# Patient Record
Sex: FEMALE | Race: WHITE | NOT HISPANIC OR LATINO | Employment: OTHER | URBAN - METROPOLITAN AREA
[De-identification: names, ages, dates, MRNs, and addresses within clinical notes are randomized per-mention and may not be internally consistent; named-entity substitution may affect disease eponyms.]

---

## 2017-01-10 ENCOUNTER — RECORDS - HEALTHEAST (OUTPATIENT)
Dept: LAB | Facility: CLINIC | Age: 77
End: 2017-01-10

## 2017-01-10 LAB
CHOLEST SERPL-MCNC: 149 MG/DL
FASTING STATUS PATIENT QL REPORTED: YES
HDLC SERPL-MCNC: 52 MG/DL
LDLC SERPL CALC-MCNC: 84 MG/DL
TRIGL SERPL-MCNC: 64 MG/DL

## 2017-01-26 ENCOUNTER — AMBULATORY - HEALTHEAST (OUTPATIENT)
Dept: CARDIOLOGY | Facility: CLINIC | Age: 77
End: 2017-01-26

## 2017-01-26 DIAGNOSIS — Z95.0 PACEMAKER: ICD-10-CM

## 2017-03-09 ENCOUNTER — SURGERY - HEALTHEAST (OUTPATIENT)
Dept: SURGERY | Facility: CLINIC | Age: 77
End: 2017-03-09

## 2017-03-09 ENCOUNTER — ANESTHESIA - HEALTHEAST (OUTPATIENT)
Dept: SURGERY | Facility: CLINIC | Age: 77
End: 2017-03-09

## 2017-03-09 ASSESSMENT — MIFFLIN-ST. JEOR: SCORE: 1194.83

## 2017-04-03 ENCOUNTER — AMBULATORY - HEALTHEAST (OUTPATIENT)
Dept: CARDIOLOGY | Facility: CLINIC | Age: 77
End: 2017-04-03

## 2017-04-07 ENCOUNTER — AMBULATORY - HEALTHEAST (OUTPATIENT)
Dept: CARDIOLOGY | Facility: CLINIC | Age: 77
End: 2017-04-07

## 2017-04-07 DIAGNOSIS — I47.10 PAROXYSMAL SUPRAVENTRICULAR TACHYCARDIA (H): ICD-10-CM

## 2017-04-07 DIAGNOSIS — I10 ESSENTIAL HYPERTENSION WITH GOAL BLOOD PRESSURE LESS THAN 140/90: ICD-10-CM

## 2017-04-07 DIAGNOSIS — Z95.0 PACEMAKER: ICD-10-CM

## 2017-04-07 DIAGNOSIS — I44.2 THIRD DEGREE AV BLOCK (H): ICD-10-CM

## 2017-04-07 ASSESSMENT — MIFFLIN-ST. JEOR: SCORE: 1241.32

## 2017-07-03 ENCOUNTER — AMBULATORY - HEALTHEAST (OUTPATIENT)
Dept: CARDIOLOGY | Facility: CLINIC | Age: 77
End: 2017-07-03

## 2017-07-03 DIAGNOSIS — Z95.0 CARDIAC PACEMAKER IN SITU: ICD-10-CM

## 2017-07-03 LAB — HCC DEVICE COMMENTS: NORMAL

## 2017-10-09 ENCOUNTER — AMBULATORY - HEALTHEAST (OUTPATIENT)
Dept: CARDIOLOGY | Facility: CLINIC | Age: 77
End: 2017-10-09

## 2017-10-09 DIAGNOSIS — Z95.0 CARDIAC PACEMAKER IN SITU: ICD-10-CM

## 2017-10-09 LAB — HCC DEVICE COMMENTS: NORMAL

## 2017-12-04 ENCOUNTER — RECORDS - HEALTHEAST (OUTPATIENT)
Dept: LAB | Facility: CLINIC | Age: 77
End: 2017-12-04

## 2017-12-04 LAB
CHOLEST SERPL-MCNC: 147 MG/DL
FASTING STATUS PATIENT QL REPORTED: YES
HDLC SERPL-MCNC: 55 MG/DL
LDLC SERPL CALC-MCNC: 75 MG/DL
TRIGL SERPL-MCNC: 84 MG/DL

## 2017-12-13 ENCOUNTER — AMBULATORY - HEALTHEAST (OUTPATIENT)
Dept: CARDIOLOGY | Facility: CLINIC | Age: 77
End: 2017-12-13

## 2017-12-13 ENCOUNTER — COMMUNICATION - HEALTHEAST (OUTPATIENT)
Dept: CARDIOLOGY | Facility: CLINIC | Age: 77
End: 2017-12-13

## 2017-12-13 DIAGNOSIS — Z95.0 CARDIAC PACEMAKER IN SITU: ICD-10-CM

## 2017-12-14 LAB — HCC DEVICE COMMENTS: NORMAL

## 2018-01-04 ENCOUNTER — RECORDS - HEALTHEAST (OUTPATIENT)
Dept: LAB | Facility: CLINIC | Age: 78
End: 2018-01-04

## 2018-01-06 LAB — BACTERIA SPEC CULT: ABNORMAL

## 2018-01-16 ENCOUNTER — AMBULATORY - HEALTHEAST (OUTPATIENT)
Dept: CARDIOLOGY | Facility: CLINIC | Age: 78
End: 2018-01-16

## 2018-01-16 DIAGNOSIS — Z95.0 CARDIAC PACEMAKER IN SITU: ICD-10-CM

## 2018-01-16 LAB — HCC DEVICE COMMENTS: NORMAL

## 2018-03-23 ENCOUNTER — HOSPITAL ENCOUNTER (OUTPATIENT)
Dept: NUCLEAR MEDICINE | Facility: HOSPITAL | Age: 78
Discharge: HOME | End: 2018-03-23
Attending: INTERNAL MEDICINE

## 2018-03-23 ENCOUNTER — HOSPITAL ENCOUNTER (OUTPATIENT)
Dept: CARDIOLOGY | Facility: HOSPITAL | Age: 78
Discharge: HOME | End: 2018-03-23
Attending: INTERNAL MEDICINE

## 2018-03-23 DIAGNOSIS — R07.9 CHEST PAIN: ICD-10-CM

## 2018-03-23 LAB
CV STRESS CURRENT BP HE: NORMAL
CV STRESS CURRENT HR HE: 100
CV STRESS CURRENT HR HE: 100
CV STRESS CURRENT HR HE: 67
CV STRESS CURRENT HR HE: 73
CV STRESS CURRENT HR HE: 77
CV STRESS CURRENT HR HE: 78
CV STRESS CURRENT HR HE: 80
CV STRESS CURRENT HR HE: 81
CV STRESS CURRENT HR HE: 82
CV STRESS CURRENT HR HE: 84
CV STRESS CURRENT HR HE: 92
CV STRESS CURRENT HR HE: 92
CV STRESS CURRENT HR HE: 96
CV STRESS CURRENT HR HE: 99
CV STRESS DEVIATION TIME HE: NORMAL
CV STRESS ECHO PERCENT HR HE: NORMAL
CV STRESS EXERCISE STAGE HE: NORMAL
CV STRESS FINAL RESTING BP HE: NORMAL
CV STRESS FINAL RESTING HR HE: 81
CV STRESS MAX HR HE: 100
CV STRESS MAX TREADMILL GRADE HE: 0
CV STRESS MAX TREADMILL SPEED HE: 0
CV STRESS PEAK DIA BP HE: NORMAL
CV STRESS PEAK SYS BP HE: NORMAL
CV STRESS PHASE HE: NORMAL
CV STRESS PROTOCOL HE: NORMAL
CV STRESS RESTING PT POSITION HE: NORMAL
CV STRESS ST DEVIATION AMOUNT HE: NORMAL
CV STRESS ST DEVIATION ELEVATION HE: NORMAL
CV STRESS ST EVELATION AMOUNT HE: NORMAL
CV STRESS TEST TYPE HE: NORMAL
CV STRESS TOTAL STAGE TIME MIN 1 HE: NORMAL
NUC STRESS EJECTION FRACTION: 70 %
STRESS ECHO BASELINE BP: NORMAL
STRESS ECHO BASELINE HR: 71
STRESS ECHO CALCULATED PERCENT HR: 69 %
STRESS ECHO LAST STRESS BP: NORMAL
STRESS ECHO LAST STRESS HR: 92

## 2018-04-11 ENCOUNTER — RECORDS - HEALTHEAST (OUTPATIENT)
Dept: LAB | Facility: CLINIC | Age: 78
End: 2018-04-11

## 2018-04-11 LAB
ANION GAP SERPL CALCULATED.3IONS-SCNC: 12 MMOL/L (ref 5–18)
BUN SERPL-MCNC: 21 MG/DL (ref 8–28)
CALCIUM SERPL-MCNC: 9.7 MG/DL (ref 8.5–10.5)
CHLORIDE BLD-SCNC: 106 MMOL/L (ref 98–107)
CO2 SERPL-SCNC: 24 MMOL/L (ref 22–31)
CREAT SERPL-MCNC: 1.14 MG/DL (ref 0.6–1.1)
GFR SERPL CREATININE-BSD FRML MDRD: 46 ML/MIN/1.73M2
GLUCOSE BLD-MCNC: 97 MG/DL (ref 70–125)
POTASSIUM BLD-SCNC: 5.2 MMOL/L (ref 3.5–5)
SODIUM SERPL-SCNC: 142 MMOL/L (ref 136–145)

## 2018-04-18 ENCOUNTER — RECORDS - HEALTHEAST (OUTPATIENT)
Dept: ADMINISTRATIVE | Facility: OTHER | Age: 78
End: 2018-04-18

## 2018-04-18 ENCOUNTER — AMBULATORY - HEALTHEAST (OUTPATIENT)
Dept: CARDIOLOGY | Facility: CLINIC | Age: 78
End: 2018-04-18

## 2018-04-23 ENCOUNTER — OFFICE VISIT - HEALTHEAST (OUTPATIENT)
Dept: CARDIOLOGY | Facility: CLINIC | Age: 78
End: 2018-04-23

## 2018-04-23 ENCOUNTER — AMBULATORY - HEALTHEAST (OUTPATIENT)
Dept: CARDIOLOGY | Facility: CLINIC | Age: 78
End: 2018-04-23

## 2018-04-23 DIAGNOSIS — I10 ESSENTIAL HYPERTENSION: ICD-10-CM

## 2018-04-23 DIAGNOSIS — I44.2 THIRD DEGREE AV BLOCK (H): ICD-10-CM

## 2018-04-23 DIAGNOSIS — Z95.0 CARDIAC PACEMAKER IN SITU: ICD-10-CM

## 2018-04-23 DIAGNOSIS — I47.10 PAROXYSMAL SUPRAVENTRICULAR TACHYCARDIA (H): ICD-10-CM

## 2018-04-23 LAB — HCC DEVICE COMMENTS: NORMAL

## 2018-04-23 ASSESSMENT — MIFFLIN-ST. JEOR: SCORE: 1281.58

## 2018-07-16 ENCOUNTER — AMBULATORY - HEALTHEAST (OUTPATIENT)
Dept: CARDIOLOGY | Facility: CLINIC | Age: 78
End: 2018-07-16

## 2018-07-16 DIAGNOSIS — Z95.0 CARDIAC PACEMAKER IN SITU: ICD-10-CM

## 2018-07-16 LAB
HCC DEVICE COMMENTS: NORMAL
HCC DEVICE IMPLANTING PROVIDER: NORMAL
HCC DEVICE MANUFACTURE: NORMAL
HCC DEVICE MODEL: NORMAL
HCC DEVICE SERIAL NUMBER: NORMAL
HCC DEVICE TYPE: NORMAL

## 2018-08-08 ENCOUNTER — RECORDS - HEALTHEAST (OUTPATIENT)
Dept: LAB | Facility: CLINIC | Age: 78
End: 2018-08-08

## 2018-08-08 LAB
ANION GAP SERPL CALCULATED.3IONS-SCNC: 13 MMOL/L (ref 5–18)
BUN SERPL-MCNC: 15 MG/DL (ref 8–28)
CALCIUM SERPL-MCNC: 10.3 MG/DL (ref 8.5–10.5)
CHLORIDE BLD-SCNC: 106 MMOL/L (ref 98–107)
CHOLEST SERPL-MCNC: 141 MG/DL
CO2 SERPL-SCNC: 26 MMOL/L (ref 22–31)
CREAT SERPL-MCNC: 1.21 MG/DL (ref 0.6–1.1)
CREAT UR-MCNC: 27.3 MG/DL
FASTING STATUS PATIENT QL REPORTED: NORMAL
GFR SERPL CREATININE-BSD FRML MDRD: 43 ML/MIN/1.73M2
GLUCOSE BLD-MCNC: 139 MG/DL (ref 70–125)
HDLC SERPL-MCNC: 56 MG/DL
LDLC SERPL CALC-MCNC: 71 MG/DL
MICROALBUMIN UR-MCNC: <0.5 MG/DL (ref 0–1.99)
MICROALBUMIN/CREAT UR: NORMAL MG/G
POTASSIUM BLD-SCNC: 5.8 MMOL/L (ref 3.5–5)
SODIUM SERPL-SCNC: 145 MMOL/L (ref 136–145)
TRIGL SERPL-MCNC: 69 MG/DL

## 2018-09-28 ENCOUNTER — RECORDS - HEALTHEAST (OUTPATIENT)
Dept: LAB | Facility: CLINIC | Age: 78
End: 2018-09-28

## 2018-09-28 LAB
ANION GAP SERPL CALCULATED.3IONS-SCNC: 11 MMOL/L (ref 5–18)
BUN SERPL-MCNC: 20 MG/DL (ref 8–28)
CALCIUM SERPL-MCNC: 9.9 MG/DL (ref 8.5–10.5)
CHLORIDE BLD-SCNC: 105 MMOL/L (ref 98–107)
CO2 SERPL-SCNC: 25 MMOL/L (ref 22–31)
CREAT SERPL-MCNC: 1.14 MG/DL (ref 0.6–1.1)
GFR SERPL CREATININE-BSD FRML MDRD: 46 ML/MIN/1.73M2
GLUCOSE BLD-MCNC: 130 MG/DL (ref 70–125)
POTASSIUM BLD-SCNC: 4.6 MMOL/L (ref 3.5–5)
SODIUM SERPL-SCNC: 141 MMOL/L (ref 136–145)

## 2018-10-22 ENCOUNTER — AMBULATORY - HEALTHEAST (OUTPATIENT)
Dept: CARDIOLOGY | Facility: CLINIC | Age: 78
End: 2018-10-22

## 2018-10-22 DIAGNOSIS — Z95.0 CARDIAC PACEMAKER IN SITU: ICD-10-CM

## 2019-01-28 ENCOUNTER — AMBULATORY - HEALTHEAST (OUTPATIENT)
Dept: CARDIOLOGY | Facility: CLINIC | Age: 79
End: 2019-01-28

## 2019-01-28 DIAGNOSIS — Z95.0 CARDIAC PACEMAKER IN SITU: ICD-10-CM

## 2019-04-09 ENCOUNTER — RECORDS - HEALTHEAST (OUTPATIENT)
Dept: ADMINISTRATIVE | Facility: OTHER | Age: 79
End: 2019-04-09

## 2019-04-09 ENCOUNTER — AMBULATORY - HEALTHEAST (OUTPATIENT)
Dept: CARDIOLOGY | Facility: CLINIC | Age: 79
End: 2019-04-09

## 2019-04-11 ENCOUNTER — AMBULATORY - HEALTHEAST (OUTPATIENT)
Dept: CARDIOLOGY | Facility: CLINIC | Age: 79
End: 2019-04-11

## 2019-04-11 ENCOUNTER — RECORDS - HEALTHEAST (OUTPATIENT)
Dept: LAB | Facility: CLINIC | Age: 79
End: 2019-04-11

## 2019-04-11 DIAGNOSIS — I44.2 THIRD DEGREE AV BLOCK (H): ICD-10-CM

## 2019-04-11 DIAGNOSIS — I47.19 ECTOPIC ATRIAL TACHYCARDIA (H): ICD-10-CM

## 2019-04-11 DIAGNOSIS — Z95.0 CARDIAC PACEMAKER IN SITU: ICD-10-CM

## 2019-04-11 DIAGNOSIS — I10 ESSENTIAL HYPERTENSION: ICD-10-CM

## 2019-04-11 LAB
ANION GAP SERPL CALCULATED.3IONS-SCNC: 11 MMOL/L (ref 5–18)
BUN SERPL-MCNC: 20 MG/DL (ref 8–28)
CALCIUM SERPL-MCNC: 10.3 MG/DL (ref 8.5–10.5)
CHLORIDE BLD-SCNC: 106 MMOL/L (ref 98–107)
CHOLEST SERPL-MCNC: 153 MG/DL
CO2 SERPL-SCNC: 28 MMOL/L (ref 22–31)
CREAT SERPL-MCNC: 1.29 MG/DL (ref 0.6–1.1)
FASTING STATUS PATIENT QL REPORTED: NO
GFR SERPL CREATININE-BSD FRML MDRD: 40 ML/MIN/1.73M2
GLUCOSE BLD-MCNC: 172 MG/DL (ref 70–125)
HCC DEVICE COMMENTS: NORMAL
HCC DEVICE IMPLANTING PROVIDER: NORMAL
HCC DEVICE MANUFACTURE: NORMAL
HCC DEVICE MODEL: NORMAL
HCC DEVICE SERIAL NUMBER: NORMAL
HCC DEVICE TYPE: NORMAL
HDLC SERPL-MCNC: 55 MG/DL
LDLC SERPL CALC-MCNC: 77 MG/DL
POTASSIUM BLD-SCNC: 5.5 MMOL/L (ref 3.5–5)
SODIUM SERPL-SCNC: 145 MMOL/L (ref 136–145)
TRIGL SERPL-MCNC: 104 MG/DL

## 2019-04-11 ASSESSMENT — MIFFLIN-ST. JEOR: SCORE: 1258.33

## 2019-05-09 ENCOUNTER — RECORDS - HEALTHEAST (OUTPATIENT)
Dept: LAB | Facility: CLINIC | Age: 79
End: 2019-05-09

## 2019-05-09 LAB
ANION GAP SERPL CALCULATED.3IONS-SCNC: 11 MMOL/L (ref 5–18)
BUN SERPL-MCNC: 19 MG/DL (ref 8–28)
CALCIUM SERPL-MCNC: 10.8 MG/DL (ref 8.5–10.5)
CHLORIDE BLD-SCNC: 105 MMOL/L (ref 98–107)
CO2 SERPL-SCNC: 28 MMOL/L (ref 22–31)
CREAT SERPL-MCNC: 1.26 MG/DL (ref 0.6–1.1)
GFR SERPL CREATININE-BSD FRML MDRD: 41 ML/MIN/1.73M2
GLUCOSE BLD-MCNC: 125 MG/DL (ref 70–125)
POTASSIUM BLD-SCNC: 5.2 MMOL/L (ref 3.5–5)
SODIUM SERPL-SCNC: 144 MMOL/L (ref 136–145)

## 2019-07-11 ENCOUNTER — AMBULATORY - HEALTHEAST (OUTPATIENT)
Dept: CARDIOLOGY | Facility: CLINIC | Age: 79
End: 2019-07-11

## 2019-07-11 DIAGNOSIS — Z95.0 CARDIAC PACEMAKER IN SITU: ICD-10-CM

## 2019-07-22 ENCOUNTER — RECORDS - HEALTHEAST (OUTPATIENT)
Dept: LAB | Facility: CLINIC | Age: 79
End: 2019-07-22

## 2019-07-24 LAB — BACTERIA SPEC CULT: ABNORMAL

## 2019-10-02 ENCOUNTER — RECORDS - HEALTHEAST (OUTPATIENT)
Dept: LAB | Facility: CLINIC | Age: 79
End: 2019-10-02

## 2019-10-03 LAB — BACTERIA SPEC CULT: NO GROWTH

## 2019-10-10 ENCOUNTER — AMBULATORY - HEALTHEAST (OUTPATIENT)
Dept: CARDIOLOGY | Facility: CLINIC | Age: 79
End: 2019-10-10

## 2019-10-10 DIAGNOSIS — Z95.0 CARDIAC PACEMAKER IN SITU: ICD-10-CM

## 2019-10-29 ENCOUNTER — RECORDS - HEALTHEAST (OUTPATIENT)
Dept: LAB | Facility: CLINIC | Age: 79
End: 2019-10-29

## 2019-10-29 LAB
ANION GAP SERPL CALCULATED.3IONS-SCNC: 10 MMOL/L (ref 5–18)
BUN SERPL-MCNC: 22 MG/DL (ref 8–28)
CALCIUM SERPL-MCNC: 9.7 MG/DL (ref 8.5–10.5)
CHLORIDE BLD-SCNC: 106 MMOL/L (ref 98–107)
CO2 SERPL-SCNC: 27 MMOL/L (ref 22–31)
CREAT SERPL-MCNC: 1.16 MG/DL (ref 0.6–1.1)
GFR SERPL CREATININE-BSD FRML MDRD: 45 ML/MIN/1.73M2
GLUCOSE BLD-MCNC: 111 MG/DL (ref 70–125)
POTASSIUM BLD-SCNC: 4.6 MMOL/L (ref 3.5–5)
SODIUM SERPL-SCNC: 143 MMOL/L (ref 136–145)

## 2020-01-15 ENCOUNTER — AMBULATORY - HEALTHEAST (OUTPATIENT)
Dept: CARDIOLOGY | Facility: CLINIC | Age: 80
End: 2020-01-15

## 2020-01-15 DIAGNOSIS — Z95.0 CARDIAC PACEMAKER IN SITU: ICD-10-CM

## 2020-01-15 DIAGNOSIS — I44.2 THIRD DEGREE AV BLOCK (H): ICD-10-CM

## 2020-04-09 ENCOUNTER — RECORDS - HEALTHEAST (OUTPATIENT)
Dept: ADMINISTRATIVE | Facility: OTHER | Age: 80
End: 2020-04-09

## 2020-04-14 ENCOUNTER — AMBULATORY - HEALTHEAST (OUTPATIENT)
Dept: CARDIOLOGY | Facility: CLINIC | Age: 80
End: 2020-04-14

## 2020-04-14 DIAGNOSIS — I44.2 THIRD DEGREE AV BLOCK (H): ICD-10-CM

## 2020-04-14 DIAGNOSIS — Z95.0 CARDIAC PACEMAKER IN SITU: ICD-10-CM

## 2020-04-15 ENCOUNTER — OFFICE VISIT - HEALTHEAST (OUTPATIENT)
Dept: CARDIOLOGY | Facility: CLINIC | Age: 80
End: 2020-04-15

## 2020-04-15 DIAGNOSIS — I47.19 ECTOPIC ATRIAL TACHYCARDIA (H): ICD-10-CM

## 2020-04-15 DIAGNOSIS — I10 ESSENTIAL HYPERTENSION: ICD-10-CM

## 2020-04-15 DIAGNOSIS — I44.2 THIRD DEGREE AV BLOCK (H): ICD-10-CM

## 2020-04-28 ENCOUNTER — RECORDS - HEALTHEAST (OUTPATIENT)
Dept: LAB | Facility: CLINIC | Age: 80
End: 2020-04-28

## 2020-04-28 LAB
ALBUMIN SERPL-MCNC: 3.7 G/DL (ref 3.5–5)
ALP SERPL-CCNC: 83 U/L (ref 45–120)
ALT SERPL W P-5'-P-CCNC: 25 U/L (ref 0–45)
ANION GAP SERPL CALCULATED.3IONS-SCNC: 13 MMOL/L (ref 5–18)
AST SERPL W P-5'-P-CCNC: 25 U/L (ref 0–40)
BILIRUB SERPL-MCNC: 0.5 MG/DL (ref 0–1)
BUN SERPL-MCNC: 19 MG/DL (ref 8–28)
CALCIUM SERPL-MCNC: 9.5 MG/DL (ref 8.5–10.5)
CHLORIDE BLD-SCNC: 105 MMOL/L (ref 98–107)
CHOLEST SERPL-MCNC: 127 MG/DL
CO2 SERPL-SCNC: 25 MMOL/L (ref 22–31)
CREAT SERPL-MCNC: 1.11 MG/DL (ref 0.6–1.1)
FASTING STATUS PATIENT QL REPORTED: ABNORMAL
GFR SERPL CREATININE-BSD FRML MDRD: 48 ML/MIN/1.73M2
GLUCOSE BLD-MCNC: 113 MG/DL (ref 70–125)
HDLC SERPL-MCNC: 45 MG/DL
LDLC SERPL CALC-MCNC: 67 MG/DL
POTASSIUM BLD-SCNC: 4.7 MMOL/L (ref 3.5–5)
PROT SERPL-MCNC: 6.7 G/DL (ref 6–8)
SODIUM SERPL-SCNC: 143 MMOL/L (ref 136–145)
TRIGL SERPL-MCNC: 73 MG/DL

## 2020-07-15 ENCOUNTER — AMBULATORY - HEALTHEAST (OUTPATIENT)
Dept: CARDIOLOGY | Facility: CLINIC | Age: 80
End: 2020-07-15

## 2020-07-15 DIAGNOSIS — I44.2 THIRD DEGREE AV BLOCK (H): ICD-10-CM

## 2020-07-15 DIAGNOSIS — Z95.0 CARDIAC PACEMAKER IN SITU: ICD-10-CM

## 2020-10-13 ENCOUNTER — RECORDS - HEALTHEAST (OUTPATIENT)
Dept: LAB | Facility: CLINIC | Age: 80
End: 2020-10-13

## 2020-10-13 LAB
ANION GAP SERPL CALCULATED.3IONS-SCNC: 12 MMOL/L (ref 5–18)
BUN SERPL-MCNC: 25 MG/DL (ref 8–28)
CALCIUM SERPL-MCNC: 9.5 MG/DL (ref 8.5–10.5)
CHLORIDE BLD-SCNC: 102 MMOL/L (ref 98–107)
CO2 SERPL-SCNC: 25 MMOL/L (ref 22–31)
CREAT SERPL-MCNC: 1.18 MG/DL (ref 0.6–1.1)
GFR SERPL CREATININE-BSD FRML MDRD: 44 ML/MIN/1.73M2
GLUCOSE BLD-MCNC: 125 MG/DL (ref 70–125)
POTASSIUM BLD-SCNC: 4.4 MMOL/L (ref 3.5–5)
SODIUM SERPL-SCNC: 139 MMOL/L (ref 136–145)

## 2020-10-19 ENCOUNTER — AMBULATORY - HEALTHEAST (OUTPATIENT)
Dept: CARDIOLOGY | Facility: CLINIC | Age: 80
End: 2020-10-19

## 2020-10-19 DIAGNOSIS — I44.2 THIRD DEGREE AV BLOCK (H): ICD-10-CM

## 2020-10-19 DIAGNOSIS — Z95.0 CARDIAC PACEMAKER IN SITU: ICD-10-CM

## 2021-01-18 ENCOUNTER — AMBULATORY - HEALTHEAST (OUTPATIENT)
Dept: CARDIOLOGY | Facility: CLINIC | Age: 81
End: 2021-01-18

## 2021-01-18 DIAGNOSIS — Z95.0 CARDIAC PACEMAKER IN SITU: ICD-10-CM

## 2021-01-18 DIAGNOSIS — I44.2 THIRD DEGREE AV BLOCK (H): ICD-10-CM

## 2021-03-02 ENCOUNTER — AMBULATORY - HEALTHEAST (OUTPATIENT)
Dept: CARDIOLOGY | Facility: CLINIC | Age: 81
End: 2021-03-02

## 2021-03-02 ENCOUNTER — COMMUNICATION - HEALTHEAST (OUTPATIENT)
Dept: SCHEDULING | Facility: CLINIC | Age: 81
End: 2021-03-02

## 2021-03-02 DIAGNOSIS — I44.2 THIRD DEGREE AV BLOCK (H): ICD-10-CM

## 2021-03-02 DIAGNOSIS — Z95.0 CARDIAC PACEMAKER IN SITU: ICD-10-CM

## 2021-03-08 ENCOUNTER — RECORDS - HEALTHEAST (OUTPATIENT)
Dept: LAB | Facility: CLINIC | Age: 81
End: 2021-03-08

## 2021-03-08 LAB
ANION GAP SERPL CALCULATED.3IONS-SCNC: 11 MMOL/L (ref 5–18)
BUN SERPL-MCNC: 32 MG/DL (ref 8–28)
CALCIUM SERPL-MCNC: 8.7 MG/DL (ref 8.5–10.5)
CHLORIDE BLD-SCNC: 102 MMOL/L (ref 98–107)
CO2 SERPL-SCNC: 21 MMOL/L (ref 22–31)
CREAT SERPL-MCNC: 1.32 MG/DL (ref 0.6–1.1)
GFR SERPL CREATININE-BSD FRML MDRD: 39 ML/MIN/1.73M2
GLUCOSE BLD-MCNC: 136 MG/DL (ref 70–125)
POTASSIUM BLD-SCNC: 4.6 MMOL/L (ref 3.5–5)
SODIUM SERPL-SCNC: 134 MMOL/L (ref 136–145)

## 2021-03-10 ENCOUNTER — COMMUNICATION - HEALTHEAST (OUTPATIENT)
Dept: CARDIOLOGY | Facility: CLINIC | Age: 81
End: 2021-03-10

## 2021-03-11 ENCOUNTER — OFFICE VISIT - HEALTHEAST (OUTPATIENT)
Dept: CARDIOLOGY | Facility: CLINIC | Age: 81
End: 2021-03-11

## 2021-03-11 DIAGNOSIS — I50.9 CHF (CONGESTIVE HEART FAILURE) (H): ICD-10-CM

## 2021-03-11 RX ORDER — CARVEDILOL 25 MG/1
25 TABLET ORAL 2 TIMES DAILY WITH MEALS
Qty: 180 TABLET | Refills: 3 | Status: SHIPPED | OUTPATIENT
Start: 2021-03-11 | End: 2022-03-08

## 2021-03-11 ASSESSMENT — MIFFLIN-ST. JEOR: SCORE: 1196.64

## 2021-03-12 ENCOUNTER — COMMUNICATION - HEALTHEAST (OUTPATIENT)
Dept: CARDIOLOGY | Facility: CLINIC | Age: 81
End: 2021-03-12

## 2021-03-16 ENCOUNTER — COMMUNICATION - HEALTHEAST (OUTPATIENT)
Dept: CARDIOLOGY | Facility: CLINIC | Age: 81
End: 2021-03-16

## 2021-03-16 DIAGNOSIS — I10 HYPERTENSION, UNSPECIFIED TYPE: ICD-10-CM

## 2021-03-31 ENCOUNTER — AMBULATORY - HEALTHEAST (OUTPATIENT)
Dept: CARDIOLOGY | Facility: CLINIC | Age: 81
End: 2021-03-31

## 2021-03-31 ENCOUNTER — RECORDS - HEALTHEAST (OUTPATIENT)
Dept: ADMINISTRATIVE | Facility: OTHER | Age: 81
End: 2021-03-31

## 2021-04-08 ENCOUNTER — OFFICE VISIT - HEALTHEAST (OUTPATIENT)
Dept: CARDIOLOGY | Facility: CLINIC | Age: 81
End: 2021-04-08

## 2021-04-08 DIAGNOSIS — I50.32 CHRONIC DIASTOLIC HEART FAILURE (H): ICD-10-CM

## 2021-04-08 DIAGNOSIS — I10 HYPERTENSION, UNSPECIFIED TYPE: ICD-10-CM

## 2021-04-09 ENCOUNTER — COMMUNICATION - HEALTHEAST (OUTPATIENT)
Dept: CARDIOLOGY | Facility: CLINIC | Age: 81
End: 2021-04-09

## 2021-04-13 ENCOUNTER — RECORDS - HEALTHEAST (OUTPATIENT)
Dept: LAB | Facility: CLINIC | Age: 81
End: 2021-04-13

## 2021-04-13 LAB
CHOLEST SERPL-MCNC: 120 MG/DL
FASTING STATUS PATIENT QL REPORTED: YES
HDLC SERPL-MCNC: 38 MG/DL
LDLC SERPL CALC-MCNC: 63 MG/DL
TRIGL SERPL-MCNC: 96 MG/DL

## 2021-04-21 ENCOUNTER — RECORDS - HEALTHEAST (OUTPATIENT)
Dept: ADMINISTRATIVE | Facility: OTHER | Age: 81
End: 2021-04-21

## 2021-04-21 ENCOUNTER — AMBULATORY - HEALTHEAST (OUTPATIENT)
Dept: CARDIOLOGY | Facility: CLINIC | Age: 81
End: 2021-04-21

## 2021-04-23 ENCOUNTER — AMBULATORY - HEALTHEAST (OUTPATIENT)
Dept: CARDIOLOGY | Facility: CLINIC | Age: 81
End: 2021-04-23

## 2021-04-23 DIAGNOSIS — I44.2 THIRD DEGREE AV BLOCK (H): ICD-10-CM

## 2021-04-23 DIAGNOSIS — I10 MALIGNANT ESSENTIAL HYPERTENSION: ICD-10-CM

## 2021-04-23 DIAGNOSIS — Z95.0 CARDIAC PACEMAKER IN SITU: ICD-10-CM

## 2021-04-23 ASSESSMENT — MIFFLIN-ST. JEOR: SCORE: 1148.89

## 2021-04-29 ENCOUNTER — COMMUNICATION - HEALTHEAST (OUTPATIENT)
Dept: CARDIOLOGY | Facility: CLINIC | Age: 81
End: 2021-04-29

## 2021-04-29 DIAGNOSIS — I10 HYPERTENSION, UNSPECIFIED TYPE: ICD-10-CM

## 2021-04-29 RX ORDER — FUROSEMIDE 20 MG
10 TABLET ORAL DAILY
Qty: 60 TABLET | Refills: 3 | Status: SHIPPED | OUTPATIENT
Start: 2021-04-29 | End: 2021-09-21

## 2021-05-08 ENCOUNTER — COMMUNICATION - HEALTHEAST (OUTPATIENT)
Dept: SCHEDULING | Facility: CLINIC | Age: 81
End: 2021-05-08

## 2021-05-10 ENCOUNTER — OFFICE VISIT - HEALTHEAST (OUTPATIENT)
Dept: CARDIOLOGY | Facility: CLINIC | Age: 81
End: 2021-05-10

## 2021-05-10 DIAGNOSIS — E11.9 DM (DIABETES MELLITUS), TYPE 2 (H): ICD-10-CM

## 2021-05-10 DIAGNOSIS — I10 ESSENTIAL HYPERTENSION: ICD-10-CM

## 2021-05-10 DIAGNOSIS — Z95.0 CARDIAC PACEMAKER IN SITU: ICD-10-CM

## 2021-05-10 DIAGNOSIS — E78.5 DYSLIPIDEMIA, GOAL LDL BELOW 70: ICD-10-CM

## 2021-05-10 DIAGNOSIS — R07.89 CHEST PRESSURE: ICD-10-CM

## 2021-05-10 ASSESSMENT — MIFFLIN-ST. JEOR: SCORE: 1142.21

## 2021-05-12 ENCOUNTER — HOSPITAL ENCOUNTER (OUTPATIENT)
Dept: NUCLEAR MEDICINE | Facility: CLINIC | Age: 81
Discharge: HOME | End: 2021-05-12
Attending: INTERNAL MEDICINE

## 2021-05-12 ENCOUNTER — HOSPITAL ENCOUNTER (OUTPATIENT)
Dept: CARDIOLOGY | Facility: CLINIC | Age: 81
Discharge: HOME | End: 2021-05-12
Attending: INTERNAL MEDICINE

## 2021-05-12 ENCOUNTER — AMBULATORY - HEALTHEAST (OUTPATIENT)
Dept: CARDIOLOGY | Facility: CLINIC | Age: 81
End: 2021-05-12

## 2021-05-12 DIAGNOSIS — R94.39 ABNORMAL NUCLEAR STRESS TEST: ICD-10-CM

## 2021-05-12 DIAGNOSIS — R07.89 CHEST PRESSURE: ICD-10-CM

## 2021-05-12 LAB
CV STRESS CURRENT BP HE: NORMAL
CV STRESS CURRENT HR HE: 66
CV STRESS CURRENT HR HE: 72
CV STRESS CURRENT HR HE: 74
CV STRESS CURRENT HR HE: 78
CV STRESS CURRENT HR HE: 87
CV STRESS CURRENT HR HE: 88
CV STRESS CURRENT HR HE: 88
CV STRESS CURRENT HR HE: 90
CV STRESS CURRENT HR HE: 91
CV STRESS CURRENT HR HE: 93
CV STRESS CURRENT HR HE: 94
CV STRESS CURRENT HR HE: 94
CV STRESS CURRENT HR HE: 96
CV STRESS CURRENT HR HE: 96
CV STRESS CURRENT HR HE: 97
CV STRESS DEVIATION TIME HE: NORMAL
CV STRESS ECHO PERCENT HR HE: NORMAL
CV STRESS EXERCISE STAGE HE: NORMAL
CV STRESS FINAL RESTING BP HE: NORMAL
CV STRESS FINAL RESTING HR HE: 87
CV STRESS MAX HR HE: 97
CV STRESS MAX TREADMILL GRADE HE: 0
CV STRESS MAX TREADMILL SPEED HE: 0
CV STRESS PEAK DIA BP HE: NORMAL
CV STRESS PEAK SYS BP HE: NORMAL
CV STRESS PHASE HE: NORMAL
CV STRESS PROTOCOL HE: NORMAL
CV STRESS RESTING PT POSITION HE: NORMAL
CV STRESS RESTING PT POSITION HE: NORMAL
CV STRESS ST DEVIATION AMOUNT HE: NORMAL
CV STRESS ST DEVIATION ELEVATION HE: NORMAL
CV STRESS ST EVELATION AMOUNT HE: NORMAL
CV STRESS TEST TYPE HE: NORMAL
CV STRESS TOTAL STAGE TIME MIN 1 HE: NORMAL
NUC STRESS EJECTION FRACTION: 64 %
RATE PRESSURE PRODUCT: NORMAL
STRESS ECHO BASELINE DIASTOLIC HE: 62
STRESS ECHO BASELINE HR: 68
STRESS ECHO BASELINE SYSTOLIC BP: 180
STRESS ECHO CALCULATED PERCENT HR: 69 %
STRESS ECHO LAST STRESS DIASTOLIC BP: 67
STRESS ECHO LAST STRESS HR: 94
STRESS ECHO LAST STRESS SYSTOLIC BP: 155
STRESS ECHO TARGET HR: 141

## 2021-05-13 ENCOUNTER — COMMUNICATION - HEALTHEAST (OUTPATIENT)
Dept: CARDIOLOGY | Facility: CLINIC | Age: 81
End: 2021-05-13

## 2021-05-15 ENCOUNTER — AMBULATORY - HEALTHEAST (OUTPATIENT)
Dept: CARDIOLOGY | Facility: CLINIC | Age: 81
End: 2021-05-15

## 2021-05-15 DIAGNOSIS — I44.2 THIRD DEGREE AV BLOCK (H): ICD-10-CM

## 2021-05-15 DIAGNOSIS — Z95.0 CARDIAC PACEMAKER IN SITU: ICD-10-CM

## 2021-05-17 ENCOUNTER — COMMUNICATION - HEALTHEAST (OUTPATIENT)
Dept: CARDIOLOGY | Facility: CLINIC | Age: 81
End: 2021-05-17

## 2021-05-18 ENCOUNTER — AMBULATORY - HEALTHEAST (OUTPATIENT)
Dept: CARDIOLOGY | Facility: CLINIC | Age: 81
End: 2021-05-18

## 2021-05-18 ENCOUNTER — RECORDS - HEALTHEAST (OUTPATIENT)
Dept: ADMINISTRATIVE | Facility: OTHER | Age: 81
End: 2021-05-18

## 2021-05-20 ENCOUNTER — RECORDS - HEALTHEAST (OUTPATIENT)
Dept: ADMINISTRATIVE | Facility: OTHER | Age: 81
End: 2021-05-20

## 2021-05-20 ENCOUNTER — SURGERY - HEALTHEAST (OUTPATIENT)
Dept: CARDIOLOGY | Facility: CLINIC | Age: 81
End: 2021-05-20

## 2021-05-20 ENCOUNTER — OFFICE VISIT - HEALTHEAST (OUTPATIENT)
Dept: CARDIOLOGY | Facility: CLINIC | Age: 81
End: 2021-05-20

## 2021-05-20 ENCOUNTER — AMBULATORY - HEALTHEAST (OUTPATIENT)
Dept: CARDIOLOGY | Facility: CLINIC | Age: 81
End: 2021-05-20

## 2021-05-20 DIAGNOSIS — R94.39 ABNORMAL NUCLEAR STRESS TEST: ICD-10-CM

## 2021-05-20 DIAGNOSIS — Z11.59 ENCOUNTER FOR SCREENING FOR OTHER VIRAL DISEASES: ICD-10-CM

## 2021-05-20 ASSESSMENT — MIFFLIN-ST. JEOR: SCORE: 1119.53

## 2021-05-22 ENCOUNTER — COMMUNICATION - HEALTHEAST (OUTPATIENT)
Dept: SCHEDULING | Facility: CLINIC | Age: 81
End: 2021-05-22

## 2021-05-23 ENCOUNTER — AMBULATORY - HEALTHEAST (OUTPATIENT)
Dept: FAMILY MEDICINE | Facility: CLINIC | Age: 81
End: 2021-05-23

## 2021-05-23 DIAGNOSIS — Z11.59 ENCOUNTER FOR SCREENING FOR OTHER VIRAL DISEASES: ICD-10-CM

## 2021-05-24 LAB
SARS-COV-2 PCR COMMENT: NORMAL
SARS-COV-2 RNA SPEC QL NAA+PROBE: NEGATIVE
SARS-COV-2 VIRUS SPECIMEN SOURCE: NORMAL

## 2021-05-25 ENCOUNTER — AMBULATORY - HEALTHEAST (OUTPATIENT)
Dept: CARDIOLOGY | Facility: CLINIC | Age: 81
End: 2021-05-25

## 2021-05-25 ENCOUNTER — COMMUNICATION - HEALTHEAST (OUTPATIENT)
Dept: SCHEDULING | Facility: CLINIC | Age: 81
End: 2021-05-25

## 2021-05-26 ENCOUNTER — RECORDS - HEALTHEAST (OUTPATIENT)
Dept: ADMINISTRATIVE | Facility: OTHER | Age: 81
End: 2021-05-26

## 2021-05-26 ENCOUNTER — SURGERY - HEALTHEAST (OUTPATIENT)
Dept: CARDIOLOGY | Facility: HOSPITAL | Age: 81
End: 2021-05-26
Payer: COMMERCIAL

## 2021-05-26 ENCOUNTER — RECORDS - HEALTHEAST (OUTPATIENT)
Dept: ADMINISTRATIVE | Facility: CLINIC | Age: 81
End: 2021-05-26

## 2021-05-26 ASSESSMENT — MIFFLIN-ST. JEOR
SCORE: 1105.92
SCORE: 1108.64

## 2021-05-27 ENCOUNTER — COMMUNICATION - HEALTHEAST (OUTPATIENT)
Dept: CARDIOLOGY | Facility: CLINIC | Age: 81
End: 2021-05-27

## 2021-05-27 VITALS — DIASTOLIC BLOOD PRESSURE: 52 MMHG | SYSTOLIC BLOOD PRESSURE: 158 MMHG | RESPIRATION RATE: 16 BRPM | HEART RATE: 60 BPM

## 2021-05-27 VITALS — HEIGHT: 61 IN | BODY MASS INDEX: 29.66 KG/M2 | WEIGHT: 157 LBS | BODY MASS INDEX: 29.66 KG/M2

## 2021-05-27 DIAGNOSIS — I25.119 CORONARY ARTERY DISEASE INVOLVING NATIVE CORONARY ARTERY OF NATIVE HEART WITH ANGINA PECTORIS (H): ICD-10-CM

## 2021-05-27 RX ORDER — HYDRALAZINE HYDROCHLORIDE 100 MG/1
TABLET, FILM COATED ORAL
Qty: 270 TABLET | Refills: 1 | Status: SHIPPED | OUTPATIENT
Start: 2021-05-27 | End: 2021-12-08

## 2021-05-27 NOTE — PATIENT INSTRUCTIONS - HE
Sharyn Trent    It was a pleasure to see you at [facility] today.    Your blood pressure this morning was 170/78  Blood pressure check your primary care and follow a low-salt diet  Consider adding hydrochlorothiazide 12.5 mg daily if blood pressure is at primary care today  Follow up appointment:   Dr. Savaedra in 1 year    Contact Maegan at [phone] with questions or concerns.    Ab Saavedra MD

## 2021-05-27 NOTE — PROGRESS NOTES
In clinic device check with Dr. Saavedra.  Please see link for full device report.  Patient was informed of results and next follow up during today's visit.

## 2021-05-28 ENCOUNTER — RECORDS - HEALTHEAST (OUTPATIENT)
Dept: ADMINISTRATIVE | Facility: CLINIC | Age: 81
End: 2021-05-28

## 2021-05-28 ENCOUNTER — AMBULATORY - HEALTHEAST (OUTPATIENT)
Dept: CARDIOLOGY | Facility: CLINIC | Age: 81
End: 2021-05-28

## 2021-05-28 DIAGNOSIS — Z95.0 CARDIAC PACEMAKER IN SITU: ICD-10-CM

## 2021-05-28 DIAGNOSIS — I44.2 THIRD DEGREE AV BLOCK (H): ICD-10-CM

## 2021-05-30 ENCOUNTER — RECORDS - HEALTHEAST (OUTPATIENT)
Dept: ADMINISTRATIVE | Facility: CLINIC | Age: 81
End: 2021-05-30

## 2021-05-30 VITALS — BODY MASS INDEX: 33.31 KG/M2 | WEIGHT: 181 LBS | HEIGHT: 62 IN

## 2021-05-30 VITALS — BODY MASS INDEX: 32.57 KG/M2 | WEIGHT: 172.5 LBS | HEIGHT: 61 IN

## 2021-06-01 VITALS — BODY MASS INDEX: 30.92 KG/M2 | HEIGHT: 64 IN | WEIGHT: 181.13 LBS

## 2021-06-02 ENCOUNTER — RECORDS - HEALTHEAST (OUTPATIENT)
Dept: ADMINISTRATIVE | Facility: CLINIC | Age: 81
End: 2021-06-02

## 2021-06-02 VITALS — BODY MASS INDEX: 30.05 KG/M2 | WEIGHT: 176 LBS | HEIGHT: 64 IN

## 2021-06-07 NOTE — PATIENT INSTRUCTIONS - HE
Sharyn RED Trent    It was a pleasure to talk to you for a telephone clinic visit.     Continue current medications  Follow up appointment:   Dr. Saavedra in 1 year in device clinic    Contact Maegan at [phone] with questions or concerns.    Ab Saavedra MD

## 2021-06-07 NOTE — PROGRESS NOTES
Review of Systems - History obtained from the patient  General ROS: negative  Psychological ROS: negative  Ophthalmic ROS: positive for - visual disturbance (cataract)  ENT ROS: negative  Hematological and Lymphatic ROS: negative  Respiratory ROS: negative  Cardiovascular ROS: positive for - irregular heartbeat, palpitations and leg swelling  Gastrointestinal ROS: negative  Genito-Urinary ROS: positive for - frequent urination at night  Musculoskeletal ROS: positive for - joint pain (from arthritis) and muscle pain  Neurological ROS: positive for - loss of balance  Dermatological ROS: negative

## 2021-06-09 ENCOUNTER — RECORDS - HEALTHEAST (OUTPATIENT)
Dept: ADMINISTRATIVE | Facility: CLINIC | Age: 81
End: 2021-06-09

## 2021-06-09 ENCOUNTER — OFFICE VISIT - HEALTHEAST (OUTPATIENT)
Dept: CARDIOLOGY | Facility: CLINIC | Age: 81
End: 2021-06-09

## 2021-06-09 DIAGNOSIS — I10 MALIGNANT ESSENTIAL HYPERTENSION: ICD-10-CM

## 2021-06-09 DIAGNOSIS — I50.31 ACUTE DIASTOLIC CHF (CONGESTIVE HEART FAILURE) (H): ICD-10-CM

## 2021-06-09 ASSESSMENT — MIFFLIN-ST. JEOR: SCORE: 1124.06

## 2021-06-09 NOTE — ANESTHESIA PROCEDURE NOTES
Peripheral Block    Patient location during procedure: pre-op  Start time: 3/9/2017 10:02 AM  End time: 3/9/2017 10:05 AM  post-op analgesia per surgeon order as noted in medical record  Staffing:  Performing  Anesthesiologist: GALEN CABRERA  Preanesthetic Checklist  Completed: patient identified, site marked, risks, benefits, and alternatives discussed, timeout performed, consent obtained, at patient's request, airway assessed, oxygen available, suction available, emergency drugs available and hand hygiene performed  Peripheral Block  Block type: brachial plexus, interscalene  Prep: ChloraPrep  Patient position: sitting  Patient monitoring: cardiac monitor, continuous pulse oximetry, heart rate and blood pressure  Laterality: left  Injection technique: ultrasound guided    Ultrasound used to visualize needle placement in proximity to nerve being blocked: yes   Permanent ultrasound image captured for medical record    Needle  Needle type: echogenic   Needle gauge: 22 G  Needle length: 2 in  no peripheral nerve catheter placed  Assessment  Injection assessment: no difficulty with injection, negative aspiration for heme, no paresthesia on injection and incremental injection

## 2021-06-09 NOTE — ANESTHESIA PREPROCEDURE EVALUATION
Anesthesia Evaluation      Patient summary reviewed   No history of anesthetic complications     Airway   Mallampati: II  Neck ROM: full   Pulmonary - negative ROS and normal exam   (-) shortness of breath, recent URI, sleep apnea                         Cardiovascular   Exercise tolerance: > or = 4 METS  (+) pacemaker (pacer for type II heart block), hypertension, dysrhythmias (RBBB), , hypercholesterolemia,     (-) CAD (clean heart cath 3/16), angina  ECG reviewed  Rhythm: regular  Rate: normal,         Neuro/Psych    (+) anxiety/panic attacks,   (-) no neuromuscular disease, no CVA    Endo/Other    (+) diabetes mellitus type 2 well controlled, obesity,      GI/Hepatic/Renal    (+) GERD,   chronic renal disease,      Other findings: hgb 12.6      Dental    (+) caps and chipped                       Anesthesia Plan  Planned anesthetic: general endotracheal  ISB / SCP blocks for postop pain  ASA 3       Anesthesia plan special considerations: antiemetics,

## 2021-06-09 NOTE — ANESTHESIA POSTPROCEDURE EVALUATION
Patient: Sharyn Trent  LEFT SHOULDER ARTHROSCOPY DECOMPRESSION DISTAL CLAVICLE EXCISION  ROTATOR CUFF REPAIR BICEPS TENOTOMY AND EXTENSIVE DEBRIDEMENT  Anesthesia type: general    Patient location: PACU  Last vitals:   Vitals:    03/09/17 1330   BP: 159/75   Pulse: (!) 58   Resp: 12   Temp:    SpO2: 99%     Post vital signs: stable  Level of consciousness: awake and responds to simple questions  Post-anesthesia pain: pain controlled  Post-anesthesia nausea and vomiting: no  Pulmonary: unassisted, return to baseline  Cardiovascular: stable and blood pressure at baseline  Hydration: adequate  Anesthetic events: no    QCDR Measures:  ASA# 11 - Mary-op Cardiac Arrest: ASA11B - Patient did NOT experience unanticipated cardiac arrest  ASA# 12 - Mary-op Mortality Rate: ASA12B - Patient did NOT die  ASA# 13 - PACU Re-Intubation Rate: ASA13B - Patient did NOT require a new airway mgmt  ASA# 10 - Composite Anes Safety: ASA10A - No serious adverse event  ASA# 38 - New Corneal Injury: ASA38A - No new exposure keratitis or corneal abrasion in PACU    Additional Notes:

## 2021-06-09 NOTE — ANESTHESIA PROCEDURE NOTES
Peripheral Block    Patient location during procedure: pre-op  Start time: 3/9/2017 10:06 AM  End time: 3/9/2017 10:07 AM  post-op analgesia per surgeon order as noted in medical record  Staffing:  Performing  Anesthesiologist: GALEN CABRERA  Preanesthetic Checklist  Completed: patient identified, site marked, risks, benefits, and alternatives discussed, timeout performed, consent obtained, at patient's request, airway assessed, oxygen available, suction available, emergency drugs available and hand hygiene performed  Peripheral Block  Block type: other, superficial cervical plexus  Prep: ChloraPrep  Patient position: sitting  Patient monitoring: cardiac monitor, continuous pulse oximetry, heart rate and blood pressure  Laterality: left  Injection technique: ultrasound guided    Ultrasound used to visualize needle placement in proximity to nerve being blocked: yes   Permanent ultrasound image captured for medical record    Needle  Needle type: echogenic   Needle gauge: 22 G  Needle length: 2 in  no peripheral nerve catheter placed  Assessment  Injection assessment: no difficulty with injection, negative aspiration for heme, no paresthesia on injection and incremental injection

## 2021-06-09 NOTE — ANESTHESIA CARE TRANSFER NOTE
Last vitals:   Vitals:    03/09/17 1322   BP: 127/86   Pulse: 64   Resp: 12   Temp: 37.2  C (99  F)   SpO2: 99%     Patient's level of consciousness is drowsy  Spontaneous respirations: yes  Maintains airway independently: yes  Dentition unchanged: yes  Oropharynx: oropharynx clear of all foreign objects    QCDR Measures:  ASA# 20 - Surgical Safety Checklist: ASA20A - Safety Checks Done  PQRS# 430 - Adult PONV Prevention: 4558F - Pt received => 2 anti-emetic agents (different classes) preop & intraop  ASA# 8 - Peds PONV Prevention: NA - Not pediatric patient, not GA or 2 or more risk factors NOT present  PQRS# 424 - Mary-op Temp Management: 4559F - At least one body temp DOCUMENTED => 35.5C or 95.9F within required timeframe  PQRS# 426 - PACU Transfer Protocol: - Transfer of care checklist used  ASA# 14 - Acute Post-op Pain: ASA14B - Patient did NOT experience pain >= 7 out of 10    I completed my SBAR handoff to the receiving nurse per policy and procedure.

## 2021-06-14 NOTE — PROGRESS NOTES
Type: Routine remote pacemaker transmission.   Presenting Rhythm: APVP with PVC and retrograde A  Lead/Battery status: Stable, battery estimates 11.6 months  Arrhythmias: Since 10/19/2020 16 mode switches, burden 1%. Longest ~3mins. V-rates >/=120bpm <5%. 1 HVR EGM shows 29bt NSVT 12/8/2020 1am. Last echo EF 3/2018 60%.   Comments: Normal device function. See Epic for note. BK    Routed to Dr Saavedra for review.     Laurie Cano RN BSN PHN  Device Nurse   [facility]

## 2021-06-15 PROBLEM — I44.2 THIRD DEGREE AV BLOCK (H): Status: ACTIVE | Noted: 2017-04-07

## 2021-06-15 NOTE — TELEPHONE ENCOUNTER
----- Message from Sara Akins MD sent at 3/11/2021  5:50 PM CST -----  Regarding: Follow-up  Hi Yomaira;  Can you give Mrs. Trent a call nest week Thursday (3/18/21)for follow-up? Her Htn has been difficult to manage so we are switching from metoprolol to carvedilol in hopes of additional BP control. She wants to wait to start on Monday in case she does not tolerate. Please go over her BP log and symptoms.    Thanks;  ~ Sara

## 2021-06-15 NOTE — TELEPHONE ENCOUNTER

## 2021-06-15 NOTE — PATIENT INSTRUCTIONS - HE
Thank you for allowing the [facility] to be a part of your care. Please pay close attention to the following medications as they have been changed during this visit.      1.) Please stop taking metoprolol    2.) Please start taking carvedilol 25 mg two times daily.

## 2021-06-16 PROBLEM — J96.01 ACUTE RESPIRATORY FAILURE WITH HYPOXIA (H): Status: ACTIVE | Noted: 2021-03-01

## 2021-06-16 PROBLEM — I50.31 ACUTE DIASTOLIC CHF (CONGESTIVE HEART FAILURE) (H): Status: ACTIVE | Noted: 2021-03-01

## 2021-06-16 PROBLEM — J18.9 PNEUMONIA OF LEFT LOWER LOBE DUE TO INFECTIOUS ORGANISM: Status: ACTIVE | Noted: 2021-03-01

## 2021-06-16 PROBLEM — R07.9 CHEST PAIN: Status: ACTIVE | Noted: 2018-03-20

## 2021-06-16 PROBLEM — I47.19 ECTOPIC ATRIAL TACHYCARDIA (H): Status: ACTIVE | Noted: 2019-04-11

## 2021-06-16 PROBLEM — R94.39 ABNORMAL NUCLEAR STRESS TEST: Status: ACTIVE | Noted: 2021-05-20

## 2021-06-16 NOTE — TELEPHONE ENCOUNTER
Sharyn is contacted to discuss her status following her b/p medication changes. She is feeling a little lightheaded a times, but overall, feels well. Feels that she may just be adjusting to the medications.  No headaches noted. Minor blurring of her vision, but she is also s/p eye surgery and she has not updated her glasses yet.    B/p readings are as follows:   Monday 3/15  7AM  141/73  10AM  101/61  12N  135/69  5PM  159/99  6PM  168/82  10PM 187/89  11PM 158/78    Tuesday 3/16  7AM  141/71  12N   160/74  3PM  155/76  8PM  134/69  10PM  163/84  11PM  149/74    Wednesday 3/17  7AM  157/78  10AM 116/67  12N   115/63  2PM  150/74  3PM  131/75  5PM  151/68  8PM  158/75  10PM  152/110  11PM  150/87    Thursday 3/18  7AM  146/78  9AM  141/70  12N   131/72  3PM  152/75    She takes Hydralazine at 7AM -3PM-11PM  and Carvedilol at  8AM and 6PM w/ food.    She had a brief time ( 30 minutes) of tingling fingers, and mild shakiness in her hands yesterday afternoon and took a Lorazepam and it went away.  She will continue to track her readings and will bring them along to her f/u appt in clinic first week in April.   She was instructed to call if any unusual headaches, dizziness or b/p that are under 90 systolic or higher than 150 systolic.     She was in agreement with this plan.    FYI To Dr. Akins,  Sk/RN

## 2021-06-16 NOTE — PATIENT INSTRUCTIONS - HE
Sharyn Trent    It was a pleasure to see you today for a clinic visit.    Continue current medications  Call if increasing shortness of breath or high blood pressures greater than 150 systolic  We will contact you when your pacemaker battery is low  Follow up appointment:   Dr. Saavedra in 1 year in device clinic    Contact [facility] Nurse Clinicians at [phone] with questions or concerns.    Ab Saavedra MD

## 2021-06-16 NOTE — TELEPHONE ENCOUNTER
Sharyn is contacted today to discuss her current status. She reports better b/p control with her current regimen.   The following readings were shared:  136/73  HR 78  146/74  HR 67  147/78  HR 72  140/97  HR 68  146/77  HR 71  141/74  HR 66  137/65  HR 73  126/73  HR 74  128/77  HR 56    She at times feels some lightheadedness when first standing up. This passes quickly and she gets her bearings. She denies dizziness or the room spinning. Sharyn feels some tingling in your feet and this she can deal with, as it comes and goes. She no longer has tingling in her hands, which is an improvement.    There have been two episodes of jitteriness or shakiness during this past week. She has used Lorazepam at that time, she reports resolution of the shakiness usually within 45 minutes or so.    Sharyn is feeling some balance problems and she uses a cane 100% when she goes out of the house. She does not use one at home, simply goes slowly, and will grasp onto things to obtain her balance.     She is currently using Carvedilol 25mg two times a day, Hydralazine 75mg three times a day, Losartan 100mg at HS.     FYI To Dr. Tashi Sanchez RN BSN, CHFN

## 2021-06-16 NOTE — TELEPHONE ENCOUNTER
----- Message from Sara Akins MD sent at 4/8/2021  4:46 PM CDT -----  Regarding: Follow-up  Yomaira;  Please give Mrs. Trent a call next week Friday (4/16/21) for follow-up. She endorsed some symptoms associated with hydralazine which have been getting progressively better. Please assess for improvement/progress.    Thanks;  ~ Sara

## 2021-06-17 NOTE — TELEPHONE ENCOUNTER
----- Message from Edu Bedoya sent at 4/29/2021  9:00 AM CDT -----  Patient has already contacted their pharmacy. The medication or refill issue is below:    Primary Cardiologist: Tashi  Medication: Furosemide  Issue / Concern: Furosemide was prescribed by hospitalist, patient needs refill and is asking if Dr. Akins can help authorize her refill for Furosemide medication  Preferred Pharmacy/City: [facility] Phone Number for Patient: [phone]    Additional Info:

## 2021-06-17 NOTE — PROGRESS NOTES
In clinic device check with Device RN and follow-up with Dr. Saavedra..  Please see link for full device report.  Patient was informed of results and next follow up during today's visit.

## 2021-06-17 NOTE — TELEPHONE ENCOUNTER
Type: alert pacemaker remote transmission for VHR episode.   Presenting rhythm: AS- and sinus 65 bpm.  Battery/lead status: stable. Device nearing NAMITA.   Arrhythmias: since 4/23/21; one ventricular high rate episode on 5/14/21 2:12pm, appears to be NSVT 18bts duration 8sec, rates 150-180 bpm. 11 mode switch episodes all <1min, appears to be atrial tachy arrhythmia.   Comments: Normal magnet and pacemaker function. Routed to device RN for review. CAROL      Transmission reviewed, agree with above. Hx NSVTs in past. Recent (abnormal) stress test with EF 64% on 5.12.21. Takes Coreg 25 mg twice daily. Reviewed NSVT episode with patient, she denies any awareness of arrhythmias, no recent lightheadedness/near-syncope. Confirms Coreg dose. Advised to call with any episodes of rapid palpitations/near-syncope so we can check a remote for arrhythmias. Verbalized understanding.       Per Chart review, patient is to have a Coronary CTA, reviewed this with patient as well, states no one has called to set it up.     Will updated Dr. Naylor with remote findings.  Viv Gilliam, RN
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
## 2021-06-17 NOTE — TELEPHONE ENCOUNTER
----- Message from Moy Rahman sent at 5/12/2021  3:26 PM CDT -----  Regarding: SHEEBA/DND/TSB PT - MEDICATION CLARIFICATION  General phone call:    Caller: Sharyn Caldera     Primary cardiologist: SHEEBA     Detailed reason for call: Pt states that she saw SHEEBA on 5/10 and her losartan (COZAAR) was changed to 100 mg, but pt realized that she's been taking 100 mg all along from her pcp. Pt states that her AVS did not reflect this and would like for it to be corrected. Pt doesn't need a call back, but if the message can be relayed to TSB before her 5/20 appt, then pt is fine with that.     Best phone number: [phone], if needed.     Best time to contact: Anytime     Ok to leave a detailed message? Yes     Device? Yes     Additional Info:      === Confirmed per PCP 4/13/21 progress note, pt is taking 100 mg losartan daily.  -gillian

## 2021-06-17 NOTE — TELEPHONE ENCOUNTER
Patient calling reporting she had a blood pressure of 174/91 earlier today before taking her blood pressure medication. Patient states her blood pressure dropped to 132/72 and 138/73 after taking the medication. Patient states now she noticed her blood pressure went up again and it is 162/80. States she is due for another blood pressure medication in 30 minutes. Denies difficulty of breathing, chest pain, unsteady gait, severe headache, or weakness. Per guideline, advised patient to be seen within 2-3 days.     Vern Davidson RN  [facility]     COVID 19 Nurse Triage Plan/Patient Instructions    Please be aware that novel coronavirus (COVID-19) may be circulating in the community. If you develop symptoms such as fever, cough, or SOB or if you have concerns about the presence of another infection including coronavirus (COVID-19), please contact your health care provider or visit  [email or web address].    Disposition/Instructions    In-Person Visit with provider recommended. Reference Visit Selection Guide.    Thank you for taking steps to prevent the spread of this virus.  o Limit your contact with others.  o Wear a simple mask to cover your cough.  o Wash your hands well and often.    Resources    [facility]: About COVID-19: [email or web address]    CDC: What to Do If You're Sick: www.cdc.gov/coronavirus/2019-ncov/about/steps-when-sick.html    CDC: Ending Home Isolation: www.cdc.gov/coronavirus/2019-ncov/hcp/disposition-in-home-patients.html     CDC: Caring for Someone: www.cdc.gov/coronavirus/2019-ncov/if-you-are-sick/care-for-someone.html     [facility]: Interim Guidance for Hospital Discharge to Home: www.health.[facility].mn.us/diseases/coronavirus/hcp/hospdischarge.pdf    [facility] clinical trials (COVID-19 research studies): clinicalaffairs.[facility]/umn-clinical-trials     Below are the COVID-19 hotlines at the [place] [facility] ([facility]). Interpreters  are available.   o For health questions: Call 773-404-6006 or 1-731.778.3589 (7 a.m. to 7 p.m.)  o For questions about schools and childcare: Call 820-847-2291 or 1-810.986.9676 (7 a.m. to 7 p.m.)       Reason for Disposition    Systolic BP  >= 160 OR Diastolic >= 100    Additional Information    Negative: Difficult to awaken or acting confused (e.g., disoriented, slurred speech)    Negative: Severe difficulty breathing (e.g., struggling for each breath, speaks in single words)    Negative: [1] Weakness of the face, arm or leg on one side of the body AND [2] new onset    Negative: [1] Numbness (i.e., loss of sensation) of the face, arm or leg on one side of the body AND [2] new onset    Negative: [1] Chest pain lasts > 5 minutes AND [2] history of heart disease  (i.e., heart attack, bypass surgery, angina, angioplasty, CHF)    Negative: [1] Chest pain AND [2] took nitrogylcerin AND [3] pain was not relieved    Negative: Sounds like a life-threatening emergency to the triager    Negative: [1] Systolic BP  >= 160 OR Diastolic >= 100 AND [2] cardiac or neurologic symptoms (e.g., chest pain, difficulty breathing, unsteady gait, blurred vision)    Negative: [1] Pregnant > 20 weeks (or postpartum < 6 weeks) AND [2] new hand or face swelling    Negative: [1] Pregnant > 20 weeks AND [2] BP Systolic BP  >= 140 OR Diastolic >= 90    Negative: [1] Systolic BP  >= 200 OR Diastolic >= 120  AND [2] having NO cardiac or neurologic symptoms    Negative: [1] Postpartum < 6 weeks AND [2] BP Systolic BP  >= 140 OR Diastolic >= 90    Negative: [1] Systolic BP  >= 180 OR Diastolic >= 110 AND [2] missed most recent dose of blood pressure medication    Negative: Systolic BP  >= 180 OR Diastolic >= 110    Negative: Ran out of BP medications    Protocols used: HIGH BLOOD PRESSURE-A-

## 2021-06-17 NOTE — TELEPHONE ENCOUNTER
----- Message -----  From: Cecelia Hernandez  Sent: 5/17/2021  10:02 AM CDT  To: Chasity Mccormick RN    General phone call:   Dr. Naylor wanted to change her losartan to 100 mg.  And when she got home she realized she was already on 100 mg so her meds need to be updated.  He also wanted her to have a stress test and then CTA right after that.  She had the stress test on 5/12 and the soonest she could scheduled the CTA was 6/8 and she wants to make sure that is acceptable to him.      Caller: Sharyn   [facility] cardiologist: Cyndy  Detailed reason for call: see above  New or active symptoms? no  Best phone number: [phone]  Best time to contact: anytime  Ok to leave a detailed message? Yes   Device? yes    Additional Info:     === Med list had previously been updated.  Message sent to Dr Naylor to see if ok with 6/8/21 CCTA appt.  lowell

## 2021-06-18 NOTE — LETTER
Letter by Fernanda Morton at      Author: Fernanda Morton Service: -- Author Type: --    Filed:  Encounter Date: 1/28/2019 Status: (Other)       Sharyn Trent  [address] [place] [facility] [address]      January 28, 2019      Dear Ms. Trent,    RE: Remote Results    We are writing to you regarding your recent Remote Pacemaker check from home. Your transmission was received successfully. Battery status is satisfactory at this time.     Your results are within normal limits.    Your next device appointment will be a clinic visit.  Please call in March to schedule.      To schedule or reschedule, please call [phone] and press 1.    NOTE: If you would like to do an extra transmission, please call [phone] and press 3 to speak to a nurse BEFORE transmitting. This ensures that the Device Clinic staff is aware of the reason you are sending a transmission, and can follow-up with you after it has been reviewed.    We will be checking your implanted device from home (remotely) every three months unless otherwise instructed. We will need to see you in the clinic at least once a year. You may need to be seen in the clinic sooner depending on the results of your check.    Please be aware:    The follow-up schedule is like a Physician prescription.    Your remote monitor is paired to your specific implanted device.      Sincerely,    [facility]        negative...

## 2021-06-19 NOTE — LETTER
Letter by Martha Lin EPS at      Author: Martha Lin EPS Service: -- Author Type: --    Filed:  Encounter Date: 10/10/2019 Status: Signed         Sharyn Trent  [address] [place] [facility] [address]      October 11, 2019      Dear Ms. Twan,    RE: Remote Results    We are writing to you regarding your recent Remote Pacemaker check from home. Your transmission was received successfully. Battery status is satisfactory at this time.     Your results are within normal limits.    Your next device appointment will be a remote check on January 14, 2020; this will occur automatically.    To schedule or reschedule, please call [phone] and press 1.    NOTE: If you would like to do an extra transmission, please call [phone] and press 3 to speak to a nurse BEFORE transmitting. This ensures that the Device Clinic staff is aware of the reason you are sending a transmission, and can follow-up with you after it has been reviewed.    We will be checking your implanted device from home (remotely) every three months unless otherwise instructed. We will need to see you in the clinic at least once a year. You may need to be seen in the clinic sooner depending on the results of your check.    Please be aware:    The follow-up schedule is like a Physician prescription.    Your remote monitor is paired to your specific implanted device.      Sincerely,    [facility]

## 2021-06-19 NOTE — LETTER
Letter by Savita Valles EPS at      Author: Savita Valles EPS Service: -- Author Type: --    Filed:  Encounter Date: 7/11/2019 Status: (Other)         Sharyn Trent  [address] [place] [facility] [facility] [address]      July 11, 2019      Dear Ms. Trent,    RE: Remote Results    We are writing to you regarding your recent Remote Pacemaker check from home. Your transmission was received successfully. Battery status is satisfactory at this time.     Your results are showing no significant changes.    Your next device appointment will be a remote check on October 10th, 2019; this will occur automatically.    To schedule or reschedule, please call [phone] and press 1.    NOTE: If you would like to do an extra transmission, please call [phone] and press 3 to speak to a nurse BEFORE transmitting. This ensures that the Device Clinic staff is aware of the reason you are sending a transmission, and can follow-up with you after it has been reviewed.    We will be checking your implanted device from home (remotely) every three months unless otherwise instructed. We will need to see you in the clinic at least once a year. You may need to be seen in the clinic sooner depending on the results of your check.    Please be aware:    The follow-up schedule is like a Physician prescription.    Your remote monitor is paired to your specific implanted device.      Sincerely,    [facility]

## 2021-06-20 NOTE — LETTER
Letter by Martha Lin EPS at      Author: Martha Lin EPS Service: -- Author Type: --    Filed:  Encounter Date: 1/15/2020 Status: Signed         Sharyn Trent  [address] [place] [address]      January 15, 2020      Dear Ms. Twan,    RE: Remote Results    We are writing to you regarding your recent Remote Pacemaker check from home. Your transmission was received successfully. Battery status is satisfactory at this time.     Your results are within normal limits.    Your next device appointment will be a clinic visit.  Please call in February to schedule.      To schedule or reschedule, please call [phone] and press 1.    NOTE: If you would like to do an extra transmission, please call [phone] and press 3 to speak to a nurse BEFORE transmitting. This ensures that the Device Clinic staff is aware of the reason you are sending a transmission, and can follow-up with you after it has been reviewed.    We will be checking your implanted device from home (remotely) every three months unless otherwise instructed. We will need to see you in the clinic at least once a year. You may need to be seen in the clinic sooner depending on the results of your check.    Please be aware:    The follow-up schedule is like a Physician prescription.    Your remote monitor is paired to your specific implanted device.      Sincerely,    [facility] Device [facility]

## 2021-06-20 NOTE — LETTER
Letter by Laurie Cano RN at      Author: Laurie Cano RN Service: -- Author Type: --    Filed:  Encounter Date: 7/15/2020 Status: (Other)         Sharyn Trent  [address] [place] [address]      July 16, 2020      Dear Ms. Joneson,    RE: Remote Results    We are writing to you regarding your recent Remote Pacemaker check from home. Your transmission was received successfully. Battery status is satisfactory at this time.     Your results are within normal limits.    Your next device appointment will be a remote check on 10/19/20; this will occur automatically.    To schedule or reschedule, please call [phone] and press 1.    NOTE: If you would like to do an extra transmission, please call [phone] and press 3 to speak to a nurse BEFORE transmitting. This ensures that the Device Clinic staff is aware of the reason you are sending a transmission, and can follow-up with you after it has been reviewed.    We will be checking your implanted device from home (remotely) every three months unless otherwise instructed. We will need to see you in the clinic at least once a year. You may need to be seen in the clinic sooner depending on the results of your check.    Please be aware:    The follow-up schedule is like a Physician prescription.    Your remote monitor is paired to your specific implanted device.      Sincerely,    [facility] Device [facility]

## 2021-06-20 NOTE — LETTER
Letter by Piedad Mckee RN at      Author: Piedad Mckee RN Service: -- Author Type: --    Filed:  Encounter Date: 4/14/2020 Status: (Other)         Sharyn Trent  [address] [place]  [facility] [address]      April 14, 2020      Dear Ms. Trent,    RE: Remote Results    We are writing to you regarding your recent Remote Pacemaker check from home. Your transmission was received successfully. Battery status is satisfactory at this time.     Your results are within normal limits.    Your next device appointment will be a remote check on 7/15/2020; this will occur automatically.    To schedule or reschedule, please call [phone] and press 1.    NOTE: If you would like to do an extra transmission, please call [phone] and press 3 to speak to a nurse BEFORE transmitting. This ensures that the Device Clinic staff is aware of the reason you are sending a transmission, and can follow-up with you after it has been reviewed.    We will be checking your implanted device from home (remotely) every three months unless otherwise instructed. We will need to see you in the clinic at least once a year. You may need to be seen in the clinic sooner depending on the results of your check.    Please be aware:    The follow-up schedule is like a Physician prescription.    Your remote monitor is paired to your specific implanted device.      Sincerely,    [facility] Device [facility]

## 2021-06-21 NOTE — LETTER
Letter by Piedad Mckee RN at      Author: Piedad Mckee RN Service: -- Author Type: --    Filed:  Encounter Date: 10/19/2020 Status: (Other)         Sharyn Trent  [address] [place]  [facility] [address]      October 19, 2020      Dear Ms. Trent,    RE: Remote Results    We are writing to you regarding your recent Remote Pacemaker check from home. Your transmission was received successfully. Battery status is satisfactory at this time.     Your results are showing no significant changes.    Your next device appointment will be a remote check on 1/18/2021; this will occur automatically.    To schedule or reschedule, please call [phone] and press 1.    NOTE: If you would like to do an extra transmission, please call [phone] and press 3 to speak to a nurse BEFORE transmitting. This ensures that the Device Clinic staff is aware of the reason you are sending a transmission, and can follow-up with you after it has been reviewed.    We will be checking your implanted device from home (remotely) every three months unless otherwise instructed. We will need to see you in the clinic at least once a year. You may need to be seen in the clinic sooner depending on the results of your check.    Please be aware:    The follow-up schedule is like a Physician prescription.    Your remote monitor is paired to your specific implanted device.      Sincerely,    [facility] Device [facility]

## 2021-06-21 NOTE — LETTER
Letter by Laurie Cano RN at      Author: Laurie Cano RN Service: -- Author Type: --    Filed:  Encounter Date: 1/18/2021 Status: (Other)         Sharyn Trent  [address] [place] [address]      January 18, 2021      Dear Ms. Twan,    RE: Remote Results    We are writing to you regarding your recent Remote Pacemaker check from home. Your transmission was received successfully. Battery status is satisfactory at this time.     Your results are being reviewed, you will be contact further if anything else is needed.     Your next device appointment will be a clinic visit.  Please call in March to schedule.   with device and Dr. Saavedra.     To schedule or reschedule, please call [phone] and press 1.    NOTE: If you would like to do an extra transmission, please call [phone] and press 3 to speak to a nurse BEFORE transmitting. This ensures that the Device Clinic staff is aware of the reason you are sending a transmission, and can follow-up with you after it has been reviewed.    We will be checking your implanted device from home (remotely) every three months unless otherwise instructed. We will need to see you in the clinic at least once a year. You may need to be seen in the clinic sooner depending on the results of your check.    Please be aware:    The follow-up schedule is like a Physician prescription.    Your remote monitor is paired to your specific implanted device.      Sincerely,    [facility] Device [facility]

## 2021-06-25 NOTE — PROGRESS NOTES
Progress Notes by Ab Saavedra MD at 4/7/2017  9:50 AM     Author: Ab Saavedra MD Service: -- Author Type: Physician    Filed: 4/7/2017 10:16 AM Encounter Date: 4/7/2017 Status: Signed    : Ab Saavedra MD (Physician)           Click to link to [facility] HEART [facility] ELECTROPHYSIOLOGY NOTE    Today I had the opportunity to see  Sharyn Trent for follow-up evaluation of third-degree AV block and atrial tachycardia.      Assessment/Recommendations   Clinic Problem List:  1. Paroxysmal supraventricular tachycardia     2. Third degree AV block     3. Essential hypertension with goal blood pressure less than 140/90         Assessment:    Primary atrial tachycardia intermittent episodes with third-degree AV block and ventricular pacing asymptomatic  Third-degree AV block normally functioning dual-chamber pacemaker  Hypertension controlled    Plan:  Continue current medications  Follow up appointment:   Dr. Saavedra in device clinic in 1 year       History of Present Illness     Sharyn Trent is a 76 y.o. female with conduction system disease and also of paroxysmal supraventricular tachycardia. She underwent permanent pacemaker implantation in 04/2011 for symptomatic second-degree AV block with evidence for infra-Hisian conduction disease with right bundle branch block and left anterior fascicular block. Subsequently, in 2013 she was noted to have tachycardia despite metoprolol doses as high as 150 mg b.i.d. Stored electrograms on her device was consistent with AV analia reentry tachycardia which was induced at EP studies on 04/11/2013, it was noted that she had occasional infra-Hisian block with persistence of the tachycardia, cryoablation quite near the compact AV node appeared to be successful in slow pathway ablation. She did have have transient second degree AV block. At follow-up palpitations were much diminished with some episodes of 2-1 AV block noted during atrial  tachycardia.  Coronary angiography March 2016 showed normal coronaries and no wall motion abnormalities.   Sharyn is done well she denies any palpitations or irregular heart beating.  There is no syncope or presyncope.  There is no exertional chest pain or pressure.  She slipped and fell and tore her rotator cuff approximately a month ago and has had surgical repair    Personally reviewed.  Pacemaker interrogation shows atrial synchronous ventricular pacing today.  She has frequent but self-limited episodes of atrial tachycardia atrial rates approximately 160 bpm with a third-degree AV block.  Longest episode lasted approximately 45 minutes.  Patient is ventricular pacing 79% of the time.  Atrial and ventricular pacing and sensing thresholds are excellent patient is pacemaker dependent with third-degree AV block and no escape rhythm at 30 bpm.  Battery voltage indicates 8.5 years of battery longevity remaining       Physical Examination Review of Systems   Vitals:    04/07/17 0934   BP: 140/66   Pulse: 68   Resp: 16     Body mass index is 33.65 kg/(m^2).  Wt Readings from Last 3 Encounters:   04/07/17 181 lb (82.1 kg)   03/09/17 172 lb 8 oz (78.2 kg)   02/01/17 170 lb (77.1 kg)        Appearance:   no distress,   HEENT:   no scleral icterus, normal conjunctivae    Neck: no carotid bruits or thyromegaly   Chest/Lungs:   lungs are clear to auscultation, no rales or wheezing, pacemaker left subclavian    Cardiovascular:   Jugular venous pressure 5 cm, Apical pulse is regular. Normal S1,S2 with no murmurs or gallops,   Abdomen:  no  Hepatosplenomegaly., nontender,  bowel sounds are present   Extremities: no cyanosis or clubbing, trace edema left leg (chronic)   sling over her left arm    Skin: no xanthelasma, warm.    Neurologic: No gross focal neurologic deficits   Mood/Affect: Alert, cooperative    General: WNL  Eyes: WNL  Ears/Nose/Throat: WNL  Lungs: WNL  Heart: WNL  Stomach: WNL  Bladder: Frequent Urination at  Night  Muscle/Joints: WNL  Skin: WNL  Nervous System: Falls  Mental Health: WNL     Blood: WNL     Medical History  Surgical History Family History Social History   Past Medical History:   Diagnosis Date   ? Abnormal ECG    ? Anxiety    ? Arthritis    ? Chest pain    ? Chronic kidney disease     stage 3-mod.   ? Diabetes    ? Dyslipidemia, goal LDL below 70    ? GERD (gastroesophageal reflux disease)    ? HTN (hypertension)    ? Hyperlipidemia    ? Melanoma of ankle     L ankle   ? Pacemaker    ? PSVT (paroxysmal supraventricular tachycardia)    ? Right bundle branch block    ? Second Degree A-V Block, Mobitz Type I     Created by Conversion    ? Skin cancer     Past Surgical History:   Procedure Laterality Date   ? ABLATION OF DYSRHYTHMIC FOCUS  04/11/2013    AV analia reentry tachycardia, partially successful   ? CARDIAC CATHETERIZATION  03/10/2016   ? CARDIAC PACEMAKER PLACEMENT  04/2011    Second-degree AV block   ? CARPAL TUNNEL RELEASE      both wrists   ? FOOT SURGERY      L foot   ? HAND SURGERY      on both thumbs   ? [place] SHLDR ARTHROSCOP,SURG,W/ROTAT CUFF REPR Left 3/9/2017    Procedure: LEFT SHOULDER ARTHROSCOPY DECOMPRESSION DISTAL CLAVICLE EXCISION  ROTATOR CUFF REPAIR BICEPS TENOTOMY AND EXTENSIVE DEBRIDEMENT;  Surgeon: Todd Riggs MD;  Location: [facility];  Service: Orthopedics   ? SKIN BIOPSY     ? SKIN CANCER EXCISION      L ankle,Lleg and cheek   ? SVT Ablation     ? TOE SURGERY      L big toe joint replacement   ? TUBAL LIGATION      Family History   Problem Relation Age of Onset   ? Hypertension Mother    ? Stroke Mother    ? Prostate cancer Father    ? Cancer Father    ? Heart attack Father    ? Dyslipidemia Father    ? Dyslipidemia Sister    ? Dyslipidemia Brother    ? Hypertension Brother    ? Prostate cancer Brother     Social History     Social History   ? Marital status:      Spouse name: N/A   ? Number of children: N/A   ? Years of education: N/A     Occupational History    ? Not on file.     Social History Main Topics   ? Smoking status: Never Smoker   ? Smokeless tobacco: Never Used   ? Alcohol use No   ? Drug use: No   ? Sexual activity: Yes     Partners: Male     Birth control/ protection: Surgical     Other Topics Concern   ? Not on file     Social History Narrative          Medications  Allergies   Current Outpatient Prescriptions   Medication Sig Dispense Refill   ? acetaminophen (TYLENOL) 325 MG tablet Take 500 mg by mouth every 6 (six) hours as needed for pain.      ? amLODIPine (NORVASC) 10 MG tablet Take 10 mg by mouth daily.     ? aspirin 325 MG tablet Take 325 mg by mouth every evening.      ? atorvastatin (LIPITOR) 80 MG tablet Take 80 mg by mouth bedtime.     ? losartan (COZAAR) 50 MG tablet Take 50 mg by mouth bedtime.      ? metFORMIN (GLUCOPHAGE) 500 MG tablet Take 1 tablet (500 mg total) by mouth 2 (two) times a day with meals. At breakfast and at dinner 60 tablet 0   ? metoprolol (LOPRESSOR) 100 MG tablet Take 100 mg by mouth 2 (two) times a day.     ? multivitamin therapeutic (THERAGRAN) tablet Take 1 tablet by mouth every evening.      ? naproxen sodium (ALEVE) 220 MG tablet Take 220 mg by mouth daily as needed for pain.     ? nitrofurantoin, macrocrystal-monohydrate, (MACROBID) 100 MG capsule Take 100 mg by mouth 2 (two) times a day.  0   ? omeprazole (PRILOSEC) 20 MG capsule Take 20 mg by mouth daily.     ? fluorouracil (EFUDEX) 5 % cream Apply 1 application topically daily as needed.     ? oxyCODONE-acetaminophen (PERCOCET) 5-325 mg per tablet Take 1-2 tablets by mouth every 4 (four) hours as needed for pain. 50 tablet 0     No current facility-administered medications for this visit.       Allergies   Allergen Reactions   ? Venom-Honey Bee Shortness Of Breath   ? Mercurial Analogues Dermatitis     blisters   ? Sulfa (Sulfonamide Antibiotics) Rash

## 2021-06-25 NOTE — PROGRESS NOTES
Sharyn Trent  [address] [place]  [facility] [address]  [phone] (home)     Procedure cardiologist:  Yony Gillis MD  PCP:  Jamie Mcconnell MD  H&P completed by:  Abiel SALCIDO 5/20/2021  Admit date 5/26/21  Arrival time:  10 AM  Anticoagulation: None  CPAP: No  Previous PCI: No  Bypass Grafts: No  Renal Issues: No  Diabetic?: Yes  Oral Agent  Device?: No  Type:  [facility] [address] RF    Angiogram Teaching    Reason for Visit:  Patient seen for pre-procedure education in preparation for: Coronary Angiogram with possible Percutaneous Coronary Intervention, and Right Heart Catherization       Procedure Prep:  Primary Cardiologist note dated: Dr. Moses Naylor  EKG results obtained, dated: 5/7/21  Hemogram results obtained: 5/6/21  Basic Metabolic Panel results obtained: 5/6/21  Lipid Profile results obtained: 4/13/21    Patient Education  Explained indications for diagnostic evaluation, including one or more of the following:  Abnormal CTA Explained indications for therapeutic interventions, including one or more of the following: angioplasty and stenting discussed   Patient states understanding of procedure and agrees to proceed.    Pre-procedure instructions  Patient instructed to be NPO after midnight.  Patient instructed to shower the evening before or the morning of the procedure.  Patient instructed to arrange for transportation home following procedure from a responsible family member of friend. No driving for at least 24 hours.  Patient instructed to have a responsible adult with them for 24 hours post-procedure.  Post-procedure follow up process.  Conscious sedation discussed.    Pre-procedure medication instructions  Patient instructed on antiplatelet medication.  Continue medications as scheduled, with a small amount of water on the day of the procedure unless indicated.  Patient instructed to take 325 mg of Aspirin am of procedure: Yes  Other medication: instructed to take all regular  medications EXCEPT METFORMIN the a.m. of the procedure. Dr. Akins requesting patient take diuretics as per usual. No additional fluids for procedure please.    *PATIENTS RECORDS AVAILABLE IN [facility] UNLESS OTHERWISE INDICATED*      Patient Active Problem List   Diagnosis     Cardiac pacemaker in situ, dual chamber     Atypical chest pain     Malignant essential hypertension     Dyslipidemia, goal LDL below 70     DM (diabetes mellitus), type 2 (H)     Third degree AV block (H)     Chest pain     Ectopic atrial tachycardia (H)     Acute diastolic CHF (congestive heart failure) (H)     Acute respiratory failure with hypoxia (H)     Pneumonia of left lower lobe due to infectious organism     Stroke-like symptoms     Abnormal nuclear stress test       Current Outpatient Medications   Medication Sig Dispense Refill     aspirin 325 MG tablet Take 325 mg by mouth every evening.        atorvastatin (LIPITOR) 80 MG tablet Take 80 mg by mouth bedtime.       carvediloL (COREG) 25 MG tablet Take 1 tablet (25 mg total) by mouth 2 (two) times a day with meals. 180 tablet 3     furosemide (LASIX) 20 MG tablet Take 0.5 tablets (10 mg total) by mouth daily. 60 tablet 3     hydrALAZINE (APRESOLINE) 50 MG tablet Take 1.5 tablets (75 mg total) by mouth every 8 (eight) hours. 405 tablet 0     LORazepam (ATIVAN) 0.5 MG tablet Take 1 tablet (0.5 mg total) by mouth every 6 (six) hours as needed for anxiety (or tremors). 12 tablet 0     losartan (COZAAR) 100 MG tablet Take 1 tablet (100 mg total) by mouth daily. 30 tablet 11     metFORMIN (GLUCOPHAGE) 500 MG tablet Take 1 tablet (500 mg total) by mouth 2 (two) times a day with meals. At breakfast and at dinner 60 tablet 0     multivitamin therapeutic (THERAGRAN) tablet Take 1 tablet by mouth every evening.        omeprazole (PRILOSEC) 20 MG capsule Take 20 mg by mouth daily before breakfast.        prednisoLONE acetate (PRED-FORTE) 1 % ophthalmic suspension Administer 1 drop to the right  eye 2 (two) times a day.        vit C/E/Zn/coppr/lutein/zeaxan (PRESERVISION AREDS-2 ORAL) Take 1 tablet by mouth 2 (two) times a day before meals.       No current facility-administered medications for this visit.        Allergies   Allergen Reactions     Venom-Honey Bee Shortness Of Breath     Macrobid [Nitrofurantoin Monohyd/M-Cryst] Other (See Comments)     Burning sensation across chest and arms     Mercurial Analogues Dermatitis     blisters     Sulfa (Sulfonamide Antibiotics) Rash     Dr. Akins requesting patient take diuretics as per usual. No additional fluids for procedure please.  The patient and her daughter were given pre-procedure instruction including medications to take and also expectations for the day. One individual to accompany her to the procedure. Overnight stay if intervention is performed. Sharyn is independent in ambulation without assistive devices.    CASTILLO Sanchez

## 2021-06-26 NOTE — PROGRESS NOTES
Progress Notes by Ab Saavedra MD at 4/23/2018  3:30 PM     Author: Ab Saavedra MD Service: -- Author Type: Physician    Filed: 4/23/2018  4:04 PM Encounter Date: 4/23/2018 Status: Signed    : Ab Saavedra MD (Physician)           Click to link to [facility] Heart [facility] HEART [facility] ELECTROPHYSIOLOGY NOTE    Today I had the opportunity to see  Sharyn Trent for follow-up evaluation of second-degree AV block and atrial tachycardia.      Assessment/Recommendations   Clinic Problem List:  1. Paroxysmal supraventricular tachycardia     2. Third degree AV block     3. Essential hypertension         Assessment:    Atrial tachycardia versus AV analia reentry with block, up to several episodes per day lasting less than a minute but asymptomatic given third-degree AV block.  No atrial fibrillation noted  Third-degree AV block normally functioning dual-chamber pacemaker  Recent chest pain probably related to GERD  Nonsustained ventricular tachycardia on pacemaker histograms,  asymptomatic without structural heart disease  Hypertension controlled    Plan:  Continue current medications  Follow up appointment:   Dr. Saavedra in device clinic in 1 year       History of Present Illness     Sharyn Trent is a 77 y.o. female with  conduction system disease and also of paroxysmal supraventricular tachycardia. She underwent permanent pacemaker implantation in 04/2011 for symptomatic second-degree AV block with evidence for infra-Hisian conduction disease with right bundle branch block and left anterior fascicular block. Subsequently, in 2013 she was noted to have tachycardia despite metoprolol doses as high as 150 mg b.i.d. Stored electrograms on her device was consistent with AV analia reentry tachycardia which was induced at EP studies on 04/11/2013, it was noted that she had occasional infra-Hisian block with persistence of the tachycardia, cryoablation quite near the compact AV node appeared to be  successful in slow pathway ablation. She did have have transient second degree AV block. At follow-up palpitations were much diminished with some episodes of 2-1 AV block noted during atrial tachycardia.  Coronary angiography March 2016 showed normal coronaries and no wall motion abnormalities.  She was evaluated at [facility] on 3/19/2018 for chest pain.  Troponins were negative.  Echocardiogram showed normal left ventricular function with abnormal septal motion consistent with a paced rhythm.  Mild left atrial enlargement was noted.  Stress nuclear imaging 3/23/2018 showed an ejection fraction is 70% no evidence for ischemia or infarction.  Chest pain was felt possibly to be related to GERD and Naprosyn was stopped.  She denies recurrent chest pain since discharge from the hospital.  Tylenol has been fairly effective for degenerative joint disease.  She denies exertional chest pain or pressure.  She denies palpitations but does notice an unusual feeling in the chest when she she is late on her metoprolol doses.    Personally reviewed.  This maker interrogation shows several episodes of atrial tachycardia week with atrial rates 130-140 bpm and underlying third-degree AV block.  Episodes are generally less than 2 minutes in duration and have been asymptomatic.  Single 23 beat run of slow ventricular tachycardia rate 120 bpm on 2/28/2018 noted asymptomatic and not life-threatening given normal echo and no evidence for ischemia on stress nuclear imaging.  Third-degree AV block with 96% ventricular pacing.  Excellent atrial and ventricular pacing and sensing thresholds with battery longevity estimated at greater than 6 years.       Physical Examination Review of Systems   There were no vitals filed for this visit.  There is no height or weight on file to calculate BMI.  Wt Readings from Last 3 Encounters:   04/23/18 181 lb 2 oz (82.2 kg)   03/20/18 177 lb 9.6 oz (80.6 kg)   04/07/17 181 lb (82.1 kg)         Appearance:   no distress,   HEENT:   no scleral icterus, normal conjunctivae    Neck: no carotid bruits or thyromegaly   Chest/Lungs:   lungs are clear to auscultation, no rales or wheezing, pacemaker in left subclavian pocket   Cardiovascular:   Jugular venous pressure 5 cm, Apical pulse is quite regular. Normal S1,S2 with grade 2/6 systolic ejection murmur,   Abdomen:  no  Hepatosplenomegaly., nontender,  bowel sounds are present   Extremities: no cyanosis or clubbing, No edema   Skin: no xanthelasma, warm.    Neurologic: No gross focal neurologic deficits   Mood/Affect: Alert, cooperative    General: WNL  Eyes: WNL  Ears/Nose/Throat: WNL  Lungs: WNL  Heart: WNL  Stomach: WNL  Bladder: WNL  Muscle/Joints: Joint Pain  Skin: WNL  Nervous System: WNL  Mental Health: WNL     Blood: WNL     Medical History  Surgical History Family History Social History   Past Medical History:   Diagnosis Date   ? Abnormal ECG    ? Anxiety    ? Arthritis    ? Chest pain    ? Chest pain    ? Chronic kidney disease     stage 3-mod.   ? Diabetes    ? Dyslipidemia, goal LDL below 70    ? GERD (gastroesophageal reflux disease)    ? HTN (hypertension)    ? Hyperlipidemia    ? Melanoma of ankle     L ankle   ? Pacemaker    ? PSVT (paroxysmal supraventricular tachycardia)    ? Right bundle branch block    ? Second Degree A-V Block, Mobitz Type I     Created by Conversion    ? Skin cancer     Past Surgical History:   Procedure Laterality Date   ? ABLATION OF DYSRHYTHMIC FOCUS  04/11/2013    AV analia reentry tachycardia, partially successful   ? CARDIAC CATHETERIZATION  03/10/2016   ? CARDIAC PACEMAKER PLACEMENT  04/2011    Second-degree AV block   ? CARPAL TUNNEL RELEASE      both wrists   ? FOOT SURGERY      L foot   ? HAND SURGERY      on both thumbs   ? CT SHLDR ARTHROSCOP,SURG,W/ROTAT CUFF REPR Left 3/9/2017    Procedure: LEFT SHOULDER ARTHROSCOPY DECOMPRESSION DISTAL CLAVICLE EXCISION  ROTATOR CUFF REPAIR BICEPS TENOTOMY AND EXTENSIVE  DEBRIDEMENT;  Surgeon: Todd Riggs MD;  Location: [facility] Main OR;  Service: Orthopedics   ? SKIN BIOPSY     ? SKIN CANCER EXCISION      L ankle,Lleg and cheek   ? SVT Ablation     ? TOE SURGERY      L big toe joint replacement   ? TUBAL LIGATION      Family History   Problem Relation Age of Onset   ? Hypertension Mother    ? Stroke Mother    ? Prostate cancer Father    ? Cancer Father    ? Heart attack Father    ? Dyslipidemia Father    ? Dyslipidemia Sister    ? Dyslipidemia Brother    ? Hypertension Brother    ? Prostate cancer Brother     Social History     Social History   ? Marital status:      Spouse name: N/A   ? Number of children: N/A   ? Years of education: N/A     Occupational History   ? Not on file.     Social History Main Topics   ? Smoking status: Never Smoker   ? Smokeless tobacco: Never Used   ? Alcohol use No   ? Drug use: No   ? Sexual activity: Yes     Partners: Male     Birth control/ protection: Surgical     Other Topics Concern   ? Not on file     Social History Narrative          Medications  Allergies   Current Outpatient Prescriptions   Medication Sig Dispense Refill   ? amLODIPine (NORVASC) 10 MG tablet Take 10 mg by mouth daily.     ? aspirin 325 MG tablet Take 325 mg by mouth every evening.      ? atorvastatin (LIPITOR) 80 MG tablet Take 80 mg by mouth bedtime.     ? losartan (COZAAR) 50 MG tablet Take 50 mg by mouth bedtime.      ? metFORMIN (GLUCOPHAGE) 500 MG tablet Take 1 tablet (500 mg total) by mouth 2 (two) times a day with meals. At breakfast and at dinner 60 tablet 0   ? metoprolol (LOPRESSOR) 100 MG tablet Take 100 mg by mouth 2 (two) times a day.     ? multivitamin therapeutic (THERAGRAN) tablet Take 1 tablet by mouth every evening.      ? omeprazole (PRILOSEC) 20 MG capsule Take 20 mg by mouth daily before breakfast.        No current facility-administered medications for this visit.       Allergies   Allergen Reactions   ? Venom-Honey Bee Shortness Of Breath    ? Macrobid [Nitrofurantoin Monohyd/M-Cryst] Other (See Comments)     Burning sensation across chest and arms   ? Mercurial Analogues Dermatitis     blisters   ? Sulfa (Sulfonamide Antibiotics) Rash

## 2021-06-26 NOTE — PATIENT INSTRUCTIONS - HE
Sharyn Trent,    It was a pleasure to see you today at [facility] HEART [facility].     My recommendations after this visit include:  - Please follow up with Dr Akins July 5   - Please follow up with Lashell Murcia in 3 months  - Continue current medications    Lashell Murcia, CNP

## 2021-06-27 NOTE — PROGRESS NOTES
Progress Notes by Ab Saavedra MD at 4/11/2019  9:30 AM     Author: Ab Saavedra MD Service: -- Author Type: Physician    Filed: 4/11/2019  9:44 AM Encounter Date: 4/11/2019 Status: Signed    : Ab Saavedra MD (Physician)           Click to link to [facility] Heart [facility] HEART [facility] ELECTROPHYSIOLOGY NOTE    Today I had the opportunity to see  Sharyn Trent for follow-up evaluation of AV block and supraventricular tachycardia.      Assessment/Recommendations   Clinic Problem List:  1. Third degree AV block (H)     2. Ectopic atrial tachycardia (H)     3. Essential hypertension         Assessment:    Third-degree AV block, normally functioning dual-chamber pacemaker  Brief episodes of ectopic atrial tachycardia on pacemaker histograms asymptomatic, no atrial fibrillation documented  Hypertension probably suboptimally controlled    Plan:  Blood pressure check your primary care and follow a low-salt diet  Consider adding hydrochlorothiazide 12.5 mg daily if blood pressure is at primary care today  Follow up appointment:   Dr. Saavedra in 1 year       History of Present Illness     Sharyn Trent is a 78 y.o. female with  conduction system disease and also of paroxysmal supraventricular tachycardia. She underwent permanent pacemaker implantation in 04/2011 for symptomatic second-degree AV block with evidence for infra-Hisian conduction disease with right bundle branch block and left anterior fascicular block. Subsequently, in 2013 she was noted to have tachycardia despite metoprolol doses as high as 150 mg b.i.d. Stored electrograms on her device was consistent with AV analia reentry tachycardia which was induced at EP studies on 04/11/2013, it was noted that she had occasional infra-Hisian block with persistence of the tachycardia, cryoablation quite near the compact AV node appeared to be successful in slow pathway ablation. She did have have transient second degree AV block. At follow-up  palpitations were much diminished with some episodes of 2-1 AV block noted during atrial tachycardia.  Coronary angiography March 2016 showed normal coronaries and no wall motion abnormalities.  She was evaluated at [facility] on 3/19/2018 for chest pain.  Troponins were negative.  Echocardiogram showed normal left ventricular function with abnormal septal motion consistent with a paced rhythm.  Mild left atrial enlargement was noted.  Stress nuclear imaging 3/23/2018 showed an ejection fraction is 70% no evidence for ischemia or infarction.  Chest pain was felt possibly to be related to GERD and Naprosyn was stopped.  She was recently evaluated in the [place] emergency department for left shoulder pain radiating to the left arm.  Troponins were negative x2 and she had ventricular pacing on ECG.  She describes some chronic left shoulder pain but no exertional chest pain shoulder pain or pressure.  She denies lightheadedness syncope palpitations.  She has no history of TIA or stroke.  Personally reviewed.  Pacemaker evaluation shows third-degree AV block, pacemaker dependent with 98% ventricular pacing.  Atrial and ventricular pacing and sensing thresholds are excellent.  She has had multiple runs of what appears to be primary atrial tachycardia with rates typically around 140 bpm.  The longest episode lasted approximately 25 minutes.  There were occasional episodes of faster atrial tachycardia approximately 200 bpm lasting less than 2 minutes.  There is no convincing evidence for paroxysmal atrial fibrillation.  Estimated battery longevity is 5.2 years.       Physical Examination Review of Systems   Vitals:    04/11/19 0856   BP: 170/78   Pulse: 60   Resp: 18     Body mass index is 30.21 kg/m .  Wt Readings from Last 3 Encounters:   04/11/19 176 lb (79.8 kg)   04/23/18 181 lb 2 oz (82.2 kg)   03/20/18 177 lb 9.6 oz (80.6 kg)        Appearance:   no distress,   HEENT:   no scleral icterus, normal conjunctivae     Neck: no carotid bruits or thyromegaly   Chest/Lungs:   lungs are clear to auscultation, no rales or wheezing,    Cardiovascular:   Jugular venous pressure 4 cm, Apical pulse is quite regular. Normal S1,S2 with no murmurs or gallops,   Abdomen:  no  Hepatosplenomegaly., nontender,  bowel sounds are present   Extremities: no cyanosis or clubbing, No edema   Skin: no xanthelasma, warm.    Neurologic: No gross focal neurologic deficits   Mood/Affect: Alert, cooperative    General: WNL  Eyes: WNL  Ears/Nose/Throat: WNL  Lungs: WNL  Heart: WNL  Stomach: WNL  Bladder: WNL  Muscle/Joints: WNL  Skin: WNL  Nervous System: WNL  Mental Health: WNL     Blood: WNL     Medical History  Surgical History Family History Social History   Past Medical History:   Diagnosis Date   ? Abnormal ECG    ? Anxiety    ? Arthritis    ? Chest pain    ? Chest pain    ? Chronic kidney disease     stage 3-mod.   ? Diabetes (H)    ? Dyslipidemia, goal LDL below 70    ? GERD (gastroesophageal reflux disease)    ? HTN (hypertension)    ? Hyperlipidemia    ? Melanoma of ankle (H)     L ankle   ? Pacemaker    ? PSVT (paroxysmal supraventricular tachycardia) (H)    ? Right bundle branch block    ? Second Degree A-V Block, Mobitz Type I     Created by Conversion    ? Skin cancer     Past Surgical History:   Procedure Laterality Date   ? ABLATION OF DYSRHYTHMIC FOCUS  04/11/2013    AV analia reentry tachycardia, partially successful   ? CARDIAC CATHETERIZATION  03/10/2016   ? CARDIAC PACEMAKER PLACEMENT  04/2011    Second-degree AV block   ? CARPAL TUNNEL RELEASE      both wrists   ? FOOT SURGERY      L foot   ? HAND SURGERY      on both thumbs   ? IN SHLDR ARTHROSCOP,SURG,W/ROTAT CUFF REPR Left 3/9/2017    Procedure: LEFT SHOULDER ARTHROSCOPY DECOMPRESSION DISTAL CLAVICLE EXCISION  ROTATOR CUFF REPAIR BICEPS TENOTOMY AND EXTENSIVE DEBRIDEMENT;  Surgeon: Todd Riggs MD;  Location: [facility];  Service: Orthopedics   ? SKIN BIOPSY     ? SKIN  CANCER EXCISION      L ankle,Lleg and cheek   ? SVT Ablation     ? TOE SURGERY      L big toe joint replacement   ? TUBAL LIGATION      Family History   Problem Relation Age of Onset   ? Hypertension Mother    ? Stroke Mother    ? Prostate cancer Father    ? Cancer Father    ? Heart attack Father    ? Dyslipidemia Father    ? Dyslipidemia Sister    ? Dyslipidemia Brother    ? Hypertension Brother    ? Prostate cancer Brother     Social History     Socioeconomic History   ? Marital status:      Spouse name: Not on file   ? Number of children: Not on file   ? Years of education: Not on file   ? Highest education level: Not on file   Occupational History   ? Not on file   Social Needs   ? Financial resource strain: Not on file   ? Food insecurity:     Worry: Not on file     Inability: Not on file   ? Transportation needs:     Medical: Not on file     Non-medical: Not on file   Tobacco Use   ? Smoking status: Never Smoker   ? Smokeless tobacco: Never Used   Substance and Sexual Activity   ? Alcohol use: No   ? Drug use: No   ? Sexual activity: Yes     Partners: Male     Birth control/protection: Surgical   Lifestyle   ? Physical activity:     Days per week: Not on file     Minutes per session: Not on file   ? Stress: Not on file   Relationships   ? Social connections:     Talks on phone: Not on file     Gets together: Not on file     Attends Mandaeism service: Not on file     Active member of club or organization: Not on file     Attends meetings of clubs or organizations: Not on file     Relationship status: Not on file   ? Intimate partner violence:     Fear of current or ex partner: Not on file     Emotionally abused: Not on file     Physically abused: Not on file     Forced sexual activity: Not on file   Other Topics Concern   ? Not on file   Social History Narrative   ? Not on file          Medications  Allergies   Current Outpatient Medications   Medication Sig Dispense Refill   ? amLODIPine (NORVASC) 10 MG  tablet Take 10 mg by mouth daily.     ? aspirin 325 MG tablet Take 325 mg by mouth every evening.      ? atorvastatin (LIPITOR) 80 MG tablet Take 80 mg by mouth bedtime.     ? losartan (COZAAR) 50 MG tablet Take 50 mg by mouth bedtime.      ? metFORMIN (GLUCOPHAGE) 500 MG tablet Take 1 tablet (500 mg total) by mouth 2 (two) times a day with meals. At breakfast and at dinner 60 tablet 0   ? metoprolol (LOPRESSOR) 100 MG tablet Take 100 mg by mouth 2 (two) times a day.     ? multivitamin therapeutic (THERAGRAN) tablet Take 1 tablet by mouth every evening.      ? omeprazole (PRILOSEC) 20 MG capsule Take 20 mg by mouth daily before breakfast.        No current facility-administered medications for this visit.       Allergies   Allergen Reactions   ? Venom-Honey Bee Shortness Of Breath   ? Macrobid [Nitrofurantoin Monohyd/M-Cryst] Other (See Comments)     Burning sensation across chest and arms   ? Mercurial Analogues Dermatitis     blisters   ? Sulfa (Sulfonamide Antibiotics) Rash

## 2021-06-29 ENCOUNTER — AMBULATORY - HEALTHEAST (OUTPATIENT)
Dept: CARDIOLOGY | Facility: CLINIC | Age: 81
End: 2021-06-29

## 2021-06-29 DIAGNOSIS — Z95.0 CARDIAC PACEMAKER IN SITU: ICD-10-CM

## 2021-06-29 DIAGNOSIS — I50.31 ACUTE DIASTOLIC CHF (CONGESTIVE HEART FAILURE) (H): ICD-10-CM

## 2021-06-29 NOTE — PROGRESS NOTES
"Progress Notes by Ab Saavedra MD at 4/15/2020  1:30 PM     Author: Ab Saavedra MD Service: -- Author Type: Physician    Filed: 4/15/2020  2:00 PM Encounter Date: 4/15/2020 Status: Signed    : Ab Saavedra MD (Physician)       The patient has been notified of following:     \"This telephone visit will be conducted via a call between you and your physician/provider. We have found that certain health care needs can be provided without the need for a physical exam.  This service lets us provide the care you need with a phone conversation.  If a prescription is necessary we can send it directly to your pharmacy.  If lab work is needed we can place an order for that and you can then stop by our lab to have the test done at a later time. If during the course of the call the physician/provider feels a telephone visit is not appropriate, you will not be charged for this service.\"         HEART CARE PHONE ENCOUNTER        Appointment is being conducted as a telephone visit, to reduce risk of exposure given the current status of Coronovirus in our community. This telephone visit is being conducted via a call between the patient and physician/provider. Health care needs are being provided without a physical exam.     Assessment/Recommendations   Clinic Problem List:  1. Third degree AV block (H)     2. Ectopic atrial tachycardia (H)     3. Essential hypertension         Assessment:      Third-degree AV block with normally functioning dual-chamber pacemaker    Ectopic atrial tachycardia brief episodes asymptomatic without atrial fibrillation    Hypertension reasonably well controlled    Plan:  Continue current medications  Follow up appointment:   Dr. Saavedra in 1 year in device clinic    I have reviewed the note as documented.  This accurately captures the substance of my conversation with the patient.    Total time of call between patient and provider was 16 minutes        History of Present " Illness/Subjective    Sharyn Trent is a 79 y.o. female who is being evaluated via a billable telephone visit.    She has  conduction system disease and also of paroxysmal supraventricular tachycardia. She underwent permanent pacemaker implantation in 04/2011 for symptomatic second-degree AV block with evidence for infra-Hisian conduction disease with right bundle branch block and left anterior fascicular block. Subsequently, in 2013 she was noted to have tachycardia despite metoprolol doses as high as 150 mg b.i.d. Stored electrograms on her device was consistent with AV analia reentry tachycardia which was induced at EP studies on 04/11/2013, it was noted that she had occasional infra-Hisian block with persistence of the tachycardia, cryoablation quite near the compact AV node appeared to be successful in slow pathway ablation. She did have have transient second degree AV block. At follow-up palpitations were much diminished with some episodes of 2-1 AV block noted during atrial tachycardia.  Coronary angiography March 2016 showed normal coronaries and no wall motion abnormalities.  She was evaluated at [facility] on 3/19/2018 for chest pain.  Troponins were negative.  Echocardiogram showed normal left ventricular function with abnormal septal motion consistent with a paced rhythm.  Mild left atrial enlargement was noted.  Stress nuclear imaging 3/23/2018 showed an ejection fraction is 70% no evidence for ischemia or infarction.  Chest pain was felt possibly to be related to GERD and Naprosyn was stopped.    Sharyn reports feeling reasonably well.  She denies any palpitations since her ablation.  There is no chest pain or dyspnea on exertion.  Home blood pressures have been running approximately 135/70 since losartan was increased to 100 mg daily.  She notes mild swelling of her left leg after surgical resection of a melanoma.  Transtelephonic pacemaker evaluation yesterday showed brief episodes of primary  atrial tachycardia rates approximately 140 bpm episodes last less than 1 minute and during those times she is 100% ventricularly paced.  Estimated battery longevity is 2 years and 7 months.  Patient does have underlying third-degree AV block.  I have reviewed and updated the patient's Past Medical History, Social History, Family History and Medication List.     Physical Examination not perform given phone encounter Review of Systems      Please open progress note below.     Medical History  Surgical History Family History Social History   Past Medical History:   Diagnosis Date   ? Abnormal ECG    ? Anxiety    ? Arthritis    ? Chest pain    ? Chest pain    ? Chronic kidney disease     stage 3-mod.   ? Diabetes (H)    ? Dyslipidemia, goal LDL below 70    ? GERD (gastroesophageal reflux disease)    ? HTN (hypertension)    ? Hyperlipidemia    ? Melanoma of ankle (H)     L ankle   ? Pacemaker    ? PSVT (paroxysmal supraventricular tachycardia) (H)    ? Right bundle branch block    ? Second Degree A-V Block, Mobitz Type I     Created by Conversion    ? Skin cancer     Past Surgical History:   Procedure Laterality Date   ? ABLATION OF DYSRHYTHMIC FOCUS  04/11/2013    AV analia reentry tachycardia, partially successful   ? CARDIAC CATHETERIZATION  03/10/2016   ? CARDIAC PACEMAKER PLACEMENT  04/2011    Second-degree AV block   ? CARPAL TUNNEL RELEASE      both wrists   ? FOOT SURGERY      L foot   ? HAND SURGERY      on both thumbs   ? [place] SHLDR ARTHROSCOP,SURG,W/ROTAT CUFF REPR Left 3/9/2017    Procedure: LEFT SHOULDER ARTHROSCOPY DECOMPRESSION DISTAL CLAVICLE EXCISION  ROTATOR CUFF REPAIR BICEPS TENOTOMY AND EXTENSIVE DEBRIDEMENT;  Surgeon: Todd Riggs MD;  Location: [facility];  Service: Orthopedics   ? SKIN BIOPSY     ? SKIN CANCER EXCISION      L ankle,Lleg and cheek   ? SVT Ablation     ? TOE SURGERY      L big toe joint replacement   ? TUBAL LIGATION      Family History   Problem Relation Age of Onset   ?  Hypertension Mother    ? Stroke Mother    ? Prostate cancer Father    ? Cancer Father    ? Heart attack Father    ? Dyslipidemia Father    ? Dyslipidemia Sister    ? Dyslipidemia Brother    ? Hypertension Brother    ? Prostate cancer Brother     Social History     Socioeconomic History   ? Marital status:      Spouse name: Not on file   ? Number of children: Not on file   ? Years of education: Not on file   ? Highest education level: Not on file   Occupational History   ? Not on file   Social Needs   ? Financial resource strain: Not on file   ? Food insecurity     Worry: Not on file     Inability: Not on file   ? Transportation needs     Medical: Not on file     Non-medical: Not on file   Tobacco Use   ? Smoking status: Never Smoker   ? Smokeless tobacco: Never Used   Substance and Sexual Activity   ? Alcohol use: No   ? Drug use: No   ? Sexual activity: Yes     Partners: Male     Birth control/protection: Surgical   Lifestyle   ? Physical activity     Days per week: Not on file     Minutes per session: Not on file   ? Stress: Not on file   Relationships   ? Social connections     Talks on phone: Not on file     Gets together: Not on file     Attends [employer] service: Not on file     Active member of club or organization: Not on file     Attends meetings of clubs or organizations: Not on file     Relationship status: Not on file   ? Intimate partner violence     Fear of current or ex partner: Not on file     Emotionally abused: Not on file     Physically abused: Not on file     Forced sexual activity: Not on file   Other Topics Concern   ? Not on file   Social History Narrative   ? Not on file          Medications  Allergies   Current Outpatient Medications   Medication Sig Dispense Refill   ? amLODIPine (NORVASC) 10 MG tablet Take 10 mg by mouth daily.     ? aspirin 325 MG tablet Take 325 mg by mouth every evening.      ? atorvastatin (LIPITOR) 80 MG tablet Take 80 mg by mouth bedtime.     ? losartan  (COZAAR) 50 MG tablet Take 100 mg by mouth at bedtime.      ? metFORMIN (GLUCOPHAGE) 500 MG tablet Take 1 tablet (500 mg total) by mouth 2 (two) times a day with meals. At breakfast and at dinner 60 tablet 0   ? metoprolol (LOPRESSOR) 100 MG tablet Take 100 mg by mouth 2 (two) times a day.     ? multivitamin therapeutic (THERAGRAN) tablet Take 1 tablet by mouth every evening.      ? omeprazole (PRILOSEC) 20 MG capsule Take 20 mg by mouth daily before breakfast.        No current facility-administered medications for this visit.     Allergies   Allergen Reactions   ? Venom-Honey Bee Shortness Of Breath   ? Macrobid [Nitrofurantoin Monohyd/M-Cryst] Other (See Comments)     Burning sensation across chest and arms   ? Mercurial Analogues Dermatitis     blisters   ? Sulfa (Sulfonamide Antibiotics) Rash         Lab Results    Chemistry/lipid CBC Cardiac Enzymes/BNP/TSH/INR   Lab Results   Component Value Date    CHOL 153 04/11/2019    HDL 55 04/11/2019    LDLCALC 77 04/11/2019    TRIG 104 04/11/2019    CREATININE 1.16 (H) 10/29/2019    BUN 22 10/29/2019    K 4.6 10/29/2019     10/29/2019     10/29/2019    CO2 27 10/29/2019    Lab Results   Component Value Date    WBC 8.5 03/20/2018    HGB 12.5 03/20/2018    HCT 36.6 03/20/2018    MCV 87 03/20/2018     03/20/2018    Lab Results   Component Value Date    CKTOTAL 54 04/12/2011    CKMB 1 03/08/2016    TROPONINI 0.02 03/20/2018     (H) 01/04/2013    TSH 4.4 04/12/2011    INR 0.94 03/19/2018

## 2021-06-30 NOTE — PROGRESS NOTES
Progress Notes by Sara Akins MD at 3/11/2021  2:10 PM     Author: Sara Akins MD Service: -- Author Type: Physician    Filed: 3/11/2021  5:03 PM Encounter Date: 3/11/2021 Status: Signed    : Sara Akins MD (Physician)         HEART CARE NOTE          Assessment/Recommendations     1. HFpEF c/b ADHF   Assessment / Plan    Euvolemic on physical exam --> continue current diuretic regimen     Continues to have difficult to control HTN --> Continue losartan and hydralazine ( while not titrate as patient reports some concerns with this mediation); will stop metoprolol and start carvedilol in it's place on hopes of attaining better BP control    [facility] clinic will call on Thursday to follow-up home BP log    2. Third degree AV block c/b AV analia reentry tachycardia   Assessment / Plan    S/p Dual chamber PPM    Will transition to carvedilol as detailed above     History of Present Illness/Subjective      Ms. Sharyn Trent is a 80 y.o. female with a PMHx significant for HFpEF, hypertensive emergency, dyslipidemia, type 2 diabetes, heart block status post pacemaker, paroxysmal supraventricular tachycardia, melanoma, chronic left leg edema, chronic kidney disease stage III who presents to [facility] for follow-up care    Today, Mrs. Trent reports that she has been doing well from a heart failure standpoint (denies orthopnea, PND or edema), but does report another episode of hypertensive emergency shortly after her recent discharge for which she was seen in the emergency room. She reports that BP improved with ativan, but it;s unclear which came first, the anxiety of the HTN. She brought in her home BP log which was illustrated SBPs ranging from 150s-170s.      ECG: Personally reviewed. Paced rhythm      ECHO (personnaly Reviewed):     Left ventricle ejection fraction is normal. The estimated left ventricular ejection fraction is 55%.    Left ventricular diastolic function  is abnormal.    Normal right ventricular size and systolic function.    No hemodynamically significant valvular heart abnormalities.    When compared to the previous study dated 3/20/2018, No significant change          Physical Examination Review of Systems   Vitals:    03/11/21 1442   BP: 162/70   Pulse: 68     Body mass index is 29.58 kg/m .  Wt Readings from Last 3 Encounters:   03/11/21 167 lb (75.8 kg)   03/07/21 165 lb (74.8 kg)   03/06/21 165 lb 3.2 oz (74.9 kg)     General Appearance:   no distress, normal body habitus   ENT/Mouth: membranes moist, no oral lesions or bleeding gums.      EYES:  no scleral icterus, normal conjunctivae   Neck: no carotid bruits or thyromegaly   Chest/Lungs:   lungs are clear to auscultation, no rales or wheezing, equal chest wall expansion    Cardiovascular:   Regular. Normal first and second heart sounds with no murmurs, rubs, or gallops; the carotid, radial and posterior tibial pulses are intact, no JVD and trace LE edema bilaterally    Abdomen:  no organomegaly, masses, bruits, or tenderness; bowel sounds are present   Extremities: no cyanosis or clubbing   Skin: no xanthelasma, warm.    Neurologic: normal gait, normal  bilateral, no tremors     Psychiatric: alert and oriented x3, calm     A complete 10 systems ROS was reviewed  And is negative except what is listed in the HPI.          Medical History  Surgical History Family History Social History   Past Medical History:   Diagnosis Date   ? Abnormal ECG    ? Anxiety    ? Arthritis    ? Chest pain    ? Chest pain    ? Chronic kidney disease     stage 3-mod.   ? Diabetes (H)    ? Dyslipidemia, goal LDL below 70    ? GERD (gastroesophageal reflux disease)    ? HTN (hypertension)    ? Hyperlipidemia    ? Melanoma of ankle (H)     L ankle   ? Pacemaker    ? PSVT (paroxysmal supraventricular tachycardia) (H)    ? Right bundle branch block    ? Second Degree A-V Block, Mobitz Type I     Created by Conversion    ? Skin  cancer     Past Surgical History:   Procedure Laterality Date   ? ABLATION OF DYSRHYTHMIC FOCUS  04/11/2013    AV analia reentry tachycardia, partially successful   ? CARDIAC CATHETERIZATION  03/10/2016   ? CARDIAC PACEMAKER PLACEMENT  04/2011    Second-degree AV block   ? CARPAL TUNNEL RELEASE      both wrists   ? FOOT SURGERY      L foot   ? HAND SURGERY      on both thumbs   ? [place] SHLDR ARTHROSCOP,SURG,W/ROTAT CUFF REPR Left 3/9/2017    Procedure: LEFT SHOULDER ARTHROSCOPY DECOMPRESSION DISTAL CLAVICLE EXCISION  ROTATOR CUFF REPAIR BICEPS TENOTOMY AND EXTENSIVE DEBRIDEMENT;  Surgeon: Todd Riggs MD;  Location: [facility];  Service: Orthopedics   ? SKIN BIOPSY     ? SKIN CANCER EXCISION      L ankle,Lleg and cheek   ? SVT Ablation     ? TOE SURGERY      L big toe joint replacement   ? TUBAL LIGATION      no family history of premature coronary artery disease Social History     Socioeconomic History   ? Marital status:      Spouse name: Not on file   ? Number of children: Not on file   ? Years of education: Not on file   ? Highest education level: Not on file   Occupational History   ? Not on file   Social Needs   ? Financial resource strain: Not on file   ? Food insecurity     Worry: Not on file     Inability: Not on file   ? Transportation needs     Medical: Not on file     Non-medical: Not on file   Tobacco Use   ? Smoking status: Never Smoker   ? Smokeless tobacco: Never Used   Substance and Sexual Activity   ? Alcohol use: No   ? Drug use: No   ? Sexual activity: Yes     Partners: Male     Birth control/protection: Surgical   Lifestyle   ? Physical activity     Days per week: Not on file     Minutes per session: Not on file   ? Stress: Not on file   Relationships   ? Social connections     Talks on phone: Not on file     Gets together: Not on file     Attends [employer] service: Not on file     Active member of club or organization: Not on file     Attends meetings of clubs or organizations:  Not on file     Relationship status: Not on file   ? Intimate partner violence     Fear of current or ex partner: Not on file     Emotionally abused: Not on file     Physically abused: Not on file     Forced sexual activity: Not on file   Other Topics Concern   ? Not on file   Social History Narrative   ? Not on file           Lab Results    Chemistry/lipid CBC Cardiac Enzymes/BNP/TSH/INR   Lab Results   Component Value Date    CHOL 127 04/28/2020    HDL 45 (L) 04/28/2020    LDLCALC 67 04/28/2020    TRIG 73 04/28/2020    CREATININE 1.32 (H) 03/08/2021    BUN 32 (H) 03/08/2021    K 4.6 03/08/2021     (L) 03/08/2021     03/08/2021    CO2 21 (L) 03/08/2021    Lab Results   Component Value Date    WBC 8.5 03/07/2021    HGB 11.9 (L) 03/07/2021    HCT 35.2 03/07/2021    MCV 90 03/07/2021     03/07/2021    Lab Results   Component Value Date    CKTOTAL 54 04/12/2011    CKMB 1 03/08/2016    TROPONINI 0.02 03/07/2021     03/07/2021    TSH 5.48 (H) 03/03/2021    INR 0.94 03/19/2018     Lab Results   Component Value Date    CKTOTAL 54 04/12/2011    CKMB 1 03/08/2016    TROPONINI 0.02 03/07/2021          Weight:    Wt Readings from Last 3 Encounters:   03/07/21 165 lb (74.8 kg)   03/06/21 165 lb 3.2 oz (74.9 kg)   04/15/20 165 lb (74.8 kg)       Allergies  Allergies   Allergen Reactions   ? Venom-Honey Bee Shortness Of Breath   ? Macrobid [Nitrofurantoin Monohyd/M-Cryst] Other (See Comments)     Burning sensation across chest and arms   ? Mercurial Analogues Dermatitis     blisters   ? Sulfa (Sulfonamide Antibiotics) Rash         Surgical History  Past Surgical History:   Procedure Laterality Date   ? ABLATION OF DYSRHYTHMIC FOCUS  04/11/2013    AV analia reentry tachycardia, partially successful   ? CARDIAC CATHETERIZATION  03/10/2016   ? CARDIAC PACEMAKER PLACEMENT  04/2011    Second-degree AV block   ? CARPAL TUNNEL RELEASE      both wrists   ? FOOT SURGERY      L foot   ? HAND SURGERY      on both  thumbs   ? [place] SHLDR ARTHROSCOP,SURG,W/ROTAT CUFF REPR Left 3/9/2017    Procedure: LEFT SHOULDER ARTHROSCOPY DECOMPRESSION DISTAL CLAVICLE EXCISION  ROTATOR CUFF REPAIR BICEPS TENOTOMY AND EXTENSIVE DEBRIDEMENT;  Surgeon: Todd Riggs MD;  Location: [facility];  Service: Orthopedics   ? SKIN BIOPSY     ? SKIN CANCER EXCISION      L ankle,Lleg and cheek   ? SVT Ablation     ? TOE SURGERY      L big toe joint replacement   ? TUBAL LIGATION         Social History  Tobacco:   Social History     Tobacco Use   Smoking Status Never Smoker   Smokeless Tobacco Never Used    Alcohol:   Social History     Substance and Sexual Activity   Alcohol Use No    Illicit Drugs:   Social History     Substance and Sexual Activity   Drug Use No       Family History  Family History   Problem Relation Age of Onset   ? Hypertension Mother    ? Stroke Mother    ? Prostate cancer Father    ? Cancer Father    ? Heart attack Father    ? Dyslipidemia Father    ? Dyslipidemia Sister    ? Dyslipidemia Brother    ? Hypertension Brother    ? Prostate cancer Brother           Heidy Akins on 3/11/2021      cc: Jamie Mcconnell MD,

## 2021-06-30 NOTE — PROGRESS NOTES
Progress Notes by Sara Akins MD at 5/20/2021 12:50 PM     Author: Sara Akins MD Service: -- Author Type: Physician    Filed: 5/20/2021  2:17 PM Encounter Date: 5/20/2021 Status: Signed    : Sara Akins MD (Physician)         HEART CARE NOTE          Assessment/Recommendations     1. HFpEF c/b ADHF   Assessment / Plan    Euvolemic on physical exam --> continue current diuretic regimen     HTN has been difficult to control--> but BP significantly improved per home log --> continue Losartan 100 gm daily for now, and [facility] will call next week Thursday to follow-up    2. Chest pressure  Assessment / Plan    Patient recently seen in the ED for chest pressure which was atypical for angina. She was seen in [facility] at which time stress testing was ordered which was positive for ischemia--> patient scheduled for follow-up CT coronary which could not be scheduled before 6/08. Today, she endorses concerning symptoms of intermittent left arm pain although the chest pressure has not returned so will proceed with coronary angiogram +/- PCI which will we will be able to schedule sooner. I discussed this with the patient and daughter who was present at the visit including potential risk of stroke, myocardial infarction, or death.  We also discussed the possibility of vascular injury, bleeding requiring blood transfusion, or reaction to x-ray dye.     3. 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 Sharyn Trent is a 80 y.o. female with a PMHx significant for HFpEF, hypertensive emergency, dyslipidemia, type 2 diabetes, heart block status post pacemaker, paroxysmal supraventricular tachycardia, melanoma, chronic left leg edema, chronic kidney disease stage III who presents to [facility] for follow-up care     Today, Mrs. Trent denies any acute events or complaints. She  denies palpitations, orthopnea, PND, fluid retention/edema. She does endorse intermittent left arm pain that seems to be exertional so will proceed with coronary angiogram +/- PCI as detailed above      ECG: Personally reviewed. Paced rhythm      ECHO (personnaly Reviewed):     Left ventricle ejection fraction is normal. The estimated left ventricular ejection fraction is 55%.    Left ventricular diastolic function is abnormal.    Normal right ventricular size and systolic function.    No hemodynamically significant valvular heart abnormalities.    When compared to the previous study dated 3/20/2018, No significant change             Physical Examination Review of Systems   Vitals:    05/20/21 1300   BP: 158/52   Pulse: 60   Resp: 16     Body mass index is 29.66 kg/m .  Wt Readings from Last 3 Encounters:   05/20/21 157 lb (71.2 kg)   05/10/21 162 lb (73.5 kg)   05/06/21 158 lb 3.2 oz (71.8 kg)     General Appearance:   no distress, normal body habitus   ENT/Mouth: membranes moist, no oral lesions or bleeding gums.      EYES:  no scleral icterus, normal conjunctivae   Neck: no carotid bruits or thyromegaly   Chest/Lungs:   lungs are clear to auscultation, no rales or wheezing, equal chest wall expansion    Cardiovascular:   Regular. Normal first and second heart sounds with no murmurs, rubs, or gallops; the carotid, radial and posterior tibial pulses are intact, no JVD or LE edema bilaterally    Abdomen:  no organomegaly, masses, bruits, or tenderness; bowel sounds are present   Extremities: no cyanosis or clubbing   Skin: no xanthelasma, warm.    Neurologic: normal gait, normal  bilateral, no tremors     Psychiatric: alert and oriented x3, calm     A complete 10 systems ROS was reviewed  And is negative except what is listed in the HPI.          Medical History  Surgical History Family History Social History   Past Medical History:   Diagnosis Date   ? Abnormal ECG    ? Anxiety    ? Arthritis    ? Chest pain     ? Chest pain    ? Chronic kidney disease     stage 3-mod.   ? Diabetes (H)    ? Dyslipidemia, goal LDL below 70    ? GERD (gastroesophageal reflux disease)    ? HTN (hypertension)    ? Hyperlipidemia    ? Melanoma of ankle (H)     L ankle   ? Pacemaker    ? PSVT (paroxysmal supraventricular tachycardia) (H)    ? Right bundle branch block    ? Second Degree A-V Block, Mobitz Type I     Created by Conversion    ? Skin cancer     Past Surgical History:   Procedure Laterality Date   ? ABLATION OF DYSRHYTHMIC FOCUS  04/11/2013    AV analia reentry tachycardia, partially successful   ? CARDIAC CATHETERIZATION  03/10/2016   ? CARDIAC PACEMAKER PLACEMENT  04/2011    Second-degree AV block   ? CARPAL TUNNEL RELEASE      both wrists   ? FOOT SURGERY      L foot   ? HAND SURGERY      on both thumbs   ? CO SHLDR ARTHROSCOP,SURG,W/ROTAT CUFF REPR Left 3/9/2017    Procedure: LEFT SHOULDER ARTHROSCOPY DECOMPRESSION DISTAL CLAVICLE EXCISION  ROTATOR CUFF REPAIR BICEPS TENOTOMY AND EXTENSIVE DEBRIDEMENT;  Surgeon: Todd Riggs MD;  Location: [facility];  Service: Orthopedics   ? SKIN BIOPSY     ? SKIN CANCER EXCISION      L ankle,Lleg and cheek   ? SVT Ablation     ? TOE SURGERY      L big toe joint replacement   ? TUBAL LIGATION      no family history of premature coronary artery disease Social History     Socioeconomic History   ? Marital status:      Spouse name: Not on file   ? Number of children: Not on file   ? Years of education: Not on file   ? Highest education level: Not on file   Occupational History   ? Not on file   Social Needs   ? Financial resource strain: Not on file   ? Food insecurity     Worry: Not on file     Inability: Not on file   ? Transportation needs     Medical: Not on file     Non-medical: Not on file   Tobacco Use   ? Smoking status: Never Smoker   ? Smokeless tobacco: Never Used   Substance and Sexual Activity   ? Alcohol use: No   ? Drug use: No   ? Sexual activity: Yes      Partners: Male     Birth control/protection: Surgical   Lifestyle   ? Physical activity     Days per week: Not on file     Minutes per session: Not on file   ? Stress: Not on file   Relationships   ? Social connections     Talks on phone: Not on file     Gets together: Not on file     Attends Religion service: Not on file     Active member of club or organization: Not on file     Attends meetings of clubs or organizations: Not on file     Relationship status: Not on file   ? Intimate partner violence     Fear of current or ex partner: Not on file     Emotionally abused: Not on file     Physically abused: Not on file     Forced sexual activity: Not on file   Other Topics Concern   ? Not on file   Social History Narrative   ? Not on file           Lab Results    Chemistry/lipid CBC Cardiac Enzymes/BNP/TSH/INR   Lab Results   Component Value Date    CHOL 120 04/13/2021    HDL 38 (L) 04/13/2021    LDLCALC 63 04/13/2021    TRIG 96 04/13/2021    CREATININE 1.09 05/06/2021    BUN 28 05/06/2021    K 4.6 05/06/2021     05/06/2021    CL 99 05/06/2021    CO2 25 05/06/2021    Lab Results   Component Value Date    WBC 7.9 05/06/2021    HGB 11.3 (L) 05/06/2021    HCT 34.2 (L) 05/06/2021    MCV 90 05/06/2021     05/06/2021    Lab Results   Component Value Date    CKTOTAL 54 04/12/2011    CKMB 1 03/08/2016    TROPONINI <0.01 05/07/2021     05/06/2021    TSH 5.48 (H) 03/03/2021    INR 1.05 05/06/2021     Lab Results   Component Value Date    CKTOTAL 54 04/12/2011    CKMB 1 03/08/2016    TROPONINI <0.01 05/07/2021          Weight:    Wt Readings from Last 3 Encounters:   05/10/21 162 lb (73.5 kg)   05/06/21 158 lb 3.2 oz (71.8 kg)   04/23/21 162 lb 9.6 oz (73.8 kg)       Allergies  Allergies   Allergen Reactions   ? Venom-Honey Bee Shortness Of Breath   ? Macrobid [Nitrofurantoin Monohyd/M-Cryst] Other (See Comments)     Burning sensation across chest and arms   ? Mercurial Analogues Dermatitis     blisters   ?  Sulfa (Sulfonamide Antibiotics) Rash         Surgical History  Past Surgical History:   Procedure Laterality Date   ? ABLATION OF DYSRHYTHMIC FOCUS  04/11/2013    AV analia reentry tachycardia, partially successful   ? CARDIAC CATHETERIZATION  03/10/2016   ? CARDIAC PACEMAKER PLACEMENT  04/2011    Second-degree AV block   ? CARPAL TUNNEL RELEASE      both wrists   ? FOOT SURGERY      L foot   ? HAND SURGERY      on both thumbs   ? [place] SHLDR ARTHROSCOP,SURG,W/ROTAT CUFF REPR Left 3/9/2017    Procedure: LEFT SHOULDER ARTHROSCOPY DECOMPRESSION DISTAL CLAVICLE EXCISION  ROTATOR CUFF REPAIR BICEPS TENOTOMY AND EXTENSIVE DEBRIDEMENT;  Surgeon: Todd Riggs MD;  Location: [facility];  Service: Orthopedics   ? SKIN BIOPSY     ? SKIN CANCER EXCISION      L ankle,Lleg and cheek   ? SVT Ablation     ? TOE SURGERY      L big toe joint replacement   ? TUBAL LIGATION         Social History  Tobacco:   Social History     Tobacco Use   Smoking Status Never Smoker   Smokeless Tobacco Never Used    Alcohol:   Social History     Substance and Sexual Activity   Alcohol Use No    Illicit Drugs:   Social History     Substance and Sexual Activity   Drug Use No       Family History  Family History   Problem Relation Age of Onset   ? Hypertension Mother    ? Stroke Mother    ? Prostate cancer Father    ? Cancer Father    ? Heart attack Father    ? Dyslipidemia Father    ? Dyslipidemia Sister    ? Dyslipidemia Brother    ? Hypertension Brother    ? Prostate cancer Brother           Heidy Akins on 5/20/2021      cc: Jamie Mcconnell MD,

## 2021-06-30 NOTE — PROGRESS NOTES
Progress Notes by Moses Naylor MD at 5/10/2021  1:20 PM     Author: Moses Naylor MD Service: -- Author Type: Physician    Filed: 5/10/2021  2:01 PM Encounter Date: 5/10/2021 Status: Signed    : Moses Naylor MD (Physician)           Click to link to [facility] HEART [facility] NOTE       Assessment/Plan:   1.  Chest pressure: The patient's symptoms is not typical.  But she complains of for fatigue recently.  Her blood pressure is fluctuated.  The patient has a multiple risk of factors including her age, essential hypertension, dyslipidemia, type 2 diabetes.  We discussed further evaluation.  Pharmacological regadenoson nuclear stress test is recommended.  The patient and her daughter agreed with the plan.    2.  Uncontrolled hypertension: Her blood pressure still not well controlled.  Increase losartan from 50 to 100 mg daily, continue hydralazine and carvedilol.  The target of her blood pressure is less than 130/80 mmHg.    3.  Third degree AV block s/p dual-chamber pacemaker placement: Device works fine.  ECG showed sinus rhythm, ventricular paced.  Continue to follow-up with device clinic.    4.  Dyslipidemia, type 2 diabetes: Stable.    Thank you for the opportunity to be involved in the care of Sharyn Trent. If you have any questions, please feel free to contact me.  The patient has appointment with Dr. Akins on 5-.    Much or all of the text in this note was generated through the use of Dragon Dictate voice-to-text software. Errors in spelling or words which seem out of context are unintentional.   Sound alike errors, in particular, may have escaped editing.       History of Present Illness:   It is my pleasure to see Sharyn Trent at the [facility] clinic for evaluation of Consult.  Sharyn Trent is a 80 y.o. female with a medical history of essential hypertension, dyslipidemia, type 2 diabetes, third-degree AV block s/p 2 dual-chamber pacemaker placement, stage III  CKD, chronic diastolic congestive heart failure.    The patient presents to rapid access cardiology clinic for evaluation of her chest tightenings.  She described her chest tightness located low anterior chest, on and off, mild in severity, no radiation, no typical factors to aggravate or relieve her chest pressure.  She went to emergency room 3 days ago for further evaluation.  Her ER evaluation was not impressive.  Her BMP was normal, troponin was normal, chest x-ray has no significant change.  She complains of fatigue recently.  She has no complaints of shortness of breath on exertion, palpitations, dizziness, orthopnea, or PND.  She has trace leg edema which has been stable.  Currently she is on furosemide 10 mg daily.  Her blood pressure is high, not controlled well.  Currently she is on carvedilol 25 mg twice a day hydralazine 75 mg every 8 hours, losartan 50 mg daily.  When she was in emergency room, her blood pressure was 229/95, her blood pressure today is 160/70 mmHg.    Her pacemaker interrogation 2 weeks ago was reported no cardiac arrhythmia.    Past Medical History:     Patient Active Problem List   Diagnosis   ? Cardiac pacemaker in situ, dual chamber   ? Atypical chest pain   ? Malignant essential hypertension   ? Dyslipidemia, goal LDL below 70   ? DM (diabetes mellitus), type 2 (H)   ? Third degree AV block (H)   ? Chest pain   ? Ectopic atrial tachycardia (H)   ? Acute diastolic CHF (congestive heart failure) (H)   ? Acute respiratory failure with hypoxia (H)   ? Pneumonia of left lower lobe due to infectious organism   ? Stroke-like symptoms       Past Surgical History:     Past Surgical History:   Procedure Laterality Date   ? ABLATION OF DYSRHYTHMIC FOCUS  04/11/2013    AV analia reentry tachycardia, partially successful   ? CARDIAC CATHETERIZATION  03/10/2016   ? CARDIAC PACEMAKER PLACEMENT  04/2011    Second-degree AV block   ? CARPAL TUNNEL RELEASE      both wrists   ? FOOT SURGERY      L  foot   ? HAND SURGERY      on both thumbs   ? DE SHLDR ARTHROSCOP,SURG,W/ROTAT CUFF REPR Left 3/9/2017    Procedure: LEFT SHOULDER ARTHROSCOPY DECOMPRESSION DISTAL CLAVICLE EXCISION  ROTATOR CUFF REPAIR BICEPS TENOTOMY AND EXTENSIVE DEBRIDEMENT;  Surgeon: Todd Riggs MD;  Location: [facility];  Service: Orthopedics   ? SKIN BIOPSY     ? SKIN CANCER EXCISION      L ankle,Lleg and cheek   ? SVT Ablation     ? TOE SURGERY      L big toe joint replacement   ? TUBAL LIGATION         Family History:     Family History   Problem Relation Age of Onset   ? Hypertension Mother    ? Stroke Mother    ? Prostate cancer Father    ? Cancer Father    ? Heart attack Father    ? Dyslipidemia Father    ? Dyslipidemia Sister    ? Dyslipidemia Brother    ? Hypertension Brother    ? Prostate cancer Brother         Social History:    reports that she has never smoked. She has never used smokeless tobacco. She reports that she does not drink alcohol or use drugs.    Review of Systems:   General: WNL  Eyes: WNL  Ears/Nose/Throat: WNL  Lungs: WNL  Heart: WNL  Stomach: WNL  Bladder: WNL  Muscle/Joints: WNL  Skin: WNL  Nervous System: WNL  Mental Health: WNL     Blood: WNL    Meds:     Current Outpatient Medications:   ?  aspirin 325 MG tablet, Take 325 mg by mouth every evening. , Disp: , Rfl:   ?  atorvastatin (LIPITOR) 80 MG tablet, Take 80 mg by mouth bedtime., Disp: , Rfl:   ?  carvediloL (COREG) 25 MG tablet, Take 1 tablet (25 mg total) by mouth 2 (two) times a day with meals., Disp: 180 tablet, Rfl: 3  ?  furosemide (LASIX) 20 MG tablet, Take 0.5 tablets (10 mg total) by mouth daily., Disp: 60 tablet, Rfl: 3  ?  hydrALAZINE (APRESOLINE) 50 MG tablet, Take 1.5 tablets (75 mg total) by mouth every 8 (eight) hours., Disp: 405 tablet, Rfl: 0  ?  LORazepam (ATIVAN) 0.5 MG tablet, Take 1 tablet (0.5 mg total) by mouth every 6 (six) hours as needed for anxiety (or tremors)., Disp: 12 tablet, Rfl: 0  ?  losartan (COZAAR) 100 MG  "tablet, Take 1 tablet (100 mg total) by mouth daily., Disp: 30 tablet, Rfl: 11  ?  metFORMIN (GLUCOPHAGE) 500 MG tablet, Take 1 tablet (500 mg total) by mouth 2 (two) times a day with meals. At breakfast and at dinner, Disp: 60 tablet, Rfl: 0  ?  multivitamin therapeutic (THERAGRAN) tablet, Take 1 tablet by mouth every evening. , Disp: , Rfl:   ?  omeprazole (PRILOSEC) 20 MG capsule, Take 20 mg by mouth daily before breakfast. , Disp: , Rfl:   ?  prednisoLONE acetate (PRED-FORTE) 1 % ophthalmic suspension, Administer 1 drop to the right eye 3 (three) times a day., Disp: , Rfl:   ?  vit C/E/Zn/coppr/lutein/zeaxan (PRESERVISION AREDS-2 ORAL), Take 1 tablet by mouth 2 (two) times a day before meals., Disp: , Rfl:     Allergies:   Venom-honey bee, Macrobid [nitrofurantoin monohyd/m-cryst], Mercurial analogues, and Sulfa (sulfonamide antibiotics)      Objective:      Physical Exam  162 lb (73.5 kg)  5' 1\" (1.549 m)  Body mass index is 30.61 kg/m .  /70 (Patient Site: Right Arm, Patient Position: Sitting, Cuff Size: Adult Large)   Pulse 68   Resp 16   Ht 5' 1\" (1.549 m)   Wt 162 lb (73.5 kg)   BMI 30.61 kg/m      General Appearance:   Awake, Alert, No acute distress.   HEENT:  Pupil equal and reactive to light. No scleral icterus; the mucous membranes were moist.   Neck: No cervical bruits. No JVD. No thyromegaly.     Chest: The spine was straight. The chest was symmetric.   Lungs:   Respirations unlabored; Lungs are clear to auscultation. No crackles. No wheezing.   Cardiovascular:   Regular rhythm and rate, normal first and second heart sounds with II/VI systolic murmurs at RUSB. No rubs or gallops.    Abdomen:  Soft. No tenderness. Non-distended. Bowels sounds are present   Extremities: Equal tibial pulses. No leg edema.   Skin: No rashes or ulcers. Warm, Dry.   Musculoskeletal: No tenderness. No deformity.   Neurologic: Mood and affect are appropriate. No focal deficits.         Pacemaker interrogation on " April 23, 2021: Personally reviewed  Normal device function, no cardiac arrhythmia    Cardiac Imaging Studies  Echocardiogram on March 2, 2021:    Left ventricle ejection fraction is normal. The estimated left ventricular ejection fraction is 55%.    Left ventricular diastolic function is abnormal.    Normal right ventricular size and systolic function.    No hemodynamically significant valvular heart abnormalities.    When compared to the previous study dated 3/20/2018, No significant change    Lab Review   Lab Results   Component Value Date     05/06/2021    K 4.6 05/06/2021    CL 99 05/06/2021    CO2 25 05/06/2021    BUN 28 05/06/2021    CREATININE 1.09 05/06/2021    CALCIUM 9.3 05/06/2021     Lab Results   Component Value Date    WBC 7.9 05/06/2021    WBC 9.3 02/25/2015    HGB 11.3 (L) 05/06/2021    HCT 34.2 (L) 05/06/2021    MCV 90 05/06/2021     05/06/2021     Lab Results   Component Value Date    CHOL 120 04/13/2021    CHOL 127 04/28/2020    CHOL 153 04/11/2019     Lab Results   Component Value Date    HDL 38 (L) 04/13/2021    HDL 45 (L) 04/28/2020    HDL 55 04/11/2019     Lab Results   Component Value Date    LDLCALC 63 04/13/2021    LDLCALC 67 04/28/2020    LDLCALC 77 04/11/2019     Lab Results   Component Value Date    TRIG 96 04/13/2021    TRIG 73 04/28/2020    TRIG 104 04/11/2019     No components found for: CHOLHDL  Lab Results   Component Value Date    TROPONINI <0.01 05/07/2021     Lab Results   Component Value Date     05/06/2021     Lab Results   Component Value Date    TSH 5.48 (H) 03/03/2021

## 2021-06-30 NOTE — PROGRESS NOTES
Progress Notes by Sara Akins MD at 4/8/2021  1:30 PM     Author: Sara Akins MD Service: -- Author Type: Physician    Filed: 4/17/2021  8:35 AM Encounter Date: 4/8/2021 Status: Signed    : Sara Akins MD (Physician)         HEART CARE NOTE          Assessment/Recommendations     1. HFpEF c/b ADHF   Assessment / Plan    Euvolemic on physical exam --> continue current diuretic regimen     HTN has been difficult to control--> but BP improving on current regimen; Mrs. Trent does endorse symptoms of lightheadedness and tingling that she believes may be associated with her BP meds, but states that those symptoms have also been improving - no changes today    [facility] will call next Friday (BP Log and symptoms)     2. 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 Sharyn Trent is a 80 y.o. female with a PMHx significant for HFpEF, hypertensive emergency, dyslipidemia, type 2 diabetes, heart block status post pacemaker, paroxysmal supraventricular tachycardia, melanoma, chronic left leg edema, chronic kidney disease stage III who presents to [facility] for follow-up care     Today, Mrs. Trent reports that she continues to do well from a heart failure standpoint (denies orthopnea, PND or edema), and that her BP is overall better controlled. Her home log ranges SBP 130s - 150s. She does have episodes of lightheadedness and tingling in her extremities, but she reports that the frequency, duration and severity of these episodes have been increasing.     ECG: Personally reviewed. Paced rhythm      ECHO (personnaly Reviewed):     Left ventricle ejection fraction is normal. The estimated left ventricular ejection fraction is 55%.    Left ventricular diastolic function is abnormal.    Normal right ventricular size and systolic function.    No hemodynamically significant  valvular heart abnormalities.    When compared to the previous study dated 3/20/2018, No significant change          Physical Examination Review of Systems   Vitals:    04/08/21 1336   BP: 144/64   Pulse: 66   Resp: 16   SpO2: 98%     Body mass index is 28.95 kg/m .  Wt Readings from Last 3 Encounters:   04/08/21 163 lb 6.4 oz (74.1 kg)   03/11/21 167 lb (75.8 kg)   03/07/21 165 lb (74.8 kg)     General Appearance:   no distress, normal body habitus   ENT/Mouth: membranes moist, no oral lesions or bleeding gums.      EYES:  no scleral icterus, normal conjunctivae   Neck: no carotid bruits or thyromegaly   Chest/Lungs:   lungs are clear to auscultation, no rales or wheezing, equal chest wall expansion    Cardiovascular:   Regular. Normal first and second heart sounds with no murmurs, rubs, or gallops; the carotid, radial and posterior tibial pulses are intact, no JVD or LE edema bilaterally    Abdomen:  no organomegaly, masses, bruits, or tenderness; bowel sounds are present   Extremities: no cyanosis or clubbing   Skin: no xanthelasma, warm.    Neurologic: normal gait, normal  bilateral, no tremors     Psychiatric: alert and oriented x3, calm     A complete 10 systems ROS was reviewed  And is negative except what is listed in the HPI.          Medical History  Surgical History Family History Social History   Past Medical History:   Diagnosis Date   ? Abnormal ECG    ? Anxiety    ? Arthritis    ? Chest pain    ? Chest pain    ? Chronic kidney disease     stage 3-mod.   ? Diabetes (H)    ? Dyslipidemia, goal LDL below 70    ? GERD (gastroesophageal reflux disease)    ? HTN (hypertension)    ? Hyperlipidemia    ? Melanoma of ankle (H)     L ankle   ? Pacemaker    ? PSVT (paroxysmal supraventricular tachycardia) (H)    ? Right bundle branch block    ? Second Degree A-V Block, Mobitz Type I     Created by Conversion    ? Skin cancer     Past Surgical History:   Procedure Laterality Date   ? ABLATION OF DYSRHYTHMIC  FOCUS  04/11/2013    AV analia reentry tachycardia, partially successful   ? CARDIAC CATHETERIZATION  03/10/2016   ? CARDIAC PACEMAKER PLACEMENT  04/2011    Second-degree AV block   ? CARPAL TUNNEL RELEASE      both wrists   ? FOOT SURGERY      L foot   ? HAND SURGERY      on both thumbs   ? [place] SHLDR ARTHROSCOP,SURG,W/ROTAT CUFF REPR Left 3/9/2017    Procedure: LEFT SHOULDER ARTHROSCOPY DECOMPRESSION DISTAL CLAVICLE EXCISION  ROTATOR CUFF REPAIR BICEPS TENOTOMY AND EXTENSIVE DEBRIDEMENT;  Surgeon: Todd Riggs MD;  Location: [facility];  Service: Orthopedics   ? SKIN BIOPSY     ? SKIN CANCER EXCISION      L ankle,Lleg and cheek   ? SVT Ablation     ? TOE SURGERY      L big toe joint replacement   ? TUBAL LIGATION      no family history of premature coronary artery disease Social History     Socioeconomic History   ? Marital status:      Spouse name: Not on file   ? Number of children: Not on file   ? Years of education: Not on file   ? Highest education level: Not on file   Occupational History   ? Not on file   Social Needs   ? Financial resource strain: Not on file   ? Food insecurity     Worry: Not on file     Inability: Not on file   ? Transportation needs     Medical: Not on file     Non-medical: Not on file   Tobacco Use   ? Smoking status: Never Smoker   ? Smokeless tobacco: Never Used   Substance and Sexual Activity   ? Alcohol use: No   ? Drug use: No   ? Sexual activity: Yes     Partners: Male     Birth control/protection: Surgical   Lifestyle   ? Physical activity     Days per week: Not on file     Minutes per session: Not on file   ? Stress: Not on file   Relationships   ? Social connections     Talks on phone: Not on file     Gets together: Not on file     Attends Bahai service: Not on file     Active member of club or organization: Not on file     Attends meetings of clubs or organizations: Not on file     Relationship status: Not on file   ? Intimate partner violence     Fear of  current or ex partner: Not on file     Emotionally abused: Not on file     Physically abused: Not on file     Forced sexual activity: Not on file   Other Topics Concern   ? Not on file   Social History Narrative   ? Not on file           Lab Results    Chemistry/lipid CBC Cardiac Enzymes/BNP/TSH/INR   Lab Results   Component Value Date    CHOL 127 04/28/2020    HDL 45 (L) 04/28/2020    LDLCALC 67 04/28/2020    TRIG 73 04/28/2020    CREATININE 1.32 (H) 03/08/2021    BUN 32 (H) 03/08/2021    K 4.6 03/08/2021     (L) 03/08/2021     03/08/2021    CO2 21 (L) 03/08/2021    Lab Results   Component Value Date    WBC 8.5 03/07/2021    HGB 11.9 (L) 03/07/2021    HCT 35.2 03/07/2021    MCV 90 03/07/2021     03/07/2021    Lab Results   Component Value Date    CKTOTAL 54 04/12/2011    CKMB 1 03/08/2016    TROPONINI 0.02 03/07/2021     03/07/2021    TSH 5.48 (H) 03/03/2021    INR 0.94 03/19/2018     Lab Results   Component Value Date    CKTOTAL 54 04/12/2011    CKMB 1 03/08/2016    TROPONINI 0.02 03/07/2021          Weight:    Wt Readings from Last 3 Encounters:   04/08/21 163 lb 6.4 oz (74.1 kg)   03/11/21 167 lb (75.8 kg)   03/07/21 165 lb (74.8 kg)       Allergies  Allergies   Allergen Reactions   ? Venom-Honey Bee Shortness Of Breath   ? Macrobid [Nitrofurantoin Monohyd/M-Cryst] Other (See Comments)     Burning sensation across chest and arms   ? Mercurial Analogues Dermatitis     blisters   ? Sulfa (Sulfonamide Antibiotics) Rash         Surgical History  Past Surgical History:   Procedure Laterality Date   ? ABLATION OF DYSRHYTHMIC FOCUS  04/11/2013    AV analia reentry tachycardia, partially successful   ? CARDIAC CATHETERIZATION  03/10/2016   ? CARDIAC PACEMAKER PLACEMENT  04/2011    Second-degree AV block   ? CARPAL TUNNEL RELEASE      both wrists   ? FOOT SURGERY      L foot   ? HAND SURGERY      on both thumbs   ? [place] SHLDR ARTHROSCOP,SURG,W/ROTAT CUFF REPR Left 3/9/2017    Procedure: LEFT  SHOULDER ARTHROSCOPY DECOMPRESSION DISTAL CLAVICLE EXCISION  ROTATOR CUFF REPAIR BICEPS TENOTOMY AND EXTENSIVE DEBRIDEMENT;  Surgeon: Todd Riggs MD;  Location: [facility];  Service: Orthopedics   ? SKIN BIOPSY     ? SKIN CANCER EXCISION      L ankle,Lleg and cheek   ? SVT Ablation     ? TOE SURGERY      L big toe joint replacement   ? TUBAL LIGATION         Social History  Tobacco:   Social History     Tobacco Use   Smoking Status Never Smoker   Smokeless Tobacco Never Used    Alcohol:   Social History     Substance and Sexual Activity   Alcohol Use No    Illicit Drugs:   Social History     Substance and Sexual Activity   Drug Use No       Family History  Family History   Problem Relation Age of Onset   ? Hypertension Mother    ? Stroke Mother    ? Prostate cancer Father    ? Cancer Father    ? Heart attack Father    ? Dyslipidemia Father    ? Dyslipidemia Sister    ? Dyslipidemia Brother    ? Hypertension Brother    ? Prostate cancer Brother           Heidy Akins on 4/8/2021      cc: Jamie Mcconnell MD,

## 2021-06-30 NOTE — PROGRESS NOTES
Progress Notes by Ab Saavedra MD at 4/23/2021  2:50 PM     Author: Ab Saavedra MD Service: -- Author Type: Physician    Filed: 4/23/2021  3:44 PM Encounter Date: 4/23/2021 Status: Signed    : Ab Saavedra MD (Physician)                ELECTROPHYSIOLOGY NOTE    Today I had the opportunity to see  Sharyn Trent for follow-up evaluation of pacemaker evaluation with third-degree AV block and a past history of PSVT is take Toprol.      Assessment/Recommendations   Clinic Problem List:  1. Third degree AV block (H)     2. Cardiac pacemaker in situ, dual chamber     3. Malignant essential hypertension         30 minutes spent on this encounter date for chart review, history, physical exam,documentation and activities detailed below.    Assessment:    Third-degree AV block with normal pacemaker function, nearing NAMITA due to battery depletion  History of malignant hypertension with diastolic failure, BP controlled no symptoms    Plan:  Continue current medications  Call if increasing shortness of breath or high blood pressures greater than 150 systolic  We will contact you when your pacemaker battery is low  Follow up appointment:   Dr. Saavedra in 1 year in device clinic       History of Present Illness     Sharyn Trent is a 80 y.o. female with conduction system disease and also of paroxysmal supraventricular tachycardia. She underwent permanent pacemaker implantation in 04/2011 for symptomatic second-degree AV block with evidence for infra-Hisian conduction disease with right bundle branch block and left anterior fascicular block. Subsequently, in 2013 she was noted to have tachycardia despite metoprolol doses as high as 150 mg b.i.d. Stored electrograms on her device was consistent with AV analia reentry tachycardia which was induced at EP studies on 04/11/2013. She had occasional infra-Hisian block with persistence of the tachycardia, cryoablation quite near the compact AV node appeared to be successful  in slow pathway ablation. She did have have transient second degree AV block. At follow-up palpitations were much diminished with some episodes of 2-1 AV block noted during atrial tachycardia.  Coronary angiography March 2016 showed normal coronaries and no wall motion abnormalities.  She was [facility] at [facility] on 3/19/2018 for chest pain.  Troponins were negative.  Echocardiogram showed normal left ventricular function with abnormal septal motion consistent with a paced rhythm.  Mild left atrial enlargement was noted.  Stress nuclear imaging 3/23/2018 showed an ejection fraction is 70% no evidence for ischemia or infarction.  Chest pain was felt possibly to be related to GERD and Naprosyn was stopped.    Sharyn reports feeling reasonably well.  She denies any palpitations since her ablation.  There is no chest pain or dyspnea on exertion.  Home blood pressures have been running approximately 135/70 since losartan was increased to 100 mg daily.  She notes mild swelling of her left leg after surgical resection of a melanoma.     She was hospitalized with hypertensive urgency and felt to have diastolic heart failure early March 2021.  Cardiac echo 3/2/2021 showed normal left ventricular systolic function ejection fraction 55% and no valvular heart disease.  Her antihypertensives were adjusted to carvedilol 25 mg twice daily hydralazine 75 mg twice daily furosemide 10 mg daily and losartan 100 mg daily.  Current home blood pressures are averaging approximately 135/80.  She notes feeling jittery during the day particularly a couple hours after taking hydralazine but this is relieved with low-dose lorazepam.  She denies chest pain or shortness of breath.  There is no lightheadedness or syncope.    Personally reviewed.  Pacemaker today shows sinus rhythm with underlying third-degree AV block pacemaker dependent.  Patient seldom paces in the atrium and paces in the ventricle 99% of the time.  Atrial and  ventricular pacing and sensing's are excellent estimated time to NAMITA is approximately 4 months.  Patient has had brief atrial tachycardias but no atrial fibrillation.  Occasional PVCs were noted       Physical Examination Review of Systems   Vitals:    04/23/21 1420   BP: 134/62   Pulse: 72   Resp: 16     Body mass index is 30.47 kg/m .  Wt Readings from Last 3 Encounters:   04/23/21 162 lb 9.6 oz (73.8 kg)   04/08/21 163 lb 6.4 oz (74.1 kg)   03/11/21 167 lb (75.8 kg)        Appearance:   no distress,    HEENT:   no scleral icterus, normal conjunctivae    Neck: no carotid bruits or thyromegaly   Chest/Lungs:   lungs are clear to auscultation, no rales or wheezing,    Cardiovascular:   Jugular venous pressure 5 cm, Apical pulse is regular with a rare extrasystole. Normal S1,S2 with no murmurs or gallops,   Abdomen:  no  Hepatosplenomegaly., nontender,  bowel sounds are present   Extremities: no cyanosis or clubbing, No edema   Skin: no xanthelasma, warm.    Neurologic: No gross focal neurologic deficits   Mood/Affect: Alert, cooperative    General: WNL  Eyes: WNL  Ears/Nose/Throat: WNL  Lungs: WNL  Heart: WNL  Stomach: WNL  Bladder: WNL  Muscle/Joints: WNL  Skin: WNL  Nervous System: WNL  Mental Health: WNL     Blood: WNL     Medical History  Surgical History Family History Social History   Past Medical History:   Diagnosis Date   ? Abnormal ECG    ? Anxiety    ? Arthritis    ? Chest pain    ? Chest pain    ? Chronic kidney disease     stage 3-mod.   ? Diabetes (H)    ? Dyslipidemia, goal LDL below 70    ? GERD (gastroesophageal reflux disease)    ? HTN (hypertension)    ? Hyperlipidemia    ? Melanoma of ankle (H)     L ankle   ? Pacemaker    ? PSVT (paroxysmal supraventricular tachycardia) (H)    ? Right bundle branch block    ? Second Degree A-V Block, Mobitz Type I     Created by Conversion    ? Skin cancer     Past Surgical History:   Procedure Laterality Date   ? ABLATION OF DYSRHYTHMIC FOCUS  04/11/2013    AV  analia reentry tachycardia, partially successful   ? CARDIAC CATHETERIZATION  03/10/2016   ? CARDIAC PACEMAKER PLACEMENT  04/2011    Second-degree AV block   ? CARPAL TUNNEL RELEASE      both wrists   ? FOOT SURGERY      L foot   ? HAND SURGERY      on both thumbs   ? [place] SHLDR ARTHROSCOP,SURG,W/ROTAT CUFF REPR Left 3/9/2017    Procedure: LEFT SHOULDER ARTHROSCOPY DECOMPRESSION DISTAL CLAVICLE EXCISION  ROTATOR CUFF REPAIR BICEPS TENOTOMY AND EXTENSIVE DEBRIDEMENT;  Surgeon: Todd Riggs MD;  Location: [facility];  Service: Orthopedics   ? SKIN BIOPSY     ? SKIN CANCER EXCISION      L ankle,Lleg and cheek   ? SVT Ablation     ? TOE SURGERY      L big toe joint replacement   ? TUBAL LIGATION      Family History   Problem Relation Age of Onset   ? Hypertension Mother    ? Stroke Mother    ? Prostate cancer Father    ? Cancer Father    ? Heart attack Father    ? Dyslipidemia Father    ? Dyslipidemia Sister    ? Dyslipidemia Brother    ? Hypertension Brother    ? Prostate cancer Brother     Social History     Socioeconomic History   ? Marital status:      Spouse name: Not on file   ? Number of children: Not on file   ? Years of education: Not on file   ? Highest education level: Not on file   Occupational History   ? Not on file   Social Needs   ? Financial resource strain: Not on file   ? Food insecurity     Worry: Not on file     Inability: Not on file   ? Transportation needs     Medical: Not on file     Non-medical: Not on file   Tobacco Use   ? Smoking status: Never Smoker   ? Smokeless tobacco: Never Used   Substance and Sexual Activity   ? Alcohol use: No   ? Drug use: No   ? Sexual activity: Yes     Partners: Male     Birth control/protection: Surgical   Lifestyle   ? Physical activity     Days per week: Not on file     Minutes per session: Not on file   ? Stress: Not on file   Relationships   ? Social connections     Talks on phone: Not on file     Gets together: Not on file     Attends  Protestant service: Not on file     Active member of club or organization: Not on file     Attends meetings of clubs or organizations: Not on file     Relationship status: Not on file   ? Intimate partner violence     Fear of current or ex partner: Not on file     Emotionally abused: Not on file     Physically abused: Not on file     Forced sexual activity: Not on file   Other Topics Concern   ? Not on file   Social History Narrative   ? Not on file          Medications  Allergies   Current Outpatient Medications   Medication Sig Dispense Refill   ? aspirin 325 MG tablet Take 325 mg by mouth every evening.      ? atorvastatin (LIPITOR) 80 MG tablet Take 80 mg by mouth bedtime.     ? carvediloL (COREG) 25 MG tablet Take 1 tablet (25 mg total) by mouth 2 (two) times a day with meals. 180 tablet 3   ? furosemide (LASIX) 20 MG tablet Take 0.5 tablets (10 mg total) by mouth daily. 30 tablet 0   ? hydrALAZINE (APRESOLINE) 50 MG tablet Take 1.5 tablets (75 mg total) by mouth every 8 (eight) hours. 405 tablet 0   ? LORazepam (ATIVAN) 0.5 MG tablet Take 1 tablet (0.5 mg total) by mouth every 6 (six) hours as needed for anxiety (or tremors). 12 tablet 0   ? losartan (COZAAR) 50 MG tablet Take 100 mg by mouth at bedtime.      ? metFORMIN (GLUCOPHAGE) 500 MG tablet Take 1 tablet (500 mg total) by mouth 2 (two) times a day with meals. At breakfast and at dinner 60 tablet 0   ? multivitamin therapeutic (THERAGRAN) tablet Take 1 tablet by mouth every evening.      ? omeprazole (PRILOSEC) 20 MG capsule Take 20 mg by mouth daily before breakfast.      ? prednisoLONE acetate (PRED-FORTE) 1 % ophthalmic suspension Administer 1 drop to the right eye 3 (three) times a day.     ? vit C/E/Zn/coppr/lutein/zeaxan (PRESERVISION AREDS-2 ORAL) Take 1 tablet by mouth 2 (two) times a day before meals.       No current facility-administered medications for this visit.       Allergies   Allergen Reactions   ? Venom-Honey Bee Shortness Of Breath   ?  Macrobid [Nitrofurantoin Monohyd/M-Cryst] Other (See Comments)     Burning sensation across chest and arms   ? Mercurial Analogues Dermatitis     blisters   ? Sulfa (Sulfonamide Antibiotics) Rash

## 2021-07-03 NOTE — ADDENDUM NOTE
Addendum Note by Brenda Butler CRNA at 3/9/2017  2:00 PM     Author: Brenda Butler CRNA Service: -- Author Type: Nurse Anesthetist    Filed: 3/9/2017  2:00 PM Date of Service: 3/9/2017  2:00 PM Status: Signed    : Brenda Butler CRNA (Nurse Anesthetist)       Addendum  created 03/09/17 1400 by Brenda Butler CRNA    Anesthesia Intra Flowsheets edited

## 2021-07-04 NOTE — LETTER
Letter by Piedad Mckee RN at      Author: Piedad Mckee RN Service: -- Author Type: --    Filed:  Encounter Date: 5/28/2021 Status: (Other)         Sharyn Trent  [address] [place] [address]      May 28, 2021      Dear Ms. Trent,    RE: Remote Results    We are writing to you regarding your recent Remote Pacemaker check from home. Your transmission was received successfully. Battery status is satisfactory at this time.     Your results are showing no significant changes.    Your next device appointment will be a remote check on 6/29/2021; this will occur automatically.    To schedule or reschedule, please call [phone] and press 1.    NOTE: If you would like to do an extra transmission, please call [phone] and press 3 to speak to a nurse BEFORE transmitting. This ensures that the Device Clinic staff is aware of the reason you are sending a transmission, and can follow-up with you after it has been reviewed.    We will be checking your implanted device from home (remotely) every month unless otherwise instructed. We will need to see you in the clinic at least once a year. You may need to be seen in the clinic sooner depending on the results of your check.    Please be aware:    The follow-up schedule is like a Physician prescription.    Your remote monitor is paired to your specific implanted device.      Sincerely,    [facility]

## 2021-07-04 NOTE — PROGRESS NOTES
Progress Notes by Lashell Murcia CNP at 6/9/2021 10:30 AM     Author: Lashell Murcia CNP Service: -- Author Type: Nurse Practitioner    Filed: 6/9/2021 11:09 AM Encounter Date: 6/9/2021 Status: Signed    : Lashell Murcia CNP (Nurse Practitioner)             Assessment/Recommendations   Assessment:    1.  Heart failure with preserved ejection fraction, NYHA class II: Compensated.  She denies any acute heart failure symptoms.  She has mild fatigue and dyspnea on exertion.  Her weight has been stable.  She continues to follow a low-sodium diet.  She denies orthopnea or PND.    2.  Hypertension: Controlled.  Blood pressure 126/54.  Her hydralazine was increased to 100 mg 3 times daily.  She monitors her blood pressure at home and it is 110-130/60-70.    Plan:  1.  Continue current medications  2.  Continue daily weights, low-sodium diet and monitoring blood pressure  3.  Encourage activity    Sharyn Trent will follow up with Dr. Akins July 5 and in the heart failure clinic in 3 months.     History of Present Illness/Subjective    Ms. Sharyn Trent is a 80 y.o. female seen at [facility] heart failure clinic today for continued follow-up.  Her daughter accompanies her today.  She follows up for heart failure with preserved ejection fraction.  She recently had a right heart catheterization and coronary angiogram the end of May.  It showed a moderate disease in the mid LAD.  A pressure wire was performed.  Her hydralazine was increased at that time to 100 mg 3 times a day.  Her last echocardiogram was done March 2021 which showed ejection fraction of 55%.  She states her chest pressure has resolved.  He has a past medical history significant for hypertension, diabetes, pacemaker, dyslipidemia.    She continues to have fatigue and mild dyspnea exertion.  She has trace lower extremity edema.  She wears compression socks.  She denies orthopnea, PND or chest tightness.  She denies  lightheadedness, shortness of breath, orthopnea, PND, palpitations, chest pain and abdominal fullness/bloating.      She is monitoring home weights which are stable between 154-157 pounds.  She is following a low sodium diet.     RHC/coronary angiogram: 5/26/2021  Conclusion         Mid LAD lesion is 50% stenosed.    Pressure wire/FFR was performed on the lesion. pre diagnositic: 0.89. post diagnostic: 0.85.    Pressure wire/FFR was performed on the lesion.    The LM vessel was moderate and is considered normal.    Estimated blood loss was <20 ml.    The left circumflex was moderate.    The RCA was moderate and is considered normal.     79 yo woman with hx of s/p PPM (96% RV pacing), DM, CKD (Cr 1.33 today), CHF in setting of normal EF with abn stress imaging     RHC via right IJ  RA mean 4mmHg  PA mean 24mmHg  PCWP 21mmHg  LVEDP 19mmHg  Ao 153/62     PA sat 67%  Ao sat 94%     CO cindy 3.35     Angiography via left radial  LM short normal  LAD mid 50% narrowing with FFR 0.85  Circ normal  RCA normal     Imp/plan  1. CHF - with elevated filling pressures on the left with systemic HTN on carvedilol 25mg two times a day, losartan 100mg daily, hydralazine 75mg three times a day, furosemide 20mg daily, will increase hydralazine 100mg three times a day (monitor PPM for evidence of afib), track BP and follow up with Dr. Akins  2. CAD - moderate dz in mid LAD - on atorvastatin and aspirin, could have ischemia in far distal LAD from serial moderate narrowings or microvascular disease as source of ischemia on stress imaging, consider isosorbide mononitrate or calcium channel blocker such as amlodipine  3. CKD  - mild would repeat BMP in 2 days              Physical Examination Review of Systems   Vitals:    06/09/21 1037   BP: 126/54   Pulse: (!) 53   Resp: 16     Body mass index is 29.85 kg/m .  Wt Readings from Last 3 Encounters:   06/09/21 158 lb (71.7 kg)   05/26/21 154 lb 9.6 oz (70.1 kg)   05/20/21 157 lb (71.2 kg)        General Appearance:   no acute distress   ENT/Mouth: membranes moist, no oral lesions or bleeding gums.      EYES:  no scleral icterus, normal conjunctivae   Neck: no carotid bruits or thyromegaly   Chest/Lungs:   lungs are clear to auscultation, no rales or wheezing, equal chest wall expansion    Cardiovascular:   Regular. Normal first and second heart sounds with no murmurs, rubs, or gallops; Jugular venous pressure normal, trace edema bilaterally, wearing compression socks   Abdomen:  no organomegaly, masses, bruits, or tenderness; bowel sounds are present   Extremities: no cyanosis or clubbing   Skin: no xanthelasma, warm.    Neurologic: normal  bilateral, no tremors     Psychiatric: alert and oriented x3    General: WNL  Eyes: WNL  Ears/Nose/Throat: WNL  Lungs: WNL  Heart: WNL  Stomach: WNL  Bladder: WNL  Muscle/Joints: WNL  Skin: WNL  Nervous System: WNL  Mental Health: WNL     Blood: WNL     Medical History  Surgical History Family History Social History   Past Medical History:   Diagnosis Date   ? Abnormal ECG    ? Anxiety    ? Arthritis    ? Chest pain    ? Chest pain    ? Chronic kidney disease     stage 3-mod.   ? Diabetes (H)    ? Dyslipidemia, goal LDL below 70    ? GERD (gastroesophageal reflux disease)    ? HTN (hypertension)    ? Hyperlipidemia    ? Melanoma of ankle (H)     L ankle   ? Pacemaker    ? PSVT (paroxysmal supraventricular tachycardia) (H)    ? Right bundle branch block    ? Second Degree A-V Block, Mobitz Type I     Created by Conversion    ? Skin cancer     Past Surgical History:   Procedure Laterality Date   ? ABLATION OF DYSRHYTHMIC FOCUS  04/11/2013    AV analia reentry tachycardia, partially successful   ? CARDIAC CATHETERIZATION  03/10/2016   ? CARDIAC PACEMAKER PLACEMENT  04/2011    Second-degree AV block   ? CARPAL TUNNEL RELEASE      both wrists   ? CV CORONARY ANGIOGRAM N/A 5/26/2021    Procedure: Coronary Angiogram;  Surgeon: Yony Gillis MD;  Location:  [facility] Card[facility];  Service: Cardiology   ? CV LEFT HEART CATHETERIZATION WO LEFT VETRICULOGRAM Left 5/26/2021    Procedure: Left Heart Catheterization Without Left Ventriculogram;  Surgeon: Yony Gillis MD;  Location: [facility];  Service: Cardiology   ? CV RIGHT HEART CATH N/A 5/26/2021    Procedure: Right Heart Catheterization;  Surgeon: Yony Gillis MD;  Location: [facility];  Service: Cardiology   ? FOOT SURGERY      L foot   ? HAND SURGERY      on both thumbs   ? [place] SHLDR ARTHROSCOP,SURG,W/ROTAT CUFF REPR Left 3/9/2017    Procedure: LEFT SHOULDER ARTHROSCOPY DECOMPRESSION DISTAL CLAVICLE EXCISION  ROTATOR CUFF REPAIR BICEPS TENOTOMY AND EXTENSIVE DEBRIDEMENT;  Surgeon: Todd Riggs MD;  Location: [facility] Main OR;  Service: Orthopedics   ? SKIN BIOPSY     ? SKIN CANCER EXCISION      L ankle,Lleg and cheek   ? SVT Ablation     ? TOE SURGERY      L big toe joint replacement   ? TUBAL LIGATION      Family History   Problem Relation Age of Onset   ? Hypertension Mother    ? Stroke Mother    ? Prostate cancer Father    ? Cancer Father    ? Heart attack Father    ? Dyslipidemia Father    ? Dyslipidemia Sister    ? Dyslipidemia Brother    ? Hypertension Brother    ? Prostate cancer Brother     Social History     Socioeconomic History   ? Marital status:      Spouse name: Not on file   ? Number of children: Not on file   ? Years of education: Not on file   ? Highest education level: Not on file   Occupational History   ? Not on file   Social Needs   ? Financial resource strain: Not on file   ? Food insecurity     Worry: Not on file     Inability: Not on file   ? Transportation needs     Medical: Not on file     Non-medical: Not on file   Tobacco Use   ? Smoking status: Never Smoker   ? Smokeless tobacco: Never Used   Substance and Sexual Activity   ? Alcohol use: No   ? Drug use: No   ? Sexual activity: Yes     Partners: Male     Birth  control/protection: Surgical   Lifestyle   ? Physical activity     Days per week: Not on file     Minutes per session: Not on file   ? Stress: Not on file   Relationships   ? Social connections     Talks on phone: Not on file     Gets together: Not on file     Attends Baptism service: Not on file     Active member of club or organization: Not on file     Attends meetings of clubs or organizations: Not on file     Relationship status: Not on file   ? Intimate partner violence     Fear of current or ex partner: Not on file     Emotionally abused: Not on file     Physically abused: Not on file     Forced sexual activity: Not on file   Other Topics Concern   ? Not on file   Social History Narrative   ? Not on file          Medications  Allergies   Current Outpatient Medications   Medication Sig Dispense Refill   ? aspirin 325 MG tablet Take 325 mg by mouth every evening.      ? atorvastatin (LIPITOR) 80 MG tablet Take 80 mg by mouth bedtime.     ? carvediloL (COREG) 25 MG tablet Take 1 tablet (25 mg total) by mouth 2 (two) times a day with meals. 180 tablet 3   ? furosemide (LASIX) 20 MG tablet Take 0.5 tablets (10 mg total) by mouth daily. 60 tablet 3   ? hydrALAZINE (APRESOLINE) 100 MG tablet TAKE 1 TABLET(100 MG) BY MOUTH THREE TIMES DAILY 270 tablet 1   ? LORazepam (ATIVAN) 0.5 MG tablet Take 1 tablet (0.5 mg total) by mouth every 6 (six) hours as needed for anxiety (or tremors). 12 tablet 0   ? losartan (COZAAR) 100 MG tablet Take 100 mg by mouth every evening.     ? metFORMIN (GLUCOPHAGE) 500 MG tablet Take 1 tablet (500 mg total) by mouth 2 (two) times a day with meals. At breakfast and at dinner 60 tablet 0   ? multivitamin therapeutic (THERAGRAN) tablet Take 1 tablet by mouth every evening.      ? omeprazole (PRILOSEC) 20 MG capsule Take 20 mg by mouth daily before breakfast.      ? prednisoLONE acetate (PRED-FORTE) 1 % ophthalmic suspension Administer 1 drop to the right eye daily.      ? vit  C/E/Zn/coppr/lutein/zeaxan (PRESERVISION AREDS-2 ORAL) Take 1 tablet by mouth 2 (two) times a day before meals.       No current facility-administered medications for this visit.     Allergies   Allergen Reactions   ? Venom-Honey Bee Shortness Of Breath   ? Macrobid [Nitrofurantoin Monohyd/M-Cryst] Other (See Comments)     Burning sensation across chest and arms   ? Mercurial Analogues Dermatitis     blisters   ? Sulfa (Sulfonamide Antibiotics) Rash         Lab Results    Chemistry/lipid CBC Cardiac Enzymes/BNP/TSH/INR   Lab Results   Component Value Date    CHOL 120 04/13/2021    HDL 38 (L) 04/13/2021    LDLCALC 63 04/13/2021    TRIG 96 04/13/2021    CREATININE 1.33 (H) 05/26/2021    BUN 24 05/26/2021    K 4.5 05/26/2021     05/26/2021     05/26/2021    CO2 22 05/26/2021    Lab Results   Component Value Date    WBC 8.3 05/26/2021    HGB 12.2 05/26/2021    HCT 36.8 05/26/2021    MCV 89 05/26/2021     05/26/2021    Lab Results   Component Value Date    CKTOTAL 54 04/12/2011    CKMB 1 03/08/2016    TROPONINI <0.01 05/07/2021     05/06/2021    TSH 5.48 (H) 03/03/2021    INR 1.05 05/06/2021             This note has been dictated using voice recognition software. Any grammatical, typographical, or context distortions are unintentional and inherent to the software    30 minutes spent on the date of encounter doing chart review, review of outside records, review of test results, interpretation with above tests, patient visit, documentation and discussion with family.

## 2021-07-06 VITALS
BODY MASS INDEX: 29.1 KG/M2 | WEIGHT: 154.6 LBS | WEIGHT: 154 LBS | HEIGHT: 61 IN | BODY MASS INDEX: 29.21 KG/M2 | BODY MASS INDEX: 29.1 KG/M2 | HEIGHT: 61 IN | BODY MASS INDEX: 29.1 KG/M2

## 2021-07-07 NOTE — LETTER
Letter by Fernanda Morton at      Author: Fernanda Morton Service: -- Author Type: --    Filed:  Encounter Date: 6/29/2021 Status: (Other)         Sharyn Trent  [address] [place] [address]      June 29, 2021      Dear Ms. Trent,    RE: Remote Results    We are writing to you regarding your recent Remote Pacemaker check from home. Your transmission was received successfully. Battery status is satisfactory at this time.     Your results are showing no significant changes.    Your next device appointment will be a remote check on July 30, 2021; this will occur automatically.    To schedule or reschedule, please call [phone] and press 1.    NOTE: If you would like to do an extra transmission, please call [phone] and press 3 to speak to a nurse BEFORE transmitting. This ensures that the Device Clinic staff is aware of the reason you are sending a transmission, and can follow-up with you after it has been reviewed.    We will be checking your implanted device from home (remotely) every three months unless otherwise instructed. We will need to see you in the clinic at least once a year. You may need to be seen in the clinic sooner depending on the results of your check.    Please be aware:    The follow-up schedule is like a Physician prescription.    Your remote monitor is paired to your specific implanted device.      Sincerely,    [facility]

## 2021-07-30 ENCOUNTER — ANCILLARY PROCEDURE (OUTPATIENT)
Dept: CARDIOLOGY | Facility: CLINIC | Age: 81
End: 2021-07-30
Attending: INTERNAL MEDICINE
Payer: COMMERCIAL

## 2021-07-30 DIAGNOSIS — Z95.0 CARDIAC PACEMAKER IN SITU: ICD-10-CM

## 2021-07-30 LAB
MDC_IDC_EPISODE_DTM: NORMAL
MDC_IDC_EPISODE_DURATION: 10 S
MDC_IDC_EPISODE_DURATION: 12 S
MDC_IDC_EPISODE_DURATION: 12 S
MDC_IDC_EPISODE_DURATION: 14 S
MDC_IDC_EPISODE_DURATION: 16 S
MDC_IDC_EPISODE_DURATION: 6 S
MDC_IDC_EPISODE_DURATION: 8 S
MDC_IDC_EPISODE_ID: NORMAL
MDC_IDC_EPISODE_TYPE: NORMAL
MDC_IDC_LEAD_IMPLANT_DT: NORMAL
MDC_IDC_LEAD_IMPLANT_DT: NORMAL
MDC_IDC_LEAD_LOCATION: NORMAL
MDC_IDC_LEAD_LOCATION: NORMAL
MDC_IDC_LEAD_LOCATION_DETAIL_1: NORMAL
MDC_IDC_LEAD_LOCATION_DETAIL_1: NORMAL
MDC_IDC_LEAD_MFG: NORMAL
MDC_IDC_LEAD_MFG: NORMAL
MDC_IDC_LEAD_MODEL: NORMAL
MDC_IDC_LEAD_MODEL: NORMAL
MDC_IDC_LEAD_POLARITY_TYPE: NORMAL
MDC_IDC_LEAD_POLARITY_TYPE: NORMAL
MDC_IDC_LEAD_SERIAL: NORMAL
MDC_IDC_LEAD_SERIAL: NORMAL
MDC_IDC_MSMT_BATTERY_DTM: NORMAL
MDC_IDC_MSMT_BATTERY_REMAINING_LONGEVITY: 1 MO
MDC_IDC_MSMT_BATTERY_REMAINING_PERCENTAGE: 0.5 %
MDC_IDC_MSMT_BATTERY_RRT_TRIGGER: NORMAL
MDC_IDC_MSMT_BATTERY_STATUS: NORMAL
MDC_IDC_MSMT_BATTERY_VOLTAGE: 2.59 V
MDC_IDC_MSMT_LEADCHNL_RA_IMPEDANCE_VALUE: 350 OHM
MDC_IDC_MSMT_LEADCHNL_RA_LEAD_CHANNEL_STATUS: NORMAL
MDC_IDC_MSMT_LEADCHNL_RA_PACING_THRESHOLD_AMPLITUDE: 0.62 V
MDC_IDC_MSMT_LEADCHNL_RA_PACING_THRESHOLD_PULSEWIDTH: 0.5 MS
MDC_IDC_MSMT_LEADCHNL_RA_SENSING_INTR_AMPL: 2.7 MV
MDC_IDC_MSMT_LEADCHNL_RV_IMPEDANCE_VALUE: 460 OHM
MDC_IDC_MSMT_LEADCHNL_RV_LEAD_CHANNEL_STATUS: NORMAL
MDC_IDC_MSMT_LEADCHNL_RV_PACING_THRESHOLD_AMPLITUDE: 0.62 V
MDC_IDC_MSMT_LEADCHNL_RV_PACING_THRESHOLD_PULSEWIDTH: 0.5 MS
MDC_IDC_MSMT_LEADCHNL_RV_SENSING_INTR_AMPL: 12 MV
MDC_IDC_PG_IMPLANT_DTM: NORMAL
MDC_IDC_PG_MFG: NORMAL
MDC_IDC_PG_MODEL: NORMAL
MDC_IDC_PG_SERIAL: NORMAL
MDC_IDC_PG_TYPE: NORMAL
MDC_IDC_SESS_CLINIC_NAME: NORMAL
MDC_IDC_SESS_DTM: NORMAL
MDC_IDC_SESS_REPROGRAMMED: NO
MDC_IDC_SESS_TYPE: NORMAL
MDC_IDC_SET_BRADY_AT_MODE_SWITCH_MODE: NORMAL
MDC_IDC_SET_BRADY_AT_MODE_SWITCH_RATE: 120 {BEATS}/MIN
MDC_IDC_SET_BRADY_LOWRATE: 50 {BEATS}/MIN
MDC_IDC_SET_BRADY_MAX_SENSOR_RATE: 100 {BEATS}/MIN
MDC_IDC_SET_BRADY_MAX_TRACKING_RATE: 100 {BEATS}/MIN
MDC_IDC_SET_BRADY_MODE: NORMAL
MDC_IDC_SET_BRADY_PAV_DELAY_HIGH: 120 MS
MDC_IDC_SET_BRADY_PAV_DELAY_LOW: 225 MS
MDC_IDC_SET_BRADY_SAV_DELAY_HIGH: 120 MS
MDC_IDC_SET_BRADY_SAV_DELAY_LOW: 170 MS
MDC_IDC_SET_LEADCHNL_RA_PACING_AMPLITUDE: 1.62
MDC_IDC_SET_LEADCHNL_RA_PACING_ANODE_ELECTRODE_1: NORMAL
MDC_IDC_SET_LEADCHNL_RA_PACING_ANODE_LOCATION_1: NORMAL
MDC_IDC_SET_LEADCHNL_RA_PACING_CAPTURE_MODE: NORMAL
MDC_IDC_SET_LEADCHNL_RA_PACING_CATHODE_ELECTRODE_1: NORMAL
MDC_IDC_SET_LEADCHNL_RA_PACING_CATHODE_LOCATION_1: NORMAL
MDC_IDC_SET_LEADCHNL_RA_PACING_POLARITY: NORMAL
MDC_IDC_SET_LEADCHNL_RA_PACING_PULSEWIDTH: 0.5 MS
MDC_IDC_SET_LEADCHNL_RA_SENSING_ADAPTATION_MODE: NORMAL
MDC_IDC_SET_LEADCHNL_RA_SENSING_ANODE_ELECTRODE_1: NORMAL
MDC_IDC_SET_LEADCHNL_RA_SENSING_ANODE_LOCATION_1: NORMAL
MDC_IDC_SET_LEADCHNL_RA_SENSING_CATHODE_ELECTRODE_1: NORMAL
MDC_IDC_SET_LEADCHNL_RA_SENSING_CATHODE_LOCATION_1: NORMAL
MDC_IDC_SET_LEADCHNL_RA_SENSING_POLARITY: NORMAL
MDC_IDC_SET_LEADCHNL_RA_SENSING_SENSITIVITY: 0.2 MV
MDC_IDC_SET_LEADCHNL_RV_PACING_AMPLITUDE: 0.88
MDC_IDC_SET_LEADCHNL_RV_PACING_ANODE_ELECTRODE_1: NORMAL
MDC_IDC_SET_LEADCHNL_RV_PACING_ANODE_LOCATION_1: NORMAL
MDC_IDC_SET_LEADCHNL_RV_PACING_CAPTURE_MODE: NORMAL
MDC_IDC_SET_LEADCHNL_RV_PACING_CATHODE_ELECTRODE_1: NORMAL
MDC_IDC_SET_LEADCHNL_RV_PACING_CATHODE_LOCATION_1: NORMAL
MDC_IDC_SET_LEADCHNL_RV_PACING_POLARITY: NORMAL
MDC_IDC_SET_LEADCHNL_RV_PACING_PULSEWIDTH: 0.5 MS
MDC_IDC_SET_LEADCHNL_RV_SENSING_ADAPTATION_MODE: NORMAL
MDC_IDC_SET_LEADCHNL_RV_SENSING_ANODE_ELECTRODE_1: NORMAL
MDC_IDC_SET_LEADCHNL_RV_SENSING_ANODE_LOCATION_1: NORMAL
MDC_IDC_SET_LEADCHNL_RV_SENSING_CATHODE_ELECTRODE_1: NORMAL
MDC_IDC_SET_LEADCHNL_RV_SENSING_CATHODE_LOCATION_1: NORMAL
MDC_IDC_SET_LEADCHNL_RV_SENSING_POLARITY: NORMAL
MDC_IDC_SET_LEADCHNL_RV_SENSING_SENSITIVITY: 2 MV
MDC_IDC_STAT_AT_BURDEN_PERCENT: 1 %
MDC_IDC_STAT_AT_DTM_END: NORMAL
MDC_IDC_STAT_AT_DTM_START: NORMAL
MDC_IDC_STAT_AT_MODE_SW_COUNT: 130
MDC_IDC_STAT_AT_MODE_SW_COUNT_PER_DAY: 1
MDC_IDC_STAT_AT_MODE_SW_MAX_DURATION: 134 S
MDC_IDC_STAT_AT_MODE_SW_PERCENT_TIME: 1 %
MDC_IDC_STAT_BRADY_AP_VP_PERCENT: 3.1 %
MDC_IDC_STAT_BRADY_AP_VS_PERCENT: 1 %
MDC_IDC_STAT_BRADY_AS_VP_PERCENT: 91 %
MDC_IDC_STAT_BRADY_AS_VS_PERCENT: 3.5 %
MDC_IDC_STAT_BRADY_DTM_END: NORMAL
MDC_IDC_STAT_BRADY_DTM_START: NORMAL
MDC_IDC_STAT_BRADY_RA_PERCENT_PACED: 1 %
MDC_IDC_STAT_BRADY_RV_PERCENT_PACED: 94 %
MDC_IDC_STAT_CRT_DTM_END: NORMAL
MDC_IDC_STAT_CRT_DTM_START: NORMAL
MDC_IDC_STAT_HEART_RATE_ATRIAL_MAX: 330 {BEATS}/MIN
MDC_IDC_STAT_HEART_RATE_ATRIAL_MEAN: 69 {BEATS}/MIN
MDC_IDC_STAT_HEART_RATE_ATRIAL_MIN: 40 {BEATS}/MIN
MDC_IDC_STAT_HEART_RATE_DTM_END: NORMAL
MDC_IDC_STAT_HEART_RATE_DTM_START: NORMAL
MDC_IDC_STAT_HEART_RATE_VENTRICULAR_MAX: 230 {BEATS}/MIN
MDC_IDC_STAT_HEART_RATE_VENTRICULAR_MEAN: 69 {BEATS}/MIN
MDC_IDC_STAT_HEART_RATE_VENTRICULAR_MIN: 30 {BEATS}/MIN

## 2021-07-30 PROCEDURE — 99207 CARDIAC DEVICE CHECK - REMOTE: CPT | Performed by: INTERNAL MEDICINE

## 2021-08-13 ENCOUNTER — DOCUMENTATION ONLY (OUTPATIENT)
Dept: CARDIOLOGY | Facility: CLINIC | Age: 81
End: 2021-08-13

## 2021-08-13 ENCOUNTER — PREP FOR PROCEDURE (OUTPATIENT)
Dept: CARDIOLOGY | Facility: CLINIC | Age: 81
End: 2021-08-13

## 2021-08-13 DIAGNOSIS — Z45.010 PACEMAKER AT END OF BATTERY LIFE: Primary | ICD-10-CM

## 2021-08-13 RX ORDER — VANCOMYCIN HYDROCHLORIDE 1 G/20ML
1000 INJECTION, POWDER, LYOPHILIZED, FOR SOLUTION INTRAVENOUS
Status: CANCELLED | OUTPATIENT
Start: 2021-08-30

## 2021-08-13 RX ORDER — FENTANYL CITRATE 50 UG/ML
25 INJECTION, SOLUTION INTRAMUSCULAR; INTRAVENOUS
Status: DISCONTINUED | OUTPATIENT
Start: 2021-08-13 | End: 2021-08-13 | Stop reason: HOSPADM

## 2021-08-13 RX ORDER — SODIUM CHLORIDE 9 MG/ML
100 INJECTION, SOLUTION INTRAVENOUS CONTINUOUS
Status: CANCELLED | OUTPATIENT
Start: 2021-08-13

## 2021-08-13 RX ORDER — LIDOCAINE 40 MG/G
CREAM TOPICAL
Status: CANCELLED | OUTPATIENT
Start: 2021-08-13

## 2021-08-13 NOTE — PROGRESS NOTES
Heart Care Device Change-Out Checklist (NAMITA Checklist)    Device Data  Pacemaker    :  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 Enter each lead  and model number.)  Last Interrogation Date: 4/23/2021      Atrium: [employer] 2088TC Tendril STS   Lead Imp:  387.5Ohms, Pacing Threshold:  0.5V@0.5ms and Sensing Threshold:  3mV      Right Ventricle: [employer] 2088TC Tendril STS   Lead Imp:  512.5Ohms, Pacing Threshold:  0.75V@0.5ms and Sensing Threshold:  11.7mV   Shock Imp: NA      Left Ventricle: NA       Old Leads Present/Abandoned: See scanned Device Summary Data sheets for Model & Serial Number       Lead Alert(s):  No    Diagnostic Information  Intrinsic Rhythm: CHB with rare Rs at VVI 30    Atrial Fibrillation:  Paroxysmal Atrial-Fib  Takes Anticoagulant? No    Pacing Percentages  Atrial Pacing 0.4% and Ventricle Pacing 99%  Pacemaker Dependent? Yes    History of VT/VF therapy    ATP: No  Appropriate Shocks:  NA    Ejection Fraction  Last EF Date:  5/12/2021  By Stress Test  Last EF Measurement:  64%      Special Instructions/Timeframe for change-out:  ASAP    Routed to EP:  Yes: Dr. Ab Saavedra      Device RN: Piedad Mckee RN

## 2021-08-13 NOTE — PROGRESS NOTES
H&P PMD Teach  I Order X P Order X Letter    COVID  Anticoag n/a Meds  Lasix, metformin     1940  Home:[phone] (home) Cell:There is no such number on file (mobile).  Emergency Contact: Ghassan Trent [phone]    Important patient information for CSC/Cath Lab staff : GFR <40    [facility] EP Cath Lab Procedure Order     Device Implant/Revision:  Procedure: Generator Change Out  Device Type: Dual Pacemaker  Device Company/Device Rep Needed for Procedure: [employer]    Diagnosis:  Generator Change- Device at NAMITA  Anticipated Case Duration:  Standard/Default Time  Scheduling Needs Timeframe:  Pt hit NAMITA on 8/12/21, please change out within 3mo from NAMITA date  Scheduling Restrictions: None  Scheduling Contact: Please contact pt to schedule, if you are unable to schedule date within the next 24 hours please contact pt to update on scheduling process  Pre-Procedural Testing needed: COVID 19 nasal/lab test within 48hrs of procedure  Anesthesia:  Conscious Sedation- CV RN to administer  Research Protocol:  No    [facility] EP Cath Lab Prep   Ordering Provider: Chandu Saavedra  Ordering Date: 8/13/2021  Orders Status: Intial order placed and Order set placed    H&P:  Pt to schedule with PMD to complete  PCP: Jamie Mcconnell, [phone]    Pre-op Labs: Ordered AM of procedure    Medical Records Pertinent for Procedure:  Device Implant Record implanted 4/12/11 and N/A  Most Recent Lab Results: BMP- Abnormal Renal Function- See Renal Protocol orders Heme- Within Acceptable Limits for Procedure    Patient Education:    Teach with Patient: Will be completed via phone prior to procedure, and letter was also sent to pt via mail/[employer] with written pre-procedural instructions.    Risks Reviewed:   Pacemaker Insertion    <1% for each of the following:  infection, bleeding, hematoma, pneumothorax, subclavian vein thrombosis, cardiac perforation, cardiac tamponade, arrhythmias, pectoral or diaphragmatic stimulation, air embolus,  pocket erosion, device interactions.    <4% lead dislodgment, <1% lead fracture or generator  malfunction.    <0.5% CVA or MI.    <0.1% death.    If external defibrillation or CV is needed, 25% risk for superficial burn.    For patients on anticoagulation, the risk of bleeding, hematoma and tamponade are increased.      Pre-Procedure Instructions that were Reviewed with Patient:  NPO after midnight, Remove all jewelry prior to coming in for procedure, Shower prior to arrival, Notified patient of time and date of procedure by CV , Transportation arrangements needed s/p procedure, Post-procedure follow up process, Sedation plan/orders and Pre-procedure letter was sent to pt by CV     Pre-Procedure Medication Instructions:  Instructions given to pt regarding anticoagulants: Pt is not on an anticoagulant- N/A  Instructions given to pt regarding antiarrhythmic medication: N/A for this type of procedure; N/A  Instructions for medication, other than anticoagulants/antiarrhythmics listed above, given to pt: to hold lasix, metformin the morning of procedure, and to take remaining medications with small sips of water  Allergies: Reviewed allergies, no concerns regarding orders for procedure    Allergies   Allergen Reactions     Venom-Honey Bee [Bee Venom] Shortness Of Breath     Macrobid [Nitrofurantoin] Other (See Comments)     Burning sensation across chest and arms     Mercurial Analogues [Mercurial Derivatives] Dermatitis     blisters     Sulfa (Sulfonamide Antibiotics) [Sulfa Drugs] Rash       Current Outpatient Medications:      aspirin 325 MG tablet, [ASPIRIN 325 MG TABLET] Take 325 mg by mouth every evening. , Disp: , Rfl:      atorvastatin (LIPITOR) 80 MG tablet, [ATORVASTATIN (LIPITOR) 80 MG TABLET] Take 80 mg by mouth bedtime., Disp: , Rfl:      carvediloL (COREG) 25 MG tablet, [CARVEDILOL (COREG) 25 MG TABLET] Take 1 tablet (25 mg total) by mouth 2 (two) times a day with meals., Disp: 180 tablet,  Rfl: 3     furosemide (LASIX) 20 MG tablet, [FUROSEMIDE (LASIX) 20 MG TABLET] Take 0.5 tablets (10 mg total) by mouth daily., Disp: 60 tablet, Rfl: 3     hydrALAZINE (APRESOLINE) 100 MG tablet, [HYDRALAZINE (APRESOLINE) 100 MG TABLET] TAKE 1 TABLET(100 MG) BY MOUTH THREE TIMES DAILY, Disp: 270 tablet, Rfl: 1     LORazepam (ATIVAN) 0.5 MG tablet, [LORAZEPAM (ATIVAN) 0.5 MG TABLET] Take 1 tablet (0.5 mg total) by mouth every 6 (six) hours as needed for anxiety (or tremors)., Disp: 12 tablet, Rfl: 0     losartan (COZAAR) 100 MG tablet, [LOSARTAN (COZAAR) 100 MG TABLET] Take 100 mg by mouth every evening., Disp: , Rfl:      metFORMIN (GLUCOPHAGE) 500 MG tablet, [METFORMIN (GLUCOPHAGE) 500 MG TABLET] Take 1 tablet (500 mg total) by mouth 2 (two) times a day with meals. At breakfast and at dinner, Disp: 60 tablet, Rfl: 0     multivitamin therapeutic (THERAGRAN) tablet, [MULTIVITAMIN THERAPEUTIC (THERAGRAN) TABLET] Take 1 tablet by mouth every evening. , Disp: , Rfl:      omeprazole (PRILOSEC) 20 MG capsule, [OMEPRAZOLE (PRILOSEC) 20 MG CAPSULE] Take 20 mg by mouth daily before breakfast. , Disp: , Rfl:      prednisoLONE acetate (PRED-FORTE) 1 % ophthalmic suspension, [PREDNISOLONE ACETATE (PRED-FORTE) 1 % OPHTHALMIC SUSPENSION] Administer 1 drop to the right eye daily. , Disp: , Rfl:      vit C/E/Zn/coppr/lutein/zeaxan (PRESERVISION AREDS-2 ORAL), [VIT C/E/ZN/COPPR/LUTEIN/ZEAXAN (PRESERVISION AREDS-2 ORAL)] Take 1 tablet by mouth 2 (two) times a day before meals., Disp: , Rfl:     Documentation Date:8/13/2021 4:20 PM  Isaura Fang RN

## 2021-08-16 ENCOUNTER — TRANSCRIBE ORDERS (OUTPATIENT)
Dept: CARDIOLOGY | Facility: CLINIC | Age: 81
End: 2021-08-16

## 2021-08-16 DIAGNOSIS — Z95.0 CARDIAC PACEMAKER IN SITU: Primary | ICD-10-CM

## 2021-08-17 DIAGNOSIS — Z11.59 ENCOUNTER FOR SCREENING FOR OTHER VIRAL DISEASES: ICD-10-CM

## 2021-08-19 ENCOUNTER — TELEPHONE (OUTPATIENT)
Dept: CARDIOLOGY | Facility: CLINIC | Age: 81
End: 2021-08-19

## 2021-08-19 NOTE — TELEPHONE ENCOUNTER
Pre-Procedure Education Phone Call    Procedure:8/30 with Dr Saavedra  Education:Reviewed Pre-Intra-Post education and instructions reviewed via phone  COIVD: scheduled on 8/27 11:15am @ [facility] at a  facility, results will be viewable in EPIC  Pre-Op: scheduled on 8/23 at PMD  8/19/2021 2:27 PM  Isaura Fang RN

## 2021-08-23 ENCOUNTER — LAB REQUISITION (OUTPATIENT)
Dept: LAB | Facility: CLINIC | Age: 81
End: 2021-08-23

## 2021-08-23 ENCOUNTER — TRANSFERRED RECORDS (OUTPATIENT)
Dept: HEALTH INFORMATION MANAGEMENT | Facility: CLINIC | Age: 81
End: 2021-08-23

## 2021-08-23 DIAGNOSIS — Z01.818 ENCOUNTER FOR OTHER PREPROCEDURAL EXAMINATION: ICD-10-CM

## 2021-08-23 LAB
ANION GAP SERPL CALCULATED.3IONS-SCNC: 12 MMOL/L (ref 5–18)
BUN SERPL-MCNC: 22 MG/DL (ref 8–28)
CALCIUM SERPL-MCNC: 9.9 MG/DL (ref 8.5–10.5)
CHLORIDE BLD-SCNC: 101 MMOL/L (ref 98–107)
CO2 SERPL-SCNC: 26 MMOL/L (ref 22–31)
CREAT SERPL-MCNC: 1.26 MG/DL (ref 0.6–1.1)
GFR SERPL CREATININE-BSD FRML MDRD: 40 ML/MIN/1.73M2
GLUCOSE BLD-MCNC: 105 MG/DL (ref 70–125)
POTASSIUM BLD-SCNC: 4.7 MMOL/L (ref 3.5–5)
SODIUM SERPL-SCNC: 139 MMOL/L (ref 136–145)

## 2021-08-23 PROCEDURE — 36415 COLL VENOUS BLD VENIPUNCTURE: CPT | Performed by: NURSE PRACTITIONER

## 2021-08-23 PROCEDURE — 80048 BASIC METABOLIC PNL TOTAL CA: CPT | Performed by: NURSE PRACTITIONER

## 2021-08-27 ENCOUNTER — LAB (OUTPATIENT)
Dept: LAB | Facility: CLINIC | Age: 81
End: 2021-08-27
Payer: COMMERCIAL

## 2021-08-27 DIAGNOSIS — Z11.59 ENCOUNTER FOR SCREENING FOR OTHER VIRAL DISEASES: ICD-10-CM

## 2021-08-27 PROCEDURE — U0003 INFECTIOUS AGENT DETECTION BY NUCLEIC ACID (DNA OR RNA); SEVERE ACUTE RESPIRATORY SYNDROME CORONAVIRUS 2 (SARS-COV-2) (CORONAVIRUS DISEASE [COVID-19]), AMPLIFIED PROBE TECHNIQUE, MAKING USE OF HIGH THROUGHPUT TECHNOLOGIES AS DESCRIBED BY CMS-2020-01-R: HCPCS

## 2021-08-27 PROCEDURE — U0005 INFEC AGEN DETEC AMPLI PROBE: HCPCS

## 2021-08-28 LAB — SARS-COV-2 RNA RESP QL NAA+PROBE: NEGATIVE

## 2021-08-30 ENCOUNTER — HOSPITAL ENCOUNTER (OUTPATIENT)
Facility: HOSPITAL | Age: 81
Discharge: HOME | End: 2021-08-30
Attending: INTERNAL MEDICINE | Admitting: INTERNAL MEDICINE
Payer: COMMERCIAL

## 2021-08-30 VITALS
BODY MASS INDEX: 28.85 KG/M2 | SYSTOLIC BLOOD PRESSURE: 141 MMHG | HEIGHT: 61 IN | HEART RATE: 63 BPM | DIASTOLIC BLOOD PRESSURE: 65 MMHG | RESPIRATION RATE: 13 BRPM | OXYGEN SATURATION: 96 % | WEIGHT: 152.8 LBS | TEMPERATURE: 98 F

## 2021-08-30 DIAGNOSIS — Z45.010 PACEMAKER AT END OF BATTERY LIFE: ICD-10-CM

## 2021-08-30 LAB
ANION GAP SERPL CALCULATED.3IONS-SCNC: 8 MMOL/L (ref 5–18)
BUN SERPL-MCNC: 17 MG/DL (ref 8–28)
CALCIUM SERPL-MCNC: 9.4 MG/DL (ref 8.5–10.5)
CHLORIDE BLD-SCNC: 103 MMOL/L (ref 98–107)
CO2 SERPL-SCNC: 27 MMOL/L (ref 22–31)
CREAT SERPL-MCNC: 1.09 MG/DL (ref 0.6–1.1)
ERYTHROCYTE [DISTWIDTH] IN BLOOD BY AUTOMATED COUNT: 14.1 % (ref 10–15)
GFR SERPL CREATININE-BSD FRML MDRD: 48 ML/MIN/1.73M2
GLUCOSE BLD-MCNC: 122 MG/DL (ref 70–125)
HCT VFR BLD AUTO: 31.8 % (ref 35–47)
HGB BLD-MCNC: 10.4 G/DL (ref 11.7–15.7)
MCH RBC QN AUTO: 29.5 PG (ref 26.5–33)
MCHC RBC AUTO-ENTMCNC: 32.7 G/DL (ref 31.5–36.5)
MCV RBC AUTO: 90 FL (ref 78–100)
PLATELET # BLD AUTO: 172 10E3/UL (ref 150–450)
POTASSIUM BLD-SCNC: 3.8 MMOL/L (ref 3.5–5)
RBC # BLD AUTO: 3.52 10E6/UL (ref 3.8–5.2)
SODIUM SERPL-SCNC: 138 MMOL/L (ref 136–145)
WBC # BLD AUTO: 5.5 10E3/UL (ref 4–11)

## 2021-08-30 PROCEDURE — 250N000013 HC RX MED GY IP 250 OP 250 PS 637: Performed by: NURSE PRACTITIONER

## 2021-08-30 PROCEDURE — 99152 MOD SED SAME PHYS/QHP 5/>YRS: CPT | Performed by: INTERNAL MEDICINE

## 2021-08-30 PROCEDURE — C1785 PMKR, DUAL, RATE-RESP: HCPCS | Performed by: INTERNAL MEDICINE

## 2021-08-30 PROCEDURE — 250N000009 HC RX 250: Performed by: INTERNAL MEDICINE

## 2021-08-30 PROCEDURE — 99153 MOD SED SAME PHYS/QHP EA: CPT | Performed by: INTERNAL MEDICINE

## 2021-08-30 PROCEDURE — 258N000003 HC RX IP 258 OP 636: Performed by: INTERNAL MEDICINE

## 2021-08-30 PROCEDURE — 272N000001 HC OR GENERAL SUPPLY STERILE: Performed by: INTERNAL MEDICINE

## 2021-08-30 PROCEDURE — 33228 REMV&REPLC PM GEN DUAL LEAD: CPT | Performed by: INTERNAL MEDICINE

## 2021-08-30 PROCEDURE — 250N000011 HC RX IP 250 OP 636: Performed by: INTERNAL MEDICINE

## 2021-08-30 PROCEDURE — 80048 BASIC METABOLIC PNL TOTAL CA: CPT | Performed by: INTERNAL MEDICINE

## 2021-08-30 PROCEDURE — 999N000127 HC STATISTIC PERIPHERAL IV START W US GUIDANCE

## 2021-08-30 PROCEDURE — 36415 COLL VENOUS BLD VENIPUNCTURE: CPT | Performed by: INTERNAL MEDICINE

## 2021-08-30 PROCEDURE — 85027 COMPLETE CBC AUTOMATED: CPT | Performed by: INTERNAL MEDICINE

## 2021-08-30 DEVICE — PACEMAKER ASSURITY MRI DR RF: Type: IMPLANTABLE DEVICE | Site: CHEST  WALL | Status: FUNCTIONAL

## 2021-08-30 RX ORDER — LIDOCAINE 40 MG/G
CREAM TOPICAL
Status: DISCONTINUED | OUTPATIENT
Start: 2021-08-30 | End: 2021-08-30 | Stop reason: HOSPADM

## 2021-08-30 RX ORDER — FENTANYL CITRATE 50 UG/ML
INJECTION, SOLUTION INTRAMUSCULAR; INTRAVENOUS
Status: DISCONTINUED | OUTPATIENT
Start: 2021-08-30 | End: 2021-08-30 | Stop reason: HOSPADM

## 2021-08-30 RX ORDER — VANCOMYCIN HYDROCHLORIDE 1 G/20ML
1000 INJECTION, POWDER, LYOPHILIZED, FOR SOLUTION INTRAVENOUS
Status: DISCONTINUED | OUTPATIENT
Start: 2021-08-30 | End: 2021-08-30 | Stop reason: HOSPADM

## 2021-08-30 RX ORDER — ACETAMINOPHEN 325 MG/1
650 TABLET ORAL EVERY 4 HOURS PRN
Status: DISCONTINUED | OUTPATIENT
Start: 2021-08-30 | End: 2021-08-30 | Stop reason: HOSPADM

## 2021-08-30 RX ORDER — VANCOMYCIN HYDROCHLORIDE 1 G/20ML
INJECTION, POWDER, LYOPHILIZED, FOR SOLUTION INTRAVENOUS CONTINUOUS PRN
Status: DISCONTINUED | OUTPATIENT
Start: 2021-08-30 | End: 2021-08-30 | Stop reason: HOSPADM

## 2021-08-30 RX ORDER — SODIUM CHLORIDE 9 MG/ML
100 INJECTION, SOLUTION INTRAVENOUS CONTINUOUS
Status: DISCONTINUED | OUTPATIENT
Start: 2021-08-30 | End: 2021-08-30 | Stop reason: HOSPADM

## 2021-08-30 RX ADMIN — SODIUM CHLORIDE 100 ML/HR: 9 INJECTION, SOLUTION INTRAVENOUS at 07:31

## 2021-08-30 RX ADMIN — DIAZEPAM 2.5 MG: 5 TABLET ORAL at 08:20

## 2021-08-30 ASSESSMENT — MIFFLIN-ST. JEOR: SCORE: 1100.48

## 2021-08-30 NOTE — PLAN OF CARE
PT diISCHARGED AFTER GEN CHANGE. ATE , DRANK AND VOIDED. NO DISCOMFORT. FOLLOW UP FOR SEPT 3. NO MEDICATION CHANGES. HOME WITH DAUGHTER.

## 2021-08-30 NOTE — Clinical Note
DISCHARGE   Discharged to: Home  Via: Automobile  Accompanied by: Family  Discharge Instructions: principal diagnosis, major findings/procedures, diet, activity, medications, follow up appointments, when to call the MD, and what to watchout for (i.e. s/s of infection, increasing SOB, palpitations, Angina). Pt states understanding of all discharge instructions.   Prescriptions: To be filled by home pharmacy per pt's request and discharge pharmacy; scripts sent with pt.  Follow Up Appointments: arranged and information sent with patient  Belongings: All sent with pt. Pt verifies that they are taking all belongings with them.   IV: discontinued.   Telemetry: discontinued.   Pt exhibits understanding of above discharge instructions; all questions answered.  Discharge Paperwork: faxed to discharge planner.    Prepped: left chest. Prepped with: DuraPrep. The patient was draped. .Pre-procedure site marking:Insertion site not predetermined

## 2021-08-30 NOTE — DISCHARGE INSTRUCTIONS
Electrophysiology  Discharge Instructions for Pacemaker Generator Change out or Defibrillator Generator Change Out    1.  You may shower in 3 days.    2.  Contact the [facility] at [phone] should you develop redness, swelling or drainage of the incision area.    3. You may drive in 24 hours.    4. You may remove the dressing tomorrow.    5. If you have steri strips in place, do not attempt to remove, they are glued on.  The nurse will remove them at your follow up visit.      Your Procedural Physician was: Dr. Ab Saavedra   To reach the Electrophysiology Registered Nurses working with Dr Saavedra please call [phone]     To reach the Device Registered Nurses regarding questions about your device incision or device function please call [phone] Option #3    [facility]:  [phone]  If you are calling after hours, please listen to the entire voice mail, a live  will answer at the end of the message.

## 2021-08-30 NOTE — PRE-PROCEDURE
GENERAL PRE-PROCEDURE:   Procedure:  PPM generator change  Date/Time:  8/30/2021 7:15 AM    Written consent obtained?: Yes    Risks and benefits: Risks, benefits and alternatives were discussed    Consent given by:  Patient  Patient states understanding of procedure being performed: Yes    Patient's understanding of procedure matches consent: Yes    Procedure consent matches procedure scheduled: Yes    Expected level of sedation:  Moderate  Appropriately NPO:  Yes  ASA Class:  3 (Dual chamber PPM in situ; at NAMITA/EOL, hx of AVNRT, HFpEF, CAD, CKD Stage III)  Mallampati  :  Grade 1- soft palate, uvula, tonsillar pillars, and posterior pharyngeal wall visible  Lungs:  Lungs clear with good breath sounds bilaterally  Heart:  Normal heart sounds and rate  History & Physical reviewed:  History and physical reviewed and no updates needed  Statement of review:  I have reviewed the lab findings, diagnostic data, medications, and the plan for sedation

## 2021-08-30 NOTE — Clinical Note
dry, intact, no bleeding and no hematoma. Left chest sutured and covered with steri strips and primapore dressing

## 2021-09-07 ENCOUNTER — ANCILLARY PROCEDURE (OUTPATIENT)
Dept: CARDIOLOGY | Facility: CLINIC | Age: 81
End: 2021-09-07
Attending: INTERNAL MEDICINE
Payer: COMMERCIAL

## 2021-09-07 VITALS
WEIGHT: 152 LBS | RESPIRATION RATE: 16 BRPM | BODY MASS INDEX: 28.72 KG/M2 | HEART RATE: 68 BPM | TEMPERATURE: 97.9 F | DIASTOLIC BLOOD PRESSURE: 68 MMHG | SYSTOLIC BLOOD PRESSURE: 150 MMHG

## 2021-09-07 DIAGNOSIS — Z95.0 CARDIAC PACEMAKER IN SITU: ICD-10-CM

## 2021-09-07 LAB
MDC_IDC_LEAD_IMPLANT_DT: NORMAL
MDC_IDC_LEAD_IMPLANT_DT: NORMAL
MDC_IDC_LEAD_LOCATION: NORMAL
MDC_IDC_LEAD_LOCATION: NORMAL
MDC_IDC_LEAD_LOCATION_DETAIL_1: NORMAL
MDC_IDC_LEAD_LOCATION_DETAIL_1: NORMAL
MDC_IDC_LEAD_MFG: NORMAL
MDC_IDC_LEAD_MFG: NORMAL
MDC_IDC_LEAD_MODEL: NORMAL
MDC_IDC_LEAD_MODEL: NORMAL
MDC_IDC_LEAD_POLARITY_TYPE: NORMAL
MDC_IDC_LEAD_POLARITY_TYPE: NORMAL
MDC_IDC_LEAD_SERIAL: NORMAL
MDC_IDC_LEAD_SERIAL: NORMAL
MDC_IDC_MSMT_BATTERY_DTM: NORMAL
MDC_IDC_MSMT_BATTERY_REMAINING_LONGEVITY: 135 MO
MDC_IDC_MSMT_BATTERY_REMAINING_PERCENTAGE: 95 %
MDC_IDC_MSMT_BATTERY_STATUS: NORMAL
MDC_IDC_MSMT_BATTERY_VOLTAGE: 3.04 V
MDC_IDC_MSMT_LEADCHNL_RA_IMPEDANCE_VALUE: 410 OHM
MDC_IDC_MSMT_LEADCHNL_RA_PACING_THRESHOLD_AMPLITUDE: 0.5 V
MDC_IDC_MSMT_LEADCHNL_RA_PACING_THRESHOLD_AMPLITUDE: 0.62 V
MDC_IDC_MSMT_LEADCHNL_RA_PACING_THRESHOLD_PULSEWIDTH: 0.5 MS
MDC_IDC_MSMT_LEADCHNL_RA_PACING_THRESHOLD_PULSEWIDTH: 0.5 MS
MDC_IDC_MSMT_LEADCHNL_RA_SENSING_INTR_AMPL: 2.7 MV
MDC_IDC_MSMT_LEADCHNL_RV_IMPEDANCE_VALUE: 490 OHM
MDC_IDC_MSMT_LEADCHNL_RV_PACING_THRESHOLD_AMPLITUDE: 0.75 V
MDC_IDC_MSMT_LEADCHNL_RV_PACING_THRESHOLD_AMPLITUDE: 0.75 V
MDC_IDC_MSMT_LEADCHNL_RV_PACING_THRESHOLD_PULSEWIDTH: 0.5 MS
MDC_IDC_MSMT_LEADCHNL_RV_PACING_THRESHOLD_PULSEWIDTH: 0.5 MS
MDC_IDC_PG_IMPLANT_DTM: NORMAL
MDC_IDC_PG_MFG: NORMAL
MDC_IDC_PG_MODEL: NORMAL
MDC_IDC_PG_SERIAL: NORMAL
MDC_IDC_PG_TYPE: NORMAL
MDC_IDC_SESS_CLINIC_NAME: NORMAL
MDC_IDC_SESS_DTM: NORMAL
MDC_IDC_SESS_TYPE: NORMAL
MDC_IDC_SET_BRADY_AT_MODE_SWITCH_MODE: NORMAL
MDC_IDC_SET_BRADY_AT_MODE_SWITCH_RATE: 180 {BEATS}/MIN
MDC_IDC_SET_BRADY_HYSTRATE: NORMAL
MDC_IDC_SET_BRADY_LOWRATE: 50 {BEATS}/MIN
MDC_IDC_SET_BRADY_MAX_SENSOR_RATE: 100 {BEATS}/MIN
MDC_IDC_SET_BRADY_MAX_TRACKING_RATE: 100 {BEATS}/MIN
MDC_IDC_SET_BRADY_MODE: NORMAL
MDC_IDC_SET_BRADY_NIGHT_RATE: NORMAL
MDC_IDC_SET_BRADY_PAV_DELAY_HIGH: 100 MS
MDC_IDC_SET_BRADY_PAV_DELAY_LOW: 300 MS
MDC_IDC_SET_BRADY_SAV_DELAY_HIGH: 100 MS
MDC_IDC_SET_BRADY_SAV_DELAY_LOW: 130 MS
MDC_IDC_SET_LEADCHNL_RA_PACING_AMPLITUDE: NORMAL
MDC_IDC_SET_LEADCHNL_RA_PACING_CAPTURE_MODE: NORMAL
MDC_IDC_SET_LEADCHNL_RA_PACING_POLARITY: NORMAL
MDC_IDC_SET_LEADCHNL_RA_PACING_PULSEWIDTH: 0.5 MS
MDC_IDC_SET_LEADCHNL_RA_SENSING_ADAPTATION_MODE: NORMAL
MDC_IDC_SET_LEADCHNL_RA_SENSING_POLARITY: NORMAL
MDC_IDC_SET_LEADCHNL_RA_SENSING_SENSITIVITY: 0.2 MV
MDC_IDC_SET_LEADCHNL_RV_PACING_AMPLITUDE: NORMAL
MDC_IDC_SET_LEADCHNL_RV_PACING_CAPTURE_MODE: NORMAL
MDC_IDC_SET_LEADCHNL_RV_PACING_POLARITY: NORMAL
MDC_IDC_SET_LEADCHNL_RV_PACING_PULSEWIDTH: 0.5 MS
MDC_IDC_SET_LEADCHNL_RV_SENSING_POLARITY: NORMAL
MDC_IDC_SET_LEADCHNL_RV_SENSING_SENSITIVITY: 2 MV
MDC_IDC_STAT_AT_BURDEN_PERCENT: 1 %
MDC_IDC_STAT_AT_MODE_SW_COUNT: 10
MDC_IDC_STAT_BRADY_DTM_END: NORMAL
MDC_IDC_STAT_BRADY_DTM_START: NORMAL
MDC_IDC_STAT_BRADY_RA_PERCENT_PACED: 0.6 %
MDC_IDC_STAT_BRADY_RV_PERCENT_PACED: 92 %
MDC_IDC_STAT_EPISODE_RECENT_COUNT: 0
MDC_IDC_STAT_EPISODE_RECENT_COUNT_DTM_END: NORMAL
MDC_IDC_STAT_EPISODE_RECENT_COUNT_DTM_START: NORMAL
MDC_IDC_STAT_EPISODE_TYPE: NORMAL
MDC_IDC_STAT_EPISODE_VENDOR_TYPE: NORMAL

## 2021-09-07 PROCEDURE — 93280 PM DEVICE PROGR EVAL DUAL: CPT | Performed by: INTERNAL MEDICINE

## 2021-09-07 NOTE — PROGRESS NOTES
"DEVICE CLINIC RN POST-OP NOTE    Reason for visit: Post-operative wound and device check s/p dual chamber pacemaker generator change-out     Device: [employer] 2272 Assurity MRI, Pre-existing leads: RA and RV - [employer] 2088TC Tendril STS (DOI 04/12/2011 by Dr. Ab Saavedra)  Procedure date: 08/30/2021  Implant Diagnosis: Pacemaker Battery Depletion, Third Degree AV Block, Pacemaker Dependent  Implanting Physician: Dr. Ab Saavedra      Assessment  Subjective: \"I think everything's doing fine.\"  Vitals: BP (!) 150/68 (BP Location: Left arm, Patient Position: Sitting, Cuff Size: Adult Large)   Pulse 68   Temp 97.9  F (36.6  C) (Oral)   Resp 16   Wt 68.9 kg (152 lb)   BMI 28.72 kg/m    Heart Sounds: Normal   Lung Sounds: clear to auscultation bilaterally  Incision/device pocket: Clean, dry and intact with resolving ecchymosis and trace edema.  There are no signs of infection present.  The Steri-strips were removed at today's visit and the area cleaned of residual adhesive.      Device Findings  Interrogation of device reveals normal sensing and capture thresholds  See the Paceart Report for a full summary of the device information.    Other: Intrinsic rhythm today remains third degree AV block with no R-waves when paced VVI at 30 bpm, occasional to frequent PVCs observed.      Patient Education  Sharyn Trent was accompanied today by her daughter, Leny.     [facility]'s Partnership Agreement for Device Patients and Post-operative Checklist were presented and reviewed at today's appointment.    Signs and symptoms of infection, poor wound healing, and normal device function were reviewed. Range of motion and weight restrictions are waived as Mrs. Trent's pacing leads are not newly implanted. She will continue to use her [email or web address] transmitter for remote device transmissions/monitoring.  These instructions were reviewed with the patient at today s visit. "       Plan  - Routine remote transmission on 12/13/2021    Aruna Schwartz, RN, MA  Device Nurse  [facility]

## 2021-09-07 NOTE — DISCHARGE INSTRUCTIONS
Pacemaker Post-operative Checklist      The Device Registered Nurse (RN) reviewed the pacemaker function.      The Device RN did a wound assessment and wound care teaching.    Please call the Device Nurses with any signs of infection or questions regarding wound healing. Device Nurse Line: [phone], Option #3      The Device RN demonstrated and displayed the specific remote monitoring system for your pacemaker.      The Device RN reviewed the Partnership Agreement Form.    Patient Instructions    You have NO lifting or arm restrictions because your leads are not newly implanted.      To reduce the risk of infection, try to avoid any dental procedures for the first 6 weeks, after 10/11/2021, after your pacemaker implant. If you have an emergent or urgent dental need during this time, contact the device clinic for a prescription for an antibiotic.      You will receive a device identification (ID) card in the mail from the device  within 6 weeks to replace the temporary ID card you were given in the hospital.      You may travel by any mode of transportation; just show your pacemaker ID card. You may be subject to a hand search or use of a handheld wand, but official should not keep the wand over the implant site for greater than 5-10 seconds.      For any surgery, let your doctor know you have a pacemaker.       Your pacemaker is MRI safe after 10/11/2021      Most household appliances, including a microwave, will not interfere with your pacemaker function. If you suspect interference, simply move away from the source. Cell phones do not cause a problem. Please refer to the device booklet from the  or their website under the section on electromagnetic interference (NILA) for further guidelines on things that may interfere with your pacemaker.             - YOUR NEXT HOME AUTOMATIC REMOTE TRANSMISSION WILL OCCUR ON 12/13/2021      Device Clinic Contact Information  Device Nurses: [phone], Option #3   Device Remote Specialists: [phone], Option #2. For questions about your Remote Transmission or Transmission Schedule  Device Schedulers: [phone], Option #1

## 2021-09-13 ENCOUNTER — APPOINTMENT (OUTPATIENT)
Dept: OCCUPATIONAL THERAPY | Facility: HOSPITAL | Age: 81
DRG: 291 | End: 2021-09-13
Attending: INTERNAL MEDICINE
Payer: COMMERCIAL

## 2021-09-13 ENCOUNTER — APPOINTMENT (OUTPATIENT)
Dept: RADIOLOGY | Facility: HOSPITAL | Age: 81
DRG: 291 | End: 2021-09-13
Attending: EMERGENCY MEDICINE
Payer: COMMERCIAL

## 2021-09-13 ENCOUNTER — HOSPITAL ENCOUNTER (INPATIENT)
Facility: HOSPITAL | Age: 81
LOS: 3 days | Discharge: HOME | DRG: 291 | End: 2021-09-16
Attending: EMERGENCY MEDICINE | Admitting: INTERNAL MEDICINE
Payer: COMMERCIAL

## 2021-09-13 ENCOUNTER — APPOINTMENT (OUTPATIENT)
Dept: CT IMAGING | Facility: HOSPITAL | Age: 81
DRG: 291 | End: 2021-09-13
Attending: STUDENT IN AN ORGANIZED HEALTH CARE EDUCATION/TRAINING PROGRAM
Payer: COMMERCIAL

## 2021-09-13 ENCOUNTER — APPOINTMENT (OUTPATIENT)
Dept: SPEECH THERAPY | Facility: HOSPITAL | Age: 81
DRG: 291 | End: 2021-09-13
Attending: INTERNAL MEDICINE
Payer: COMMERCIAL

## 2021-09-13 ENCOUNTER — APPOINTMENT (OUTPATIENT)
Dept: ULTRASOUND IMAGING | Facility: HOSPITAL | Age: 81
DRG: 291 | End: 2021-09-13
Attending: INTERNAL MEDICINE
Payer: COMMERCIAL

## 2021-09-13 DIAGNOSIS — I50.9 ACUTE ON CHRONIC CONGESTIVE HEART FAILURE, UNSPECIFIED HEART FAILURE TYPE (H): ICD-10-CM

## 2021-09-13 DIAGNOSIS — I16.1 HYPERTENSIVE EMERGENCY: ICD-10-CM

## 2021-09-13 PROBLEM — E04.1 THYROID NODULE: Status: ACTIVE | Noted: 2021-09-13

## 2021-09-13 PROBLEM — K21.9 GASTROESOPHAGEAL REFLUX DISEASE WITHOUT ESOPHAGITIS: Status: ACTIVE | Noted: 2021-09-13

## 2021-09-13 PROBLEM — E11.9 DIABETES MELLITUS TYPE 2 WITHOUT RETINOPATHY (H): Status: ACTIVE | Noted: 2017-07-19

## 2021-09-13 PROBLEM — N18.31 CHRONIC KIDNEY DISEASE, STAGE 3A (H): Status: ACTIVE | Noted: 2021-09-13

## 2021-09-13 LAB
ALBUMIN UR-MCNC: NEGATIVE MG/DL
ANION GAP SERPL CALCULATED.3IONS-SCNC: 11 MMOL/L (ref 5–18)
ANION GAP SERPL CALCULATED.3IONS-SCNC: 9 MMOL/L (ref 5–18)
APPEARANCE UR: CLEAR
APTT PPP: 33 SECONDS (ref 22–38)
BILIRUB UR QL STRIP: NEGATIVE
BNP SERPL-MCNC: 255 PG/ML (ref 0–155)
BUN SERPL-MCNC: 21 MG/DL (ref 8–28)
BUN SERPL-MCNC: 23 MG/DL (ref 8–28)
C REACTIVE PROTEIN LHE: <0.1 MG/DL (ref 0–0.8)
CALCIUM SERPL-MCNC: 10 MG/DL (ref 8.5–10.5)
CALCIUM SERPL-MCNC: 9.9 MG/DL (ref 8.5–10.5)
CHLORIDE BLD-SCNC: 100 MMOL/L (ref 98–107)
CHLORIDE BLD-SCNC: 101 MMOL/L (ref 98–107)
CO2 SERPL-SCNC: 25 MMOL/L (ref 22–31)
CO2 SERPL-SCNC: 28 MMOL/L (ref 22–31)
COLOR UR AUTO: COLORLESS
CREAT BLD-MCNC: 1.3 MG/DL (ref 0.6–1.1)
CREAT SERPL-MCNC: 1.14 MG/DL (ref 0.6–1.1)
CREAT SERPL-MCNC: 1.2 MG/DL (ref 0.6–1.1)
ERYTHROCYTE [DISTWIDTH] IN BLOOD BY AUTOMATED COUNT: 13.6 % (ref 10–15)
GFR SERPL CREATININE-BSD FRML MDRD: 39 ML/MIN/1.73M2
GFR SERPL CREATININE-BSD FRML MDRD: 43 ML/MIN/1.73M2
GFR SERPL CREATININE-BSD FRML MDRD: 46 ML/MIN/1.73M2
GLUCOSE BLD-MCNC: 125 MG/DL (ref 70–125)
GLUCOSE BLD-MCNC: 132 MG/DL (ref 70–125)
GLUCOSE BLDC GLUCOMTR-MCNC: 111 MG/DL (ref 70–99)
GLUCOSE BLDC GLUCOMTR-MCNC: 123 MG/DL (ref 70–99)
GLUCOSE BLDC GLUCOMTR-MCNC: 126 MG/DL (ref 70–99)
GLUCOSE BLDC GLUCOMTR-MCNC: 127 MG/DL (ref 70–99)
GLUCOSE UR STRIP-MCNC: NEGATIVE MG/DL
HBA1C MFR BLD: 5.4 %
HCT VFR BLD AUTO: 37.3 % (ref 35–47)
HGB BLD-MCNC: 12.3 G/DL (ref 11.7–15.7)
HGB UR QL STRIP: NEGATIVE
HOLD SPECIMEN: NORMAL
INR PPP: 1.05 (ref 0.9–1.15)
KETONES UR STRIP-MCNC: NEGATIVE MG/DL
LEUKOCYTE ESTERASE UR QL STRIP: NEGATIVE
MAGNESIUM SERPL-MCNC: 1.7 MG/DL (ref 1.8–2.6)
MCH RBC QN AUTO: 30 PG (ref 26.5–33)
MCHC RBC AUTO-ENTMCNC: 33 G/DL (ref 31.5–36.5)
MCV RBC AUTO: 91 FL (ref 78–100)
NITRATE UR QL: NEGATIVE
PH UR STRIP: 7 [PH] (ref 5–7)
PLATELET # BLD AUTO: 227 10E3/UL (ref 150–450)
POTASSIUM BLD-SCNC: 3.5 MMOL/L (ref 3.5–5)
POTASSIUM BLD-SCNC: 4.4 MMOL/L (ref 3.5–5)
PROCALCITONIN SERPL-MCNC: 0.02 NG/ML (ref 0–0.49)
RBC # BLD AUTO: 4.1 10E6/UL (ref 3.8–5.2)
SARS-COV-2 RNA RESP QL NAA+PROBE: NEGATIVE
SODIUM SERPL-SCNC: 136 MMOL/L (ref 136–145)
SODIUM SERPL-SCNC: 138 MMOL/L (ref 136–145)
SP GR UR STRIP: 1.01 (ref 1–1.03)
TROPONIN I SERPL-MCNC: 0.05 NG/ML (ref 0–0.29)
TROPONIN I SERPL-MCNC: <0.01 NG/ML (ref 0–0.29)
UROBILINOGEN UR STRIP-MCNC: <2 MG/DL
WBC # BLD AUTO: 6.7 10E3/UL (ref 4–11)

## 2021-09-13 PROCEDURE — 83880 ASSAY OF NATRIURETIC PEPTIDE: CPT | Performed by: EMERGENCY MEDICINE

## 2021-09-13 PROCEDURE — 36415 COLL VENOUS BLD VENIPUNCTURE: CPT | Performed by: INTERNAL MEDICINE

## 2021-09-13 PROCEDURE — 87635 SARS-COV-2 COVID-19 AMP PRB: CPT | Performed by: EMERGENCY MEDICINE

## 2021-09-13 PROCEDURE — 85730 THROMBOPLASTIN TIME PARTIAL: CPT | Performed by: EMERGENCY MEDICINE

## 2021-09-13 PROCEDURE — 250N000013 HC RX MED GY IP 250 OP 250 PS 637: Performed by: EMERGENCY MEDICINE

## 2021-09-13 PROCEDURE — 99285 EMERGENCY DEPT VISIT HI MDM: CPT | Mod: 25

## 2021-09-13 PROCEDURE — 71045 X-RAY EXAM CHEST 1 VIEW: CPT

## 2021-09-13 PROCEDURE — 36415 COLL VENOUS BLD VENIPUNCTURE: CPT | Performed by: EMERGENCY MEDICINE

## 2021-09-13 PROCEDURE — 250N000009 HC RX 250: Performed by: INTERNAL MEDICINE

## 2021-09-13 PROCEDURE — 84484 ASSAY OF TROPONIN QUANT: CPT | Performed by: INTERNAL MEDICINE

## 2021-09-13 PROCEDURE — 85027 COMPLETE CBC AUTOMATED: CPT | Performed by: EMERGENCY MEDICINE

## 2021-09-13 PROCEDURE — 96365 THER/PROPH/DIAG IV INF INIT: CPT

## 2021-09-13 PROCEDURE — 999N000157 HC STATISTIC RCP TIME EA 10 MIN

## 2021-09-13 PROCEDURE — 94660 CPAP INITIATION&MGMT: CPT

## 2021-09-13 PROCEDURE — 99291 CRITICAL CARE FIRST HOUR: CPT | Performed by: INTERNAL MEDICINE

## 2021-09-13 PROCEDURE — 93880 EXTRACRANIAL BILAT STUDY: CPT

## 2021-09-13 PROCEDURE — 96366 THER/PROPH/DIAG IV INF ADDON: CPT

## 2021-09-13 PROCEDURE — 97166 OT EVAL MOD COMPLEX 45 MIN: CPT | Mod: GO

## 2021-09-13 PROCEDURE — 97535 SELF CARE MNGMENT TRAINING: CPT | Mod: GO

## 2021-09-13 PROCEDURE — 82607 VITAMIN B-12: CPT | Performed by: FAMILY MEDICINE

## 2021-09-13 PROCEDURE — 81003 URINALYSIS AUTO W/O SCOPE: CPT | Performed by: INTERNAL MEDICINE

## 2021-09-13 PROCEDURE — 96367 TX/PROPH/DG ADDL SEQ IV INF: CPT

## 2021-09-13 PROCEDURE — 92610 EVALUATE SWALLOWING FUNCTION: CPT | Mod: GN

## 2021-09-13 PROCEDURE — 86141 C-REACTIVE PROTEIN HS: CPT | Performed by: INTERNAL MEDICINE

## 2021-09-13 PROCEDURE — 250N000013 HC RX MED GY IP 250 OP 250 PS 637: Performed by: INTERNAL MEDICINE

## 2021-09-13 PROCEDURE — 99223 1ST HOSP IP/OBS HIGH 75: CPT | Mod: AI | Performed by: INTERNAL MEDICINE

## 2021-09-13 PROCEDURE — 83036 HEMOGLOBIN GLYCOSYLATED A1C: CPT | Performed by: INTERNAL MEDICINE

## 2021-09-13 PROCEDURE — 250N000011 HC RX IP 250 OP 636: Performed by: INTERNAL MEDICINE

## 2021-09-13 PROCEDURE — 82746 ASSAY OF FOLIC ACID SERUM: CPT | Performed by: FAMILY MEDICINE

## 2021-09-13 PROCEDURE — 250N000011 HC RX IP 250 OP 636: Performed by: EMERGENCY MEDICINE

## 2021-09-13 PROCEDURE — 84145 PROCALCITONIN (PCT): CPT | Performed by: INTERNAL MEDICINE

## 2021-09-13 PROCEDURE — 93005 ELECTROCARDIOGRAM TRACING: CPT | Performed by: EMERGENCY MEDICINE

## 2021-09-13 PROCEDURE — 96375 TX/PRO/DX INJ NEW DRUG ADDON: CPT

## 2021-09-13 PROCEDURE — 80048 BASIC METABOLIC PNL TOTAL CA: CPT | Performed by: EMERGENCY MEDICINE

## 2021-09-13 PROCEDURE — 5A09357 ASSISTANCE WITH RESPIRATORY VENTILATION, LESS THAN 24 CONSECUTIVE HOURS, CONTINUOUS POSITIVE AIRWAY PRESSURE: ICD-10-PCS | Performed by: EMERGENCY MEDICINE

## 2021-09-13 PROCEDURE — 120N000001 HC R&B MED SURG/OB

## 2021-09-13 PROCEDURE — 84484 ASSAY OF TROPONIN QUANT: CPT | Performed by: EMERGENCY MEDICINE

## 2021-09-13 PROCEDURE — C9803 HOPD COVID-19 SPEC COLLECT: HCPCS

## 2021-09-13 PROCEDURE — 70496 CT ANGIOGRAPHY HEAD: CPT

## 2021-09-13 PROCEDURE — 83735 ASSAY OF MAGNESIUM: CPT | Performed by: INTERNAL MEDICINE

## 2021-09-13 PROCEDURE — 80048 BASIC METABOLIC PNL TOTAL CA: CPT | Performed by: INTERNAL MEDICINE

## 2021-09-13 PROCEDURE — 99207 PR NO BILLABLE SERVICE THIS VISIT: CPT | Performed by: INTERNAL MEDICINE

## 2021-09-13 PROCEDURE — 258N000003 HC RX IP 258 OP 636: Performed by: INTERNAL MEDICINE

## 2021-09-13 PROCEDURE — 85610 PROTHROMBIN TIME: CPT | Performed by: EMERGENCY MEDICINE

## 2021-09-13 PROCEDURE — 51702 INSERT TEMP BLADDER CATH: CPT

## 2021-09-13 PROCEDURE — 99223 1ST HOSP IP/OBS HIGH 75: CPT | Mod: 24 | Performed by: INTERNAL MEDICINE

## 2021-09-13 PROCEDURE — 99223 1ST HOSP IP/OBS HIGH 75: CPT | Performed by: PSYCHIATRY & NEUROLOGY

## 2021-09-13 PROCEDURE — 82565 ASSAY OF CREATININE: CPT

## 2021-09-13 RX ORDER — PANTOPRAZOLE SODIUM 20 MG/1
40 TABLET, DELAYED RELEASE ORAL
Status: DISCONTINUED | OUTPATIENT
Start: 2021-09-13 | End: 2021-09-16 | Stop reason: HOSPADM

## 2021-09-13 RX ORDER — HYDRALAZINE HYDROCHLORIDE 50 MG/1
100 TABLET, FILM COATED ORAL 3 TIMES DAILY
Status: DISCONTINUED | OUTPATIENT
Start: 2021-09-13 | End: 2021-09-14

## 2021-09-13 RX ORDER — ONDANSETRON 2 MG/ML
4 INJECTION INTRAMUSCULAR; INTRAVENOUS EVERY 6 HOURS PRN
Status: DISCONTINUED | OUTPATIENT
Start: 2021-09-13 | End: 2021-09-16 | Stop reason: HOSPADM

## 2021-09-13 RX ORDER — ATORVASTATIN CALCIUM 40 MG/1
80 TABLET, FILM COATED ORAL AT BEDTIME
Status: DISCONTINUED | OUTPATIENT
Start: 2021-09-13 | End: 2021-09-16 | Stop reason: HOSPADM

## 2021-09-13 RX ORDER — HYDRALAZINE HYDROCHLORIDE 20 MG/ML
10-20 INJECTION INTRAMUSCULAR; INTRAVENOUS EVERY 6 HOURS PRN
Status: DISCONTINUED | OUTPATIENT
Start: 2021-09-13 | End: 2021-09-16 | Stop reason: HOSPADM

## 2021-09-13 RX ORDER — CARVEDILOL 12.5 MG/1
25 TABLET ORAL 2 TIMES DAILY WITH MEALS
Status: DISCONTINUED | OUTPATIENT
Start: 2021-09-13 | End: 2021-09-16 | Stop reason: HOSPADM

## 2021-09-13 RX ORDER — FUROSEMIDE 10 MG/ML
40 INJECTION INTRAMUSCULAR; INTRAVENOUS EVERY 12 HOURS
Status: DISCONTINUED | OUTPATIENT
Start: 2021-09-13 | End: 2021-09-14

## 2021-09-13 RX ORDER — NICOTINE POLACRILEX 4 MG
15-30 LOZENGE BUCCAL
Status: DISCONTINUED | OUTPATIENT
Start: 2021-09-13 | End: 2021-09-16 | Stop reason: HOSPADM

## 2021-09-13 RX ORDER — ONDANSETRON 4 MG/1
4 TABLET, ORALLY DISINTEGRATING ORAL EVERY 6 HOURS PRN
Status: DISCONTINUED | OUTPATIENT
Start: 2021-09-13 | End: 2021-09-16 | Stop reason: HOSPADM

## 2021-09-13 RX ORDER — NITROGLYCERIN 20 MG/100ML
10-200 INJECTION INTRAVENOUS CONTINUOUS
Status: DISCONTINUED | OUTPATIENT
Start: 2021-09-13 | End: 2021-09-13 | Stop reason: CLARIF

## 2021-09-13 RX ORDER — NALOXONE HYDROCHLORIDE 0.4 MG/ML
0.2 INJECTION, SOLUTION INTRAMUSCULAR; INTRAVENOUS; SUBCUTANEOUS
Status: DISCONTINUED | OUTPATIENT
Start: 2021-09-13 | End: 2021-09-16 | Stop reason: HOSPADM

## 2021-09-13 RX ORDER — ACETAMINOPHEN 325 MG/1
650 TABLET ORAL EVERY 6 HOURS PRN
Status: DISCONTINUED | OUTPATIENT
Start: 2021-09-13 | End: 2021-09-16 | Stop reason: HOSPADM

## 2021-09-13 RX ORDER — NITROGLYCERIN 0.4 MG/1
0.4 TABLET SUBLINGUAL EVERY 5 MIN PRN
Status: DISCONTINUED | OUTPATIENT
Start: 2021-09-13 | End: 2021-09-13

## 2021-09-13 RX ORDER — IOPAMIDOL 755 MG/ML
75 INJECTION, SOLUTION INTRAVASCULAR ONCE
Status: COMPLETED | OUTPATIENT
Start: 2021-09-13 | End: 2021-09-13

## 2021-09-13 RX ORDER — LOSARTAN POTASSIUM 50 MG/1
100 TABLET ORAL EVERY EVENING
Status: DISCONTINUED | OUTPATIENT
Start: 2021-09-13 | End: 2021-09-16 | Stop reason: HOSPADM

## 2021-09-13 RX ORDER — FUROSEMIDE 10 MG/ML
40 INJECTION INTRAMUSCULAR; INTRAVENOUS ONCE
Status: COMPLETED | OUTPATIENT
Start: 2021-09-13 | End: 2021-09-13

## 2021-09-13 RX ORDER — AMOXICILLIN 250 MG
2 CAPSULE ORAL 2 TIMES DAILY PRN
Status: DISCONTINUED | OUTPATIENT
Start: 2021-09-13 | End: 2021-09-16 | Stop reason: HOSPADM

## 2021-09-13 RX ORDER — PREDNISOLONE ACETATE 10 MG/ML
1 SUSPENSION/ DROPS OPHTHALMIC DAILY
Status: DISCONTINUED | OUTPATIENT
Start: 2021-09-13 | End: 2021-09-16 | Stop reason: HOSPADM

## 2021-09-13 RX ORDER — LABETALOL HYDROCHLORIDE 5 MG/ML
10-20 INJECTION, SOLUTION INTRAVENOUS EVERY 6 HOURS PRN
Status: DISCONTINUED | OUTPATIENT
Start: 2021-09-13 | End: 2021-09-16 | Stop reason: HOSPADM

## 2021-09-13 RX ORDER — DEXTROSE MONOHYDRATE 25 G/50ML
25-50 INJECTION, SOLUTION INTRAVENOUS
Status: DISCONTINUED | OUTPATIENT
Start: 2021-09-13 | End: 2021-09-16 | Stop reason: HOSPADM

## 2021-09-13 RX ORDER — AMOXICILLIN 250 MG
1 CAPSULE ORAL 2 TIMES DAILY PRN
Status: DISCONTINUED | OUTPATIENT
Start: 2021-09-13 | End: 2021-09-16 | Stop reason: HOSPADM

## 2021-09-13 RX ORDER — LIDOCAINE 40 MG/G
CREAM TOPICAL
Status: DISCONTINUED | OUTPATIENT
Start: 2021-09-13 | End: 2021-09-16 | Stop reason: HOSPADM

## 2021-09-13 RX ORDER — ACETAMINOPHEN 650 MG/1
650 SUPPOSITORY RECTAL EVERY 6 HOURS PRN
Status: DISCONTINUED | OUTPATIENT
Start: 2021-09-13 | End: 2021-09-16 | Stop reason: HOSPADM

## 2021-09-13 RX ORDER — ASPIRIN 325 MG
325 TABLET ORAL EVERY EVENING
Status: DISCONTINUED | OUTPATIENT
Start: 2021-09-13 | End: 2021-09-16 | Stop reason: HOSPADM

## 2021-09-13 RX ORDER — ASPIRIN 325 MG
325 TABLET ORAL ONCE
Status: COMPLETED | OUTPATIENT
Start: 2021-09-13 | End: 2021-09-13

## 2021-09-13 RX ORDER — HYDRALAZINE HYDROCHLORIDE 20 MG/ML
10 INJECTION INTRAMUSCULAR; INTRAVENOUS ONCE
Status: COMPLETED | OUTPATIENT
Start: 2021-09-13 | End: 2021-09-13

## 2021-09-13 RX ORDER — NALOXONE HYDROCHLORIDE 0.4 MG/ML
0.4 INJECTION, SOLUTION INTRAMUSCULAR; INTRAVENOUS; SUBCUTANEOUS
Status: DISCONTINUED | OUTPATIENT
Start: 2021-09-13 | End: 2021-09-16 | Stop reason: HOSPADM

## 2021-09-13 RX ADMIN — FUROSEMIDE 40 MG: 10 INJECTION, SOLUTION INTRAMUSCULAR; INTRAVENOUS at 19:08

## 2021-09-13 RX ADMIN — ASPIRIN 325 MG: 325 TABLET, FILM COATED ORAL at 02:25

## 2021-09-13 RX ADMIN — PREDNISOLONE ACETATE 1 DROP: 10 SUSPENSION/ DROPS OPHTHALMIC at 08:27

## 2021-09-13 RX ADMIN — HYDRALAZINE HYDROCHLORIDE 100 MG: 50 TABLET, FILM COATED ORAL at 16:07

## 2021-09-13 RX ADMIN — FUROSEMIDE 40 MG: 10 INJECTION, SOLUTION INTRAMUSCULAR; INTRAVENOUS at 02:21

## 2021-09-13 RX ADMIN — ASPIRIN 325 MG: 325 TABLET, FILM COATED ORAL at 19:07

## 2021-09-13 RX ADMIN — LOSARTAN POTASSIUM 100 MG: 50 TABLET, FILM COATED ORAL at 20:17

## 2021-09-13 RX ADMIN — HYDRALAZINE HYDROCHLORIDE 100 MG: 50 TABLET, FILM COATED ORAL at 22:53

## 2021-09-13 RX ADMIN — PANTOPRAZOLE SODIUM 40 MG: 20 TABLET, DELAYED RELEASE ORAL at 08:27

## 2021-09-13 RX ADMIN — IOPAMIDOL 75 ML: 755 INJECTION, SOLUTION INTRAVENOUS at 01:39

## 2021-09-13 RX ADMIN — ENOXAPARIN SODIUM 40 MG: 40 INJECTION SUBCUTANEOUS at 08:27

## 2021-09-13 RX ADMIN — HYDRALAZINE HYDROCHLORIDE 10 MG: 20 INJECTION INTRAMUSCULAR; INTRAVENOUS at 01:57

## 2021-09-13 RX ADMIN — FUROSEMIDE 40 MG: 10 INJECTION, SOLUTION INTRAMUSCULAR; INTRAVENOUS at 06:55

## 2021-09-13 RX ADMIN — HYDRALAZINE HYDROCHLORIDE 100 MG: 50 TABLET, FILM COATED ORAL at 06:36

## 2021-09-13 RX ADMIN — CARVEDILOL 25 MG: 12.5 TABLET, FILM COATED ORAL at 19:07

## 2021-09-13 RX ADMIN — ATORVASTATIN CALCIUM 80 MG: 40 TABLET, FILM COATED ORAL at 22:53

## 2021-09-13 RX ADMIN — CARVEDILOL 25 MG: 12.5 TABLET, FILM COATED ORAL at 08:27

## 2021-09-13 RX ADMIN — FUROSEMIDE 10 MG/HR: 10 INJECTION, SOLUTION INTRAVENOUS at 05:22

## 2021-09-13 RX ADMIN — NITROGLYCERIN 10 MCG/MIN: 20 INJECTION INTRAVENOUS at 02:29

## 2021-09-13 ASSESSMENT — ACTIVITIES OF DAILY LIVING (ADL)
CONCENTRATING,_REMEMBERING_OR_MAKING_DECISIONS_DIFFICULTY: NO
EQUIPMENT_CURRENTLY_USED_AT_HOME: CANE, STRAIGHT
WHICH_OF_THE_ABOVE_FUNCTIONAL_RISKS_HAD_A_RECENT_ONSET_OR_CHANGE?: AMBULATION
DIFFICULTY_EATING/SWALLOWING: NO
WALKING_OR_CLIMBING_STAIRS_DIFFICULTY: NO
FALL_HISTORY_WITHIN_LAST_SIX_MONTHS: NO
DRESSING/BATHING_DIFFICULTY: NO
DIFFICULTY_COMMUNICATING: NO
TOILETING_ISSUES: NO
WEAR_GLASSES_OR_BLIND: YES
DOING_ERRANDS_INDEPENDENTLY_DIFFICULTY: YES

## 2021-09-13 ASSESSMENT — ENCOUNTER SYMPTOMS
WEAKNESS: 0
SPEECH DIFFICULTY: 0
HEADACHES: 1

## 2021-09-13 ASSESSMENT — MIFFLIN-ST. JEOR
SCORE: 1081.88
SCORE: 1069.18

## 2021-09-13 NOTE — PROGRESS NOTES
Chart assessment completed per CM protocol. Readmission risk of 9%. Patient admitted today 9/13/2021 due to sudden onset of a severe headache and body shaking at home.     Per Hospitalist consult note 9/13/2021, diagnosed with acute on chronic congestive heart failure with acute respiratory failure with hypoxia. Patient was started on Nitro and Lasix drips along with BiPap. Cardiology consult.    Patient is off Bipap and IV drips. Still on scheduled IV Lasix. Due to downgrade in status, patient was not discussed on ICU rounds. Plan to transfer out of ICU today.     Awaiting Cardiology recommendations.     Per chart review, patient is from home with her  and was independent at baseline. COVID test was negative on 9/13/2021.  Patient recently had an exchange of her pacemaker due to battery at the end of life on 8/30/21 (outpatient).       CM following for discharge planning.

## 2021-09-13 NOTE — H&P
[facility]    History and Physical - Hospitalist Service       Date of Admission:  9/13/2021    Assessment & Plan   Summary: 81 year old female with hx of hypertension, dyslipidemia, CKD3, DM2, presented to [facility] ED with sudden onset of severe headache and body shaking 1 hour prior to admission.    Principal Problem:    Acute diastolic CHF (congestive heart failure): Initiate aggressive diuresis with furosemide IV drip.  Consult cardiology    Acute respiratory failure with hypoxia: Secondary to pulmonary edema from acute congestive heart failure.  BiPAP support, nitro drip, furosemide drip, reinstitute all patient's home antihypertensives.    Hypertensive emergency: Resume all home antihypertensives.  Currently on nitro drip.  Initiating furosemide drip.    Severe headache: Secondary to hypertensive emergency.  Now gradually improving.  Head imaging is negative.    Left carotid severe siphon stenosis: As seen on CTA head and neck.  Will order ultrasound carotids.  Active Problems:    Malignant essential hypertension: Patient has been taking all her home medications and not missed any doses.  Consult cardiology for malignant hypertension.    Dyslipidemia, goal LDL below 70: Continue home statin dose    Chronic kidney disease, stage 3a: Currently at baseline    Diabetes mellitus type 2 without retinopathy: Hold Metformin while n.p.o.    Gastroesophageal reflux disease without esophagitis: Resume home PPI    Code Status:  No Order      Diet: Orders Placed This Encounter      NPO for Medical/Clinical Reasons Except for: No Exceptions      DVT prophylaxis:    Medical:  subcutaneous enoxaparin    Mechanical:  PCD's    Galvez Catheter: Not present  Central Lines/Port-a-cath: Not present  Drains: Not present    Disposition Plan   Expected discharge:   recommended to Home once Adequately diuresed, blood pressure stable.    The patient's care was discussed with the Patient.    Keny Vaughn MD    [employer]  Securely message with the [employer] Web Console (learn more here)  Text page via [employer] Paging/Directory         Clinically Significant Risk Factors Present on Admission        _____________________________________________________________________    Chief Complaint   body shaking and severe headache    History is obtained from the patient    History of Present Illness   81 year old female with a pertinent history of CKD, HTN, CHF, diabetes, and s/p pacemaker placement, who presents to ED via EMS for evaluation of a headache and shaking.      The patient reports sudden onset of a headache and shaking one hour prior to admission.  She called EMS and when they arrived her blood pressure was 212 systolic.  This was elevated to 220 on arrival, and the patient notes it was 145 about two hours PTA to her onset of symptoms.  EMS also notes she was stating at 81 percent on room air, and she was placed on eight liters which increased her stats to 94.  She does note chronic blurry vision since having previous eye surgery.  The patient denies missing any recent doses of her medication.  She denies any weakness, speech difficulty, vertigo, chest pain, or other complaints at this time.     She denies cough, fever, diarrhea, nausea, vomiting, constipation, dysuria.  She has chronic swelling in the left leg after melanoma surgery.    Review of Systems    The 5 point Review of Systems is negative other than noted in the HPI.    Medical History  I have reviewed this patient's medical history and updated it with pertinent information if needed.  Past Medical History:   Diagnosis Date     Abnormal ECG      Anxiety      Arthritis      Chest pain      Chest pain      Chronic kidney disease     stage 3-mod.     Diabetes (H)      Dyslipidemia, goal LDL below 70      GERD (gastroesophageal reflux disease)      HTN (hypertension)      Hyperlipidemia      Melanoma of ankle (H)     L ankle     Other second degree  atrioventricular block     Created by Conversion      Pacemaker      PSVT (paroxysmal supraventricular tachycardia) (H)      Right bundle branch block      Skin cancer        Surgical History   I have reviewed this patient's surgical history and updated it with pertinent information if needed.  Past Surgical History:   Procedure Laterality Date     ABLATION OF DYSRHYTHMIC FOCUS  04/11/2013    AV analia reentry tachycardia, partially successful     BIOPSY SKIN (LOCATION)       CARDIAC CATHETERIZATION  03/10/2016     CV CORONARY ANGIOGRAM N/A 5/26/2021    Procedure: Coronary Angiogram;  Surgeon: Yony Gillis MD;  Location: [facility] Cardiac Cath Lab;  Service: Cardiology     CV LEFT HEART CATHETERIZATION WITHOUT LEFT VENTRICULOGRAM Left 5/26/2021    Procedure: Left Heart Catheterization Without Left Ventriculogram;  Surgeon: Yony Gillis MD;  Location: [facility] Cardiac Cath Lab;  Service: Cardiology     CV RIGHT HEART CATHETERIZATION N/A 5/26/2021    Procedure: Right Heart Catheterization;  Surgeon: Yony Gillis MD;  Location: [facility] Cardiac Cath Lab;  Service: Cardiology     EP PACEMAKER N/A 8/30/2021    Procedure: Electrophysiology Pacemaker;  Surgeon: Ab Saavedra MD;  Location: [facility] CATH [facility] CV     FOOT SURGERY      L foot     HAND SURGERY      on both thumbs     IMPLANT PACEMAKER  04/2011    Second-degree AV block     OTHER SURGICAL HISTORY      SVT Ablation     NC SHLDR ARTHROSCOP,SURG,W/ROTAT CUFF REPR Left 3/9/2017    Procedure: LEFT SHOULDER ARTHROSCOPY DECOMPRESSION DISTAL CLAVICLE EXCISION  ROTATOR CUFF REPAIR BICEPS TENOTOMY AND EXTENSIVE DEBRIDEMENT;  Surgeon: Todd Riggs MD;  Location: [facility];  Service: Orthopedics     RELEASE CARPAL TUNNEL      both wrists     SKIN CANCER EXCISION      L ankle,Lleg and cheek     TOE SURGERY      L big toe joint replacement     TUBAL LIGATION         Social History   I have reviewed this patient's social  history and updated it with pertinent information if needed.  Social History     Tobacco Use     Smoking status: Never Smoker     Smokeless tobacco: Never Used   Substance Use Topics     Alcohol use: No     Drug use: No       Family History   I have reviewed this patient's family history and updated it with pertinent information if needed.  Family History   Problem Relation Age of Onset     Hypertension Mother      Cerebrovascular Disease Mother      Prostate Cancer Father      Cancer Father      Coronary Artery Disease Father      Dyslipidemia Father      Dyslipidemia Sister      Dyslipidemia Brother      Hypertension Brother      Prostate Cancer Brother        Prior to Admission Medications   No current facility-administered medications on file prior to encounter.  aspirin 325 MG tablet, [ASPIRIN 325 MG TABLET] Take 325 mg by mouth every evening.   atorvastatin (LIPITOR) 80 MG tablet, [ATORVASTATIN (LIPITOR) 80 MG TABLET] Take 80 mg by mouth bedtime.  carvediloL (COREG) 25 MG tablet, [CARVEDILOL (COREG) 25 MG TABLET] Take 1 tablet (25 mg total) by mouth 2 (two) times a day with meals.  furosemide (LASIX) 20 MG tablet, [FUROSEMIDE (LASIX) 20 MG TABLET] Take 0.5 tablets (10 mg total) by mouth daily.  hydrALAZINE (APRESOLINE) 100 MG tablet, [HYDRALAZINE (APRESOLINE) 100 MG TABLET] TAKE 1 TABLET(100 MG) BY MOUTH THREE TIMES DAILY  LORazepam (ATIVAN) 0.5 MG tablet, [LORAZEPAM (ATIVAN) 0.5 MG TABLET] Take 1 tablet (0.5 mg total) by mouth every 6 (six) hours as needed for anxiety (or tremors).  losartan (COZAAR) 100 MG tablet, [LOSARTAN (COZAAR) 100 MG TABLET] Take 100 mg by mouth every evening.  metFORMIN (GLUCOPHAGE) 500 MG tablet, [METFORMIN (GLUCOPHAGE) 500 MG TABLET] Take 1 tablet (500 mg total) by mouth 2 (two) times a day with meals. At breakfast and at dinner  multivitamin therapeutic (THERAGRAN) tablet, [MULTIVITAMIN THERAPEUTIC (THERAGRAN) TABLET] Take 1 tablet by mouth every evening.   omeprazole (PRILOSEC) 20  "MG capsule, [OMEPRAZOLE (PRILOSEC) 20 MG CAPSULE] Take 20 mg by mouth daily before breakfast.   prednisoLONE acetate (PRED-FORTE) 1 % ophthalmic suspension, [PREDNISOLONE ACETATE (PRED-FORTE) 1 % OPHTHALMIC SUSPENSION] Administer 1 drop to the right eye daily.   vit C/E/Zn/coppr/lutein/zeaxan (PRESERVISION AREDS-2 ORAL), [VIT C/E/ZN/COPPR/LUTEIN/ZEAXAN (PRESERVISION AREDS-2 ORAL)] Take 1 tablet by mouth 2 (two) times a day before meals.        Allergies      Allergies   Allergen Reactions     Venom-Honey Bee [Bee Venom] Shortness Of Breath     Macrobid [Nitrofurantoin] Other (See Comments)     Burning sensation across chest and arms     Mercurial Analogues [Mercurial Derivatives] Dermatitis     blisters     Sulfa (Sulfonamide Antibiotics) [Sulfa Drugs] Rash       Physical Exam   Vital signs:      BP: 137/69 Pulse: 64   Resp: 10 SpO2: 97 %          Estimated body mass index is 28.72 kg/m  as calculated from the following:    Height as of 8/30/21: 1.549 m (5' 1\").    Weight as of 9/7/21: 68.9 kg (152 lb).    Constitutional: awake, alert, cooperative, no apparent distress, and appears stated age  ENT: normocepalic, without obvious abnormality, atramatic  Respiratory: Breathing comfortably on BiPAP  Cardiovascular: normal apical pulses  and normal S1 and S2  GI: normal bowel sounds, soft and non-distended    Data   Data reviewed today: I reviewed all medications, new labs and imaging results over the last 24 hours.  Recent Labs   Lab 09/13/21  0125 09/13/21  0124 09/13/21  0122   WBC 6.7  --   --    HGB 12.3  --   --    MCV 91  --   --      --   --    INR  --  1.05  --      --   --    POTASSIUM 4.4  --   --    CHLORIDE 100  --   --    CO2 25  --   --    BUN 23  --   --    CR 1.20*  --  1.3*   ANIONGAP 11  --   --    MAURICIO 10.0  --   --    *  --   --      Recent Results (from the past 24 hour(s))   CTA Head Neck with Contrast    Narrative    EXAM: CTA  HEAD NECK WITH CONTRAST  LOCATION: [facility]  DATE/TIME: 9/13/2021 1:27 AM    INDICATION: Dizziness. Blurry vision.  COMPARISON: None.  CONTRAST: ISOVUE 370 75ML  TECHNIQUE: Head and neck CT angiogram with IV contrast. Noncontrast head CT followed by axial helical CT images of the head and neck vessels obtained during the arterial phase of intravenous contrast administration. Axial 2D reconstructed images and   multiplanar 3D MIP reconstructed images of the head and neck vessels were performed by the technologist. Dose reduction techniques were used. All stenosis measurements made according to NASCET criteria unless otherwise specified.    FINDINGS:   Streak artifact limits aspects of exam.    NONCONTRAST HEAD CT:   INTRACRANIAL CONTENTS: No intracranial hemorrhage, extraaxial collection, or mass effect.  No CT evidence of acute infarct. Mild presumed chronic small vessel ischemic changes. Mild to moderate generalized volume loss. No hydrocephalus.       HEAD CTA:  Right carotid siphon mild stenosis. Left carotid siphon severe stenosis.    Otherwise, no significant stenosis/occlusion. No brain aneurysm. No AVM/AVF.    Bilateral fetal origins. Balanced vertebral arteries supply a normal basilar artery.     DURAL VENOUS SINUSES: Expected enhancement of the major dural venous sinuses.      NECK CTA:  RIGHT CAROTID: No significant stenosis/occlusion. No dissection.    LEFT CAROTID: No significant stenosis/occlusion. No dissection.    VERTEBRAL ARTERIES:  Right vertebral artery origin moderate stenosis.    Remaining bilateral extradural vertebral arteries demonstrate no significant stenosis/occlusion. No dissection.      Balanced vertebral arteries.    AORTIC ARCH: Classic aortic arch anatomy. Left proximal subclavian artery mild-to-moderate stenosis.    ARTERIAL PLAQUE: Calcified/noncalcified plaque within aorta, left subclavian artery, right brachial cephalic artery, right subclavian artery, bilateral carotid bifurcations/bulbs,  bilateral precavernous ICAs, bilateral carotid siphons.    NONVASCULAR STRUCTURES:   Dental amalgam resulting in streak artifact. Left chest pacer type device. Degenerative changes noted within the spine. C7 T1 grade 1 anterolisthesis. Several cervical levels demonstrate severe central canal stenosis. Multiple thyroid nodules largest   measuring 2.1 cm. Recommend nonemergent thyroid ultrasound based upon ACR white paper criteria. Lung apices demonstrate septal thickening with patchy groundglass opacities and consolidations most likely due to pulmonary edema. Please correlate clinically   to exclude acute infectious inflammatory pneumonitis.            Impression    IMPRESSION:   HEAD CT:  1.  No acute intracranial hemorrhage.  2.  No CT evidence acute infarct. Aspect score 10.  3.  Age-related changes described above.    Dr. Tashi Lackey was notified by Dr Kevin Cooper at  1:40 AM 09/13/2021.     HEAD CTA:   1.  No intracranial arterial large vessel occlusion.  2.  Right carotid siphon mild stenosis.   3.  Left carotid siphon severe stenosis.  4.  Otherwise, no significant stenosis/occlusion.     NECK CTA:  1.  Right vertebral artery origin moderate stenosis.  2.  Left proximal subclavian artery mild-to-moderate stenosis.  3.  Otherwise, no significant stenosis/occlusion. No dissection.  4.  Multiple thyroid nodules. Recommend nonemergent thyroid ultrasound based upon ACR white paper criteria.   5.  Lung apices demonstrate septal thickening with patchy groundglass opacities and consolidations most likely due to pulmonary edema. Please correlate clinically to exclude acute infectious inflammatory pneumonitis.      Dr. Tashi Lackey was notified by Dr Kevin Cooper at  1:55 AM 09/13/2021.    XR Chest Port 1 View    Narrative    EXAM: XR CHEST PORT 1 VIEW  LOCATION: [facility]  DATE/TIME: 9/13/2021 2:17 AM    INDICATION: Dyspnea.  COMPARISON: 5/7/2021.      Impression    IMPRESSION: Dual-lead  left-sided cardiac pacer. Mild cardiac enlargement and increased pulmonary vascularity with some interstitial edema. Findings suggest CHF or fluid overload. Patchy hazy interstitial opacities in the upper lungs more so on the right   may be due to infiltrate versus edema. Aortic calcification. No significant bony abnormalities.

## 2021-09-13 NOTE — ED PROVIDER NOTES
EMERGENCY DEPARTMENT ENCOUNTER      NAME: Sharyn Trent  AGE: 81 year old female  YOB: 1940  MRN: [id number]  EVALUATION DATE & TIME: No admission date for patient encounter.    PCP: Jamie Mcconnell    ED PROVIDER: Tashi Lackey M.D.      Chief Complaint   Patient presents with     tier 1 stroke code         FINAL IMPRESSION:  1. Hypertensive emergency    2. Acute on chronic congestive heart failure, unspecified heart failure type (H)        ED COURSE & MEDICAL DECISION MAKING:    Pertinent Labs & Imaging studies reviewed. (See chart for details)  81 year old female presents to the Emergency Department for evaluation of elevated blood pressure, headache and blurred vision.  Initially activated as a stroke code from the field.  I did see her in a remedial arrival.  No focal deficits.  Did proceed with head CT and CTA.  This was negative.  Discussed with Dr. Reese.  Will cancel stroke code.  He had requested an MRI but unfortunate this is not able to be done due to patient having a pacemaker.  I think likely state patient symptoms are due to hypertensive emergency and likely CHF exacerbation.  Has increased fluid on chest x-ray and crackles on exam.  Patient started on a nitro drip.  Also given BiPAP given her low oxygen saturation.  Given 40 mg of IV Lasix.  Troponins negative.  EKG is paced.  Labs otherwise unremarkable.  Patient will be transferred to the ICU.  Discussed with Dr. Harris from the ICU.    1:13 AM I met with the patient to gather history and to perform my initial exam. I discussed the plan for care while in the Emergency Department. PPE: Mask and eye protection.  1:44 AM I spoke with Dr. Davila from Neurology, and they recommend no thrombolytics and an MRI.   1:54 AM I spoke with [facility].  2:07 AM I spoke with Dr. Davila from Neurology.   3:50 AM I spoke with Dr. Vaughn with the hospitalist service who agrees to admit the patient.      At the conclusion of the  encounter I discussed the results of all of the tests and the disposition. The questions were answered. The patient or family acknowledged understanding and was agreeable with the care plan.     National Institutes of Health Stroke Scale  Exam Interval: Baseline at 1:13 AM   Score    Level of consciousness: (0)   Alert, keenly responsive    LOC questions: (0)   Answers both questions correctly    LOC commands: (0)   Performs both tasks correctly    Best gaze: (0)   Normal    Visual: (0)   No visual loss    Facial palsy: (0)   Normal symmetrical movements    Motor arm (left): (0)   No drift    Motor arm (right): (0)   No drift    Motor leg (left): (0)   No drift    Motor leg (right): (0)   No drift    Limb ataxia: (0)   Absent    Sensory: (0)   Normal- no sensory loss    Best language: (0)   Normal- no aphasia    Dysarthria: (0)   Normal    Extinction and inattention: (0)   No abnormality        Total Score:  0          Critical Care time was 60 minutes for this patient excluding procedures.  This includes multiple direct patient evaluations, discussion with family in obtaining history, discussion with consultants, review of lab and imaging data, and management of care.     MEDICATIONS GIVEN IN THE EMERGENCY:  Medications   nitroGLYcerin 50 mg in D5W 250 mL (adult std) infusion CENTRAL (10 mcg/min Intravenous New Bag 9/13/21 0229)   atorvastatin (LIPITOR) tablet 80 mg (has no administration in time range)   aspirin (ASA) tablet 325 mg (has no administration in time range)   carvedilol (COREG) tablet 25 mg (has no administration in time range)   hydrALAZINE (APRESOLINE) tablet 100 mg (has no administration in time range)   losartan (COZAAR) tablet 100 mg (has no administration in time range)   prednisoLONE acetate (PRED FORTE) 1 % ophthalmic susp 1 drop (has no administration in time range)   omeprazole (priLOSEC) CR capsule 20 mg (has no administration in time range)   hydrALAZINE (APRESOLINE) injection 10 mg (10 mg  Intravenous Given 9/13/21 0157)   iopamidol (ISOVUE-370) solution 75 mL (75 mLs Intravenous Given 9/13/21 0139)   furosemide (LASIX) injection 40 mg (40 mg Intravenous Given 9/13/21 0221)   aspirin (ASA) tablet 325 mg (325 mg Oral Given 9/13/21 0225)       NEW PRESCRIPTIONS STARTED AT TODAY'S ER VISIT  New Prescriptions    No medications on file          =================================================================    HPI    Patient information was obtained from: Patient    Use of : N/A       Sharyn Trent is a 81 year old female with a pertinent history of CKD, HTN, CHF, diabetes, and s/p pacemaker placement, who presents to this ED via EMS for evaluation of a headache and shaking.     The patient reports sudden onset of a headache and shaking one hour ago.  She called EMS and when they arrived her blood pressure was 212 systolic.  This was elevated to 220 on arrival, and the patient notes it was 145 about two hours PTA to her onset of symptoms.  EMS also notes she was stating at 81 percent on room air, and she was placed on eight liters which increased her stats to 94.  She does note chronic blurry vision since having previous eye surgery.  The patient denies missing any recent doses of her medication.  She denies any weakness, speech difficulty, vertigo, chest pain, or other complaints at this time.     REVIEW OF SYSTEMS   Review of Systems   Eyes:        Positive for blurry vision, unchanged     Cardiovascular: Negative for chest pain.   Neurological: Positive for headaches. Negative for speech difficulty and weakness.        Positive for shaking  Negative for vertigo   All other systems reviewed and are negative.     PAST MEDICAL HISTORY:  Past Medical History:   Diagnosis Date     Abnormal ECG      Anxiety      Arthritis      Chest pain      Chest pain      Chronic kidney disease     stage 3-mod.     Diabetes (H)      Dyslipidemia, goal LDL below 70      GERD (gastroesophageal reflux disease)       HTN (hypertension)      Hyperlipidemia      Melanoma of ankle (H)     L ankle     Other second degree atrioventricular block     Created by Conversion      Pacemaker      PSVT (paroxysmal supraventricular tachycardia) (H)      Right bundle branch block      Skin cancer        PAST SURGICAL HISTORY:  Past Surgical History:   Procedure Laterality Date     ABLATION OF DYSRHYTHMIC FOCUS  04/11/2013    AV analia reentry tachycardia, partially successful     BIOPSY SKIN (LOCATION)       CARDIAC CATHETERIZATION  03/10/2016     CV CORONARY ANGIOGRAM N/A 5/26/2021    Procedure: Coronary Angiogram;  Surgeon: Yony Gillis MD;  Location: [facility] Cardiac Cath Lab;  Service: Cardiology     CV LEFT HEART CATHETERIZATION WITHOUT LEFT VENTRICULOGRAM Left 5/26/2021    Procedure: Left Heart Catheterization Without Left Ventriculogram;  Surgeon: Yony Gillis MD;  Location: [facility] Cardiac Cath Lab;  Service: Cardiology     CV RIGHT HEART CATHETERIZATION N/A 5/26/2021    Procedure: Right Heart Catheterization;  Surgeon: Yony Gillis MD;  Location: [facility] Cardiac Cath Lab;  Service: Cardiology     EP PACEMAKER N/A 8/30/2021    Procedure: Electrophysiology Pacemaker;  Surgeon: Ab Saavedra MD;  Location: [facility] CATH [facility] CV     FOOT SURGERY      L foot     HAND SURGERY      on both thumbs     IMPLANT PACEMAKER  04/2011    Second-degree AV block     OTHER SURGICAL HISTORY      SVT Ablation     LA SHLDR ARTHROSCOP,SURG,W/ROTAT CUFF REPR Left 3/9/2017    Procedure: LEFT SHOULDER ARTHROSCOPY DECOMPRESSION DISTAL CLAVICLE EXCISION  ROTATOR CUFF REPAIR BICEPS TENOTOMY AND EXTENSIVE DEBRIDEMENT;  Surgeon: Todd Riggs MD;  Location: [facility];  Service: Orthopedics     RELEASE CARPAL TUNNEL      both wrists     SKIN CANCER EXCISION      L ankle,Lleg and cheek     TOE SURGERY      L big toe joint replacement     TUBAL LIGATION         CURRENT MEDICATIONS:    Current  Facility-Administered Medications   Medication     aspirin (ASA) tablet 325 mg     atorvastatin (LIPITOR) tablet 80 mg     carvedilol (COREG) tablet 25 mg     hydrALAZINE (APRESOLINE) tablet 100 mg     losartan (COZAAR) tablet 100 mg     nitroGLYcerin 50 mg in D5W 250 mL (adult std) infusion CENTRAL     omeprazole (priLOSEC) CR capsule 20 mg     prednisoLONE acetate (PRED FORTE) 1 % ophthalmic susp 1 drop     Current Outpatient Medications   Medication     aspirin 325 MG tablet     atorvastatin (LIPITOR) 80 MG tablet     carvediloL (COREG) 25 MG tablet     furosemide (LASIX) 20 MG tablet     hydrALAZINE (APRESOLINE) 100 MG tablet     LORazepam (ATIVAN) 0.5 MG tablet     losartan (COZAAR) 100 MG tablet     metFORMIN (GLUCOPHAGE) 500 MG tablet     multivitamin therapeutic (THERAGRAN) tablet     omeprazole (PRILOSEC) 20 MG capsule     prednisoLONE acetate (PRED-FORTE) 1 % ophthalmic suspension     vit C/E/Zn/coppr/lutein/zeaxan (PRESERVISION AREDS-2 ORAL)         ALLERGIES:  Allergies   Allergen Reactions     Venom-Honey Bee [Bee Venom] Shortness Of Breath     Macrobid [Nitrofurantoin] Other (See Comments)     Burning sensation across chest and arms     Mercurial Analogues [Mercurial Derivatives] Dermatitis     blisters     Sulfa (Sulfonamide Antibiotics) [Sulfa Drugs] Rash       FAMILY HISTORY:  Family History   Problem Relation Age of Onset     Hypertension Mother      Cerebrovascular Disease Mother      Prostate Cancer Father      Cancer Father      Coronary Artery Disease Father      Dyslipidemia Father      Dyslipidemia Sister      Dyslipidemia Brother      Hypertension Brother      Prostate Cancer Brother        SOCIAL HISTORY:   Social History     Socioeconomic History     Marital status:      Spouse name: Not on file     Number of children: Not on file     Years of education: Not on file     Highest education level: Not on file   Occupational History     Not on file   Tobacco Use     Smoking status:  Never Smoker     Smokeless tobacco: Never Used   Substance and Sexual Activity     Alcohol use: No     Drug use: No     Sexual activity: Yes     Partners: Male     Birth control/protection: Surgical   Other Topics Concern     Not on file   Social History Narrative     Not on file     Social Determinants of Health     Financial Resource Strain:      Difficulty of Paying Living Expenses:    Food Insecurity:      Worried About Running Out of Food in the Last Year:      Ran Out of Food in the Last Year:    Transportation Needs:      Lack of Transportation (Medical):      Lack of Transportation (Non-Medical):    Physical Activity:      Days of Exercise per Week:      Minutes of Exercise per Session:    Stress:      Feeling of Stress :    Social Connections:      Frequency of Communication with Friends and Family:      Frequency of Social Gatherings with Friends and Family:      Attends Mandaen Services:      Active Member of Clubs or Organizations:      Attends Club or Organization Meetings:      Marital Status:    Intimate Partner Violence:      Fear of Current or Ex-Partner:      Emotionally Abused:      Physically Abused:      Sexually Abused:        VITALS:  BP (!) 154/70   Pulse 67   Resp 13   SpO2 98%     PHYSICAL EXAM    Physical Exam  Constitutional:       General: She is in acute distress.      Appearance: She is not diaphoretic.   HENT:      Head: Atraumatic.      Mouth/Throat:      Pharynx: No oropharyngeal exudate.   Eyes:      General: No scleral icterus.     Pupils: Pupils are equal, round, and reactive to light.   Cardiovascular:      Rate and Rhythm: Normal rate and regular rhythm.      Heart sounds: Normal heart sounds.   Pulmonary:      Effort: No respiratory distress.      Breath sounds: Rhonchi and rales present.   Abdominal:      Palpations: Abdomen is soft.      Tenderness: There is no abdominal tenderness. There is no guarding or rebound.   Musculoskeletal:         General: No tenderness.    Skin:     General: Skin is warm.      Findings: No rash.   Neurological:      General: No focal deficit present.      Mental Status: She is alert.      Cranial Nerves: Cranial nerves are intact. No cranial nerve deficit.      Sensory: Sensation is intact.      Motor: No weakness or pronator drift.           LAB:  All pertinent labs reviewed and interpreted.  Labs Ordered and Resulted from Time of ED Arrival Up to the Time of Departure from the ED   BASIC METABOLIC PANEL - Abnormal; Notable for the following components:       Result Value    Creatinine 1.20 (*)     Glucose 132 (*)     GFR Estimate 43 (*)     All other components within normal limits   ISTAT CREATININE POCT - Abnormal; Notable for the following components:    Creatinine POCT 1.3 (*)     GFR, ESTIMATED POCT 39 (*)     All other components within normal limits   B-TYPE NATRIURETIC PEPTIDE ([facility] ONLY) - Abnormal; Notable for the following components:     (*)     All other components within normal limits   CBC WITH PLATELETS - Normal   PARTIAL THROMBOPLASTIN TIME - Normal   INR - Normal   TROPONIN I - Normal   GLUCOSE MONITOR NURSING POCT   EXTRA RED TOP TUBE   COVID-19 VIRUS (CORONAVIRUS) BY PCR   NOTIFY   VITAL SIGNS   NEURO CHECKS   MEASURE WEIGHT   ACTIVITY   HEAD OF BED   CARDIAC CONTINUOUS MONITORING   PULSE OXIMETRY NURSING   IP DYSPHAGIA SCREEN   IV ACCESS   CALL   CALL   CALL   EXTRA TUBE    Narrative:     The following orders were created for panel order Brent Draw.  Procedure                               Abnormality         Status                     ---------                               -----------         ------                     Extra Red Top Tube[[id number]]                               Final result                 Please view results for these tests on the individual orders.       RADIOLOGY:  Reviewed all pertinent imaging. Please see official radiology report.  XR Chest Port 1 View   Final Result   IMPRESSION: Dual-lead  left-sided cardiac pacer. Mild cardiac enlargement and increased pulmonary vascularity with some interstitial edema. Findings suggest CHF or fluid overload. Patchy hazy interstitial opacities in the upper lungs more so on the right    may be due to infiltrate versus edema. Aortic calcification. No significant bony abnormalities.      CTA Head Neck with Contrast   Final Result   IMPRESSION:    HEAD CT:   1.  No acute intracranial hemorrhage.   2.  No CT evidence acute infarct. Aspect score 10.   3.  Age-related changes described above.      Dr. Tashi Lackey was notified by Dr Kevin Cooper at  1:40 AM 09/13/2021.       HEAD CTA:    1.  No intracranial arterial large vessel occlusion.   2.  Right carotid siphon mild stenosis.    3.  Left carotid siphon severe stenosis.   4.  Otherwise, no significant stenosis/occlusion.       NECK CTA:   1.  Right vertebral artery origin moderate stenosis.   2.  Left proximal subclavian artery mild-to-moderate stenosis.   3.  Otherwise, no significant stenosis/occlusion. No dissection.   4.  Multiple thyroid nodules. Recommend nonemergent thyroid ultrasound based upon ACR white paper criteria.    5.  Lung apices demonstrate septal thickening with patchy groundglass opacities and consolidations most likely due to pulmonary edema. Please correlate clinically to exclude acute infectious inflammatory pneumonitis.         Dr. Tashi Lackey was notified by Dr Kevin Cooper at  1:55 AM 09/13/2021.           EKG:    Performed at: 205  Impression: Paced rhythm.  No other abnormalities.  Unchanged from previous dated May 26, 2021  Paced rhythm ventricular to 75.  .  QTc 536    I have independently reviewed and interpreted the EKG(s) documented above.    PROCEDURES:   None    I, Tuan Pool, am serving as a scribe to document services personally performed by Dr. Tashi Lackey, based on my observation and the provider's statements to me. I, Tashi Lackey MD attest that Tuan  Yrn is acting in a scribe capacity, has observed my performance of the services and has documented them in accordance with my direction.    Tashi Lackey M.D.  Emergency Medicine  [facility] EMERGENCY DEPARTMENT  [address] [place] [id number]  [phone]  Dept: [phone]      Tashi Lackey MD  09/13/21 0511

## 2021-09-13 NOTE — PHARMACY-ADMISSION MEDICATION HISTORY
Pharmacy Note - Admission Medication History    Pertinent Provider Information: all meds had been reconciled prior to pharmacist talking to patient-no changes made     ______________________________________________________________________    Prior To Admission (PTA) med list completed and updated in EMR.       PTA Med List   Medication Sig Last Dose     aspirin 325 MG tablet [ASPIRIN 325 MG TABLET] Take 325 mg by mouth every evening.  9/12/2021 at Unknown time     atorvastatin (LIPITOR) 80 MG tablet [ATORVASTATIN (LIPITOR) 80 MG TABLET] Take 80 mg by mouth bedtime. 9/12/2021 at Unknown time     carvediloL (COREG) 25 MG tablet [CARVEDILOL (COREG) 25 MG TABLET] Take 1 tablet (25 mg total) by mouth 2 (two) times a day with meals. 9/12/2021 at Unknown time     furosemide (LASIX) 20 MG tablet [FUROSEMIDE (LASIX) 20 MG TABLET] Take 0.5 tablets (10 mg total) by mouth daily. 9/12/2021 at Unknown time     hydrALAZINE (APRESOLINE) 100 MG tablet [HYDRALAZINE (APRESOLINE) 100 MG TABLET] TAKE 1 TABLET(100 MG) BY MOUTH THREE TIMES DAILY 9/12/2021 at Unknown time     LORazepam (ATIVAN) 0.5 MG tablet [LORAZEPAM (ATIVAN) 0.5 MG TABLET] Take 1 tablet (0.5 mg total) by mouth every 6 (six) hours as needed for anxiety (or tremors). 9/12/2021 at Unknown time     losartan (COZAAR) 100 MG tablet [LOSARTAN (COZAAR) 100 MG TABLET] Take 100 mg by mouth every evening. 9/12/2021 at Unknown time     metFORMIN (GLUCOPHAGE) 500 MG tablet [METFORMIN (GLUCOPHAGE) 500 MG TABLET] Take 1 tablet (500 mg total) by mouth 2 (two) times a day with meals. At breakfast and at dinner 9/12/2021 at Unknown time     multivitamin therapeutic (THERAGRAN) tablet [MULTIVITAMIN THERAPEUTIC (THERAGRAN) TABLET] Take 1 tablet by mouth every evening.  9/12/2021 at Unknown time     omeprazole (PRILOSEC) 20 MG capsule [OMEPRAZOLE (PRILOSEC) 20 MG CAPSULE] Take 20 mg by mouth daily before breakfast.  9/12/2021 at Unknown time     prednisoLONE acetate (PRED-FORTE) 1 %  ophthalmic suspension [PREDNISOLONE ACETATE (PRED-FORTE) 1 % OPHTHALMIC SUSPENSION] Administer 1 drop to the right eye daily.  9/12/2021 at Unknown time     vit C/E/Zn/coppr/lutein/zeaxan (PRESERVISION AREDS-2 ORAL) [VIT C/E/ZN/COPPR/LUTEIN/ZEAXAN (PRESERVISION AREDS-2 ORAL)] Take 1 tablet by mouth 2 (two) times a day before meals. 9/12/2021 at Unknown time       Information source(s): Patient and Clinic records  Method of interview communication: in-person    Summary of Changes to PTA Med List  No changes    Patient was asked about OTC/herbal products specifically.  PTA med list reflects this.    In the past week, patient estimated taking medication this percent of the time:  greater than 90%.    Allergies were reviewed, assessed, and updated with the patient.      Patient did not bring any medications to the hospital and can't retrieve from home. No multi-dose medications are available for use during hospital stay.     The information provided in this note is only as accurate as the sources available at the time of the update(s).    Thank you for the opportunity to participate in the care of this patient.    Una Gavin RPH  9/13/2021 10:51 AM

## 2021-09-13 NOTE — PLAN OF CARE
Problem: Adult Inpatient Plan of Care  Goal: Readiness for Transition of Care  Outcome: Improving     Pt transferred to .  Settled into bed, bed alarm on and call light within reach.       Problem: Adult Inpatient Plan of Care  Goal: Absence of Hospital-Acquired Illness or Injury  Outcome: Improving  Intervention: Identify and Manage Fall Risk  Recent Flowsheet Documentation  Taken 9/13/2021 1349 by Gloria Lama, RN  Safety Promotion/Fall Prevention: bed alarm on     Up with SBA.  Pt prefers PureWick in place while in bed due to baseline incontinence.       Problem: Respiratory Compromise (Heart Failure)  Goal: Effective Oxygenation and Ventilation  Outcome: Improving   O2 via NC 2L.  95%,

## 2021-09-13 NOTE — PROGRESS NOTES
Report given to: P1 charge nurse    Time of transfer:1300    Transferred to: room 113    Belongings sent:Yes    Family updated:Yes    Reviewed pertinent information from EPIC (EMAR/Clinical Summary/Flowsheets):Yes    Head-to-toe assessment with receiving RN:No    Recommendations Just had pacemaker remotely interrogated by SWAT nurse. Has 1400 medication. Dr Mckeon has been paged x2 for diet order.

## 2021-09-13 NOTE — PLAN OF CARE
Problem: Fluid Imbalance (Heart Failure)  Goal: Fluid Balance  Outcome: Improving     Problem: Risk for Delirium  Goal: Optimal Coping  Outcome: Improving   Voided 2050 ml shift total after am IV lasix. No complaints of pain or problem. Passed bedside speech evaluation and Dr Mckeon text paged for diet order. Pacemaker interrogated by SWAT nurse as per order Dr Palomares.

## 2021-09-13 NOTE — PROGRESS NOTES
Brief intensivist note  9/13/2021     82 yo woman w/ reported HTN, diastolic HF, CKD-3, admitted today w/ hypertensive emergency. She was treated w/ NTG & furosemide drips for preload reduction, quickly weaned off BiPAP. She was stepped down to [facility] this morning before multidisciplinary rounds. ICU service will sign off -- please, call if clinical situation deteriorates & I would be happy to reassess.     Saima Bragg MD  Pulmonary and Critical Care

## 2021-09-13 NOTE — CONSULTS
CLINICAL NUTRITION SERVICES - EDUCATION NOTE    Received diet education consult for low sodium diet ed     NUTRITION HISTORY:  Met pt and pt's daughter.  Pt has been following low sodium diet since March. Pt's daughter reports pt doing well on this.  They are both reading labels when shopping.    ?  ?  NUTRITION DIAGNOSIS:  none    INTERVENTIONS:  Pt declined need for further diet ed.  Pt has written materials on the low sodium diet at home.    Goals:  Continue low sodium diet at discharge    Follow Up:  Patient to ask any further nutrition-related questions before discharge. Further diet education available in the [facility] cardiac rehab program.

## 2021-09-13 NOTE — PROVIDER NOTIFICATION
Dr Roderick mattson. Pt had 6 beat run of V-tach with 's. Pt states that she used to feel when this happened but no longer does.  In bed and asymptomatic during this. Report give to oncoming CASTILLO Roca.

## 2021-09-13 NOTE — ED NOTES
"Patient returns from CT to exam room.States developed feelings of being \"shaky all over\" and slight headache.EMS noted hypertension and low O2 sats.At this time states headache resolved.States had \"fluttering in chest\" at home and took Lorazepam without improvement.O2 sats 83-85% on RA when O2 removed to undress patient.Placed back on 5L/NC with O2 sats increased to 91%. No noted edema to legs.Has not noticed shortness of breath prior to tonight.  "

## 2021-09-13 NOTE — CONSULTS
PULMONARY / CRITICAL CARE CONSULT NOTE    Date / Time of Admission:  9/13/2021  1:18 AM    Assessment:     Sharyn Trent is a 81 year old female with history of HTN, HFpEF, s/p pacemaker, CKD stage 3, DM, GERD. Patient presents to ED for evaluation of headaches and blurry vision.   Patient was in mild general distress, hypertensive, hypoxic.No focal deficits on exam.   Head CT scan showed no acute intracranial pathology.  Labs showed unchanged mild elevated BUN/Cr, mild increase BNP, negative troponin.   CXR showed increase interstitial markings.   Patient was started on nitroglycerin drip. Lasix drip and BiPAP therapy.  Patient was admitted to ICU for further evaluation.     1. Hypertensive urgency   2. Acute respiratory failure  3. Abnormal CXR  Increase interstitial markings are likely related to pulmonary edema.  Denies fever, chills, unchanged mild chronic dry cough. Infection is lower in the differential.    Check CR, procalcitonin.   Continue IV diuresis. Repeat CXR.   4. Decompensated cardiomyopathy   Serial cardiac enzymes, follow up echocardiogram  5. HTN, HFpEF, s/p pacemaker  6. CKD stage 3  7. DM  8. GERD    Advance Directives: Full code    Plan:   1. Titrate FiO2, keep SpO2 > 92%  2. BiPAP as needed  3. Change lasix drip to intermittent lasix dose  4. Wean off nitroglycerin drip  5. Titrate BP meds  6. Serial cardiac enzymes  7. Follow up echocardiogram   8. Follow up electrolytes while on high dose diuresis   9. Check CRP, procalcitonin, get u/a and urine culture  10. Follow up CXR after adequate diuresis  11. Diet per speech evaluation  12. PPI for GI prophylaxis   13. Glucose level monitoring   14. DVT prophylaxis lovenox SQ    Please contact me if you have any questions.  Total critical care time, not including separately billable procedure time: 45 minutes  This patient had a high probability of imminent or life threatening deterioration due to acute respiratory failure which required my direct  attention, intervention and personal management.     Moses Vee  Pulmonary / Critical Care  09/13/2021  5:41 AM          ICU DAILY CHECKLIST                           Can patient transfer out of MICU? no    FAST HUG:    Feeding:  Feeding: yes.  Patient is receiving ORAL    Galvez: no  Analgesia/Sedation:no    Thromboembolic prophylaxis: Yes; Mode:  Lovenox and SCDs  HOB>30:  Yes  Stress Ulcer Protocol Active: Yes; Mode: PPI  Glycemic Control: Any glucose > 180 no; Mode of Insulin Therapy: Sliding Scale Insulin    INTUBATED:  Can patient have daily waking:  No  Can patient have spontaneous breathing trial:  No    Restraints? 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 Patient presents to ED for evaluation of headaches and blurry vision.  Report that all her body was shaking, denies fever, chills or night sweats, reports mild dry cough, which is chronic. Denies chest pain. Uses one pillow to sleep, chronic mild swelling of Left LE.   Upon ED evaluation, patient was in mild general distress, hypertensive, hypoxic. No focal deficits on exam.   Head CT scan showed no acute intracranial pathology.  Labs showed unchanged mild elevated BUN/Cr, mild increase BNP, negative troponin.   CXR showed increase interstitial markings.   Patient was started on nitroglycerin drip. Lasix IV and BiPAP therapy.  Patient was admitted to ICU for further evaluation.     Past Medical History:   Diagnosis Date     Abnormal ECG      Anxiety      Arthritis      Chest pain      Chest pain      Chronic kidney disease     stage 3-mod.     Diabetes (H)      Dyslipidemia, goal LDL below 70      GERD (gastroesophageal reflux disease)      HTN (hypertension)      Hyperlipidemia       Melanoma of ankle (H)     L ankle     Other second degree atrioventricular block     Created by Conversion      Pacemaker      PSVT (paroxysmal supraventricular tachycardia) (H)      Right bundle branch block      Skin cancer      Allergies: Venom-honey bee [bee venom], Macrobid [nitrofurantoin], Mercurial analogues [mercurial derivatives], and Sulfa (sulfonamide antibiotics) [sulfa drugs]     MEDS:  Current Facility-Administered Medications   Medication     - MEDICATION INSTRUCTIONS -     acetaminophen (TYLENOL) tablet 650 mg    Or     acetaminophen (TYLENOL) Suppository 650 mg     aspirin (ASA) tablet 325 mg     atorvastatin (LIPITOR) tablet 80 mg     carvedilol (COREG) tablet 25 mg     Continuing ACE inhibitor/ARB/ARNI from home medication list OR ACE inhibitor/ARB/ARNI order already placed during this visit     Continuing beta blocker from home medication list OR beta blocker order already placed during this visit     glucose gel 15-30 g    Or     dextrose 50 % injection 25-50 mL    Or     glucagon injection 1 mg     enoxaparin ANTICOAGULANT (LOVENOX) injection 40 mg     furosemide (LASIX) injection 40 mg     HOLD: nitroGLYcerin IF     hydrALAZINE (APRESOLINE) injection 10-20 mg     hydrALAZINE (APRESOLINE) tablet 100 mg     insulin aspart (NovoLOG) injection (RAPID ACTING)     insulin aspart (NovoLOG) injection (RAPID ACTING)     labetalol (NORMODYNE/TRANDATE) injection 10-20 mg     lidocaine (LMX4) cream     lidocaine 1 % 0.1-1 mL     losartan (COZAAR) tablet 100 mg     melatonin tablet 1 mg     naloxone (NARCAN) injection 0.2 mg    Or     naloxone (NARCAN) injection 0.4 mg    Or     naloxone (NARCAN) injection 0.2 mg    Or     naloxone (NARCAN) injection 0.4 mg     nitroGLYcerin 50 mg in D5W 250 mL (adult std) infusion CENTRAL     ondansetron (ZOFRAN-ODT) ODT tab 4 mg    Or     ondansetron (ZOFRAN) injection 4 mg     oxyCODONE IR (ROXICODONE) half-tab 2.5 mg     pantoprazole (PROTONIX) EC tablet 40 mg      prednisoLONE acetate (PRED FORTE) 1 % ophthalmic susp 1 drop     senna-docusate (SENOKOT-S/PERICOLACE) 8.6-50 MG per tablet 1 tablet    Or     senna-docusate (SENOKOT-S/PERICOLACE) 8.6-50 MG per tablet 2 tablet     sodium chloride (PF) 0.9% PF flush 3 mL     sodium chloride (PF) 0.9% PF flush 3 mL       Prior to Admission medications    Medication Sig Last Dose Taking? Auth Provider   aspirin 325 MG tablet [ASPIRIN 325 MG TABLET] Take 325 mg by mouth every evening.    Provider, Historical   atorvastatin (LIPITOR) 80 MG tablet [ATORVASTATIN (LIPITOR) 80 MG TABLET] Take 80 mg by mouth bedtime.   Provider, Historical   carvediloL (COREG) 25 MG tablet [CARVEDILOL (COREG) 25 MG TABLET] Take 1 tablet (25 mg total) by mouth 2 (two) times a day with meals.   Heidy Akins MD   furosemide (LASIX) 20 MG tablet [FUROSEMIDE (LASIX) 20 MG TABLET] Take 0.5 tablets (10 mg total) by mouth daily.   Heidy Akins MD   hydrALAZINE (APRESOLINE) 100 MG tablet [HYDRALAZINE (APRESOLINE) 100 MG TABLET] TAKE 1 TABLET(100 MG) BY MOUTH THREE TIMES DAILY   Yony Gillis MD   LORazepam (ATIVAN) 0.5 MG tablet [LORAZEPAM (ATIVAN) 0.5 MG TABLET] Take 1 tablet (0.5 mg total) by mouth every 6 (six) hours as needed for anxiety (or tremors).   Aniceto Mclean MD   losartan (COZAAR) 100 MG tablet [LOSARTAN (COZAAR) 100 MG TABLET] Take 100 mg by mouth every evening.   Provider, Historical   metFORMIN (GLUCOPHAGE) 500 MG tablet [METFORMIN (GLUCOPHAGE) 500 MG TABLET] Take 1 tablet (500 mg total) by mouth 2 (two) times a day with meals. At breakfast and at dinner   Igor Sen MD   multivitamin therapeutic (THERAGRAN) tablet [MULTIVITAMIN THERAPEUTIC (THERAGRAN) TABLET] Take 1 tablet by mouth every evening.    Provider, Historical   omeprazole (PRILOSEC) 20 MG capsule [OMEPRAZOLE (PRILOSEC) 20 MG CAPSULE] Take 20 mg by mouth daily before breakfast.    Provider, Historical   prednisoLONE acetate (PRED-FORTE) 1 % ophthalmic  suspension [PREDNISOLONE ACETATE (PRED-FORTE) 1 % OPHTHALMIC SUSPENSION] Administer 1 drop to the right eye daily.    Provider, Historical   vit C/E/Zn/coppr/lutein/zeaxan (PRESERVISION AREDS-2 ORAL) [VIT C/E/ZN/COPPR/LUTEIN/ZEAXAN (PRESERVISION AREDS-2 ORAL)] Take 1 tablet by mouth 2 (two) times a day before meals.   Provider, Historical     Social History     Socioeconomic History     Marital status:      Spouse name: Not on file     Number of children: Not on file     Years of education: Not on file     Highest education level: Not on file   Occupational History     Not on file   Tobacco Use     Smoking status: Never Smoker     Smokeless tobacco: Never Used   Substance and Sexual Activity     Alcohol use: No     Drug use: No     Sexual activity: Yes     Partners: Male     Birth control/protection: Surgical   Other Topics Concern     Not on file   Social History Narrative     Not on file     Social Determinants of Health     Financial Resource Strain:      Difficulty of Paying Living Expenses:    Food Insecurity:      Worried About Running Out of Food in the Last Year:      Ran Out of Food in the Last Year:    Transportation Needs:      Lack of Transportation (Medical):      Lack of Transportation (Non-Medical):    Physical Activity:      Days of Exercise per Week:      Minutes of Exercise per Session:    Stress:      Feeling of Stress :    Social Connections:      Frequency of Communication with Friends and Family:      Frequency of Social Gatherings with Friends and Family:      Attends Yazdanism Services:      Active Member of Clubs or Organizations:      Attends Club or Organization Meetings:      Marital Status:    Intimate Partner Violence:      Fear of Current or Ex-Partner:      Emotionally Abused:      Physically Abused:      Sexually Abused:      Family History   Problem Relation Age of Onset     Hypertension Mother      Cerebrovascular Disease Mother      Prostate Cancer Father      Cancer Father       Coronary Artery Disease Father      Dyslipidemia Father      Dyslipidemia Sister      Dyslipidemia Brother      Hypertension Brother      Prostate Cancer Brother      ROS  - 12 point review of systems were discussed with patient , positive findings in HPI.     Objective:   VITALS:  /69   Pulse 64   Resp 10   SpO2 97%   VENT:  Resp: 10    EXAM:   Gen: awake, alert, mild respiratory distress  HEENT: pink conjunctiva, moist mucosa  Neck: no thyromegaly, masses or JVD  Lungs: clear  CV: regular, no murmurs or gallops appreciated  Abdomen: soft, NT, BS wnl  Ext: no edema  Neuro: CN II-XII intact, non focal       Data Review:  Recent Labs   Lab 09/13/21  0125   *       CBC RESULTS: Recent Labs   Lab Test 09/13/21 0125   WBC 6.7   RBC 4.10   HGB 12.3   HCT 37.3   MCV 91   MCH 30.0   MCHC 33.0   RDW 13.6         9/13/2021 01:25   Sodium 136   Potassium 4.4   Chloride 100   Carbon Dioxide 25   Urea Nitrogen 23   Creatinine 1.20 (H)   GFR Estimate 43 (L)   Calcium 10.0   Anion Gap 11    (H)   Troponin I <0.01       XR CHEST PORT 1 VIEW  LOCATION: [facility]  DATE/TIME: 9/13/2021 2:17 AM  INDICATION: Dyspnea.  COMPARISON: 5/7/2021.                                                     IMPRESSION: Dual-lead left-sided cardiac pacer. Mild cardiac enlargement and increased pulmonary vascularity with some interstitial edema. Findings suggest CHF or fluid overload. Patchy hazy interstitial opacities in the upper lungs more so on the right may be due to infiltrate versus edema. Aortic calcification. No significant bony abnormalities.    CTA  HEAD NECK WITH CONTRAST  LOCATION: [facility]  DATE/TIME: 9/13/2021 1:27 AM    INDICATION: Dizziness. Blurry vision.  COMPARISON: None.  CONTRAST: ISOVUE 370 75ML  FINDINGS:   Streak artifact limits aspects of exam.  NONCONTRAST HEAD CT:   INTRACRANIAL CONTENTS: No intracranial hemorrhage, extraaxial collection,  or mass effect.  No CT evidence of acute infarct. Mild presumed chronic small vessel ischemic changes. Mild to moderate generalized volume loss. No hydrocephalus.   HEAD CTA:  Right carotid siphon mild stenosis. Left carotid siphon severe stenosis.  Otherwise, no significant stenosis/occlusion. No brain aneurysm. No AVM/AVF.  Bilateral fetal origins. Balanced vertebral arteries supply a normal basilar artery.   DURAL VENOUS SINUSES: Expected enhancement of the major dural venous sinuses.  NECK CTA:  RIGHT CAROTID: No significant stenosis/occlusion. No dissection.  LEFT CAROTID: No significant stenosis/occlusion. No dissection.  VERTEBRAL ARTERIES:  Right vertebral artery origin moderate stenosis.  Remaining bilateral extradural vertebral arteries demonstrate no significant stenosis/occlusion. No dissection.    Balanced vertebral arteries.  AORTIC ARCH: Classic aortic arch anatomy. Left proximal subclavian artery mild-to-moderate stenosis.  ARTERIAL PLAQUE: Calcified/noncalcified plaque within aorta, left subclavian artery, right brachial cephalic artery, right subclavian artery, bilateral carotid bifurcations/bulbs, bilateral precavernous ICAs, bilateral carotid siphons.  NONVASCULAR STRUCTURES:   Dental amalgam resulting in streak artifact. Left chest pacer type device. Degenerative changes noted within the spine. C7 T1 grade 1 anterolisthesis. Several cervical levels demonstrate severe central canal stenosis. Multiple thyroid nodules largest measuring 2.1 cm. Recommend nonemergent thyroid ultrasound based upon ACR white paper criteria. Lung apices demonstrate septal thickening with patchy groundglass opacities and consolidations most likely due to pulmonary edema. Please correlate clinically  to exclude acute infectious inflammatory pneumonitis.  IMPRESSION:   HEAD CT:  1.  No acute intracranial hemorrhage.  2.  No CT evidence acute infarct. Aspect score 10.  3.  Age-related changes described above.       Tashi Darya was notified by Dr Kevin Cooper at  1:40 AM 09/13/2021.      HEAD CTA:   1.  No intracranial arterial large vessel occlusion.  2.  Right carotid siphon mild stenosis.   3.  Left carotid siphon severe stenosis.  4.  Otherwise, no significant stenosis/occlusion.      NECK CTA:  1.  Right vertebral artery origin moderate stenosis.  2.  Left proximal subclavian artery mild-to-moderate stenosis.  3.  Otherwise, no significant stenosis/occlusion. No dissection.  4.  Multiple thyroid nodules. Recommend nonemergent thyroid ultrasound based upon ACR white paper criteria.   5.  Lung apices demonstrate septal thickening with patchy groundglass opacities and consolidations most likely due to pulmonary edema. Please correlate clinically to exclude acute infectious inflammatory pneumonitis.  By:  Moses Vee MD, 09/13/2021  5:41 AM    Primary Care Physician:  Jamie Mcconnell

## 2021-09-13 NOTE — CONSULTS
"      Cardiology Consult Note    Thank you, Dr. Keny Vaughn, for asking the [facility] team to see Sharyn Trent in consultation at [facility] to evaluate acute pulmonary edema.      Assessment:   1.  Acute pulmonary edema secondary to acute diastolic dysfunction.  Etiology of the patient's acute decompensation is unclear she reports being compliant with medications, low-sodium diet and daily weights with no recent weight gain.  Denies any preceding chest discomfort.  Recently underwent coronary angiography demonstrating 50% mid LAD stenosis which was not flow-limiting and no other coronary artery disease.  Does report a sense of \"nervousness in the chest\" prior to the onset of symptoms which did not improve with lorazepam.  We will interrogate her pacemaker to verify no arrhythmias.  Responding well with IV diuresis.  Will slowly transition back to oral diuretic tomorrow.  2.  Hypertensive urgency, likely secondary to #1.  Will resume patient's usual outpatient medication regimen and monitor blood pressures.  3.  Moderate single-vessel coronary artery disease with recent angiogram demonstrating 50% mid LAD stenosis.  Initial troponin negative.  Awaiting follow-up troponin levels.  4.  History of complete heart block status post dual-chamber pacemaker with recent generator change out.  Again we will do remote pacer check.  Her device check in early September did demonstrate 10 mode switches consistent with paroxysmal atrial tachycardia.     Plan:   1.  Resume outpatient blood pressure regimen  2.  Continue IV diuresis today and transition back to oral Lasix tomorrow  3.  Remote device check to assess for any atrial arrhythmias as possible cause for her acute decompensation     Current History:   Ms. Sharyn Trent is a 81 year old female with history of chronic diastolic heart failure, hypertensive heart disease, history of dual-chamber pacemaker for complete heart block, stage III chronic " kidney disease who presented to the ED yesterday with complaint of acute shortness of breath.  Patient states she felt somewhat poorly after eating dinner yesterday and went to lay down in bed.  She then began to shake.  Denied any sense of rapid heart beating.  With these symptoms, she began to note some difficulty breathing and notified her  to call paramedics.  When they arrived, she was in some degree of respiratory distress.  She was brought to the ED here where laboratory studies demonstrated evidence of acute heart failure exacerbation.  She was also found to be markedly hypertensive both in route as well as in the ED.  She was given IV Lasix along with IV hydralazine followed by her usual oral medications with improvement in blood pressure.  She was subsequently admitted to the intensive care unit for further treatment.  Tells me she has been compliant with her medications and has not missed any doses.  Denies any recent weight gain or dietary indiscretion.  No recent symptoms of orthopnea, PND or lower extremity edema.    Past Medical History:     Past Medical History:   Diagnosis Date     Abnormal ECG      Anxiety      Arthritis      Chest pain      Chest pain      Chronic kidney disease     stage 3-mod.     Diabetes (H)      Dyslipidemia, goal LDL below 70      GERD (gastroesophageal reflux disease)      HTN (hypertension)      Hyperlipidemia      Melanoma of ankle (H)     L ankle     Other second degree atrioventricular block     Created by Conversion      Pacemaker      PSVT (paroxysmal supraventricular tachycardia) (H)      Right bundle branch block      Skin cancer        Past Surgical History:     Past Surgical History:   Procedure Laterality Date     ABLATION OF DYSRHYTHMIC FOCUS  04/11/2013    AV analia reentry tachycardia, partially successful     BIOPSY SKIN (LOCATION)       CARDIAC CATHETERIZATION  03/10/2016     CV CORONARY ANGIOGRAM N/A 5/26/2021    Procedure: Coronary Angiogram;   Surgeon: Yony Gillis MD;  Location: [facility] Cardiac Cath Lab;  Service: Cardiology     CV LEFT HEART CATHETERIZATION WITHOUT LEFT VENTRICULOGRAM Left 5/26/2021    Procedure: Left Heart Catheterization Without Left Ventriculogram;  Surgeon: Yony Gillis MD;  Location: [facility] Cardiac Cath Lab;  Service: Cardiology     CV RIGHT HEART CATHETERIZATION N/A 5/26/2021    Procedure: Right Heart Catheterization;  Surgeon: Yony Gillis MD;  Location: [facility] Cardiac Cath Lab;  Service: Cardiology     EP PACEMAKER N/A 8/30/2021    Procedure: Electrophysiology Pacemaker;  Surgeon: Ab Saavedra MD;  Location: [facility] CV     FOOT SURGERY      L foot     HAND SURGERY      on both thumbs     IMPLANT PACEMAKER  04/2011    Second-degree AV block     OTHER SURGICAL HISTORY      SVT Ablation     MT SHLDR ARTHROSCOP,SURG,W/ROTAT CUFF REPR Left 3/9/2017    Procedure: LEFT SHOULDER ARTHROSCOPY DECOMPRESSION DISTAL CLAVICLE EXCISION  ROTATOR CUFF REPAIR BICEPS TENOTOMY AND EXTENSIVE DEBRIDEMENT;  Surgeon: Todd Riggs MD;  Location: [facility] Main OR;  Service: Orthopedics     RELEASE CARPAL TUNNEL      both wrists     SKIN CANCER EXCISION      L ankle,Lleg and cheek     TOE SURGERY      L big toe joint replacement     TUBAL LIGATION         Family History:     Family History   Problem Relation Age of Onset     Hypertension Mother      Cerebrovascular Disease Mother      Prostate Cancer Father      Cancer Father      Coronary Artery Disease Father      Dyslipidemia Father      Dyslipidemia Sister      Dyslipidemia Brother      Hypertension Brother      Prostate Cancer Brother        Social History:    reports that she has never smoked. She has never used smokeless tobacco. She reports that she does not drink alcohol and does not use drugs.    Meds:     Current Facility-Administered Medications:      - MEDICATION INSTRUCTIONS -, , Does not apply, DOES NOT GO TO Roderick FERRO Paul D,  MD     acetaminophen (TYLENOL) tablet 650 mg, 650 mg, Oral, Q6H PRN **OR** acetaminophen (TYLENOL) Suppository 650 mg, 650 mg, Rectal, Q6H PRN, Keny Vaughn MD     aspirin (ASA) tablet 325 mg, 325 mg, Oral, QPM, Keny Vaughn MD     atorvastatin (LIPITOR) tablet 80 mg, 80 mg, Oral, At Bedtime, Keny Vaughn MD     carvedilol (COREG) tablet 25 mg, 25 mg, Oral, BID w/meals, Keny Vaughn MD, 25 mg at 09/13/21 0827     Continuing ACE inhibitor/ARB/ARNI from home medication list OR ACE inhibitor/ARB/ARNI order already placed during this visit, , Does not apply, DOES NOT GO TO Roderick FERRO Paul D, MD     Continuing beta blocker from home medication list OR beta blocker order already placed during this visit, , Does not apply, DOES NOT GO TO Roderick FERRO Paul D, MD     glucose gel 15-30 g, 15-30 g, Oral, Q15 Min PRN **OR** dextrose 50 % injection 25-50 mL, 25-50 mL, Intravenous, Q15 Min PRN **OR** glucagon injection 1 mg, 1 mg, Subcutaneous, Q15 Min PRN, Keny Vaughn MD     enoxaparin ANTICOAGULANT (LOVENOX) injection 40 mg, 40 mg, Subcutaneous, Q24H, Keny Vaughn MD, 40 mg at 09/13/21 0827     furosemide (LASIX) injection 40 mg, 40 mg, Intravenous, Q12H, Moses Crockett MD, 40 mg at 09/13/21 0655     HOLD: nitroGLYcerin IF, , Does not apply, HOLD, Keny Vaughn MD     hydrALAZINE (APRESOLINE) injection 10-20 mg, 10-20 mg, Intravenous, Q6H PRN, Moses Crockett MD     hydrALAZINE (APRESOLINE) tablet 100 mg, 100 mg, Oral, TID, Keny Vaughn MD, 100 mg at 09/13/21 0636     insulin aspart (NovoLOG) injection (RAPID ACTING), 1-7 Units, Subcutaneous, TID AC, Keny Vaughn MD     insulin aspart (NovoLOG) injection (RAPID ACTING), 1-5 Units, Subcutaneous, At Bedtime, Keny Vaughn MD     insulin aspart (NovoLOG) injection (RAPID ACTING), 1-10 Units, Subcutaneous, TID AC, Moses Crockett MD     insulin aspart (NovoLOG) injection (RAPID ACTING), 1-7 Units, Subcutaneous, At  Bedtime, Moses Crockett MD     labetalol (NORMODYNE/TRANDATE) injection 10-20 mg, 10-20 mg, Intravenous, Q6H PRN, Moses Crockett MD     lidocaine (LMX4) cream, , Topical, Q1H PRN, Keny Vaughn MD     lidocaine 1 % 0.1-1 mL, 0.1-1 mL, Other, Q1H PRN, Keny Vaughn MD     losartan (COZAAR) tablet 100 mg, 100 mg, Oral, QPM, Keny Vaughn MD     melatonin tablet 1 mg, 1 mg, Oral, At Bedtime PRN, Keny Vaughn MD     naloxone (NARCAN) injection 0.2 mg, 0.2 mg, Intravenous, Q2 Min PRN **OR** naloxone (NARCAN) injection 0.4 mg, 0.4 mg, Intravenous, Q2 Min PRN **OR** naloxone (NARCAN) injection 0.2 mg, 0.2 mg, Intramuscular, Q2 Min PRN **OR** naloxone (NARCAN) injection 0.4 mg, 0.4 mg, Intramuscular, Q2 Min PRN, Keny Vaughn MD     ondansetron (ZOFRAN-ODT) ODT tab 4 mg, 4 mg, Oral, Q6H PRN **OR** ondansetron (ZOFRAN) injection 4 mg, 4 mg, Intravenous, Q6H PRN, Keny Vaughn MD     oxyCODONE IR (ROXICODONE) half-tab 2.5 mg, 2.5 mg, Oral, Q4H PRN, Keny Vaughn MD     pantoprazole (PROTONIX) EC tablet 40 mg, 40 mg, Oral, QAM AC, Keny Vaughn MD, 40 mg at 09/13/21 0827     prednisoLONE acetate (PRED FORTE) 1 % ophthalmic susp 1 drop, 1 drop, Right Eye, Daily, Keny Vaughn MD, 1 drop at 09/13/21 0827     senna-docusate (SENOKOT-S/PERICOLACE) 8.6-50 MG per tablet 1 tablet, 1 tablet, Oral, BID PRN **OR** senna-docusate (SENOKOT-S/PERICOLACE) 8.6-50 MG per tablet 2 tablet, 2 tablet, Oral, BID PRN, Keny Vaughn MD     sodium chloride (PF) 0.9% PF flush 3 mL, 3 mL, Intracatheter, Q8H, Keny Vaughn MD, 3 mL at 09/13/21 0638     sodium chloride (PF) 0.9% PF flush 3 mL, 3 mL, Intracatheter, q1 min prn, Keny Vaughn MD    aspirin  325 mg Oral QPM     atorvastatin  80 mg Oral At Bedtime     carvedilol  25 mg Oral BID w/meals     enoxaparin ANTICOAGULANT  40 mg Subcutaneous Q24H     furosemide  40 mg Intravenous Q12H     hydrALAZINE  100 mg Oral TID     insulin aspart  1-7 Units  "Subcutaneous TID AC     insulin aspart  1-5 Units Subcutaneous At Bedtime     insulin aspart  1-10 Units Subcutaneous TID AC     insulin aspart  1-7 Units Subcutaneous At Bedtime     losartan  100 mg Oral QPM     pantoprazole  40 mg Oral QAM AC     prednisoLONE acetate  1 drop Right Eye Daily     sodium chloride (PF)  3 mL Intracatheter Q8H       Allergies:   Venom-honey bee [bee venom], Macrobid [nitrofurantoin], Mercurial analogues [mercurial derivatives], and Sulfa (sulfonamide antibiotics) [sulfa drugs]    Review of Systems:   A 12 point comprehensive review of systems was negative except as noted in the current history.    Objective:      Physical Exam  [unfilled]  [unfilled]  Body mass index is 28.1 kg/m .  /51   Pulse 66   Temp 98.4  F (36.9  C) (Oral)   Resp 15   Ht 1.549 m (5' 1\")   Wt 67.4 kg (148 lb 11.2 oz)   SpO2 96%   BMI 28.10 kg/m      General Appearance:    Well developed, well-nourished elderly female appears in no acute distress   HEENT:   Normocephalic, atraumatic.  Sclera nonicteric.  Mucous membranes moist.   Neck:  No jugular venous distention or carotid bruits   Chest:  Symmetric with normal AP diameter   Lungs:    Clear to auscultation and percussion bilaterally.   Cardiovascular:    Regular rate and rhythm.  S1, S2 normal.  No murmur or gallop heard   Abdomen:   Obese, soft, nondistended, nontender.  No organomegaly or mass.   Extremities:  No peripheral edema, clubbing or cyanosis   Skin:  No rash or lesions   Neurologic:  Alert and oriented x3.  No gross focal deficits.             Cardiographics (personally reviewed)  ECG demonstrates atrial sensed ventricular paced rhythm with occasional PVCs.    Imaging (personally reviewed)  Chest x-ray demonstrates increased pulmonary vasculature, cardiomegaly    Lab Review (personally reviewed all results)  Lab Results   Component Value Date     09/13/2021    CO2 25 09/13/2021    BUN 23 09/13/2021     Lab Results   Component " Value Date    WBC 6.7 09/13/2021    HGB 12.3 09/13/2021    HCT 37.3 09/13/2021    MCV 91 09/13/2021     09/13/2021     Lab Results   Component Value Date    CHOL 120 04/13/2021    TRIG 96 04/13/2021    HDL 38 04/13/2021    LDL 63 04/13/2021     No components found for: TROPININL  BNP   Date Value Ref Range Status   09/13/2021 255 (H) 0 - 155 pg/mL Final   05/06/2021 148 0 - 151 pg/mL Final

## 2021-09-13 NOTE — PROGRESS NOTES
09/13/21 1200   General Information   Onset of Illness/Injury or Date of Surgery 09/13/21   Referring Physician Keny Vaughn   Patient/Family Therapy Goal Statement (SLP) is hungry and wants to eat   Pertinent History of Current Problem acute on chronic CHF   Past History of Dysphagia none   Type of Evaluation   Type of Evaluation Swallow Evaluation   Oral Motor   Oral Musculature generally intact   Structural Abnormalities none present   Dentition (Oral Motor)   Dentition (Oral Motor) natural dentition;adequate dentition   Facial Symmetry (Oral Motor)   Facial Symmetry (Oral Motor) WNL   Lip Function (Oral Motor)   Lip Range of Motion (Oral Motor) WNL   Lip Strength (Oral Motor) WNL   Lip Sensitivity (Oral Motor) intact   Tongue Function (Oral Motor)   Tongue ROM (Oral Motor) WNL   Tongue Strength (Oral Motor) WNL   Tongue Coordination/Speed (Oral Motor) WNL   Jaw Function (Oral Motor)   Jaw Function (Oral Motor) WNL   Vocal Quality/Secretion Management (Oral Motor)   Vocal Quality (Oral Motor) hoarse;other (see comments)  (mildly hoarse, dry mouth and throat due to O2 and No PO)   Secretion Management (Oral Motor) WNL   General Swallowing Observations   Current Diet/Method of Nutritional Intake (General Swallowing Observations, NIS) NPO   Respiratory Support (General Swallowing Observations) nasal cannula  (2L, was weaned off BiPAP earlier )   Swallowing Evaluation Clinical swallow evaluation   Clinical Swallow Evaluation   Feeding Assistance no assistance needed   Clinical Swallow Evaluation Textures Trialed thin liquids;pureed;solid foods   Clinical Swallow Eval: Thin Liquid Texture Trial   Mode of Presentation, Thin Liquids straw   Volume of Liquid or Food Presented 6 oz   Oral Phase of Swallow WFL   Pharyngeal Phase of Swallow intact  (dry intermittent throat clear with and without PO)   Successful Strategies Trialed During Procedure other (see comments)  (small sips)   Clinical Swallow Evaluation: Puree  Solid Texture Trial   Mode of Presentation, Puree spoon   Volume of Puree Presented 4 oz   Oral Phase, Puree WFL   Pharyngeal Phase, Puree intact   Clinical Swallow Evaluation: Solid Food Texture Trial   Mode of Presentation self-fed   Volume Presented 2 marjorie crackers   Oral Phase WFL   Pharyngeal Phase intact   Diagnostic Statement no overt s/s aspiration, occasional dry throat clear with and without PO, did not appear related to swallow   Swallowing Recommendations   Diet Consistency Recommendations thin liquids (level 0);regular diet   Supervision Level for Intake patient independent   Medication Administration Recommendations, Swallowing (SLP) per patient preference   SLP Therapy Assessment/Plan   Criteria for Skilled Therapeutic Interventions Met (SLP Eval) yes   SLP Diagnosis dysphagia   Rehab Potential (SLP Eval) good, to achieve stated therapy goals   Therapy Frequency (SLP Eval) other (see comments)   Predicted Duration of Therapy Intervention (SLP Eval) 1-2 visits   Comment, Therapy Assessment/Plan (SLP) No overt s/s aspiration. No oral dysphagia. Dry throat clear intermittently with and without intake. Patient denies feeling like anything is going down the wrong way., SATs remained stable throuthout PO intake. Will begin PO diet, due to risk of respiratory decline SLP will follow up to ensure diet tolerance and implement strategies if needed.   SLP Discharge Planning    SLP Rationale for DC Rec do not anticipate SLP needs at discharge    Total Evaluation Time   Total Evaluation Time (Minutes) 20

## 2021-09-13 NOTE — PROGRESS NOTES
Patient initiated on BiPAP with no acute events.    V60 Settings:  Mode: S/T  IPAP: 10 cmH2O  EPAP: 5 cmH2O  RR: 16  FiO2: 30%    Will continue to monitor on these settings.    Eugene Nassar RT on 9/13/2021 at 2:41 AM

## 2021-09-13 NOTE — PROGRESS NOTES
09/13/21 0930   Quick Adds   Type of Visit Initial Occupational Therapy Evaluation   Living Environment   People in home spouse   Current Living Arrangements house   Home Accessibility stairs within home  (to laundry)   Self-Care   Usual Activity Tolerance moderate   Equipment Currently Used at Home   (uses cane when going out but has not gone out much since COV)   Instrumental Activities of Daily Living (IADL)   Previous Responsibilities laundry;meal prep;medication management;housekeeping   Disability/Function   Hearing Difficulty or Deaf no   Wear Glasses or Blind yes   Concentrating, Remembering or Making Decisions Difficulty no   Difficulty Communicating no   Difficulty Eating/Swallowing no   Walking or Climbing Stairs Difficulty no   Dressing/Bathing Difficulty no   Toileting issues no   Doing Errands Independently Difficulty (such as shopping) no  (due to vision issues)   Fall history within last six months no   Change in Functional Status Since Onset of Current Illness/Injury yes   General Information   Onset of Illness/Injury or Date of Surgery 09/13/21   Patient/Family Therapy Goal Statement (OT) Return home   Additional Occupational Profile Info/Pertinent History of Current Problem low   Existing Precautions/Restrictions no known precautions/restrictions   Cognitive Status Examination   Orientation Status orientation to person, place and time   Affect/Mental Status (Cognitive) WNL   Follows Commands WNL   Pain Assessment   Patient Currently in Pain No   Posture   Posture not impaired   Range of Motion Comprehensive   General Range of Motion no range of motion deficits identified   Bed Mobility   Bed Mobility No deficits identified   Clinical Impression   Criteria for Skilled Therapeutic Interventions Met (OT) yes;meets criteria   OT Diagnosis CHF   OT Problem List-Impairments impacting ADL activity tolerance impaired;strength   Assessment of Occupational Performance 1-3 Performance Deficits    Identified Performance Deficits dressing, bathing, toileting   Planned Therapy Interventions (OT) ADL retraining;strengthening;progressive activity/exercise;transfer training   Clinical Decision Making Complexity (OT) low complexity   Therapy Frequency (OT) Daily   Predicted Duration of Therapy 6 days   Anticipated Equipment Needs Upon Discharge (OT) shower chair   Risk & Benefits of therapy have been explained evaluation/treatment results reviewed;patient   Comment-Clinical Impression Pt. fatiqued from no sleep, declined activity further than bedside, plan to progress ADL involvement for return home when medically stable    OT Discharge Planning    OT Discharge Recommendation (DC Rec) Home with assist

## 2021-09-14 ENCOUNTER — APPOINTMENT (OUTPATIENT)
Dept: CT IMAGING | Facility: HOSPITAL | Age: 81
DRG: 291 | End: 2021-09-14
Attending: FAMILY MEDICINE
Payer: COMMERCIAL

## 2021-09-14 LAB
ANION GAP SERPL CALCULATED.3IONS-SCNC: 11 MMOL/L (ref 5–18)
ATRIAL RATE - MUSE: 72 BPM
ATRIAL RATE - MUSE: 73 BPM
BUN SERPL-MCNC: 27 MG/DL (ref 8–28)
CALCIUM SERPL-MCNC: 9.4 MG/DL (ref 8.5–10.5)
CHLORIDE BLD-SCNC: 101 MMOL/L (ref 98–107)
CO2 SERPL-SCNC: 27 MMOL/L (ref 22–31)
CREAT SERPL-MCNC: 1.21 MG/DL (ref 0.6–1.1)
DIASTOLIC BLOOD PRESSURE - MUSE: NORMAL MMHG
DIASTOLIC BLOOD PRESSURE - MUSE: NORMAL MMHG
FOLATE SERPL-MCNC: 16.6 NG/ML
GFR SERPL CREATININE-BSD FRML MDRD: 42 ML/MIN/1.73M2
GLUCOSE BLD-MCNC: 124 MG/DL (ref 70–125)
GLUCOSE BLDC GLUCOMTR-MCNC: 140 MG/DL (ref 70–99)
GLUCOSE BLDC GLUCOMTR-MCNC: 142 MG/DL (ref 70–99)
GLUCOSE BLDC GLUCOMTR-MCNC: 155 MG/DL (ref 70–99)
GLUCOSE BLDC GLUCOMTR-MCNC: 162 MG/DL (ref 70–99)
GLUCOSE BLDC GLUCOMTR-MCNC: 173 MG/DL (ref 70–99)
HOLD SPECIMEN: NORMAL
INTERPRETATION ECG - MUSE: NORMAL
INTERPRETATION ECG - MUSE: NORMAL
MAGNESIUM SERPL-MCNC: 1.8 MG/DL (ref 1.8–2.6)
P AXIS - MUSE: 90 DEGREES
P AXIS - MUSE: NORMAL DEGREES
POTASSIUM BLD-SCNC: 3.6 MMOL/L (ref 3.5–5)
PR INTERVAL - MUSE: 150 MS
PR INTERVAL - MUSE: NORMAL MS
QRS DURATION - MUSE: 170 MS
QRS DURATION - MUSE: 180 MS
QT - MUSE: 480 MS
QT - MUSE: 490 MS
QTC - MUSE: 536 MS
QTC - MUSE: 539 MS
R AXIS - MUSE: -78 DEGREES
R AXIS - MUSE: -86 DEGREES
SODIUM SERPL-SCNC: 139 MMOL/L (ref 136–145)
SYSTOLIC BLOOD PRESSURE - MUSE: NORMAL MMHG
SYSTOLIC BLOOD PRESSURE - MUSE: NORMAL MMHG
T AXIS - MUSE: 82 DEGREES
T AXIS - MUSE: 84 DEGREES
VENTRICULAR RATE- MUSE: 73 BPM
VENTRICULAR RATE- MUSE: 75 BPM
VIT B12 SERPL-MCNC: 503 PG/ML (ref 213–816)

## 2021-09-14 PROCEDURE — 250N000011 HC RX IP 250 OP 636: Performed by: INTERNAL MEDICINE

## 2021-09-14 PROCEDURE — 93010 ELECTROCARDIOGRAM REPORT: CPT | Performed by: INTERNAL MEDICINE

## 2021-09-14 PROCEDURE — 72131 CT LUMBAR SPINE W/O DYE: CPT

## 2021-09-14 PROCEDURE — 36415 COLL VENOUS BLD VENIPUNCTURE: CPT | Performed by: FAMILY MEDICINE

## 2021-09-14 PROCEDURE — 250N000012 HC RX MED GY IP 250 OP 636 PS 637: Performed by: INTERNAL MEDICINE

## 2021-09-14 PROCEDURE — 93005 ELECTROCARDIOGRAM TRACING: CPT

## 2021-09-14 PROCEDURE — 93005 ELECTROCARDIOGRAM TRACING: CPT | Performed by: STUDENT IN AN ORGANIZED HEALTH CARE EDUCATION/TRAINING PROGRAM

## 2021-09-14 PROCEDURE — 83735 ASSAY OF MAGNESIUM: CPT | Performed by: FAMILY MEDICINE

## 2021-09-14 PROCEDURE — 99232 SBSQ HOSP IP/OBS MODERATE 35: CPT | Mod: 24 | Performed by: INTERNAL MEDICINE

## 2021-09-14 PROCEDURE — 250N000013 HC RX MED GY IP 250 OP 250 PS 637: Performed by: FAMILY MEDICINE

## 2021-09-14 PROCEDURE — 250N000013 HC RX MED GY IP 250 OP 250 PS 637: Performed by: INTERNAL MEDICINE

## 2021-09-14 PROCEDURE — 80048 BASIC METABOLIC PNL TOTAL CA: CPT | Performed by: FAMILY MEDICINE

## 2021-09-14 PROCEDURE — 120N000001 HC R&B MED SURG/OB

## 2021-09-14 PROCEDURE — 99232 SBSQ HOSP IP/OBS MODERATE 35: CPT | Performed by: PSYCHIATRY & NEUROLOGY

## 2021-09-14 PROCEDURE — 99233 SBSQ HOSP IP/OBS HIGH 50: CPT | Performed by: FAMILY MEDICINE

## 2021-09-14 RX ORDER — FUROSEMIDE 20 MG
20 TABLET ORAL DAILY
Status: DISCONTINUED | OUTPATIENT
Start: 2021-09-15 | End: 2021-09-16 | Stop reason: HOSPADM

## 2021-09-14 RX ORDER — HYDRALAZINE HYDROCHLORIDE 50 MG/1
100 TABLET, FILM COATED ORAL 3 TIMES DAILY
Status: DISCONTINUED | OUTPATIENT
Start: 2021-09-15 | End: 2021-09-16 | Stop reason: HOSPADM

## 2021-09-14 RX ORDER — LORAZEPAM 0.5 MG/1
0.25 TABLET ORAL EVERY 6 HOURS PRN
Status: DISCONTINUED | OUTPATIENT
Start: 2021-09-14 | End: 2021-09-16 | Stop reason: HOSPADM

## 2021-09-14 RX ADMIN — LOSARTAN POTASSIUM 100 MG: 50 TABLET, FILM COATED ORAL at 20:15

## 2021-09-14 RX ADMIN — FUROSEMIDE 40 MG: 10 INJECTION, SOLUTION INTRAMUSCULAR; INTRAVENOUS at 06:31

## 2021-09-14 RX ADMIN — ASPIRIN 325 MG: 325 TABLET, FILM COATED ORAL at 20:15

## 2021-09-14 RX ADMIN — ATORVASTATIN CALCIUM 80 MG: 40 TABLET, FILM COATED ORAL at 21:26

## 2021-09-14 RX ADMIN — PREDNISOLONE ACETATE 1 DROP: 10 SUSPENSION/ DROPS OPHTHALMIC at 08:48

## 2021-09-14 RX ADMIN — CARVEDILOL 25 MG: 12.5 TABLET, FILM COATED ORAL at 08:45

## 2021-09-14 RX ADMIN — HYDRALAZINE HYDROCHLORIDE 100 MG: 50 TABLET, FILM COATED ORAL at 08:46

## 2021-09-14 RX ADMIN — CARVEDILOL 25 MG: 12.5 TABLET, FILM COATED ORAL at 18:04

## 2021-09-14 RX ADMIN — INSULIN ASPART 1 UNITS: 100 INJECTION, SOLUTION INTRAVENOUS; SUBCUTANEOUS at 12:32

## 2021-09-14 RX ADMIN — PANTOPRAZOLE SODIUM 40 MG: 20 TABLET, DELAYED RELEASE ORAL at 06:34

## 2021-09-14 RX ADMIN — INSULIN ASPART 1 UNITS: 100 INJECTION, SOLUTION INTRAVENOUS; SUBCUTANEOUS at 17:27

## 2021-09-14 RX ADMIN — INSULIN ASPART 1 UNITS: 100 INJECTION, SOLUTION INTRAVENOUS; SUBCUTANEOUS at 08:47

## 2021-09-14 RX ADMIN — ENOXAPARIN SODIUM 40 MG: 40 INJECTION SUBCUTANEOUS at 08:45

## 2021-09-14 ASSESSMENT — MIFFLIN-ST. JEOR: SCORE: 1067.36

## 2021-09-14 NOTE — SIGNIFICANT EVENT
C/o not feeling well. Tingling in her arms and hands, vision more blurred than usual. Transferred to bed with great difficulty. bp 52/32. Rapid response called. Put in trendelenburg.    1005, stated she was feeling better. bp 101/47. Off trendelenburg. 1010 bp, 96/45. Stat ecg, done. Bg. wnl.

## 2021-09-14 NOTE — PROGRESS NOTES
Sharyn continues to c/o feeling so tired. Sat on side of bed for supper. Up to the commode. Vital signs have been wnl. Denies any more issues with lightheadedness.

## 2021-09-14 NOTE — CONSULTS
NEUROLOGY CONSULTATION NOTE     Sharyn Trent,  1940, MRN [id number] Date: 2021     [facility]   Code status:  Full Code   PCP: Jamie Mcconnell, [phone]      ASSESSMENT & PLAN   Diagnosis code: Hypertensive emergency versus rule out stroke    Headache  Carotid stenosis  Hypertensive emergency versus rule out stroke    Patient symptoms do not localize to 1 side and are not suggestive of stroke.  Head CT negative for stroke  MRI brain not possible due to pacemaker  CT angiogram shows left carotid siphon stenosis.  This would be asymptomatic based on her symptoms.  Treatment for this would be aspirin 81 mg.  Carotid ultrasound negative for any significant carotid stenosis  Vascular risk factor management per primary team  Continue statin  Slow reduction in blood pressure is appropriate per cardiology  PT/OT    Discharge:- per primary team.        Chief Complaint   Patient presents with     tier 1 stroke code        HISTORY OF PRESENT ILLNESS     We have been requested by Dr. Dominick Lackey to evaluate Sharyn Trent who is a 81 year old  female for stroke.  Is a 81-year-old female on whom neurology is been consulted to evaluate for stroke.  Patient presented to the emergency room with an episode of shaking and headache.  In the emergency room her blood pressure was significantly elevated.  She was also complaining of bilateral hand numbness.  Stroke code was called.  Head CT was negative for hemorrhage.  CT angiogram did show severe left carotid stenosis-intracranial.  She is not aware of this from before.  MRI could not be done due to pacemaker.  No thrombolytics were given.  She was found to be in heart failure and was admitted for further management.  Headache has resolved at this point.  Currently her symptoms are resolved.  She has had similar symptoms in the past and these were thought to be related to heart failure.  Cardiology is involved.  She does take aspirin at baseline.  No history  of stroke.     PAST MEDICAL & SURGICAL HISTORY     Medical History  Past Medical History:   Diagnosis Date     Abnormal ECG      Anxiety      Arthritis      Chest pain      Chest pain      Chronic kidney disease     stage 3-mod.     Diabetes (H)      Dyslipidemia, goal LDL below 70      GERD (gastroesophageal reflux disease)      HTN (hypertension)      Hyperlipidemia      Melanoma of ankle (H)     L ankle     Other second degree atrioventricular block     Created by Conversion      Pacemaker      PSVT (paroxysmal supraventricular tachycardia) (H)      Right bundle branch block      Skin cancer      Surgical History  Past Surgical History:   Procedure Laterality Date     ABLATION OF DYSRHYTHMIC FOCUS  04/11/2013    AV analia reentry tachycardia, partially successful     BIOPSY SKIN (LOCATION)       CARDIAC CATHETERIZATION  03/10/2016     CV CORONARY ANGIOGRAM N/A 5/26/2021    Procedure: Coronary Angiogram;  Surgeon: Yony Gillis MD;  Location: [facility] Cardiac Cath Lab;  Service: Cardiology     CV LEFT HEART CATHETERIZATION WITHOUT LEFT VENTRICULOGRAM Left 5/26/2021    Procedure: Left Heart Catheterization Without Left Ventriculogram;  Surgeon: Yony Gillis MD;  Location: [facility] Cardiac Cath Lab;  Service: Cardiology     CV RIGHT HEART CATHETERIZATION N/A 5/26/2021    Procedure: Right Heart Catheterization;  Surgeon: Yony Gillis MD;  Location: [facility] Cardiac Cath Lab;  Service: Cardiology     EP PACEMAKER N/A 8/30/2021    Procedure: Electrophysiology Pacemaker;  Surgeon: Ab Saavedra MD;  Location: [facility] CATH [facility] CV     FOOT SURGERY      L foot     HAND SURGERY      on both thumbs     IMPLANT PACEMAKER  04/2011    Second-degree AV block     OTHER SURGICAL HISTORY      SVT Ablation     RI SHLDR ARTHROSCOP,SURG,W/ROTAT CUFF REPR Left 3/9/2017    Procedure: LEFT SHOULDER ARTHROSCOPY DECOMPRESSION DISTAL CLAVICLE EXCISION  ROTATOR CUFF REPAIR BICEPS TENOTOMY AND EXTENSIVE  DEBRIDEMENT;  Surgeon: Todd Riggs MD;  Location: [facility] OR;  Service: Orthopedics     RELEASE CARPAL TUNNEL      both wrists     SKIN CANCER EXCISION      L ankle,Lleg and cheek     TOE SURGERY      L big toe joint replacement     TUBAL LIGATION          SOCIAL HISTORY     Social History     Tobacco Use     Smoking status: Never Smoker     Smokeless tobacco: Never Used   Substance Use Topics     Alcohol use: No     Drug use: No        FAMILY HISTORY     Reviewed, and family history includes Cancer in her father; Cerebrovascular Disease in her mother; Coronary Artery Disease in her father; Dyslipidemia in her brother, father, and sister; Hypertension in her brother and mother; Prostate Cancer in her brother and father.     ALLERGIES     Allergies   Allergen Reactions     Venom-Honey Bee [Bee Venom] Shortness Of Breath     Macrobid [Nitrofurantoin] Other (See Comments)     Burning sensation across chest and arms     Mercurial Analogues [Mercurial Derivatives] Dermatitis     blisters     Sulfa (Sulfonamide Antibiotics) [Sulfa Drugs] Rash        REVIEW OF SYSTEMS     12 system ROS was done other than the HPI this was negative.     HOME & HOSPITAL MEDICATIONS     Prior to Admission Medications  Medications Prior to Admission   Medication Sig Dispense Refill Last Dose     aspirin 325 MG tablet [ASPIRIN 325 MG TABLET] Take 325 mg by mouth every evening.    9/12/2021 at Unknown time     atorvastatin (LIPITOR) 80 MG tablet [ATORVASTATIN (LIPITOR) 80 MG TABLET] Take 80 mg by mouth bedtime.   9/12/2021 at Unknown time     carvediloL (COREG) 25 MG tablet [CARVEDILOL (COREG) 25 MG TABLET] Take 1 tablet (25 mg total) by mouth 2 (two) times a day with meals. 180 tablet 3 9/12/2021 at Unknown time     furosemide (LASIX) 20 MG tablet [FUROSEMIDE (LASIX) 20 MG TABLET] Take 0.5 tablets (10 mg total) by mouth daily. 60 tablet 3 9/12/2021 at Unknown time     hydrALAZINE (APRESOLINE) 100 MG tablet [HYDRALAZINE (APRESOLINE)  100 MG TABLET] TAKE 1 TABLET(100 MG) BY MOUTH THREE TIMES DAILY 270 tablet 1 9/12/2021 at Unknown time     LORazepam (ATIVAN) 0.5 MG tablet [LORAZEPAM (ATIVAN) 0.5 MG TABLET] Take 1 tablet (0.5 mg total) by mouth every 6 (six) hours as needed for anxiety (or tremors). 12 tablet 0 9/12/2021 at Unknown time     losartan (COZAAR) 100 MG tablet [LOSARTAN (COZAAR) 100 MG TABLET] Take 100 mg by mouth every evening.   9/12/2021 at Unknown time     metFORMIN (GLUCOPHAGE) 500 MG tablet [METFORMIN (GLUCOPHAGE) 500 MG TABLET] Take 1 tablet (500 mg total) by mouth 2 (two) times a day with meals. At breakfast and at dinner 60 tablet 0 9/12/2021 at Unknown time     multivitamin therapeutic (THERAGRAN) tablet [MULTIVITAMIN THERAPEUTIC (THERAGRAN) TABLET] Take 1 tablet by mouth every evening.    9/12/2021 at Unknown time     omeprazole (PRILOSEC) 20 MG capsule [OMEPRAZOLE (PRILOSEC) 20 MG CAPSULE] Take 20 mg by mouth daily before breakfast.    9/12/2021 at Unknown time     prednisoLONE acetate (PRED-FORTE) 1 % ophthalmic suspension [PREDNISOLONE ACETATE (PRED-FORTE) 1 % OPHTHALMIC SUSPENSION] Administer 1 drop to the right eye daily.    9/12/2021 at Unknown time     vit C/E/Zn/coppr/lutein/zeaxan (PRESERVISION AREDS-2 ORAL) [VIT C/E/ZN/COPPR/LUTEIN/ZEAXAN (PRESERVISION AREDS-2 ORAL)] Take 1 tablet by mouth 2 (two) times a day before meals.   9/12/2021 at Unknown time       Hospital Medications    aspirin  325 mg Oral QPM     atorvastatin  80 mg Oral At Bedtime     carvedilol  25 mg Oral BID w/meals     enoxaparin ANTICOAGULANT  40 mg Subcutaneous Q24H     furosemide  40 mg Intravenous Q12H     hydrALAZINE  100 mg Oral TID     insulin aspart  1-7 Units Subcutaneous TID AC     insulin aspart  1-5 Units Subcutaneous At Bedtime     losartan  100 mg Oral QPM     pantoprazole  40 mg Oral QAM AC     prednisoLONE acetate  1 drop Right Eye Daily     sodium chloride (PF)  3 mL Intracatheter Q8H        PHYSICAL EXAM     Vital signs  Temp:   "[97.4  F (36.3  C)-98.4  F (36.9  C)] 97.4  F (36.3  C)  Pulse:  [59-78] 62  Resp:  [10-34] 16  BP: (104-192)/(49-93) 136/57  SpO2:  [92 %-98 %] 94 %    PHYSICAL EXAMINATION  VITALS: /57 (BP Location: Right arm)   Pulse 62   Temp 97.4  F (36.3  C) (Oral)   Resp 16   Ht 1.549 m (5' 1\")   Wt 66.2 kg (145 lb 14.4 oz)   SpO2 94%   BMI 27.57 kg/m    GENERAL -Health appearing, No apparent distress  EYES- No scleral icterus, no eyelid droop, Pupils - see Neuro section  HEENT - Normocephalic, atraumatic, Hearing grossly intact; Oral mucosa moist and pink in color. External Ears and nose intact.   Neck - supple with no obvious lymphadenopathy or thyromegaly  PULM - Good spontaneous respiratory effort; Normal palpation of chest.   CV- Pedal pulses present with no peripheral edema/ No significant varicosities.  MSK- Gait - see Neuro section; Strength and tone- see Neuro section; Range of motion grossly intact.  PSYCH -cooperative    Neurological  Mental status - Patient is awake and oriented to self, place and time. Attention span is normal. Language is fluent and follows commands appropriately.   Cranial nerves - CN II-XII intact. Pupils are reactive and symmetric; EOMI, VFIT, NLF symmetric  Motor - There is no pronator drift. Motor exam shows 5/5 strength in all extremities.  Tone - Tone is symmetric bilaterally in upper and lower extremities.  Reflexes - Reflexes are symmetric but decreased.  Sensation - Sensory exam is grossly intact to light touch, pain.    Coordination - Finger to nose and heel to shin is without dysmetria.  Gait and station --unable to safely ambulate.  Formal gait testing cannot be done due to safety concerns from ongoing medical issues.       DIAGNOSTIC STUDIES     Pertinent Radiology   HEAD CT:  1.  No acute intracranial hemorrhage.  2.  No CT evidence acute infarct. Aspect score 10.  3.  Age-related changes described above.     Dr. Tashi Lackey was notified by Dr Kevin Cooper at  1:40 " AM 09/13/2021.      HEAD CTA:   1.  No intracranial arterial large vessel occlusion.  2.  Right carotid siphon mild stenosis.   3.  Left carotid siphon severe stenosis.  4.  Otherwise, no significant stenosis/occlusion.      NECK CTA:  1.  Right vertebral artery origin moderate stenosis.  2.  Left proximal subclavian artery mild-to-moderate stenosis.  3.  Otherwise, no significant stenosis/occlusion. No dissection.  4.  Multiple thyroid nodules. Recommend nonemergent thyroid ultrasound based upon ACR white paper criteria.   5.  Lung apices demonstrate septal thickening with patchy groundglass opacities and consolidations most likely due to pulmonary edema. Please correlate clinically to exclude acute infectious inflammatory pneumonitis.    Carotid US  IMPRESSION:  1.  Mild plaque formation, velocities consistent with less than 50% stenosis in the right internal carotid artery.  2.  Mild plaque formation, velocities consistent with less than 50% stenosis in the left internal carotid artery.  3.  Flow within the vertebral arteries is antegrade.  4.  Bilateral thyroid nodules, recommend further evaluation with dedicated thyroid ultrasound.  Recent Results (from the past 24 hour(s))   Creatinine POCT    Collection Time: 09/13/21  1:22 AM   Result Value Ref Range    Creatinine POCT 1.3 (H) 0.6 - 1.1 mg/dL    GFR, ESTIMATED POCT 39 (L) >60 mL/min/1.73m2   Partial thromboplastin time    Collection Time: 09/13/21  1:24 AM   Result Value Ref Range    aPTT 33 22 - 38 Seconds   INR    Collection Time: 09/13/21  1:24 AM   Result Value Ref Range    INR 1.05 0.90 - 1.15   Extra Red Top Tube    Collection Time: 09/13/21  1:24 AM   Result Value Ref Range    Hold Specimen [facility]    CBC with platelets    Collection Time: 09/13/21  1:25 AM   Result Value Ref Range    WBC Count 6.7 4.0 - 11.0 10e3/uL    RBC Count 4.10 3.80 - 5.20 10e6/uL    Hemoglobin 12.3 11.7 - 15.7 g/dL    Hematocrit 37.3 35.0 - 47.0 %    MCV 91 78 - 100 fL    MCH 30.0  26.5 - 33.0 pg    MCHC 33.0 31.5 - 36.5 g/dL    RDW 13.6 10.0 - 15.0 %    Platelet Count 227 150 - 450 10e3/uL   Basic metabolic panel    Collection Time: 09/13/21  1:25 AM   Result Value Ref Range    Sodium 136 136 - 145 mmol/L    Potassium 4.4 3.5 - 5.0 mmol/L    Chloride 100 98 - 107 mmol/L    Carbon Dioxide (CO2) 25 22 - 31 mmol/L    Anion Gap 11 5 - 18 mmol/L    Urea Nitrogen 23 8 - 28 mg/dL    Creatinine 1.20 (H) 0.60 - 1.10 mg/dL    Calcium 10.0 8.5 - 10.5 mg/dL    Glucose 132 (H) 70 - 125 mg/dL    GFR Estimate 43 (L) >60 mL/min/1.73m2   Troponin I    Collection Time: 09/13/21  1:25 AM   Result Value Ref Range    Troponin I <0.01 0.00 - 0.29 ng/mL   B-Type Natriuretic Peptide ([facility] Only)    Collection Time: 09/13/21  1:25 AM   Result Value Ref Range     (H) 0 - 155 pg/mL   CRP inflammation    Collection Time: 09/13/21  1:25 AM   Result Value Ref Range    CRP <0.1 0.0-<0.8 mg/dL   Asymptomatic COVID-19 Virus (Coronavirus) by PCR Nasopharyngeal    Collection Time: 09/13/21  5:17 AM    Specimen: Nasopharyngeal; Swab   Result Value Ref Range    SARS CoV2 PCR Negative Negative   Glucose by meter    Collection Time: 09/13/21  7:55 AM   Result Value Ref Range    GLUCOSE BY METER POCT 127 (H) 70 - 99 mg/dL   Hemoglobin A1c    Collection Time: 09/13/21 10:42 AM   Result Value Ref Range    Hemoglobin A1C 5.4 <=5.6 %   Procalcitonin    Collection Time: 09/13/21 10:42 AM   Result Value Ref Range    Procalcitonin 0.02 0.00 - 0.49 ng/mL   Troponin I    Collection Time: 09/13/21 11:02 AM   Result Value Ref Range    Troponin I 0.05 0.00 - 0.29 ng/mL   Basic metabolic panel    Collection Time: 09/13/21 11:02 AM   Result Value Ref Range    Sodium 138 136 - 145 mmol/L    Potassium 3.5 3.5 - 5.0 mmol/L    Chloride 101 98 - 107 mmol/L    Carbon Dioxide (CO2) 28 22 - 31 mmol/L    Anion Gap 9 5 - 18 mmol/L    Urea Nitrogen 21 8 - 28 mg/dL    Creatinine 1.14 (H) 0.60 - 1.10 mg/dL    Calcium 9.9 8.5 - 10.5 mg/dL    Glucose  125 70 - 125 mg/dL    GFR Estimate 46 (L) >60 mL/min/1.73m2   Magnesium    Collection Time: 09/13/21 11:02 AM   Result Value Ref Range    Magnesium 1.7 (L) 1.8 - 2.6 mg/dL   UA reflex to Microscopic and Culture    Collection Time: 09/13/21 11:03 AM    Specimen: Urine, Clean Catch   Result Value Ref Range    Color Urine Colorless Colorless, Straw, Light Yellow, Yellow    Appearance Urine Clear Clear    Glucose Urine Negative Negative mg/dL    Bilirubin Urine Negative Negative    Ketones Urine Negative Negative mg/dL    Specific Gravity Urine 1.010 1.001 - 1.030    Blood Urine Negative Negative    pH Urine 7.0 5.0 - 7.0    Protein Albumin Urine Negative Negative mg/dL    Urobilinogen Urine <2.0 <2.0 mg/dL    Nitrite Urine Negative Negative    Leukocyte Esterase Urine Negative Negative   Glucose by meter    Collection Time: 09/13/21 12:02 PM   Result Value Ref Range    GLUCOSE BY METER POCT 123 (H) 70 - 99 mg/dL   Glucose by meter    Collection Time: 09/13/21  6:45 PM   Result Value Ref Range    GLUCOSE BY METER POCT 111 (H) 70 - 99 mg/dL       Total time spent for face to face visit, reviewing labs/imaging studies, counseling and coordination of care was: Over 70 min More than 50% of this time was spent on counseling and coordination of care.    Stroke code in the ER.  Counseling patient.  Coordination of care with the ER staff.  Reviewing test results.    Reji Davila MD  Neurologist  [facility] Neurology [facility]  Tel:- [phone]

## 2021-09-14 NOTE — PROGRESS NOTES
Briefly checked in with pt. She was in conversation with a health care provider. Requested that  return at another time.    ROBBIE [place].

## 2021-09-14 NOTE — PLAN OF CARE
Problem: Respiratory Compromise (Heart Failure)  Goal: Effective Oxygenation and Ventilation  Outcome: Improving     Problem: Fluid Imbalance (Heart Failure)  Goal: Fluid Balance  Outcome: Improving   Pt received IV lasix and had 1000 ml out of urine as well as an unmeasured void in toilet. Pt has minimal edema in feet and lower legs. Lungs are clear. O2 sats >95% on 2Liters. Pt able to walk to bathroom independently with stand by assist.

## 2021-09-14 NOTE — PROGRESS NOTES
NEUROLOGY PROGRESS NOTE     Sharyn Trent,  1940, MRN [id number] Date: 2021     [facility]   Code status:  Full Code   PCP: Jamie Mcconnell, [phone]      ASSESSMENT & PLAN   Diagnosis code: Hypertensive emergency versus rule out stroke    Headache  Carotid stenosis  Hypertensive emergency versus rule out stroke    Patient symptoms do not localize to 1 side and are not suggestive of stroke.  Head CT negative for stroke  MRI brain not possible due to pacemaker  CT angiogram shows left carotid siphon stenosis.  This would be asymptomatic based on her symptoms.  Treatment for this would be aspirin 81 mg.  Carotid ultrasound negative for any significant carotid stenosis  Vascular risk factor management per primary team  Continue statin  Slow reduction in blood pressure as appropriate per cardiology  PT/OT    Discharge:- per primary team.  Will sign off.       Chief Complaint   Patient presents with     tier 1 stroke code        HISTORY OF PRESENT ILLNESS     We have been requested by Dr. Dominick Lackey to evaluate Sharyn Trent who is a 81 year old  female for stroke.  Is a 81-year-old female on whom neurology is been consulted to evaluate for stroke.  Patient presented to the emergency room with an episode of shaking and headache.  In the emergency room her blood pressure was significantly elevated.  She was also complaining of bilateral hand numbness.  Stroke code was called.  Head CT was negative for hemorrhage.  CT angiogram did show severe left carotid stenosis-intracranial.  She is not aware of this from before.  MRI could not be done due to pacemaker.  No thrombolytics were given.  She was found to be in heart failure and was admitted for further management.  Headache has resolved at this point.  Currently her symptoms are resolved.  She has had similar symptoms in the past and these were thought to be related to heart failure.  Cardiology is involved.  She does take aspirin at baseline.   No history of stroke.    9/14/21  Patient reports no ongoing numbness.  The shaking has also resolved.  Denies any other strokelike symptoms.  Tolerating medications well.  Did have an episode of hypotension later today.  This was secondary to Lasix.  This is being backed off.  No recurrence of symptoms.     PAST MEDICAL & SURGICAL HISTORY     Medical History  Past Medical History:   Diagnosis Date     Abnormal ECG      Anxiety      Arthritis      Chest pain      Chest pain      Chronic kidney disease     stage 3-mod.     Diabetes (H)      Dyslipidemia, goal LDL below 70      GERD (gastroesophageal reflux disease)      HTN (hypertension)      Hyperlipidemia      Melanoma of ankle (H)     L ankle     Other second degree atrioventricular block     Created by Conversion      Pacemaker      PSVT (paroxysmal supraventricular tachycardia) (H)      Right bundle branch block      Skin cancer      Surgical History  Past Surgical History:   Procedure Laterality Date     ABLATION OF DYSRHYTHMIC FOCUS  04/11/2013    AV analia reentry tachycardia, partially successful     BIOPSY SKIN (LOCATION)       CARDIAC CATHETERIZATION  03/10/2016     CV CORONARY ANGIOGRAM N/A 5/26/2021    Procedure: Coronary Angiogram;  Surgeon: Yony Gillis MD;  Location: [facility] Cardiac Cath Lab;  Service: Cardiology     CV LEFT HEART CATHETERIZATION WITHOUT LEFT VENTRICULOGRAM Left 5/26/2021    Procedure: Left Heart Catheterization Without Left Ventriculogram;  Surgeon: Yony Gillis MD;  Location: [facility] Cardiac Cath Lab;  Service: Cardiology     CV RIGHT HEART CATHETERIZATION N/A 5/26/2021    Procedure: Right Heart Catheterization;  Surgeon: Yony Gillis MD;  Location: [facility] Cath Lab;  Service: Cardiology     EP PACEMAKER N/A 8/30/2021    Procedure: Electrophysiology Pacemaker;  Surgeon: Ab Saavedra MD;  Location: [facility] CV     FOOT SURGERY      L foot     HAND SURGERY      on both thumbs      IMPLANT PACEMAKER  04/2011    Second-degree AV block     OTHER SURGICAL HISTORY      SVT Ablation     SC SHLDR ARTHROSCOP,SURG,W/ROTAT CUFF REPR Left 3/9/2017    Procedure: LEFT SHOULDER ARTHROSCOPY DECOMPRESSION DISTAL CLAVICLE EXCISION  ROTATOR CUFF REPAIR BICEPS TENOTOMY AND EXTENSIVE DEBRIDEMENT;  Surgeon: Todd Riggs MD;  Location: [facility];  Service: Orthopedics     RELEASE CARPAL TUNNEL      both wrists     SKIN CANCER EXCISION      L ankle,Lleg and cheek     TOE SURGERY      L big toe joint replacement     TUBAL LIGATION          SOCIAL HISTORY     Social History     Tobacco Use     Smoking status: Never Smoker     Smokeless tobacco: Never Used   Substance Use Topics     Alcohol use: No     Drug use: No        FAMILY HISTORY     Reviewed, and family history includes Cancer in her father; Cerebrovascular Disease in her mother; Coronary Artery Disease in her father; Dyslipidemia in her brother, father, and sister; Hypertension in her brother and mother; Prostate Cancer in her brother and father.     ALLERGIES     Allergies   Allergen Reactions     Venom-Honey Bee [Bee Venom] Shortness Of Breath     Macrobid [Nitrofurantoin] Other (See Comments)     Burning sensation across chest and arms     Mercurial Analogues [Mercurial Derivatives] Dermatitis     blisters     Sulfa (Sulfonamide Antibiotics) [Sulfa Drugs] Rash        REVIEW OF SYSTEMS     12 system ROS was done other than the HPI this was negative.     HOME & HOSPITAL MEDICATIONS     Prior to Admission Medications  Medications Prior to Admission   Medication Sig Dispense Refill Last Dose     aspirin 325 MG tablet [ASPIRIN 325 MG TABLET] Take 325 mg by mouth every evening.    9/12/2021 at Unknown time     atorvastatin (LIPITOR) 80 MG tablet [ATORVASTATIN (LIPITOR) 80 MG TABLET] Take 80 mg by mouth bedtime.   9/12/2021 at Unknown time     carvediloL (COREG) 25 MG tablet [CARVEDILOL (COREG) 25 MG TABLET] Take 1 tablet (25 mg total) by mouth 2  (two) times a day with meals. 180 tablet 3 9/12/2021 at Unknown time     furosemide (LASIX) 20 MG tablet [FUROSEMIDE (LASIX) 20 MG TABLET] Take 0.5 tablets (10 mg total) by mouth daily. 60 tablet 3 9/12/2021 at Unknown time     hydrALAZINE (APRESOLINE) 100 MG tablet [HYDRALAZINE (APRESOLINE) 100 MG TABLET] TAKE 1 TABLET(100 MG) BY MOUTH THREE TIMES DAILY 270 tablet 1 9/12/2021 at Unknown time     LORazepam (ATIVAN) 0.5 MG tablet [LORAZEPAM (ATIVAN) 0.5 MG TABLET] Take 1 tablet (0.5 mg total) by mouth every 6 (six) hours as needed for anxiety (or tremors). 12 tablet 0 9/12/2021 at Unknown time     losartan (COZAAR) 100 MG tablet [LOSARTAN (COZAAR) 100 MG TABLET] Take 100 mg by mouth every evening.   9/12/2021 at Unknown time     metFORMIN (GLUCOPHAGE) 500 MG tablet [METFORMIN (GLUCOPHAGE) 500 MG TABLET] Take 1 tablet (500 mg total) by mouth 2 (two) times a day with meals. At breakfast and at dinner 60 tablet 0 9/12/2021 at Unknown time     multivitamin therapeutic (THERAGRAN) tablet [MULTIVITAMIN THERAPEUTIC (THERAGRAN) TABLET] Take 1 tablet by mouth every evening.    9/12/2021 at Unknown time     omeprazole (PRILOSEC) 20 MG capsule [OMEPRAZOLE (PRILOSEC) 20 MG CAPSULE] Take 20 mg by mouth daily before breakfast.    9/12/2021 at Unknown time     prednisoLONE acetate (PRED-FORTE) 1 % ophthalmic suspension [PREDNISOLONE ACETATE (PRED-FORTE) 1 % OPHTHALMIC SUSPENSION] Administer 1 drop to the right eye daily.    9/12/2021 at Unknown time     vit C/E/Zn/coppr/lutein/zeaxan (PRESERVISION AREDS-2 ORAL) [VIT C/E/ZN/COPPR/LUTEIN/ZEAXAN (PRESERVISION AREDS-2 ORAL)] Take 1 tablet by mouth 2 (two) times a day before meals.   9/12/2021 at Unknown time       Hospital Medications    aspirin  325 mg Oral QPM     atorvastatin  80 mg Oral At Bedtime     carvedilol  25 mg Oral BID w/meals     enoxaparin ANTICOAGULANT  40 mg Subcutaneous Q24H     [START ON 9/15/2021] furosemide  20 mg Oral Daily     [START ON 9/15/2021] hydrALAZINE   "100 mg Oral TID     insulin aspart  1-7 Units Subcutaneous TID AC     insulin aspart  1-5 Units Subcutaneous At Bedtime     losartan  100 mg Oral QPM     pantoprazole  40 mg Oral QAM AC     prednisoLONE acetate  1 drop Right Eye Daily     sodium chloride (PF)  3 mL Intracatheter Q8H        PHYSICAL EXAM     Vital signs  Temp:  [97.4  F (36.3  C)-98.7  F (37.1  C)] 97.8  F (36.6  C)  Pulse:  [54-82] 54  Resp:  [16-18] 16  BP: ()/(32-74) 115/54  SpO2:  [94 %-97 %] 94 %    PHYSICAL EXAMINATION  VITALS: /54 (BP Location: Right arm)   Pulse 54   Temp 97.8  F (36.6  C) (Oral)   Resp 16   Ht 1.549 m (5' 1\")   Wt 66.2 kg (145 lb 14.4 oz)   SpO2 94%   BMI 27.57 kg/m    GENERAL -Health appearing, No apparent distress  EYES- No scleral icterus, no eyelid droop, Pupils - see Neuro section  HEENT - Normocephalic, atraumatic, Hearing grossly intact; Oral mucosa moist and pink in color. External Ears and nose intact.   Neck - supple with no obvious lymphadenopathy or thyromegaly  PULM - Good spontaneous respiratory effort; Normal palpation of chest.   CV- Pedal pulses present with no peripheral edema/ No significant varicosities.  MSK- Gait - see Neuro section; Strength and tone- see Neuro section; Range of motion grossly intact.  PSYCH -cooperative    Neurological  Mental status - Patient is awake and oriented to self, place and time. Attention span is normal. Language is fluent and follows commands appropriately.   Cranial nerves - CN II-XII intact. Pupils are reactive and symmetric; EOMI, VFIT, NLF symmetric  Motor - There is no pronator drift. Motor exam shows 5/5 strength in all extremities.  Tone - Tone is symmetric bilaterally in upper and lower extremities.  Coordination - Finger to nose and heel to shin is without dysmetria.  Gait and station --unable to safely ambulate.  Formal gait testing cannot be done due to safety concerns from ongoing medical issues.       DIAGNOSTIC STUDIES     Pertinent " Radiology   HEAD CT:  1.  No acute intracranial hemorrhage.  2.  No CT evidence acute infarct. Aspect score 10.  3.  Age-related changes described above.     Dr. Tashi Lackey was notified by Dr Kevin Cooper at  1:40 AM 09/13/2021.      HEAD CTA:   1.  No intracranial arterial large vessel occlusion.  2.  Right carotid siphon mild stenosis.   3.  Left carotid siphon severe stenosis.  4.  Otherwise, no significant stenosis/occlusion.      NECK CTA:  1.  Right vertebral artery origin moderate stenosis.  2.  Left proximal subclavian artery mild-to-moderate stenosis.  3.  Otherwise, no significant stenosis/occlusion. No dissection.  4.  Multiple thyroid nodules. Recommend nonemergent thyroid ultrasound based upon ACR white paper criteria.   5.  Lung apices demonstrate septal thickening with patchy groundglass opacities and consolidations most likely due to pulmonary edema. Please correlate clinically to exclude acute infectious inflammatory pneumonitis.    Carotid US  IMPRESSION:  1.  Mild plaque formation, velocities consistent with less than 50% stenosis in the right internal carotid artery.  2.  Mild plaque formation, velocities consistent with less than 50% stenosis in the left internal carotid artery.  3.  Flow within the vertebral arteries is antegrade.  4.  Bilateral thyroid nodules, recommend further evaluation with dedicated thyroid ultrasound.  Recent Results (from the past 24 hour(s))   Glucose by meter    Collection Time: 09/13/21  6:45 PM   Result Value Ref Range    GLUCOSE BY METER POCT 111 (H) 70 - 99 mg/dL   Glucose by meter    Collection Time: 09/13/21 10:52 PM   Result Value Ref Range    GLUCOSE BY METER POCT 126 (H) 70 - 99 mg/dL   Basic metabolic panel    Collection Time: 09/14/21  6:37 AM   Result Value Ref Range    Sodium 139 136 - 145 mmol/L    Potassium 3.6 3.5 - 5.0 mmol/L    Chloride 101 98 - 107 mmol/L    Carbon Dioxide (CO2) 27 22 - 31 mmol/L    Anion Gap 11 5 - 18 mmol/L    Urea Nitrogen 27  8 - 28 mg/dL    Creatinine 1.21 (H) 0.60 - 1.10 mg/dL    Calcium 9.4 8.5 - 10.5 mg/dL    Glucose 124 70 - 125 mg/dL    GFR Estimate 42 (L) >60 mL/min/1.73m2   Magnesium    Collection Time: 09/14/21  6:37 AM   Result Value Ref Range    Magnesium 1.8 1.8 - 2.6 mg/dL   Extra Purple Top Tube    Collection Time: 09/14/21  6:37 AM   Result Value Ref Range    Hold Specimen JIC    Glucose by meter    Collection Time: 09/14/21  8:33 AM   Result Value Ref Range    GLUCOSE BY METER POCT 155 (H) 70 - 99 mg/dL   Glucose by meter    Collection Time: 09/14/21  9:55 AM   Result Value Ref Range    GLUCOSE BY METER POCT 173 (H) 70 - 99 mg/dL   ECG 12-LEAD WITH MUSE (LHE)    Collection Time: 09/14/21  9:58 AM   Result Value Ref Range    Systolic Blood Pressure  mmHg    Diastolic Blood Pressure  mmHg    Ventricular Rate 73 BPM    Atrial Rate 73 BPM    VT Interval 150 ms    QRS Duration 170 ms     ms    QTc 539 ms    P Axis 90 degrees    R AXIS -86 degrees    T Axis 82 degrees    Interpretation ECG       Sinus rhythm  Left axis deviation  Non-specific intra-ventricular conduction block  Possible Lateral infarct , age undetermined  Inferior infarct , age undetermined  Abnormal ECG  When compared with ECG of 13-SEP-2021 02:05,  Sinus rhythm has replaced Wide QRS rhythm     Glucose by meter    Collection Time: 09/14/21 12:18 PM   Result Value Ref Range    GLUCOSE BY METER POCT 142 (H) 70 - 99 mg/dL       Total time spent for face to face visit, reviewing labs/imaging studies, counseling and coordination of care was: Over 31 min More than 50% of this time was spent on counseling and coordination of care.    Counseling patient.  Reviewing chart.  Coordination of care with the primary team/nursing staff.    Reji Davila MD  Neurologist  [facility] Neurology [facility]  Tel:- [phone]

## 2021-09-14 NOTE — PROGRESS NOTES
Care Management Follow Up    Length of Stay (days): 1    Expected Discharge Date: 09/16/2021     Concerns to be Addressed: cardio status, diuresing       Patient plan of care discussed at interdisciplinary rounds: yes    Anticipated Discharge Disposition:  home     Anticipated Discharge Services: none    Anticipated Discharge DME:  No new DME      Referrals Placed by CM/SW:  None       Additional Information: Cardiology and Neurology following for heart failure, severe headache, Hypertensive emergency.  Discharge recommendation is for return home.      Assessment History:  Patient independent at baseline and lives with spouse.     Planning return home with support from spouse. Family to transport.       Yasmine Arvizu RN

## 2021-09-14 NOTE — PLAN OF CARE
Problem: Fluid Imbalance (Heart Failure)  Goal: Fluid Balance  Outcome: Improving     Problem: Respiratory Compromise (Heart Failure)  Goal: Effective Oxygenation and Ventilation  Outcome: Improving     Problem: Risk for Delirium  Goal: Improved Sleep  Outcome: Improving     Pt slept well overnight. Alert, oriented. VSS. Oxygen sats >94% on 2L nasal cannula. Lung sounds clear. Non pitting edema BLE. Tele- NSR with BBB and prolonged QT interval. Cardiology following. Has permanent pacemaker L side- plan to have this checked for possible arrhythmias/issues- recently replaced on 8/30. Purewick in place d/t urinary frequency and urgency from IV lasix.

## 2021-09-14 NOTE — SIGNIFICANT EVENT
House officer called to rapid response for patient with hypotension. Patient admitted for hypertensive emergency and acute diastolic HF exacerbation w/ AHRF.    S:   Rapid response called for patient w/ hypotension - systolic in the 50s. Per nursing, patient was in her chair and feeling light headed and faint. In transfer to her bed, she fainted. Patient alert and oriented upon my interview. Denying chest pain or persistent dizziness/lightheadedness. Sounds like she typically takes oral hydralazine 100mg TID and coreg 25mg BID, losartan 100mg nightly. She received both this morning at her home med dosing. Only new med she received this morning is lasix. Is being aggressively diuresed for CHFE w/ lasix 40mg bid. Not able to give me her dry weight but I don't appreciate any peripheral edema and she is no longer on O2 so I suspect she is there or nearly there.     On my interview her MAP is now 68-71. EKG showing NSR. Glucose 173.      A/P:   Hypotension d/t aggressive diuresis  -Recommend backing off on lasix given she appears euvolemic  -Will hold off on giving fluids back given satisfactory MAPs for now, but will defer to the hospitalist managing her care.      I routed this note to the hospitalists.    Robin Arredondo MD - PGY2  [facility] Medicine Residency  P: [phone]

## 2021-09-14 NOTE — PLAN OF CARE
Problem: Adult Inpatient Plan of Care  Goal: Plan of Care Review  Outcome: No Change   Verbalizes understanding of plan of care. Will continue to monitor bp. And adjust medications accordingly. Need to call for any assistance needed.  Problem: Adult Inpatient Plan of Care  Goal: Absence of Hospital-Acquired Illness or Injury  Intervention: Identify and Manage Fall Risk  Recent Flowsheet Documentation  Taken 9/14/2021 0800 by Lillian Carrington RN  Safety Promotion/Fall Prevention:   chair alarm on   room door open   Called and waited for help to bathroom. Gait is weak. Fatigues easily.  Problem: Risk for Delirium  Goal: Optimal Coping  Outcome: No Change   Alert and appropriate.  Problem: Fluid Imbalance (Heart Failure)  Goal: Fluid Balance  Outcome: No Change   Lungs are clear. Voiding in good amounts

## 2021-09-14 NOTE — PROGRESS NOTES
Cardiology Progress Note    Assessment/Plan:  1.  Acute diastolic heart failure exacerbation, etiology unclear as no history of medication noncompliance, change in diet or recent weight gain.  Marked diuresis with IV furosemide with nearly 4500 cc out.  Had near syncopal episode this morning associated with orthostatic fall in blood pressure.  Diuretic currently on hold with plans to resume furosemide orally at 20 mg tomorrow.  Continue with other usual medications.  2.  Hypertensive emergency, resolved.  Again resume usual home blood pressure medications.  3.  Orthostatic hypotension, likely due to profound diuresis.  Off IV diuretics now and will continue with oral Lasix tomorrow.  4.  CKD stage III, stable    Subjective:  Patient feeling poorly this morning.  States she had an episode of near syncope after being up in the chair with drop in blood pressure to approximately 50 systolic.  Question whether it is due to significant diuresis since admission.  Feeling better today after resting.      aspirin  325 mg Oral QPM     atorvastatin  80 mg Oral At Bedtime     carvedilol  25 mg Oral BID w/meals     enoxaparin ANTICOAGULANT  40 mg Subcutaneous Q24H     [START ON 9/15/2021] furosemide  20 mg Oral Daily     [START ON 9/15/2021] hydrALAZINE  100 mg Oral TID     insulin aspart  1-7 Units Subcutaneous TID AC     insulin aspart  1-5 Units Subcutaneous At Bedtime     losartan  100 mg Oral QPM     pantoprazole  40 mg Oral QAM AC     prednisoLONE acetate  1 drop Right Eye Daily     sodium chloride (PF)  3 mL Intracatheter Q8H         Objective:   Vital signs in last 24 hours:  Temp:  [97.4  F (36.3  C)-98.7  F (37.1  C)] 98.7  F (37.1  C)  Pulse:  [54-82] 76  Resp:  [16-18] 16  BP: ()/(32-74) 122/51  SpO2:  [94 %-97 %] 96 %  Weight:        Review of Systems:  Negative    Physical Exam:  General appearance: alert, cooperative, no distress   Head: Normocephalic, without obvious abnormality, atraumatic  Neck: no  JVD   Lungs: clear to auscultation bilaterally   Heart: Regular rate and rhythm.  S1, S2 normal.  No murmur or gallop  Extremities: No peripheral edema    Cardiographics (personally reviewed):   Telemetry demonstrates sinus rhythm with brief run of atrial fibrillation.    Imaging (personally reviewed):   No new imaging    Lab Results (personally reviewed):     Recent Labs   Lab Test 04/13/21  0906   CHOL 120   HDL 38*   LDL 63   TRIG 96     Recent Labs   Lab Test 04/13/21  0906 04/28/20  0835 04/11/19  1408   LDL 63 67 77     Recent Labs   Lab Test 09/14/21  1724 09/14/21  0637   NA  --  139   POTASSIUM  --  3.6   CHLORIDE  --  101   CO2  --  27   * 124   BUN  --  27   CR  --  1.21*   GFRESTIMATED  --  42*   MAURICIO  --  9.4     Recent Labs   Lab Test 09/14/21  0637 09/13/21  1102 09/13/21  0125   CR 1.21* 1.14* 1.20*     Recent Labs   Lab Test 09/13/21  1042 03/02/21  0505 03/09/16  0552   A1C 5.4 5.9* 6.3*          Recent Labs   Lab Test 09/13/21  0125   WBC 6.7   HGB 12.3   HCT 37.3   MCV 91        Recent Labs   Lab Test 09/13/21  0125 08/30/21  0726 05/26/21  1032   HGB 12.3 10.4* 12.2    Recent Labs   Lab Test 09/13/21  1102 09/13/21  0125 05/07/21  0216   TROPONINI 0.05 <0.01 <0.01     Recent Labs   Lab Test 09/13/21  0125 05/06/21  2310 03/07/21  2120   * 148 150     Recent Labs   Lab Test 03/03/21  0428   TSH 5.48*     Recent Labs   Lab Test 09/13/21  0124 05/06/21  2311 03/19/18  2331   INR 1.05 1.05 0.94          Bethany Paolmares MD

## 2021-09-15 ENCOUNTER — APPOINTMENT (OUTPATIENT)
Dept: OCCUPATIONAL THERAPY | Facility: HOSPITAL | Age: 81
DRG: 291 | End: 2021-09-15
Payer: COMMERCIAL

## 2021-09-15 ENCOUNTER — APPOINTMENT (OUTPATIENT)
Dept: RADIOLOGY | Facility: HOSPITAL | Age: 81
DRG: 291 | End: 2021-09-15
Attending: FAMILY MEDICINE
Payer: COMMERCIAL

## 2021-09-15 LAB
ANION GAP SERPL CALCULATED.3IONS-SCNC: 10 MMOL/L (ref 5–18)
BUN SERPL-MCNC: 37 MG/DL (ref 8–28)
CALCIUM SERPL-MCNC: 9 MG/DL (ref 8.5–10.5)
CHLORIDE BLD-SCNC: 101 MMOL/L (ref 98–107)
CK SERPL-CCNC: 57 U/L (ref 30–190)
CO2 SERPL-SCNC: 27 MMOL/L (ref 22–31)
CREAT SERPL-MCNC: 1.38 MG/DL (ref 0.6–1.1)
GFR SERPL CREATININE-BSD FRML MDRD: 36 ML/MIN/1.73M2
GLUCOSE BLD-MCNC: 141 MG/DL (ref 70–125)
GLUCOSE BLDC GLUCOMTR-MCNC: 133 MG/DL (ref 70–99)
GLUCOSE BLDC GLUCOMTR-MCNC: 147 MG/DL (ref 70–99)
GLUCOSE BLDC GLUCOMTR-MCNC: 159 MG/DL (ref 70–99)
GLUCOSE BLDC GLUCOMTR-MCNC: 168 MG/DL (ref 70–99)
POTASSIUM BLD-SCNC: 3.4 MMOL/L (ref 3.5–5)
POTASSIUM BLD-SCNC: 4 MMOL/L (ref 3.5–5)
SODIUM SERPL-SCNC: 138 MMOL/L (ref 136–145)
TSH SERPL DL<=0.005 MIU/L-ACNC: 1.53 UIU/ML (ref 0.3–5)

## 2021-09-15 PROCEDURE — 99233 SBSQ HOSP IP/OBS HIGH 50: CPT | Performed by: FAMILY MEDICINE

## 2021-09-15 PROCEDURE — 84165 PROTEIN E-PHORESIS SERUM: CPT | Mod: TC | Performed by: PSYCHIATRY & NEUROLOGY

## 2021-09-15 PROCEDURE — 250N000013 HC RX MED GY IP 250 OP 250 PS 637: Performed by: FAMILY MEDICINE

## 2021-09-15 PROCEDURE — 250N000011 HC RX IP 250 OP 636: Performed by: INTERNAL MEDICINE

## 2021-09-15 PROCEDURE — 99233 SBSQ HOSP IP/OBS HIGH 50: CPT | Performed by: PSYCHIATRY & NEUROLOGY

## 2021-09-15 PROCEDURE — 82550 ASSAY OF CK (CPK): CPT | Performed by: PSYCHIATRY & NEUROLOGY

## 2021-09-15 PROCEDURE — 84132 ASSAY OF SERUM POTASSIUM: CPT | Performed by: FAMILY MEDICINE

## 2021-09-15 PROCEDURE — 36415 COLL VENOUS BLD VENIPUNCTURE: CPT | Performed by: FAMILY MEDICINE

## 2021-09-15 PROCEDURE — 84443 ASSAY THYROID STIM HORMONE: CPT | Performed by: PSYCHIATRY & NEUROLOGY

## 2021-09-15 PROCEDURE — 84425 ASSAY OF VITAMIN B-1: CPT | Performed by: PSYCHIATRY & NEUROLOGY

## 2021-09-15 PROCEDURE — 86334 IMMUNOFIX E-PHORESIS SERUM: CPT | Mod: TC | Performed by: PSYCHIATRY & NEUROLOGY

## 2021-09-15 PROCEDURE — 120N000001 HC R&B MED SURG/OB

## 2021-09-15 PROCEDURE — 250N000013 HC RX MED GY IP 250 OP 250 PS 637: Performed by: INTERNAL MEDICINE

## 2021-09-15 PROCEDURE — 73560 X-RAY EXAM OF KNEE 1 OR 2: CPT | Mod: RT

## 2021-09-15 PROCEDURE — 80048 BASIC METABOLIC PNL TOTAL CA: CPT | Performed by: FAMILY MEDICINE

## 2021-09-15 PROCEDURE — 99232 SBSQ HOSP IP/OBS MODERATE 35: CPT | Mod: 24 | Performed by: INTERNAL MEDICINE

## 2021-09-15 PROCEDURE — 82565 ASSAY OF CREATININE: CPT | Performed by: INTERNAL MEDICINE

## 2021-09-15 PROCEDURE — 36415 COLL VENOUS BLD VENIPUNCTURE: CPT | Performed by: PSYCHIATRY & NEUROLOGY

## 2021-09-15 RX ORDER — POTASSIUM CHLORIDE 1500 MG/1
20 TABLET, EXTENDED RELEASE ORAL ONCE
Status: COMPLETED | OUTPATIENT
Start: 2021-09-15 | End: 2021-09-15

## 2021-09-15 RX ADMIN — ENOXAPARIN SODIUM 40 MG: 40 INJECTION SUBCUTANEOUS at 09:44

## 2021-09-15 RX ADMIN — ACETAMINOPHEN 650 MG: 325 TABLET ORAL at 17:36

## 2021-09-15 RX ADMIN — POTASSIUM CHLORIDE 20 MEQ: 20 TABLET, EXTENDED RELEASE ORAL at 09:45

## 2021-09-15 RX ADMIN — PREDNISOLONE ACETATE 1 DROP: 10 SUSPENSION/ DROPS OPHTHALMIC at 09:46

## 2021-09-15 RX ADMIN — CARVEDILOL 25 MG: 12.5 TABLET, FILM COATED ORAL at 09:43

## 2021-09-15 RX ADMIN — PANTOPRAZOLE SODIUM 40 MG: 20 TABLET, DELAYED RELEASE ORAL at 06:46

## 2021-09-15 RX ADMIN — FUROSEMIDE 20 MG: 20 TABLET ORAL at 09:44

## 2021-09-15 RX ADMIN — INSULIN ASPART 1 UNITS: 100 INJECTION, SOLUTION INTRAVENOUS; SUBCUTANEOUS at 17:31

## 2021-09-15 RX ADMIN — INSULIN ASPART 1 UNITS: 100 INJECTION, SOLUTION INTRAVENOUS; SUBCUTANEOUS at 12:14

## 2021-09-15 RX ADMIN — CARVEDILOL 25 MG: 12.5 TABLET, FILM COATED ORAL at 17:30

## 2021-09-15 RX ADMIN — ATORVASTATIN CALCIUM 80 MG: 40 TABLET, FILM COATED ORAL at 21:50

## 2021-09-15 RX ADMIN — HYDRALAZINE HYDROCHLORIDE 100 MG: 50 TABLET, FILM COATED ORAL at 09:45

## 2021-09-15 RX ADMIN — LOSARTAN POTASSIUM 100 MG: 50 TABLET, FILM COATED ORAL at 21:50

## 2021-09-15 RX ADMIN — ASPIRIN 325 MG: 325 TABLET, FILM COATED ORAL at 21:50

## 2021-09-15 NOTE — PROGRESS NOTES
"A/O x4. Cooperative with cares and lying restfully when in bed.      Reports \"gnawing\" pain in right foot, extending up to right knee. Pain rated 6/10 at rest. RN notified of pain. Encouraged patient to ambulate with assistance. Offered ice for knee, patent refused. Plan to obtain an x-ray of R knee.    Ambulated from bed to chair and up to bedside commode x2 with stand-by assist and walker.    Reports no dizziness or lightheadedness upon standing or ambulation. Patient stated \"my knee is really bothering me today.\" Informed patient that movement may help decrease pain.     Chair and bed alarms in use.  "

## 2021-09-15 NOTE — PLAN OF CARE
Problem: Adult Inpatient Plan of Care  Goal: Plan of Care Review  Outcome: Improving   Verbalizes understanding of plan of care. Will continue to monitor blood pressure and parameters have been written. Sharyn  has been educated on the importance of weighing and monitoring her bp.   Problem: Fluid Imbalance (Heart Failure)  Goal: Fluid Balance  Outcome: Improving   Tolerated activity to bathroom and chair but fatigues easily.

## 2021-09-15 NOTE — PLAN OF CARE
Problem: Adult Inpatient Plan of Care  Goal: Patient-Specific Goal (Individualized)  9/15/2021 0619 by Scott Heath RN  Outcome: Improving  9/15/2021 0619 by Scott Heath RN  Outcome: Improving  9/15/2021 0616 by Scott Heath RN  Outcome: Improving  9/14/2021 2214 by Scott Heath RN  Outcome: Improving     Problem: Adult Inpatient Plan of Care  Goal: Absence of Hospital-Acquired Illness or Injury  9/14/2021 2214 by Scott Heath RN  Outcome: Improving     Problem: Adult Inpatient Plan of Care  Goal: Absence of Hospital-Acquired Illness or Injury  Intervention: Prevent Skin Injury  Recent Flowsheet Documentation  Taken 9/14/2021 2331 by Scott Heath RN  Body Position: position changed independently  Taken 9/14/2021 2005 by Scott Heath RN  Body Position: position changed independently     Pt had a great night. Denied pain/discomfort. Up to bedside commode with SBA of 1. No episode of syncope witnessed; will continue to monitorV

## 2021-09-15 NOTE — PLAN OF CARE
Pt had a great night. Denied pain/discomfort. Up to bedside commode with SBA of 1. No episode of syncope witnessed; will continue to monitor.

## 2021-09-15 NOTE — PROGRESS NOTES
Cardiology Progress Note    Assessment/Plan:  1.  Acute diastolic heart failure exacerbation, etiology unclear as no history of medication noncompliance, change in diet or recent weight gain.  No evidence of arrhythmia.  Had marked diuresis initially with IV furosemide but with subsequent orthostatic hypotension.  Now back on oral dose of furosemide.  Reports no shortness of breath currently.  2.  Hypertensive emergency, resolved.  Blood pressure is running slightly higher.  Consider adding small dose of doxazosin at bedtime.  3.  CKD, stage III    Subjective:  Reports discomfort in her right knee which is new today.  States breathing back to baseline.      aspirin  325 mg Oral QPM     atorvastatin  80 mg Oral At Bedtime     carvedilol  25 mg Oral BID w/meals     [START ON 9/16/2021] enoxaparin ANTICOAGULANT  30 mg Subcutaneous Q24H     furosemide  20 mg Oral Daily     hydrALAZINE  100 mg Oral TID     insulin aspart  1-7 Units Subcutaneous TID AC     insulin aspart  1-5 Units Subcutaneous At Bedtime     losartan  100 mg Oral QPM     pantoprazole  40 mg Oral QAM AC     prednisoLONE acetate  1 drop Right Eye Daily     sodium chloride (PF)  3 mL Intracatheter Q8H         Objective:   Vital signs in last 24 hours:  Temp:  [98.5  F (36.9  C)-99.9  F (37.7  C)] 99.9  F (37.7  C)  Pulse:  [70-85] 82  Resp:  [16-18] 18  BP: (112-142)/(51-64) 142/64  SpO2:  [94 %-96 %] 94 %  Weight:        Review of Systems:  Negative    Physical Exam:  General appearance: alert, cooperative, no distress   Head: Normocephalic, without obvious abnormality, atraumatic  Neck: no JVD   Lungs: clear to auscultation bilaterally   Heart: Regular rate and rhythm.  S1, S2 normal.  Extremities: Peripheral edema.    Cardiographics (personally reviewed):   No arrhythmias    Imaging (personally reviewed):   No new cardiac imaging    Lab Results (personally reviewed):     Recent Labs   Lab Test 04/13/21  0906   CHOL 120   HDL 38*   LDL 63   TRIG 96      Recent Labs   Lab Test 04/13/21  0906 04/28/20  0835 04/11/19  1408   LDL 63 67 77     Recent Labs   Lab Test 09/15/21  1341 09/15/21  1120 09/15/21  0748 09/15/21  0737 09/15/21  0737   NA  --   --   --   --  138   POTASSIUM 4.0  --   --    < > 3.4*   CHLORIDE  --   --   --   --  101   CO2  --   --   --   --  27   GLC  --  147*   < >  --  141*   BUN  --   --   --   --  37*   CR  --   --   --   --  1.38*   GFRESTIMATED  --   --   --   --  36*   MAURICIO  --   --   --   --  9.0    < > = values in this interval not displayed.     Recent Labs   Lab Test 09/15/21  0737 09/14/21  0637 09/13/21  1102   CR 1.38* 1.21* 1.14*     Recent Labs   Lab Test 09/13/21  1042 03/02/21  0505 03/09/16  0552   A1C 5.4 5.9* 6.3*          Recent Labs   Lab Test 09/13/21  0125   WBC 6.7   HGB 12.3   HCT 37.3   MCV 91        Recent Labs   Lab Test 09/13/21  0125 08/30/21  0726 05/26/21  1032   HGB 12.3 10.4* 12.2    Recent Labs   Lab Test 09/13/21  1102 09/13/21  0125 05/07/21  0216   TROPONINI 0.05 <0.01 <0.01     Recent Labs   Lab Test 09/13/21  0125 05/06/21  2310 03/07/21  2120   * 148 150     Recent Labs   Lab Test 03/03/21  0428   TSH 5.48*     Recent Labs   Lab Test 09/13/21  0124 05/06/21  2311 03/19/18  2331   INR 1.05 1.05 0.94          Bethany Palomares MD

## 2021-09-15 NOTE — PLAN OF CARE
Problem: Adult Inpatient Plan of Care  Goal: Patient-Specific Goal (Individualized)  Outcome: Improving     Problem: Adult Inpatient Plan of Care  Goal: Optimal Comfort and Wellbeing  Outcome: Improving     Problem: Adult Inpatient Plan of Care  Goal: Readiness for Transition of Care  Outcome: Improving     Problem: Risk for Delirium  Goal: Improved Behavioral Control  Outcome: Improving     Problem: Risk for Delirium  Goal: Improved Attention and Thought Clarity  Outcome: Improving

## 2021-09-15 NOTE — PROGRESS NOTES
NEUROLOGY PROGRESS NOTE     Sharyn Trent,  1940, MRN [id number] Date: 9/15/2021     [facility]   Code status:  Full Code   PCP: Jamie Mcconnell, [phone]      ASSESSMENT & PLAN   Diagnosis code: Numbness    Headache  Carotid stenosis   History of hypertensive emergency versus rule out stroke  Numbness/rule out neuropathy    Patient symptoms do not localize to 1 side and are not suggestive of stroke.  Head CT negative for stroke  MRI brain not possible due to pacemaker  CT angiogram shows left carotid siphon stenosis.  This would be asymptomatic based on her symptoms.  Treatment for this would be aspirin 81 mg.  Carotid ultrasound negative for any significant carotid stenosis  Vascular risk factor management per primary team  Continue statin  Slow reduction in blood pressure as appropriate per cardiology  PT/OT  In terms of the numbness of her feet most likely this is mild neuropathy.  CT L-spine shows mild to moderate spinal stenosis.  This would not fit with the patient's symptoms.  We will check blood work for neuropathy.  Check B12/TSH/B1/SPEP/immunofixation/CK/hemoglobin A1c - b12 and HbA1c normal.  Would need outpatient EMG.  Treatment of right knee pain per primary team.    Discharge:- per primary team.  Okay to discharge from neurology and with outpatient follow-up for EMG.       Chief Complaint   Patient presents with     tier 1 stroke code        HISTORY OF PRESENT ILLNESS     We have been requested by Dr. Dominick Lackey to evaluate Sharyn Trent who is a 81 year old  female for stroke.  Is a 81-year-old female on whom neurology is been consulted to evaluate for stroke.  Patient presented to the emergency room with an episode of shaking and headache.  In the emergency room her blood pressure was significantly elevated.  She was also complaining of bilateral hand numbness.  Stroke code was called.  Head CT was negative for hemorrhage.  CT angiogram did show severe left carotid  stenosis-intracranial.  She is not aware of this from before.  MRI could not be done due to pacemaker.  No thrombolytics were given.  She was found to be in heart failure and was admitted for further management.  Headache has resolved at this point.  Currently her symptoms are resolved.  She has had similar symptoms in the past and these were thought to be related to heart failure.  Cardiology is involved.  She does take aspirin at baseline.  No history of stroke.    9/14/21  Patient reports no ongoing numbness.  The shaking has also resolved.  Denies any other strokelike symptoms.  Tolerating medications well.  Did have an episode of hypotension later today.  This was secondary to Lasix.  This is being backed off.  No recurrence of symptoms.    9/15/21  Patient reports no strokelike symptoms.  Consulted today for new problem of foot numbness.  Patient reports that she has a history of left foot numbness.  This is related to previous surgery.  Since admission she is noted that her numbness in the left foot is worse and she also has numbness in the right foot.  Numbness is limited to the tip of her toes.  All 5 toes are involved.  Denies any back pain.  Nuys any shooting pain down the legs.  CT lumbar spine was done for her which did not show a cause for her symptoms.  Neurology was then consulted to evaluate for this issue.  She has had a EMG for carpal tunnel.  Does complain of a lot of right knee pain.  Does have some pain in the right foot as well.  It is hard for her to walk since admission but denies any weakness in the legs.  Has not had similar symptoms in the past.  No previous history of neuropathy.     PAST MEDICAL & SURGICAL HISTORY     Medical History  Past Medical History:   Diagnosis Date     Abnormal ECG      Anxiety      Arthritis      Chest pain      Chest pain      Chronic kidney disease     stage 3-mod.     Diabetes (H)      Dyslipidemia, goal LDL below 70      GERD (gastroesophageal reflux  disease)      HTN (hypertension)      Hyperlipidemia      Melanoma of ankle (H)     L ankle     Other second degree atrioventricular block     Created by Conversion      Pacemaker      PSVT (paroxysmal supraventricular tachycardia) (H)      Right bundle branch block      Skin cancer      Surgical History  Past Surgical History:   Procedure Laterality Date     ABLATION OF DYSRHYTHMIC FOCUS  04/11/2013    AV analia reentry tachycardia, partially successful     BIOPSY SKIN (LOCATION)       CARDIAC CATHETERIZATION  03/10/2016     CV CORONARY ANGIOGRAM N/A 5/26/2021    Procedure: Coronary Angiogram;  Surgeon: Yony Gillis MD;  Location: [facility] Cardiac Cath Lab;  Service: Cardiology     CV LEFT HEART CATHETERIZATION WITHOUT LEFT VENTRICULOGRAM Left 5/26/2021    Procedure: Left Heart Catheterization Without Left Ventriculogram;  Surgeon: Yony Gillis MD;  Location: [facility] Cardiac Cath Lab;  Service: Cardiology     CV RIGHT HEART CATHETERIZATION N/A 5/26/2021    Procedure: Right Heart Catheterization;  Surgeon: Yony Gillis MD;  Location: [facility] Cardiac Cath Lab;  Service: Cardiology     EP PACEMAKER N/A 8/30/2021    Procedure: Electrophysiology Pacemaker;  Surgeon: Ab Saavedra MD;  Location: [facility] CATH [facility] CV     FOOT SURGERY      L foot     HAND SURGERY      on both thumbs     IMPLANT PACEMAKER  04/2011    Second-degree AV block     OTHER SURGICAL HISTORY      SVT Ablation     OR SHLDR ARTHROSCOP,SURG,W/ROTAT CUFF REPR Left 3/9/2017    Procedure: LEFT SHOULDER ARTHROSCOPY DECOMPRESSION DISTAL CLAVICLE EXCISION  ROTATOR CUFF REPAIR BICEPS TENOTOMY AND EXTENSIVE DEBRIDEMENT;  Surgeon: Todd Riggs MD;  Location: [facility];  Service: Orthopedics     RELEASE CARPAL TUNNEL      both wrists     SKIN CANCER EXCISION      L ankle,Lleg and cheek     TOE SURGERY      L big toe joint replacement     TUBAL LIGATION          SOCIAL HISTORY     Social History     Tobacco Use      Smoking status: Never Smoker     Smokeless tobacco: Never Used   Substance Use Topics     Alcohol use: No     Drug use: No        FAMILY HISTORY     Reviewed, and family history includes Cancer in her father; Cerebrovascular Disease in her mother; Coronary Artery Disease in her father; Dyslipidemia in her brother, father, and sister; Hypertension in her brother and mother; Prostate Cancer in her brother and father.     ALLERGIES     Allergies   Allergen Reactions     Venom-Honey Bee [Bee Venom] Shortness Of Breath     Macrobid [Nitrofurantoin] Other (See Comments)     Burning sensation across chest and arms     Mercurial Analogues [Mercurial Derivatives] Dermatitis     blisters     Sulfa (Sulfonamide Antibiotics) [Sulfa Drugs] Rash        REVIEW OF SYSTEMS     12 system ROS was done other than the HPI this was negative.     HOME & HOSPITAL MEDICATIONS     Prior to Admission Medications  Medications Prior to Admission   Medication Sig Dispense Refill Last Dose     aspirin 325 MG tablet [ASPIRIN 325 MG TABLET] Take 325 mg by mouth every evening.    9/12/2021 at Unknown time     atorvastatin (LIPITOR) 80 MG tablet [ATORVASTATIN (LIPITOR) 80 MG TABLET] Take 80 mg by mouth bedtime.   9/12/2021 at Unknown time     carvediloL (COREG) 25 MG tablet [CARVEDILOL (COREG) 25 MG TABLET] Take 1 tablet (25 mg total) by mouth 2 (two) times a day with meals. 180 tablet 3 9/12/2021 at Unknown time     furosemide (LASIX) 20 MG tablet [FUROSEMIDE (LASIX) 20 MG TABLET] Take 0.5 tablets (10 mg total) by mouth daily. 60 tablet 3 9/12/2021 at Unknown time     hydrALAZINE (APRESOLINE) 100 MG tablet [HYDRALAZINE (APRESOLINE) 100 MG TABLET] TAKE 1 TABLET(100 MG) BY MOUTH THREE TIMES DAILY 270 tablet 1 9/12/2021 at Unknown time     LORazepam (ATIVAN) 0.5 MG tablet [LORAZEPAM (ATIVAN) 0.5 MG TABLET] Take 1 tablet (0.5 mg total) by mouth every 6 (six) hours as needed for anxiety (or tremors). 12 tablet 0 9/12/2021 at Unknown time     losartan  "(COZAAR) 100 MG tablet [LOSARTAN (COZAAR) 100 MG TABLET] Take 100 mg by mouth every evening.   9/12/2021 at Unknown time     metFORMIN (GLUCOPHAGE) 500 MG tablet [METFORMIN (GLUCOPHAGE) 500 MG TABLET] Take 1 tablet (500 mg total) by mouth 2 (two) times a day with meals. At breakfast and at dinner 60 tablet 0 9/12/2021 at Unknown time     multivitamin therapeutic (THERAGRAN) tablet [MULTIVITAMIN THERAPEUTIC (THERAGRAN) TABLET] Take 1 tablet by mouth every evening.    9/12/2021 at Unknown time     omeprazole (PRILOSEC) 20 MG capsule [OMEPRAZOLE (PRILOSEC) 20 MG CAPSULE] Take 20 mg by mouth daily before breakfast.    9/12/2021 at Unknown time     prednisoLONE acetate (PRED-FORTE) 1 % ophthalmic suspension [PREDNISOLONE ACETATE (PRED-FORTE) 1 % OPHTHALMIC SUSPENSION] Administer 1 drop to the right eye daily.    9/12/2021 at Unknown time     vit C/E/Zn/coppr/lutein/zeaxan (PRESERVISION AREDS-2 ORAL) [VIT C/E/ZN/COPPR/LUTEIN/ZEAXAN (PRESERVISION AREDS-2 ORAL)] Take 1 tablet by mouth 2 (two) times a day before meals.   9/12/2021 at Unknown time       Hospital Medications    aspirin  325 mg Oral QPM     atorvastatin  80 mg Oral At Bedtime     carvedilol  25 mg Oral BID w/meals     [START ON 9/16/2021] enoxaparin ANTICOAGULANT  30 mg Subcutaneous Q24H     furosemide  20 mg Oral Daily     hydrALAZINE  100 mg Oral TID     insulin aspart  1-7 Units Subcutaneous TID AC     insulin aspart  1-5 Units Subcutaneous At Bedtime     losartan  100 mg Oral QPM     pantoprazole  40 mg Oral QAM AC     prednisoLONE acetate  1 drop Right Eye Daily     sodium chloride (PF)  3 mL Intracatheter Q8H        PHYSICAL EXAM     Vital signs  Temp:  [98.5  F (36.9  C)-99.9  F (37.7  C)] 99.9  F (37.7  C)  Pulse:  [70-85] 82  Resp:  [16-18] 18  BP: (112-142)/(51-64) 142/64  SpO2:  [94 %-96 %] 94 %    PHYSICAL EXAMINATION  VITALS: BP (!) 142/64 (BP Location: Left arm)   Pulse 82   Temp 99.9  F (37.7  C) (Oral)   Resp 18   Ht 1.549 m (5' 1\")   Wt 66 " kg (145 lb 8 oz)   SpO2 94%   BMI 27.49 kg/m    GENERAL -Health appearing, No apparent distress  EYES- No scleral icterus, no eyelid droop, Pupils - see Neuro section  HEENT - Normocephalic, atraumatic, Hearing grossly intact; Oral mucosa moist and pink in color. External Ears and nose intact.   Neck - supple with no obvious lymphadenopathy or thyromegaly  PULM - Good spontaneous respiratory effort; Normal palpation of chest.   CV- Pedal pulses present with no peripheral edema/ No significant varicosities.  MSK- Gait - see Neuro section; Strength and tone- see Neuro section; Range of motion grossly intact.  PSYCH -cooperative    Neurological  Mental status - Patient is awake and oriented to self, place and time. Attention span is normal. Language is fluent and follows commands appropriately.   Cranial nerves - CN II-XII intact. Pupils are reactive and symmetric; EOMI, VFIT, NLF symmetric  Motor - There is no pronator drift. Motor exam shows 5/5 strength in all extremities.  No foot drop noted.  She does have some pain in her foot and knee on the right side which can cause functional weakness.  Tone - Tone is symmetric bilaterally in upper and lower extremities.  Sensation intact grossly to light touch and pain.  Coordination - Finger to nose and heel to shin is without dysmetria.  Gait and station --unable to safely ambulate.  Formal gait testing cannot be done due to safety concerns from ongoing medical issues.       DIAGNOSTIC STUDIES     Pertinent Radiology   HEAD CT:  1.  No acute intracranial hemorrhage.  2.  No CT evidence acute infarct. Aspect score 10.  3.  Age-related changes described above.     Dr. Tashi Lackey was notified by Dr Kevin Cooper at  1:40 AM 09/13/2021.      HEAD CTA:   1.  No intracranial arterial large vessel occlusion.  2.  Right carotid siphon mild stenosis.   3.  Left carotid siphon severe stenosis.  4.  Otherwise, no significant stenosis/occlusion.      NECK CTA:  1.  Right  vertebral artery origin moderate stenosis.  2.  Left proximal subclavian artery mild-to-moderate stenosis.  3.  Otherwise, no significant stenosis/occlusion. No dissection.  4.  Multiple thyroid nodules. Recommend nonemergent thyroid ultrasound based upon ACR white paper criteria.   5.  Lung apices demonstrate septal thickening with patchy groundglass opacities and consolidations most likely due to pulmonary edema. Please correlate clinically to exclude acute infectious inflammatory pneumonitis.    Carotid US  IMPRESSION:  1.  Mild plaque formation, velocities consistent with less than 50% stenosis in the right internal carotid artery.  2.  Mild plaque formation, velocities consistent with less than 50% stenosis in the left internal carotid artery.  3.  Flow within the vertebral arteries is antegrade.  4.  Bilateral thyroid nodules, recommend further evaluation with dedicated thyroid ultrasound.    CT L spine  1.  No evidence of lumbar spine fracture.     2.  Multilevel degenerative change throughout the lumbar disc spaces with moderate spinal canal stenosis at L3-4. Moderate spinal canal stenosis at L4-5. Variable degrees of foraminal narrowing. Please see body of report for details at each level.  Recent Results (from the past 24 hour(s))   Glucose by meter    Collection Time: 09/14/21  5:24 PM   Result Value Ref Range    GLUCOSE BY METER POCT 162 (H) 70 - 99 mg/dL   Glucose by meter    Collection Time: 09/14/21  8:21 PM   Result Value Ref Range    GLUCOSE BY METER POCT 140 (H) 70 - 99 mg/dL   Basic metabolic panel    Collection Time: 09/15/21  7:37 AM   Result Value Ref Range    Sodium 138 136 - 145 mmol/L    Potassium 3.4 (L) 3.5 - 5.0 mmol/L    Chloride 101 98 - 107 mmol/L    Carbon Dioxide (CO2) 27 22 - 31 mmol/L    Anion Gap 10 5 - 18 mmol/L    Urea Nitrogen 37 (H) 8 - 28 mg/dL    Creatinine 1.38 (H) 0.60 - 1.10 mg/dL    Calcium 9.0 8.5 - 10.5 mg/dL    Glucose 141 (H) 70 - 125 mg/dL    GFR Estimate 36 (L) >60  mL/min/1.73m2   Glucose by meter    Collection Time: 09/15/21  7:48 AM   Result Value Ref Range    GLUCOSE BY METER POCT 133 (H) 70 - 99 mg/dL   Glucose by meter    Collection Time: 09/15/21 11:20 AM   Result Value Ref Range    GLUCOSE BY METER POCT 147 (H) 70 - 99 mg/dL   Potassium    Collection Time: 09/15/21  1:41 PM   Result Value Ref Range    Potassium 4.0 3.5 - 5.0 mmol/L       Total time spent for face to face visit, reviewing labs/imaging studies, counseling and coordination of care was: Over 40 min More than 50% of this time was spent on counseling and coordination of care.    Consulted for new problem.  Counseling patient.  Reviewing chart.  Coordination of care with nursing staff/primary team.    Reji Davila MD  Neurologist  [facility]  Tel:- [phone]

## 2021-09-15 NOTE — PROGRESS NOTES
[facility]    Medicine Progress Note - Hospitalist Service       Date of Admission:  9/13/2021    Assessment & Plan           Summary: 81 year old female with hx of hypertension, dyslipidemia, CKD3, DM2, presented to [facility] ED with sudden onset of severe headache and body shaking 1 hour prior to admission.    Principal Problem:    Acute diastolic CHF (congestive heart failure): Initiated aggressive diuresis with furosemide IV drip.  Consult cardiology appreciated.  - Good diuresis: 1 Kg  Down and down >2.5L  - appears euvolemic on exam and did have ortho stasis this morning.  - switched to PO lasix 20 mg daily.      Acute respiratory failure with hypoxia: Secondary to pulmonary edema from acute congestive heart failure.  BiPAP support, nitro drip, furosemide drip, reinstitute all patient's home antihypertensives.  - resolved with diuresis      Hypertensive emergency: Resumed all home antihypertensives.  Initially on nitro drip, also furosemide drip.  Will convert back to oral meds now that pressure is better.    Orthostatic hypotension:  RR note reviewed.  Patient's BP improved with laying flat.  Continue to check orthostatics.      Right toe numbness  Just woke up with this.  She has toe numbeness on the left side that is chronic  - checked CT L spine (No MRI due to pacer) - moderate L4-5 stenosis  - Thiamin and folate normal  - PT/OT   - will re-consult neuro since this is a new neuro change since the stroke eval.      Severe headache: Secondary to hypertensive emergency.  Now resovled.  Head imaging is negative.      Left carotid severe siphon stenosis: As seen on CTA head and neck.  US shows no critical stenosis.      Dyslipidemia, goal LDL below 70: Continue home statin dose     Chronic kidney disease, stage 3a with mild QUE: likely due to diuresis, recheck tomorrow.      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      Gastroesophageal reflux disease  without esophagitis: Resume home PPI    Code Status:  No Order      Diet: Orders Placed This Encounter     Adv diet      DVT prophylaxis:    Medical:  subcutaneous enoxaparin    Mechanical:  PCD's      Disposition Plan   Expected discharge: 09/16/2021  In the morning. recommended to prior living arrangement once adequate pain management/ tolerating PO medications.     The patient's care was discussed with the Bedside Nurse and Patient.    Mauro Mckeon MD  Hospitalist Service  [facility]  Securely message with the Vocera Web Console (learn more here)  Text page via makemoji Paging/Directory      Clinically Significant Risk Factors Present on Admission               ______________________________________________________________________    Interval History   Overall feels much better today but right foot numbness persists.  She has been getting up and moving around.  Able to take p.o.  No chest pain or shortness of breath.    Data reviewed today: I reviewed all medications, new labs and imaging results over the last 24 hours. I personally reviewed     Physical Exam   Vital Signs: Temp: 99.9  F (37.7  C) Temp src: Oral BP: (!) 142/64 (RN notified) Pulse: 82   Resp: 18 SpO2: 94 % O2 Device: None (Room air)    Weight: 145 lbs 8 oz  Constitutional: awake, alert, cooperative, no apparent distress, and appears stated age and appears stated age  Eyes: Lids and lashes normal, pupils equal, round and reactive to light, extra ocular muscles intact, sclera clear, conjunctiva normal  Respiratory: No increased work of breathing, good air exchange, clear to auscultation bilaterally, no crackles or wheezing  Cardiovascular: Normal apical impulse, regular rate and rhythm, normal S1 and S2, no S3 or S4, and no murmur noted  Skin: no bruising or bleeding  Musculoskeletal: There is no redness, warmth, or swelling of the joints.  Full range of motion noted.  Motor strength is 5 out of 5 all extremities  bilaterally.  Tone is normal.  Neurologic: Awake, alert, oriented to name, place and time.  Cranial nerves II-XII are grossly intact.  Motor is 5 out of 5 bilaterally.  Cerebellar finger to nose, heel to shin intact.  Sensory is intact.  Babinski down going, Romberg negative, and gait is normal.  Subjective numbness in all toes of the right foot.  1 beat of clonus in the ankle, normal dorsi/plantar flexion  Neuropsychiatric: General: normal, calm and normal eye contact    Data   Recent Labs   Lab 09/15/21  1341 09/15/21  1120 09/15/21  0748 09/15/21  0737 09/14/21  0833 09/14/21  0637 09/13/21  1202 09/13/21  1102 09/13/21  0755 09/13/21  0125 09/13/21  0124   0000   WBC  --   --   --   --   --   --   --   --   --  6.7  --   --    HGB  --   --   --   --   --   --   --   --   --  12.3  --   --    MCV  --   --   --   --   --   --   --   --   --  91  --   --    PLT  --   --   --   --   --   --   --   --   --  227  --   --    INR  --   --   --   --   --   --   --   --   --   --  1.05  --    NA  --   --   --  138  --  139  --  138  --  136  --    < >   POTASSIUM 4.0  --   --  3.4*  --  3.6   < > 3.5  --  4.4  --    < >   CHLORIDE  --   --   --  101  --  101  --  101  --  100  --    < >   CO2  --   --   --  27  --  27  --  28  --  25  --    < >   BUN  --   --   --  37*  --  27  --  21  --  23  --    < >   CR  --   --   --  1.38*  --  1.21*  --  1.14*  --  1.20*  --    < >   ANIONGAP  --   --   --  10  --  11  --  9  --  11  --    < >   MAURICIO  --   --   --  9.0  --  9.4  --  9.9  --  10.0  --    < >   GLC  --  147* 133* 141*   < > 124   < > 125   < > 132*  --    < >    < > = values in this interval not displayed.     Recent Results (from the past 24 hour(s))   CT Lumbar Spine w/o Contrast    Narrative    EXAM: CT LUMBAR SPINE W/O CONTRAST  LOCATION: [facility]  DATE/TIME: 9/14/2021 8:39 PM    INDICATION: Back pain, injury.  COMPARISON: None.  TECHNIQUE: Routine CT Lumbar Spine without IV contrast.  Multiplanar reformats. Dose reduction techniques were used.    FINDINGS:  No evidence of lumbar spine fracture. Limited views of the retroperitoneal structures and lung bases are clear. Leftward scoliotic curvature of the lumbar spine. No evidence of fracture.    L1-L2: DDD change at L1-L2 with 5 mm of degenerative retrolisthesis of L1 on L2 with 6 mm of rightward lateral translation of L1 on L2. Spinal canal is patent. Bilateral foraminal stenosis.    L2-L3: DDD change at L2-L3 with 3 mm of degenerative retrolisthesis of L2 on L3 with mild disc bulging and mild spinal canal narrowing. Foramen are patent. Slight loss of disc height.    L3-L4: DDD change at L3-4 with loss of disc height, endplate sclerosis and 4 mm of grade 1 anterior spondylolisthesis of L3 on L4. Uncovering/bulging of the degenerated/desiccated disc margin results in moderate spinal canal stenosis with posterior   element facet hypertrophic change and thickening of the ligamentum flavum. The left foramen is mildly narrowed. Right foramen is severely narrowed. 6 mm of leftward lateral translation of L3 on L4.    L4-L5: DDD change at L4-L5 with grade 1 anterior spondylolisthesis of approximately 4 mm with uncovering/bulging of the degenerated disc margin and posterior element facet hypertrophic change with thickening of the ligamentum flavum resulting in moderate   spinal canal stenosis. Right foramen is patent. Left foramen is mildly narrowed.    L5-S1: Bilateral facet degeneration. Spinal canal and foramen are patent.      Impression    IMPRESSION:  1.  No evidence of lumbar spine fracture.    2.  Multilevel degenerative change throughout the lumbar disc spaces with moderate spinal canal stenosis at L3-4. Moderate spinal canal stenosis at L4-5. Variable degrees of foraminal narrowing. Please see body of report for details at each level.     Medications     - MEDICATION INSTRUCTIONS -       Continuing ACE inhibitor/ARB/ARNI from home medication list  OR ACE inhibitor/ARB order already placed during this visit       - MEDICATION INSTRUCTIONS -         aspirin  325 mg Oral QPM     atorvastatin  80 mg Oral At Bedtime     carvedilol  25 mg Oral BID w/meals     [START ON 9/16/2021] enoxaparin ANTICOAGULANT  30 mg Subcutaneous Q24H     furosemide  20 mg Oral Daily     hydrALAZINE  100 mg Oral TID     insulin aspart  1-7 Units Subcutaneous TID AC     insulin aspart  1-5 Units Subcutaneous At Bedtime     losartan  100 mg Oral QPM     pantoprazole  40 mg Oral QAM AC     prednisoLONE acetate  1 drop Right Eye Daily     sodium chloride (PF)  3 mL Intracatheter Q8H

## 2021-09-15 NOTE — PROGRESS NOTES
Care Management Follow Up    Length of Stay (days): 2    Expected Discharge Date: 09/16/2021     Concerns to be Addressed: cardio status, diuresing       Patient plan of care discussed at interdisciplinary rounds: yes     Anticipated Discharge Disposition:  home     Anticipated Discharge Services: none     Anticipated Discharge DME:  No new DME        Referrals Placed by CM/SW:  None        Additional Information: Cardiology and Neurology following for heart failure, severe headache, Hypertensive emergency.  Discharge recommendation is for return home.        Assessment History:  Patient independent at baseline and lives with spouse.      Planning return home with support from spouse. Family to transport.       Yasmine Arvizu RN

## 2021-09-16 ENCOUNTER — APPOINTMENT (OUTPATIENT)
Dept: SPEECH THERAPY | Facility: HOSPITAL | Age: 81
DRG: 291 | End: 2021-09-16
Payer: COMMERCIAL

## 2021-09-16 ENCOUNTER — APPOINTMENT (OUTPATIENT)
Dept: OCCUPATIONAL THERAPY | Facility: HOSPITAL | Age: 81
DRG: 291 | End: 2021-09-16
Payer: COMMERCIAL

## 2021-09-16 VITALS
DIASTOLIC BLOOD PRESSURE: 58 MMHG | HEART RATE: 71 BPM | BODY MASS INDEX: 27.47 KG/M2 | HEIGHT: 61 IN | WEIGHT: 145.5 LBS | TEMPERATURE: 98.3 F | RESPIRATION RATE: 20 BRPM | SYSTOLIC BLOOD PRESSURE: 123 MMHG | OXYGEN SATURATION: 94 %

## 2021-09-16 LAB
ANION GAP SERPL CALCULATED.3IONS-SCNC: 10 MMOL/L (ref 5–18)
BUN SERPL-MCNC: 45 MG/DL (ref 8–28)
CALCIUM SERPL-MCNC: 8.9 MG/DL (ref 8.5–10.5)
CHLORIDE BLD-SCNC: 100 MMOL/L (ref 98–107)
CO2 SERPL-SCNC: 26 MMOL/L (ref 22–31)
CREAT SERPL-MCNC: 1.38 MG/DL (ref 0.6–1.1)
CREAT SERPL-MCNC: 1.42 MG/DL (ref 0.6–1.1)
GFR SERPL CREATININE-BSD FRML MDRD: 35 ML/MIN/1.73M2
GFR SERPL CREATININE-BSD FRML MDRD: 36 ML/MIN/1.73M2
GLUCOSE BLD-MCNC: 139 MG/DL (ref 70–125)
GLUCOSE BLDC GLUCOMTR-MCNC: 149 MG/DL (ref 70–99)
PLATELET # BLD AUTO: 186 10E3/UL (ref 150–450)
POTASSIUM BLD-SCNC: 3.3 MMOL/L (ref 3.5–5)
SODIUM SERPL-SCNC: 136 MMOL/L (ref 136–145)

## 2021-09-16 PROCEDURE — 99239 HOSP IP/OBS DSCHRG MGMT >30: CPT | Performed by: FAMILY MEDICINE

## 2021-09-16 PROCEDURE — 85049 AUTOMATED PLATELET COUNT: CPT | Performed by: INTERNAL MEDICINE

## 2021-09-16 PROCEDURE — 250N000013 HC RX MED GY IP 250 OP 250 PS 637: Performed by: FAMILY MEDICINE

## 2021-09-16 PROCEDURE — 97535 SELF CARE MNGMENT TRAINING: CPT | Mod: GO

## 2021-09-16 PROCEDURE — 80048 BASIC METABOLIC PNL TOTAL CA: CPT | Performed by: FAMILY MEDICINE

## 2021-09-16 PROCEDURE — 250N000013 HC RX MED GY IP 250 OP 250 PS 637: Performed by: INTERNAL MEDICINE

## 2021-09-16 PROCEDURE — 97530 THERAPEUTIC ACTIVITIES: CPT | Mod: GO

## 2021-09-16 PROCEDURE — 250N000011 HC RX IP 250 OP 636: Performed by: FAMILY MEDICINE

## 2021-09-16 PROCEDURE — 92526 ORAL FUNCTION THERAPY: CPT | Mod: GN

## 2021-09-16 PROCEDURE — 99232 SBSQ HOSP IP/OBS MODERATE 35: CPT | Performed by: PSYCHIATRY & NEUROLOGY

## 2021-09-16 PROCEDURE — 36415 COLL VENOUS BLD VENIPUNCTURE: CPT | Performed by: FAMILY MEDICINE

## 2021-09-16 RX ORDER — POTASSIUM CHLORIDE 1500 MG/1
20 TABLET, EXTENDED RELEASE ORAL ONCE
Status: COMPLETED | OUTPATIENT
Start: 2021-09-16 | End: 2021-09-16

## 2021-09-16 RX ORDER — LOSARTAN POTASSIUM 50 MG/1
50 TABLET ORAL EVERY EVENING
Qty: 30 TABLET | Refills: 0 | Status: SHIPPED | OUTPATIENT
Start: 2021-09-16 | End: 2021-12-22

## 2021-09-16 RX ORDER — LOSARTAN POTASSIUM 50 MG/1
50 TABLET ORAL EVERY EVENING
Qty: 30 TABLET | Refills: 0 | Status: SHIPPED | OUTPATIENT
Start: 2021-09-16 | End: 2021-09-16

## 2021-09-16 RX ADMIN — PANTOPRAZOLE SODIUM 40 MG: 20 TABLET, DELAYED RELEASE ORAL at 06:35

## 2021-09-16 RX ADMIN — CARVEDILOL 25 MG: 12.5 TABLET, FILM COATED ORAL at 08:54

## 2021-09-16 RX ADMIN — ACETAMINOPHEN 650 MG: 325 TABLET ORAL at 06:57

## 2021-09-16 RX ADMIN — ENOXAPARIN SODIUM 30 MG: 30 INJECTION SUBCUTANEOUS at 08:44

## 2021-09-16 RX ADMIN — PREDNISOLONE ACETATE 1 DROP: 10 SUSPENSION/ DROPS OPHTHALMIC at 08:45

## 2021-09-16 RX ADMIN — INSULIN ASPART 1 UNITS: 100 INJECTION, SOLUTION INTRAVENOUS; SUBCUTANEOUS at 08:43

## 2021-09-16 RX ADMIN — FUROSEMIDE 20 MG: 20 TABLET ORAL at 08:55

## 2021-09-16 RX ADMIN — POTASSIUM CHLORIDE 20 MEQ: 20 TABLET, EXTENDED RELEASE ORAL at 08:49

## 2021-09-16 NOTE — CONSULTS
Spiritual Assessment:   N/A - Unable to assess as patient was discharged today.     Mary Slade M.Div.  Staff   [phone]

## 2021-09-16 NOTE — PLAN OF CARE
Problem: Adult Inpatient Plan of Care  Goal: Plan of Care Review  Outcome: Improving   Alert and orientated.  C/o mod knee pain tolerable with PRN tylenol.  VSS  able to walk down [address] and back with staff.

## 2021-09-16 NOTE — PLAN OF CARE
Problem: Adult Inpatient Plan of Care  Goal: Plan of Care Review  Outcome: Adequate for Discharge   All discharge barriers met. Ambulating to bathroom and halls with less fatigue.

## 2021-09-16 NOTE — PLAN OF CARE
"  Problem: Adult Inpatient Plan of Care  Goal: Absence of Hospital-Acquired Illness or Injury  Outcome: Improving  Intervention: Identify and Manage Fall Risk  Safety Promotion/Fall Prevention:   bed alarm on   activity supervised   safety round/check completed   room door open  Intervention: Prevent Skin Injury  Body Position: position changed independently     /55 (BP Location: Right arm)   Pulse 66   Temp 99  F (37.2  C) (Oral)   Resp 16   Ht 1.549 m (5' 1\")   Wt 66 kg (145 lb 8 oz)   SpO2 91%   BMI 27.49 kg/m       Pt denies pain this night. Up with assist of 1 and walker.  "

## 2021-09-16 NOTE — PROGRESS NOTES
NEUROLOGY PROGRESS NOTE     Sharyn Trent,  1940, MRN [id number] Date: 2021     [facility]   Code status:  Prior   PCP: Jamie Mcconnell, [phone]      ASSESSMENT & PLAN   Diagnosis code: Numbness    Headache  Carotid stenosis   History of hypertensive emergency versus rule out stroke  Numbness/rule out neuropathy    Patient symptoms do not localize to 1 side and are not suggestive of stroke.  Head CT negative for stroke  MRI brain not possible due to pacemaker  CT angiogram shows left carotid siphon stenosis.  This would be asymptomatic based on her symptoms.  Treatment for this would be aspirin 81 mg.  Carotid ultrasound negative for any significant carotid stenosis  Vascular risk factor management per primary team  Continue statin  Slow reduction in blood pressure as appropriate per cardiology  PT/OT  In terms of the numbness of her feet most likely this is mild neuropathy.  CT L-spine shows mild to moderate spinal stenosis.  This would not fit with the patient's symptoms.  We will check blood work for neuropathy.  Check B12/TSH/B1/SPEP/immunofixation/CK/hemoglobin A1c - b12 and HbA1c normal.-Pending  Would need outpatient EMG to evaluate for peripheral neuropathy.-Coordinated care with the nursing staff to schedule this in the clinic  Treatment of right knee pain per primary team.    Discharge:- per primary team.  Okay to discharge from neurology and with outpatient follow-up for EMG.       Chief Complaint   Patient presents with     tier 1 stroke code        HISTORY OF PRESENT ILLNESS     We have been requested by Dr. Dominick Lackey to evaluate Sharyn Trent who is a 81 year old  female for stroke.  Is a 81-year-old female on whom neurology is been consulted to evaluate for stroke.  Patient presented to the emergency room with an episode of shaking and headache.  In the emergency room her blood pressure was significantly elevated.  She was also complaining of bilateral hand numbness.   Stroke code was called.  Head CT was negative for hemorrhage.  CT angiogram did show severe left carotid stenosis-intracranial.  She is not aware of this from before.  MRI could not be done due to pacemaker.  No thrombolytics were given.  She was found to be in heart failure and was admitted for further management.  Headache has resolved at this point.  Currently her symptoms are resolved.  She has had similar symptoms in the past and these were thought to be related to heart failure.  Cardiology is involved.  She does take aspirin at baseline.  No history of stroke.    9/14/21  Patient reports no ongoing numbness.  The shaking has also resolved.  Denies any other strokelike symptoms.  Tolerating medications well.  Did have an episode of hypotension later today.  This was secondary to Lasix.  This is being backed off.  No recurrence of symptoms.    9/15/21  Patient reports no strokelike symptoms.  Consulted today for new problem of foot numbness.  Patient reports that she has a history of left foot numbness.  This is related to previous surgery.  Since admission she is noted that her numbness in the left foot is worse and she also has numbness in the right foot.  Numbness is limited to the tip of her toes.  All 5 toes are involved.  Denies any back pain.  Nuys any shooting pain down the legs.  CT lumbar spine was done for her which did not show a cause for her symptoms.  Neurology was then consulted to evaluate for this issue.  She has had a EMG for carpal tunnel.  Does complain of a lot of right knee pain.  Does have some pain in the right foot as well.  It is hard for her to walk since admission but denies any weakness in the legs.  Has not had similar symptoms in the past.  No previous history of neuropathy.    9/16/21  Knee pain and numbness is slightly better today.  Still present though not completely resolved.  Denies any other new issues.     PAST MEDICAL & SURGICAL HISTORY     Medical History  Past Medical  History:   Diagnosis Date     Abnormal ECG      Anxiety      Arthritis      Chest pain      Chest pain      Chronic kidney disease     stage 3-mod.     Diabetes (H)      Dyslipidemia, goal LDL below 70      GERD (gastroesophageal reflux disease)      HTN (hypertension)      Hyperlipidemia      Melanoma of ankle (H)     L ankle     Other second degree atrioventricular block     Created by Conversion      Pacemaker      PSVT (paroxysmal supraventricular tachycardia) (H)      Right bundle branch block      Skin cancer      Surgical History  Past Surgical History:   Procedure Laterality Date     ABLATION OF DYSRHYTHMIC FOCUS  04/11/2013    AV analia reentry tachycardia, partially successful     BIOPSY SKIN (LOCATION)       CARDIAC CATHETERIZATION  03/10/2016     CV CORONARY ANGIOGRAM N/A 5/26/2021    Procedure: Coronary Angiogram;  Surgeon: Yony Gillis MD;  Location: [facility] Cardiac Cath Lab;  Service: Cardiology     CV LEFT HEART CATHETERIZATION WITHOUT LEFT VENTRICULOGRAM Left 5/26/2021    Procedure: Left Heart Catheterization Without Left Ventriculogram;  Surgeon: Yony Gillis MD;  Location: [facility] Cardiac Cath Lab;  Service: Cardiology     CV RIGHT HEART CATHETERIZATION N/A 5/26/2021    Procedure: Right Heart Catheterization;  Surgeon: Yony Gillis MD;  Location: [facility] Cardiac Cath Lab;  Service: Cardiology     EP PACEMAKER N/A 8/30/2021    Procedure: Electrophysiology Pacemaker;  Surgeon: Ab Saavedra MD;  Location: [facility] CV     FOOT SURGERY      L foot     HAND SURGERY      on both thumbs     IMPLANT PACEMAKER  04/2011    Second-degree AV block     OTHER SURGICAL HISTORY      SVT Ablation     MS SHLDR ARTHROSCOP,SURG,W/ROTAT CUFF REPR Left 3/9/2017    Procedure: LEFT SHOULDER ARTHROSCOPY DECOMPRESSION DISTAL CLAVICLE EXCISION  ROTATOR CUFF REPAIR BICEPS TENOTOMY AND EXTENSIVE DEBRIDEMENT;  Surgeon: Todd Riggs MD;  Location: [facility];   "Service: Orthopedics     RELEASE CARPAL TUNNEL      both wrists     SKIN CANCER EXCISION      L ankle,Lleg and cheek     TOE SURGERY      L big toe joint replacement     TUBAL LIGATION          SOCIAL HISTORY     Social History     Tobacco Use     Smoking status: Never Smoker     Smokeless tobacco: Never Used   Substance Use Topics     Alcohol use: No     Drug use: No        FAMILY HISTORY     Reviewed, and family history includes Cancer in her father; Cerebrovascular Disease in her mother; Coronary Artery Disease in her father; Dyslipidemia in her brother, father, and sister; Hypertension in her brother and mother; Prostate Cancer in her brother and father.     ALLERGIES     Allergies   Allergen Reactions     Venom-Honey Bee [Bee Venom] Shortness Of Breath     Macrobid [Nitrofurantoin] Other (See Comments)     Burning sensation across chest and arms     Mercurial Analogues [Mercurial Derivatives] Dermatitis     blisters     Sulfa (Sulfonamide Antibiotics) [Sulfa Drugs] Rash        REVIEW OF SYSTEMS     12 system ROS was done other than the HPI this was negative.  No new issues reported.     HOME & HOSPITAL MEDICATIONS     Prior to Admission Medications  No medications prior to admission.       Hospital Medications       PHYSICAL EXAM     Vital signs  Temp:  [98.3  F (36.8  C)-99  F (37.2  C)] 98.3  F (36.8  C)  Pulse:  [60-84] 71  Resp:  [16-20] 20  BP: (100-137)/(55-62) 123/58  SpO2:  [91 %-95 %] 94 %    PHYSICAL EXAMINATION  VITALS: /58   Pulse 71   Temp 98.3  F (36.8  C) (Oral)   Resp 20   Ht 1.549 m (5' 1\")   Wt 66 kg (145 lb 8 oz)   SpO2 94%   BMI 27.49 kg/m    GENERAL -Health appearing, No apparent distress  EYES- No scleral icterus, no eyelid droop, Pupils - see Neuro section  HEENT - Normocephalic, atraumatic, Hearing grossly intact; Oral mucosa moist and pink in color. External Ears and nose intact.   Neck - supple with no obvious lymphadenopathy or thyromegaly  PULM - Good spontaneous " respiratory effort; Normal palpation of chest.   CV- Pedal pulses present with no peripheral edema/ No significant varicosities.  MSK- Gait - see Neuro section; Strength and tone- see Neuro section; Range of motion grossly intact.  PSYCH -cooperative    Neurological  Mental status - Patient is awake and oriented to self, place and time. Attention span is normal. Language is fluent and follows commands appropriately.   Cranial nerves - CN II-XII intact. Pupils are reactive and symmetric; EOMI, VFIT, NLF symmetric  Motor - There is no pronator drift. Motor exam shows 5/5 strength in all extremities.  No foot drop noted.  She does have some pain in her foot and knee on the right side which can cause functional weakness.  Tone - Tone is symmetric bilaterally in upper and lower extremities.  Sensation intact grossly to light touch and pain.  Coordination - Finger to nose and heel to shin is without dysmetria.  Gait and station --unable to safely ambulate.  Formal gait testing cannot be done due to safety concerns from ongoing medical issues.       DIAGNOSTIC STUDIES     Pertinent Radiology   HEAD CT:  1.  No acute intracranial hemorrhage.  2.  No CT evidence acute infarct. Aspect score 10.  3.  Age-related changes described above.     Dr. Tashi Lackey was notified by Dr Kevin Cooper at  1:40 AM 09/13/2021.      HEAD CTA:   1.  No intracranial arterial large vessel occlusion.  2.  Right carotid siphon mild stenosis.   3.  Left carotid siphon severe stenosis.  4.  Otherwise, no significant stenosis/occlusion.      NECK CTA:  1.  Right vertebral artery origin moderate stenosis.  2.  Left proximal subclavian artery mild-to-moderate stenosis.  3.  Otherwise, no significant stenosis/occlusion. No dissection.  4.  Multiple thyroid nodules. Recommend nonemergent thyroid ultrasound based upon ACR white paper criteria.   5.  Lung apices demonstrate septal thickening with patchy groundglass opacities and consolidations most  likely due to pulmonary edema. Please correlate clinically to exclude acute infectious inflammatory pneumonitis.    Carotid US  IMPRESSION:  1.  Mild plaque formation, velocities consistent with less than 50% stenosis in the right internal carotid artery.  2.  Mild plaque formation, velocities consistent with less than 50% stenosis in the left internal carotid artery.  3.  Flow within the vertebral arteries is antegrade.  4.  Bilateral thyroid nodules, recommend further evaluation with dedicated thyroid ultrasound.    CT L spine  1.  No evidence of lumbar spine fracture.     2.  Multilevel degenerative change throughout the lumbar disc spaces with moderate spinal canal stenosis at L3-4. Moderate spinal canal stenosis at L4-5. Variable degrees of foraminal narrowing. Please see body of report for details at each level.  Recent Results (from the past 24 hour(s))   Glucose by meter    Collection Time: 09/15/21  5:20 PM   Result Value Ref Range    GLUCOSE BY METER POCT 159 (H) 70 - 99 mg/dL   Glucose by meter    Collection Time: 09/15/21  9:18 PM   Result Value Ref Range    GLUCOSE BY METER POCT 168 (H) 70 - 99 mg/dL   Platelet count    Collection Time: 09/16/21  7:06 AM   Result Value Ref Range    Platelet Count 186 150 - 450 10e3/uL   Basic metabolic panel    Collection Time: 09/16/21  7:06 AM   Result Value Ref Range    Sodium 136 136 - 145 mmol/L    Potassium 3.3 (L) 3.5 - 5.0 mmol/L    Chloride 100 98 - 107 mmol/L    Carbon Dioxide (CO2) 26 22 - 31 mmol/L    Anion Gap 10 5 - 18 mmol/L    Urea Nitrogen 45 (H) 8 - 28 mg/dL    Creatinine 1.38 (H) 0.60 - 1.10 mg/dL    Calcium 8.9 8.5 - 10.5 mg/dL    Glucose 139 (H) 70 - 125 mg/dL    GFR Estimate 36 (L) >60 mL/min/1.73m2   Glucose by meter    Collection Time: 09/16/21  8:21 AM   Result Value Ref Range    GLUCOSE BY METER POCT 149 (H) 70 - 99 mg/dL       Total time spent for face to face visit, reviewing labs/imaging studies, counseling and coordination of care was: Over  30 min More than 50% of this time was spent on counseling and coordination of care.    Counseling patient.  Setting up outpatient EMG and appointment.  Coordination of care with the nursing staff.  Discharge planning..    Reji Davila MD  Neurologist  [facility]  Tel:- [phone]

## 2021-09-16 NOTE — PLAN OF CARE
Problem: SLP General Care Plan  Goal: SLP Frequency  Outcome: Completed  Goal: Swallow Goal: Safely tolerate diet without aspiration (SLP)  Description: Safely tolerate diet without signs/symptoms of aspiration  Outcome: Completed   Follow up re:diet tolerance of regular with thin liquids. No oral issues noted during conversational speech. I Analyzed pt drinking approximately 1 oz thin  liquid from straw with no overt sx's aspiration or oral dysphagia. Recommenmd ST be dc'd at this time as goals have been met.

## 2021-09-16 NOTE — PLAN OF CARE
Occupational Therapy Discharge Summary    Reason for therapy discharge:    Discharged to home.    Progress towards therapy goal(s). See goals on Care Plan in [facility] electronic health record for goal details.  Goals met    Therapy recommendation(s):    No further therapy is recommended.

## 2021-09-16 NOTE — PROGRESS NOTES
Care Management Discharge Note    Discharge Date: 09/16/2021       Discharge Disposition: Home    Discharge Services: None    Discharge DME:  none    Discharge Transportation: family or friend will provide      Education Provided on the Discharge Plan: yes     Persons Notified of Discharge Plans: yes    Patient/Family in Agreement with the Plan: yes     Handoff Referral Completed: yes    Additional Information:  Patient independent at baseline and will return home with support from spouse. Family to transport.         Yasmine Arvizu RN

## 2021-09-17 ENCOUNTER — TELEPHONE (OUTPATIENT)
Dept: NEUROLOGY | Facility: CLINIC | Age: 81
End: 2021-09-17

## 2021-09-17 ENCOUNTER — PATIENT OUTREACH (OUTPATIENT)
Dept: CARE COORDINATION | Facility: CLINIC | Age: 81
End: 2021-09-17

## 2021-09-17 DIAGNOSIS — Z71.89 OTHER SPECIFIED COUNSELING: ICD-10-CM

## 2021-09-17 DIAGNOSIS — R20.0 NUMBNESS: Primary | ICD-10-CM

## 2021-09-17 NOTE — TELEPHONE ENCOUNTER
----- Message from Reji Davila MD sent at 9/17/2021 10:49 AM CDT -----  Recent chart encounter message so I can put in the order.  Thank you  ----- Message -----  From: Chirag Peterson MA  Sent: 9/17/2021   8:40 AM CDT  To: Reji Davila MD    Please add in EMG order   ----- Message -----  From: Sruthi Martin  Sent: 9/16/2021   3:23 PM CDT  To: Chirag Peterson MA    Please enter EMG order. First available is in January, where do you want to add pt in.    ----- Message -----  From: Chirag Peterson MA  Sent: 9/16/2021   2:31 PM CDT  To: [facility] Neurology Scheduling Pool      ----- Message -----  From: Reji Davila MD  Sent: 9/16/2021   1:08 PM CDT  To: [facility] Care Team    Saw the patient in the hospital.  Needs bilateral lower extremity EMG and a follow-up.  Could you please call her and schedule appointments.

## 2021-09-17 NOTE — PROGRESS NOTES
Clinic Care Coordination Contact  Care Team Conversations    Patient identified for care management outreach, however patient is not on a value based contract so cannot complete outreach. Will escalate to clinic staff if specific needs or resources are indicated.    Lashell Rodriguez, [facility]  Clinic Care Cordinator  [facility]   [phone]

## 2021-09-18 NOTE — DISCHARGE SUMMARY
[facility]  Hospitalist Discharge Summary      Date of Admission:  9/13/2021  Date of Discharge:  9/16/2021 12:50 PM  Discharging Provider: Mauro Mckeon MD      Discharge Diagnoses   Hypertensive Urgency, CHF    Follow-ups Needed After Discharge   Follow-up Appointments     Follow-up and recommended labs and tests       Follow up with primary care provider, Jamie Mcconnell, within 7 days to   evaluate medication change and for hospital follow- up.  The following   labs/tests are recommended: BMP.    Follow up with [facility] Neurology for EMG testing.         Check orthostatics    Unresulted Labs Ordered in the Past 30 Days of this Admission     Date and Time Order Name Status Description    9/15/2021  9:31 PM Protein Electrophoresis, Serum In process     9/15/2021  5:08 PM Protein Immunofixation Serum In process     9/15/2021  5:08 PM Vitamin B1 whole blood In process       These results will be followed up by Neurology    Discharge Disposition   Discharged to home  Condition at discharge: Stable      Hospital Course              Summary: 81 year old female with hx of hypertension, dyslipidemia, CKD3, DM2, presented to [facility] ED with sudden onset of severe headache and body shaking 1 hour prior to admission.    Principal Problem:    Acute diastolic CHF (congestive heart failure): Initiated aggressive diuresis with furosemide IV drip.  Consult cardiology appreciated.  - appears euvolemic on exam and did have ortho stasis and very low BP yesterday morning.  - switched to PO lasix 20 mg daily.      Acute respiratory failure with hypoxia: Secondary to pulmonary edema from acute congestive heart failure.  BiPAP support, nitro drip, furosemide drip required to stabilize  - resolved with diuresis      Hypertensive emergency: Resumed all home antihypertensives.  Initially on nitro drip, lso furosemide drip.  Will convert back to oral meds now that pressure is better.  She runs  a bit high in the day but low at night so asked her to check daily and hold ARB if low bp at home.    Orthostatic hypotension:  Recommend PCP check orthostatics.      Right toes numb  Just woke up with this.  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      Severe headache: Secondary to hypertensive emergency.  Now resovled.  Head imaging is negative.      Left carotid severe siphon stenosis: As seen on CTA head and neck.  US shows no critical stenosis.  Has F/U with Neuro and PCP.      Dyslipidemia, goal LDL below 70: Continue home statin     Chronic kidney disease, stage 3a with mild QUE: likely due to diuresis, resolving, check at follow up.      Diabetes mellitus type 2 without retinopathy: Restart Metformin      Gastroesophageal reflux disease without esophagitis: Resume home PPI        Consultations This Hospital Stay   CARDIOLOGY IP CONSULT  CORE CLINIC EVALUATION IP CONSULT  OCCUPATIONAL THERAPY ADULT IP CONSULT  NUTRITION SERVICES ADULT IP CONSULT  CARE MANAGEMENT / SOCIAL WORK IP CONSULT  SPEECH LANGUAGE PATH ADULT IP CONSULT  NEUROLOGY IP CONSULT  SPIRITUAL HEALTH SERVICES IP CONSULT    Code Status   Prior    Time Spent on this Encounter   I, Mauro Mckeon MD, personally saw the patient today and spent greater than 30 minutes discharging this patient.       Mauro Mckeon MD  [address] [address]  Phone: [phone]  Fax: [phone]  ______________________________________________________________________    Physical Exam   Vital Signs:                    Weight: 145 lbs 8 oz  Physical Exam   Vital Signs: Temp: 99.9  F (37.7  C) Temp src: Oral BP: (!) 142/64 (RN notified) Pulse: 82   Resp: 18 SpO2: 94 % O2 Device: None (Room air)    Weight: 145 lbs 8  oz  Constitutional: awake, alert, cooperative, no apparent distress, and appears stated age and appears stated age  Eyes: Lids and lashes normal, pupils equal, round and reactive to light, extra ocular muscles intact, sclera clear, conjunctiva normal  Respiratory: No increased work of breathing, good air exchange, clear to auscultation bilaterally, no crackles or wheezing  Cardiovascular: Normal apical impulse, regular rate and rhythm, normal S1 and S2, no S3 or S4, and no murmur noted  Skin: no bruising or bleeding  Musculoskeletal: There is no redness, warmth, or swelling of the joints.  Full range of motion noted.  Motor strength is 5 out of 5 all extremities bilaterally.  Tone is normal.  Neurologic: Awake, alert, oriented to name, place and time.  Cranial nerves II-XII are grossly intact.  Motor is 5 out of 5 bilaterally.  Cerebellar finger to nose, heel to shin intact.  Sensory is intact.  Babinski down going, Romberg negative, and gait is normal.  Subjective numbness in all toes of the right foot.  1 beat of clonus in the ankle, normal dorsi/plantar flexion  Neuropsychiatric: General: normal, calm and normal eye contact          Primary Care Physician   Jamie Mcconnell    Discharge Orders      Reason for your hospital stay    Hypertensive Urgency     Follow-up and recommended labs and tests     Follow up with primary care provider, Jamie Mcconnell, within 7 days to evaluate medication change and for hospital follow- up.  The following labs/tests are recommended: BMP.    Follow up with [facility] Neurology for EMG testing.     Activity    Your activity upon discharge: activity as tolerated.  Walks with assistive device.     Monitor and record    blood pressure daily.  If top number is <110 please hold your losartan dose for the day and report levels above 175 to Dr. Mcconnell.     Diet    Follow this diet upon discharge: Orders Placed This Encounter      Combination Diet Regular Diet Adult; Renal Diet;  Consistent Carb 90 Grams CHO per Meal Diet       Significant Results and Procedures   Results for orders placed or performed during the hospital encounter of 09/13/21   CTA Head Neck with Contrast    Narrative    EXAM: CTA  HEAD NECK WITH CONTRAST  LOCATION: [facility]  DATE/TIME: 9/13/2021 1:27 AM    INDICATION: Dizziness. Blurry vision.  COMPARISON: None.  CONTRAST: ISOVUE 370 75ML  TECHNIQUE: Head and neck CT angiogram with IV contrast. Noncontrast head CT followed by axial helical CT images of the head and neck vessels obtained during the arterial phase of intravenous contrast administration. Axial 2D reconstructed images and   multiplanar 3D MIP reconstructed images of the head and neck vessels were performed by the technologist. Dose reduction techniques were used. All stenosis measurements made according to NASCET criteria unless otherwise specified.    FINDINGS:   Streak artifact limits aspects of exam.    NONCONTRAST HEAD CT:   INTRACRANIAL CONTENTS: No intracranial hemorrhage, extraaxial collection, or mass effect.  No CT evidence of acute infarct. Mild presumed chronic small vessel ischemic changes. Mild to moderate generalized volume loss. No hydrocephalus.       HEAD CTA:  Right carotid siphon mild stenosis. Left carotid siphon severe stenosis.    Otherwise, no significant stenosis/occlusion. No brain aneurysm. No AVM/AVF.    Bilateral fetal origins. Balanced vertebral arteries supply a normal basilar artery.     DURAL VENOUS SINUSES: Expected enhancement of the major dural venous sinuses.      NECK CTA:  RIGHT CAROTID: No significant stenosis/occlusion. No dissection.    LEFT CAROTID: No significant stenosis/occlusion. No dissection.    VERTEBRAL ARTERIES:  Right vertebral artery origin moderate stenosis.    Remaining bilateral extradural vertebral arteries demonstrate no significant stenosis/occlusion. No dissection.      Balanced vertebral arteries.    AORTIC ARCH: Classic  aortic arch anatomy. Left proximal subclavian artery mild-to-moderate stenosis.    ARTERIAL PLAQUE: Calcified/noncalcified plaque within aorta, left subclavian artery, right brachial cephalic artery, right subclavian artery, bilateral carotid bifurcations/bulbs, bilateral precavernous ICAs, bilateral carotid siphons.    NONVASCULAR STRUCTURES:   Dental amalgam resulting in streak artifact. Left chest pacer type device. Degenerative changes noted within the spine. C7 T1 grade 1 anterolisthesis. Several cervical levels demonstrate severe central canal stenosis. Multiple thyroid nodules largest   measuring 2.1 cm. Recommend nonemergent thyroid ultrasound based upon ACR white paper criteria. Lung apices demonstrate septal thickening with patchy groundglass opacities and consolidations most likely due to pulmonary edema. Please correlate clinically   to exclude acute infectious inflammatory pneumonitis.            Impression    IMPRESSION:   HEAD CT:  1.  No acute intracranial hemorrhage.  2.  No CT evidence acute infarct. Aspect score 10.  3.  Age-related changes described above.    Dr. Tashi Lackey was notified by Dr Kevin Cooper at  1:40 AM 09/13/2021.     HEAD CTA:   1.  No intracranial arterial large vessel occlusion.  2.  Right carotid siphon mild stenosis.   3.  Left carotid siphon severe stenosis.  4.  Otherwise, no significant stenosis/occlusion.     NECK CTA:  1.  Right vertebral artery origin moderate stenosis.  2.  Left proximal subclavian artery mild-to-moderate stenosis.  3.  Otherwise, no significant stenosis/occlusion. No dissection.  4.  Multiple thyroid nodules. Recommend nonemergent thyroid ultrasound based upon ACR white paper criteria.   5.  Lung apices demonstrate septal thickening with patchy groundglass opacities and consolidations most likely due to pulmonary edema. Please correlate clinically to exclude acute infectious inflammatory pneumonitis.      Dr. Tashi Lackey was notified by Dr Brown  Kenneth at  1:55 AM 09/13/2021.    XR Chest Port 1 View    Narrative    EXAM: XR CHEST PORT 1 VIEW  LOCATION: [facility]  DATE/TIME: 9/13/2021 2:17 AM    INDICATION: Dyspnea.  COMPARISON: 5/7/2021.      Impression    IMPRESSION: Dual-lead left-sided cardiac pacer. Mild cardiac enlargement and increased pulmonary vascularity with some interstitial edema. Findings suggest CHF or fluid overload. Patchy hazy interstitial opacities in the upper lungs more so on the right   may be due to infiltrate versus edema. Aortic calcification. No significant bony abnormalities.   US Carotid Bilateral    Narrative    EXAM: US CAROTID BILATERAL  LOCATION: [facility]  DATE/TIME: 9/13/2021 5:07 PM    INDICATION: History of left carotid siphon stenosis as seen on recent CTA.  COMPARISON: CT angiography head and neck 9/13/2021  TECHNIQUE: Duplex exam performed utilizing 2D gray-scale imaging, Doppler interrogation with color-flow and spectral waveform analysis. The percent diameter stenosis is determined using NASCET criteria and Society of Radiologists in Ultrasound Consensus   Criteria.    FINDINGS:    RIGHT: Mild plaque at the bifurcation. The peak systolic velocity in the ICA is less than 125 cm/sec, consistent with less than 50% stenosis. Normal velocities in the ECA. Antegrade flow within the vertebral artery.     LEFT: Mild plaque at the bifurcation. The peak systolic velocity in the ICA is less than 125 cm/sec, consistent with less than 50% stenosis. Normal velocities in the ECA. Antegrade flow within the vertebral artery.    VELOCITY CHART:  CCA   Right: 93 cm/s   Left: 69 cm/s  ICA   Right: 78 cm/s   Left: 106 cm/s  ECA   Right: 68 cm/s   Left: 66 cm/s  ICA/CCA PSV Ratio   Right: 0.4   Left: 1.5    There is a mixed cystic/solid 1.7 x 1.2 x 1.2 cm right thyroid nodule, and a solid 2.3 x 1.4 x 1.2 cm left thyroid nodule. Recommend further evaluation with dedicated thyroid  ultrasound      Impression    IMPRESSION:  1.  Mild plaque formation, velocities consistent with less than 50% stenosis in the right internal carotid artery.  2.  Mild plaque formation, velocities consistent with less than 50% stenosis in the left internal carotid artery.  3.  Flow within the vertebral arteries is antegrade.  4.  Bilateral thyroid nodules, recommend further evaluation with dedicated thyroid ultrasound.   CT Lumbar Spine w/o Contrast    Narrative    EXAM: CT LUMBAR SPINE W/O CONTRAST  LOCATION: [facility]  DATE/TIME: 9/14/2021 8:39 PM    INDICATION: Back pain, injury.  COMPARISON: None.  TECHNIQUE: Routine CT Lumbar Spine without IV contrast. Multiplanar reformats. Dose reduction techniques were used.    FINDINGS:  No evidence of lumbar spine fracture. Limited views of the retroperitoneal structures and lung bases are clear. Leftward scoliotic curvature of the lumbar spine. No evidence of fracture.    L1-L2: DDD change at L1-L2 with 5 mm of degenerative retrolisthesis of L1 on L2 with 6 mm of rightward lateral translation of L1 on L2. Spinal canal is patent. Bilateral foraminal stenosis.    L2-L3: DDD change at L2-L3 with 3 mm of degenerative retrolisthesis of L2 on L3 with mild disc bulging and mild spinal canal narrowing. Foramen are patent. Slight loss of disc height.    L3-L4: DDD change at L3-4 with loss of disc height, endplate sclerosis and 4 mm of grade 1 anterior spondylolisthesis of L3 on L4. Uncovering/bulging of the degenerated/desiccated disc margin results in moderate spinal canal stenosis with posterior   element facet hypertrophic change and thickening of the ligamentum flavum. The left foramen is mildly narrowed. Right foramen is severely narrowed. 6 mm of leftward lateral translation of L3 on L4.    L4-L5: DDD change at L4-L5 with grade 1 anterior spondylolisthesis of approximately 4 mm with uncovering/bulging of the degenerated disc margin and posterior  element facet hypertrophic change with thickening of the ligamentum flavum resulting in moderate   spinal canal stenosis. Right foramen is patent. Left foramen is mildly narrowed.    L5-S1: Bilateral facet degeneration. Spinal canal and foramen are patent.      Impression    IMPRESSION:  1.  No evidence of lumbar spine fracture.    2.  Multilevel degenerative change throughout the lumbar disc spaces with moderate spinal canal stenosis at L3-4. Moderate spinal canal stenosis at L4-5. Variable degrees of foraminal narrowing. Please see body of report for details at each level.   XR Knee Port Right 1/2 Views    Narrative    EXAM: XR KNEE PORT RIGHT 1/2 VIEWS  LOCATION: [facility]  DATE/TIME: 9/15/2021 4:38 PM    INDICATION: New pain with weight bearing.  COMPARISON: None.      Impression    IMPRESSION: Mild osteoarthrosis of the medial compartment. Moderate osteoarthrosis of the lateral compartment. Severe patellofemoral osteoarthrosis. There are ossified intra-articular bodies and there is a moderately large effusion. There is   chondrocalcinosis in the medial and lateral compartments. Diffuse bone demineralization. No radiographic evidence of fracture or lipohemarthrosis.       Discharge Medications   Discharge Medication List as of 9/16/2021 11:18 AM      CONTINUE these medications which have CHANGED    Details   losartan (COZAAR) 50 MG tablet Take 1 tablet (50 mg) by mouth every evening, Disp-30 tablet, R-0, E-Prescribe         CONTINUE these medications which have NOT CHANGED    Details   aspirin 325 MG tablet [ASPIRIN 325 MG TABLET] Take 325 mg by mouth every evening. , Historical      atorvastatin (LIPITOR) 80 MG tablet [ATORVASTATIN (LIPITOR) 80 MG TABLET] Take 80 mg by mouth bedtime., Historical      carvediloL (COREG) 25 MG tablet [CARVEDILOL (COREG) 25 MG TABLET] Take 1 tablet (25 mg total) by mouth 2 (two) times a day with meals., Disp-180 tablet, R-3, Normal      furosemide (LASIX)  20 MG tablet [FUROSEMIDE (LASIX) 20 MG TABLET] Take 0.5 tablets (10 mg total) by mouth daily., Disp-60 tablet, R-3, Normal      hydrALAZINE (APRESOLINE) 100 MG tablet [HYDRALAZINE (APRESOLINE) 100 MG TABLET] TAKE 1 TABLET(100 MG) BY MOUTH THREE TIMES DAILY, Disp-270 tablet, R-1, Normal**Patient requests 90 days supply**      LORazepam (ATIVAN) 0.5 MG tablet [LORAZEPAM (ATIVAN) 0.5 MG TABLET] Take 1 tablet (0.5 mg total) by mouth every 6 (six) hours as needed for anxiety (or tremors)., Disp-12 tablet, R-0, Print      metFORMIN (GLUCOPHAGE) 500 MG tablet [METFORMIN (GLUCOPHAGE) 500 MG TABLET] Take 1 tablet (500 mg total) by mouth 2 (two) times a day with meals. At breakfast and at dinner, Disp-60 tablet, R-0, Normal      multivitamin therapeutic (THERAGRAN) tablet [MULTIVITAMIN THERAPEUTIC (THERAGRAN) TABLET] Take 1 tablet by mouth every evening. , Historical      omeprazole (PRILOSEC) 20 MG capsule [OMEPRAZOLE (PRILOSEC) 20 MG CAPSULE] Take 20 mg by mouth daily before breakfast. , Historical      prednisoLONE acetate (PRED-FORTE) 1 % ophthalmic suspension [PREDNISOLONE ACETATE (PRED-FORTE) 1 % OPHTHALMIC SUSPENSION] Administer 1 drop to the right eye daily. , Historical      vit C/E/Zn/coppr/lutein/zeaxan (PRESERVISION AREDS-2 ORAL) [VIT C/E/ZN/COPPR/LUTEIN/ZEAXAN (PRESERVISION AREDS-2 ORAL)] Take 1 tablet by mouth 2 (two) times a day before meals., Historical           Allergies   Allergies   Allergen Reactions     Venom-Honey Bee [Bee Venom] Shortness Of Breath     Macrobid [Nitrofurantoin] Other (See Comments)     Burning sensation across chest and arms     Mercurial Analogues [Mercurial Derivatives] Dermatitis     blisters     Sulfa (Sulfonamide Antibiotics) [Sulfa Drugs] Rash

## 2021-09-19 LAB — VIT B1 PYROPHOSHATE BLD-SCNC: 109 NMOL/L

## 2021-09-20 LAB
ALBUMIN PERCENT: 61.4 % (ref 51–67)
ALBUMIN SERPL ELPH-MCNC: 3.9 G/DL (ref 3.2–4.7)
ALPHA 1 PERCENT: 3.7 % (ref 2–4)
ALPHA 2 PERCENT: 12.2 % (ref 5–13)
ALPHA1 GLOB SERPL ELPH-MCNC: 0.2 G/DL (ref 0.1–0.3)
ALPHA2 GLOB SERPL ELPH-MCNC: 0.8 G/DL (ref 0.4–0.9)
B-GLOBULIN SERPL ELPH-MCNC: 0.6 G/DL (ref 0.7–1.2)
BETA PERCENT: 10 % (ref 10–17)
GAMMA GLOB SERPL ELPH-MCNC: 0.8 G/DL (ref 0.6–1.4)
GAMMA GLOBULIN PERCENT: 12.7 % (ref 9–20)
PATH ICD:: ABNORMAL
PATH ICD:: NORMAL
PROT PATTERN SERPL ELPH-IMP: ABNORMAL
PROT PATTERN SERPL IFE-IMP: NORMAL
REVIEWING PATHOLOGIST: ABNORMAL
REVIEWING PATHOLOGIST: NORMAL
TOTAL PROTEIN SERUM FOR ELP (SYNCED VALUE): ABNORMAL

## 2021-09-20 PROCEDURE — 86334 IMMUNOFIX E-PHORESIS SERUM: CPT | Mod: 26 | Performed by: PATHOLOGY

## 2021-09-20 PROCEDURE — 84165 PROTEIN E-PHORESIS SERUM: CPT | Mod: 26 | Performed by: PATHOLOGY

## 2021-09-21 ENCOUNTER — TELEPHONE (OUTPATIENT)
Dept: CARDIOLOGY | Facility: CLINIC | Age: 81
End: 2021-09-21

## 2021-09-21 DIAGNOSIS — I10 HYPERTENSION, UNSPECIFIED TYPE: Primary | ICD-10-CM

## 2021-09-21 RX ORDER — FUROSEMIDE 20 MG
20 TABLET ORAL DAILY
Qty: 60 TABLET | Refills: 3 | Status: SHIPPED | OUTPATIENT
Start: 2021-09-21 | End: 2021-09-21

## 2021-09-21 RX ORDER — FUROSEMIDE 20 MG
20 TABLET ORAL DAILY
Qty: 90 TABLET | Refills: 3 | Status: SHIPPED | OUTPATIENT
Start: 2021-09-21 | End: 2022-06-08

## 2021-09-21 NOTE — TELEPHONE ENCOUNTER
----- Message from Argenis Rahman sent at 9/21/2021 10:48 AM CDT -----  Regarding: zahra julian  General phone call:    Caller: Sharyn  Primary cardiologist: Lashell  Detailed reason for call: pt is stating for that medication furosemide she is suppose to take 20 mg but was told from pt pcp to take half of furosemide 20 mg. Which is the correct medication dosage, please advise .    Best phone number: [phone]  Best time to contact: any  Ok to leave a detailedmessage? yes  Device? yes    Additional Info:

## 2021-09-21 NOTE — TELEPHONE ENCOUNTER
Sharyn is contacted to discuss her questions of medication dosing of the Furosemide following discharge.   She noted that she was taking Furosemide 10mg daily prior to admission. She was treated for fluid overload.   Sharyn does recall that she was advised verbally to take the full 20mg tablet following her discharge. However, she notes that the discharge paper work does not reflect this.   Reviewed with Dr. Palomares today. She confirmed that the intended dosing of the Furosemide for Sharyn was 20mg daily at the time of her discharge.    Her Epic med list is updated to reflect this today.Sharyn is notified of this, and she states that she has been taking the full 20mg tablet daily since going home from the hospital.    Yomaira Sanchez RN BSN, CHFN

## 2021-09-22 ENCOUNTER — TELEPHONE (OUTPATIENT)
Dept: NEUROLOGY | Facility: CLINIC | Age: 81
End: 2021-09-22

## 2021-09-22 NOTE — TELEPHONE ENCOUNTER
Called and spoke with pt. Informed of abnormal results and follow up with EMG. No further questions. Will contact pt if there are cancellations.   Chirag Peterson MA on 9/22/2021 at 10:51 AM

## 2021-09-22 NOTE — TELEPHONE ENCOUNTER
----- Message from Reji Davila MD sent at 9/21/2021  1:00 PM CDT -----  Could let the patient know one of her test from the hospital is abnormal.  This could be the cause of her numbness.  We possibly would need to repeat the test in 6 months.  Nothing else to do for right now other than the EMG.  ----- Message -----  From: Jim Caban II, MD  Sent: 9/21/2021   8:20 AM CDT  To: Reji Davila MD    [facility] Dr. Davila this lab you ordered while this patient was at [facility] shows IgM lambda monoclonal gammopathy.

## 2021-09-23 ENCOUNTER — OFFICE VISIT (OUTPATIENT)
Dept: CARDIOLOGY | Facility: CLINIC | Age: 81
End: 2021-09-23
Payer: COMMERCIAL

## 2021-09-23 VITALS
HEIGHT: 61 IN | WEIGHT: 151 LBS | RESPIRATION RATE: 16 BRPM | SYSTOLIC BLOOD PRESSURE: 138 MMHG | BODY MASS INDEX: 28.51 KG/M2 | HEART RATE: 54 BPM | DIASTOLIC BLOOD PRESSURE: 60 MMHG

## 2021-09-23 DIAGNOSIS — I10 BENIGN ESSENTIAL HYPERTENSION: ICD-10-CM

## 2021-09-23 DIAGNOSIS — I50.31 ACUTE DIASTOLIC CHF (CONGESTIVE HEART FAILURE) (H): Primary | ICD-10-CM

## 2021-09-23 DIAGNOSIS — N18.31 CHRONIC KIDNEY DISEASE, STAGE 3A (H): ICD-10-CM

## 2021-09-23 LAB
ANION GAP SERPL CALCULATED.3IONS-SCNC: 10 MMOL/L (ref 5–18)
BUN SERPL-MCNC: 27 MG/DL (ref 8–28)
CALCIUM SERPL-MCNC: 9.9 MG/DL (ref 8.5–10.5)
CHLORIDE BLD-SCNC: 100 MMOL/L (ref 98–107)
CO2 SERPL-SCNC: 28 MMOL/L (ref 22–31)
CREAT SERPL-MCNC: 1.11 MG/DL (ref 0.6–1.1)
GFR SERPL CREATININE-BSD FRML MDRD: 47 ML/MIN/1.73M2
GLUCOSE BLD-MCNC: 80 MG/DL (ref 70–125)
POTASSIUM BLD-SCNC: 4.8 MMOL/L (ref 3.5–5)
SODIUM SERPL-SCNC: 138 MMOL/L (ref 136–145)

## 2021-09-23 PROCEDURE — 36415 COLL VENOUS BLD VENIPUNCTURE: CPT | Performed by: NURSE PRACTITIONER

## 2021-09-23 PROCEDURE — 99214 OFFICE O/P EST MOD 30 MIN: CPT | Performed by: NURSE PRACTITIONER

## 2021-09-23 PROCEDURE — 80048 BASIC METABOLIC PNL TOTAL CA: CPT | Performed by: NURSE PRACTITIONER

## 2021-09-23 ASSESSMENT — MIFFLIN-ST. JEOR: SCORE: 1092.31

## 2021-09-23 NOTE — PROGRESS NOTES
"      Assessment/Recommendations   Assessment:    1.  Heart failure with preserved ejection fraction, NYHA class II: Compensated.  She states her symptoms have improved since hospitalization.  Her weight has been stable.  Her Lasix was increased at discharge.    2.  Hypertension: Blood pressure 138/60    3.  Hypokalemia: Her potassium was 3.3 at discharge.  BMP pending. Encouraged her to increase high potassium foods in diet    Plan:  1.  BMP pending due to potassium being 3.3 at discharge  2.  Continue current medications  3.  Continue all daily weights and low-salt diet    Sharyn Trent will follow up with  in 6 weeks, Dr. Saavedra in April and in the heart failure clinic in 3 months.     History of Present Illness/Subjective    Ms. Sharyn Trent is a 81 year old female seen at [facility] heart failure clinic today for continued follow-up. Her daughter accompanies her today.  She follows up for heart failure with preserved ejection fraction.  She was recently hospitalized September 13 to September 16, 2021 with acute heart failure.  She was started on a Lasix drip.  Her symptoms improved. Her last echocardiogram was done March 2021 which showed ejection fraction of 55%. She has a past medical history significant for hypertension, diabetes, pacemaker, dyslipidemia.     Today, she states she is doing well hospitalization.  Her dyspnea on exertion has improved.  She denies lightheadedness, shortness of breath, orthopnea, PND, palpitations, chest pain and abdominal fullness/bloating.      She is monitoring home weights which are stable between 146-148 pounds.  She is following a low sodium diet.       Physical Examination Review of Systems   /60 (BP Location: Right arm, Patient Position: Sitting, Cuff Size: Adult Regular)   Pulse 54   Resp 16   Ht 1.549 m (5' 1\")   Wt 68.5 kg (151 lb)   BMI 28.53 kg/m    Body mass index is 28.53 kg/m .  Wt Readings from Last 3 Encounters:   09/23/21 68.5 kg " "(151 lb)   09/14/21 66 kg (145 lb 8 oz)   09/07/21 68.9 kg (152 lb)       General Appearance:   no acute distress   ENT/Mouth: membranes moist, no oral lesions or bleeding gums.      EYES:  no scleral icterus, normal conjunctivae   Neck: no carotid bruits or thyromegaly   Chest/Lungs:   lungs are clear to auscultation, no rales or wheezing, equal chest wall expansion    Cardiovascular:   Regular. Normal first and second heart sounds with no murmurs, rubs, or gallops; Jugular venous pressure normal, trace edema bilaterally    Abdomen:  no organomegaly, masses, bruits, or tenderness; bowel sounds are present   Extremities: no cyanosis or clubbing   Skin: no xanthelasma, warm.    Neurologic: normal  bilateral, no tremors     Psychiatric: alert and oriented x3    Enc Vitals  BP: 138/60  Pulse: 54  Resp: 16  Weight: 68.5 kg (151 lb)  Height: 154.9 cm (5' 1\")                                         Medical History  Surgical History Family History Social History   Past Medical History:   Diagnosis Date     Abnormal ECG      Anxiety      Arthritis      Chest pain      Chest pain      Chronic kidney disease     stage 3-mod.     Diabetes (H)      Dyslipidemia, goal LDL below 70      GERD (gastroesophageal reflux disease)      HTN (hypertension)      Hyperlipidemia      Melanoma of ankle (H)     L ankle     Other second degree atrioventricular block     Created by Conversion      Pacemaker      PSVT (paroxysmal supraventricular tachycardia) (H)      Right bundle branch block      Skin cancer     Past Surgical History:   Procedure Laterality Date     ABLATION OF DYSRHYTHMIC FOCUS  04/11/2013    AV analia reentry tachycardia, partially successful     BIOPSY SKIN (LOCATION)       CARDIAC CATHETERIZATION  03/10/2016     CV CORONARY ANGIOGRAM N/A 5/26/2021    Procedure: Coronary Angiogram;  Surgeon: Yony Gillis MD;  Location: [facility] Cardiac Cath Lab;  Service: Cardiology     CV LEFT HEART CATHETERIZATION WITHOUT " LEFT VENTRICULOGRAM Left 5/26/2021    Procedure: Left Heart Catheterization Without Left Ventriculogram;  Surgeon: Yony Gillis MD;  Location: [facility] Cardiac Cath Lab;  Service: Cardiology     CV RIGHT HEART CATHETERIZATION N/A 5/26/2021    Procedure: Right Heart Catheterization;  Surgeon: Yony Gillis MD;  Location: [facility] Cardiac Cath Lab;  Service: Cardiology     EP PACEMAKER N/A 8/30/2021    Procedure: Electrophysiology Pacemaker;  Surgeon: Ab Saavedra MD;  Location: [facility] CATH [facility] CV     FOOT SURGERY      L foot     HAND SURGERY      on both thumbs     IMPLANT PACEMAKER  04/2011    Second-degree AV block     OTHER SURGICAL HISTORY      SVT Ablation     [place] SHLDR ARTHROSCOP,SURG,W/ROTAT CUFF REPR Left 3/9/2017    Procedure: LEFT SHOULDER ARTHROSCOPY DECOMPRESSION DISTAL CLAVICLE EXCISION  ROTATOR CUFF REPAIR BICEPS TENOTOMY AND EXTENSIVE DEBRIDEMENT;  Surgeon: Todd Riggs MD;  Location: [facility];  Service: Orthopedics     RELEASE CARPAL TUNNEL      both wrists     SKIN CANCER EXCISION      L ankle,Lleg and cheek     TOE SURGERY      L big toe joint replacement     TUBAL LIGATION      Family History   Problem Relation Age of Onset     Hypertension Mother      Cerebrovascular Disease Mother      Prostate Cancer Father      Cancer Father      Coronary Artery Disease Father      Dyslipidemia Father      Dyslipidemia Sister      Dyslipidemia Brother      Hypertension Brother      Prostate Cancer Brother     Social History     Socioeconomic History     Marital status:      Spouse name: Not on file     Number of children: Not on file     Years of education: Not on file     Highest education level: Not on file   Occupational History     Not on file   Tobacco Use     Smoking status: Never Smoker     Smokeless tobacco: Never Used   Substance and Sexual Activity     Alcohol use: No     Drug use: No     Sexual activity: Yes     Partners: Male     Birth  control/protection: Surgical   Other Topics Concern     Not on file   Social History Narrative     Not on file     Social Determinants of Health     Financial Resource Strain:      Difficulty of Paying Living Expenses:    Food Insecurity:      Worried About Running Out of Food in the Last Year:      Ran Out of Food in the Last Year:    Transportation Needs:      Lack of Transportation (Medical):      Lack of Transportation (Non-Medical):    Physical Activity:      Days of Exercise per Week:      Minutes of Exercise per Session:    Stress:      Feeling of Stress :    Social Connections:      Frequency of Communication with Friends and Family:      Frequency of Social Gatherings with Friends and Family:      Attends [employer] Services:      Active Member of Clubs or Organizations:      Attends Club or Organization Meetings:      Marital Status:    Intimate Partner Violence:      Fear of Current or Ex-Partner:      Emotionally Abused:      Physically Abused:      Sexually Abused:           Medications  Allergies   Current Outpatient Medications   Medication Sig Dispense Refill     aspirin 325 MG tablet [ASPIRIN 325 MG TABLET] Take 325 mg by mouth every evening.        atorvastatin (LIPITOR) 80 MG tablet [ATORVASTATIN (LIPITOR) 80 MG TABLET] Take 80 mg by mouth bedtime.       carvediloL (COREG) 25 MG tablet [CARVEDILOL (COREG) 25 MG TABLET] Take 1 tablet (25 mg total) by mouth 2 (two) times a day with meals. 180 tablet 3     furosemide (LASIX) 20 MG tablet Take 1 tablet (20 mg) by mouth daily 90 tablet 3     hydrALAZINE (APRESOLINE) 100 MG tablet [HYDRALAZINE (APRESOLINE) 100 MG TABLET] TAKE 1 TABLET(100 MG) BY MOUTH THREE TIMES DAILY 270 tablet 1     LORazepam (ATIVAN) 0.5 MG tablet [LORAZEPAM (ATIVAN) 0.5 MG TABLET] Take 1 tablet (0.5 mg total) by mouth every 6 (six) hours as needed for anxiety (or tremors). 12 tablet 0     losartan (COZAAR) 50 MG tablet Take 1 tablet (50 mg) by mouth every evening 30 tablet 0      metFORMIN (GLUCOPHAGE) 500 MG tablet [METFORMIN (GLUCOPHAGE) 500 MG TABLET] Take 1 tablet (500 mg total) by mouth 2 (two) times a day with meals. At breakfast and at dinner 60 tablet 0     multivitamin therapeutic (THERAGRAN) tablet [MULTIVITAMIN THERAPEUTIC (THERAGRAN) TABLET] Take 1 tablet by mouth every evening.        omeprazole (PRILOSEC) 20 MG capsule [OMEPRAZOLE (PRILOSEC) 20 MG CAPSULE] Take 20 mg by mouth daily before breakfast.        prednisoLONE acetate (PRED-FORTE) 1 % ophthalmic suspension [PREDNISOLONE ACETATE (PRED-FORTE) 1 % OPHTHALMIC SUSPENSION] Administer 1 drop to the right eye daily.        vit C/E/Zn/coppr/lutein/zeaxan (PRESERVISION AREDS-2 ORAL) [VIT C/E/ZN/COPPR/LUTEIN/ZEAXAN (PRESERVISION AREDS-2 ORAL)] Take 1 tablet by mouth 2 (two) times a day before meals.      Allergies   Allergen Reactions     Venom-Honey Bee [Bee Venom] Shortness Of Breath     Macrobid [Nitrofurantoin] Other (See Comments)     Burning sensation across chest and arms     Mercurial Analogues [Mercurial Derivatives] Dermatitis     blisters     Sulfa (Sulfonamide Antibiotics) [Sulfa Drugs] Rash         Lab Results    Chemistry/lipid CBC Cardiac Enzymes/BNP/TSH/INR   Lab Results   Component Value Date    CHOL 120 04/13/2021    HDL 38 (L) 04/13/2021    TRIG 96 04/13/2021    BUN 45 (H) 09/16/2021     09/16/2021    CO2 26 09/16/2021    Lab Results   Component Value Date    WBC 6.7 09/13/2021    HGB 12.3 09/13/2021    HCT 37.3 09/13/2021    MCV 91 09/13/2021     09/16/2021    Lab Results   Component Value Date    TROPONINI 0.05 09/13/2021     (H) 09/13/2021    TSH 1.53 09/15/2021    INR 1.05 09/13/2021             This note has been dictated using voice recognition software. Any grammatical, typographical, or context distortions are unintentional and inherent to the software    30 minutes spent on the date of encounter doing chart review, review of outside records, review of test results, patient visit,  documentation and discussion with family.

## 2021-09-23 NOTE — PATIENT INSTRUCTIONS
Sharyn Trent,    It was a pleasure to see you today at [facility] HEART CLINIC.     My recommendations after this visit include:  - Please follow up with Dr Akins in 6 weeks   - Please follow up with Dr Saavedra in April  - Please follow up with Lashell Murcia in 3 months  - I have checked labs and will contact you with results  - Continue current medications      Lashell Murcia, CNP

## 2021-09-23 NOTE — LETTER
9/23/2021    Jamie Mcconnell MD  [facility] [address]  [facility] [address]    RE: Sharyn Trent       Dear Colleague,    I had the pleasure of seeing Sharyn Trent in the  Heart Care.          Assessment/Recommendations   Assessment:    1.  Heart failure with preserved ejection fraction, NYHA class II: Compensated.  She states her symptoms have improved since hospitalization.  Her weight has been stable.  Her Lasix was increased at discharge.    2.  Hypertension: Blood pressure 138/60    3.  Hypokalemia: Her potassium was 3.3 at discharge.  BMP pending. Encouraged her to increase high potassium foods in diet    Plan:  1.  BMP pending due to potassium being 3.3 at discharge  2.  Continue current medications  3.  Continue all daily weights and low-salt diet    Sharyn Trent will follow up with  in 6 weeks, Dr. Saavedra in April and in the heart failure clinic in 3 months.     History of Present Illness/Subjective    Ms. Sharyn Trent is a 81 year old female seen at [facility] heart failure clinic today for continued follow-up. Her daughter accompanies her today.  She follows up for heart failure with preserved ejection fraction.  She was recently hospitalized September 13 to September 16, 2021 with acute heart failure.  She was started on a Lasix drip.  Her symptoms improved. Her last echocardiogram was done March 2021 which showed ejection fraction of 55%. She has a past medical history significant for hypertension, diabetes, pacemaker, dyslipidemia.     Today, she states she is doing well hospitalization.  Her dyspnea on exertion has improved.  She denies lightheadedness, shortness of breath, orthopnea, PND, palpitations, chest pain and abdominal fullness/bloating.      She is monitoring home weights which are stable between 146-148 pounds.  She is following a low sodium diet.       Physical Examination Review of Systems   BP  "138/60 (BP Location: Right arm, Patient Position: Sitting, Cuff Size: Adult Regular)   Pulse 54   Resp 16   Ht 1.549 m (5' 1\")   Wt 68.5 kg (151 lb)   BMI 28.53 kg/m    Body mass index is 28.53 kg/m .  Wt Readings from Last 3 Encounters:   09/23/21 68.5 kg (151 lb)   09/14/21 66 kg (145 lb 8 oz)   09/07/21 68.9 kg (152 lb)       General Appearance:   no acute distress   ENT/Mouth: membranes moist, no oral lesions or bleeding gums.      EYES:  no scleral icterus, normal conjunctivae   Neck: no carotid bruits or thyromegaly   Chest/Lungs:   lungs are clear to auscultation, no rales or wheezing, equal chest wall expansion    Cardiovascular:   Regular. Normal first and second heart sounds with no murmurs, rubs, or gallops; Jugular venous pressure normal, trace edema bilaterally    Abdomen:  no organomegaly, masses, bruits, or tenderness; bowel sounds are present   Extremities: no cyanosis or clubbing   Skin: no xanthelasma, warm.    Neurologic: normal  bilateral, no tremors     Psychiatric: alert and oriented x3    Enc Vitals  BP: 138/60  Pulse: 54  Resp: 16  Weight: 68.5 kg (151 lb)  Height: 154.9 cm (5' 1\")                                         Medical History  Surgical History Family History Social History   Past Medical History:   Diagnosis Date     Abnormal ECG      Anxiety      Arthritis      Chest pain      Chest pain      Chronic kidney disease     stage 3-mod.     Diabetes (H)      Dyslipidemia, goal LDL below 70      GERD (gastroesophageal reflux disease)      HTN (hypertension)      Hyperlipidemia      Melanoma of ankle (H)     L ankle     Other second degree atrioventricular block     Created by Conversion      Pacemaker      PSVT (paroxysmal supraventricular tachycardia) (H)      Right bundle branch block      Skin cancer     Past Surgical History:   Procedure Laterality Date     ABLATION OF DYSRHYTHMIC FOCUS  04/11/2013    AV analia reentry tachycardia, partially successful     BIOPSY SKIN " (LOCATION)       CARDIAC CATHETERIZATION  03/10/2016     CV CORONARY ANGIOGRAM N/A 5/26/2021    Procedure: Coronary Angiogram;  Surgeon: Yony Gillis MD;  Location: [facility] Cardiac Cath Lab;  Service: Cardiology     CV LEFT HEART CATHETERIZATION WITHOUT LEFT VENTRICULOGRAM Left 5/26/2021    Procedure: Left Heart Catheterization Without Left Ventriculogram;  Surgeon: Yony Gillis MD;  Location: [facility] Cardiac Cath Lab;  Service: Cardiology     CV RIGHT HEART CATHETERIZATION N/A 5/26/2021    Procedure: Right Heart Catheterization;  Surgeon: Yony Gillis MD;  Location: [facility] Cardiac Cath Lab;  Service: Cardiology     EP PACEMAKER N/A 8/30/2021    Procedure: Electrophysiology Pacemaker;  Surgeon: Ab Saavedra MD;  Location: [facility] CV     FOOT SURGERY      L foot     HAND SURGERY      on both thumbs     IMPLANT PACEMAKER  04/2011    Second-degree AV block     OTHER SURGICAL HISTORY      SVT Ablation     [place] SHLDR ARTHROSCOP,SURG,W/ROTAT CUFF REPR Left 3/9/2017    Procedure: LEFT SHOULDER ARTHROSCOPY DECOMPRESSION DISTAL CLAVICLE EXCISION  ROTATOR CUFF REPAIR BICEPS TENOTOMY AND EXTENSIVE DEBRIDEMENT;  Surgeon: Todd Riggs MD;  Location: [facility] OR;  Service: Orthopedics     RELEASE CARPAL TUNNEL      both wrists     SKIN CANCER EXCISION      L ankle,Lleg and cheek     TOE SURGERY      L big toe joint replacement     TUBAL LIGATION      Family History   Problem Relation Age of Onset     Hypertension Mother      Cerebrovascular Disease Mother      Prostate Cancer Father      Cancer Father      Coronary Artery Disease Father      Dyslipidemia Father      Dyslipidemia Sister      Dyslipidemia Brother      Hypertension Brother      Prostate Cancer Brother     Social History     Socioeconomic History     Marital status:      Spouse name: Not on file     Number of children: Not on file     Years of education: Not on file     Highest education level: Not  on file   Occupational History     Not on file   Tobacco Use     Smoking status: Never Smoker     Smokeless tobacco: Never Used   Substance and Sexual Activity     Alcohol use: No     Drug use: No     Sexual activity: Yes     Partners: Male     Birth control/protection: Surgical   Other Topics Concern     Not on file   Social History Narrative     Not on file     Social Determinants of Health     Financial Resource Strain:      Difficulty of Paying Living Expenses:    Food Insecurity:      Worried About Running Out of Food in the Last Year:      Ran Out of Food in the Last Year:    Transportation Needs:      Lack of Transportation (Medical):      Lack of Transportation (Non-Medical):    Physical Activity:      Days of Exercise per Week:      Minutes of Exercise per Session:    Stress:      Feeling of Stress :    Social Connections:      Frequency of Communication with Friends and Family:      Frequency of Social Gatherings with Friends and Family:      Attends Jainism Services:      Active Member of Clubs or Organizations:      Attends Club or Organization Meetings:      Marital Status:    Intimate Partner Violence:      Fear of Current or Ex-Partner:      Emotionally Abused:      Physically Abused:      Sexually Abused:           Medications  Allergies   Current Outpatient Medications   Medication Sig Dispense Refill     aspirin 325 MG tablet [ASPIRIN 325 MG TABLET] Take 325 mg by mouth every evening.        atorvastatin (LIPITOR) 80 MG tablet [ATORVASTATIN (LIPITOR) 80 MG TABLET] Take 80 mg by mouth bedtime.       carvediloL (COREG) 25 MG tablet [CARVEDILOL (COREG) 25 MG TABLET] Take 1 tablet (25 mg total) by mouth 2 (two) times a day with meals. 180 tablet 3     furosemide (LASIX) 20 MG tablet Take 1 tablet (20 mg) by mouth daily 90 tablet 3     hydrALAZINE (APRESOLINE) 100 MG tablet [HYDRALAZINE (APRESOLINE) 100 MG TABLET] TAKE 1 TABLET(100 MG) BY MOUTH THREE TIMES DAILY 270 tablet 1     LORazepam (ATIVAN) 0.5  MG tablet [LORAZEPAM (ATIVAN) 0.5 MG TABLET] Take 1 tablet (0.5 mg total) by mouth every 6 (six) hours as needed for anxiety (or tremors). 12 tablet 0     losartan (COZAAR) 50 MG tablet Take 1 tablet (50 mg) by mouth every evening 30 tablet 0     metFORMIN (GLUCOPHAGE) 500 MG tablet [METFORMIN (GLUCOPHAGE) 500 MG TABLET] Take 1 tablet (500 mg total) by mouth 2 (two) times a day with meals. At breakfast and at dinner 60 tablet 0     multivitamin therapeutic (THERAGRAN) tablet [MULTIVITAMIN THERAPEUTIC (THERAGRAN) TABLET] Take 1 tablet by mouth every evening.        omeprazole (PRILOSEC) 20 MG capsule [OMEPRAZOLE (PRILOSEC) 20 MG CAPSULE] Take 20 mg by mouth daily before breakfast.        prednisoLONE acetate (PRED-FORTE) 1 % ophthalmic suspension [PREDNISOLONE ACETATE (PRED-FORTE) 1 % OPHTHALMIC SUSPENSION] Administer 1 drop to the right eye daily.        vit C/E/Zn/coppr/lutein/zeaxan (PRESERVISION AREDS-2 ORAL) [VIT C/E/ZN/COPPR/LUTEIN/ZEAXAN (PRESERVISION AREDS-2 ORAL)] Take 1 tablet by mouth 2 (two) times a day before meals.      Allergies   Allergen Reactions     Venom-Honey Bee [Bee Venom] Shortness Of Breath     Macrobid [Nitrofurantoin] Other (See Comments)     Burning sensation across chest and arms     Mercurial Analogues [Mercurial Derivatives] Dermatitis     blisters     Sulfa (Sulfonamide Antibiotics) [Sulfa Drugs] Rash         Lab Results    Chemistry/lipid CBC Cardiac Enzymes/BNP/TSH/INR   Lab Results   Component Value Date    CHOL 120 04/13/2021    HDL 38 (L) 04/13/2021    TRIG 96 04/13/2021    BUN 45 (H) 09/16/2021     09/16/2021    CO2 26 09/16/2021    Lab Results   Component Value Date    WBC 6.7 09/13/2021    HGB 12.3 09/13/2021    HCT 37.3 09/13/2021    MCV 91 09/13/2021     09/16/2021    Lab Results   Component Value Date    TROPONINI 0.05 09/13/2021     (H) 09/13/2021    TSH 1.53 09/15/2021    INR 1.05 09/13/2021             This note has been dictated using voice  recognition software. Any grammatical, typographical, or context distortions are unintentional and inherent to the software    30 minutes spent on the date of encounter doing chart review, review of outside records, review of test results, patient visit, documentation and discussion with family.                Thank you for allowing me to participate in the care of your patient.      Sincerely,     PEG Rios CNP     [facility]  cc:   PEG Rios [facility]  [address] [place] [address]  [address] [place] [address]

## 2021-09-28 NOTE — TELEPHONE ENCOUNTER
Sharyn is contacted with recommendations to increase her Lasix to 40mg daily x 3 days starting today. She was reminded to drink fluids every day of 7.5-8 cups. She will receive f/u call from [facility] RN on Friday this week to discuss her response.     Yomaira Sanchez RN BSN, CHFN

## 2021-09-28 NOTE — TELEPHONE ENCOUNTER
----- Message from Cindy Martinez sent at 9/28/2021  8:32 AM CDT -----  Regarding: DCB PATIENT  General phone call:    Caller: PATIENT    Primary cardiologist: PEEWEE    Detailed reason for call: PATIENT CALLING TO REPORT 2 LB WEIGHT GAIN, /72, P 61, O2 SAT 95%, THIS MORNING.     Best phone number: [phone]    Best time to contact: ANY    Ok to leave a detailedmessage? YES    Device? YES, PACEMAKER.     Additional Info:

## 2021-09-28 NOTE — TELEPHONE ENCOUNTER
Sharyn is contacted to discuss her concerns today with wt gain of 2# from yesterday and 3# since her hospital discharge.   She is noting mild swelling in her ankles and feet. She denies abdominal bloating, no dyspnea at rest. Sharyn does have to rest and recover for about 5 minutes after climbing a flight of stairs. She denies dizziness, lightheadedness or chest pain.  She is sleeping fine with one pillow.    Currently using Lasix 20mg once daily in AM   Lashell Murcia CNP what are your recommendations at this time?    Yomaira Sanchez RN BSN, CHFN

## 2021-10-01 NOTE — TELEPHONE ENCOUNTER
"Called pt to see how she is feeling after increasing her Lasix to 40 mg daily for the past 3 days.     Pt states that she feels like she is doing very well. Pt states that the swelling in her ankles and feet is \"about the same.\" Denies any pitting edema, states that ankles are \"puffy\".     Pt states that she still gets mildly tired when she goes up a flight of stairs; denies any SOB.     Weight today is #148.     Routing to Lashell as RONAK.     Shaye Pena RN    "

## 2021-10-29 ENCOUNTER — OFFICE VISIT (OUTPATIENT)
Dept: CARDIOLOGY | Facility: CLINIC | Age: 81
End: 2021-10-29
Payer: COMMERCIAL

## 2021-10-29 VITALS
SYSTOLIC BLOOD PRESSURE: 118 MMHG | BODY MASS INDEX: 28.15 KG/M2 | WEIGHT: 149 LBS | RESPIRATION RATE: 10 BRPM | DIASTOLIC BLOOD PRESSURE: 58 MMHG | HEART RATE: 50 BPM

## 2021-10-29 DIAGNOSIS — I50.32 CHRONIC DIASTOLIC CONGESTIVE HEART FAILURE (H): Primary | ICD-10-CM

## 2021-10-29 PROCEDURE — 99214 OFFICE O/P EST MOD 30 MIN: CPT | Mod: 25 | Performed by: INTERNAL MEDICINE

## 2021-10-29 NOTE — PROGRESS NOTES
HEART CARE NOTE          Assessment/Recommendations        1. HFpEF c/b ADHF   Assessment / Plan    Euvolemic on physical exam and denies HF symptoms --> continue current diuretic regimen     Recently admitted or hypertensive urgency at which the regimen was adjusted; BP adequately controlled today --> no changes to regimen at this time     2. CAD  Assessment / Plan    Denies further episodes of chest discomfort/pressure since getting BP under better control; s/p coronary angiogram significant for moderate LAD dz. Plan for medical management at this time    Continue ASA, high intensity atorvastatin, carvedilol, losartan     3. 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 Sharyn Trent is a 80 y.o. female with a PMHx significant for HFpEF, hypertensive emergency, dyslipidemia, type 2 diabetes, heart block status post pacemaker, paroxysmal supraventricular tachycardia, melanoma, chronic left leg edema, chronic kidney disease stage III who presents [facility] for follow-up care. Of note, patient was recently admitted for hypertensive urgency c/b volume overload from 9/13/21-9/16/21. She reports that she has been I her usual state of health since discharge.      Today, Mrs. Trent denies palpitations, orthopnea, PND, fluid retention/edema, chest pain/pressure or anginal equivalents. She does reports some symptoms concerning for anxiety which are relieved by lorazepam.      ECG: Personally reviewed. Paced rhythm      Coronary angiogram:  RHC via right IJ  RA mean 4mmHg  PA mean 24mmHg  PCWP 21mmHg  LVEDP 19mmHg  Ao 153/62     PA sat 67%  Ao sat 94%     CO cindy 3.35     Angiography via left radial  LM short normal  LAD mid 50% narrowing with FFR 0.85  Circ normal  RCA normal     ECHO (personnaly Reviewed):     Left ventricle ejection fraction is normal. The estimated left ventricular ejection fraction is  55%.    Left ventricular diastolic function is abnormal.    Normal right ventricular size and systolic function.    No hemodynamically significant valvular heart abnormalities.    When compared to the previous study dated 3/20/2018, No significant change          Physical Examination Review of Systems   /58 (BP Location: Left arm, Patient Position: Sitting, Cuff Size: Adult Regular)   Pulse 50   Resp 10   Wt 67.6 kg (149 lb)   BMI 28.15 kg/m    Body mass index is 28.15 kg/m .  Wt Readings from Last 3 Encounters:   10/29/21 67.6 kg (149 lb)   09/23/21 68.5 kg (151 lb)   09/14/21 66 kg (145 lb 8 oz)     General Appearance:   no distress, normal body habitus   ENT/Mouth: membranes moist, no oral lesions or bleeding gums.      EYES:  no scleral icterus, normal conjunctivae   Neck: no carotid bruits or thyromegaly   Chest/Lungs:   lungs are clear to auscultation, no rales or wheezing, equal chest wall expansion    Cardiovascular:   Regular. Normal first and second heart sounds with  no rubs, or gallops; the carotid, radial and posterior tibial pulses are intact, no JVD and trace LE edema bilaterally    Abdomen:  no organomegaly, masses, bruits, or tenderness; bowel sounds are present   Extremities: no cyanosis or clubbing   Skin: no xanthelasma, warm.    Neurologic: alert and oriented x3, calm     Psychiatric: alert and oriented x3, calm     A complete 10 systems ROS was reviewed  And is negative except what is listed in the HPI.          Medical History  Surgical History Family History Social History   Past Medical History:   Diagnosis Date     Abnormal ECG      Anxiety      Arthritis      Chest pain      Chest pain      Chronic kidney disease     stage 3-mod.     Diabetes (H)      Dyslipidemia, goal LDL below 70      GERD (gastroesophageal reflux disease)      HTN (hypertension)      Hyperlipidemia      Melanoma of ankle (H)     L ankle     Other second degree atrioventricular block     Created by Conversion       Pacemaker      PSVT (paroxysmal supraventricular tachycardia) (H)      Right bundle branch block      Skin cancer     Past Surgical History:   Procedure Laterality Date     ABLATION OF DYSRHYTHMIC FOCUS  04/11/2013    AV analia reentry tachycardia, partially successful     BIOPSY SKIN (LOCATION)       CARDIAC CATHETERIZATION  03/10/2016     CV CORONARY ANGIOGRAM N/A 5/26/2021    Procedure: Coronary Angiogram;  Surgeon: Yony Gillis MD;  Location: [facility] Cardiac Cath Lab;  Service: Cardiology     CV LEFT HEART CATHETERIZATION WITHOUT LEFT VENTRICULOGRAM Left 5/26/2021    Procedure: Left Heart Catheterization Without Left Ventriculogram;  Surgeon: Yony Gillis MD;  Location: [facility] Cardiac Cath Lab;  Service: Cardiology     CV RIGHT HEART CATHETERIZATION N/A 5/26/2021    Procedure: Right Heart Catheterization;  Surgeon: Yony Gillis MD;  Location: [facility] Cardiac Cath Lab;  Service: Cardiology     EP PACEMAKER N/A 8/30/2021    Procedure: Electrophysiology Pacemaker;  Surgeon: Ab Saavedra MD;  Location: [facility] CV     FOOT SURGERY      L foot     HAND SURGERY      on both thumbs     IMPLANT PACEMAKER  04/2011    Second-degree AV block     OTHER SURGICAL HISTORY      SVT Ablation     [place] SHLDR ARTHROSCOP,SURG,W/ROTAT CUFF REPR Left 3/9/2017    Procedure: LEFT SHOULDER ARTHROSCOPY DECOMPRESSION DISTAL CLAVICLE EXCISION  ROTATOR CUFF REPAIR BICEPS TENOTOMY AND EXTENSIVE DEBRIDEMENT;  Surgeon: Todd Riggs MD;  Location: [facility];  Service: Orthopedics     RELEASE CARPAL TUNNEL      both wrists     SKIN CANCER EXCISION      L ankle,Lleg and cheek     TOE SURGERY      L big toe joint replacement     TUBAL LIGATION      no family history of premature coronary artery disease Social History     Socioeconomic History     Marital status:      Spouse name: Not on file     Number of children: Not on file     Years of education: Not on file     Highest  education level: Not on file   Occupational History     Not on file   Tobacco Use     Smoking status: Never Smoker     Smokeless tobacco: Never Used   Substance and Sexual Activity     Alcohol use: No     Drug use: No     Sexual activity: Yes     Partners: Male     Birth control/protection: Surgical   Other Topics Concern     Not on file   Social History Narrative     Not on file     Social Determinants of Health     Financial Resource Strain:      Difficulty of Paying Living Expenses:    Food Insecurity:      Worried About Running Out of Food in the Last Year:      Ran Out of Food in the Last Year:    Transportation Needs:      Lack of Transportation (Medical):      Lack of Transportation (Non-Medical):    Physical Activity:      Days of Exercise per Week:      Minutes of Exercise per Session:    Stress:      Feeling of Stress :    Social Connections:      Frequency of Communication with Friends and Family:      Frequency of Social Gatherings with Friends and Family:      Attends Mosque Services:      Active Member of Clubs or Organizations:      Attends Club or Organization Meetings:      Marital Status:    Intimate Partner Violence:      Fear of Current or Ex-Partner:      Emotionally Abused:      Physically Abused:      Sexually Abused:            Lab Results    Chemistry/lipid CBC Cardiac Enzymes/BNP/TSH/INR   Lab Results   Component Value Date    CHOL 120 04/13/2021    HDL 38 (L) 04/13/2021    TRIG 96 04/13/2021    BUN 27 09/23/2021     09/23/2021    CO2 28 09/23/2021    Lab Results   Component Value Date    WBC 6.7 09/13/2021    HGB 12.3 09/13/2021    HCT 37.3 09/13/2021    MCV 91 09/13/2021     09/16/2021    Lab Results   Component Value Date    TROPONINI 0.05 09/13/2021     (H) 09/13/2021    TSH 1.53 09/15/2021    INR 1.05 09/13/2021     Lab Results   Component Value Date    TROPONINI 0.05 09/13/2021          Weight:    Wt Readings from Last 3 Encounters:   09/23/21 68.5 kg (151 lb)    09/14/21 66 kg (145 lb 8 oz)   09/07/21 68.9 kg (152 lb)       Allergies  Allergies   Allergen Reactions     Venom-Honey Bee [Bee Venom] Shortness Of Breath     Macrobid [Nitrofurantoin] Other (See Comments)     Burning sensation across chest and arms     Mercurial Analogues [Mercurial Derivatives] Dermatitis     blisters     Sulfa (Sulfonamide Antibiotics) [Sulfa Drugs] Rash         Surgical History  Past Surgical History:   Procedure Laterality Date     ABLATION OF DYSRHYTHMIC FOCUS  04/11/2013    AV analia reentry tachycardia, partially successful     BIOPSY SKIN (LOCATION)       CARDIAC CATHETERIZATION  03/10/2016     CV CORONARY ANGIOGRAM N/A 5/26/2021    Procedure: Coronary Angiogram;  Surgeon: Yony Gillis MD;  Location: [facility] Cardiac Cath Lab;  Service: Cardiology     CV LEFT HEART CATHETERIZATION WITHOUT LEFT VENTRICULOGRAM Left 5/26/2021    Procedure: Left Heart Catheterization Without Left Ventriculogram;  Surgeon: Yony Gillis MD;  Location: [facility] Cardiac Cath Lab;  Service: Cardiology     CV RIGHT HEART CATHETERIZATION N/A 5/26/2021    Procedure: Right Heart Catheterization;  Surgeon: Yony Gillis MD;  Location: [facility] Cardiac Cath Lab;  Service: Cardiology     EP PACEMAKER N/A 8/30/2021    Procedure: Electrophysiology Pacemaker;  Surgeon: Ab Saavedra MD;  Location: [facility] CV     FOOT SURGERY      L foot     HAND SURGERY      on both thumbs     IMPLANT PACEMAKER  04/2011    Second-degree AV block     OTHER SURGICAL HISTORY      SVT Ablation     [place] SHLDR ARTHROSCOP,SURG,W/ROTAT CUFF REPR Left 3/9/2017    Procedure: LEFT SHOULDER ARTHROSCOPY DECOMPRESSION DISTAL CLAVICLE EXCISION  ROTATOR CUFF REPAIR BICEPS TENOTOMY AND EXTENSIVE DEBRIDEMENT;  Surgeon: Todd Riggs MD;  Location: [facility] OR;  Service: Orthopedics     RELEASE CARPAL TUNNEL      both wrists     SKIN CANCER EXCISION      L ankle,Lleg and cheek     TOE SURGERY      L big  toe joint replacement     TUBAL LIGATION         Social History  Tobacco:   History   Smoking Status     Never Smoker   Smokeless Tobacco     Never Used    Alcohol:   Social History    Substance and Sexual Activity      Alcohol use: No   Illicit Drugs:   History   Drug Use No       Family History  Family History   Problem Relation Age of Onset     Hypertension Mother      Cerebrovascular Disease Mother      Prostate Cancer Father      Cancer Father      Coronary Artery Disease Father      Dyslipidemia Father      Dyslipidemia Sister      Dyslipidemia Brother      Hypertension Brother      Prostate Cancer Brother           Heidy Akins MD on 10/29/2021      cc: Jamie Mcconnell

## 2021-10-29 NOTE — LETTER
10/29/2021    Jamie Mcconnell MD  [facility] [address]  [facility] [address]    RE: Sharyn Trent       Dear Colleague,    I had the pleasure of seeing Sharyn Trent in the [facility].    HEART CARE NOTE          Assessment/Recommendations        1. HFpEF c/b ADHF   Assessment / Plan    Euvolemic on physical exam and denies HF symptoms --> continue current diuretic regimen     Recently admitted or hypertensive urgency at which the regimen was adjusted; BP adequately controlled today --> no changes to regimen at this time     2. CAD  Assessment / Plan    Denies further episodes of chest discomfort/pressure since getting BP under better control; s/p coronary angiogram significant for moderate LAD dz. Plan for medical management at this time    Continue ASA, high intensity atorvastatin, carvedilol, losartan     3. 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 Sharyn Trent is a 80 y.o. female with a PMHx significant for HFpEF, hypertensive emergency, dyslipidemia, type 2 diabetes, heart block status post pacemaker, paroxysmal supraventricular tachycardia, melanoma, chronic left leg edema, chronic kidney disease stage III who [facility] for follow-up care. Of note, patient was recently admitted for hypertensive urgency c/b volume overload from 9/13/21-9/16/21. She reports that she has been I her usual state of health since discharge.      Today, Mrs. Trent denies palpitations, orthopnea, PND, fluid retention/edema, chest pain/pressure or anginal equivalents. She does reports some symptoms concerning for anxiety which are relieved by lorazepam.      ECG: Personally reviewed. Paced rhythm      Coronary angiogram:  RHC via right IJ  RA mean 4mmHg  PA mean 24mmHg  PCWP 21mmHg  LVEDP 19mmHg  Ao 153/62     PA sat 67%  Ao sat 94%     CO  cindy 3.35     Angiography via left radial  LM short normal  LAD mid 50% narrowing with FFR 0.85  Circ normal  RCA normal     ECHO (personnaly Reviewed):     Left ventricle ejection fraction is normal. The estimated left ventricular ejection fraction is 55%.    Left ventricular diastolic function is abnormal.    Normal right ventricular size and systolic function.    No hemodynamically significant valvular heart abnormalities.    When compared to the previous study dated 3/20/2018, No significant change          Physical Examination Review of Systems   /58 (BP Location: Left arm, Patient Position: Sitting, Cuff Size: Adult Regular)   Pulse 50   Resp 10   Wt 67.6 kg (149 lb)   BMI 28.15 kg/m    Body mass index is 28.15 kg/m .  Wt Readings from Last 3 Encounters:   10/29/21 67.6 kg (149 lb)   09/23/21 68.5 kg (151 lb)   09/14/21 66 kg (145 lb 8 oz)     General Appearance:   no distress, normal body habitus   ENT/Mouth: membranes moist, no oral lesions or bleeding gums.      EYES:  no scleral icterus, normal conjunctivae   Neck: no carotid bruits or thyromegaly   Chest/Lungs:   lungs are clear to auscultation, no rales or wheezing, equal chest wall expansion    Cardiovascular:   Regular. Normal first and second heart sounds with  no rubs, or gallops; the carotid, radial and posterior tibial pulses are intact, no JVD and trace LE edema bilaterally    Abdomen:  no organomegaly, masses, bruits, or tenderness; bowel sounds are present   Extremities: no cyanosis or clubbing   Skin: no xanthelasma, warm.    Neurologic: alert and oriented x3, calm     Psychiatric: alert and oriented x3, calm     A complete 10 systems ROS was reviewed  And is negative except what is listed in the HPI.          Medical History  Surgical History Family History Social History   Past Medical History:   Diagnosis Date     Abnormal ECG      Anxiety      Arthritis      Chest pain      Chest pain      Chronic kidney disease     stage 3-mod.      Diabetes (H)      Dyslipidemia, goal LDL below 70      GERD (gastroesophageal reflux disease)      HTN (hypertension)      Hyperlipidemia      Melanoma of ankle (H)     L ankle     Other second degree atrioventricular block     Created by Conversion      Pacemaker      PSVT (paroxysmal supraventricular tachycardia) (H)      Right bundle branch block      Skin cancer     Past Surgical History:   Procedure Laterality Date     ABLATION OF DYSRHYTHMIC FOCUS  04/11/2013    AV analia reentry tachycardia, partially successful     BIOPSY SKIN (LOCATION)       CARDIAC CATHETERIZATION  03/10/2016     CV CORONARY ANGIOGRAM N/A 5/26/2021    Procedure: Coronary Angiogram;  Surgeon: Yony Gillis MD;  Location: [facility] Cardiac Cath Lab;  Service: Cardiology     CV LEFT HEART CATHETERIZATION WITHOUT LEFT VENTRICULOGRAM Left 5/26/2021    Procedure: Left Heart Catheterization Without Left Ventriculogram;  Surgeon: Yony Gillis MD;  Location: [facility] Cardiac Cath Lab;  Service: Cardiology     CV RIGHT HEART CATHETERIZATION N/A 5/26/2021    Procedure: Right Heart Catheterization;  Surgeon: Yony Gillis MD;  Location: [facility] Cardiac Cath Lab;  Service: Cardiology     EP PACEMAKER N/A 8/30/2021    Procedure: Electrophysiology Pacemaker;  Surgeon: Ab Saavedra MD;  Location: [facility] CV     FOOT SURGERY      L foot     HAND SURGERY      on both thumbs     IMPLANT PACEMAKER  04/2011    Second-degree AV block     OTHER SURGICAL HISTORY      SVT Ablation     [place] SHLDR ARTHROSCOP,SURG,W/ROTAT CUFF REPR Left 3/9/2017    Procedure: LEFT SHOULDER ARTHROSCOPY DECOMPRESSION DISTAL CLAVICLE EXCISION  ROTATOR CUFF REPAIR BICEPS TENOTOMY AND EXTENSIVE DEBRIDEMENT;  Surgeon: Todd Riggs MD;  Location: [facility];  Service: Orthopedics     RELEASE CARPAL TUNNEL      both wrists     SKIN CANCER EXCISION      L ankle,Lleg and cheek     TOE SURGERY      L big toe joint replacement      TUBAL LIGATION      no family history of premature coronary artery disease Social History     Socioeconomic History     Marital status:      Spouse name: Not on file     Number of children: Not on file     Years of education: Not on file     Highest education level: Not on file   Occupational History     Not on file   Tobacco Use     Smoking status: Never Smoker     Smokeless tobacco: Never Used   Substance and Sexual Activity     Alcohol use: No     Drug use: No     Sexual activity: Yes     Partners: Male     Birth control/protection: Surgical   Other Topics Concern     Not on file   Social History Narrative     Not on file     Social Determinants of Health     Financial Resource Strain:      Difficulty of Paying Living Expenses:    Food Insecurity:      Worried About Running Out of Food in the Last Year:      Ran Out of Food in the Last Year:    Transportation Needs:      Lack of Transportation (Medical):      Lack of Transportation (Non-Medical):    Physical Activity:      Days of Exercise per Week:      Minutes of Exercise per Session:    Stress:      Feeling of Stress :    Social Connections:      Frequency of Communication with Friends and Family:      Frequency of Social Gatherings with Friends and Family:      Attends Temple Services:      Active Member of Clubs or Organizations:      Attends Club or Organization Meetings:      Marital Status:    Intimate Partner Violence:      Fear of Current or Ex-Partner:      Emotionally Abused:      Physically Abused:      Sexually Abused:            Lab Results    Chemistry/lipid CBC Cardiac Enzymes/BNP/TSH/INR   Lab Results   Component Value Date    CHOL 120 04/13/2021    HDL 38 (L) 04/13/2021    TRIG 96 04/13/2021    BUN 27 09/23/2021     09/23/2021    CO2 28 09/23/2021    Lab Results   Component Value Date    WBC 6.7 09/13/2021    HGB 12.3 09/13/2021    HCT 37.3 09/13/2021    MCV 91 09/13/2021     09/16/2021    Lab Results   Component Value  Date    TROPONINI 0.05 09/13/2021     (H) 09/13/2021    TSH 1.53 09/15/2021    INR 1.05 09/13/2021     Lab Results   Component Value Date    TROPONINI 0.05 09/13/2021          Weight:    Wt Readings from Last 3 Encounters:   09/23/21 68.5 kg (151 lb)   09/14/21 66 kg (145 lb 8 oz)   09/07/21 68.9 kg (152 lb)       Allergies  Allergies   Allergen Reactions     Venom-Honey Bee [Bee Venom] Shortness Of Breath     Macrobid [Nitrofurantoin] Other (See Comments)     Burning sensation across chest and arms     Mercurial Analogues [Mercurial Derivatives] Dermatitis     blisters     Sulfa (Sulfonamide Antibiotics) [Sulfa Drugs] Rash         Surgical History  Past Surgical History:   Procedure Laterality Date     ABLATION OF DYSRHYTHMIC FOCUS  04/11/2013    AV analia reentry tachycardia, partially successful     BIOPSY SKIN (LOCATION)       CARDIAC CATHETERIZATION  03/10/2016     CV CORONARY ANGIOGRAM N/A 5/26/2021    Procedure: Coronary Angiogram;  Surgeon: Yony Gillis MD;  Location: [facility] Cardiac Cath Lab;  Service: Cardiology     CV LEFT HEART CATHETERIZATION WITHOUT LEFT VENTRICULOGRAM Left 5/26/2021    Procedure: Left Heart Catheterization Without Left Ventriculogram;  Surgeon: Ynoy Gillis MD;  Location: [facility] Cardiac Cath Lab;  Service: Cardiology     CV RIGHT HEART CATHETERIZATION N/A 5/26/2021    Procedure: Right Heart Catheterization;  Surgeon: Yony Gillis MD;  Location: [facility] Cardiac Cath Lab;  Service: Cardiology     EP PACEMAKER N/A 8/30/2021    Procedure: Electrophysiology Pacemaker;  Surgeon: Ab Saavedra MD;  Location: [facility] CATH LAB CV     FOOT SURGERY      L foot     HAND SURGERY      on both thumbs     IMPLANT PACEMAKER  04/2011    Second-degree AV block     OTHER SURGICAL HISTORY      SVT Ablation     [place] SHLDR ARTHROSCOP,SURG,W/ROTAT CUFF REPR Left 3/9/2017    Procedure: LEFT SHOULDER ARTHROSCOPY DECOMPRESSION DISTAL CLAVICLE EXCISION  ROTATOR  CUFF REPAIR BICEPS TENOTOMY AND EXTENSIVE DEBRIDEMENT;  Surgeon: Todd Riggs MD;  Location: [facility];  Service: Orthopedics     RELEASE CARPAL TUNNEL      both wrists     SKIN CANCER EXCISION      L ankle,Lleg and cheek     TOE SURGERY      L big toe joint replacement     TUBAL LIGATION         Social History  Tobacco:   History   Smoking Status     Never Smoker   Smokeless Tobacco     Never Used    Alcohol:   Social History    Substance and Sexual Activity      Alcohol use: No   Illicit Drugs:   History   Drug Use No       Family History  Family History   Problem Relation Age of Onset     Hypertension Mother      Cerebrovascular Disease Mother      Prostate Cancer Father      Cancer Father      Coronary Artery Disease Father      Dyslipidemia Father      Dyslipidemia Sister      Dyslipidemia Brother      Hypertension Brother      Prostate Cancer Brother           Heidy Akins MD on 10/29/2021      cc: Jamie Mcconnell

## 2021-11-12 NOTE — PROGRESS NOTES
[facility]    Medicine Progress Note - Hospitalist Service       Date of Admission:  9/13/2021    Assessment & Plan           Summary: 81 year old female with hx of hypertension, dyslipidemia, CKD3, DM2, presented to [facility] ED with sudden onset of severe headache and body shaking 1 hour prior to admission.    Principal Problem:    Acute diastolic CHF (congestive heart failure): Initiated aggressive diuresis with furosemide IV drip.  Consult cardiology appreciated.  - Good diuresis: 1 Kg  Down and down >2.5L  - appears euvolemic on exam and did have ortho stasis this morning.  - will switch to PO lasix 20 mg daily.      Acute respiratory failure with hypoxia: Secondary to pulmonary edema from acute congestive heart failure.  BiPAP support, nitro drip, furosemide drip, reinstitute all patient's home antihypertensives.  - resolved with diuresis      Hypertensive emergency: Resumed all home antihypertensives.  Initially on nitro drip, also furosemide drip.  Will convert back to oral meds now that pressure is better    Orthostatic hypotension:  RR note reviewed.  Patient's BP improved with laying flat.  Continue to check orthostatics.        Right toe numbness  Just woke up with this.  She has toe numbeness on the left side that is chronic  - check CT L spine (No MRI due to pacer)  - Thiamin and folate  - PT/OT       Severe headache: Secondary to hypertensive emergency.  Now resovled.  Head imaging is negative.      Left carotid severe siphon stenosis: As seen on CTA head and neck.  US shows no critical stenosis.      Dyslipidemia, goal LDL below 70: Continue home statin dose     Chronic kidney disease, stage 3a: Currently at baseline      Diabetes mellitus type 2 without retinopathy: Hold Metformin while n.p.o.      Gastroesophageal reflux disease without esophagitis: Resume home PPI    Code Status:  No Order      Diet: Orders Placed This Encounter      NPO for Medical/Clinical Reasons  "Except for: No Exceptions      DVT prophylaxis:    Medical:  subcutaneous enoxaparin    Mechanical:  PCD's      Disposition Plan   Expected discharge: 09/16/2021   recommended to prior living arrangement once adequate pain management/ tolerating PO medications.     The patient's care was discussed with the Bedside Nurse and Patient.    Mauro Mckeon MD  Hospitalist Service  [facility]  Securely message with the Vocera Web Console (learn more here)  Text page via [employer] Paging/Directory      Clinically Significant Risk Factors Present on Admission               ______________________________________________________________________    Interval History   Patient awoke with numb feeling in all of her right toes.  She has numbness on her left foot but never her right.  Notes \"abnormal growth\" in the bones of her back.    Rapid response note reviewed.    Data reviewed today: I reviewed all medications, new labs and imaging results over the last 24 hours. I personally reviewed     Physical Exam   Vital Signs: Temp: 98.7  F (37.1  C) Temp src: Oral BP: 130/59 Pulse: 77   Resp: 16 SpO2: 96 % O2 Device: None (Room air) Oxygen Delivery: 2 LPM  Weight: 145 lbs 14.4 oz  Constitutional: awake, alert, cooperative, no apparent distress, and appears stated age and appears stated age  Eyes: Lids and lashes normal, pupils equal, round and reactive to light, extra ocular muscles intact, sclera clear, conjunctiva normal  Respiratory: No increased work of breathing, good air exchange, clear to auscultation bilaterally, no crackles or wheezing  Cardiovascular: Normal apical impulse, regular rate and rhythm, normal S1 and S2, no S3 or S4, and no murmur noted  Skin: no bruising or bleeding  Musculoskeletal: There is no redness, warmth, or swelling of the joints.  Full range of motion noted.  Motor strength is 5 out of 5 all extremities bilaterally.  Tone is normal.  Neurologic: Awake, alert, oriented to name, " place and time.  Cranial nerves II-XII are grossly intact.  Motor is 5 out of 5 bilaterally.  Cerebellar finger to nose, heel to shin intact.  Sensory is intact.  Babinski down going, Romberg negative, and gait is normal.  Subjective numbness in all toes of the right foot.  1 beat of clonus in the ankle, normal dorsi/plantar flexion  Neuropsychiatric: General: normal, calm and normal eye contact    Data   Recent Labs   Lab 09/14/21  1218 09/14/21  0955 09/14/21  0833 09/14/21  0637 09/13/21  1102 09/13/21  0125 09/13/21  0124   WBC  --   --   --   --   --  6.7  --    HGB  --   --   --   --   --  12.3  --    MCV  --   --   --   --   --  91  --    PLT  --   --   --   --   --  227  --    INR  --   --   --   --   --   --  1.05   NA  --   --   --  139 138 136  --    POTASSIUM  --   --   --  3.6 3.5 4.4  --    CHLORIDE  --   --   --  101 101 100  --    CO2  --   --   --  27 28 25  --    BUN  --   --   --  27 21 23  --    CR  --   --   --  1.21* 1.14* 1.20*  --    ANIONGAP  --   --   --  11 9 11  --    MAURICIO  --   --   --  9.4 9.9 10.0  --    * 173* 155* 124 125 132*  --      Recent Results (from the past 24 hour(s))   US Carotid Bilateral    Narrative    EXAM: US CAROTID BILATERAL  LOCATION: [facility]  DATE/TIME: 9/13/2021 5:07 PM    INDICATION: History of left carotid siphon stenosis as seen on recent CTA.  COMPARISON: CT angiography head and neck 9/13/2021  TECHNIQUE: Duplex exam performed utilizing 2D gray-scale imaging, Doppler interrogation with color-flow and spectral waveform analysis. The percent diameter stenosis is determined using NASCET criteria and Society of Radiologists in Ultrasound Consensus   Criteria.    FINDINGS:    RIGHT: Mild plaque at the bifurcation. The peak systolic velocity in the ICA is less than 125 cm/sec, consistent with less than 50% stenosis. Normal velocities in the ECA. Antegrade flow within the vertebral artery.     LEFT: Mild plaque at the bifurcation.  The peak systolic velocity in the ICA is less than 125 cm/sec, consistent with less than 50% stenosis. Normal velocities in the ECA. Antegrade flow within the vertebral artery.    VELOCITY CHART:  CCA   Right: 93 cm/s   Left: 69 cm/s  ICA   Right: 78 cm/s   Left: 106 cm/s  ECA   Right: 68 cm/s   Left: 66 cm/s  ICA/CCA PSV Ratio   Right: 0.4   Left: 1.5    There is a mixed cystic/solid 1.7 x 1.2 x 1.2 cm right thyroid nodule, and a solid 2.3 x 1.4 x 1.2 cm left thyroid nodule. Recommend further evaluation with dedicated thyroid ultrasound      Impression    IMPRESSION:  1.  Mild plaque formation, velocities consistent with less than 50% stenosis in the right internal carotid artery.  2.  Mild plaque formation, velocities consistent with less than 50% stenosis in the left internal carotid artery.  3.  Flow within the vertebral arteries is antegrade.  4.  Bilateral thyroid nodules, recommend further evaluation with dedicated thyroid ultrasound.     Medications     - MEDICATION INSTRUCTIONS -       Continuing ACE inhibitor/ARB/ARNI from home medication list OR ACE inhibitor/ARB order already placed during this visit       - MEDICATION INSTRUCTIONS -         aspirin  325 mg Oral QPM     atorvastatin  80 mg Oral At Bedtime     carvedilol  25 mg Oral BID w/meals     enoxaparin ANTICOAGULANT  40 mg Subcutaneous Q24H     [START ON 9/15/2021] furosemide  20 mg Oral Daily     [START ON 9/15/2021] hydrALAZINE  100 mg Oral TID     insulin aspart  1-7 Units Subcutaneous TID AC     insulin aspart  1-5 Units Subcutaneous At Bedtime     losartan  100 mg Oral QPM     pantoprazole  40 mg Oral QAM AC     prednisoLONE acetate  1 drop Right Eye Daily     sodium chloride (PF)  3 mL Intracatheter Q8H      12-Nov-2021 20:17

## 2021-12-08 DIAGNOSIS — I25.119 CORONARY ARTERY DISEASE INVOLVING NATIVE CORONARY ARTERY OF NATIVE HEART WITH ANGINA PECTORIS (H): ICD-10-CM

## 2021-12-08 RX ORDER — HYDRALAZINE HYDROCHLORIDE 100 MG/1
100 TABLET, FILM COATED ORAL 3 TIMES DAILY
Qty: 270 TABLET | Refills: 1 | Status: SHIPPED | OUTPATIENT
Start: 2021-12-08 | End: 2022-06-02

## 2021-12-13 ENCOUNTER — ANCILLARY PROCEDURE (OUTPATIENT)
Dept: CARDIOLOGY | Facility: CLINIC | Age: 81
End: 2021-12-13
Attending: INTERNAL MEDICINE
Payer: COMMERCIAL

## 2021-12-13 DIAGNOSIS — Z95.0 CARDIAC PACEMAKER: ICD-10-CM

## 2021-12-13 DIAGNOSIS — I44.2 THIRD DEGREE AV BLOCK (H): ICD-10-CM

## 2021-12-13 LAB
MDC_IDC_EPISODE_DTM: NORMAL
MDC_IDC_EPISODE_DURATION: 10 S
MDC_IDC_EPISODE_DURATION: 12 S
MDC_IDC_EPISODE_DURATION: 14 S
MDC_IDC_EPISODE_DURATION: 14 S
MDC_IDC_EPISODE_DURATION: 16 S
MDC_IDC_EPISODE_DURATION: 16 S
MDC_IDC_EPISODE_DURATION: 18 S
MDC_IDC_EPISODE_DURATION: 184 S
MDC_IDC_EPISODE_DURATION: 186 S
MDC_IDC_EPISODE_DURATION: 22 S
MDC_IDC_EPISODE_DURATION: 42 S
MDC_IDC_EPISODE_DURATION: 52 S
MDC_IDC_EPISODE_DURATION: 74 S
MDC_IDC_EPISODE_DURATION: 8 S
MDC_IDC_EPISODE_ID: NORMAL
MDC_IDC_EPISODE_TYPE: NORMAL
MDC_IDC_LEAD_IMPLANT_DT: NORMAL
MDC_IDC_LEAD_IMPLANT_DT: NORMAL
MDC_IDC_LEAD_LOCATION: NORMAL
MDC_IDC_LEAD_LOCATION: NORMAL
MDC_IDC_LEAD_LOCATION_DETAIL_1: NORMAL
MDC_IDC_LEAD_LOCATION_DETAIL_1: NORMAL
MDC_IDC_LEAD_MFG: NORMAL
MDC_IDC_LEAD_MFG: NORMAL
MDC_IDC_LEAD_MODEL: NORMAL
MDC_IDC_LEAD_MODEL: NORMAL
MDC_IDC_LEAD_POLARITY_TYPE: NORMAL
MDC_IDC_LEAD_POLARITY_TYPE: NORMAL
MDC_IDC_LEAD_SERIAL: NORMAL
MDC_IDC_LEAD_SERIAL: NORMAL
MDC_IDC_MSMT_BATTERY_DTM: NORMAL
MDC_IDC_MSMT_BATTERY_REMAINING_LONGEVITY: 127 MO
MDC_IDC_MSMT_BATTERY_REMAINING_PERCENTAGE: 95.5 %
MDC_IDC_MSMT_BATTERY_RRT_TRIGGER: NORMAL
MDC_IDC_MSMT_BATTERY_STATUS: NORMAL
MDC_IDC_MSMT_BATTERY_VOLTAGE: 3.02 V
MDC_IDC_MSMT_LEADCHNL_RA_IMPEDANCE_VALUE: 390 OHM
MDC_IDC_MSMT_LEADCHNL_RA_LEAD_CHANNEL_STATUS: NORMAL
MDC_IDC_MSMT_LEADCHNL_RA_PACING_THRESHOLD_AMPLITUDE: 0.62 V
MDC_IDC_MSMT_LEADCHNL_RA_PACING_THRESHOLD_PULSEWIDTH: 0.5 MS
MDC_IDC_MSMT_LEADCHNL_RA_SENSING_INTR_AMPL: 2.7 MV
MDC_IDC_MSMT_LEADCHNL_RV_IMPEDANCE_VALUE: 460 OHM
MDC_IDC_MSMT_LEADCHNL_RV_LEAD_CHANNEL_STATUS: NORMAL
MDC_IDC_MSMT_LEADCHNL_RV_PACING_THRESHOLD_AMPLITUDE: 0.88 V
MDC_IDC_MSMT_LEADCHNL_RV_PACING_THRESHOLD_PULSEWIDTH: 0.5 MS
MDC_IDC_MSMT_LEADCHNL_RV_SENSING_INTR_AMPL: 4.1 MV
MDC_IDC_PG_IMPLANT_DTM: NORMAL
MDC_IDC_PG_MFG: NORMAL
MDC_IDC_PG_MODEL: NORMAL
MDC_IDC_PG_SERIAL: NORMAL
MDC_IDC_PG_TYPE: NORMAL
MDC_IDC_SESS_CLINIC_NAME: NORMAL
MDC_IDC_SESS_DTM: NORMAL
MDC_IDC_SESS_REPROGRAMMED: NO
MDC_IDC_SESS_TYPE: NORMAL
MDC_IDC_SET_BRADY_AT_MODE_SWITCH_MODE: NORMAL
MDC_IDC_SET_BRADY_AT_MODE_SWITCH_RATE: 180 {BEATS}/MIN
MDC_IDC_SET_BRADY_LOWRATE: 50 {BEATS}/MIN
MDC_IDC_SET_BRADY_MAX_SENSOR_RATE: 100 {BEATS}/MIN
MDC_IDC_SET_BRADY_MAX_TRACKING_RATE: 100 {BEATS}/MIN
MDC_IDC_SET_BRADY_MODE: NORMAL
MDC_IDC_SET_BRADY_PAV_DELAY_HIGH: 100 MS
MDC_IDC_SET_BRADY_PAV_DELAY_LOW: 300 MS
MDC_IDC_SET_BRADY_SAV_DELAY_HIGH: 100 MS
MDC_IDC_SET_BRADY_SAV_DELAY_LOW: 130 MS
MDC_IDC_SET_LEADCHNL_RA_PACING_AMPLITUDE: 1.62
MDC_IDC_SET_LEADCHNL_RA_PACING_ANODE_ELECTRODE_1: NORMAL
MDC_IDC_SET_LEADCHNL_RA_PACING_ANODE_LOCATION_1: NORMAL
MDC_IDC_SET_LEADCHNL_RA_PACING_CAPTURE_MODE: NORMAL
MDC_IDC_SET_LEADCHNL_RA_PACING_CATHODE_ELECTRODE_1: NORMAL
MDC_IDC_SET_LEADCHNL_RA_PACING_CATHODE_LOCATION_1: NORMAL
MDC_IDC_SET_LEADCHNL_RA_PACING_POLARITY: NORMAL
MDC_IDC_SET_LEADCHNL_RA_PACING_PULSEWIDTH: 0.5 MS
MDC_IDC_SET_LEADCHNL_RA_SENSING_ADAPTATION_MODE: NORMAL
MDC_IDC_SET_LEADCHNL_RA_SENSING_ANODE_ELECTRODE_1: NORMAL
MDC_IDC_SET_LEADCHNL_RA_SENSING_ANODE_LOCATION_1: NORMAL
MDC_IDC_SET_LEADCHNL_RA_SENSING_CATHODE_ELECTRODE_1: NORMAL
MDC_IDC_SET_LEADCHNL_RA_SENSING_CATHODE_LOCATION_1: NORMAL
MDC_IDC_SET_LEADCHNL_RA_SENSING_POLARITY: NORMAL
MDC_IDC_SET_LEADCHNL_RA_SENSING_SENSITIVITY: 0.2 MV
MDC_IDC_SET_LEADCHNL_RV_PACING_AMPLITUDE: 1.12
MDC_IDC_SET_LEADCHNL_RV_PACING_ANODE_ELECTRODE_1: NORMAL
MDC_IDC_SET_LEADCHNL_RV_PACING_ANODE_LOCATION_1: NORMAL
MDC_IDC_SET_LEADCHNL_RV_PACING_CAPTURE_MODE: NORMAL
MDC_IDC_SET_LEADCHNL_RV_PACING_CATHODE_ELECTRODE_1: NORMAL
MDC_IDC_SET_LEADCHNL_RV_PACING_CATHODE_LOCATION_1: NORMAL
MDC_IDC_SET_LEADCHNL_RV_PACING_POLARITY: NORMAL
MDC_IDC_SET_LEADCHNL_RV_PACING_PULSEWIDTH: 0.5 MS
MDC_IDC_SET_LEADCHNL_RV_SENSING_ADAPTATION_MODE: NORMAL
MDC_IDC_SET_LEADCHNL_RV_SENSING_ANODE_ELECTRODE_1: NORMAL
MDC_IDC_SET_LEADCHNL_RV_SENSING_ANODE_LOCATION_1: NORMAL
MDC_IDC_SET_LEADCHNL_RV_SENSING_CATHODE_ELECTRODE_1: NORMAL
MDC_IDC_SET_LEADCHNL_RV_SENSING_CATHODE_LOCATION_1: NORMAL
MDC_IDC_SET_LEADCHNL_RV_SENSING_POLARITY: NORMAL
MDC_IDC_SET_LEADCHNL_RV_SENSING_SENSITIVITY: 2 MV
MDC_IDC_STAT_AT_BURDEN_PERCENT: 1 %
MDC_IDC_STAT_AT_DTM_END: NORMAL
MDC_IDC_STAT_AT_DTM_START: NORMAL
MDC_IDC_STAT_AT_MODE_SW_COUNT: 31
MDC_IDC_STAT_AT_MODE_SW_COUNT_PER_DAY: 0
MDC_IDC_STAT_AT_MODE_SW_MAX_DURATION: 186 S
MDC_IDC_STAT_AT_MODE_SW_PERCENT_TIME: 1 %
MDC_IDC_STAT_BRADY_AP_VP_PERCENT: 3.9 %
MDC_IDC_STAT_BRADY_AP_VS_PERCENT: 1 %
MDC_IDC_STAT_BRADY_AS_VP_PERCENT: 92 %
MDC_IDC_STAT_BRADY_AS_VS_PERCENT: 1.4 %
MDC_IDC_STAT_BRADY_DTM_END: NORMAL
MDC_IDC_STAT_BRADY_DTM_START: NORMAL
MDC_IDC_STAT_BRADY_RA_PERCENT_PACED: 1 %
MDC_IDC_STAT_BRADY_RV_PERCENT_PACED: 96 %
MDC_IDC_STAT_CRT_DTM_END: NORMAL
MDC_IDC_STAT_CRT_DTM_START: NORMAL
MDC_IDC_STAT_HEART_RATE_ATRIAL_MAX: 330 {BEATS}/MIN
MDC_IDC_STAT_HEART_RATE_ATRIAL_MEAN: 68 {BEATS}/MIN
MDC_IDC_STAT_HEART_RATE_ATRIAL_MIN: 40 {BEATS}/MIN
MDC_IDC_STAT_HEART_RATE_DTM_END: NORMAL
MDC_IDC_STAT_HEART_RATE_DTM_START: NORMAL
MDC_IDC_STAT_HEART_RATE_VENTRICULAR_MAX: 240 {BEATS}/MIN
MDC_IDC_STAT_HEART_RATE_VENTRICULAR_MEAN: 69 {BEATS}/MIN
MDC_IDC_STAT_HEART_RATE_VENTRICULAR_MIN: 30 {BEATS}/MIN

## 2021-12-13 PROCEDURE — 93296 REM INTERROG EVL PM/IDS: CPT | Performed by: INTERNAL MEDICINE

## 2021-12-13 PROCEDURE — 93294 REM INTERROG EVL PM/LDLS PM: CPT | Performed by: INTERNAL MEDICINE

## 2021-12-22 ENCOUNTER — OFFICE VISIT (OUTPATIENT)
Dept: CARDIOLOGY | Facility: CLINIC | Age: 81
End: 2021-12-22
Payer: COMMERCIAL

## 2021-12-22 VITALS
DIASTOLIC BLOOD PRESSURE: 50 MMHG | HEIGHT: 63 IN | SYSTOLIC BLOOD PRESSURE: 132 MMHG | BODY MASS INDEX: 25.41 KG/M2 | RESPIRATION RATE: 16 BRPM | HEART RATE: 72 BPM | WEIGHT: 143.4 LBS

## 2021-12-22 DIAGNOSIS — I10 BENIGN ESSENTIAL HYPERTENSION: ICD-10-CM

## 2021-12-22 DIAGNOSIS — I50.9 ACUTE ON CHRONIC CONGESTIVE HEART FAILURE, UNSPECIFIED HEART FAILURE TYPE (H): Primary | ICD-10-CM

## 2021-12-22 DIAGNOSIS — N18.31 CHRONIC KIDNEY DISEASE, STAGE 3A (H): ICD-10-CM

## 2021-12-22 DIAGNOSIS — I16.1 HYPERTENSIVE EMERGENCY: ICD-10-CM

## 2021-12-22 LAB
ANION GAP SERPL CALCULATED.3IONS-SCNC: 11 MMOL/L (ref 5–18)
BUN SERPL-MCNC: 30 MG/DL (ref 8–28)
CALCIUM SERPL-MCNC: 9.6 MG/DL (ref 8.5–10.5)
CHLORIDE BLD-SCNC: 101 MMOL/L (ref 98–107)
CO2 SERPL-SCNC: 26 MMOL/L (ref 22–31)
CREAT SERPL-MCNC: 1.39 MG/DL (ref 0.6–1.1)
GFR SERPL CREATININE-BSD FRML MDRD: 38 ML/MIN/1.73M2
GLUCOSE BLD-MCNC: 120 MG/DL (ref 70–125)
POTASSIUM BLD-SCNC: 4.8 MMOL/L (ref 3.5–5)
SODIUM SERPL-SCNC: 138 MMOL/L (ref 136–145)

## 2021-12-22 PROCEDURE — 80048 BASIC METABOLIC PNL TOTAL CA: CPT | Performed by: NURSE PRACTITIONER

## 2021-12-22 PROCEDURE — 36415 COLL VENOUS BLD VENIPUNCTURE: CPT | Performed by: NURSE PRACTITIONER

## 2021-12-22 PROCEDURE — 99214 OFFICE O/P EST MOD 30 MIN: CPT | Performed by: NURSE PRACTITIONER

## 2021-12-22 RX ORDER — LOSARTAN POTASSIUM 50 MG/1
50 TABLET ORAL EVERY EVENING
Qty: 90 TABLET | Refills: 3 | Status: SHIPPED | OUTPATIENT
Start: 2021-12-22 | End: 2022-12-13

## 2021-12-22 ASSESSMENT — MIFFLIN-ST. JEOR: SCORE: 1089.59

## 2021-12-22 NOTE — LETTER
"12/22/2021    Jamie Mcconnell MD  [facility] [address]  [facility] [address]    RE: Sharyn Trent       Dear Colleague,     I had the pleasure of seeing Sharyn Trent in the [facility].        Assessment/Recommendations   Assessment:    1.  Heart failure with preserved ejection fraction, NYHA class II: Compensated.  She denies any acute heart failure symptoms.  She has mild fatigue and dyspnea on exertion.  Her weight has decreased as she is really monitoring her salt intake.  2.  Hypertension: Controlled. Blood pressure 132/50  3.  Dyslipidemia: Continues atorvastatin  4.  CKD stage III: BMP pending    Plan:  1.  Continue current medications  2.  BMP pending  3.  Continue daily weights and low-salt diet    Sharyn Trent will follow up with Dr Saavedra in March, Dr Akins early summer and in the heart failure clinic as needed.     History of Present Illness/Subjective    Ms. Sharyn Trent is a 81 year old female seen at [facility] heart failure clinic today for continued follow-up.  Her daughter accompanies her today.  She follows up for heart failure with preserved ejection fraction. Her last echocardiogram was done March 2021 which showed ejection fraction of 55%. She has a past medical history significant for hypertension, diabetes, pacemaker, dyslipidemia.     She states she is doing well.  She has mild fatigue and dyspnea on exertion.  She denies lightheadedness, shortness of breath, orthopnea, PND, palpitations, chest pain, abdominal fullness/bloating and lower extremity edema.      She is monitoring home weights which are stable between 140-143 pounds.  She is following a low sodium diet.       Physical Examination Review of Systems   /50 (BP Location: Right arm, Patient Position: Sitting, Cuff Size: Adult Regular)   Pulse 72   Resp 16   Ht 1.6 m (5' 3\")   Wt 65 kg (143 lb 6.4 oz)   BMI 25.40 kg/m    Body mass index is 25.4 kg/m .  Wt Readings from Last 3 " Encounters:   12/22/21 65 kg (143 lb 6.4 oz)   10/29/21 67.6 kg (149 lb)   09/23/21 68.5 kg (151 lb)       General Appearance:   no acute distress   ENT/Mouth: membranes moist, no oral lesions or bleeding gums.      EYES:  no scleral icterus, normal conjunctivae   Neck: no carotid bruits or thyromegaly   Chest/Lungs:   lungs are clear to auscultation, no rales or wheezing, equal chest wall expansion    Cardiovascular:   Regular. Normal first and second heart sounds with no murmurs, rubs, or gallops; Jugular venous pressure normal, no edema bilaterally    Abdomen:  no organomegaly, masses, bruits, or tenderness; bowel sounds are present   Extremities: no cyanosis or clubbing   Skin: no xanthelasma, warm.    Neurologic: normal  bilateral, no tremors     Psychiatric: alert and oriented x3                                              Medical History  Surgical History Family History Social History   Past Medical History:   Diagnosis Date     Abnormal ECG      Anxiety      Arthritis      Chest pain      Chest pain      Chronic kidney disease     stage 3-mod.     Diabetes (H)      Dyslipidemia, goal LDL below 70      GERD (gastroesophageal reflux disease)      HTN (hypertension)      Hyperlipidemia      Melanoma of ankle (H)     L ankle     Other second degree atrioventricular block     Created by Conversion      Pacemaker      PSVT (paroxysmal supraventricular tachycardia) (H)      Right bundle branch block      Skin cancer     Past Surgical History:   Procedure Laterality Date     ABLATION OF DYSRHYTHMIC FOCUS  04/11/2013    AV analia reentry tachycardia, partially successful     BIOPSY SKIN (LOCATION)       CARDIAC CATHETERIZATION  03/10/2016     CV CORONARY ANGIOGRAM N/A 5/26/2021    Procedure: Coronary Angiogram;  Surgeon: Yony Gillis MD;  Location: [facility] Cardiac Cath Lab;  Service: Cardiology     CV LEFT HEART CATHETERIZATION WITHOUT LEFT VENTRICULOGRAM Left 5/26/2021    Procedure: Left Heart  Catheterization Without Left Ventriculogram;  Surgeon: Yony Gillis MD;  Location: [facility] Cardiac Cath Lab;  Service: Cardiology     CV RIGHT HEART CATHETERIZATION N/A 5/26/2021    Procedure: Right Heart Catheterization;  Surgeon: Yony Gillis MD;  Location: [facility] Cardiac Cath Lab;  Service: Cardiology     EP PACEMAKER N/A 8/30/2021    Procedure: Electrophysiology Pacemaker;  Surgeon: Ab Saavedra MD;  Location: [facility] CV     FOOT SURGERY      L foot     HAND SURGERY      on both thumbs     IMPLANT PACEMAKER  04/2011    Second-degree AV block     OTHER SURGICAL HISTORY      SVT Ablation     [place] SHLDR ARTHROSCOP,SURG,W/ROTAT CUFF REPR Left 3/9/2017    Procedure: LEFT SHOULDER ARTHROSCOPY DECOMPRESSION DISTAL CLAVICLE EXCISION  ROTATOR CUFF REPAIR BICEPS TENOTOMY AND EXTENSIVE DEBRIDEMENT;  Surgeon: Todd Riggs MD;  Location: [facility];  Service: Orthopedics     RELEASE CARPAL TUNNEL      both wrists     SKIN CANCER EXCISION      L ankle,Lleg and cheek     TOE SURGERY      L big toe joint replacement     TUBAL LIGATION      Family History   Problem Relation Age of Onset     Hypertension Mother      Cerebrovascular Disease Mother      Prostate Cancer Father      Cancer Father      Coronary Artery Disease Father      Dyslipidemia Father      Dyslipidemia Sister      Dyslipidemia Brother      Hypertension Brother      Prostate Cancer Brother     Social History     Socioeconomic History     Marital status:      Spouse name: Not on file     Number of children: Not on file     Years of education: Not on file     Highest education level: Not on file   Occupational History     Not on file   Tobacco Use     Smoking status: Never Smoker     Smokeless tobacco: Never Used   Substance and Sexual Activity     Alcohol use: No     Drug use: No     Sexual activity: Yes     Partners: Male     Birth control/protection: Surgical   Other Topics Concern     Not on file    Social History Narrative     Not on file     Social Determinants of Health     Financial Resource Strain: Not on file   Food Insecurity: Not on file   Transportation Needs: Not on file   Physical Activity: Not on file   Stress: Not on file   Social Connections: Not on file   Intimate Partner Violence: Not on file   Housing Stability: Not on file          Medications  Allergies   Current Outpatient Medications   Medication Sig Dispense Refill     aspirin 325 MG tablet [ASPIRIN 325 MG TABLET] Take 325 mg by mouth every evening.        atorvastatin (LIPITOR) 80 MG tablet [ATORVASTATIN (LIPITOR) 80 MG TABLET] Take 80 mg by mouth bedtime.       carvediloL (COREG) 25 MG tablet [CARVEDILOL (COREG) 25 MG TABLET] Take 1 tablet (25 mg total) by mouth 2 (two) times a day with meals. 180 tablet 3     furosemide (LASIX) 20 MG tablet Take 1 tablet (20 mg) by mouth daily 90 tablet 3     hydrALAZINE (APRESOLINE) 100 MG tablet Take 1 tablet (100 mg) by mouth 3 times daily 270 tablet 1     LORazepam (ATIVAN) 0.5 MG tablet [LORAZEPAM (ATIVAN) 0.5 MG TABLET] Take 1 tablet (0.5 mg total) by mouth every 6 (six) hours as needed for anxiety (or tremors). 12 tablet 0     losartan (COZAAR) 50 MG tablet Take 1 tablet (50 mg) by mouth every evening 90 tablet 3     metFORMIN (GLUCOPHAGE) 500 MG tablet [METFORMIN (GLUCOPHAGE) 500 MG TABLET] Take 1 tablet (500 mg total) by mouth 2 (two) times a day with meals. At breakfast and at dinner 60 tablet 0     multivitamin therapeutic (THERAGRAN) tablet [MULTIVITAMIN THERAPEUTIC (THERAGRAN) TABLET] Take 1 tablet by mouth every evening.        omeprazole (PRILOSEC) 20 MG capsule [OMEPRAZOLE (PRILOSEC) 20 MG CAPSULE] Take 20 mg by mouth daily before breakfast.        prednisoLONE acetate (PRED-FORTE) 1 % ophthalmic suspension [PREDNISOLONE ACETATE (PRED-FORTE) 1 % OPHTHALMIC SUSPENSION] Administer 1 drop to the right eye daily.        vit C/E/Zn/coppr/lutein/zeaxan (PRESERVISION AREDS-2 ORAL) [VIT  C/E/ZN/COPPR/LUTEIN/ZEAXAN (PRESERVISION AREDS-2 ORAL)] Take 1 tablet by mouth 2 (two) times a day before meals.      Allergies   Allergen Reactions     Venom-Honey Bee [Bee Venom] Shortness Of Breath     Macrobid [Nitrofurantoin] Other (See Comments)     Burning sensation across chest and arms     Mercurial Analogues [Mercurial Derivatives] Dermatitis     blisters     Sulfa (Sulfonamide Antibiotics) [Sulfa Drugs] Rash         Lab Results    Chemistry/lipid CBC Cardiac Enzymes/BNP/TSH/INR   Lab Results   Component Value Date    CHOL 120 04/13/2021    HDL 38 (L) 04/13/2021    TRIG 96 04/13/2021    BUN 27 09/23/2021     09/23/2021    CO2 28 09/23/2021    Lab Results   Component Value Date    WBC 6.7 09/13/2021    HGB 12.3 09/13/2021    HCT 37.3 09/13/2021    MCV 91 09/13/2021     09/16/2021    Lab Results   Component Value Date    TROPONINI 0.05 09/13/2021     (H) 09/13/2021    TSH 1.53 09/15/2021    INR 1.05 09/13/2021             This note has been dictated using voice recognition software. Any grammatical, typographical, or context distortions are unintentional and inherent to the software    30 minutes spent on the date of encounter doing chart review, review of outside records, review of test results, patient visit, documentation and discussion with family.                  Thank you for allowing me to participate in the care of your patient.      Sincerely,     [facility]  cc:   No referring provider defined for this encounter.

## 2021-12-22 NOTE — PATIENT INSTRUCTIONS
Sharyn Trent,    It was a pleasure to see you today at [facility].     My recommendations after this visit include:  - Please follow up with Dr Akins in early summer   - Please follow up with Dr Saavedra in March  - Please follow up with Lashell Murcia as needed  - I have checked labs and will contact you with results  - Continue current medications    Lashell Murcia, CNP

## 2021-12-22 NOTE — PROGRESS NOTES
"      Assessment/Recommendations   Assessment:    1.  Heart failure with preserved ejection fraction, NYHA class II: Compensated.  She denies any acute heart failure symptoms.  She has mild fatigue and dyspnea on exertion.  Her weight has decreased as she is really monitoring her salt intake.  2.  Hypertension: Controlled. Blood pressure 132/50  3.  Dyslipidemia: Continues atorvastatin  4.  CKD stage III: BMP pending    Plan:  1.  Continue current medications  2.  BMP pending  3.  Continue daily weights and low-salt diet    Sharyn Trent will follow up with Dr Saavedra in March, Dr Akins early summer and in the heart failure clinic as needed.     History of Present Illness/Subjective    Ms. Sharyn Trent is a 81 year old female seen at [facility] heart failure clinic today for continued follow-up.  Her daughter accompanies her today.  She follows up for heart failure with preserved ejection fraction. Her last echocardiogram was done March 2021 which showed ejection fraction of 55%. She has a past medical history significant for hypertension, diabetes, pacemaker, dyslipidemia.     She states she is doing well.  She has mild fatigue and dyspnea on exertion.  She denies lightheadedness, shortness of breath, orthopnea, PND, palpitations, chest pain, abdominal fullness/bloating and lower extremity edema.      She is monitoring home weights which are stable between 140-143 pounds.  She is following a low sodium diet.       Physical Examination Review of Systems   /50 (BP Location: Right arm, Patient Position: Sitting, Cuff Size: Adult Regular)   Pulse 72   Resp 16   Ht 1.6 m (5' 3\")   Wt 65 kg (143 lb 6.4 oz)   BMI 25.40 kg/m    Body mass index is 25.4 kg/m .  Wt Readings from Last 3 Encounters:   12/22/21 65 kg (143 lb 6.4 oz)   10/29/21 67.6 kg (149 lb)   09/23/21 68.5 kg (151 lb)       General Appearance:   no acute distress   ENT/Mouth: membranes moist, no oral lesions or bleeding gums.      EYES:  " no scleral icterus, normal conjunctivae   Neck: no carotid bruits or thyromegaly   Chest/Lungs:   lungs are clear to auscultation, no rales or wheezing, equal chest wall expansion    Cardiovascular:   Regular. Normal first and second heart sounds with no murmurs, rubs, or gallops; Jugular venous pressure normal, no edema bilaterally    Abdomen:  no organomegaly, masses, bruits, or tenderness; bowel sounds are present   Extremities: no cyanosis or clubbing   Skin: no xanthelasma, warm.    Neurologic: normal  bilateral, no tremors     Psychiatric: alert and oriented x3                                              Medical History  Surgical History Family History Social History   Past Medical History:   Diagnosis Date     Abnormal ECG      Anxiety      Arthritis      Chest pain      Chest pain      Chronic kidney disease     stage 3-mod.     Diabetes (H)      Dyslipidemia, goal LDL below 70      GERD (gastroesophageal reflux disease)      HTN (hypertension)      Hyperlipidemia      Melanoma of ankle (H)     L ankle     Other second degree atrioventricular block     Created by Conversion      Pacemaker      PSVT (paroxysmal supraventricular tachycardia) (H)      Right bundle branch block      Skin cancer     Past Surgical History:   Procedure Laterality Date     ABLATION OF DYSRHYTHMIC FOCUS  04/11/2013    AV analia reentry tachycardia, partially successful     BIOPSY SKIN (LOCATION)       CARDIAC CATHETERIZATION  03/10/2016     CV CORONARY ANGIOGRAM N/A 5/26/2021    Procedure: Coronary Angiogram;  Surgeon: Yony Gillis MD;  Location: [facility] Cardiac Cath Lab;  Service: Cardiology     CV LEFT HEART CATHETERIZATION WITHOUT LEFT VENTRICULOGRAM Left 5/26/2021    Procedure: Left Heart Catheterization Without Left Ventriculogram;  Surgeon: Yony Gillis MD;  Location: [facility] Cardiac Cath Lab;  Service: Cardiology     CV RIGHT HEART CATHETERIZATION N/A 5/26/2021    Procedure: Right Heart  Catheterization;  Surgeon: Yony Gillis MD;  Location: [facility] Cardiac Cath Lab;  Service: Cardiology     EP PACEMAKER N/A 8/30/2021    Procedure: Electrophysiology Pacemaker;  Surgeon: Ab Saavedra MD;  Location: [facility] CATH LAB CV     FOOT SURGERY      L foot     HAND SURGERY      on both thumbs     IMPLANT PACEMAKER  04/2011    Second-degree AV block     OTHER SURGICAL HISTORY      SVT Ablation     AR SHLDR ARTHROSCOP,SURG,W/ROTAT CUFF REPR Left 3/9/2017    Procedure: LEFT SHOULDER ARTHROSCOPY DECOMPRESSION DISTAL CLAVICLE EXCISION  ROTATOR CUFF REPAIR BICEPS TENOTOMY AND EXTENSIVE DEBRIDEMENT;  Surgeon: Todd Riggs MD;  Location: [facility] Main OR;  Service: Orthopedics     RELEASE CARPAL TUNNEL      both wrists     SKIN CANCER EXCISION      L ankle,Lleg and cheek     TOE SURGERY      L big toe joint replacement     TUBAL LIGATION      Family History   Problem Relation Age of Onset     Hypertension Mother      Cerebrovascular Disease Mother      Prostate Cancer Father      Cancer Father      Coronary Artery Disease Father      Dyslipidemia Father      Dyslipidemia Sister      Dyslipidemia Brother      Hypertension Brother      Prostate Cancer Brother     Social History     Socioeconomic History     Marital status:      Spouse name: Not on file     Number of children: Not on file     Years of education: Not on file     Highest education level: Not on file   Occupational History     Not on file   Tobacco Use     Smoking status: Never Smoker     Smokeless tobacco: Never Used   Substance and Sexual Activity     Alcohol use: No     Drug use: No     Sexual activity: Yes     Partners: Male     Birth control/protection: Surgical   Other Topics Concern     Not on file   Social History Narrative     Not on file     Social Determinants of Health     Financial Resource Strain: Not on file   Food Insecurity: Not on file   Transportation Needs: Not on file   Physical Activity: Not on file    Stress: Not on file   Social Connections: Not on file   Intimate Partner Violence: Not on file   Housing Stability: Not on file          Medications  Allergies   Current Outpatient Medications   Medication Sig Dispense Refill     aspirin 325 MG tablet [ASPIRIN 325 MG TABLET] Take 325 mg by mouth every evening.        atorvastatin (LIPITOR) 80 MG tablet [ATORVASTATIN (LIPITOR) 80 MG TABLET] Take 80 mg by mouth bedtime.       carvediloL (COREG) 25 MG tablet [CARVEDILOL (COREG) 25 MG TABLET] Take 1 tablet (25 mg total) by mouth 2 (two) times a day with meals. 180 tablet 3     furosemide (LASIX) 20 MG tablet Take 1 tablet (20 mg) by mouth daily 90 tablet 3     hydrALAZINE (APRESOLINE) 100 MG tablet Take 1 tablet (100 mg) by mouth 3 times daily 270 tablet 1     LORazepam (ATIVAN) 0.5 MG tablet [LORAZEPAM (ATIVAN) 0.5 MG TABLET] Take 1 tablet (0.5 mg total) by mouth every 6 (six) hours as needed for anxiety (or tremors). 12 tablet 0     losartan (COZAAR) 50 MG tablet Take 1 tablet (50 mg) by mouth every evening 90 tablet 3     metFORMIN (GLUCOPHAGE) 500 MG tablet [METFORMIN (GLUCOPHAGE) 500 MG TABLET] Take 1 tablet (500 mg total) by mouth 2 (two) times a day with meals. At breakfast and at dinner 60 tablet 0     multivitamin therapeutic (THERAGRAN) tablet [MULTIVITAMIN THERAPEUTIC (THERAGRAN) TABLET] Take 1 tablet by mouth every evening.        omeprazole (PRILOSEC) 20 MG capsule [OMEPRAZOLE (PRILOSEC) 20 MG CAPSULE] Take 20 mg by mouth daily before breakfast.        prednisoLONE acetate (PRED-FORTE) 1 % ophthalmic suspension [PREDNISOLONE ACETATE (PRED-FORTE) 1 % OPHTHALMIC SUSPENSION] Administer 1 drop to the right eye daily.        vit C/E/Zn/coppr/lutein/zeaxan (PRESERVISION AREDS-2 ORAL) [VIT C/E/ZN/COPPR/LUTEIN/ZEAXAN (PRESERVISION AREDS-2 ORAL)] Take 1 tablet by mouth 2 (two) times a day before meals.      Allergies   Allergen Reactions     Venom-Honey Bee [Bee Venom] Shortness Of Breath     Macrobid  [Nitrofurantoin] Other (See Comments)     Burning sensation across chest and arms     Mercurial Analogues [Mercurial Derivatives] Dermatitis     blisters     Sulfa (Sulfonamide Antibiotics) [Sulfa Drugs] Rash         Lab Results    Chemistry/lipid CBC Cardiac Enzymes/BNP/TSH/INR   Lab Results   Component Value Date    CHOL 120 04/13/2021    HDL 38 (L) 04/13/2021    TRIG 96 04/13/2021    BUN 27 09/23/2021     09/23/2021    CO2 28 09/23/2021    Lab Results   Component Value Date    WBC 6.7 09/13/2021    HGB 12.3 09/13/2021    HCT 37.3 09/13/2021    MCV 91 09/13/2021     09/16/2021    Lab Results   Component Value Date    TROPONINI 0.05 09/13/2021     (H) 09/13/2021    TSH 1.53 09/15/2021    INR 1.05 09/13/2021             This note has been dictated using voice recognition software. Any grammatical, typographical, or context distortions are unintentional and inherent to the software    30 minutes spent on the date of encounter doing chart review, review of outside records, review of test results, patient visit, documentation and discussion with family.

## 2022-01-06 ENCOUNTER — LAB (OUTPATIENT)
Dept: LAB | Facility: HOSPITAL | Age: 82
End: 2022-01-06
Payer: COMMERCIAL

## 2022-01-06 ENCOUNTER — OFFICE VISIT (OUTPATIENT)
Dept: NEUROLOGY | Facility: CLINIC | Age: 82
End: 2022-01-06
Attending: PSYCHIATRY & NEUROLOGY
Payer: COMMERCIAL

## 2022-01-06 VITALS
BODY MASS INDEX: 26.4 KG/M2 | HEART RATE: 70 BPM | HEIGHT: 63 IN | DIASTOLIC BLOOD PRESSURE: 60 MMHG | SYSTOLIC BLOOD PRESSURE: 145 MMHG | WEIGHT: 149 LBS

## 2022-01-06 DIAGNOSIS — G62.9 NEUROPATHY: ICD-10-CM

## 2022-01-06 DIAGNOSIS — R20.0 NUMBNESS: ICD-10-CM

## 2022-01-06 DIAGNOSIS — R77.8 ABNORMAL SPEP: ICD-10-CM

## 2022-01-06 DIAGNOSIS — I10 PRIMARY HYPERTENSION: ICD-10-CM

## 2022-01-06 DIAGNOSIS — R77.8 ABNORMAL SPEP: Primary | ICD-10-CM

## 2022-01-06 PROCEDURE — 84165 PROTEIN E-PHORESIS SERUM: CPT | Mod: TC

## 2022-01-06 PROCEDURE — 86334 IMMUNOFIX E-PHORESIS SERUM: CPT

## 2022-01-06 PROCEDURE — 95886 MUSC TEST DONE W/N TEST COMP: CPT | Mod: RT | Performed by: PSYCHIATRY & NEUROLOGY

## 2022-01-06 PROCEDURE — 84155 ASSAY OF PROTEIN SERUM: CPT

## 2022-01-06 PROCEDURE — 95910 NRV CNDJ TEST 7-8 STUDIES: CPT | Performed by: PSYCHIATRY & NEUROLOGY

## 2022-01-06 PROCEDURE — 99214 OFFICE O/P EST MOD 30 MIN: CPT | Mod: 25 | Performed by: PSYCHIATRY & NEUROLOGY

## 2022-01-06 PROCEDURE — 36415 COLL VENOUS BLD VENIPUNCTURE: CPT

## 2022-01-06 ASSESSMENT — MIFFLIN-ST. JEOR: SCORE: 1114.99

## 2022-01-06 NOTE — LETTER
1/6/2022         RE: Sharyn Trent  [address] [place] [address]        Dear Colleague,    Thank you for referring your patient, Sharyn Trent, to the [facility] NEUROLOGY [facility] [place]. Please see a copy of my visit note below.    NEUROLOGY OUTPATIENT PROGRESS NOTE   Jan 6, 2022     CHIEF COMPLAINT/REASON FOR VISIT/REASON FOR CONSULT  Patient presents with:  Follow Up: f/u EMG numbness    REASON FOR CONSULTATION- Numbness    HISTORY OF PRESENT ILLNESS  Sharyn Trent is a 81 year old female seen today for hospital follow-up.  Patient was seen in the hospital for an episode of shaking and headache.  He is complaining of bilateral hand numbness.  Stroke code was called.  Testing was negative.  Patient was diagnosed to have a hypertensive emergency.  She reports no recurrence of her symptoms.    In the hospital she was also complaining of some numbness in her feet which was suggestive of neuropathy.  CT of the L-spine did show mild to moderate spinal stenosis.  Did not completely fit with her symptoms.  She followed up in the neurology clinic for further evaluation.  EMG was done today.  Has also had blood work done in the hospital.  Reports ongoing numbness in the bottom of her feet.  Does have some balance issues.  Denies any other new concerns.    Previous history is reviewed and this is unchanged.    PAST MEDICAL/SURGICAL HISTORY  Past Medical History:   Diagnosis Date     Abnormal ECG      Anxiety      Arthritis      Chest pain      Chest pain      Chronic kidney disease     stage 3-mod.     Diabetes (H)      Dyslipidemia, goal LDL below 70      GERD (gastroesophageal reflux disease)      HTN (hypertension)      Hyperlipidemia      Melanoma of ankle (H)     L ankle     Other second degree atrioventricular block     Created by Conversion      Pacemaker      PSVT (paroxysmal supraventricular tachycardia) (H)      Right bundle branch block      Skin cancer      Patient Active Problem List    Diagnosis     Pacemaker at end of battery life     Atypical chest pain     Malignant essential hypertension     Dyslipidemia, goal LDL below 70     DM (diabetes mellitus), type 2 (H)     Third degree AV block (H)     Chest pain     Ectopic atrial tachycardia (H)     Acute diastolic CHF (congestive heart failure) (H)     Acute respiratory failure with hypoxia (H)     Pneumonia of left lower lobe due to infectious organism     Stroke-like symptoms     Abnormal nuclear stress test     Chronic kidney disease, stage 3a (H)     Diabetes mellitus type 2 without retinopathy (H)     Gastroesophageal reflux disease without esophagitis     Thyroid nodule     Hypertensive emergency     Acute on chronic congestive heart failure, unspecified heart failure type (H)     Benign essential hypertension       FAMILY HISTORY  Family History   Problem Relation Age of Onset     Hypertension Mother      Cerebrovascular Disease Mother      Prostate Cancer Father      Cancer Father      Coronary Artery Disease Father      Dyslipidemia Father      Dyslipidemia Sister      Dyslipidemia Brother      Hypertension Brother      Prostate Cancer Brother        SOCIAL HISTORY  Social History     Tobacco Use     Smoking status: Never Smoker     Smokeless tobacco: Never Used   Substance Use Topics     Alcohol use: No     Drug use: No       SYSTEMS REVIEW  Twelve-system ROS was done and other than the HPI this was negative.  Pertinent positives noted in the HPI.    MEDICATIONS  aspirin 325 MG tablet, [ASPIRIN 325 MG TABLET] Take 325 mg by mouth every evening.   atorvastatin (LIPITOR) 80 MG tablet, [ATORVASTATIN (LIPITOR) 80 MG TABLET] Take 80 mg by mouth bedtime.  carvediloL (COREG) 25 MG tablet, [CARVEDILOL (COREG) 25 MG TABLET] Take 1 tablet (25 mg total) by mouth 2 (two) times a day with meals.  furosemide (LASIX) 20 MG tablet, Take 1 tablet (20 mg) by mouth daily  hydrALAZINE (APRESOLINE) 100 MG tablet, Take 1 tablet (100 mg) by mouth 3 times  "daily  LORazepam (ATIVAN) 0.5 MG tablet, [LORAZEPAM (ATIVAN) 0.5 MG TABLET] Take 1 tablet (0.5 mg total) by mouth every 6 (six) hours as needed for anxiety (or tremors).  losartan (COZAAR) 50 MG tablet, Take 1 tablet (50 mg) by mouth every evening  metFORMIN (GLUCOPHAGE) 500 MG tablet, [METFORMIN (GLUCOPHAGE) 500 MG TABLET] Take 1 tablet (500 mg total) by mouth 2 (two) times a day with meals. At breakfast and at dinner  multivitamin therapeutic (THERAGRAN) tablet, [MULTIVITAMIN THERAPEUTIC (THERAGRAN) TABLET] Take 1 tablet by mouth every evening.   omeprazole (PRILOSEC) 20 MG capsule, [OMEPRAZOLE (PRILOSEC) 20 MG CAPSULE] Take 20 mg by mouth daily before breakfast.   prednisoLONE acetate (PRED-FORTE) 1 % ophthalmic suspension, [PREDNISOLONE ACETATE (PRED-FORTE) 1 % OPHTHALMIC SUSPENSION] Administer 1 drop to the right eye daily.   vit C/E/Zn/coppr/lutein/zeaxan (PRESERVISION AREDS-2 ORAL), [VIT C/E/ZN/COPPR/LUTEIN/ZEAXAN (PRESERVISION AREDS-2 ORAL)] Take 1 tablet by mouth 2 (two) times a day before meals.    No current facility-administered medications on file prior to visit.       PHYSICAL EXAMINATION  VITALS: BP (!) 145/60 (BP Location: Left arm, Patient Position: Sitting)   Pulse 70   Ht 1.6 m (5' 3\")   Wt 67.6 kg (149 lb)   BMI 26.39 kg/m    GENERAL: Healthy appearing, alert, no acute distress, normal habitus.  CARDIOVASCULAR: Extremities warm and well perfused. Pulses present.   NEUROLOGICAL:  Patient is awake and oriented to self, place and time.  Attention span is normal.  Memory is grossly intact.  Language is fluent and follows commands appropriately.  Appropriate fund of knowledge. Cranial nerves 2-12 are intact. There is no pronator drift.  Motor exam shows 5/5 strength in all extremities.  Tone is symmetric bilaterally in upper and lower extremities.  Reflexes are symmetric and 1+/diminished in upper extremities and lower extremities. Sensory exam is grossly intact to light touch, pin prick and " vibration.  Finger to nose and heel to shin is without dysmetria.  Romberg is negative.  Gait is normal and the patient is able to do tandem walk and walk on toes and heels with some difficulty.      DIAGNOSTICS  HEAD CT:  1.  No acute intracranial hemorrhage.  2.  No CT evidence acute infarct. Aspect score 10.  3.  Age-related changes described above.     Dr. Tashi Lackey was notified by Dr Kevin Cooper at  1:40 AM 09/13/2021.      HEAD CTA:   1.  No intracranial arterial large vessel occlusion.  2.  Right carotid siphon mild stenosis.   3.  Left carotid siphon severe stenosis.  4.  Otherwise, no significant stenosis/occlusion.      NECK CTA:  1.  Right vertebral artery origin moderate stenosis.  2.  Left proximal subclavian artery mild-to-moderate stenosis.  3.  Otherwise, no significant stenosis/occlusion. No dissection.  4.  Multiple thyroid nodules. Recommend nonemergent thyroid ultrasound based upon ACR white paper criteria.   5.  Lung apices demonstrate septal thickening with patchy groundglass opacities and consolidations most likely due to pulmonary edema. Please correlate clinically to exclude acute infectious inflammatory pneumonitis.     Carotid US  IMPRESSION:  1.  Mild plaque formation, velocities consistent with less than 50% stenosis in the right internal carotid artery.  2.  Mild plaque formation, velocities consistent with less than 50% stenosis in the left internal carotid artery.  3.  Flow within the vertebral arteries is antegrade.  4.  Bilateral thyroid nodules, recommend further evaluation with dedicated thyroid ultrasound.     CT L spine  1.  No evidence of lumbar spine fracture.     2.  Multilevel degenerative change throughout the lumbar disc spaces with moderate spinal canal stenosis at L3-4. Moderate spinal canal stenosis at L4-5. Variable degrees of foraminal narrowing. Please see body of report for details at each level.    EMG  CLINICAL INTERPRETATION:  This is an abnormal nerve  conduction and EMG study.  The study is suggestive of a sensorimotor polyneuropathy in both legs.  Further clinical correlation is needed.    RELEVANT LABS  Component      Latest Ref Rng & Units 9/13/2021 9/15/2021   Albumin %      51.0 - 67.0 %  61.4   Albumin Fraction      3.2 - 4.7 g/dL  3.9   Alpha 1 %      2.0 - 4.0 %  3.7   Alpha 1 Fraction      0.1 - 0.3 g/dL  0.2   Alpha 2 %      5.0 - 13.0 %  12.2   Alpha 2 Fraction      0.4 - 0.9 g/dL  0.8   Beta %      10.0 - 17.0 %  10.0   Beta Fraction      0.7 - 1.2 g/dL  0.6 (L)   Gamma Globulin %      9.0 - 20.0 %  12.7   Gamma Fraction      0.6 - 1.4 g/dL  0.8   ELP Interpretation:        Normal serum protein electrophoresis.   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 At the present time, the bands are too faint for unequivocal diagnosis.   Path ICD       I16.1   Interpreted By       Eduardo Dyer MD       OUTSIDE RECORDS  Hospital notes reviewed.    IMPRESSION/REPORT/PLAN  Abnormal SPEP  Neuropathy  Primary hypertension    This is a 81 year old female with numbness in the bottom of her feet.  EMG is confirmatory for peripheral neuropathy.  CT of the lumbar spine does show spinal stenosis though this would not fit with her symptoms.  Exam does show decreased reflexes but otherwise negative for sensory loss.  Blood work overall has been noncontributory though has shown positive serum for electrophoresis though I suspect this to be a false positive.  We will recheck in 6 months.   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  Discussed prognosis.  Encourage exercise and healthy lifestyle.    Patient was seen in the hospital for a spell of bilateral hand numbness shaking and elevated blood pressure.  This was thought to be hypertensive emergency.  Head CT was negative for stroke.  MRI could not be done because of pacemaker.  CT angiogram showed left carotid stenosis though otherwise noncontributory.  Carotid ultrasound was negative.  Patient remains on aspirin 81 mg.  She is also on a statin.  No strokelike symptoms we will continue to monitor.    He can see her back in 6 months.    -     Protein Immunofixation Serum; Future  -     Protein electrophoresis; Future    Return in about 6 months (around 7/6/2022) for In-Clinic Visit, After testing.    Over 35 minutes were spent coordinating the care for the patient on the day of the encounter.  This includes previsit, during visit and post visit activities as documented above.  EMG and blood work reviewed.  Counseling patient.  Discussing prognosis of neuropathy.  Blood work ordered.  (Activities include but not inclusive of reviewing chart, reviewing outside records, reviewing labs and imaging study results as well as the images, patient visit time including getting history and exam,  use if applicable, review of test results with the patient and coming up with a plan in a shared model, counseling patient and family, education and answering patient questions, EMR , EMR diagnosis entry and problem list management, medication reconciliation and prescription management if applicable, paperwork if applicable, printing documents and documentation of the visit activities.)        Reji Davila MD  Neurologist  [facility] Neurology [facility]  Tel:- [phone]    This note was dictated using voice recognition software.  Any grammatical or context distortions are unintentional and  inherent to the software.        Again, thank you for allowing me to participate in the care of your patient.        Sincerely,        Reji Davila MD

## 2022-01-06 NOTE — PROCEDURES
[facility]     [facility]ly [facility]  [address], [address]  [place] [address]  Tel: [phone]  Fax: [phone]          Full Name: Sharyn Trent Gender: Female  Patient ID: [id number] YOB: 1940      Visit Date: 1/6/2022 10:11  Age: 81 Years 4 Months Old  Interpreted By: Reji Davila MD   Ref Dr.: Jamie Mcconnell MD  Tech: ST   Height: 5 feet 3 inch  Reason for referral: Evaluate bilateral lowers. c/o numbness in both feet > 3 months. Diabetic > 10 years. h/o left foot surgery.       Motor NCS      Nerve / Sites Lat Amp Dist [place]    ms mV cm m/s   R Peroneal - EDB      Ankle 4.64 4.5 8       Fib head 10.94 3.8 25.5 40      Pop fossa 13.23 3.7 10 44   L Peroneal - EDB      Ankle 4.79 2.7 8       Fib head 11.72 2.3 27.5 40      Pop fossa 14.17 2.1 10 41   R Tibial - AH      Ankle 4.58 3.8 8       Pop fossa 13.02 3.7 36 43   L Tibial - AH      Ankle 3.85 3.4 8       Pop fossa 12.55 2.6 36 41       F  Wave      Nerve Fmin    ms   R Tibial - AH 54.06   L Tibial - AH 50.78       Sensory NCS      Nerve / Sites Onset Lat Pk Lat Amp.2-3 Dist [place]    ms ms  V cm m/s   R Sural - Ankle (Calf)      Calf 2.86 3.65 3.2 14 49   L Sural - Ankle (Calf)      Calf 3.02 3.44 3.7 14 46   R Superficial peroneal - Ankle      Lat leg NR NR NR 12 NR   L Superficial peroneal - Ankle      Lat leg NR NR NR 12 NR       EMG Summary Table     Spontaneous MUAP Rcmt Note   Muscle Fib PSW Fasc IA # Amp Dur PPP Rate Type   R. Gluteus medius None None None N N N N N N N   R. Gluteus rachel None None None N N N N N N N   R. Upper paraspinal None None None N N N N N N N   R. Middle paraspinal None None None N N N N N N N   R. Lower paraspinal None None None N N N N N N N   R. Adductor reji None None None N N N N N N N   R. Quadriceps None None None N N N N N N N   R. Gastrocnemius (Medial head) None None None N N N N N N N   R. Tibialis anterior None None None N N N N  N N N   R. Tibialis posterior None None None N N N N N N N   L. Adductor reji None None None N N N N N N N   L. Quadriceps None None None N N N N N N N   L. Gastrocnemius (Medial head) None None None N N N N N N N   L. Tibialis anterior None None None N N N N N N N   L. Tibialis posterior None None None N N N N N N N     SUMMARY  Nerve conduction and EMG study of bilateral lower extremities shows:    Normal right peroneal distal motor latency with low normal amplitude and conduction velocity.  Normal left peroneal distal motor latency with low normal amplitude and conduction velocity.  Normal right tibial distal motor latency with low normal amplitude and conduction velocity.  Normal left tibial distal motor latency with low normal amplitude and conduction velocity.  Normal bilateral sural with low normal amplitude and absent bilateral superficial peroneal sensory SNAP.  Monopolar needle exam is normal.      CLINICAL INTERPRETATION:  This is an abnormal nerve conduction and EMG study.  The study is suggestive of a sensorimotor polyneuropathy in both legs.  Further clinical correlation is needed.     Reji Davila MD  Neurologist  [facility] Neurology [facility]  Tel:- [phone]

## 2022-01-06 NOTE — NURSING NOTE
Chief Complaint   Patient presents with     Follow Up     f/u EMG numbness     Neftali Lorenzana MA on 1/6/2022 at 10:25 AM

## 2022-01-06 NOTE — LETTER
1/6/2022         RE: Sharyn Trent  [address] [place] [address]        Dear Colleague,    Thank you for referring your patient, Sharyn Trent, to the [facility] [place]. Please see a copy of my visit note below.    See procedure note.       Again, thank you for allowing me to participate in the care of your patient.        Sincerely,        Reji Davila MD

## 2022-01-06 NOTE — PROGRESS NOTES
NEUROLOGY OUTPATIENT PROGRESS NOTE   Jan 6, 2022     CHIEF COMPLAINT/REASON FOR VISIT/REASON FOR CONSULT  Patient presents with:  Follow Up: f/u EMG numbness    REASON FOR CONSULTATION- Numbness    HISTORY OF PRESENT ILLNESS  Sharyn Trent is a 81 year old female seen today for hospital follow-up.  Patient was seen in the hospital for an episode of shaking and headache.  He is complaining of bilateral hand numbness.  Stroke code was called.  Testing was negative.  Patient was diagnosed to have a hypertensive emergency.  She reports no recurrence of her symptoms.    In the hospital she was also complaining of some numbness in her feet which was suggestive of neuropathy.  CT of the L-spine did show mild to moderate spinal stenosis.  Did not completely fit with her symptoms.  She followed up in the neurology clinic for further evaluation.  EMG was done today.  Has also had blood work done in the hospital.  Reports ongoing numbness in the bottom of her feet.  Does have some balance issues.  Denies any other new concerns.    Previous history is reviewed and this is unchanged.    PAST MEDICAL/SURGICAL HISTORY  Past Medical History:   Diagnosis Date     Abnormal ECG      Anxiety      Arthritis      Chest pain      Chest pain      Chronic kidney disease     stage 3-mod.     Diabetes (H)      Dyslipidemia, goal LDL below 70      GERD (gastroesophageal reflux disease)      HTN (hypertension)      Hyperlipidemia      Melanoma of ankle (H)     L ankle     Other second degree atrioventricular block     Created by Conversion      Pacemaker      PSVT (paroxysmal supraventricular tachycardia) (H)      Right bundle branch block      Skin cancer      Patient Active Problem List   Diagnosis     Pacemaker at end of battery life     Atypical chest pain     Malignant essential hypertension     Dyslipidemia, goal LDL below 70     DM (diabetes mellitus), type 2 (H)     Third degree AV block (H)     Chest pain     Ectopic atrial tachycardia  (H)     Acute diastolic CHF (congestive heart failure) (H)     Acute respiratory failure with hypoxia (H)     Pneumonia of left lower lobe due to infectious organism     Stroke-like symptoms     Abnormal nuclear stress test     Chronic kidney disease, stage 3a (H)     Diabetes mellitus type 2 without retinopathy (H)     Gastroesophageal reflux disease without esophagitis     Thyroid nodule     Hypertensive emergency     Acute on chronic congestive heart failure, unspecified heart failure type (H)     Benign essential hypertension       FAMILY HISTORY  Family History   Problem Relation Age of Onset     Hypertension Mother      Cerebrovascular Disease Mother      Prostate Cancer Father      Cancer Father      Coronary Artery Disease Father      Dyslipidemia Father      Dyslipidemia Sister      Dyslipidemia Brother      Hypertension Brother      Prostate Cancer Brother        SOCIAL HISTORY  Social History     Tobacco Use     Smoking status: Never Smoker     Smokeless tobacco: Never Used   Substance Use Topics     Alcohol use: No     Drug use: No       SYSTEMS REVIEW  Twelve-system ROS was done and other than the HPI this was negative.  Pertinent positives noted in the HPI.    MEDICATIONS  aspirin 325 MG tablet, [ASPIRIN 325 MG TABLET] Take 325 mg by mouth every evening.   atorvastatin (LIPITOR) 80 MG tablet, [ATORVASTATIN (LIPITOR) 80 MG TABLET] Take 80 mg by mouth bedtime.  carvediloL (COREG) 25 MG tablet, [CARVEDILOL (COREG) 25 MG TABLET] Take 1 tablet (25 mg total) by mouth 2 (two) times a day with meals.  furosemide (LASIX) 20 MG tablet, Take 1 tablet (20 mg) by mouth daily  hydrALAZINE (APRESOLINE) 100 MG tablet, Take 1 tablet (100 mg) by mouth 3 times daily  LORazepam (ATIVAN) 0.5 MG tablet, [LORAZEPAM (ATIVAN) 0.5 MG TABLET] Take 1 tablet (0.5 mg total) by mouth every 6 (six) hours as needed for anxiety (or tremors).  losartan (COZAAR) 50 MG tablet, Take 1 tablet (50 mg) by mouth every evening  metFORMIN  "(GLUCOPHAGE) 500 MG tablet, [METFORMIN (GLUCOPHAGE) 500 MG TABLET] Take 1 tablet (500 mg total) by mouth 2 (two) times a day with meals. At breakfast and at dinner  multivitamin therapeutic (THERAGRAN) tablet, [MULTIVITAMIN THERAPEUTIC (THERAGRAN) TABLET] Take 1 tablet by mouth every evening.   omeprazole (PRILOSEC) 20 MG capsule, [OMEPRAZOLE (PRILOSEC) 20 MG CAPSULE] Take 20 mg by mouth daily before breakfast.   prednisoLONE acetate (PRED-FORTE) 1 % ophthalmic suspension, [PREDNISOLONE ACETATE (PRED-FORTE) 1 % OPHTHALMIC SUSPENSION] Administer 1 drop to the right eye daily.   vit C/E/Zn/coppr/lutein/zeaxan (PRESERVISION AREDS-2 ORAL), [VIT C/E/ZN/COPPR/LUTEIN/ZEAXAN (PRESERVISION AREDS-2 ORAL)] Take 1 tablet by mouth 2 (two) times a day before meals.    No current facility-administered medications on file prior to visit.       PHYSICAL EXAMINATION  VITALS: BP (!) 145/60 (BP Location: Left arm, Patient Position: Sitting)   Pulse 70   Ht 1.6 m (5' 3\")   Wt 67.6 kg (149 lb)   BMI 26.39 kg/m    GENERAL: Healthy appearing, alert, no acute distress, normal habitus.  CARDIOVASCULAR: Extremities warm and well perfused. Pulses present.   NEUROLOGICAL:  Patient is awake and oriented to self, place and time.  Attention span is normal.  Memory is grossly intact.  Language is fluent and follows commands appropriately.  Appropriate fund of knowledge. Cranial nerves 2-12 are intact. There is no pronator drift.  Motor exam shows 5/5 strength in all extremities.  Tone is symmetric bilaterally in upper and lower extremities.  Reflexes are symmetric and 1+/diminished in upper extremities and lower extremities. Sensory exam is grossly intact to light touch, pin prick and vibration.  Finger to nose and heel to shin is without dysmetria.  Romberg is negative.  Gait is normal and the patient is able to do tandem walk and walk on toes and heels with some difficulty.      DIAGNOSTICS  HEAD CT:  1.  No acute intracranial hemorrhage.  2. "  No CT evidence acute infarct. Aspect score 10.  3.  Age-related changes described above.     Dr. Tashi Lackey was notified by Dr Kevin Cooper at  1:40 AM 09/13/2021.      HEAD CTA:   1.  No intracranial arterial large vessel occlusion.  2.  Right carotid siphon mild stenosis.   3.  Left carotid siphon severe stenosis.  4.  Otherwise, no significant stenosis/occlusion.      NECK CTA:  1.  Right vertebral artery origin moderate stenosis.  2.  Left proximal subclavian artery mild-to-moderate stenosis.  3.  Otherwise, no significant stenosis/occlusion. No dissection.  4.  Multiple thyroid nodules. Recommend nonemergent thyroid ultrasound based upon ACR white paper criteria.   5.  Lung apices demonstrate septal thickening with patchy groundglass opacities and consolidations most likely due to pulmonary edema. Please correlate clinically to exclude acute infectious inflammatory pneumonitis.     Carotid US  IMPRESSION:  1.  Mild plaque formation, velocities consistent with less than 50% stenosis in the right internal carotid artery.  2.  Mild plaque formation, velocities consistent with less than 50% stenosis in the left internal carotid artery.  3.  Flow within the vertebral arteries is antegrade.  4.  Bilateral thyroid nodules, recommend further evaluation with dedicated thyroid ultrasound.     CT L spine  1.  No evidence of lumbar spine fracture.     2.  Multilevel degenerative change throughout the lumbar disc spaces with moderate spinal canal stenosis at L3-4. Moderate spinal canal stenosis at L4-5. Variable degrees of foraminal narrowing. Please see body of report for details at each level.    EMG  CLINICAL INTERPRETATION:  This is an abnormal nerve conduction and EMG study.  The study is suggestive of a sensorimotor polyneuropathy in both legs.  Further clinical correlation is needed.    RELEVANT LABS  Component      Latest Ref Rng & Units 9/13/2021 9/15/2021   Albumin %      51.0 - 67.0 %  61.4   Albumin  Fraction      3.2 - 4.7 g/dL  3.9   Alpha 1 %      2.0 - 4.0 %  3.7   Alpha 1 Fraction      0.1 - 0.3 g/dL  0.2   Alpha 2 %      5.0 - 13.0 %  12.2   Alpha 2 Fraction      0.4 - 0.9 g/dL  0.8   Beta %      10.0 - 17.0 %  10.0   Beta Fraction      0.7 - 1.2 g/dL  0.6 (L)   Gamma Globulin %      9.0 - 20.0 %  12.7   Gamma Fraction      0.6 - 1.4 g/dL  0.8   ELP Interpretation:        Normal serum protein electrophoresis.   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 At the present time, the bands are too faint for unequivocal diagnosis.   Path ICD       I16.1   Interpreted By       Eduardo Dyer MD       OUTSIDE RECORDS  Hospital notes reviewed.    IMPRESSION/REPORT/PLAN  Abnormal SPEP  Neuropathy  Primary hypertension    This is a 81 year old female with numbness in the bottom of her feet.  EMG is confirmatory for peripheral neuropathy.  CT of the lumbar spine does show spinal stenosis though this would not fit with her symptoms.  Exam does show decreased reflexes but otherwise negative for sensory loss.  Blood work overall has been noncontributory though has shown positive serum for electrophoresis though I suspect this to be a false positive.  We will recheck in 6 months.  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  Discussed prognosis.  Encourage exercise and healthy lifestyle.    Patient was seen in the hospital for a spell of bilateral hand numbness shaking and elevated blood pressure.  This was thought  to be hypertensive emergency.  Head CT was negative for stroke.  MRI could not be done because of pacemaker.  CT angiogram showed left carotid stenosis though otherwise noncontributory.  Carotid ultrasound was negative.  Patient remains on aspirin 81 mg.  She is also on a statin.  No strokelike symptoms we will continue to monitor.    He can see her back in 6 months.    -     Protein Immunofixation Serum; Future  -     Protein electrophoresis; Future    Return in about 6 months (around 7/6/2022) for In-Clinic Visit, After testing.    Over 35 minutes were spent coordinating the care for the patient on the day of the encounter.  This includes previsit, during visit and post visit activities as documented above.  EMG and blood work reviewed.  Counseling patient.  Discussing prognosis of neuropathy.  Blood work ordered.  (Activities include but not inclusive of reviewing chart, reviewing outside records, reviewing labs and imaging study results as well as the images, patient visit time including getting history and exam,  use if applicable, review of test results with the patient and coming up with a plan in a shared model, counseling patient and family, education and answering patient questions, EMR , EMR diagnosis entry and problem list management, medication reconciliation and prescription management if applicable, paperwork if applicable, printing documents and documentation of the visit activities.)        Reji Davila MD  Neurologist  [facility] Neurology [facility]  Tel:- [phone]    This note was dictated using voice recognition software.  Any grammatical or context distortions are unintentional and inherent to the software.

## 2022-01-08 LAB — TOTAL PROTEIN SERUM FOR ELP: 7 G/DL (ref 6–8)

## 2022-01-11 LAB
ALBUMIN PERCENT: 63.3 % (ref 51–67)
ALBUMIN SERPL ELPH-MCNC: 4.4 G/DL (ref 3.2–4.7)
ALPHA 1 PERCENT: 2.7 % (ref 2–4)
ALPHA 2 PERCENT: 10.5 % (ref 5–13)
ALPHA1 GLOB SERPL ELPH-MCNC: 0.2 G/DL (ref 0.1–0.3)
ALPHA2 GLOB SERPL ELPH-MCNC: 0.7 G/DL (ref 0.4–0.9)
B-GLOBULIN SERPL ELPH-MCNC: 0.7 G/DL (ref 0.7–1.2)
BETA PERCENT: 10.2 % (ref 10–17)
GAMMA GLOB SERPL ELPH-MCNC: 0.9 G/DL (ref 0.6–1.4)
GAMMA GLOBULIN PERCENT: 13.3 % (ref 9–20)
PATH ICD:: NORMAL
PATH ICD:: NORMAL
PROT PATTERN SERPL ELPH-IMP: NORMAL
PROT PATTERN SERPL IFE-IMP: NORMAL
REVIEWING PATHOLOGIST: NORMAL
REVIEWING PATHOLOGIST: NORMAL
TOTAL PROTEIN SERUM FOR ELP (SYNCED VALUE): 7 G/DL

## 2022-01-11 PROCEDURE — 84165 PROTEIN E-PHORESIS SERUM: CPT | Mod: 26 | Performed by: PATHOLOGY

## 2022-01-11 PROCEDURE — 86334 IMMUNOFIX E-PHORESIS SERUM: CPT | Mod: 26 | Performed by: PATHOLOGY

## 2022-01-12 VITALS — BODY MASS INDEX: 29.19 KG/M2 | HEIGHT: 61 IN | WEIGHT: 154.6 LBS

## 2022-01-18 VITALS
WEIGHT: 167 LBS | HEIGHT: 63 IN | BODY MASS INDEX: 29.59 KG/M2 | HEART RATE: 68 BPM | DIASTOLIC BLOOD PRESSURE: 70 MMHG | SYSTOLIC BLOOD PRESSURE: 162 MMHG

## 2022-01-18 VITALS
WEIGHT: 157 LBS | HEART RATE: 60 BPM | SYSTOLIC BLOOD PRESSURE: 158 MMHG | HEIGHT: 61 IN | DIASTOLIC BLOOD PRESSURE: 52 MMHG | RESPIRATION RATE: 16 BRPM | BODY MASS INDEX: 29.64 KG/M2

## 2022-01-18 VITALS
SYSTOLIC BLOOD PRESSURE: 144 MMHG | BODY MASS INDEX: 28.95 KG/M2 | RESPIRATION RATE: 16 BRPM | HEART RATE: 66 BPM | DIASTOLIC BLOOD PRESSURE: 64 MMHG | OXYGEN SATURATION: 98 % | WEIGHT: 163.4 LBS

## 2022-01-18 VITALS — SYSTOLIC BLOOD PRESSURE: 138 MMHG | WEIGHT: 165 LBS | BODY MASS INDEX: 28.32 KG/M2 | DIASTOLIC BLOOD PRESSURE: 71 MMHG

## 2022-01-18 VITALS
BODY MASS INDEX: 29.83 KG/M2 | WEIGHT: 158 LBS | DIASTOLIC BLOOD PRESSURE: 54 MMHG | RESPIRATION RATE: 16 BRPM | HEART RATE: 53 BPM | SYSTOLIC BLOOD PRESSURE: 126 MMHG | HEIGHT: 61 IN

## 2022-01-18 VITALS
BODY MASS INDEX: 30.7 KG/M2 | WEIGHT: 162.6 LBS | HEIGHT: 61 IN | HEART RATE: 72 BPM | DIASTOLIC BLOOD PRESSURE: 62 MMHG | RESPIRATION RATE: 16 BRPM | SYSTOLIC BLOOD PRESSURE: 134 MMHG

## 2022-01-18 VITALS
DIASTOLIC BLOOD PRESSURE: 70 MMHG | RESPIRATION RATE: 16 BRPM | HEART RATE: 68 BPM | HEIGHT: 61 IN | BODY MASS INDEX: 30.58 KG/M2 | WEIGHT: 162 LBS | SYSTOLIC BLOOD PRESSURE: 160 MMHG

## 2022-02-13 ENCOUNTER — NURSE TRIAGE (OUTPATIENT)
Dept: NURSING | Facility: CLINIC | Age: 82
End: 2022-02-13
Payer: COMMERCIAL

## 2022-02-14 NOTE — TELEPHONE ENCOUNTER
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   Daughter just arrived and that will help even more    Advised that if the symptoms return, to call back or go to the ED. Pt is agreeable    COVID 19 Nurse Triage Plan/Patient Instructions    Please be aware that novel coronavirus (COVID-19) may be circulating in the community. If you develop symptoms such as fever, cough, or SOB or if you have concerns about the presence of another infection including coronavirus (COVID-19), please contact your health care provider or visit [email or web address].     Disposition/Instructions    Home care recommended. Follow home care protocol based instructions.    Thank you for taking steps to prevent the spread of this virus.  o Limit your contact with others.  o Wear a simple mask to cover your cough.  o Wash your hands well and often.    Resources    [facility]: About COVID-19: [email or web address]    CDC: What to Do If You're Sick: www.cdc.gov/coronavirus/2019-ncov/about/steps-when-sick.html    CDC: Ending Home Isolation: www.cdc.gov/coronavirus/2019-ncov/hcp/disposition-in-home-patients.html     CDC: Caring for Someone: www.cdc.gov/coronavirus/2019-ncov/if-you-are-sick/care-for-someone.html     [facility]: Interim Guidance for Hospital Discharge to Home: www.health.[email or web address].[email or web address]/diseases/coronavirus/hcp/hospdischarge.pdf    [facility] clinical trials (COVID-19 research studies): [email or web address]      Below are the COVID-19 hotlines at the [place] Department of Health ([facility]). Interpreters are available.   o For health questions: Call 424-807-3382 or 1-616.742.3065 (7 a.m. to 7 p.m.)  o For questions about schools and childcare: Call 576-455-0501 or 1-991.272.3155 (7 a.m. to 7 p.m.)    Reason for Disposition    [1] Symptoms of anxiety or panic attack AND [2] is a chronic symptom (recurrent or ongoing AND present > 4 weeks)    Known or suspected caffeine abuse (e.g., > 2 cups of coffee/tea or > 4 cans of soda / day)    Additional Information    Negative: Severe difficulty breathing (e.g., struggling for each breath, speaks in single words)    Negative: Bluish (or gray) lips or face now    Negative: Difficult to awaken or acting confused (e.g., disoriented, slurred speech)    Negative: Hysterical or combative behavior    Negative: Sounds like a life-threatening emergency to the triager    Negative: Chest pain    Negative: Palpitations, skipped heart beat, or rapid heart beat    Negative: Cough is main symptom    Negative: Suicide thoughts, threats, attempts, or questions    Negative: Depression is main problem or symptom (e.g., feelings of sadness or hopelessness)    Negative: [1] Difficulty breathing AND [2] persists > 10 minutes AND [3] not relieved by reassurance provided by triager    Negative: [1] Lightheadedness or dizziness AND [2] persists > 10 minutes AND [3] not relieved by reassurance provided by triager    Negative: [1] Alcohol or drug abuse, known or suspected AND [2] feeling very shaky (i.e., visible tremors of hands)    Negative: Patient sounds very sick or weak to the triager    Negative: Symptoms interfere with work or school    Negative: Requesting to talk to a counselor (e.g., mental health worker, psychiatrist)    Negative: Patient sounds very upset or troubled to the triager    Negative: [1] Symptoms of anxiety or panic AND [2] has not been evaluated for this by physician    Negative: [1]  Started on anti-anxiety medication AND [2] no relief    Negative: [1] Significant weight loss (or gain) AND [2] not dieting    Negative: Taking thyroid medications    Negative: Alcohol or drug abuse, known or suspected    Negative: Taking herbal remedies    Negative: Recent traumatic event (e.g., death of a loved one, job loss, victim/witness of crime)    Negative: Symptoms interfere with sleep    Protocols used: ANXIETY AND PANIC ATTACK-A-    Araseli Plascencia RN on 2/14/2022 at 12:13 AM

## 2022-03-08 DIAGNOSIS — I50.9 CHF (CONGESTIVE HEART FAILURE) (H): ICD-10-CM

## 2022-03-08 RX ORDER — CARVEDILOL 25 MG/1
25 TABLET ORAL 2 TIMES DAILY WITH MEALS
Qty: 180 TABLET | Refills: 1 | Status: SHIPPED | OUTPATIENT
Start: 2022-03-08 | End: 2022-09-06

## 2022-03-14 ENCOUNTER — OFFICE VISIT (OUTPATIENT)
Dept: CARDIOLOGY | Facility: CLINIC | Age: 82
End: 2022-03-14
Attending: INTERNAL MEDICINE

## 2022-03-14 ENCOUNTER — ANCILLARY PROCEDURE (OUTPATIENT)
Dept: CARDIOLOGY | Facility: CLINIC | Age: 82
End: 2022-03-14
Attending: INTERNAL MEDICINE
Payer: COMMERCIAL

## 2022-03-14 VITALS
SYSTOLIC BLOOD PRESSURE: 130 MMHG | BODY MASS INDEX: 26.04 KG/M2 | WEIGHT: 147 LBS | HEART RATE: 58 BPM | OXYGEN SATURATION: 98 % | RESPIRATION RATE: 16 BRPM | DIASTOLIC BLOOD PRESSURE: 56 MMHG

## 2022-03-14 DIAGNOSIS — I10 BENIGN ESSENTIAL HYPERTENSION: ICD-10-CM

## 2022-03-14 DIAGNOSIS — I47.19 ECTOPIC ATRIAL TACHYCARDIA (H): ICD-10-CM

## 2022-03-14 DIAGNOSIS — Z95.0 PACEMAKER: ICD-10-CM

## 2022-03-14 DIAGNOSIS — I44.2 COMPLETE ATRIOVENTRICULAR BLOCK (H): Primary | ICD-10-CM

## 2022-03-14 DIAGNOSIS — I44.2 THIRD DEGREE AV BLOCK (H): Primary | ICD-10-CM

## 2022-03-14 LAB
MDC_IDC_LEAD_IMPLANT_DT: NORMAL
MDC_IDC_LEAD_IMPLANT_DT: NORMAL
MDC_IDC_LEAD_LOCATION: NORMAL
MDC_IDC_LEAD_LOCATION: NORMAL
MDC_IDC_LEAD_LOCATION_DETAIL_1: NORMAL
MDC_IDC_LEAD_LOCATION_DETAIL_1: NORMAL
MDC_IDC_LEAD_MFG: NORMAL
MDC_IDC_LEAD_MFG: NORMAL
MDC_IDC_LEAD_MODEL: NORMAL
MDC_IDC_LEAD_MODEL: NORMAL
MDC_IDC_LEAD_POLARITY_TYPE: NORMAL
MDC_IDC_LEAD_POLARITY_TYPE: NORMAL
MDC_IDC_LEAD_SERIAL: NORMAL
MDC_IDC_LEAD_SERIAL: NORMAL
MDC_IDC_MSMT_BATTERY_DTM: NORMAL
MDC_IDC_MSMT_BATTERY_REMAINING_LONGEVITY: 128 MO
MDC_IDC_MSMT_BATTERY_REMAINING_PERCENTAGE: 95 %
MDC_IDC_MSMT_BATTERY_STATUS: NORMAL
MDC_IDC_MSMT_BATTERY_VOLTAGE: 3.02 V
MDC_IDC_MSMT_LEADCHNL_RA_IMPEDANCE_VALUE: 430 OHM
MDC_IDC_MSMT_LEADCHNL_RA_PACING_THRESHOLD_AMPLITUDE: 0.62 V
MDC_IDC_MSMT_LEADCHNL_RA_PACING_THRESHOLD_AMPLITUDE: 0.75 V
MDC_IDC_MSMT_LEADCHNL_RA_PACING_THRESHOLD_PULSEWIDTH: 0.5 MS
MDC_IDC_MSMT_LEADCHNL_RA_PACING_THRESHOLD_PULSEWIDTH: 0.5 MS
MDC_IDC_MSMT_LEADCHNL_RA_SENSING_INTR_AMPL: 3 MV
MDC_IDC_MSMT_LEADCHNL_RV_IMPEDANCE_VALUE: 480 OHM
MDC_IDC_MSMT_LEADCHNL_RV_PACING_THRESHOLD_AMPLITUDE: 0.88 V
MDC_IDC_MSMT_LEADCHNL_RV_PACING_THRESHOLD_AMPLITUDE: 1 V
MDC_IDC_MSMT_LEADCHNL_RV_PACING_THRESHOLD_PULSEWIDTH: 0.5 MS
MDC_IDC_MSMT_LEADCHNL_RV_PACING_THRESHOLD_PULSEWIDTH: 0.5 MS
MDC_IDC_PG_IMPLANT_DTM: NORMAL
MDC_IDC_PG_MFG: NORMAL
MDC_IDC_PG_MODEL: NORMAL
MDC_IDC_PG_SERIAL: NORMAL
MDC_IDC_PG_TYPE: NORMAL
MDC_IDC_SESS_CLINIC_NAME: NORMAL
MDC_IDC_SESS_DTM: NORMAL
MDC_IDC_SESS_TYPE: NORMAL
MDC_IDC_SET_BRADY_AT_MODE_SWITCH_MODE: NORMAL
MDC_IDC_SET_BRADY_AT_MODE_SWITCH_RATE: 180 {BEATS}/MIN
MDC_IDC_SET_BRADY_HYSTRATE: NORMAL
MDC_IDC_SET_BRADY_LOWRATE: 50 {BEATS}/MIN
MDC_IDC_SET_BRADY_MAX_SENSOR_RATE: 100 {BEATS}/MIN
MDC_IDC_SET_BRADY_MAX_TRACKING_RATE: 100 {BEATS}/MIN
MDC_IDC_SET_BRADY_MODE: NORMAL
MDC_IDC_SET_BRADY_NIGHT_RATE: NORMAL
MDC_IDC_SET_BRADY_PAV_DELAY_HIGH: 100 MS
MDC_IDC_SET_BRADY_PAV_DELAY_LOW: 300 MS
MDC_IDC_SET_BRADY_SAV_DELAY_HIGH: 100 MS
MDC_IDC_SET_BRADY_SAV_DELAY_LOW: 130 MS
MDC_IDC_SET_LEADCHNL_RA_PACING_AMPLITUDE: NORMAL
MDC_IDC_SET_LEADCHNL_RA_PACING_CAPTURE_MODE: NORMAL
MDC_IDC_SET_LEADCHNL_RA_PACING_POLARITY: NORMAL
MDC_IDC_SET_LEADCHNL_RA_PACING_PULSEWIDTH: 0.5 MS
MDC_IDC_SET_LEADCHNL_RA_SENSING_ADAPTATION_MODE: NORMAL
MDC_IDC_SET_LEADCHNL_RA_SENSING_POLARITY: NORMAL
MDC_IDC_SET_LEADCHNL_RA_SENSING_SENSITIVITY: 0.2 MV
MDC_IDC_SET_LEADCHNL_RV_PACING_AMPLITUDE: NORMAL
MDC_IDC_SET_LEADCHNL_RV_PACING_CAPTURE_MODE: NORMAL
MDC_IDC_SET_LEADCHNL_RV_PACING_POLARITY: NORMAL
MDC_IDC_SET_LEADCHNL_RV_PACING_PULSEWIDTH: 0.5 MS
MDC_IDC_SET_LEADCHNL_RV_SENSING_POLARITY: NORMAL
MDC_IDC_SET_LEADCHNL_RV_SENSING_SENSITIVITY: 2 MV
MDC_IDC_STAT_AT_BURDEN_PERCENT: 1 %
MDC_IDC_STAT_AT_MODE_SW_COUNT: 87
MDC_IDC_STAT_BRADY_DTM_END: NORMAL
MDC_IDC_STAT_BRADY_DTM_START: NORMAL
MDC_IDC_STAT_BRADY_RA_PERCENT_PACED: 0.47 %
MDC_IDC_STAT_BRADY_RV_PERCENT_PACED: 95 %
MDC_IDC_STAT_EPISODE_RECENT_COUNT: 0
MDC_IDC_STAT_EPISODE_RECENT_COUNT_DTM_END: NORMAL
MDC_IDC_STAT_EPISODE_RECENT_COUNT_DTM_START: NORMAL
MDC_IDC_STAT_EPISODE_TYPE: NORMAL
MDC_IDC_STAT_EPISODE_VENDOR_TYPE: NORMAL

## 2022-03-14 PROCEDURE — 93280 PM DEVICE PROGR EVAL DUAL: CPT | Performed by: INTERNAL MEDICINE

## 2022-03-14 PROCEDURE — 99214 OFFICE O/P EST MOD 30 MIN: CPT | Performed by: INTERNAL MEDICINE

## 2022-03-14 NOTE — PATIENT INSTRUCTIONS
Sharyn MOON Trent    It was a pleasure to see you today for a clinic visit.    Continue current medications  We will continue to follow your pacemaker.  Anticoagulation will be considered if atrial tachycardias last more than 10 minutes    Follow up appointment:   Lashell Murcia CNP as scheduled    Contact [facility] at [phone] with questions or concerns.    Ab Saavedra MD

## 2022-03-14 NOTE — NURSING NOTE
Per Dr. Saavedra the Mode Switches from today's device check are atrial tach, not atrial fibrillation.  Aruna Schwartz, RN, MA  Device Nurse  [facility]

## 2022-03-14 NOTE — LETTER
3/14/2022    Jamie Mcconnell MD  [facility] [address]  [facility] [address]    RE: Sharyn Trent       Dear Colleague,     I had the pleasure of seeing Sharyn Trent in the [facility].           ELECTROPHYSIOLOGY NOTE    Today I had the opportunity to see  Sharyn Trent for follow-up evaluation of pacemaker follow-up.      Assessment/Recommendations   Clinic Problem List:  (I44.2) Third degree AV block (H)  (primary encounter diagnosis)  Comment: Normal dual-chamber pacemaker function      (I47.1) Ectopic atrial tachycardia (H)  Comment: Brief atrial tachyarrhythmias each lasting less than 3 minutes with burden less than 1%  Plan: No need for anticoagulation at this time    (I10) Benign essential hypertension  Comment: Well-controlled    30 minutes spent on this encounter date for chart review, history, physical exam,documentation and activities detailed below.    Plan:  Continue current medications  We will continue to follow your pacemaker.  Anticoagulation will be considered if atrial tachycardias last more than 10 minutes       History of Present Illness     Sharyn Trent is a 81 year old female with conduction system disease and also of paroxysmal supraventricular tachycardia. She underwent permanent pacemaker implantation in 04/2011 for symptomatic second-degree AV block with evidence for infra-Hisian conduction disease with right bundle branch block and left anterior fascicular block. Subsequently, in 2013 she was noted to have tachycardia despite metoprolol doses as high as 150 mg b.i.d. Stored electrograms on her device was consistent with AV analia reentry tachycardia which was induced at EP studies on 04/11/2013. She had occasional infra-Hisian block with persistence of the tachycardia, cryoablation quite near the compact AV node appeared to be successful in slow pathway ablation. She did have have transient second degree AV block. At follow-up palpitations were much  diminished with some episodes of 2-1 AV block noted during atrial tachycardia.  Coronary angiography March 2016 showed normal coronaries and no wall motion abnormalities.  She was evaluated at [facility] [place] on 3/19/2018 for chest pain.  Troponins were negative.  Echocardiogram showed normal left ventricular function with abnormal septal motion consistent with a paced rhythm.  Mild left atrial enlargement was noted.  Stress nuclear imaging 3/23/2018 showed an ejection fraction is 70% no evidence for ischemia or infarction.  Pacemaker generator was replaced 8/30/2021.  Sharyn is felt well.  She denies chest pain shortness of breath or palpitations.  There has been no syncope or presyncope.  Personally reviewed.  Pacemaker evaluation shows third-degree AV block.  Patient has underlying sinus rhythm.  She has frequent but very brief atrial tachyarrhythmias each lasting less than 3 minutes.  No longer episodes of atrial fibrillation were confirmed.  There was no ventricular arrhythmias detected.  Pacing and sensing thresholds are excellent estimated battery life is 10.4 years.       Physical Examination Review of Systems   /56 (BP Location: Left arm, Patient Position: Sitting, Cuff Size: Adult Regular)   Pulse 58   Resp 16   Wt 66.7 kg (147 lb)   SpO2 98%   BMI 26.04 kg/m    Body mass index is 26.04 kg/m .  Wt Readings from Last 3 Encounters:   03/14/22 66.7 kg (147 lb)   01/06/22 67.6 kg (149 lb)   12/22/21 65 kg (143 lb 6.4 oz)        Appearance:   no distress,    HEENT:   no scleral icterus, normal conjunctivae    Neck: no carotid bruits or thyromegaly   Chest/Lungs:   lungs are clear to auscultation, no rales or wheezing, pacemaker left subclavian pocket   Cardiovascular:   Jugular venous pressure 5 cm, Apical pulse is regular. Normal S1,S2 with 2/6 systolic ejection murmur,   Abdomen:  no  Hepatosplenomegaly., nontender,  bowel sounds are present   Extremities: no cyanosis or clubbing, No edema    Skin: no xanthelasma, warm.    Neurologic: No gross focal neurologic deficits   Mood/Affect: Alert, cooperative                                              Medical History  Surgical History Family History Social History   Past Medical History:   Diagnosis Date     Abnormal ECG      Anxiety      Arthritis      Chest pain      Chest pain      Chronic kidney disease     stage 3-mod.     Diabetes (H)      Dyslipidemia, goal LDL below 70      GERD (gastroesophageal reflux disease)      HTN (hypertension)      Hyperlipidemia      Melanoma of ankle (H)     L ankle     Other second degree atrioventricular block     Created by Conversion      Pacemaker      PSVT (paroxysmal supraventricular tachycardia) (H)      Right bundle branch block      Skin cancer     Past Surgical History:   Procedure Laterality Date     ABLATION OF DYSRHYTHMIC FOCUS  04/11/2013    AV analia reentry tachycardia, partially successful     BIOPSY SKIN (LOCATION)       CARDIAC CATHETERIZATION  03/10/2016     CV CORONARY ANGIOGRAM N/A 5/26/2021    Procedure: Coronary Angiogram;  Surgeon: Yony Gillis MD;  Location: [facility] Cardiac Cath Lab;  Service: Cardiology     CV LEFT HEART CATHETERIZATION WITHOUT LEFT VENTRICULOGRAM Left 5/26/2021    Procedure: Left Heart Catheterization Without Left Ventriculogram;  Surgeon: Yony Gillis MD;  Location: [facility] Cardiac Cath Lab;  Service: Cardiology     CV RIGHT HEART CATHETERIZATION N/A 5/26/2021    Procedure: Right Heart Catheterization;  Surgeon: Yony Gillis MD;  Location: [facility] Cardiac Cath Lab;  Service: Cardiology     EP PACEMAKER N/A 8/30/2021    Procedure: Electrophysiology Pacemaker;  Surgeon: Ab Saavedra MD;  Location: [facility] CATH LAB CV     FOOT SURGERY      L foot     HAND SURGERY      on both thumbs     IMPLANT PACEMAKER  04/2011    Second-degree AV block     OTHER SURGICAL HISTORY      SVT Ablation     OH SHLDR ARTHROSCOP,SURG,W/ROTAT CUFF REPR Left  3/9/2017    Procedure: LEFT SHOULDER ARTHROSCOPY DECOMPRESSION DISTAL CLAVICLE EXCISION  ROTATOR CUFF REPAIR BICEPS TENOTOMY AND EXTENSIVE DEBRIDEMENT;  Surgeon: Todd Riggs MD;  Location: [facility] OR;  Service: Orthopedics     RELEASE CARPAL TUNNEL      both wrists     SKIN CANCER EXCISION      L ankle,Lleg and cheek     TOE SURGERY      L big toe joint replacement     TUBAL LIGATION      Family History   Problem Relation Age of Onset     Hypertension Mother      Cerebrovascular Disease Mother      Prostate Cancer Father      Cancer Father      Coronary Artery Disease Father      Dyslipidemia Father      Dyslipidemia Sister      Dyslipidemia Brother      Hypertension Brother      Prostate Cancer Brother     Social History     Socioeconomic History     Marital status:      Spouse name: Not on file     Number of children: Not on file     Years of education: Not on file     Highest education level: Not on file   Occupational History     Not on file   Tobacco Use     Smoking status: Never Smoker     Smokeless tobacco: Never Used   Substance and Sexual Activity     Alcohol use: No     Drug use: No     Sexual activity: Yes     Partners: Male     Birth control/protection: Surgical   Other Topics Concern     Not on file   Social History Narrative     Not on file     Social Determinants of Health     Financial Resource Strain: Not on file   Food Insecurity: Not on file   Transportation Needs: Not on file   Physical Activity: Not on file   Stress: Not on file   Social Connections: Not on file   Intimate Partner Violence: Not on file   Housing Stability: Not on file          Medications  Allergies   Current Outpatient Medications   Medication Sig Dispense Refill     aspirin 325 MG tablet [ASPIRIN 325 MG TABLET] Take 325 mg by mouth every evening.        atorvastatin (LIPITOR) 80 MG tablet [ATORVASTATIN (LIPITOR) 80 MG TABLET] Take 80 mg by mouth bedtime.       carvedilol (COREG) 25 MG tablet Take 1 tablet  (25 mg) by mouth 2 times daily (with meals) 180 tablet 1     furosemide (LASIX) 20 MG tablet Take 1 tablet (20 mg) by mouth daily 90 tablet 3     hydrALAZINE (APRESOLINE) 100 MG tablet Take 1 tablet (100 mg) by mouth 3 times daily 270 tablet 1     LORazepam (ATIVAN) 0.5 MG tablet [LORAZEPAM (ATIVAN) 0.5 MG TABLET] Take 1 tablet (0.5 mg total) by mouth every 6 (six) hours as needed for anxiety (or tremors). 12 tablet 0     losartan (COZAAR) 50 MG tablet Take 1 tablet (50 mg) by mouth every evening 90 tablet 3     metFORMIN (GLUCOPHAGE) 500 MG tablet [METFORMIN (GLUCOPHAGE) 500 MG TABLET] Take 1 tablet (500 mg total) by mouth 2 (two) times a day with meals. At breakfast and at dinner 60 tablet 0     multivitamin therapeutic (THERAGRAN) tablet [MULTIVITAMIN THERAPEUTIC (THERAGRAN) TABLET] Take 1 tablet by mouth every evening.        omeprazole (PRILOSEC) 20 MG capsule [OMEPRAZOLE (PRILOSEC) 20 MG CAPSULE] Take 20 mg by mouth daily before breakfast.        prednisoLONE acetate (PRED-FORTE) 1 % ophthalmic suspension [PREDNISOLONE ACETATE (PRED-FORTE) 1 % OPHTHALMIC SUSPENSION] Administer 1 drop to the right eye daily.        vit C/E/Zn/coppr/lutein/zeaxan (PRESERVISION AREDS-2 ORAL) [VIT C/E/ZN/COPPR/LUTEIN/ZEAXAN (PRESERVISION AREDS-2 ORAL)] Take 1 tablet by mouth 2 (two) times a day before meals.        Allergies   Allergen Reactions     Venom-Honey Bee [Bee Venom] Shortness Of Breath     Macrobid [Nitrofurantoin] Other (See Comments)     Burning sensation across chest and arms     Mercurial Analogues [Mercurial Derivatives] Dermatitis     blisters     Sulfa (Sulfonamide Antibiotics) [Sulfa Drugs] Rash

## 2022-03-31 ENCOUNTER — LAB REQUISITION (OUTPATIENT)
Dept: LAB | Facility: CLINIC | Age: 82
End: 2022-03-31

## 2022-03-31 ENCOUNTER — TRANSFERRED RECORDS (OUTPATIENT)
Dept: HEALTH INFORMATION MANAGEMENT | Facility: CLINIC | Age: 82
End: 2022-03-31
Payer: COMMERCIAL

## 2022-03-31 DIAGNOSIS — E11.22 TYPE 2 DIABETES MELLITUS WITH DIABETIC CHRONIC KIDNEY DISEASE (H): ICD-10-CM

## 2022-03-31 DIAGNOSIS — I13.0 HYPERTENSIVE HEART AND CHRONIC KIDNEY DISEASE WITH HEART FAILURE AND STAGE 1 THROUGH STAGE 4 CHRONIC KIDNEY DISEASE, OR UNSPECIFIED CHRONIC KIDNEY DISEASE (H): ICD-10-CM

## 2022-03-31 LAB
ALBUMIN SERPL-MCNC: 3.9 G/DL (ref 3.5–5)
ALP SERPL-CCNC: 77 U/L (ref 45–120)
ALT SERPL W P-5'-P-CCNC: 24 U/L (ref 0–45)
ANION GAP SERPL CALCULATED.3IONS-SCNC: 12 MMOL/L (ref 5–18)
AST SERPL W P-5'-P-CCNC: 35 U/L (ref 0–40)
BILIRUB SERPL-MCNC: 0.7 MG/DL (ref 0–1)
BUN SERPL-MCNC: 40 MG/DL (ref 8–28)
CALCIUM SERPL-MCNC: 9.6 MG/DL (ref 8.5–10.5)
CHLORIDE BLD-SCNC: 100 MMOL/L (ref 98–107)
CHOLEST SERPL-MCNC: 126 MG/DL
CO2 SERPL-SCNC: 26 MMOL/L (ref 22–31)
CREAT SERPL-MCNC: 1.43 MG/DL (ref 0.6–1.1)
FASTING STATUS PATIENT QL REPORTED: ABNORMAL
GFR SERPL CREATININE-BSD FRML MDRD: 37 ML/MIN/1.73M2
GLUCOSE BLD-MCNC: 112 MG/DL (ref 70–125)
HBA1C MFR BLD: 5.6 % (ref 4.2–6.1)
HDLC SERPL-MCNC: 46 MG/DL
LDLC SERPL CALC-MCNC: 69 MG/DL
MAGNESIUM SERPL-MCNC: 2.1 MG/DL (ref 1.8–2.6)
POTASSIUM BLD-SCNC: 4.1 MMOL/L (ref 3.5–5)
PROT SERPL-MCNC: 6.9 G/DL (ref 6–8)
SODIUM SERPL-SCNC: 138 MMOL/L (ref 136–145)
TRIGL SERPL-MCNC: 53 MG/DL

## 2022-03-31 PROCEDURE — 80053 COMPREHEN METABOLIC PANEL: CPT | Performed by: FAMILY MEDICINE

## 2022-03-31 PROCEDURE — 83735 ASSAY OF MAGNESIUM: CPT | Performed by: FAMILY MEDICINE

## 2022-03-31 PROCEDURE — 80061 LIPID PANEL: CPT | Performed by: FAMILY MEDICINE

## 2022-04-05 ENCOUNTER — APPOINTMENT (OUTPATIENT)
Dept: CT IMAGING | Facility: HOSPITAL | Age: 82
End: 2022-04-05
Attending: EMERGENCY MEDICINE
Payer: COMMERCIAL

## 2022-04-05 ENCOUNTER — HOSPITAL ENCOUNTER (EMERGENCY)
Facility: HOSPITAL | Age: 82
Discharge: HOME | End: 2022-04-05
Attending: EMERGENCY MEDICINE | Admitting: EMERGENCY MEDICINE
Payer: COMMERCIAL

## 2022-04-05 ENCOUNTER — APPOINTMENT (OUTPATIENT)
Dept: ULTRASOUND IMAGING | Facility: HOSPITAL | Age: 82
End: 2022-04-05
Attending: EMERGENCY MEDICINE
Payer: COMMERCIAL

## 2022-04-05 VITALS
HEIGHT: 62 IN | BODY MASS INDEX: 26.54 KG/M2 | HEART RATE: 50 BPM | RESPIRATION RATE: 14 BRPM | DIASTOLIC BLOOD PRESSURE: 72 MMHG | WEIGHT: 144.2 LBS | SYSTOLIC BLOOD PRESSURE: 174 MMHG | OXYGEN SATURATION: 98 % | TEMPERATURE: 98.1 F

## 2022-04-05 DIAGNOSIS — R53.81 MALAISE: ICD-10-CM

## 2022-04-05 DIAGNOSIS — R20.0 NUMBNESS: ICD-10-CM

## 2022-04-05 DIAGNOSIS — N85.8 UTERINE MASS: ICD-10-CM

## 2022-04-05 LAB
ALBUMIN UR-MCNC: NEGATIVE MG/DL
ANION GAP SERPL CALCULATED.3IONS-SCNC: 12 MMOL/L (ref 5–18)
APPEARANCE UR: CLEAR
BACTERIA #/AREA URNS HPF: ABNORMAL /HPF
BILIRUB UR QL STRIP: NEGATIVE
BNP SERPL-MCNC: 320 PG/ML (ref 0–155)
BUN SERPL-MCNC: 27 MG/DL (ref 8–28)
CALCIUM SERPL-MCNC: 9.4 MG/DL (ref 8.5–10.5)
CHLORIDE BLD-SCNC: 102 MMOL/L (ref 98–107)
CO2 SERPL-SCNC: 23 MMOL/L (ref 22–31)
COLOR UR AUTO: COLORLESS
CREAT SERPL-MCNC: 1.32 MG/DL (ref 0.6–1.1)
ERYTHROCYTE [DISTWIDTH] IN BLOOD BY AUTOMATED COUNT: 13.9 % (ref 10–15)
GFR SERPL CREATININE-BSD FRML MDRD: 41 ML/MIN/1.73M2
GLUCOSE BLD-MCNC: 110 MG/DL (ref 70–125)
GLUCOSE UR STRIP-MCNC: NEGATIVE MG/DL
HCT VFR BLD AUTO: 31.7 % (ref 35–47)
HGB BLD-MCNC: 11.1 G/DL (ref 11.7–15.7)
HGB UR QL STRIP: NEGATIVE
KETONES UR STRIP-MCNC: NEGATIVE MG/DL
LEUKOCYTE ESTERASE UR QL STRIP: NEGATIVE
MAGNESIUM SERPL-MCNC: 2 MG/DL (ref 1.8–2.6)
MCH RBC QN AUTO: 30.3 PG (ref 26.5–33)
MCHC RBC AUTO-ENTMCNC: 35 G/DL (ref 31.5–36.5)
MCV RBC AUTO: 87 FL (ref 78–100)
NITRATE UR QL: NEGATIVE
PH UR STRIP: 6.5 [PH] (ref 5–7)
PLATELET # BLD AUTO: 200 10E3/UL (ref 150–450)
POTASSIUM BLD-SCNC: 4.1 MMOL/L (ref 3.5–5)
RBC # BLD AUTO: 3.66 10E6/UL (ref 3.8–5.2)
RBC URINE: 1 /HPF
SODIUM SERPL-SCNC: 137 MMOL/L (ref 136–145)
SP GR UR STRIP: 1.01 (ref 1–1.03)
TROPONIN I SERPL-MCNC: 0.02 NG/ML (ref 0–0.29)
TROPONIN I SERPL-MCNC: 0.04 NG/ML (ref 0–0.29)
TROPONIN I SERPL-MCNC: 0.04 NG/ML (ref 0–0.29)
UROBILINOGEN UR STRIP-MCNC: <2 MG/DL
WBC # BLD AUTO: 6.7 10E3/UL (ref 4–11)
WBC URINE: 2 /HPF

## 2022-04-05 PROCEDURE — 36415 COLL VENOUS BLD VENIPUNCTURE: CPT | Performed by: EMERGENCY MEDICINE

## 2022-04-05 PROCEDURE — 85027 COMPLETE CBC AUTOMATED: CPT | Performed by: EMERGENCY MEDICINE

## 2022-04-05 PROCEDURE — 74174 CTA ABD&PLVS W/CONTRAST: CPT

## 2022-04-05 PROCEDURE — 250N000011 HC RX IP 250 OP 636: Performed by: EMERGENCY MEDICINE

## 2022-04-05 PROCEDURE — 84484 ASSAY OF TROPONIN QUANT: CPT | Mod: 91 | Performed by: EMERGENCY MEDICINE

## 2022-04-05 PROCEDURE — 83735 ASSAY OF MAGNESIUM: CPT | Performed by: EMERGENCY MEDICINE

## 2022-04-05 PROCEDURE — 93005 ELECTROCARDIOGRAM TRACING: CPT | Performed by: EMERGENCY MEDICINE

## 2022-04-05 PROCEDURE — 83880 ASSAY OF NATRIURETIC PEPTIDE: CPT | Performed by: EMERGENCY MEDICINE

## 2022-04-05 PROCEDURE — 250N000013 HC RX MED GY IP 250 OP 250 PS 637: Performed by: EMERGENCY MEDICINE

## 2022-04-05 PROCEDURE — 84484 ASSAY OF TROPONIN QUANT: CPT | Performed by: EMERGENCY MEDICINE

## 2022-04-05 PROCEDURE — 99285 EMERGENCY DEPT VISIT HI MDM: CPT | Mod: 25

## 2022-04-05 PROCEDURE — 81001 URINALYSIS AUTO W/SCOPE: CPT | Performed by: EMERGENCY MEDICINE

## 2022-04-05 PROCEDURE — 70450 CT HEAD/BRAIN W/O DYE: CPT

## 2022-04-05 PROCEDURE — 93971 EXTREMITY STUDY: CPT | Mod: RT

## 2022-04-05 PROCEDURE — 80048 BASIC METABOLIC PNL TOTAL CA: CPT | Performed by: EMERGENCY MEDICINE

## 2022-04-05 RX ORDER — HYDROXYZINE HYDROCHLORIDE 25 MG/1
25 TABLET, FILM COATED ORAL ONCE
Status: DISCONTINUED | OUTPATIENT
Start: 2022-04-05 | End: 2022-04-05

## 2022-04-05 RX ORDER — HYDRALAZINE HYDROCHLORIDE 50 MG/1
100 TABLET, FILM COATED ORAL ONCE
Status: COMPLETED | OUTPATIENT
Start: 2022-04-05 | End: 2022-04-05

## 2022-04-05 RX ORDER — IOPAMIDOL 755 MG/ML
75 INJECTION, SOLUTION INTRAVASCULAR ONCE
Status: COMPLETED | OUTPATIENT
Start: 2022-04-05 | End: 2022-04-05

## 2022-04-05 RX ADMIN — IOPAMIDOL 75 ML: 755 INJECTION, SOLUTION INTRAVENOUS at 15:40

## 2022-04-05 RX ADMIN — HYDRALAZINE HYDROCHLORIDE 100 MG: 50 TABLET, FILM COATED ORAL at 14:20

## 2022-04-05 ASSESSMENT — ENCOUNTER SYMPTOMS
NUMBNESS: 1
DIFFICULTY URINATING: 0
NECK PAIN: 0
HEADACHES: 0
WOUND: 0
CONFUSION: 0
ABDOMINAL PAIN: 0
ADENOPATHY: 0
SHORTNESS OF BREATH: 0
FEVER: 0

## 2022-04-05 NOTE — ED NOTES
Called daughter per patient request and asked daughter to come and sit with mother for emotional support.

## 2022-04-05 NOTE — ED PROVIDER NOTES
"ED Provider In Triage Note  [facility]  Encounter Date: Apr 5, 2022    No chief complaint on file.      Brief HPI:   Sharyn Trent is a 81 year old female, history of pacemaker and neuropathy, presenting to the Emergency Department with a chief complaint of BL arm tingling (shoulders and arms) and BL feet that started ~1 hour ago. . Has had tingling previously - same locations and to the same severity. Reports generalized weakness. No headache, vertigo, dysphagia or speech difficulties. Denies chest pain, however was worried that something could be wrong with her heart because she \"just didn't feel right\" and has been jittery despite having taken an ativan.    Brief Physical Exam:  There were no vitals taken for this visit.  General: Non-toxic appearing  HEENT: Atraumatic  Resp: No respiratory distress; clear lungs  Cardiac: RRR  Abdomen: soft, non-tender  Neuro: Alert, oriented, answers questions appropriately; slight anisocoria (R > L), cranial nerves otherwise grossly intact, no focal motor deficits, sensation to light touch grossly intact  Psych: Behavior appropriate      Plan Initiated in Triage:  EKG, basic labs    PIT Dispo:   Room when available    Love Hopkins MD on 4/5/2022 at 11:43 AM    Patient was evaluated by the Physician in Triage due to a limitation of available rooms in the Emergency Department. A plan of care was discussed based on the information obtained on the initial evaluation and patient was consuled to return back to the Emergency Department lobby after this initial evalutaiton until results were obtained or a room became available in the Emergency Department. Patient was counseled not to leave prior to receiving the results of their workup.        Love Hopkins MD  04/05/22 1149    "

## 2022-04-05 NOTE — DISCHARGE INSTRUCTIONS
Your ultrasound was negative for blood clot and your repeat heart lab remained unchanged.  Your heart is ok.  As we discussed you have what it appears to be a possible uterine cancer that needs follow-up for consideration of biopsy to further characterize what this is as well as possible outpatient PET scan.  Follow-up with [place] [facility] as well as your primary care doctor who can help you navigate further work-up

## 2022-04-05 NOTE — ED NOTES
"Pt to room 23.  Labs drawn and sent.  Pt states she has anxiety almost every am and takes Lorazepam 0.5mg which she took today.  She feels that today what is different is that she is having \" a lot of tightness in both shoulders and I feel just weak\".  Denies any other symptoms.  "

## 2022-04-05 NOTE — ED TRIAGE NOTES
Pt presents via [facility] with bilateral arm tingling that started an hour ago. BP via medics 180/80 - ekg pacemaker - 180bg - 20g right hand

## 2022-04-05 NOTE — ED PROVIDER NOTES
"ED SIGNOUT  Date/Time:4/5/2022 4:42 PM    Patient signed out to me by my colleague, Igor Milian MD.  Please see their note for complete history and physical. Plan to follow up on labs and plan for discharge.     The creation of this record is based on the scribe s observations of the work being performed by Vik Noland DO and the provider s statements to them. It was created on their behalf by Thiago Lomas a trained medical scribe. This document has been checked and approved by the attending provider.      REMAINING ED WORKUP:  Repeat trop and US of leg    Vitals:  BP (!) 174/72   Pulse 50   Temp 98.1  F (36.7  C) (Oral)   Resp 14   Ht 1.575 m (5' 2\")   Wt 65.4 kg (144 lb 3.2 oz)   SpO2 98%   BMI 26.37 kg/m        Pertinent labs results reviewed   Results for orders placed or performed during the hospital encounter of 04/05/22   CTA Chest Abdomen Pelvis w Contrast    Impression    IMPRESSION:  1.  No acute aortic findings. Scattered atheromatous plaque. Stable mild left subclavian artery ostial stenosis.    2.  Markedly abnormal appearance of the uterus for postmenopausal state concerning for gynecologic malignancy. Recommend consultation with gynecology. Transvaginal ultrasound may be beneficial as next step to evaluate for presence/absence of fluid within   the endometrial canal. No associated ascites or lymphadenopathy.    3.  Multiple nodules within the lungs measuring at or greater than 1.0 cm in size. Differential includes infectious/inflammatory as well as malignancy. No mediastinal or hilar lymphadenopathy. Short-term follow-up CT or PET/CT may help further guide   evaluation.    Findings discussed Dr. Milian at the time this interpretation.     Head CT w/o contrast    Impression    IMPRESSION:    No evidence of acute intracranial abnormality. No evidence of acute hemorrhage, mass or CT evidence of acute infarct.      US Lower Extremity Venous Duplex Right    Impression    IMPRESSION:  1.  No deep " venous thrombosis in the right lower extremity.    2.  Baker's cyst.   CBC (+ platelets, no diff)   Result Value Ref Range    WBC Count 6.7 4.0 - 11.0 10e3/uL    RBC Count 3.66 (L) 3.80 - 5.20 10e6/uL    Hemoglobin 11.1 (L) 11.7 - 15.7 g/dL    Hematocrit 31.7 (L) 35.0 - 47.0 %    MCV 87 78 - 100 fL    MCH 30.3 26.5 - 33.0 pg    MCHC 35.0 31.5 - 36.5 g/dL    RDW 13.9 10.0 - 15.0 %    Platelet Count 200 150 - 450 10e3/uL   Basic metabolic panel   Result Value Ref Range    Sodium 137 136 - 145 mmol/L    Potassium 4.1 3.5 - 5.0 mmol/L    Chloride 102 98 - 107 mmol/L    Carbon Dioxide (CO2) 23 22 - 31 mmol/L    Anion Gap 12 5 - 18 mmol/L    Urea Nitrogen 27 8 - 28 mg/dL    Creatinine 1.32 (H) 0.60 - 1.10 mg/dL    Calcium 9.4 8.5 - 10.5 mg/dL    Glucose 110 70 - 125 mg/dL    GFR Estimate 41 (L) >60 mL/min/1.73m2   Troponin I (now)   Result Value Ref Range    Troponin I 0.02 0.00 - 0.29 ng/mL   Result Value Ref Range    Magnesium 2.0 1.8 - 2.6 mg/dL   UA with Microscopic reflex to Culture    Specimen: Urine, Clean Catch   Result Value Ref Range    Color Urine Colorless Colorless, Straw, Light Yellow, Yellow    Appearance Urine Clear Clear    Glucose Urine Negative Negative mg/dL    Bilirubin Urine Negative Negative    Ketones Urine Negative Negative mg/dL    Specific Gravity Urine 1.007 1.001 - 1.030    Blood Urine Negative Negative    pH Urine 6.5 5.0 - 7.0    Protein Albumin Urine Negative Negative mg/dL    Urobilinogen Urine <2.0 <2.0 mg/dL    Nitrite Urine Negative Negative    Leukocyte Esterase Urine Negative Negative    Bacteria Urine Few (A) None Seen /HPF    RBC Urine 1 <=2 /HPF    WBC Urine 2 <=5 /HPF   B-Type Natriuretic Peptide ([facility] Only)   Result Value Ref Range     (H) 0 - 155 pg/mL   Troponin I (now)   Result Value Ref Range    Troponin I 0.04 0.00 - 0.29 ng/mL   Troponin I (now)   Result Value Ref Range    Troponin I 0.04 0.00 - 0.29 ng/mL       Pertinent imaging reviewed   Please see official  radiology report.  US Lower Extremity Venous Duplex Right   Final Result   IMPRESSION:   1.  No deep venous thrombosis in the right lower extremity.      2.  Baker's cyst.      CTA Chest Abdomen Pelvis w Contrast   Final Result   IMPRESSION:   1.  No acute aortic findings. Scattered atheromatous plaque. Stable mild left subclavian artery ostial stenosis.      2.  Markedly abnormal appearance of the uterus for postmenopausal state concerning for gynecologic malignancy. Recommend consultation with gynecology. Transvaginal ultrasound may be beneficial as next step to evaluate for presence/absence of fluid within    the endometrial canal. No associated ascites or lymphadenopathy.      3.  Multiple nodules within the lungs measuring at or greater than 1.0 cm in size. Differential includes infectious/inflammatory as well as malignancy. No mediastinal or hilar lymphadenopathy. Short-term follow-up CT or PET/CT may help further guide    evaluation.      Findings discussed Dr. Milian at the time this interpretation.         Head CT w/o contrast   Final Result   IMPRESSION:      No evidence of acute intracranial abnormality. No evidence of acute hemorrhage, mass or CT evidence of acute infarct.               Interventions  Medications   hydrALAZINE (APRESOLINE) tablet 100 mg (100 mg Oral Given 4/5/22 1420)   iopamidol (ISOVUE-370) solution 75 mL (75 mLs Intravenous Given 4/5/22 1540)        ED Course/MDM:  5:00 PM Signout accepted from Dr. Milian.  Prior records were reviewed.  Diagnostics from this visit are reviewed.  5:34 PM Repeat trop unchanged.  Awaiting US and plan for discharge after that.  7:24 PM US negative, Trop negative.  Pt discharged per plan.  She is aware of abnormal findings, Dr. Milian helped to arrange for outpatient oncology follow up.  Spoke about plan for discharge.      ED Course as of 04/05/22 2214   Tue Apr 05, 2022   9414 I met with the patient, obtained history, performed an initial exam, and discussed  options and plan for diagnostics and treatment here in the ED.     1615 Concern for cancer in uterus   1615 Multiple lung nodule intermediate, possible mets vs infamatorry   1649 Discussed with patient and son need to follow-up with [place] [facility] as well as primary care doctor.  Repeat delta troponin pending if negative will go home.  Dr. Noland will follow up on troponin and if stable or downtrending  will discharge patient   1653 Sodium: 137   1653 Glucose: 110   1653 BNP(!): 320  Symptoms not suggestive of CHF exacerbation   1653 Troponin I: 0.04  ACS unlikely   1653 Urine not suggestive of UTI   1653 WBC: 6.7   1653 Hemoglobin(!): 11.1  Stable anemia   1703 While discussing patient's likely cancer she also mentions she been having some right calf pain with walking the last week therefore ultrasound ordered           1. Uterine mass    2. Numbness    3. Malaise          Vik Noland,   [facility] Emergency Department       Vik Noland MD  04/05/22 1267

## 2022-04-05 NOTE — ED PROVIDER NOTES
"EMERGENCY DEPARTMENT ENCOUNTER      NAME: Sharyn Trent  AGE: 81 year old female  YOB: 1940  MRN: [id number]  EVALUATION DATE & TIME: 4/5/2022 12:47 PM    PCP: Jamie Mcconnell    ED PROVIDER: Igor Milian MD        No chief complaint on file.        FINAL IMPRESSION:  1. Uterine mass    2. Numbness    3. Malaise          ED COURSE & MEDICAL DECISION MAKING:    Pertinent Labs & Imaging studies reviewed. (See chart for details)  81 year old female presents to the Emergency Department for evaluation of lase, numbness to hands and feet    No focal neurological deficit on exam.  Does a history of neuropathy as well as diabetes.  Recently started on sertraline which is not tolerated.  Exam not suggestive of serotonin syndrome    He is hypertensive and reports symptoms above and below diaphragm and right and left part of body therefore CTA dissection run ordered.  CT head ordered.  Has pacemaker cannot get MRI liver based on exam highly doubt focal neurological deficit such as stroke    EKG shows paced rhythm.  Initial troponin negative.  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  Patient given information on [facility] and discussed need to follow-up with primary care doctor        At the conclusion of the encounter I discussed the results of all of the tests and the disposition. The questions were answered. The patient or family acknowledged understanding and was agreeable with the care plan.     ED Course as of 04/05/22 1704   Tue Apr 05, 2022   1414 I met with the patient, obtained history, performed an initial exam, and discussed options and plan for diagnostics and treatment here in the ED.     1615 Concern for cancer in uterus   1615 Multiple lung nodule intermediate, possible mets vs " "infamatorry   1649 Discussed with patient and son need to follow-up with [facility] as well as primary care doctor.  Repeat delta troponin pending if negative will go home.  Dr. Noland will follow up on troponin and if stable or downtrending  will discharge patient   1653 Sodium: 137   1653 Glucose: 110   1653 BNP(!): 320  Symptoms not suggestive of CHF exacerbation   1653 Troponin I: 0.04  ACS unlikely   1653 Urine not suggestive of UTI   1653 WBC: 6.7   1653 Hemoglobin(!): 11.1  Stable anemia   1703 While discussing patient's likely cancer she also mentions she been having some right calf pain with walking the last week therefore ultrasound ordered       PPE worn: Eye protection, N95        MEDICATIONS GIVEN IN THE EMERGENCY:  Medications   hydrALAZINE (APRESOLINE) tablet 100 mg (100 mg Oral Given 4/5/22 1420)   iopamidol (ISOVUE-370) solution 75 mL (75 mLs Intravenous Given 4/5/22 1540)       NEW PRESCRIPTIONS STARTED AT TODAY'S ER VISIT  New Prescriptions    No medications on file          =================================================================    HPI    Triage note  \"Pt presents via [facility] with bilateral arm tingling that started an hour ago. BP via medics 180/80 - ekg pacemaker - 180bg - 20g right hand  \"      Patient information was obtained from: Patient    Use of : N/A      Sharyn Trent is a 81 year old female with a pertinent history of neuropathy, hyperlipidemia, stage 3 CKD, paroxysmal supraventricular tachycardia, type 2 diabetes, hypertension who presents to this ED private car for evaluation of arm tingling.    Patient reports bilateral finger tingling, and notes some shoulder stiffness. She says it started around 9 am this morning, and also notes some feet tingling due to her neuropathy history. She also took lorazepam this morning which she normally takes at night after feeling dizzy. Patient denies any headache or neck pain, and denies any abdominal pain or double " vision. She also reports that she took sertraline on Sunday and had started to lose her appetite. Patient is currently on a diuretic, and has not had any recent vaccinations.       REVIEW OF SYSTEMS   Review of Systems   Constitutional: Negative for fever.   Eyes: Negative for visual disturbance.   Respiratory: Negative for shortness of breath.    Cardiovascular: Negative for chest pain.   Gastrointestinal: Negative for abdominal pain.   Genitourinary: Negative for difficulty urinating.   Musculoskeletal: Negative for neck pain.   Skin: Negative for wound.   Allergic/Immunologic: Negative for immunocompromised state.   Neurological: Positive for numbness. Negative for headaches.   Hematological: Negative for adenopathy.   Psychiatric/Behavioral: Negative for confusion.        PAST MEDICAL HISTORY:  Past Medical History:   Diagnosis Date     Abnormal ECG      Anxiety      Arthritis      Chest pain      Chest pain      Chronic kidney disease     stage 3-mod.     Diabetes (H)      Dyslipidemia, goal LDL below 70      GERD (gastroesophageal reflux disease)      HTN (hypertension)      Hyperlipidemia      Melanoma of ankle (H)     L ankle     Other second degree atrioventricular block     Created by Conversion      Pacemaker      PSVT (paroxysmal supraventricular tachycardia) (H)      Right bundle branch block      Skin cancer        PAST SURGICAL HISTORY:  Past Surgical History:   Procedure Laterality Date     ABLATION OF DYSRHYTHMIC FOCUS  04/11/2013    AV analia reentry tachycardia, partially successful     BIOPSY SKIN (LOCATION)       CARDIAC CATHETERIZATION  03/10/2016     CV CORONARY ANGIOGRAM N/A 5/26/2021    Procedure: Coronary Angiogram;  Surgeon: Yony Gillis MD;  Location: [facility] Cardiac Cath Lab;  Service: Cardiology     CV LEFT HEART CATHETERIZATION WITHOUT LEFT VENTRICULOGRAM Left 5/26/2021    Procedure: Left Heart Catheterization Without Left Ventriculogram;  Surgeon: Yony Gillis,  MD;  Location: [facility] Cardiac Cath Lab;  Service: Cardiology     CV RIGHT HEART CATHETERIZATION N/A 5/26/2021    Procedure: Right Heart Catheterization;  Surgeon: Yony Gillis MD;  Location: [facility] Cardiac Cath Lab;  Service: Cardiology     EP PACEMAKER N/A 8/30/2021    Procedure: Electrophysiology Pacemaker;  Surgeon: Ab Saavedra MD;  Location: [facility] CATH LAB CV     FOOT SURGERY      L foot     HAND SURGERY      on both thumbs     IMPLANT PACEMAKER  04/2011    Second-degree AV block     OTHER SURGICAL HISTORY      SVT Ablation     ME SHLDR ARTHROSCOP,SURG,W/ROTAT CUFF REPR Left 3/9/2017    Procedure: LEFT SHOULDER ARTHROSCOPY DECOMPRESSION DISTAL CLAVICLE EXCISION  ROTATOR CUFF REPAIR BICEPS TENOTOMY AND EXTENSIVE DEBRIDEMENT;  Surgeon: Todd Riggs MD;  Location: [facility];  Service: Orthopedics     RELEASE CARPAL TUNNEL      both wrists     SKIN CANCER EXCISION      L ankle,Lleg and cheek     TOE SURGERY      L big toe joint replacement     TUBAL LIGATION             CURRENT MEDICATIONS:    aspirin 325 MG tablet  atorvastatin (LIPITOR) 80 MG tablet  carvedilol (COREG) 25 MG tablet  furosemide (LASIX) 20 MG tablet  hydrALAZINE (APRESOLINE) 100 MG tablet  LORazepam (ATIVAN) 0.5 MG tablet  losartan (COZAAR) 50 MG tablet  metFORMIN (GLUCOPHAGE) 500 MG tablet  multivitamin therapeutic (THERAGRAN) tablet  omeprazole (PRILOSEC) 20 MG capsule  prednisoLONE acetate (PRED-FORTE) 1 % ophthalmic suspension  vit C/E/Zn/coppr/lutein/zeaxan (PRESERVISION AREDS-2 ORAL)        ALLERGIES:  Allergies   Allergen Reactions     Herlinda-Kit Bee Sting Shortness Of Breath     Venom-Honey Bee [Bee Venom] Shortness Of Breath     Mercurial Analogues [Mercurial Derivatives] Dermatitis     blisters     Nitrofurantoin Other (See Comments)     Burning sensation across chest and arms  Other reaction(s): arms and chest burning     Sulfa (Sulfonamide Antibiotics) [Sulfa Drugs] Rash      "Sulfamethoxazole-Trimethoprim Rash       FAMILY HISTORY:  Family History   Problem Relation Age of Onset     Hypertension Mother      Cerebrovascular Disease Mother      Prostate Cancer Father      Cancer Father      Coronary Artery Disease Father      Dyslipidemia Father      Dyslipidemia Sister      Dyslipidemia Brother      Hypertension Brother      Prostate Cancer Brother        SOCIAL HISTORY:   Social History     Socioeconomic History     Marital status:      Spouse name: Not on file     Number of children: Not on file     Years of education: Not on file     Highest education level: Not on file   Occupational History     Not on file   Tobacco Use     Smoking status: Never Smoker     Smokeless tobacco: Never Used   Substance and Sexual Activity     Alcohol use: No     Drug use: No     Sexual activity: Yes     Partners: Male     Birth control/protection: Surgical   Other Topics Concern     Not on file   Social History Narrative     Not on file     Social Determinants of Health     Financial Resource Strain: Not on file   Food Insecurity: Not on file   Transportation Needs: Not on file   Physical Activity: Not on file   Stress: Not on file   Social Connections: Not on file   Intimate Partner Violence: Not on file   Housing Stability: Not on file       VITALS:  BP (!) 174/72   Pulse 50   Temp 98.1  F (36.7  C) (Oral)   Resp 14   Ht 1.575 m (5' 2\")   Wt 65.4 kg (144 lb 3.2 oz)   SpO2 98%   BMI 26.37 kg/m      PHYSICAL EXAM      Vitals: BP (!) 174/72   Pulse 50   Temp 98.1  F (36.7  C) (Oral)   Resp 14   Ht 1.575 m (5' 2\")   Wt 65.4 kg (144 lb 3.2 oz)   SpO2 98%   BMI 26.37 kg/m    General: Appears in no acute distress, awake, alert, interactive.   Eyes: Conjunctivae non-injected. Sclera anicteric.  HENT: Atraumatic.  Neck: Supple.  Respiratory/Chest: Respiration unlabored. Good strong dorsal pedal pulse.   Abdomen: non distended  Musculoskeletal: Normal extremities. No edema or erythema.  5/5 " strength upper and lower extremities.  Symmetric face.  No ataxia.  Normal voice and speech  Skin: Normal color. No rash or diaphoresis.  Neurologic: Face symmetric, moves all extremities spontaneously. Speech clear. No ocular clonus, has patellar reflex, down going toes.   Psychiatric: Oriented to person, place, and time. Affect appropriate.       LAB:  All pertinent labs reviewed and interpreted.  Results for orders placed or performed during the hospital encounter of 04/05/22   CTA Chest Abdomen Pelvis w Contrast    Impression    IMPRESSION:  1.  No acute aortic findings. Scattered atheromatous plaque. Stable mild left subclavian artery ostial stenosis.    2.  Markedly abnormal appearance of the uterus for postmenopausal state concerning for gynecologic malignancy. Recommend consultation with gynecology. Transvaginal ultrasound may be beneficial as next step to evaluate for presence/absence of fluid within   the endometrial canal. No associated ascites or lymphadenopathy.    3.  Multiple nodules within the lungs measuring at or greater than 1.0 cm in size. Differential includes infectious/inflammatory as well as malignancy. No mediastinal or hilar lymphadenopathy. Short-term follow-up CT or PET/CT may help further guide   evaluation.    Findings discussed Dr. Milian at the time this interpretation.     Head CT w/o contrast    Impression    IMPRESSION:    No evidence of acute intracranial abnormality. No evidence of acute hemorrhage, mass or CT evidence of acute infarct.      CBC (+ platelets, no diff)   Result Value Ref Range    WBC Count 6.7 4.0 - 11.0 10e3/uL    RBC Count 3.66 (L) 3.80 - 5.20 10e6/uL    Hemoglobin 11.1 (L) 11.7 - 15.7 g/dL    Hematocrit 31.7 (L) 35.0 - 47.0 %    MCV 87 78 - 100 fL    MCH 30.3 26.5 - 33.0 pg    MCHC 35.0 31.5 - 36.5 g/dL    RDW 13.9 10.0 - 15.0 %    Platelet Count 200 150 - 450 10e3/uL   Basic metabolic panel   Result Value Ref Range    Sodium 137 136 - 145 mmol/L    Potassium 4.1  3.5 - 5.0 mmol/L    Chloride 102 98 - 107 mmol/L    Carbon Dioxide (CO2) 23 22 - 31 mmol/L    Anion Gap 12 5 - 18 mmol/L    Urea Nitrogen 27 8 - 28 mg/dL    Creatinine 1.32 (H) 0.60 - 1.10 mg/dL    Calcium 9.4 8.5 - 10.5 mg/dL    Glucose 110 70 - 125 mg/dL    GFR Estimate 41 (L) >60 mL/min/1.73m2   Troponin I (now)   Result Value Ref Range    Troponin I 0.02 0.00 - 0.29 ng/mL   Result Value Ref Range    Magnesium 2.0 1.8 - 2.6 mg/dL   UA with Microscopic reflex to Culture    Specimen: Urine, Clean Catch   Result Value Ref Range    Color Urine Colorless Colorless, Straw, Light Yellow, Yellow    Appearance Urine Clear Clear    Glucose Urine Negative Negative mg/dL    Bilirubin Urine Negative Negative    Ketones Urine Negative Negative mg/dL    Specific Gravity Urine 1.007 1.001 - 1.030    Blood Urine Negative Negative    pH Urine 6.5 5.0 - 7.0    Protein Albumin Urine Negative Negative mg/dL    Urobilinogen Urine <2.0 <2.0 mg/dL    Nitrite Urine Negative Negative    Leukocyte Esterase Urine Negative Negative    Bacteria Urine Few (A) None Seen /HPF    RBC Urine 1 <=2 /HPF    WBC Urine 2 <=5 /HPF   B-Type Natriuretic Peptide ([facility] Only)   Result Value Ref Range     (H) 0 - 155 pg/mL   Troponin I (now)   Result Value Ref Range    Troponin I 0.04 0.00 - 0.29 ng/mL       RADIOLOGY:  Reviewed all pertinent imaging. Please see official radiology report.  CTA Chest Abdomen Pelvis w Contrast   Final Result   IMPRESSION:   1.  No acute aortic findings. Scattered atheromatous plaque. Stable mild left subclavian artery ostial stenosis.      2.  Markedly abnormal appearance of the uterus for postmenopausal state concerning for gynecologic malignancy. Recommend consultation with gynecology. Transvaginal ultrasound may be beneficial as next step to evaluate for presence/absence of fluid within    the endometrial canal. No associated ascites or lymphadenopathy.      3.  Multiple nodules within the lungs measuring at or  greater than 1.0 cm in size. Differential includes infectious/inflammatory as well as malignancy. No mediastinal or hilar lymphadenopathy. Short-term follow-up CT or PET/CT may help further guide    evaluation.      Findings discussed Dr. Milian at the time this interpretation.         Head CT w/o contrast   Final Result   IMPRESSION:      No evidence of acute intracranial abnormality. No evidence of acute hemorrhage, mass or CT evidence of acute infarct.          US Lower Extremity Venous Duplex Right    (Results Pending)       EKG:    Performed at: 11:57 AM    Impression: Pace rhythm.     Rate: 61 BPM  Rhythm: Pace rhythm  Axis: 74 -81 76  VA Interval: 160 ms  QRS Interval: 178 ms  QTc Interval: 497 ms  ST Changes: none  Comparison: Similar to previous EKG on 9/14/21.     I have independently reviewed and interpreted the EKG(s) documented above.      I, Aniceto Mckee, am serving as a scribe to document services personally performed by Shaheen Milian MD based on my observation and the provider's statements to me. I, Dr. Shaheen Milian, attest that Aniceto Mckee is acting in a scribe capacity, has observed my performance of the services and has documented them in accordance with my direction.    Shaheen Milian MD  Emergency Medicine  [facility] EMERGENCY DEPARTMENT  [facility] [facility] [address] [phone]     Shaheen Milian MD  04/05/22 4146       Shaheen Milian MD  04/05/22 3124

## 2022-04-06 LAB
ATRIAL RATE - MUSE: 61 BPM
DIASTOLIC BLOOD PRESSURE - MUSE: NORMAL MMHG
INTERPRETATION ECG - MUSE: NORMAL
P AXIS - MUSE: 74 DEGREES
PR INTERVAL - MUSE: 160 MS
QRS DURATION - MUSE: 178 MS
QT - MUSE: 494 MS
QTC - MUSE: 497 MS
R AXIS - MUSE: -81 DEGREES
SYSTOLIC BLOOD PRESSURE - MUSE: NORMAL MMHG
T AXIS - MUSE: 76 DEGREES
VENTRICULAR RATE- MUSE: 61 BPM

## 2022-04-15 ENCOUNTER — TRANSFERRED RECORDS (OUTPATIENT)
Dept: HEALTH INFORMATION MANAGEMENT | Facility: CLINIC | Age: 82
End: 2022-04-15
Payer: COMMERCIAL

## 2022-04-15 ENCOUNTER — LAB REQUISITION (OUTPATIENT)
Dept: LAB | Facility: CLINIC | Age: 82
End: 2022-04-15
Payer: COMMERCIAL

## 2022-04-15 DIAGNOSIS — Z01.812 ENCOUNTER FOR PREPROCEDURAL LABORATORY EXAMINATION: ICD-10-CM

## 2022-04-15 DIAGNOSIS — Z11.59 ENCOUNTER FOR SCREENING FOR OTHER VIRAL DISEASES: Primary | ICD-10-CM

## 2022-04-15 LAB — SARS-COV-2 RNA RESP QL NAA+PROBE: NEGATIVE

## 2022-04-15 PROCEDURE — U0005 INFEC AGEN DETEC AMPLI PROBE: HCPCS | Mod: ORL | Performed by: FAMILY MEDICINE

## 2022-04-18 NOTE — H&P (VIEW-ONLY)
GYNECOLOGIC ONCOLOGY CONSULT    Patient Name: DARIUS TRENT Patient : 1940   Patient MRN: [id number]   Referring Physician: Shaheen Milian PA-C (Emergency Medicine)   Primary GYNOncologist: Mary Riggs (Gynecological/Oncology)  Date of Service: 2022     Reason for Consult:  Treatment planning    History of Present Illness (Gyn Oncology):  Darius Trent is a very pleasant 81-year-old female with a new diagnosis of an endometrial mass measuring 4.8 centimeters. The bilateral adnexa were difficult to visualize. There is additionally an 8 mm left upper lobe nodule in addition to multiple small bilateral nodules with airspace disease. She presents today for treatment recommendations.     CLINICAL COURSE:  2022: The patient presented in the ED for bilateral arm tingling. She had workup for her uterine mass as well.   2022: Right lower extremity venous ultrasound showed no deep venous thrombosis in the right lower extremity. Baker's cyst.   2022: CT CAP showed no acute aortic findings. Scattered atheromatous plaque. Stable mild left subclavian artery ostial stenosis. Markedly abnormal appearance of the uterus for postmenopausal state concerning for gynecologic malignancy. Recommend consultation with gynecology. Transvaginal ultrasound may be beneficial as next step to evaluate for presence/absence of fluid within the endometrial canal. No associated ascites or lymphadenopathy. Multiple nodules within the lungs measuring at or greater than 1.0 cm in size. Differential includes infectious/inflammatory as well as malignancy. No mediastinal or hilar lymphadenopathy. Short-term follow-up CT or PET/CT may help further guide evaluation.   2022: CT of the head showed no evidence of acute intracranial abnormality. No evidence of acute hemorrhage, mass or CT evidence of acute infarct.   2022: Pelvic ultrasound showed that the mass seen on CT corresponds to a thick walled solid lesion with presumed  necrotic changes centrally. There is mass effect upon the uterus, which is displaced posteriorly and slightly tot he right of the midline. It is unclear if this is a large degenerated subserosal fibroid or a right ovarian mass. MRI of the pelvis may be able to ascertain if this is uterine or ovarian in nature. GYN consultation suggested regarding need for further imaging workup versus intervention. Endometrial stripe is normal and there is a small myometrial fibroid in the mid-body of the uterus. Neither ovary is seen by ultrasound. The left ovary is normal appearing on the recent CT along the left pelvic sidewall.  Genetic Testing (Gyn Oncology):  N/A    Interval history  The patient presents for treatment recommendations regarding a recently diagnosed uterine mass. She is feeling okay. She was recently seen in the ER. She states that she went in with high blood pressure and tingling in her hand and feet due to neuropathy. She also had shoulder pain at that time. She denies any vaginal bleeding or pelvic pressure or pain. She had a melanoma of left ankle, face, and left leg. She has lost weight. She was told she had uterine fibroid approx 20 years ago. She has had no treatment for that.     Review of Systems:  A complete 14-point review of systems is negative except as noted in the above history of present illness.    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  Neuropathy  Stage 3 CKD, impaired kidney function  Pneumonia  GERD  Right bundle branch block  Arthritis  Hypertension  Tachycardia - SVT  Chest pain  Anxiety disorder  Menopause  Diabetes mellitus, type 2  Hyperlipidemia  Left rotator cuff tear  Third degree AV Block status post pacemaker placement  Macular degeneration  Chicken pox  Baker's cyst    Surgical History:  Tubal ligation in 1976  Carpal tunnel release in 5/2006  Left first MC-C joint replacement  Basal cell removal on 3/23/2011  Removal of  squamous cell from face in 2010  Pacemaker placed on   Closed reduction of dislocated shoulder on 2017  YAG peripheral iridotomy on 2017 and 2017  Excision cataract w/lense implant, left on 2021  Left foot surgeries x6  Left heart catheterization in     OB/Gyn History:  Tubal ligation    Allergies#  Macrobid, Sulfamethoxazole-Trimethoprim and bee venom protein (honey bee)    Medications:  Aspirin Oral 325 mg tablet   Losartan Oral 50 mg tablet   Multivitamins Oral Tablet   Hydralazine Oral 100 mg tablet   Furosemide Oral 20 mg tablet   Omeprazole Oral Delayed Release Capsule 20 mg capsule,delayed release(DR/EC)   Atorvastatin Oral 80 mg tablet   Carvedilol Oral 25 mg tablet   Lorazepam Oral 0.5 mg tablet   Metformin Oral 500 mg tablet   Prednisolone Ophthalmic Drops 1 %   Miscellaneous Drug preservision  Family History:  Father with prostate cancer,  at 96.  Brother with prostate cancer  1 sister with multiple myolemma, .    Social History:  , living with spouse.  Never smoker.  No alcohol consumption.  Caffeine 2 cups a day.  No recreational drug use.    Health Maintenance:    Vital Signs:  Blood pressure: 157/71, Pulse: 60, Temperature: 97 F, Respirations: 12, O2 sat: 95%, Pain Scale: 0, Height: 63 in, Weight: 146.8 lb, BSA: 1.7, BMI: 26 kg/m2     Physical Exam (Gyn Oncology):  General: Alert and oriented x3, no acute distress  HEENT: Normocephalic, atraumatic  Lymph node exam: No supraclavicular, submandibular or cervical lymphadenopathy  CV: Regular rate and rhythm, no murmurs, rubs or gallops  Resp: Clear to auscultation bilaterally, no wheezes, rales or rhonchi  Abdomen: Soft, non-tender to palpation, no rebound or guarding  Lower Extremity Exam: No lower extremity edema, no palpable cords  Neuro: Cranial Nerves 2-12 intact, gross motor skills intact  Genitourinary exam: EGBUS within normal limits. Normal-appearing vaginal mucosa. However, an attempt  was made at an endometrial biopsy, and a single tooth tenaculum was placed across the anterior lip of the cervix. The endocervical canal was completely covered with a thick firm layer of epithelial. There was absolutely no way to perforate through this layer within the office. It would definitely take a knife to incise the layer of tissue completely covering the cervix. The endometrial biopsy was abandoned.    Laboratory Data:     Imagin2022: US Venous Right lower extremity  IMPRESSION: No deep venous thrombosis in the right lower extremity. Baker's cyst.    2022: CT CAP  IMPRESSION: No acute aortic findings. Scattered atheromatous plaque. Stable mild left subclavian artery ostial stenosis. Markedly abnormal appearance of the uterus for postmenopausal state concerning for gynecologic malignancy. Recommend consultation with gynecology. Transvaginal ultrasound may be beneficial as next step to evaluate for presence/absence of fluid within the endometrial canal. No associated ascites or lymphadenopathy. Multiple nodules within the lungs measuring at or greater than 1.0 cm in size. Differential includes infectious/inflammatory as well as malignancy. No mediastinal or hilar lymphadenopathy. Short-term follow-up CT or PET/CT may help further guide evaluation.    2022: CT Head  IMPRESSION: No evidence of acute intracranial abnormality. No evidence of acute hemorrhage, mass or CT evidence of acute infarct.    2022: US Pelvis  IMPRESSION: Mass seen on CT corresponds to a thick walled solid lesion with presumed necrotic changes centrally. There is mass effect upon the uterus, which is displaced posteriorly and slightly tot he right of the midline. It is unclear if this is a large degenerated subserosal fibroid or a right ovarian mass. MRI of the pelvis may be able to ascertain if this is uterine or ovarian in nature. GYN consultation suggested regarding need for further imaging workup versus intervention.  Endometrial stripe is normal and there is a small myometrial fibroid in the mid-body of the uterus. Neither ovary is seen by ultrasound. The left ovary is normal appearing on the recent CT along the left pelvic sidewall.    Problems:    Assessment & Plan (Gyn Oncology):  Sharyn Trent is a very pleasant 81-year-old female with a new diagnosis of an endometrial mass measuring 4.8 centimeters. The bilateral adnexa were difficult to visualize. There is additionally an 8 mm left upper lobe nodule in addition to multiple small bilateral nodules with airspace disease. She presents today for treatment recommendations.     1. Endometrial mass - On review of the CT scan, I do feel that there is a high likelihood of an endometrial cancer. I reassured Sharyn and her daughter that we do not see any evidence of metastatic disease including no evidence of lymphadenopathy or omental caking. 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 We discussed the possibility of proceeding with a robotic-assisted hysterectomy and possible staging. However, I feel that in Sharyn's current stage, the best next step would be to proceed with an exam under anesthesia, dilation and curettage, and subsequent hysterectomy not long thereafter. I do think Sharyn needs to see pulmonology to review the nodules and her lungs, and I also think she needs cardiac clearance prior to a higher risk hysterectomy. She will need preoperative clearance from her primary care doctor and a COVID-19 test prior to surgery. We did review the risks of surgery that can include bleeding or infection. We will follow up after her procedure for a post-operative visit and subsequent treatment planning.    2. Pulmonary nodules - Appear consistent with interstitial lung disease and potential blebbing. However, I would like to contact our cardiothoracic team and also  plays a consult for pulmonology at [facility] for further workup of possible progressive interstitial lung disease. I am not seeing clear well-demarcated, ovoid lesions in the periphery of the lung most consistent with metastatic disease. I would like the opinion of pulmonology prior to hysterectomy.     Thank you very much for allowing me to take part in the care of this very sweet patient.    3 minutes in reviewing records, 46 minutes in discussion, 2 minutes ordering labs.    Pain Care Management:  Pain Scale: 0    Patient Care needs:  Depressions Status: Was screened; Outcome positive: No; Screening Date: 04/15/2022; Screening Tool: PRIME MD-PHQ2; Total depression score: 0  Smoking Status: Never smoker    Documentation assistance provided by Ascencion Griffiths, scribing for Dr. Mary Riggs, on 4/14/2022.  I, Dr. Mary Riggs, personally performed the services described in this documentation, and it is both accurate and complete.     Mary Riggs MD  Phone: [phone]  Fax: [phone]

## 2022-04-18 NOTE — CONSULTS
GYNECOLOGIC ONCOLOGY CONSULT    Patient Name: DARIUS TRENT Patient : 1940   Patient MRN: [id number]   Referring Physician: Shaheen Milian PA-C (Emergency Medicine)   Primary GYNOncologist: Mary Riggs (Gynecological/Oncology)  Date of Service: 2022     Reason for Consult:  Treatment planning    History of Present Illness (Gyn Oncology):  Darius Trent is a very pleasant 81-year-old female with a new diagnosis of an endometrial mass measuring 4.8 centimeters. The bilateral adnexa were difficult to visualize. There is additionally an 8 mm left upper lobe nodule in addition to multiple small bilateral nodules with airspace disease. She presents today for treatment recommendations.     CLINICAL COURSE:  2022: The patient presented in the ED for bilateral arm tingling. She had workup for her uterine mass as well.   2022: Right lower extremity venous ultrasound showed no deep venous thrombosis in the right lower extremity. Baker's cyst.   2022: CT CAP showed no acute aortic findings. Scattered atheromatous plaque. Stable mild left subclavian artery ostial stenosis. Markedly abnormal appearance of the uterus for postmenopausal state concerning for gynecologic malignancy. Recommend consultation with gynecology. Transvaginal ultrasound may be beneficial as next step to evaluate for presence/absence of fluid within the endometrial canal. No associated ascites or lymphadenopathy. Multiple nodules within the lungs measuring at or greater than 1.0 cm in size. Differential includes infectious/inflammatory as well as malignancy. No mediastinal or hilar lymphadenopathy. Short-term follow-up CT or PET/CT may help further guide evaluation.   2022: CT of the head showed no evidence of acute intracranial abnormality. No evidence of acute hemorrhage, mass or CT evidence of acute infarct.   2022: Pelvic ultrasound showed that the mass seen on CT corresponds to a thick walled solid lesion with presumed  necrotic changes centrally. There is mass effect upon the uterus, which is displaced posteriorly and slightly tot he right of the midline. It is unclear if this is a large degenerated subserosal fibroid or a right ovarian mass. MRI of the pelvis may be able to ascertain if this is uterine or ovarian in nature. GYN consultation suggested regarding need for further imaging workup versus intervention. Endometrial stripe is normal and there is a small myometrial fibroid in the mid-body of the uterus. Neither ovary is seen by ultrasound. The left ovary is normal appearing on the recent CT along the left pelvic sidewall.  Genetic Testing (Gyn Oncology):  N/A    Interval history  The patient presents for treatment recommendations regarding a recently diagnosed uterine mass. She is feeling okay. She was recently seen in the ER. She states that she went in with high blood pressure and tingling in her hand and feet due to neuropathy. She also had shoulder pain at that time. She denies any vaginal bleeding or pelvic pressure or pain. She had a melanoma of left ankle, face, and left leg. She has lost weight. She was told she had uterine fibroid approx 20 years ago. She has had no treatment for that.     Review of Systems:  A complete 14-point review of systems is negative except as noted in the above history of present illness.    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  Neuropathy  Stage 3 CKD, impaired kidney function  Pneumonia  GERD  Right bundle branch block  Arthritis  Hypertension  Tachycardia - SVT  Chest pain  Anxiety disorder  Menopause  Diabetes mellitus, type 2  Hyperlipidemia  Left rotator cuff tear  Third degree AV Block status post pacemaker placement  Macular degeneration  Chicken pox  Baker's cyst    Surgical History:  Tubal ligation in 1976  Carpal tunnel release in 5/2006  Left first MC-C joint replacement  Basal cell removal on 3/23/2011  Removal of  squamous cell from face in 2010  Pacemaker placed on   Closed reduction of dislocated shoulder on 2017  YAG peripheral iridotomy on 2017 and 2017  Excision cataract w/lense implant, left on 2021  Left foot surgeries x6  Left heart catheterization in     OB/Gyn History:  Tubal ligation    Allergies#  Macrobid, Sulfamethoxazole-Trimethoprim and bee venom protein (honey bee)    Medications:  Aspirin Oral 325 mg tablet   Losartan Oral 50 mg tablet   Multivitamins Oral Tablet   Hydralazine Oral 100 mg tablet   Furosemide Oral 20 mg tablet   Omeprazole Oral Delayed Release Capsule 20 mg capsule,delayed release(DR/EC)   Atorvastatin Oral 80 mg tablet   Carvedilol Oral 25 mg tablet   Lorazepam Oral 0.5 mg tablet   Metformin Oral 500 mg tablet   Prednisolone Ophthalmic Drops 1 %   Miscellaneous Drug preservision  Family History:  Father with prostate cancer,  at 96.  Brother with prostate cancer  1 sister with multiple myolemma, .    Social History:  , living with spouse.  Never smoker.  No alcohol consumption.  Caffeine 2 cups a day.  No recreational drug use.    Health Maintenance:    Vital Signs:  Blood pressure: 157/71, Pulse: 60, Temperature: 97 F, Respirations: 12, O2 sat: 95%, Pain Scale: 0, Height: 63 in, Weight: 146.8 lb, BSA: 1.7, BMI: 26 kg/m2     Physical Exam (Gyn Oncology):  General: Alert and oriented x3, no acute distress  HEENT: Normocephalic, atraumatic  Lymph node exam: No supraclavicular, submandibular or cervical lymphadenopathy  CV: Regular rate and rhythm, no murmurs, rubs or gallops  Resp: Clear to auscultation bilaterally, no wheezes, rales or rhonchi  Abdomen: Soft, non-tender to palpation, no rebound or guarding  Lower Extremity Exam: No lower extremity edema, no palpable cords  Neuro: Cranial Nerves 2-12 intact, gross motor skills intact  Genitourinary exam: EGBUS within normal limits. Normal-appearing vaginal mucosa. However, an attempt  was made at an endometrial biopsy, and a single tooth tenaculum was placed across the anterior lip of the cervix. The endocervical canal was completely covered with a thick firm layer of epithelial. There was absolutely no way to perforate through this layer within the office. It would definitely take a knife to incise the layer of tissue completely covering the cervix. The endometrial biopsy was abandoned.    Laboratory Data:     Imagin2022: US Venous Right lower extremity  IMPRESSION: No deep venous thrombosis in the right lower extremity. Baker's cyst.    2022: CT CAP  IMPRESSION: No acute aortic findings. Scattered atheromatous plaque. Stable mild left subclavian artery ostial stenosis. Markedly abnormal appearance of the uterus for postmenopausal state concerning for gynecologic malignancy. Recommend consultation with gynecology. Transvaginal ultrasound may be beneficial as next step to evaluate for presence/absence of fluid within the endometrial canal. No associated ascites or lymphadenopathy. Multiple nodules within the lungs measuring at or greater than 1.0 cm in size. Differential includes infectious/inflammatory as well as malignancy. No mediastinal or hilar lymphadenopathy. Short-term follow-up CT or PET/CT may help further guide evaluation.    2022: CT Head  IMPRESSION: No evidence of acute intracranial abnormality. No evidence of acute hemorrhage, mass or CT evidence of acute infarct.    2022: US Pelvis  IMPRESSION: Mass seen on CT corresponds to a thick walled solid lesion with presumed necrotic changes centrally. There is mass effect upon the uterus, which is displaced posteriorly and slightly tot he right of the midline. It is unclear if this is a large degenerated subserosal fibroid or a right ovarian mass. MRI of the pelvis may be able to ascertain if this is uterine or ovarian in nature. GYN consultation suggested regarding need for further imaging workup versus intervention.  Endometrial stripe is normal and there is a small myometrial fibroid in the mid-body of the uterus. Neither ovary is seen by ultrasound. The left ovary is normal appearing on the recent CT along the left pelvic sidewall.    Problems:    Assessment & Plan (Gyn Oncology):  Sharyn Trent is a very pleasant 81-year-old female with a new diagnosis of an endometrial mass measuring 4.8 centimeters. The bilateral adnexa were difficult to visualize. There is additionally an 8 mm left upper lobe nodule in addition to multiple small bilateral nodules with airspace disease. She presents today for treatment recommendations.     1. Endometrial mass - On review of the CT scan, I do feel that there is a high likelihood of an endometrial cancer. I reassured Sharyn and her daughter that we do not see any evidence of metastatic disease including no evidence of lymphadenopathy or omental caking. 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 We discussed the possibility of proceeding with a robotic-assisted hysterectomy and possible staging. However, I feel that in Sharyn's current stage, the best next step would be to proceed with an exam under anesthesia, dilation and curettage, and subsequent hysterectomy not long thereafter. I do think Sharyn needs to see pulmonology to review the nodules and her lungs, and I also think she needs cardiac clearance prior to a higher risk hysterectomy. She will need preoperative clearance from her primary care doctor and a COVID-19 test prior to surgery. We did review the risks of surgery that can include bleeding or infection. We will follow up after her procedure for a post-operative visit and subsequent treatment planning.    2. Pulmonary nodules - Appear consistent with interstitial lung disease and potential blebbing. However, I would like to contact our cardiothoracic team and also  plays a consult for pulmonology at [facility] for further workup of possible progressive interstitial lung disease. I am not seeing clear well-demarcated, ovoid lesions in the periphery of the lung most consistent with metastatic disease. I would like the opinion of pulmonology prior to hysterectomy.     Thank you very much for allowing me to take part in the care of this very sweet patient.    3 minutes in reviewing records, 46 minutes in discussion, 2 minutes ordering labs.    Pain Care Management:  Pain Scale: 0    Patient Care needs:  Depressions Status: Was screened; Outcome positive: No; Screening Date: 04/15/2022; Screening Tool: PRIME MD-PHQ2; Total depression score: 0  Smoking Status: Never smoker    Documentation assistance provided by Ascencion Griffiths, scribing for Dr. Mary Riggs, on 4/14/2022.  I, Dr. Mary Riggs, personally performed the services described in this documentation, and it is both accurate and complete.     Mary Riggs MD  Phone: [phone]  Fax: [phone]

## 2022-04-19 ENCOUNTER — HOSPITAL ENCOUNTER (OUTPATIENT)
Facility: HOSPITAL | Age: 82
Discharge: HOME | End: 2022-04-19
Attending: OBSTETRICS & GYNECOLOGY | Admitting: OBSTETRICS & GYNECOLOGY
Payer: COMMERCIAL

## 2022-04-19 ENCOUNTER — ANESTHESIA (OUTPATIENT)
Dept: SURGERY | Facility: HOSPITAL | Age: 82
End: 2022-04-19
Payer: COMMERCIAL

## 2022-04-19 ENCOUNTER — ANESTHESIA EVENT (OUTPATIENT)
Dept: SURGERY | Facility: HOSPITAL | Age: 82
End: 2022-04-19
Payer: COMMERCIAL

## 2022-04-19 VITALS
TEMPERATURE: 98.2 F | RESPIRATION RATE: 16 BRPM | OXYGEN SATURATION: 94 % | HEART RATE: 53 BPM | WEIGHT: 144.9 LBS | BODY MASS INDEX: 25.68 KG/M2 | HEIGHT: 63 IN | SYSTOLIC BLOOD PRESSURE: 148 MMHG | DIASTOLIC BLOOD PRESSURE: 66 MMHG

## 2022-04-19 DIAGNOSIS — G89.18 POSTOPERATIVE PAIN: Primary | ICD-10-CM

## 2022-04-19 LAB — GLUCOSE BLDC GLUCOMTR-MCNC: 126 MG/DL (ref 70–99)

## 2022-04-19 PROCEDURE — 370N000017 HC ANESTHESIA TECHNICAL FEE, PER MIN: Performed by: OBSTETRICS & GYNECOLOGY

## 2022-04-19 PROCEDURE — 272N000001 HC OR GENERAL SUPPLY STERILE: Performed by: OBSTETRICS & GYNECOLOGY

## 2022-04-19 PROCEDURE — 88305 TISSUE EXAM BY PATHOLOGIST: CPT | Mod: TC | Performed by: OBSTETRICS & GYNECOLOGY

## 2022-04-19 PROCEDURE — 999N000141 HC STATISTIC PRE-PROCEDURE NURSING ASSESSMENT: Performed by: OBSTETRICS & GYNECOLOGY

## 2022-04-19 PROCEDURE — 258N000003 HC RX IP 258 OP 636: Performed by: NURSE ANESTHETIST, CERTIFIED REGISTERED

## 2022-04-19 PROCEDURE — 250N000009 HC RX 250: Performed by: NURSE ANESTHETIST, CERTIFIED REGISTERED

## 2022-04-19 PROCEDURE — 82962 GLUCOSE BLOOD TEST: CPT

## 2022-04-19 PROCEDURE — 360N000076 HC SURGERY LEVEL 3, PER MIN: Performed by: OBSTETRICS & GYNECOLOGY

## 2022-04-19 PROCEDURE — 250N000011 HC RX IP 250 OP 636: Performed by: NURSE ANESTHETIST, CERTIFIED REGISTERED

## 2022-04-19 PROCEDURE — 250N000009 HC RX 250: Performed by: OBSTETRICS & GYNECOLOGY

## 2022-04-19 PROCEDURE — 258N000001 HC RX 258: Performed by: OBSTETRICS & GYNECOLOGY

## 2022-04-19 PROCEDURE — 258N000003 HC RX IP 258 OP 636: Performed by: ANESTHESIOLOGY

## 2022-04-19 PROCEDURE — 710N000012 HC RECOVERY PHASE 2, PER MINUTE: Performed by: OBSTETRICS & GYNECOLOGY

## 2022-04-19 RX ORDER — NALOXONE HYDROCHLORIDE 1 MG/ML
0.2 INJECTION INTRAMUSCULAR; INTRAVENOUS; SUBCUTANEOUS
Status: DISCONTINUED | OUTPATIENT
Start: 2022-04-19 | End: 2022-04-19 | Stop reason: HOSPADM

## 2022-04-19 RX ORDER — NALOXONE HYDROCHLORIDE 1 MG/ML
0.4 INJECTION INTRAMUSCULAR; INTRAVENOUS; SUBCUTANEOUS
Status: DISCONTINUED | OUTPATIENT
Start: 2022-04-19 | End: 2022-04-19 | Stop reason: HOSPADM

## 2022-04-19 RX ORDER — LABETALOL HYDROCHLORIDE 5 MG/ML
10 INJECTION, SOLUTION INTRAVENOUS
Status: DISCONTINUED | OUTPATIENT
Start: 2022-04-19 | End: 2022-04-19 | Stop reason: HOSPADM

## 2022-04-19 RX ORDER — LIDOCAINE HYDROCHLORIDE 20 MG/ML
INJECTION, SOLUTION INFILTRATION; PERINEURAL PRN
Status: DISCONTINUED | OUTPATIENT
Start: 2022-04-19 | End: 2022-04-19

## 2022-04-19 RX ORDER — PROPOFOL 10 MG/ML
INJECTION, EMULSION INTRAVENOUS CONTINUOUS PRN
Status: DISCONTINUED | OUTPATIENT
Start: 2022-04-19 | End: 2022-04-19

## 2022-04-19 RX ORDER — SODIUM CHLORIDE, SODIUM LACTATE, POTASSIUM CHLORIDE, CALCIUM CHLORIDE 600; 310; 30; 20 MG/100ML; MG/100ML; MG/100ML; MG/100ML
INJECTION, SOLUTION INTRAVENOUS CONTINUOUS
Status: DISCONTINUED | OUTPATIENT
Start: 2022-04-19 | End: 2022-04-19 | Stop reason: HOSPADM

## 2022-04-19 RX ORDER — ONDANSETRON 4 MG/1
4 TABLET, ORALLY DISINTEGRATING ORAL EVERY 30 MIN PRN
Status: DISCONTINUED | OUTPATIENT
Start: 2022-04-19 | End: 2022-04-19 | Stop reason: HOSPADM

## 2022-04-19 RX ORDER — SODIUM CHLORIDE, SODIUM LACTATE, POTASSIUM CHLORIDE, CALCIUM CHLORIDE 600; 310; 30; 20 MG/100ML; MG/100ML; MG/100ML; MG/100ML
INJECTION, SOLUTION INTRAVENOUS CONTINUOUS PRN
Status: DISCONTINUED | OUTPATIENT
Start: 2022-04-19 | End: 2022-04-19

## 2022-04-19 RX ORDER — ONDANSETRON 2 MG/ML
INJECTION INTRAMUSCULAR; INTRAVENOUS PRN
Status: DISCONTINUED | OUTPATIENT
Start: 2022-04-19 | End: 2022-04-19

## 2022-04-19 RX ORDER — ONDANSETRON 2 MG/ML
4 INJECTION INTRAMUSCULAR; INTRAVENOUS EVERY 30 MIN PRN
Status: DISCONTINUED | OUTPATIENT
Start: 2022-04-19 | End: 2022-04-19 | Stop reason: HOSPADM

## 2022-04-19 RX ORDER — ACETAMINOPHEN 325 MG/1
975 TABLET ORAL EVERY 6 HOURS PRN
Qty: 50 TABLET | Refills: 0 | COMMUNITY
Start: 2022-04-19 | End: 2022-06-08

## 2022-04-19 RX ORDER — LIDOCAINE 40 MG/G
CREAM TOPICAL
Status: DISCONTINUED | OUTPATIENT
Start: 2022-04-19 | End: 2022-04-19 | Stop reason: HOSPADM

## 2022-04-19 RX ORDER — PROPOFOL 10 MG/ML
INJECTION, EMULSION INTRAVENOUS PRN
Status: DISCONTINUED | OUTPATIENT
Start: 2022-04-19 | End: 2022-04-19

## 2022-04-19 RX ORDER — FENTANYL CITRATE 50 UG/ML
50 INJECTION, SOLUTION INTRAMUSCULAR; INTRAVENOUS
Status: DISCONTINUED | OUTPATIENT
Start: 2022-04-19 | End: 2022-04-19 | Stop reason: HOSPADM

## 2022-04-19 RX ORDER — LIDOCAINE HYDROCHLORIDE 10 MG/ML
INJECTION, SOLUTION INFILTRATION; PERINEURAL PRN
Status: DISCONTINUED | OUTPATIENT
Start: 2022-04-19 | End: 2022-04-19 | Stop reason: HOSPADM

## 2022-04-19 RX ORDER — MEPERIDINE HYDROCHLORIDE 25 MG/ML
12.5 INJECTION INTRAMUSCULAR; INTRAVENOUS; SUBCUTANEOUS
Status: DISCONTINUED | OUTPATIENT
Start: 2022-04-19 | End: 2022-04-19 | Stop reason: HOSPADM

## 2022-04-19 RX ORDER — OXYCODONE HYDROCHLORIDE 5 MG/1
5 TABLET ORAL
Status: DISCONTINUED | OUTPATIENT
Start: 2022-04-19 | End: 2022-04-19 | Stop reason: HOSPADM

## 2022-04-19 RX ORDER — OXYCODONE HYDROCHLORIDE 5 MG/1
5-10 TABLET ORAL EVERY 4 HOURS PRN
Status: DISCONTINUED | OUTPATIENT
Start: 2022-04-19 | End: 2022-04-19 | Stop reason: HOSPADM

## 2022-04-19 RX ORDER — HYDROMORPHONE HYDROCHLORIDE 1 MG/ML
0.4 INJECTION, SOLUTION INTRAMUSCULAR; INTRAVENOUS; SUBCUTANEOUS EVERY 5 MIN PRN
Status: DISCONTINUED | OUTPATIENT
Start: 2022-04-19 | End: 2022-04-19 | Stop reason: HOSPADM

## 2022-04-19 RX ORDER — FENTANYL CITRATE 50 UG/ML
50 INJECTION, SOLUTION INTRAMUSCULAR; INTRAVENOUS EVERY 5 MIN PRN
Status: DISCONTINUED | OUTPATIENT
Start: 2022-04-19 | End: 2022-04-19 | Stop reason: HOSPADM

## 2022-04-19 RX ADMIN — SODIUM CHLORIDE, POTASSIUM CHLORIDE, SODIUM LACTATE AND CALCIUM CHLORIDE: 600; 310; 30; 20 INJECTION, SOLUTION INTRAVENOUS at 13:43

## 2022-04-19 RX ADMIN — LIDOCAINE HYDROCHLORIDE 60 MG: 20 INJECTION, SOLUTION INFILTRATION; PERINEURAL at 13:53

## 2022-04-19 RX ADMIN — SODIUM CHLORIDE, POTASSIUM CHLORIDE, SODIUM LACTATE AND CALCIUM CHLORIDE: 600; 310; 30; 20 INJECTION, SOLUTION INTRAVENOUS at 13:30

## 2022-04-19 RX ADMIN — PROPOFOL 20 MG: 10 INJECTION, EMULSION INTRAVENOUS at 14:04

## 2022-04-19 RX ADMIN — PROPOFOL 100 MCG/KG/MIN: 10 INJECTION, EMULSION INTRAVENOUS at 13:50

## 2022-04-19 RX ADMIN — ONDANSETRON 4 MG: 2 INJECTION INTRAMUSCULAR; INTRAVENOUS at 14:41

## 2022-04-19 RX ADMIN — PROPOFOL 20 MG: 10 INJECTION, EMULSION INTRAVENOUS at 14:21

## 2022-04-19 NOTE — ANESTHESIA CARE TRANSFER NOTE
Patient: Sharyn Trent    Procedure: Procedure(s):  PELVIC EXAM UNDER ANESTHESIA, CERVICAL DILATION,  DILATION AND CURRETAGE  HYSTEROSCOPY, DIAGNOSTIC       Diagnosis: Endometrial mass [N94.89]  Diagnosis Additional Information: No value filed.    Anesthesia Type:   MAC     Note:    Oropharynx: oropharynx clear of all foreign objects  Level of Consciousness: awake  Oxygen Supplementation: room air    Independent Airway: airway patency satisfactory and stable  Dentition: dentition unchanged  Vital Signs Stable: post-procedure vital signs reviewed and stable  Report to RN Given: handoff report given  Patient transferred to: Phase II    Handoff Report: Identifed the Patient, Identified the Reponsible Provider, Reviewed the pertinent medical history, Discussed the surgical course, Reviewed Intra-OP anesthesia mangement and issues during anesthesia, Set expectations for post-procedure period and Allowed opportunity for questions and acknowledgement of understanding      Vitals:  Vitals Value Taken Time   /81 04/19/22 1452   Temp 97.5    Pulse 53 04/19/22 1456   Resp 14    SpO2 95 % 04/19/22 1456   Vitals shown include unvalidated device data.    Electronically Signed By: PEG Terry CRNA  April 19, 2022  2:57 PM

## 2022-04-19 NOTE — INTERVAL H&P NOTE
"I have reviewed the surgical (or preoperative) H&P that is linked to this encounter, and examined the patient. There are no significant changes    Clinical Conditions Present on Arrival:  Clinically Significant Risk Factors Present on Admission                  # Platelet Defect: home medication list includes an antiplatelet medication  # Overweight: Estimated body mass index is 25.67 kg/m  as calculated from the following:    Height as of this encounter: 1.6 m (5' 3\").    Weight as of this encounter: 65.7 kg (144 lb 14.4 oz).       "

## 2022-04-19 NOTE — ANESTHESIA PREPROCEDURE EVALUATION
Anesthesia Pre-Procedure Evaluation    Patient: Sharyn Trent   MRN: [id number] : 1940        Procedure : Procedure(s):  PELVIC EXAM UNDER ANESTHESIA, CERVICAL DILATION,  DILATION AND CURRETAGE          Past Medical History:   Diagnosis Date     Abnormal ECG      Anxiety      Arthritis      Chest pain      Chest pain      Chronic kidney disease     stage 3-mod.     Diabetes (H)      Dyslipidemia, goal LDL below 70      GERD (gastroesophageal reflux disease)      HTN (hypertension)      Hyperlipidemia      Melanoma of ankle (H)     L ankle     Other second degree atrioventricular block     Created by Conversion      Pacemaker      PSVT (paroxysmal supraventricular tachycardia) (H)      Right bundle branch block      Skin cancer       Past Surgical History:   Procedure Laterality Date     ABLATION OF DYSRHYTHMIC FOCUS  2013    AV analia reentry tachycardia, partially successful     BIOPSY SKIN (LOCATION)       CARDIAC CATHETERIZATION  03/10/2016     CV CORONARY ANGIOGRAM N/A 2021    Procedure: Coronary Angiogram;  Surgeon: Yony Gillis MD;  Location: [facility] Cardiac Cath Lab;  Service: Cardiology     CV LEFT HEART CATHETERIZATION WITHOUT LEFT VENTRICULOGRAM Left 2021    Procedure: Left Heart Catheterization Without Left Ventriculogram;  Surgeon: Yony Gillis MD;  Location: [facility] Cardiac Cath Lab;  Service: Cardiology     CV RIGHT HEART CATHETERIZATION N/A 2021    Procedure: Right Heart Catheterization;  Surgeon: Yony Gillis MD;  Location: [facility] Cardiac Cath Lab;  Service: Cardiology     EP PACEMAKER N/A 2021    Procedure: Electrophysiology Pacemaker;  Surgeon: Ab Saavedra MD;  Location: [facility] CATH LAB CV     FOOT SURGERY      L foot     HAND SURGERY      on both thumbs     IMPLANT PACEMAKER  2011    Second-degree AV block     OTHER SURGICAL HISTORY      SVT Ablation     CA SHLDR ARTHROSCOP,SURG,W/ROTAT CUFF REPR Left 3/9/2017     Procedure: LEFT SHOULDER ARTHROSCOPY DECOMPRESSION DISTAL CLAVICLE EXCISION  ROTATOR CUFF REPAIR BICEPS TENOTOMY AND EXTENSIVE DEBRIDEMENT;  Surgeon: Todd Riggs MD;  Location: [facility] OR;  Service: Orthopedics     RELEASE CARPAL TUNNEL      both wrists     SKIN CANCER EXCISION      L ankle,Lleg and cheek     TOE SURGERY      L big toe joint replacement     TUBAL LIGATION        Allergies   Allergen Reactions     Herlinda-Kit Bee Sting Shortness Of Breath     Venom-Honey Bee [Bee Venom] Shortness Of Breath     Mercurial Analogues [Mercurial Derivatives] Dermatitis     blisters     Nitrofurantoin Other (See Comments)     Burning sensation across chest and arms  Other reaction(s): arms and chest burning     Sulfa (Sulfonamide Antibiotics) [Sulfa Drugs] Rash     Sulfamethoxazole-Trimethoprim Rash      Social History     Tobacco Use     Smoking status: Never Smoker     Smokeless tobacco: Never Used   Substance Use Topics     Alcohol use: No      Wt Readings from Last 1 Encounters:   04/19/22 65.7 kg (144 lb 14.4 oz)        Anesthesia Evaluation   Pt has had prior anesthetic.         ROS/MED HX  ENT/Pulmonary:  - neg pulmonary ROS     Neurologic:  - neg neurologic ROS     Cardiovascular:     (+) hypertension-----CHF pacemaker,     METS/Exercise Tolerance:     Hematologic:  - neg hematologic  ROS     Musculoskeletal:       GI/Hepatic:     (+) GERD,     Renal/Genitourinary:     (+) renal disease,     Endo:     (+) type II DM, thyroid problem,     Psychiatric/Substance Use:       Infectious Disease:       Malignancy:       Other:            Physical Exam    Airway        Mallampati: II   TM distance: > 3 FB   Neck ROM: full     Respiratory Devices and Support         Dental  no notable dental history         Cardiovascular   cardiovascular exam normal          Pulmonary   pulmonary exam normal                OUTSIDE LABS:  CBC:   Lab Results   Component Value Date    WBC 6.7 04/05/2022    WBC 6.7 09/13/2021    HGB  11.1 (L) 04/05/2022    HGB 12.3 09/13/2021    HCT 31.7 (L) 04/05/2022    HCT 37.3 09/13/2021     04/05/2022     09/16/2021     BMP:   Lab Results   Component Value Date     04/05/2022     03/31/2022    POTASSIUM 4.1 04/05/2022    POTASSIUM 4.1 03/31/2022    CHLORIDE 102 04/05/2022    CHLORIDE 100 03/31/2022    CO2 23 04/05/2022    CO2 26 03/31/2022    BUN 27 04/05/2022    BUN 40 (H) 03/31/2022    CR 1.32 (H) 04/05/2022    CR 1.43 (H) 03/31/2022     (H) 04/19/2022     04/05/2022     COAGS:   Lab Results   Component Value Date    PTT 33 09/13/2021    INR 1.05 09/13/2021     POC: No results found for: BGM, HCG, HCGS  HEPATIC:   Lab Results   Component Value Date    ALBUMIN 3.9 03/31/2022    PROTTOTAL 6.9 03/31/2022    ALT 24 03/31/2022    AST 35 03/31/2022    ALKPHOS 77 03/31/2022    BILITOTAL 0.7 03/31/2022     OTHER:   Lab Results   Component Value Date    PH 7.44 05/26/2021    LACT 1.8 03/01/2021    A1C 5.4 09/13/2021    MAURICIO 9.4 04/05/2022    MAG 2.0 04/05/2022    LIPASE 81 (H) 03/20/2018    TSH 1.53 09/15/2021    CRP <0.1 09/13/2021       Anesthesia Plan    ASA Status:  2      Anesthesia Type: MAC.     - Reason for MAC: straight local not clinically adequate              Consents    Anesthesia Plan(s) and associated risks, benefits, and realistic alternatives discussed. Questions answered and patient/representative(s) expressed understanding.     - Discussed: Risks, Benefits and Alternatives for BOTH SEDATION and the PROCEDURE were discussed     - Discussed with:  Patient         Postoperative Care    Pain management: Multi-modal analgesia.        Comments:                Fernanda Young MD

## 2022-04-19 NOTE — ANESTHESIA POSTPROCEDURE EVALUATION
Patient: Sharyn Trent    Procedure: Procedure(s):  PELVIC EXAM UNDER ANESTHESIA, CERVICAL DILATION,  DILATION AND CURRETAGE  HYSTEROSCOPY, DIAGNOSTIC       Anesthesia Type:  MAC    Note:  Disposition: Outpatient   Postop Pain Control: Uneventful            Sign Out: Well controlled pain   PONV: No   Neuro/Psych: Uneventful            Sign Out: Acceptable/Baseline neuro status   Airway/Respiratory: Uneventful            Sign Out: Acceptable/Baseline resp. status   CV/Hemodynamics: Uneventful            Sign Out: Acceptable CV status   Other NRE: NONE   DID A NON-ROUTINE EVENT OCCUR? No           Last vitals:  Vitals Value Taken Time   /76 04/19/22 1500   Temp 36.3  C (97.4  F) 04/19/22 1455   Pulse 54 04/19/22 1507   Resp     SpO2 96 % 04/19/22 1507   Vitals shown include unvalidated device data.    Electronically Signed By: Raman Rivera MD  April 19, 2022  3:08 PM

## 2022-04-19 NOTE — PROCEDURES
DATE OF SERVICE:    4/19/2022      SURGEON:    Mary Riggs MD     PREOPERATIVE DIAGNOSIS:   Endometrial mass    POSTOPERATIVE DIAGNOSIS:   Same    PROCEDURE:   Examination under anesthesia, cervical dilation, diagnostic hysteroscopy, dilation and curettage     ANESTHESIA:    MAC     COMPLICATIONS:  None.     ESTIMATED BLOOD LOSS :   70 ml    URINE OUTPUT:   Not recorded    FINDINGS:  Thick epithelium traversing and enclosing the ectocervical os.  Likely false tract uncovered.  Benign-appearing adhesions unlikely to be consistent with endometrial cavity.  The endometrial mass was not adequately visualized.     PROCEDURE IN DETAIL:  Sharyn Trent is a very pleasant 81 year old-year-old brought to the operating room for the above-stated procedure.  She was incidentally identified to have a 4.8 cm endometrial mass identified on CT scan during work-up for presentation to the emergency room with bilateral arm tingling.  She additionally was identified to have multiple small bilateral nodules with an 8 mm left upper lobe nodule on imaging of the chest. The patient was brought the operative suite where general anesthesia was found to be adequate. She was prepared and draped in the normal sterile fashion in high lithotomy position. An exam under anesthesia revealed the findings as per above.     The anterior lip of the cervix was grasped with a single-tooth tenaculum.  The epithelium covering the ectocervical os was too thick to traverse with the cervical dilator.  This was then incised with a 15 blade.  The endocervical canal was then dilated to 10 Turkmen with the Hegar dilators.  The hysteroscope was then advanced into the presumed endometrial cavity without any issue.  Bands of tissue with substantial adhesion were identified potentially consistent with a false tract.  Endometrial curettings were attempte with the curette.  There was increased bleeding from the ectocervical os at the site of the opening of the  epithelium.  Figure-of-eight sutures were attempted to be placed with minimal assistance.  The hysteroscope was readvanced into the cavity which identified a similar appearance to the previous.  The endometrial curettings were collected on a Telfa and sent for routine processing.  The ectocervical os was coagulated with monopolar energy.  The sutures were cut.    The patient tolerated the procedure very well. All sponge, lap and needle counts were correct x2 at the completion of the procedure. She was taken to the recovery room in a stable condition.       Mary Riggs MD  Gynecologic Oncologist  [facility]  [place] [facility]  [phone]

## 2022-04-20 LAB
PATH REPORT.COMMENTS IMP SPEC: NORMAL
PATH REPORT.COMMENTS IMP SPEC: NORMAL
PATH REPORT.FINAL DX SPEC: NORMAL
PATH REPORT.GROSS SPEC: NORMAL
PATH REPORT.MICROSCOPIC SPEC OTHER STN: NORMAL
PATH REPORT.RELEVANT HX SPEC: NORMAL
PHOTO IMAGE: NORMAL

## 2022-04-20 PROCEDURE — 88305 TISSUE EXAM BY PATHOLOGIST: CPT | Mod: 26 | Performed by: PATHOLOGY

## 2022-04-27 ENCOUNTER — OFFICE VISIT (OUTPATIENT)
Dept: CARDIOLOGY | Facility: CLINIC | Age: 82
End: 2022-04-27
Payer: COMMERCIAL

## 2022-04-27 VITALS
HEIGHT: 63 IN | DIASTOLIC BLOOD PRESSURE: 68 MMHG | SYSTOLIC BLOOD PRESSURE: 138 MMHG | HEART RATE: 63 BPM | WEIGHT: 149 LBS | BODY MASS INDEX: 26.4 KG/M2

## 2022-04-27 DIAGNOSIS — I50.30 NYHA CLASS 3 HEART FAILURE WITH PRESERVED EJECTION FRACTION (H): Primary | ICD-10-CM

## 2022-04-27 PROCEDURE — 99213 OFFICE O/P EST LOW 20 MIN: CPT | Performed by: INTERNAL MEDICINE

## 2022-04-27 RX ORDER — ESCITALOPRAM OXALATE 5 MG/1
5 TABLET ORAL DAILY
COMMUNITY
Start: 2022-04-04 | End: 2022-08-24

## 2022-04-27 NOTE — PROGRESS NOTES
HEART CARE NOTE          Assessment/Recommendations     1. HFpEF c/b ADHF   Assessment / Plan    Euvolemic on physical exam and denies HF symptoms --> continue current diuretic regimen     BP adequately controlled today --> no changes to regimen at this time     2. CAD  Assessment / Plan    Denies further episodes of chest discomfort/pressure; s/p coronary angiogram significant for moderate LAD dz. Plan for medical management at that time    Continue ASA, high intensity atorvastatin, carvedilol, losartan     3. 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 Uterine mass  Assessment / Plan    Incidental finding of uterine mass; s/p biopsy which was negative for malignancy; tentative plan for hysterectomy     History of Present Illness/Subjective      Ms. Sharyn Trent is a 80 y.o. female with a PMHx significant for HFpEF, hypertensive emergency, dyslipidemia, type 2 diabetes, non-obstructive CAD, heart block status post pacemaker, paroxysmal supraventricular tachycardia, melanoma, chronic left leg edema, chronic kidney disease stage III who presents to [facility] for follow-up care. Of note, patient was recently diagnosed with benign uterine mass with plans for hysterectomy.      Today, Mrs. Trent denies palpitations, orthopnea, PND, fluid retention/edema, chest pain/pressure or anginal equivalents; Management plan as detailed above     ECG: Personally reviewed. Paced rhythm      Coronary angiogram:  RHC via right IJ  RA mean 4mmHg  PA mean 24mmHg  PCWP 21mmHg  LVEDP 19mmHg  Ao 153/62     PA sat 67%  Ao sat 94%     CO cindy 3.35     Angiography via left radial  LM short normal  LAD mid 50% narrowing with FFR 0.85  Circ normal  RCA normal     ECHO (personnaly Reviewed):     Left ventricle ejection fraction is normal. The estimated left ventricular ejection fraction is 55%.    Left ventricular diastolic function is abnormal.    Normal right ventricular size  "and systolic function.    No hemodynamically significant valvular heart abnormalities.    When compared to the previous study dated 3/20/2018, No significant change             Physical Examination Review of Systems   /68 (BP Location: Left arm, Patient Position: Sitting, Cuff Size: Adult Large)   Pulse 63   Ht 1.6 m (5' 3\")   Wt 67.6 kg (149 lb)   BMI 26.39 kg/m    Body mass index is 26.39 kg/m .  Wt Readings from Last 3 Encounters:   04/27/22 67.6 kg (149 lb)   04/19/22 65.7 kg (144 lb 14.4 oz)   04/05/22 65.4 kg (144 lb 3.2 oz)     General Appearance:   no distress, normal body habitus   ENT/Mouth: membranes moist, no oral lesions or bleeding gums.      EYES:  no scleral icterus, normal conjunctivae   Neck: no carotid bruits or thyromegaly   Chest/Lungs:   lungs are clear to auscultation, no rales or wheezing, equal chest wall expansion    Cardiovascular:   Regular. Normal first and second heart sounds with no murmurs, rubs, or gallops; the carotid, radial and posterior tibial pulses are intact, no JVD or LE edema bilaterally    Abdomen:  no organomegaly, masses, bruits, or tenderness; bowel sounds are present   Extremities: no cyanosis or clubbing   Skin: no xanthelasma, warm.    Neurologic: alert and oriented x3, calm     Psychiatric: alert and oriented x3, calm     A complete 10 systems ROS was reviewed  And is negative except what is listed in the HPI.          Medical History  Surgical History Family History Social History   Past Medical History:   Diagnosis Date     Abnormal ECG      Anxiety      Arthritis      Chest pain      Chest pain      Chronic kidney disease     stage 3-mod.     Diabetes (H)      Dyslipidemia, goal LDL below 70      GERD (gastroesophageal reflux disease)      HTN (hypertension)      Hyperlipidemia      Melanoma of ankle (H)     L ankle     Other second degree atrioventricular block     Created by Conversion      Pacemaker      PSVT (paroxysmal supraventricular tachycardia) " (H)      Right bundle branch block      Skin cancer     Past Surgical History:   Procedure Laterality Date     ABLATION OF DYSRHYTHMIC FOCUS  04/11/2013    AV analia reentry tachycardia, partially successful     BIOPSY SKIN (LOCATION)       CARDIAC CATHETERIZATION  03/10/2016     CV CORONARY ANGIOGRAM N/A 05/26/2021    Procedure: Coronary Angiogram;  Surgeon: Yony Gillis MD;  Location: [facility] Cardiac Cath Lab;  Service: Cardiology     CV LEFT HEART CATHETERIZATION WITHOUT LEFT VENTRICULOGRAM Left 05/26/2021    Procedure: Left Heart Catheterization Without Left Ventriculogram;  Surgeon: Yony Gillis MD;  Location: [facility] Cardiac Cath Lab;  Service: Cardiology     CV RIGHT HEART CATHETERIZATION N/A 05/26/2021    Procedure: Right Heart Catheterization;  Surgeon: Yony Gillis MD;  Location: [facility] Cardiac Cath Lab;  Service: Cardiology     DILATION AND CURETTAGE N/A 4/19/2022    Procedure: DILATION AND CURRETAGE;  Surgeon: Mary Reed MD;  Location: [facility] OR     EP PACEMAKER N/A 08/30/2021    Procedure: Electrophysiology Pacemaker;  Surgeon: Ab Saavedra MD;  Location: [facility] CV     FOOT SURGERY      L foot     HAND SURGERY      on both thumbs     HYSTEROSCOPY DIAGNOSTIC N/A 4/19/2022    Procedure: HYSTEROSCOPY, DIAGNOSTIC;  Surgeon: Mary Reed MD;  Location: [facility] OR     IMPLANT PACEMAKER  04/01/2011    Second-degree AV block     OTHER SURGICAL HISTORY      SVT Ablation     PELVIC EXAM UNDER ANESTHESIA, CERVICAL DILATION, N/A      PELVIC EXAMINATION UNDER ANESTHESIA N/A 4/19/2022    Procedure: PELVIC EXAM UNDER ANESTHESIA, CERVICAL DILATION,;  Surgeon: Mary Reed MD;  Location: [facility] OR     [place] SHLDR ARTHROSCOP,SURG,W/ROTAT CUFF REPR Left 03/09/2017    Procedure: LEFT SHOULDER ARTHROSCOPY DECOMPRESSION DISTAL CLAVICLE EXCISION  ROTATOR CUFF REPAIR BICEPS TENOTOMY AND EXTENSIVE DEBRIDEMENT;   Surgeon: Todd Riggs MD;  Location: [facility] OR;  Service: Orthopedics     RELEASE CARPAL TUNNEL      both wrists     SKIN CANCER EXCISION      L ankle,Lleg and cheek     TOE SURGERY      L big toe joint replacement     TUBAL LIGATION      no family history of premature coronary artery disease Social History     Socioeconomic History     Marital status:      Spouse name: Not on file     Number of children: Not on file     Years of education: Not on file     Highest education level: Not on file   Occupational History     Not on file   Tobacco Use     Smoking status: Never Smoker     Smokeless tobacco: Never Used   Substance and Sexual Activity     Alcohol use: No     Drug use: No     Sexual activity: Yes     Partners: Male     Birth control/protection: Surgical   Other Topics Concern     Not on file   Social History Narrative     Not on file     Social Determinants of Health     Financial Resource Strain: Not on file   Food Insecurity: Not on file   Transportation Needs: Not on file   Physical Activity: Not on file   Stress: Not on file   Social Connections: Not on file   Intimate Partner Violence: Not on file   Housing Stability: Not on file           Lab Results    Chemistry/lipid CBC Cardiac Enzymes/BNP/TSH/INR   Lab Results   Component Value Date    CHOL 126 03/31/2022    HDL 46 (L) 03/31/2022    TRIG 53 03/31/2022    BUN 27 04/05/2022     04/05/2022    CO2 23 04/05/2022    Lab Results   Component Value Date    WBC 6.7 04/05/2022    HGB 11.1 (L) 04/05/2022    HCT 31.7 (L) 04/05/2022    MCV 87 04/05/2022     04/05/2022    Lab Results   Component Value Date    TROPONINI 0.04 04/05/2022     (H) 04/05/2022    TSH 1.53 09/15/2021    INR 1.05 09/13/2021     Lab Results   Component Value Date    TROPONINI 0.04 04/05/2022          Weight:    Wt Readings from Last 3 Encounters:   04/19/22 65.7 kg (144 lb 14.4 oz)   04/05/22 65.4 kg (144 lb 3.2 oz)   03/14/22 66.7 kg (147 lb)        Allergies  Allergies   Allergen Reactions     Herlinda-Kit Bee Sting Shortness Of Breath     Venom-Honey Bee [Bee Venom] Shortness Of Breath     Mercurial Analogues [Mercurial Derivatives] Dermatitis     blisters     Nitrofurantoin Other (See Comments)     Burning sensation across chest and arms  Other reaction(s): arms and chest burning     Sulfa (Sulfonamide Antibiotics) [Sulfa Drugs] Rash     Sulfamethoxazole-Trimethoprim Rash         Surgical History  Past Surgical History:   Procedure Laterality Date     ABLATION OF DYSRHYTHMIC FOCUS  04/11/2013    AV analia reentry tachycardia, partially successful     BIOPSY SKIN (LOCATION)       CARDIAC CATHETERIZATION  03/10/2016     CV CORONARY ANGIOGRAM N/A 05/26/2021    Procedure: Coronary Angiogram;  Surgeon: Yony Gillis MD;  Location: [facility] Cardiac Cath Lab;  Service: Cardiology     CV LEFT HEART CATHETERIZATION WITHOUT LEFT VENTRICULOGRAM Left 05/26/2021    Procedure: Left Heart Catheterization Without Left Ventriculogram;  Surgeon: Yony Gillis MD;  Location: [facility] Cardiac Cath Lab;  Service: Cardiology     CV RIGHT HEART CATHETERIZATION N/A 05/26/2021    Procedure: Right Heart Catheterization;  Surgeon: Yony Gillis MD;  Location: [facility] Cardiac Cath Lab;  Service: Cardiology     DILATION AND CURETTAGE N/A 4/19/2022    Procedure: DILATION AND CURRETAGE;  Surgeon: Mary Reed MD;  Location: [facility] OR     EP PACEMAKER N/A 08/30/2021    Procedure: Electrophysiology Pacemaker;  Surgeon: Ab Saavedra MD;  Location: [facility] CV     FOOT SURGERY      L foot     HAND SURGERY      on both thumbs     HYSTEROSCOPY DIAGNOSTIC N/A 4/19/2022    Procedure: HYSTEROSCOPY, DIAGNOSTIC;  Surgeon: Mary Reed MD;  Location: [facility] OR     IMPLANT PACEMAKER  04/01/2011    Second-degree AV block     OTHER SURGICAL HISTORY      SVT Ablation     PELVIC EXAM UNDER ANESTHESIA, CERVICAL  DILATION, N/A      PELVIC EXAMINATION UNDER ANESTHESIA N/A 4/19/2022    Procedure: PELVIC EXAM UNDER ANESTHESIA, CERVICAL DILATION,;  Surgeon: Zoila Riggs, Mary Malloy MD;  Location: [facility] SHLDR ARTHROSCOP,SURG,W/ROTAT CUFF REPR Left 03/09/2017    Procedure: LEFT SHOULDER ARTHROSCOPY DECOMPRESSION DISTAL CLAVICLE EXCISION  ROTATOR CUFF REPAIR BICEPS TENOTOMY AND EXTENSIVE DEBRIDEMENT;  Surgeon: Todd Riggs MD;  Location: [facility];  Service: Orthopedics     RELEASE CARPAL TUNNEL      both wrists     SKIN CANCER EXCISION      L ankle,Lleg and cheek     TOE SURGERY      L big toe joint replacement     TUBAL LIGATION         Social History  Tobacco:   History   Smoking Status     Never Smoker   Smokeless Tobacco     Never Used    Alcohol:   Social History    Substance and Sexual Activity      Alcohol use: No   Illicit Drugs:   History   Drug Use No       Family History  Family History   Problem Relation Age of Onset     Hypertension Mother      Cerebrovascular Disease Mother      Prostate Cancer Father      Cancer Father      Coronary Artery Disease Father      Dyslipidemia Father      Dyslipidemia Sister      Dyslipidemia Brother      Hypertension Brother      Prostate Cancer Brother           Heidy Akins MD on 4/27/2022      cc: Jamie Mcconnell

## 2022-04-27 NOTE — LETTER
4/27/2022    Jamie Mcconnell MD  [facility] [address]  [facility] [address]    RE: Sharyn Trent       Dear Colleague,     I had the pleasure of seeing Sharyn Trent in the [facility].    HEART CARE NOTE          Assessment/Recommendations     1. HFpEF c/b ADHF   Assessment / Plan    Euvolemic on physical exam and denies HF symptoms --> continue current diuretic regimen     BP adequately controlled today --> no changes to regimen at this time     2. CAD  Assessment / Plan    Denies further episodes of chest discomfort/pressure; s/p coronary angiogram significant for moderate LAD dz. Plan for medical management at that time    Continue ASA, high intensity atorvastatin, carvedilol, losartan     3. 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 Uterine mass  Assessment / Plan    Incidental finding of uterine mass; s/p biopsy which was negative for malignancy; tentative plan for hysterectomy     History of Present Illness/Subjective      Ms. Sharyn Trent is a 80 y.o. female with a PMHx significant for HFpEF, hypertensive emergency, dyslipidemia, type 2 diabetes, non-obstructive CAD, heart block status post pacemaker, paroxysmal supraventricular tachycardia, melanoma, chronic left leg edema, chronic kidney disease stage III who presents to [facility] for follow-up care. Of note, patient was recently diagnosed with benign uterine mass with plans for hysterectomy.      Today, Mrs. Trent denies palpitations, orthopnea, PND, fluid retention/edema, chest pain/pressure or anginal equivalents; Management plan as detailed above     ECG: Personally reviewed. Paced rhythm      Coronary angiogram:  RHC via right IJ  RA mean 4mmHg  PA mean 24mmHg  PCWP 21mmHg  LVEDP 19mmHg  Ao 153/62     PA sat 67%  Ao sat 94%     CO cindy 3.35     Angiography via left radial  LM short normal  LAD mid 50% narrowing with FFR 0.85  Circ normal  RCA  "normal     ECHO (personnaly Reviewed):     Left ventricle ejection fraction is normal. The estimated left ventricular ejection fraction is 55%.    Left ventricular diastolic function is abnormal.    Normal right ventricular size and systolic function.    No hemodynamically significant valvular heart abnormalities.    When compared to the previous study dated 3/20/2018, No significant change             Physical Examination Review of Systems   /68 (BP Location: Left arm, Patient Position: Sitting, Cuff Size: Adult Large)   Pulse 63   Ht 1.6 m (5' 3\")   Wt 67.6 kg (149 lb)   BMI 26.39 kg/m    Body mass index is 26.39 kg/m .  Wt Readings from Last 3 Encounters:   04/27/22 67.6 kg (149 lb)   04/19/22 65.7 kg (144 lb 14.4 oz)   04/05/22 65.4 kg (144 lb 3.2 oz)     General Appearance:   no distress, normal body habitus   ENT/Mouth: membranes moist, no oral lesions or bleeding gums.      EYES:  no scleral icterus, normal conjunctivae   Neck: no carotid bruits or thyromegaly   Chest/Lungs:   lungs are clear to auscultation, no rales or wheezing, equal chest wall expansion    Cardiovascular:   Regular. Normal first and second heart sounds with no murmurs, rubs, or gallops; the carotid, radial and posterior tibial pulses are intact, no JVD or LE edema bilaterally    Abdomen:  no organomegaly, masses, bruits, or tenderness; bowel sounds are present   Extremities: no cyanosis or clubbing   Skin: no xanthelasma, warm.    Neurologic: alert and oriented x3, calm     Psychiatric: alert and oriented x3, calm     A complete 10 systems ROS was reviewed  And is negative except what is listed in the HPI.          Medical History  Surgical History Family History Social History   Past Medical History:   Diagnosis Date     Abnormal ECG      Anxiety      Arthritis      Chest pain      Chest pain      Chronic kidney disease     stage 3-mod.     Diabetes (H)      Dyslipidemia, goal LDL below 70      GERD (gastroesophageal reflux " disease)      HTN (hypertension)      Hyperlipidemia      Melanoma of ankle (H)     L ankle     Other second degree atrioventricular block     Created by Conversion      Pacemaker      PSVT (paroxysmal supraventricular tachycardia) (H)      Right bundle branch block      Skin cancer     Past Surgical History:   Procedure Laterality Date     ABLATION OF DYSRHYTHMIC FOCUS  04/11/2013    AV analia reentry tachycardia, partially successful     BIOPSY SKIN (LOCATION)       CARDIAC CATHETERIZATION  03/10/2016     CV CORONARY ANGIOGRAM N/A 05/26/2021    Procedure: Coronary Angiogram;  Surgeon: Yony Gillis MD;  Location: [facility] Cardiac Cath Lab;  Service: Cardiology     CV LEFT HEART CATHETERIZATION WITHOUT LEFT VENTRICULOGRAM Left 05/26/2021    Procedure: Left Heart Catheterization Without Left Ventriculogram;  Surgeon: Yony Gillis MD;  Location: [facility] Cardiac Cath Lab;  Service: Cardiology     CV RIGHT HEART CATHETERIZATION N/A 05/26/2021    Procedure: Right Heart Catheterization;  Surgeon: Yony Gillis MD;  Location: [facility] Cardiac Cath Lab;  Service: Cardiology     DILATION AND CURETTAGE N/A 4/19/2022    Procedure: DILATION AND CURRETAGE;  Surgeon: Mary Reed MD;  Location: [facility] OR     EP PACEMAKER N/A 08/30/2021    Procedure: Electrophysiology Pacemaker;  Surgeon: Ab Saavedra MD;  Location: [facility] CV     FOOT SURGERY      L foot     HAND SURGERY      on both thumbs     HYSTEROSCOPY DIAGNOSTIC N/A 4/19/2022    Procedure: HYSTEROSCOPY, DIAGNOSTIC;  Surgeon: Mary Reed MD;  Location: [facility] OR     IMPLANT PACEMAKER  04/01/2011    Second-degree AV block     OTHER SURGICAL HISTORY      SVT Ablation     PELVIC EXAM UNDER ANESTHESIA, CERVICAL DILATION, N/A      PELVIC EXAMINATION UNDER ANESTHESIA N/A 4/19/2022    Procedure: PELVIC EXAM UNDER ANESTHESIA, CERVICAL DILATION,;  Surgeon: Mary Reed MD;   [place] [facility] [place]     [place] SHLDR ARTHROSCOP,SURG,W/ROTAT CUFF REPR Left 03/09/2017    Procedure: LEFT SHOULDER ARTHROSCOPY DECOMPRESSION DISTAL CLAVICLE EXCISION  ROTATOR CUFF REPAIR BICEPS TENOTOMY AND EXTENSIVE DEBRIDEMENT;  Surgeon: Todd Riggs MD;  Location: [facility] OR;  Service: Orthopedics     RELEASE CARPAL TUNNEL      both wrists     SKIN CANCER EXCISION      L ankle,Lleg and cheek     TOE SURGERY      L big toe joint replacement     TUBAL LIGATION      no family history of premature coronary artery disease Social History     Socioeconomic History     Marital status:      Spouse name: Not on file     Number of children: Not on file     Years of education: Not on file     Highest education level: Not on file   Occupational History     Not on file   Tobacco Use     Smoking status: Never Smoker     Smokeless tobacco: Never Used   Substance and Sexual Activity     Alcohol use: No     Drug use: No     Sexual activity: Yes     Partners: Male     Birth control/protection: Surgical   Other Topics Concern     Not on file   Social History Narrative     Not on file     Social Determinants of Health     Financial Resource Strain: Not on file   Food Insecurity: Not on file   Transportation Needs: Not on file   Physical Activity: Not on file   Stress: Not on file   Social Connections: Not on file   Intimate Partner Violence: Not on file   Housing Stability: Not on file           Lab Results    Chemistry/lipid CBC Cardiac Enzymes/BNP/TSH/INR   Lab Results   Component Value Date    CHOL 126 03/31/2022    HDL 46 (L) 03/31/2022    TRIG 53 03/31/2022    BUN 27 04/05/2022     04/05/2022    CO2 23 04/05/2022    Lab Results   Component Value Date    WBC 6.7 04/05/2022    HGB 11.1 (L) 04/05/2022    HCT 31.7 (L) 04/05/2022    MCV 87 04/05/2022     04/05/2022    Lab Results   Component Value Date    TROPONINI 0.04 04/05/2022     (H) 04/05/2022    TSH 1.53 09/15/2021    INR 1.05  09/13/2021     Lab Results   Component Value Date    TROPONINI 0.04 04/05/2022          Weight:    Wt Readings from Last 3 Encounters:   04/19/22 65.7 kg (144 lb 14.4 oz)   04/05/22 65.4 kg (144 lb 3.2 oz)   03/14/22 66.7 kg (147 lb)       Allergies  Allergies   Allergen Reactions     Herlinda-Kit Bee Sting Shortness Of Breath     Venom-Honey Bee [Bee Venom] Shortness Of Breath     Mercurial Analogues [Mercurial Derivatives] Dermatitis     blisters     Nitrofurantoin Other (See Comments)     Burning sensation across chest and arms  Other reaction(s): arms and chest burning     Sulfa (Sulfonamide Antibiotics) [Sulfa Drugs] Rash     Sulfamethoxazole-Trimethoprim Rash         Surgical History  Past Surgical History:   Procedure Laterality Date     ABLATION OF DYSRHYTHMIC FOCUS  04/11/2013    AV analia reentry tachycardia, partially successful     BIOPSY SKIN (LOCATION)       CARDIAC CATHETERIZATION  03/10/2016     CV CORONARY ANGIOGRAM N/A 05/26/2021    Procedure: Coronary Angiogram;  Surgeon: Yony Gillis MD;  Location: [facility] Cardiac Cath Lab;  Service: Cardiology     CV LEFT HEART CATHETERIZATION WITHOUT LEFT VENTRICULOGRAM Left 05/26/2021    Procedure: Left Heart Catheterization Without Left Ventriculogram;  Surgeon: Yony Gillis MD;  Location: [facility] Cardiac Cath Lab;  Service: Cardiology     CV RIGHT HEART CATHETERIZATION N/A 05/26/2021    Procedure: Right Heart Catheterization;  Surgeon: Yony Gillis MD;  Location: [facility] Cardiac Cath Lab;  Service: Cardiology     DILATION AND CURETTAGE N/A 4/19/2022    Procedure: DILATION AND CURRETAGE;  Surgeon: Mary Reed MD;  Location: [facility] OR     EP PACEMAKER N/A 08/30/2021    Procedure: Electrophysiology Pacemaker;  Surgeon: Ab Saavedra MD;  Location: [facility] LAB CV     FOOT SURGERY      L foot     HAND SURGERY      on both thumbs     HYSTEROSCOPY DIAGNOSTIC N/A 4/19/2022    Procedure: HYSTEROSCOPY,  DIAGNOSTIC;  Surgeon: Mary Reed MD;  Location: [facility] OR     IMPLANT PACEMAKER  04/01/2011    Second-degree AV block     OTHER SURGICAL HISTORY      SVT Ablation     PELVIC EXAM UNDER ANESTHESIA, CERVICAL DILATION, N/A      PELVIC EXAMINATION UNDER ANESTHESIA N/A 4/19/2022    Procedure: PELVIC EXAM UNDER ANESTHESIA, CERVICAL DILATION,;  Surgeon: Mary Reed MD;  Location: [facility] OR     [place] SHLDR ARTHROSCOP,SURG,W/ROTAT CUFF REPR Left 03/09/2017    Procedure: LEFT SHOULDER ARTHROSCOPY DECOMPRESSION DISTAL CLAVICLE EXCISION  ROTATOR CUFF REPAIR BICEPS TENOTOMY AND EXTENSIVE DEBRIDEMENT;  Surgeon: Todd Riggs MD;  Location: [facility] OR;  Service: Orthopedics     RELEASE CARPAL TUNNEL      both wrists     SKIN CANCER EXCISION      L ankle,Lleg and cheek     TOE SURGERY      L big toe joint replacement     TUBAL LIGATION         Social History  Tobacco:   History   Smoking Status     Never Smoker   Smokeless Tobacco     Never Used    Alcohol:   Social History    Substance and Sexual Activity      Alcohol use: No   Illicit Drugs:   History   Drug Use No       Family History  Family History   Problem Relation Age of Onset     Hypertension Mother      Cerebrovascular Disease Mother      Prostate Cancer Father      Cancer Father      Coronary Artery Disease Father      Dyslipidemia Father      Dyslipidemia Sister      Dyslipidemia Brother      Hypertension Brother      Prostate Cancer Brother           Heidy Akins MD on 4/27/2022      cc: Jamie Mcconnell        Thank you for allowing me to participate in the care of your patient.      Sincerely,     Heidy Akins MD     [facility]  cc:   No referring provider defined for this encounter.

## 2022-05-03 ENCOUNTER — HOSPITAL ENCOUNTER (OUTPATIENT)
Dept: PET IMAGING | Facility: HOSPITAL | Age: 82
Discharge: HOME | End: 2022-05-03
Attending: OBSTETRICS & GYNECOLOGY | Admitting: OBSTETRICS & GYNECOLOGY
Payer: COMMERCIAL

## 2022-05-03 DIAGNOSIS — J98.4 LUNG DISEASE: ICD-10-CM

## 2022-05-03 DIAGNOSIS — N85.8 UTERINE MASS: ICD-10-CM

## 2022-05-03 DIAGNOSIS — R91.8 MULTIPLE PULMONARY NODULES: ICD-10-CM

## 2022-05-03 LAB — GLUCOSE BLDC GLUCOMTR-MCNC: 112 MG/DL (ref 70–99)

## 2022-05-03 PROCEDURE — 82962 GLUCOSE BLOOD TEST: CPT

## 2022-05-03 PROCEDURE — A9552 F18 FDG: HCPCS | Performed by: OBSTETRICS & GYNECOLOGY

## 2022-05-03 PROCEDURE — 343N000001 HC RX 343: Performed by: OBSTETRICS & GYNECOLOGY

## 2022-05-03 PROCEDURE — 78815 PET IMAGE W/CT SKULL-THIGH: CPT | Mod: PI

## 2022-05-03 RX ADMIN — FLUDEOXYGLUCOSE F-18 10.2 MCI.: 500 INJECTION, SOLUTION INTRAVENOUS at 12:13

## 2022-05-12 ENCOUNTER — LAB REQUISITION (OUTPATIENT)
Dept: LAB | Facility: CLINIC | Age: 82
End: 2022-05-12

## 2022-05-12 ENCOUNTER — LAB REQUISITION (OUTPATIENT)
Dept: LAB | Facility: CLINIC | Age: 82
End: 2022-05-12
Payer: COMMERCIAL

## 2022-05-12 DIAGNOSIS — Z01.818 ENCOUNTER FOR OTHER PREPROCEDURAL EXAMINATION: ICD-10-CM

## 2022-05-12 DIAGNOSIS — E11.22 TYPE 2 DIABETES MELLITUS WITH DIABETIC CHRONIC KIDNEY DISEASE (H): ICD-10-CM

## 2022-05-12 DIAGNOSIS — D64.9 ANEMIA, UNSPECIFIED: ICD-10-CM

## 2022-05-12 LAB
ANION GAP SERPL CALCULATED.3IONS-SCNC: 10 MMOL/L (ref 5–18)
BUN SERPL-MCNC: 27 MG/DL (ref 8–28)
CALCIUM SERPL-MCNC: 9.1 MG/DL (ref 8.5–10.5)
CHLORIDE BLD-SCNC: 101 MMOL/L (ref 98–107)
CO2 SERPL-SCNC: 26 MMOL/L (ref 22–31)
CREAT SERPL-MCNC: 1.24 MG/DL (ref 0.6–1.1)
GFR SERPL CREATININE-BSD FRML MDRD: 44 ML/MIN/1.73M2
GLUCOSE BLD-MCNC: 117 MG/DL (ref 70–125)
IRON SATN MFR SERPL: 7 % (ref 20–50)
IRON SERPL-MCNC: 29 UG/DL (ref 42–175)
POTASSIUM BLD-SCNC: 4.1 MMOL/L (ref 3.5–5)
SODIUM SERPL-SCNC: 137 MMOL/L (ref 136–145)
TIBC SERPL-MCNC: 390 UG/DL (ref 313–563)
TRANSFERRIN SERPL-MCNC: 312 MG/DL (ref 212–360)
VIT B12 SERPL-MCNC: 435 PG/ML (ref 213–816)

## 2022-05-12 PROCEDURE — U0005 INFEC AGEN DETEC AMPLI PROBE: HCPCS | Mod: ORL | Performed by: FAMILY MEDICINE

## 2022-05-12 PROCEDURE — 82310 ASSAY OF CALCIUM: CPT | Performed by: FAMILY MEDICINE

## 2022-05-12 PROCEDURE — 84466 ASSAY OF TRANSFERRIN: CPT | Performed by: FAMILY MEDICINE

## 2022-05-12 PROCEDURE — 82607 VITAMIN B-12: CPT | Performed by: FAMILY MEDICINE

## 2022-05-13 LAB — SARS-COV-2 RNA RESP QL NAA+PROBE: NEGATIVE

## 2022-05-17 NOTE — PROGRESS NOTES
GYNECOLOGIC ONCOLOGY FOLLOW-UP VISIT    Patient Name: DARIUS TRENT : 1940  Date of Visit: 2022  Referring Provider: Shaheen Milian PA-C (Emergency Medicine)  Attending: Mary Riggs (Gynecological/Oncology)    Chief Complaint (Gyn Oncology):  Postoperative assessment    History of Present Illness (Gyn Oncology):  Darius Trent is a very pleasant 81-year-old female with an endometrial mass measuring 4.8 cm. She was taken to the operating room for an attempt at cervical dilatation and dilation and curettage to obtain an endometrial sampling, which was unsuccessful due to a false tract. A post-operative PET CT to further characterize the pulmonary lesions has identified a probable primary endometrial cancer with local lymph node metastasis, but also a pulmonary process of uncertain etiology with hilar lymphadenopathy and focal pulmonary lesions. She presents today for a postoperative assessment.    CLINICAL COURSE:  2022: The patient presented in the ED for bilateral arm tingling. She had workup for her uterine mass as well.   2022: Right lower extremity venous ultrasound showed no deep venous thrombosis in the right lower extremity. Baker's cyst.   2022: CT CAP showed no acute aortic findings. Scattered atheromatous plaque. Stable mild left subclavian artery ostial stenosis. Markedly abnormal appearance of the uterus for postmenopausal state concerning for gynecologic malignancy. Recommend consultation with gynecology. Transvaginal ultrasound may be beneficial as next step to evaluate for presence/absence of fluid within the endometrial canal. No associated ascites or lymphadenopathy. Multiple nodules within the lungs measuring at or greater than 1.0 cm in size. Differential includes infectious/inflammatory as well as malignancy. No mediastinal or hilar lymphadenopathy. Short-term follow-up CT or PET/CT may help further guide evaluation.   2022: CT of the head showed no evidence of  acute intracranial abnormality. No evidence of acute hemorrhage, mass or CT evidence of acute infarct.   4/8/2022: Pelvic ultrasound showed that the mass seen on CT corresponds to a thick walled solid lesion with presumed necrotic changes centrally. There is mass effect upon the uterus, which is displaced posteriorly and slightly tot he right of the midline. It is unclear if this is a large degenerated subserosal fibroid or a right ovarian mass. MRI of the pelvis may be able to ascertain if this is uterine or ovarian in nature. GYN consultation suggested regarding need for further imaging workup versus intervention. Endometrial stripe is normal and there is a small myometrial fibroid in the mid-body of the uterus. Neither ovary is seen by ultrasound. The left ovary is normal appearing on the recent CT along the left pelvic sidewall.   5/3/2022: PET CT showed heterogenous and centrally necrotic lesion arising from the uterus (max SUV 2.7) wtih FDG-avid left inguinal (max SUV 4.8), left external iliac (max SUV 5.8), left common iliac (max SUV 4.0), right greater than left hilar (max SUV 5.9), right lower paratracheal station 4R (max SUV 3.9), right upper paratracheal station 2R (max SUV 3.3) lymph nodes and scattered pulmonary nodules in both upper lobes (max SUV 3.7 in the anterior left upper lobe nodule), suspicious for uterine neoplasm with lymph node and pulmonary metastases. Mild carotid artery bifurcation calcific location. Left chest cardiac device with lead tips terminating in the right atrium and right ventricle. Pelvic phleboliths. Multilevel degenerative changes in the spine. Bilateral shoulder and right greater than left knee synovitis. Mild to moderate senescent intracranial changes.  Genetic Testing (Gyn Oncology):  N/A    Interval History (Gyn Oncology)  Sharyn presents for a follow up. She is doing well today. She is only complaining of neuropathy in her feet. She takes medication for it with improvement  of her symptoms.     Review of Systems:  A complete 14-point review of systems is negative except as noted in the above history of present illness.     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  Neuropathy  Stage 3 CKD, impaired kidney function  Pneumonia  GERD  Right bundle branch block  Arthritis  Hypertension  Tachycardia - SVT  Chest pain  Anxiety disorder  Menopause  Diabetes mellitus, type 2  Hyperlipidemia  Left rotator cuff tear  Third degree AV Block status post pacemaker placement  Macular degeneration  Chicken pox  Baker's cyst    Surgical History:  Tubal ligation in   Carpal tunnel release in 2006  Left first MC-C joint replacement  Basal cell removal on 3/23/2011  Removal of squamous cell from face in 2010  Pacemaker placed on   Closed reduction of dislocated shoulder on 2017  YAG peripheral iridotomy on 2017 and 2017  Excision cataract w/lense implant, left on 2021  Left foot surgeries x6  Left heart catheterization in     OB/Gyn History:  Tubal ligation    Allergies:  Macrobid   Sulfamethoxazole-Trimethoprim   bee venom protein (honey bee)  Medications:  Lorazepam Oral 0.5 mg tablet   Metformin Oral 500 mg tablet   Prednisolone Ophthalmic Drops 1 %   Aspirin Oral 325 mg tablet   Escitalopram Oral 5 mg tablet   Multivitamins Oral Tablet   Omeprazole Oral Delayed Release Capsule 20 mg capsule,delayed release(DR/EC)   Atorvastatin Oral 80 mg tablet   Carvedilol Oral 25 mg tablet   Hydralazine Oral 100 mg tablet   Miscellaneous Drug preservision   Furosemide Oral 20 mg tablet   Losartan Oral 50 mg tablet  Family History:  Father with prostate cancer,  at 96.  Brother with prostate cancer  1 sister with multiple myolemma, .    Social History:  , living with spouse.  Never smoker.  No alcohol consumption.  Caffeine 2 cups a day.  No recreational drug use.    Health Maintenance:    Vital  Signs:  Blood pressure: 164/60, Pulse: 56, Temperature: 97.6 F, Respirations: 12, O2 sat: 97%, Pain Scale: 0, Height: 63 in, Weight: 142 lb, BSA: 1.67, BMI: 25.15 kg/m2    Physical Exam (Gyn Oncology):  General: Alert and oriented x3, no acute distress  HEENT: Normocephalic, atraumatic  Lymph node exam: No supraclavicular, submandibular or cervical lymphadenopathy  CV: Regular rate and rhythm, no murmurs, rubs or gallops  Resp: Clear to auscultation bilaterally, no wheezes, rales or rhonchi  Abdomen: Soft, non-tender to palpation, no rebound or guarding  Lower Extremity Exam: No lower extremity edema, no palpable cords  Neuro: Cranial Nerves 2-12 intact, gross motor skills intact  Genitourinary exam: Deferred.    Laboratory Data:  2022:Diagnostic Hysteroscopy with cervical dilatation and D&C  PATHOLOGY  FINAL DIAGNOSIS  ENDOMETRIUM, CERVICAL DILATATION WITH CURETTAGE:  1. Non-diagnostic; Endometrial tissue not present for evaluation.  2. Scant ectocervical and endocervical epithelium, without dysplasia, scant fibromuscular stroma.  3. Abundant mucin and blood clot.    Imagin2022: US Venous Right lower extremity  IMPRESSION: No deep venous thrombosis in the right lower extremity. Baker's cyst.    2022: CT CAP  IMPRESSION: No acute aortic findings. Scattered atheromatous plaque. Stable mild left subclavian artery ostial stenosis. Markedly abnormal appearance of the uterus for postmenopausal state concerning for gynecologic malignancy. Recommend consultation with gynecology. Transvaginal ultrasound may be beneficial as next step to evaluate for presence/absence of fluid within the endometrial canal. No associated ascites or lymphadenopathy. Multiple nodules within the lungs measuring at or greater than 1.0 cm in size. Differential includes infectious/inflammatory as well as malignancy. No mediastinal or hilar lymphadenopathy. Short-term follow-up CT or PET/CT may help further guide  evaluation.    4/5/2022: CT Head  IMPRESSION: No evidence of acute intracranial abnormality. No evidence of acute hemorrhage, mass or CT evidence of acute infarct.    4/8/2022: US Pelvis  IMPRESSION: Mass seen on CT corresponds to a thick walled solid lesion with presumed necrotic changes centrally. There is mass effect upon the uterus, which is displaced posteriorly and slightly tot he right of the midline. It is unclear if this is a large degenerated subserosal fibroid or a right ovarian mass. MRI of the pelvis may be able to ascertain if this is uterine or ovarian in nature. GYN consultation suggested regarding need for further imaging workup versus intervention. Endometrial stripe is normal and there is a small myometrial fibroid in the mid-body of the uterus. Neither ovary is seen by ultrasound. The left ovary is normal appearing on the recent CT along the left pelvic sidewall.    5/3/2022: PET CT  IMPRESSION:  Heterogenous and centrally necrotic lesion arising from the uterus (max SUV 2.7) wtih FDG-avid left inguinal (max SUV 4.8), left external iliac (max SUV 5.8), left common iliac (max SUV 4.0), right greater than left hilar (max SUV 5.9), right lower paratracheal station 4R (max SUV 3.9), right upper paratracheal station 2R (max SUV 3.3) lymph nodes and scattered pulmonary nodules in both upper lobes (max SUV 3.7 in the anterior left upper lobe nodule), suspicious for uterine neoplasm with lymph node and pulmonary metastases. Mild carotid artery bifurcation calcific location. Left chest cardiac device with lead tips terminating in the right atrium and right ventricle. Pelvic phleboliths. Multilevel degenerative changes in the spine. Bilateral shoulder and right greater than left knee synovitis. Mild to moderate senescent intracranial changes.    Problems:  Lung disease   Multiple pulmonary nodules   Uterine mass  Assessment & Plan (Gyn Oncology):  Sharyn Trent is a very pleasant 81-year-old female with a  new diagnosis of an endometrial mass measuring 4.8 cm. She was taken to the operating room for an attempt at cervical dilatation and dilation and curettage to obtain an endometrial sampling, which was unsuccessful due to a false tract. A postoperative PET CT, to further characterize the pulmonary lesions has identified a probable primary endometrial cancer with local lymph node metastasis, but also, a pulmonary process of uncertain etiology with hilar lymphadenopathy and focal pulmonary lesions. She presents today for post-operative for a for postoperative assessment.    1. Endometrial mass - Status post recent exam under anesthesia and hysteroscopy and D&C without an ability to access the endometrial cavity and perform an adequate endometrial biopsy. Unfortunately, the recent PET CT demonstrates a probable malignant process ongoing within the endometrial cavity with possible local pelvic lymphadenopathy related. I reviewed with Sharyn and her daughter that depending on further workup of her lungs, my plan would be to proceed with a robotic-assisted total laparoscopic hysterectomy, bilateral salpingo-oophorectomy, sentinel lymph node injections with biopsies, and possible omentectomy. She will need a mini laparotomy to deliver the enlarged uterus. We discussed that I need to defer surgery until further workup of her lungs is performed to determine whether or not this is related to the ongoing process in the uterus. We reviewed the risks of surgery that do include bleeding or infection and potential damage to surrounding structures. We reviewed the need for cardiac clearance and also a COVID-19 test prior to surgery. We will tentatively hold the place for her procedure in late May, which will follow further workup of the pulmonary nodules and hilar lymphadenopathy with Dr. Zaldivar.    2. Pulmonary nodule / hilar lymphadenopathy - Identified on the recent PET CT. I have contacted Dr. Christopher Shields to discuss a possible  primary pulmonary process. He advised the PET CT to evaluate for a possible pulmonary process. This unfortunately demonstrates FDG avidity within the lungs bilaterally and also within hilar adenopathy. I have subsequently referred Sharyn to Dr. Jaskaran Zaldivar for attempts at biopsies of some portion of the pulmonary process to determine whether or not this is related to what is going on in her pelvis. At the end of our discussion, Sharyn was understanding. I answered multiple questions for both Sharyn and her daughter to the best of my ability. We will have her meet with Robin to receive information regarding hysterectomy.    Thank you very much for allowing me to take part in the care of this very sweet patient.    3 minutes in reviewing records, 28 minutes in discussion, 4 minutes ordering labs.     Pain Care Management:  Pain Scale: 0    Patient Care needs:  Depressions Status: Was screened; Outcome positive: No; Screening Date: 04/15/2022; Screening Tool: PRIME MD-PHQ2; Total depression score: 0  Psycho-Social PHQ-9 Follow-up Plan (if applicable):  Smoking Status: Never smoker    Documentation assistance provided by alejandrina Tiwari for Dr. Mary Riggs, on 05/05/2022.  I, Dr. Mary Riggs, personally performed the services described in this documentation, and it is both accurate and complete.     Mary Riggs MD  Phone: [phone]  Fax: [phone]

## 2022-05-23 ENCOUNTER — TRANSFERRED RECORDS (OUTPATIENT)
Dept: HEALTH INFORMATION MANAGEMENT | Facility: CLINIC | Age: 82
End: 2022-05-23
Payer: COMMERCIAL

## 2022-05-27 ENCOUNTER — LAB REQUISITION (OUTPATIENT)
Dept: LAB | Facility: CLINIC | Age: 82
End: 2022-05-27
Payer: COMMERCIAL

## 2022-05-27 DIAGNOSIS — Z03.818 ENCOUNTER FOR OBSERVATION FOR SUSPECTED EXPOSURE TO OTHER BIOLOGICAL AGENTS RULED OUT: ICD-10-CM

## 2022-05-27 PROCEDURE — U0005 INFEC AGEN DETEC AMPLI PROBE: HCPCS | Mod: ORL | Performed by: FAMILY MEDICINE

## 2022-05-28 LAB — SARS-COV-2 RNA RESP QL NAA+PROBE: NEGATIVE

## 2022-05-31 ENCOUNTER — ANESTHESIA EVENT (OUTPATIENT)
Dept: SURGERY | Facility: HOSPITAL | Age: 82
End: 2022-05-31
Payer: COMMERCIAL

## 2022-05-31 ENCOUNTER — ANESTHESIA (OUTPATIENT)
Dept: SURGERY | Facility: HOSPITAL | Age: 82
End: 2022-05-31
Payer: COMMERCIAL

## 2022-05-31 ENCOUNTER — TRANSFERRED RECORDS (OUTPATIENT)
Dept: HEALTH INFORMATION MANAGEMENT | Facility: CLINIC | Age: 82
End: 2022-05-31
Payer: COMMERCIAL

## 2022-05-31 ENCOUNTER — HOSPITAL ENCOUNTER (OUTPATIENT)
Facility: HOSPITAL | Age: 82
Discharge: HOME | End: 2022-06-01
Attending: OBSTETRICS & GYNECOLOGY | Admitting: OBSTETRICS & GYNECOLOGY
Payer: COMMERCIAL

## 2022-05-31 ENCOUNTER — ANCILLARY PROCEDURE (OUTPATIENT)
Dept: ULTRASOUND IMAGING | Facility: HOSPITAL | Age: 82
End: 2022-05-31
Attending: ANESTHESIOLOGY
Payer: COMMERCIAL

## 2022-05-31 DIAGNOSIS — N85.8 UTERINE MASS: Primary | ICD-10-CM

## 2022-05-31 DIAGNOSIS — E11.9 DIABETES MELLITUS TYPE 2 WITHOUT RETINOPATHY (H): ICD-10-CM

## 2022-05-31 LAB
GLUCOSE BLDC GLUCOMTR-MCNC: 125 MG/DL (ref 70–99)
GLUCOSE BLDC GLUCOMTR-MCNC: 156 MG/DL (ref 70–99)
GLUCOSE BLDC GLUCOMTR-MCNC: 182 MG/DL (ref 70–99)
GLUCOSE BLDC GLUCOMTR-MCNC: 189 MG/DL (ref 70–99)
HGB BLD-MCNC: 10.3 G/DL (ref 11.7–15.7)

## 2022-05-31 PROCEDURE — 85018 HEMOGLOBIN: CPT | Performed by: PHYSICIAN ASSISTANT

## 2022-05-31 PROCEDURE — 82962 GLUCOSE BLOOD TEST: CPT

## 2022-05-31 PROCEDURE — 250N000011 HC RX IP 250 OP 636: Performed by: PHYSICIAN ASSISTANT

## 2022-05-31 PROCEDURE — 258N000003 HC RX IP 258 OP 636: Performed by: ANESTHESIOLOGY

## 2022-05-31 PROCEDURE — 258N000003 HC RX IP 258 OP 636: Performed by: PHYSICIAN ASSISTANT

## 2022-05-31 PROCEDURE — 258N000003 HC RX IP 258 OP 636: Performed by: NURSE ANESTHETIST, CERTIFIED REGISTERED

## 2022-05-31 PROCEDURE — 250N000011 HC RX IP 250 OP 636: Performed by: ANESTHESIOLOGY

## 2022-05-31 PROCEDURE — 250N000013 HC RX MED GY IP 250 OP 250 PS 637: Performed by: ANESTHESIOLOGY

## 2022-05-31 PROCEDURE — 250N000012 HC RX MED GY IP 250 OP 636 PS 637: Performed by: PHYSICIAN ASSISTANT

## 2022-05-31 PROCEDURE — 36415 COLL VENOUS BLD VENIPUNCTURE: CPT | Performed by: PHYSICIAN ASSISTANT

## 2022-05-31 PROCEDURE — 999N000141 HC STATISTIC PRE-PROCEDURE NURSING ASSESSMENT: Performed by: OBSTETRICS & GYNECOLOGY

## 2022-05-31 PROCEDURE — 360N000080 HC SURGERY LEVEL 7, PER MIN: Performed by: OBSTETRICS & GYNECOLOGY

## 2022-05-31 PROCEDURE — 250N000025 HC SEVOFLURANE, PER MIN: Performed by: OBSTETRICS & GYNECOLOGY

## 2022-05-31 PROCEDURE — 88331 PATH CONSLTJ SURG 1 BLK 1SPC: CPT | Mod: TC | Performed by: OBSTETRICS & GYNECOLOGY

## 2022-05-31 PROCEDURE — 370N000017 HC ANESTHESIA TECHNICAL FEE, PER MIN: Performed by: OBSTETRICS & GYNECOLOGY

## 2022-05-31 PROCEDURE — 250N000013 HC RX MED GY IP 250 OP 250 PS 637: Performed by: PHYSICIAN ASSISTANT

## 2022-05-31 PROCEDURE — 272N000001 HC OR GENERAL SUPPLY STERILE: Performed by: OBSTETRICS & GYNECOLOGY

## 2022-05-31 PROCEDURE — 250N000013 HC RX MED GY IP 250 OP 250 PS 637: Performed by: OBSTETRICS & GYNECOLOGY

## 2022-05-31 PROCEDURE — 258N000001 HC RX 258: Performed by: OBSTETRICS & GYNECOLOGY

## 2022-05-31 PROCEDURE — 96372 THER/PROPH/DIAG INJ SC/IM: CPT | Mod: 59 | Performed by: PHYSICIAN ASSISTANT

## 2022-05-31 PROCEDURE — 250N000009 HC RX 250: Performed by: OBSTETRICS & GYNECOLOGY

## 2022-05-31 PROCEDURE — 710N000010 HC RECOVERY PHASE 1, LEVEL 2, PER MIN: Performed by: OBSTETRICS & GYNECOLOGY

## 2022-05-31 PROCEDURE — 250N000011 HC RX IP 250 OP 636: Performed by: NURSE ANESTHETIST, CERTIFIED REGISTERED

## 2022-05-31 PROCEDURE — 250N000009 HC RX 250: Performed by: NURSE ANESTHETIST, CERTIFIED REGISTERED

## 2022-05-31 RX ORDER — LIDOCAINE HYDROCHLORIDE 10 MG/ML
INJECTION, SOLUTION INFILTRATION; PERINEURAL PRN
Status: DISCONTINUED | OUTPATIENT
Start: 2022-05-31 | End: 2022-05-31

## 2022-05-31 RX ORDER — HYDROMORPHONE HYDROCHLORIDE 1 MG/ML
0.2 INJECTION, SOLUTION INTRAMUSCULAR; INTRAVENOUS; SUBCUTANEOUS
Status: DISCONTINUED | OUTPATIENT
Start: 2022-05-31 | End: 2022-06-01 | Stop reason: HOSPADM

## 2022-05-31 RX ORDER — KETAMINE HYDROCHLORIDE 10 MG/ML
INJECTION INTRAMUSCULAR; INTRAVENOUS PRN
Status: DISCONTINUED | OUTPATIENT
Start: 2022-05-31 | End: 2022-05-31

## 2022-05-31 RX ORDER — CEFAZOLIN SODIUM/WATER 2 G/20 ML
2 SYRINGE (ML) INTRAVENOUS SEE ADMIN INSTRUCTIONS
Status: DISCONTINUED | OUTPATIENT
Start: 2022-05-31 | End: 2022-05-31 | Stop reason: HOSPADM

## 2022-05-31 RX ORDER — ONDANSETRON 2 MG/ML
INJECTION INTRAMUSCULAR; INTRAVENOUS PRN
Status: DISCONTINUED | OUTPATIENT
Start: 2022-05-31 | End: 2022-05-31

## 2022-05-31 RX ORDER — NALOXONE HYDROCHLORIDE 0.4 MG/ML
0.4 INJECTION, SOLUTION INTRAMUSCULAR; INTRAVENOUS; SUBCUTANEOUS
Status: DISCONTINUED | OUTPATIENT
Start: 2022-05-31 | End: 2022-06-01 | Stop reason: HOSPADM

## 2022-05-31 RX ORDER — ESCITALOPRAM OXALATE 5 MG/1
5 TABLET ORAL AT BEDTIME
Status: DISCONTINUED | OUTPATIENT
Start: 2022-05-31 | End: 2022-06-01 | Stop reason: HOSPADM

## 2022-05-31 RX ORDER — PREDNISOLONE ACETATE 10 MG/ML
1 SUSPENSION/ DROPS OPHTHALMIC DAILY
Status: DISCONTINUED | OUTPATIENT
Start: 2022-06-01 | End: 2022-06-01 | Stop reason: HOSPADM

## 2022-05-31 RX ORDER — CALCIUM CARBONATE 500 MG/1
500 TABLET, CHEWABLE ORAL 4 TIMES DAILY PRN
Status: DISCONTINUED | OUTPATIENT
Start: 2022-05-31 | End: 2022-06-01 | Stop reason: HOSPADM

## 2022-05-31 RX ORDER — ACETAMINOPHEN 325 MG/1
975 TABLET ORAL EVERY 6 HOURS PRN
Status: DISCONTINUED | OUTPATIENT
Start: 2022-05-31 | End: 2022-06-01 | Stop reason: HOSPADM

## 2022-05-31 RX ORDER — FENTANYL CITRATE 50 UG/ML
25 INJECTION, SOLUTION INTRAMUSCULAR; INTRAVENOUS EVERY 5 MIN PRN
Status: DISCONTINUED | OUTPATIENT
Start: 2022-05-31 | End: 2022-05-31 | Stop reason: HOSPADM

## 2022-05-31 RX ORDER — ONDANSETRON 2 MG/ML
4 INJECTION INTRAMUSCULAR; INTRAVENOUS EVERY 6 HOURS PRN
Status: DISCONTINUED | OUTPATIENT
Start: 2022-05-31 | End: 2022-06-01 | Stop reason: HOSPADM

## 2022-05-31 RX ORDER — ONDANSETRON 2 MG/ML
4 INJECTION INTRAMUSCULAR; INTRAVENOUS EVERY 30 MIN PRN
Status: DISCONTINUED | OUTPATIENT
Start: 2022-05-31 | End: 2022-05-31 | Stop reason: HOSPADM

## 2022-05-31 RX ORDER — HYDRALAZINE HYDROCHLORIDE 50 MG/1
100 TABLET, FILM COATED ORAL 3 TIMES DAILY
Status: DISCONTINUED | OUTPATIENT
Start: 2022-05-31 | End: 2022-06-01 | Stop reason: HOSPADM

## 2022-05-31 RX ORDER — FUROSEMIDE 20 MG
20 TABLET ORAL DAILY
Status: DISCONTINUED | OUTPATIENT
Start: 2022-06-01 | End: 2022-05-31

## 2022-05-31 RX ORDER — LOSARTAN POTASSIUM 50 MG/1
50 TABLET ORAL EVERY EVENING
Status: DISCONTINUED | OUTPATIENT
Start: 2022-05-31 | End: 2022-06-01 | Stop reason: HOSPADM

## 2022-05-31 RX ORDER — CEFAZOLIN SODIUM/WATER 2 G/20 ML
2 SYRINGE (ML) INTRAVENOUS
Status: COMPLETED | OUTPATIENT
Start: 2022-05-31 | End: 2022-05-31

## 2022-05-31 RX ORDER — SODIUM CHLORIDE, SODIUM LACTATE, POTASSIUM CHLORIDE, CALCIUM CHLORIDE 600; 310; 30; 20 MG/100ML; MG/100ML; MG/100ML; MG/100ML
INJECTION, SOLUTION INTRAVENOUS CONTINUOUS
Status: DISCONTINUED | OUTPATIENT
Start: 2022-05-31 | End: 2022-05-31 | Stop reason: HOSPADM

## 2022-05-31 RX ORDER — ACETAMINOPHEN 325 MG/1
975 TABLET ORAL ONCE
Status: COMPLETED | OUTPATIENT
Start: 2022-05-31 | End: 2022-05-31

## 2022-05-31 RX ORDER — ONDANSETRON 4 MG/1
4 TABLET, ORALLY DISINTEGRATING ORAL EVERY 6 HOURS PRN
Status: DISCONTINUED | OUTPATIENT
Start: 2022-05-31 | End: 2022-06-01 | Stop reason: HOSPADM

## 2022-05-31 RX ORDER — ONDANSETRON 4 MG/1
4 TABLET, ORALLY DISINTEGRATING ORAL EVERY 30 MIN PRN
Status: DISCONTINUED | OUTPATIENT
Start: 2022-05-31 | End: 2022-05-31 | Stop reason: HOSPADM

## 2022-05-31 RX ORDER — DEXAMETHASONE SODIUM PHOSPHATE 4 MG/ML
INJECTION, SOLUTION INTRA-ARTICULAR; INTRALESIONAL; INTRAMUSCULAR; INTRAVENOUS; SOFT TISSUE PRN
Status: DISCONTINUED | OUTPATIENT
Start: 2022-05-31 | End: 2022-05-31

## 2022-05-31 RX ORDER — LIDOCAINE 40 MG/G
CREAM TOPICAL
Status: DISCONTINUED | OUTPATIENT
Start: 2022-05-31 | End: 2022-06-01 | Stop reason: HOSPADM

## 2022-05-31 RX ORDER — ACETAMINOPHEN 325 MG/1
975 TABLET ORAL ONCE
Status: DISCONTINUED | OUTPATIENT
Start: 2022-05-31 | End: 2022-05-31 | Stop reason: HOSPADM

## 2022-05-31 RX ORDER — WATER 10 ML/10ML
INJECTION INTRAMUSCULAR; INTRAVENOUS; SUBCUTANEOUS PRN
Status: DISCONTINUED | OUTPATIENT
Start: 2022-05-31 | End: 2022-05-31 | Stop reason: HOSPADM

## 2022-05-31 RX ORDER — OXYCODONE HYDROCHLORIDE 5 MG/1
5 TABLET ORAL EVERY 4 HOURS PRN
Status: DISCONTINUED | OUTPATIENT
Start: 2022-05-31 | End: 2022-05-31 | Stop reason: HOSPADM

## 2022-05-31 RX ORDER — INDOCYANINE GREEN AND WATER 25 MG
KIT INJECTION PRN
Status: DISCONTINUED | OUTPATIENT
Start: 2022-05-31 | End: 2022-05-31 | Stop reason: HOSPADM

## 2022-05-31 RX ORDER — SODIUM CHLORIDE 9 MG/ML
INJECTION, SOLUTION INTRAVENOUS CONTINUOUS
Status: DISCONTINUED | OUTPATIENT
Start: 2022-05-31 | End: 2022-06-01

## 2022-05-31 RX ORDER — NALOXONE HYDROCHLORIDE 0.4 MG/ML
0.2 INJECTION, SOLUTION INTRAMUSCULAR; INTRAVENOUS; SUBCUTANEOUS
Status: DISCONTINUED | OUTPATIENT
Start: 2022-05-31 | End: 2022-06-01 | Stop reason: HOSPADM

## 2022-05-31 RX ORDER — LORAZEPAM 0.5 MG/1
0.5 TABLET ORAL EVERY 6 HOURS PRN
Status: DISCONTINUED | OUTPATIENT
Start: 2022-05-31 | End: 2022-06-01 | Stop reason: HOSPADM

## 2022-05-31 RX ORDER — FUROSEMIDE 20 MG
20 TABLET ORAL DAILY
Status: DISCONTINUED | OUTPATIENT
Start: 2022-05-31 | End: 2022-06-01 | Stop reason: HOSPADM

## 2022-05-31 RX ORDER — CARVEDILOL 12.5 MG/1
25 TABLET ORAL 2 TIMES DAILY WITH MEALS
Status: DISCONTINUED | OUTPATIENT
Start: 2022-05-31 | End: 2022-06-01 | Stop reason: HOSPADM

## 2022-05-31 RX ORDER — HYDROMORPHONE HYDROCHLORIDE 1 MG/ML
0.2 INJECTION, SOLUTION INTRAMUSCULAR; INTRAVENOUS; SUBCUTANEOUS EVERY 5 MIN PRN
Status: DISCONTINUED | OUTPATIENT
Start: 2022-05-31 | End: 2022-05-31 | Stop reason: HOSPADM

## 2022-05-31 RX ORDER — ATORVASTATIN CALCIUM 40 MG/1
80 TABLET, FILM COATED ORAL AT BEDTIME
Status: DISCONTINUED | OUTPATIENT
Start: 2022-05-31 | End: 2022-06-01 | Stop reason: HOSPADM

## 2022-05-31 RX ORDER — FERROUS SULFATE 325(65) MG
325 TABLET ORAL
COMMUNITY

## 2022-05-31 RX ORDER — NICOTINE POLACRILEX 4 MG
15-30 LOZENGE BUCCAL
Status: DISCONTINUED | OUTPATIENT
Start: 2022-05-31 | End: 2022-06-01 | Stop reason: HOSPADM

## 2022-05-31 RX ORDER — PROPOFOL 10 MG/ML
INJECTION, EMULSION INTRAVENOUS PRN
Status: DISCONTINUED | OUTPATIENT
Start: 2022-05-31 | End: 2022-05-31

## 2022-05-31 RX ORDER — PANTOPRAZOLE SODIUM 40 MG/1
40 TABLET, DELAYED RELEASE ORAL
Status: DISCONTINUED | OUTPATIENT
Start: 2022-06-01 | End: 2022-06-01 | Stop reason: HOSPADM

## 2022-05-31 RX ORDER — LIDOCAINE 40 MG/G
CREAM TOPICAL
Status: DISCONTINUED | OUTPATIENT
Start: 2022-05-31 | End: 2022-05-31 | Stop reason: HOSPADM

## 2022-05-31 RX ORDER — BUPIVACAINE HYDROCHLORIDE 2.5 MG/ML
INJECTION, SOLUTION EPIDURAL; INFILTRATION; INTRACAUDAL
Status: COMPLETED | OUTPATIENT
Start: 2022-05-31 | End: 2022-05-31

## 2022-05-31 RX ORDER — DEXTROSE MONOHYDRATE 25 G/50ML
25-50 INJECTION, SOLUTION INTRAVENOUS
Status: DISCONTINUED | OUTPATIENT
Start: 2022-05-31 | End: 2022-06-01 | Stop reason: HOSPADM

## 2022-05-31 RX ORDER — OXYCODONE HYDROCHLORIDE 5 MG/1
2.5-5 TABLET ORAL EVERY 4 HOURS PRN
Qty: 12 TABLET | Refills: 0 | Status: SHIPPED | OUTPATIENT
Start: 2022-05-31 | End: 2022-06-08

## 2022-05-31 RX ORDER — FENTANYL CITRATE 50 UG/ML
INJECTION, SOLUTION INTRAMUSCULAR; INTRAVENOUS PRN
Status: DISCONTINUED | OUTPATIENT
Start: 2022-05-31 | End: 2022-05-31

## 2022-05-31 RX ORDER — AMOXICILLIN 250 MG
1-2 CAPSULE ORAL 2 TIMES DAILY PRN
Qty: 30 TABLET | Refills: 0 | COMMUNITY
Start: 2022-05-31 | End: 2022-06-08

## 2022-05-31 RX ORDER — PROCHLORPERAZINE MALEATE 5 MG
5 TABLET ORAL EVERY 6 HOURS PRN
Status: DISCONTINUED | OUTPATIENT
Start: 2022-05-31 | End: 2022-06-01 | Stop reason: HOSPADM

## 2022-05-31 RX ORDER — OXYCODONE HYDROCHLORIDE 5 MG/1
5 TABLET ORAL EVERY 4 HOURS PRN
Status: DISCONTINUED | OUTPATIENT
Start: 2022-05-31 | End: 2022-06-01 | Stop reason: HOSPADM

## 2022-05-31 RX ADMIN — Medication 2 G: at 09:00

## 2022-05-31 RX ADMIN — LOSARTAN POTASSIUM 50 MG: 50 TABLET, FILM COATED ORAL at 20:48

## 2022-05-31 RX ADMIN — SODIUM CHLORIDE: 9 INJECTION, SOLUTION INTRAVENOUS at 14:47

## 2022-05-31 RX ADMIN — SODIUM CHLORIDE, POTASSIUM CHLORIDE, SODIUM LACTATE AND CALCIUM CHLORIDE: 600; 310; 30; 20 INJECTION, SOLUTION INTRAVENOUS at 09:54

## 2022-05-31 RX ADMIN — PHENYLEPHRINE HYDROCHLORIDE 100 MCG: 10 INJECTION INTRAVENOUS at 11:25

## 2022-05-31 RX ADMIN — FUROSEMIDE 20 MG: 20 TABLET ORAL at 16:39

## 2022-05-31 RX ADMIN — ONDANSETRON 4 MG: 2 INJECTION INTRAMUSCULAR; INTRAVENOUS at 11:25

## 2022-05-31 RX ADMIN — ATORVASTATIN CALCIUM 80 MG: 40 TABLET, FILM COATED ORAL at 20:48

## 2022-05-31 RX ADMIN — ESCITALOPRAM 5 MG: 5 TABLET, FILM COATED ORAL at 20:48

## 2022-05-31 RX ADMIN — DEXAMETHASONE SODIUM PHOSPHATE 4 MG: 4 INJECTION, SOLUTION INTRA-ARTICULAR; INTRALESIONAL; INTRAMUSCULAR; INTRAVENOUS; SOFT TISSUE at 09:02

## 2022-05-31 RX ADMIN — HYDROMORPHONE HYDROCHLORIDE 0.5 MG: 1 INJECTION, SOLUTION INTRAMUSCULAR; INTRAVENOUS; SUBCUTANEOUS at 09:48

## 2022-05-31 RX ADMIN — ROCURONIUM BROMIDE 10 MG: 50 INJECTION, SOLUTION INTRAVENOUS at 11:01

## 2022-05-31 RX ADMIN — HYDRALAZINE HYDROCHLORIDE 100 MG: 50 TABLET, FILM COATED ORAL at 20:48

## 2022-05-31 RX ADMIN — PHENYLEPHRINE HYDROCHLORIDE 100 MCG: 10 INJECTION INTRAVENOUS at 09:11

## 2022-05-31 RX ADMIN — ACETAMINOPHEN 975 MG: 325 TABLET ORAL at 17:02

## 2022-05-31 RX ADMIN — CARVEDILOL 25 MG: 12.5 TABLET, FILM COATED ORAL at 17:02

## 2022-05-31 RX ADMIN — ROCURONIUM BROMIDE 20 MG: 50 INJECTION, SOLUTION INTRAVENOUS at 10:00

## 2022-05-31 RX ADMIN — SODIUM CHLORIDE, POTASSIUM CHLORIDE, SODIUM LACTATE AND CALCIUM CHLORIDE: 600; 310; 30; 20 INJECTION, SOLUTION INTRAVENOUS at 07:39

## 2022-05-31 RX ADMIN — INSULIN ASPART 1 UNITS: 100 INJECTION, SOLUTION INTRAVENOUS; SUBCUTANEOUS at 17:02

## 2022-05-31 RX ADMIN — HYDROMORPHONE HYDROCHLORIDE 0.5 MG: 1 INJECTION, SOLUTION INTRAMUSCULAR; INTRAVENOUS; SUBCUTANEOUS at 10:48

## 2022-05-31 RX ADMIN — ACETAMINOPHEN 975 MG: 325 TABLET ORAL at 07:39

## 2022-05-31 RX ADMIN — FENTANYL CITRATE 100 MCG: 50 INJECTION, SOLUTION INTRAMUSCULAR; INTRAVENOUS at 09:01

## 2022-05-31 RX ADMIN — HYDRALAZINE HYDROCHLORIDE 100 MG: 50 TABLET, FILM COATED ORAL at 16:39

## 2022-05-31 RX ADMIN — ROCURONIUM BROMIDE 50 MG: 50 INJECTION, SOLUTION INTRAVENOUS at 09:02

## 2022-05-31 RX ADMIN — LIDOCAINE HYDROCHLORIDE 30 MG: 10 INJECTION, SOLUTION INFILTRATION; PERINEURAL at 09:01

## 2022-05-31 RX ADMIN — SUGAMMADEX 130 MG: 100 INJECTION, SOLUTION INTRAVENOUS at 11:39

## 2022-05-31 RX ADMIN — BUPIVACAINE HYDROCHLORIDE 40 ML: 2.5 INJECTION, SOLUTION EPIDURAL; INFILTRATION; INTRACAUDAL at 09:14

## 2022-05-31 RX ADMIN — PROPOFOL 90 MG: 10 INJECTION, EMULSION INTRAVENOUS at 09:01

## 2022-05-31 NOTE — ANESTHESIA PREPROCEDURE EVALUATION
Anesthesia Pre-Procedure Evaluation    Patient: Sharyn Trent   MRN: [id number] : 1940        Procedure : Procedure(s):  ROBOTIC ASSISTED TOTAL LAPAROSCOPIC HYSTERECTOMY, BILATERAL SALPINGO-OOPHORECTOMY, LYMPHADENECTOMY, MINI-LAPAROTOMY, POSSIBLE OMENTECTOMY          Past Medical History:   Diagnosis Date     Abnormal ECG      Anxiety      Arthritis      Chest pain      Chest pain      Chronic kidney disease     stage 3-mod.     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      Pacemaker      PSVT (paroxysmal supraventricular tachycardia) (H)      Right bundle branch block      Skin cancer       Past Surgical History:   Procedure Laterality Date     ABLATION OF DYSRHYTHMIC FOCUS  2013    AV analia reentry tachycardia, partially successful     BIOPSY SKIN (LOCATION)       CARDIAC CATHETERIZATION  03/10/2016     CV CORONARY ANGIOGRAM N/A 2021    Procedure: Coronary Angiogram;  Surgeon: Yony Gillis MD;  Location: [facility] Cardiac [facility] Lab;  Service: Cardiology     CV LEFT HEART CATHETERIZATION WITHOUT LEFT VENTRICULOGRAM Left 2021    Procedure: Left Heart Catheterization Without Left Ventriculogram;  Surgeon: Yony Gillis MD;  Location: [facility] Cardiac [facility] Lab;  Service: Cardiology     CV RIGHT HEART CATHETERIZATION N/A 2021    Procedure: Right Heart Catheterization;  Surgeon: Yony Gillis MD;  Location: [facility] Cardiac Cath Lab;  Service: Cardiology     DILATION AND CURETTAGE N/A 2022    Procedure: DILATION AND CURRETAGE;  Surgeon: Mary Reed MD;  Location: [facility] OR     EP PACEMAKER N/A 2021    Procedure: Electrophysiology Pacemaker;   Surgeon: Ab Saavedra MD;  Location: [facility] LAB CV     FOOT SURGERY      L foot     HAND SURGERY      on both thumbs     HYSTEROSCOPY DIAGNOSTIC N/A 4/19/2022    Procedure: HYSTEROSCOPY, DIAGNOSTIC;  Surgeon: Mary Reed MD;  Location: [facility] OR     IMPLANT PACEMAKER  04/01/2011    Second-degree AV block     OTHER SURGICAL HISTORY      SVT Ablation     PELVIC EXAM UNDER ANESTHESIA, CERVICAL DILATION, N/A      PELVIC EXAMINATION UNDER ANESTHESIA N/A 4/19/2022    Procedure: PELVIC EXAM UNDER ANESTHESIA, CERVICAL DILATION,;  Surgeon: Mary Reed MD;  Location: [facility] OR     AL SHLDR ARTHROSCOP,SURG,W/ROTAT CUFF REPR Left 03/09/2017    Procedure: LEFT SHOULDER ARTHROSCOPY DECOMPRESSION DISTAL CLAVICLE EXCISION  ROTATOR CUFF REPAIR BICEPS TENOTOMY AND EXTENSIVE DEBRIDEMENT;  Surgeon: Todd Riggs MD;  Location: [facility];  Service: Orthopedics     RELEASE CARPAL TUNNEL      both wrists     SKIN CANCER EXCISION      L ankle,Lleg and cheek     TOE SURGERY      L big toe joint replacement     TUBAL LIGATION        Allergies   Allergen Reactions     Herlinda-Kit Bee Sting Shortness Of Breath     Venom-Honey Bee [Bee Venom] Shortness Of Breath     Mercurial Analogues [Mercurial Derivatives] Dermatitis     blisters     Nitrofurantoin Other (See Comments)     Burning sensation across chest and arms  Other reaction(s): arms and chest burning     Sulfa (Sulfonamide Antibiotics) [Sulfa Drugs] Rash      Social History     Tobacco Use     Smoking status: Never Smoker     Smokeless tobacco: Never Used   Substance Use Topics     Alcohol use: No      Wt Readings from Last 1 Encounters:   04/27/22 67.6 kg (149 lb)        Anesthesia Evaluation            ROS/MED HX  ENT/Pulmonary:  - neg pulmonary ROS     Neurologic:  - neg neurologic ROS     Cardiovascular: Comment:   Left ventricle ejection fraction is normal. The estimated left ventricular ejection fraction is 55%.    Left  ventricular diastolic function is abnormal.    Normal right ventricular size and systolic function.    No hemodynamically significant valvular heart abnormalities.    When compared to the previous study dated 3/20/2018, No significant change           (+) Dyslipidemia hypertension-----CHF pacemaker,     METS/Exercise Tolerance:     Hematologic:       Musculoskeletal:   (+) arthritis,     GI/Hepatic:     (+) GERD,     Renal/Genitourinary:     (+) renal disease,     Endo:     (+) type II DM,     Psychiatric/Substance Use:  - neg psychiatric ROS     Infectious Disease:       Malignancy:       Other:            Physical Exam    Airway  airway exam normal      Mallampati: I   TM distance: > 3 FB   Neck ROM: full   Mouth opening: > 3 cm    Respiratory Devices and Support         Dental  no notable dental history         Cardiovascular   cardiovascular exam normal          Pulmonary   pulmonary exam normal                OUTSIDE LABS:  CBC:   Lab Results   Component Value Date    WBC 6.7 04/05/2022    WBC 6.7 09/13/2021    HGB 11.1 (L) 04/05/2022    HGB 12.3 09/13/2021    HCT 31.7 (L) 04/05/2022    HCT 37.3 09/13/2021     04/05/2022     09/16/2021     BMP:   Lab Results   Component Value Date     05/12/2022     04/05/2022    POTASSIUM 4.1 05/12/2022    POTASSIUM 4.1 04/05/2022    CHLORIDE 101 05/12/2022    CHLORIDE 102 04/05/2022    CO2 26 05/12/2022    CO2 23 04/05/2022    BUN 27 05/12/2022    BUN 27 04/05/2022    CR 1.24 (H) 05/12/2022    CR 1.32 (H) 04/05/2022     05/12/2022     (H) 05/03/2022     COAGS:   Lab Results   Component Value Date    PTT 33 09/13/2021    INR 1.05 09/13/2021     POC: No results found for: BGM, HCG, HCGS  HEPATIC:   Lab Results   Component Value Date    ALBUMIN 3.9 03/31/2022    PROTTOTAL 6.9 03/31/2022    ALT 24 03/31/2022    AST 35 03/31/2022    ALKPHOS 77 03/31/2022    BILITOTAL 0.7 03/31/2022     OTHER:   Lab Results   Component Value Date    PH 7.44  05/26/2021    LACT 1.8 03/01/2021    A1C 5.4 09/13/2021    MAURICIO 9.1 05/12/2022    MAG 2.0 04/05/2022    LIPASE 81 (H) 03/20/2018    TSH 1.53 09/15/2021    CRP <0.1 09/13/2021       Anesthesia Plan    ASA Status:  3   NPO Status:  NPO Appropriate    Anesthesia Type: General.     - Airway: ETT   Induction: Intravenous.   Maintenance: Inhalation.   Techniques and Equipment:     - Lines/Monitors: 2nd IV, Arterial Line     Consents    Anesthesia Plan(s) and associated risks, benefits, and realistic alternatives discussed. Questions answered and patient/representative(s) expressed understanding.     - Discussed: Risks, Benefits and Alternatives for the PROCEDURE were discussed     - Discussed with:  Patient         Postoperative Care    Pain management: Multi-modal analgesia.   PONV prophylaxis: Ondansetron (or other 5HT-3), Dexamethasone or Solumedrol     Comments:    Other Comments: GETA  2 PIV  Art line post induction  Fentanyl prn, dilaudid prn  ana paula Thomas MD

## 2022-05-31 NOTE — PROCEDURES
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]  Retroperitoneal lymphadenopathy    Postoperative Diagnosis:  Same    Procedures:  Robotic assisted laparoscopic total hysterectomy  Bilateral salphingoophorectomy  Loop Lymph Node ICG Injection and Biopsy  Mini-laparotomy    Prosthetic Devices:  None    Surgeon(s) and Assistants (if any):  Surgeon(s):  Mary Reed MD Barkman, Alyssa Jean, PA-C  Circulator: Bette Hernandez RN; Selene Barbour RN  Relief Circulator: Woo Arce RN  Relief Scrub: Juan Carlos Arana; Lashell Jason  Scrub Person: Migdalia Patrick    Anesthesia:  Combined General with Tap Block    Drains:  none    Specimens: Uterus, cervix, bilateral fallopian tubes and ovaries, sentinel lymph node #1 right obturator, sentinel lymph node #2 right external iliac artery, sentinel lymph node #3 right common iliac artery, sentinel lymph node #1 left external iliac artery    Complications: none    Findings/Conclusions:  Normal peritoneal cavity. Normal bilateral diaphragms and liver capsule. Normal appearing stomach and omentum without nodularity. The bowel, intestines were both within normal limits. The appendix was not visualized.  The bilateral adnexa were both within normal limits.  The right ovary and fallopian tube were mildly adherent to the posterior pelvic peritoneum just anterior to the ureter within the intraperitoneal space.  There were several lymph nodes that were enlarged, particularly along the right pelvic sidewall.  These were also the sentinel lymph nodes highlighted by ICG mapping.  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  There was no evidence of an endometrial carcinoma.    Procedures:   Sharyn Trent is a very pleasant 81-year-old female with a history of postmenopausal bleeding and a preoperative diagnosis of Neoplasm of uncertain behavior of uterus  [D39.0] who had undergone preoperative work-up with a PET/CT identifying retroperitoneal lymphadenopathy including mediastinal lymphadenopathy in addition to microscopic pulmonary nodules with negative lymph node FNA biopsies.  The plan had been made to proceed with hysterectomy and lymph node biopsies with mini laparotomy due to the size of her uterus.  The consent was reviewed in the preoperative area and signed.  She was subsequently brought to the operating room where general anesthesia was found to be adequate.She was prepared and draped in the normal sterile fashion in lithotomy positioning.  She had an arterial line placed.  She also underwent tap block anesthesia.  Her arms were padded and tucked at her sides with arm boards.      At the start of the case, the cervix was grasped with a single toothed tenaculum and injected at 3 and 9 o'clock with 1 ml of a 1.5 mg/ml concentration of ICG 1 cm deep and additionally submucosally. A total of 4 ml was used.  The large V-care uterine manipulator was placed with mild difficulty due to endocervical stenosis.  This had been the source of the false tract during the patient's recent dilation and curettage.  The endocervical dilators were placed under direct visualization with the laparoscope.  Once the uterine manipulator was seated and assembled, attention was then turned to the abdomen.  A Galvez catheter was anchored.     A supraumbilical incision was made roughly 27cm above the pubic symphysis. The 5 mm laparoscopic camera was introduced into the abdomen with a robotic 8 mm obturator and sleeve. Following confirmation of entrance into the abdomen by visual inspection of the intraperitoneal space, the abdomen was insufflated to 15 mm Hg. The patient was placed in steep trendelenberg. Additional port sites were mapped out 11 cm lateral with a 15 degree drop toeach side of the camera port site. The right lower quadrant port site for assistant manipulation was placed 2  cm cephalad and medial to the ASIS. The assist port site was an 8 mm Airseal trochar. The left lower quadrant port site was mapped out an additional 11 cm lateral to the mid-left port site. A survey of the abdomen and pelvis was performed. The bowel was folded into the upper abdomen. The robot was then docked.      The para-rectal and para-vesical spaces were opened and developed. The right round ligament was grasped, and transected with monopolar energy. The peritoneum was divided just parallel and lateral to the gonadal vessels. By retracting the posterior leaf medially, the ureter was identified coursing deep within the posterior leaf of the broad ligament, and the para-rectal space was developed between the ureter medially, and the hypogastric artery laterally. The paravesical space on the right was developed by dissecting just lateral to the obliterated umbilical ligament. Attention was turned to identifying the sentinel lymph node, and the infra-red firefly camera was turned on. The lymphatics were identified on the right coursing directly to the first sentinel lymph node ending within the right obturator analia basin.  The decision has been made to biopsy the sentinel lymph nodes due to their visibly enlarged status, the adherence of the posterior aspect of the uterus to the right posterior pelvic peritoneum and the FDG avidity on recent PET/CT.  The lymph node was individually biopsied with no bleeding due to an accessory obturator vessel.  The lymph node was too large for the spoon grasper and a 5 mm Endo Catch bag was advanced through the assistant trocar site.  The lymph node was then delivered and sent for routine processing.  Attention was turned to the second sentinel lymph node within the chain which was lying just over the right external iliac artery. This lymph node was individually biopsied, removed and delivered with a spoon grasper additionally. Attention was turned to the patient's left pelvic  sidewall.  The ureter was retracted medially over the right common iliac artery.  This enabled further visualization of the right common iliac lymphadenopathy.  The peritoneum was elevated to retract the bowel cephalad.  The third sentinel lymph node was elevated and dissected away from the lateral aspect of the right common iliac artery.  It was additionally  from the underlying IVC.  The decision was made to transect this lymph node away from the developing right periaortic lymphadenopathy.  This lymph node was also collected with a spoon grasper and delivered to the right lower quadrant assistant trocar site.  The left pelvic peritoneum was opened and developed in a similar fashion of the right. The left pararectal and paravesical spaces were opened and developed in a similar fashion. The ureter was identified within its usual position. The firefly camera was activated and the first sentinel lymph node was identified over the left external iliac artery. In a similar fashion, this was dissected without issue. The left external iliac lymph node was then collected with a spoon grasper and delivered through the right lower quadrant assistant trocar site. This lymph node was additionally biopsied and delivered through the right lower quadrant assistant trocar site similarly.  The remainder of the lymph nodes, particularly those overlying the IVC were held off on further biopsies until frozen section analysis returned.     Attention was then turned to the hysterectomy portion of the case, which was initiated by visualizing the ureter again along the posterior medial leaf on the left. The gray space bound by the IP ligament, the utero-ovarian ligament and theureter was then opened sharply. The IP ligament with the gonadal vessels was identified and skeletonized. The gonadal vessels were then cauterized with the bipolar grasper and transected. The posterior leaf was taken downto the uterosacral ligament  posteriorly.  There was increased difficulty as the posterior cul-de-sac was mildly obliterated with intraperitoneal adhesions on the left.  These were dissected with cold cut barbara as this did include the colon involved within the peritoneal reflection.  The ureter was retracted laterally and rolled from the canal away from the pelvic peritoneum.  Attention was turned to the right gonadal vessels. In a similar fashion, the gray space was developed and the gonadal vessels skeletonized. The gonadal vessels were coagulated, sealed and transected in a similar fashion. The posterior leaf was similarly dissected to skeletonize the uterine vessels posteriorly.  This again required developing the ureter away from the right posterior pelvic peritoneum.  Along this aspect, there is a small amount of bleeding from the uterine vessels near the origin on the right.  These were controlled with bipolar cautery while retracting the ureter laterally.  Attention was then turned anterior. The anterior leaf was dissected to initiate the bladder flap.The bladder flap was developed by dissecting the vesicouterine peritoneum, and following, the pubocervical fascia was dissected over the anterior cervix to fully expose the anterior colpotomy ring.  The uterine vessels were then coagulated, sealed and transected bilaterally. The cardinal ligaments were coagulated, sealed and transected just parallel to the uterine cervix. The colpotomy was initiated and circumferentially completed without difficulty. Following, the uterus, bilateral fallopian tubes and ovaries were delivered into a 15 cm Endo Catch bag advanced transvaginally. The vagina was then closed with a Stratafix 0-PDS barbed unidirectional suture.     A mini-pfannenstiel incision was mapped out 2 cm cephalad to the pubic symphisis. A 5 cm incision was incised with the Bovie on cut and carried through the subcutaneous layer with monopolar energy. The fascia was cleared and incised  at the midline with the bovie. The fascia was extended to the full length of the incision. The rectus muscles were then divided at the midline digitally, and the peritoneum entered bluntly with pressure placed cephalad. The peritoneum was then extended to the full length of the incision by gentle pull.  The Endo Catch bag strings were identified, grasped and pulled to the incision.  The uterus, cervix and bag contents all delivered without issue. The uterus and the uterine mass were sent for frozen section analysis.  Following complete delivery of all the bag contents, the suprapubic incision was closed with a running suture of 0-vicryl within the fascial layer.  Subcutaneous tissues were were reapproximated with a running suture of 2-0 Vicryl.  The skin incision was re-approximated with running 3-0 monocryl in a subcuticular fashion.  The pelvis was irrigated and all areas were hemostatic.  The hemostatic agent, Surgicel snow was placed over the right obturator analia basin, along the right pelvic sidewall and over the vaginal cuff.     All robotic instruments were removed from the patient's abdomen, and the robot was un-docked. The skin incisions were re-approximated with a 3-0 subcuticular stitch followed by an overlying layer of dermabond. All sites were injected with 0.25% marcaine.  The Galvez catheter was removed in the operating room.     The vagina was inspected and found to be hemostatic. All sponges, needles and instrument counts were correct x2. She was taken to the recovery room in a stable condition.      Klarissa Mcleod PA-C  assisted me throughout the case and was paramount in the completion of all vital portions. She assisted with injection of the cervix, placement of the uterine manipulator, retraction, adequate visualization and closure.       Estimated Blood Loss: 30 mL    Condition on discharge from OR:  Satisfactory      Mary Riggs MD   On Behalf of  Surgeon(s):  Zoila Riggs  MD Shani Grubbs, Klarissa Rendon PA-C

## 2022-05-31 NOTE — ANESTHESIA POSTPROCEDURE EVALUATION
Patient: Sharyn Trent    Procedure: Procedure(s):  ROBOTIC ASSISTED TOTAL LAPAROSCOPIC HYSTERECTOMY, BILATERAL SALPINGO-OOPHORECTOMY, SENTINEL LYMPH NODE INJECTION AND BIOPSY, MINI-LAPAROTOMY,       Anesthesia Type:  General    Note:     Postop Pain Control: Uneventful            Sign Out: Well controlled pain   PONV: No   Neuro/Psych: Uneventful            Sign Out: Acceptable/Baseline neuro status   Airway/Respiratory: Uneventful            Sign Out: Acceptable/Baseline resp. status   CV/Hemodynamics: Uneventful            Sign Out: Acceptable CV status; No obvious hypovolemia; No obvious fluid overload   Other NRE: NONE   DID A NON-ROUTINE EVENT OCCUR? No           Last vitals:  Vitals Value Taken Time   /70 05/31/22 1300   Temp     Pulse 56 05/31/22 1310   Resp 12 05/31/22 1310   SpO2 96 % 05/31/22 1310   Vitals shown include unvalidated device data.    Electronically Signed By: Judy Malave MD  May 31, 2022  1:40 PM

## 2022-05-31 NOTE — PLAN OF CARE
PRIMARY DIAGNOSIS: Post op care  OUTPATIENT/OBSERVATION GOALS TO BE MET BEFORE DISCHARGE:  1. Stable vital signs Yes  2. Tolerating diet:Yes  3. Pain controlled with oral pain medications:  Yes  4. Positive bowel sounds:  Yes  5. Voiding without difficulty:  No  6. Able to ambulate:  No  7. Provider specific discharge goals met:  No    Discharge Planner Nurse   Safe discharge environment identified: Yes  Barriers to discharge: Yes       Entered by: Callie Martinez RN 05/31/2022 6:11 PM     Please review provider order for any additional goals.   Nurse to notify provider when observation goals have been met and patient is ready for discharge.Goal Outcome Evaluation:

## 2022-05-31 NOTE — ANESTHESIA PROCEDURE NOTES
Arterial Line Procedure Note    Pre-Procedure   Staff -        Anesthesiologist:  Judy Malave MD       Performed By: anesthesiologist       Location: OR       Pre-Anesthestic Checklist: patient identified, IV checked, risks and benefits discussed, informed consent, monitors and equipment checked, pre-op evaluation and at physician/surgeon's request  Timeout:       Correct Patient: Yes        Correct Procedure: Yes        Correct Site: Yes        Correct Position: Yes   Line Placement:   This line was placed Post Induction starting at 5/31/2022 9:09 AM and ending at 5/31/2022 9:13 AM  Procedure   Procedure: arterial line       Laterality: left       Insertion Site: radial.  Sterile Prep        Standard elements of sterile barrier followed       Skin prep: Chloraprep  Insertion/Injection        Technique: Seldinger Technique        6. There were no apparent abnormal pathologic findings.       Catheter Type/Size: 20 G, 12 cm  Narrative        Tegaderm and Biopatch dressing used.       Complications: None apparent,        Arterial waveform: Yes        IBP within 10% of NIBP: Yes

## 2022-05-31 NOTE — PLAN OF CARE
PRIMARY DIAGNOSIS: Post op hysterectomy  OUTPATIENT/OBSERVATION GOALS TO BE MET BEFORE DISCHARGE:  1. Stable vital signs Yes  2. Tolerating diet:Yes  3. Pain controlled with oral pain medications:  Yes  4. Positive bowel sounds:  Yes  5. Voiding without difficulty:  Yes  6. Able to ambulate:  Yes  7. Provider specific discharge goals met:  No    Discharge Planner Nurse   Safe discharge environment identified: Yes  Barriers to discharge: Yes       Entered by: Callie Martinez RN 05/31/2022 6:13 PM     Please review provider order for any additional goals.   Nurse to notify provider when observation goals have been met and patient is ready for discharge.Goal Outcome Evaluation:

## 2022-05-31 NOTE — ANESTHESIA PROCEDURE NOTES
Airway       Patient location during procedure: OR       Procedure Start/Stop Times: 5/31/2022 9:04 AM  Staff -        Anesthesiologist:  Judy Malave MD       CRNA: Dilia Fernandes APRN CRNA       Performed By: CRNAIndications and Patient Condition       Indications for airway management: oksana-procedural       Induction type:intravenous       Mask difficulty assessment: 1 - vent by mask    Final Airway Details       Final airway type: endotracheal airway       Successful airway: ETT - single and Oral  Endotracheal Airway Details        ETT size (mm): 7.0       Cuffed: yes       Successful intubation technique: direct laryngoscopy       DL Blade Type: Slade 2       Adjucts: stylet and tooth guard       Position: Right       Measured from: gums/teeth       Secured at (cm): 22       Bite block used: None    Post intubation assessment        Placement verified by: capnometry, equal breath sounds and chest rise        Number of attempts at approach: 1       Number of other approaches attempted: 0       Secured with: silk tape       Ease of procedure: easy       Dentition: Intact and Unchanged    Medication(s) Administered   Medication Administration Time: 5/31/2022 9:04 AM

## 2022-05-31 NOTE — PHARMACY-ADMISSION MEDICATION HISTORY
Pharmacy Note - Admission Medication History   ______________________________________________________________________    Prior To Admission (PTA) med list completed and updated in EMR.       PTA Med List   Medication Sig Last Dose     acetaminophen (TYLENOL) 325 MG tablet Take 3 tablets (975 mg) by mouth every 6 hours as needed for mild pain 5/30/2022 at 2200     aspirin (ASA) 325 MG EC tablet Take 325 mg by mouth every evening 5/24/2022     atorvastatin (LIPITOR) 80 MG tablet [ATORVASTATIN (LIPITOR) 80 MG TABLET] Take 80 mg by mouth bedtime. 5/30/2022 at 2200     carvedilol (COREG) 25 MG tablet Take 1 tablet (25 mg) by mouth 2 times daily (with meals) 5/31/2022 at 0545     escitalopram (LEXAPRO) 5 MG tablet Take 5 mg by mouth daily 5/30/2022 at 2200     ferrous sulfate (FEROSUL) 325 (65 Fe) MG tablet Take 325 mg by mouth daily (with breakfast) 5/24/2022     furosemide (LASIX) 20 MG tablet Take 1 tablet (20 mg) by mouth daily 5/30/2022     hydrALAZINE (APRESOLINE) 100 MG tablet Take 1 tablet (100 mg) by mouth 3 times daily 5/31/2022 at 0545     LORazepam (ATIVAN) 0.5 MG tablet [LORAZEPAM (ATIVAN) 0.5 MG TABLET] Take 1 tablet (0.5 mg total) by mouth every 6 (six) hours as needed for anxiety (or tremors). 5/30/2022     losartan (COZAAR) 50 MG tablet Take 1 tablet (50 mg) by mouth every evening 5/30/2022 at 2200     metFORMIN (GLUCOPHAGE) 500 MG tablet [METFORMIN (GLUCOPHAGE) 500 MG TABLET] Take 1 tablet (500 mg total) by mouth 2 (two) times a day with meals. At breakfast and at dinner 5/30/2022 at Unknown time     multivitamin therapeutic (THERAGRAN) tablet [MULTIVITAMIN THERAPEUTIC (THERAGRAN) TABLET] Take 1 tablet by mouth every evening.  5/24/2022     omeprazole (PRILOSEC) 20 MG capsule [OMEPRAZOLE (PRILOSEC) 20 MG CAPSULE] Take 20 mg by mouth daily before breakfast.  5/31/2022 at 0545     prednisoLONE acetate (PRED-FORTE) 1 % ophthalmic suspension [PREDNISOLONE ACETATE (PRED-FORTE) 1 % OPHTHALMIC SUSPENSION]  Administer 1 drop to the right eye daily.  5/30/2022     vit C/E/Zn/coppr/lutein/zeaxan (PRESERVISION AREDS-2 ORAL) [VIT C/E/ZN/COPPR/LUTEIN/ZEAXAN (PRESERVISION AREDS-2 ORAL)] Take 1 tablet by mouth 2 (two) times a day before meals. 5/24/2022       Information source(s): Patient and CareEverywhere/SureScripts    Method of interview communication: in-person    Patient was asked about OTC/herbal products specifically.  PTA med list reflects this.    Based on the pharmacist's assessment, the PTA med list information appears reliable    Allergies were reviewed, assessed, and updated with the patient.      Medications available for use during hospital stay: prednisolone eye drop.      Thank you for the opportunity to participate in the care of this patient.      Atiya Pedro RPH     5/31/2022     7:10 AM

## 2022-05-31 NOTE — ANESTHESIA CARE TRANSFER NOTE
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    Anesthesia Type:   General     Note:    Oropharynx: oropharynx clear of all foreign objects and spontaneously breathing  Level of Consciousness: drowsy  Oxygen Supplementation: face mask  Level of Supplemental Oxygen (L/min / FiO2): 8  Independent Airway: airway patency satisfactory and stable  Dentition: dentition unchanged  Vital Signs Stable: post-procedure vital signs reviewed and stable  Report to RN Given: handoff report given  Patient transferred to: PACU    Handoff Report: Identifed the Patient, Identified the Reponsible Provider, Reviewed the pertinent medical history, Discussed the surgical course, Reviewed Intra-OP anesthesia mangement and issues during anesthesia, Set expectations for post-procedure period and Allowed opportunity for questions and acknowledgement of understanding      Vitals:  Vitals Value Taken Time   /76 05/31/22 1154   Temp 97.1 05/31/22 1154   Pulse 57 05/31/22 1155   Resp 14 05/31/22 1155   SpO2 100 % 05/31/22 1155   Vitals shown include unvalidated device data.    Electronically Signed By: PEG Landers CRNA  May 31, 2022  11:56 AM

## 2022-05-31 NOTE — ANESTHESIA PROCEDURE NOTES
TAP Procedure Note    Pre-Procedure   Staff -        Anesthesiologist:  Judy Malave MD       Performed By: anesthesiologist       Location: OR       Procedure Start/Stop Times: 5/31/2022 9:14 AM and 5/31/2022 9:18 AM       Pre-Anesthestic Checklist: patient identified, IV checked, site marked, risks and benefits discussed, informed consent, monitors and equipment checked, pre-op evaluation, at physician/surgeon's request and post-op pain management  Timeout:       Correct Patient: Yes        Correct Procedure: Yes        Correct Site: Yes        Correct Position: Yes        Correct Laterality: Yes        Site Marked: Yes  Procedure Documentation  Procedure: TAP       Laterality: bilateral       Patient Position: supine       Skin prep: Chloraprep       Needle Gauge: 20.        Needle Length (Inches): 4        Ultrasound guided       1. Ultrasound was used to identify targeted nerve, plexus, vascular marker, or fascial plane and place a needle adjacent to it in real-time.       2. Ultrasound was used to visualize the spread of anesthetic in close proximity to the above referenced structure.       3. A permanent image is entered into the patient's record.       4. The visualized anatomic structures appeared normal.       5. There were no apparent abnormal pathologic findings.    Assessment/Narrative         The placement was negative for: blood aspirated, painful injection and site bleeding       Bolus given via needle..        Secured via.        Insertion/Infusion Method: Single Shot       Complications: none    Medication(s) Administered   Bupivacaine 0.25% PF (Infiltration) - Infiltration   40 mL - 5/31/2022 9:14:00 AM  Medication Administration Time: 5/31/2022 9:14 AM

## 2022-05-31 NOTE — DISCHARGE SUMMARY
HOSPITAL DISCHARGE SUMMARY    Patient Name: Sharyn Trent  YOB: 1940 Age: 81 year old  Medical Record Number: [id number]  Primary Physician: Jamie Mcconnell  Phone: [phone]  Admission Date: 5/31/2022  Discharge Date: 6/1/22    Sharyn Trent  will be discharged from [facility] to Home.    PRINCIPAL DISCHARGE DIAGNOSIS: Uterine mass, final pathology pending.     BRIEF HOSPITAL COURSE: This 81 year old female admitted following the procedure stated below. She tolerated the procedure well. Uneventful post operative course and discharge to home on POD #1 with adequate pain control, tolerating orals, voiding and ambulating.    PROCEDURES PERFORMED DURING HOSPITALIZATION:   Robotic assisted laparoscopic  Total hysterectomy  Bilateral salpingo-oophorectomy  [place] lymph node injection and biopsy  Mini laparotomy    COMPLICATIONS IN HOSPITAL: None    CONSULTATIONS:  None    PERTINENT FINDINGS/RESULTS AT DISCHARGE:   BP (!) 166/68   Pulse 54   Temp 97.1  F (36.2  C) (Temporal)   Resp 14   Wt 65.3 kg (144 lb)   SpO2 99%   BMI 25.51 kg/m      Latest Laboratory Results:  Chem:  CBC RESULTS: Recent Labs   Lab Test 05/31/22  0656 04/05/22  1305   WBC  --  6.7   RBC  --  3.66*   HGB 10.3* 11.1*   HCT  --  31.7*   MCV  --  87   MCH  --  30.3   MCHC  --  35.0   RDW  --  13.9   PLT  --  200     Last Basic Metabolic Panel:  Lab Results   Component Value Date     05/12/2022      Lab Results   Component Value Date    POTASSIUM 4.1 05/12/2022     Lab Results   Component Value Date    CHLORIDE 101 05/12/2022     Lab Results   Component Value Date    MAURICIO 9.1 05/12/2022     Lab Results   Component Value Date    CO2 26 05/12/2022     Lab Results   Component Value Date    BUN 27 05/12/2022     Lab Results   Component Value Date    CR 1.24 05/12/2022     Lab Results   Component Value Date     05/31/2022     05/12/2022         IMPORTANT PENDING TEST RESULTS:  Pathology    CONDITION  AT DISCHARGE:    Stabilized    DISCHARGE ORDERS  Current Discharge Medication List      START taking these medications    Details   oxyCODONE (ROXICODONE) 5 MG tablet Take 0.5-1 tablets (2.5-5 mg) by mouth every 4 hours as needed for pain (Moderate to Severe)  Qty: 12 tablet, Refills: 0    Associated Diagnoses: Uterine mass      senna-docusate (SENOKOT-S/PERICOLACE) 8.6-50 MG tablet Take 1-2 tablets by mouth 2 times daily as needed for constipation Take while on oral narcotics to prevent or treat constipation.  Qty: 30 tablet, Refills: 0    Comments: While taking narcotics  Associated Diagnoses: Uterine mass         CONTINUE these medications which have NOT CHANGED    Details   acetaminophen (TYLENOL) 325 MG tablet Take 3 tablets (975 mg) by mouth every 6 hours as needed for mild pain  Qty: 50 tablet, Refills: 0    Associated Diagnoses: Postoperative pain      aspirin (ASA) 325 MG EC tablet Take 325 mg by mouth every evening      atorvastatin (LIPITOR) 80 MG tablet [ATORVASTATIN (LIPITOR) 80 MG TABLET] Take 80 mg by mouth bedtime.      carvedilol (COREG) 25 MG tablet Take 1 tablet (25 mg) by mouth 2 times daily (with meals)  Qty: 180 tablet, Refills: 1    Associated Diagnoses: CHF (congestive heart failure) (H)      escitalopram (LEXAPRO) 5 MG tablet Take 5 mg by mouth daily      ferrous sulfate (FEROSUL) 325 (65 Fe) MG tablet Take 325 mg by mouth daily (with breakfast)      furosemide (LASIX) 20 MG tablet Take 1 tablet (20 mg) by mouth daily  Qty: 90 tablet, Refills: 3    Associated Diagnoses: Hypertension, unspecified type      hydrALAZINE (APRESOLINE) 100 MG tablet Take 1 tablet (100 mg) by mouth 3 times daily  Qty: 270 tablet, Refills: 1    Associated Diagnoses: Coronary artery disease involving native coronary artery of native heart with angina pectoris (H)      LORazepam (ATIVAN) 0.5 MG tablet [LORAZEPAM (ATIVAN) 0.5 MG TABLET] Take 1 tablet (0.5 mg total) by mouth every 6 (six) hours as needed for anxiety (or  tremors).  Qty: 12 tablet, Refills: 0    Associated Diagnoses: Tremor      losartan (COZAAR) 50 MG tablet Take 1 tablet (50 mg) by mouth every evening  Qty: 90 tablet, Refills: 3    Associated Diagnoses: Hypertensive emergency      metFORMIN (GLUCOPHAGE) 500 MG tablet [METFORMIN (GLUCOPHAGE) 500 MG TABLET] Take 1 tablet (500 mg total) by mouth 2 (two) times a day with meals. At breakfast and at dinner  Qty: 60 tablet, Refills: 0    Associated Diagnoses: HTN (hypertension), benign      multivitamin therapeutic (THERAGRAN) tablet [MULTIVITAMIN THERAPEUTIC (THERAGRAN) TABLET] Take 1 tablet by mouth every evening.       omeprazole (PRILOSEC) 20 MG capsule [OMEPRAZOLE (PRILOSEC) 20 MG CAPSULE] Take 20 mg by mouth daily before breakfast.       prednisoLONE acetate (PRED-FORTE) 1 % ophthalmic suspension [PREDNISOLONE ACETATE (PRED-FORTE) 1 % OPHTHALMIC SUSPENSION] Administer 1 drop to the right eye daily.       vit C/E/Zn/coppr/lutein/zeaxan (PRESERVISION AREDS-2 ORAL) [VIT C/E/ZN/COPPR/LUTEIN/ZEAXAN (PRESERVISION AREDS-2 ORAL)] Take 1 tablet by mouth 2 (two) times a day before meals.             FOLLOW-UP: Sharyn Trent should see Jamie Mcconnell.    Specialty follow-up: with Dr. Zoila Riggs's office in 3-4 weeks.     AFTER HOSPITAL RECOMMENDATIONS  As above.      Physician(s) in addition to primary physician who should receive a copy:  Jamie Mcconnell

## 2022-06-01 VITALS
HEART RATE: 67 BPM | BODY MASS INDEX: 25.51 KG/M2 | WEIGHT: 144 LBS | DIASTOLIC BLOOD PRESSURE: 62 MMHG | OXYGEN SATURATION: 92 % | TEMPERATURE: 98.3 F | SYSTOLIC BLOOD PRESSURE: 139 MMHG | RESPIRATION RATE: 17 BRPM

## 2022-06-01 LAB
ALBUMIN SERPL-MCNC: 3.1 G/DL (ref 3.5–5)
ALBUMIN UR-MCNC: NEGATIVE MG/DL
ALP SERPL-CCNC: 41 U/L (ref 45–120)
ALT SERPL W P-5'-P-CCNC: 14 U/L (ref 0–45)
ANION GAP SERPL CALCULATED.3IONS-SCNC: 6 MMOL/L (ref 5–18)
APPEARANCE UR: CLEAR
AST SERPL W P-5'-P-CCNC: 21 U/L (ref 0–40)
BILIRUB SERPL-MCNC: 0.6 MG/DL (ref 0–1)
BILIRUB UR QL STRIP: NEGATIVE
BUN SERPL-MCNC: 27 MG/DL (ref 8–28)
CALCIUM SERPL-MCNC: 8.4 MG/DL (ref 8.5–10.5)
CHLORIDE BLD-SCNC: 104 MMOL/L (ref 98–107)
CO2 SERPL-SCNC: 26 MMOL/L (ref 22–31)
COLOR UR AUTO: NORMAL
CREAT SERPL-MCNC: 1.43 MG/DL (ref 0.6–1.1)
CREAT SERPL-MCNC: 1.51 MG/DL (ref 0.6–1.1)
ERYTHROCYTE [DISTWIDTH] IN BLOOD BY AUTOMATED COUNT: 14.6 % (ref 10–15)
GFR SERPL CREATININE-BSD FRML MDRD: 35 ML/MIN/1.73M2
GFR SERPL CREATININE-BSD FRML MDRD: 37 ML/MIN/1.73M2
GLUCOSE BLD-MCNC: 114 MG/DL (ref 70–125)
GLUCOSE BLDC GLUCOMTR-MCNC: 133 MG/DL (ref 70–99)
GLUCOSE BLDC GLUCOMTR-MCNC: 139 MG/DL (ref 70–99)
GLUCOSE UR STRIP-MCNC: NEGATIVE MG/DL
HCT VFR BLD AUTO: 29.4 % (ref 35–47)
HGB BLD-MCNC: 9.4 G/DL (ref 11.7–15.7)
HGB BLD-MCNC: 9.4 G/DL (ref 11.7–15.7)
HGB UR QL STRIP: NEGATIVE
KETONES UR STRIP-MCNC: NEGATIVE MG/DL
LEUKOCYTE ESTERASE UR QL STRIP: NEGATIVE
MCH RBC QN AUTO: 29.4 PG (ref 26.5–33)
MCHC RBC AUTO-ENTMCNC: 32.1 G/DL (ref 31.5–36.5)
MCV RBC AUTO: 91 FL (ref 78–100)
NITRATE UR QL: NEGATIVE
PH UR STRIP: 5.5 [PH] (ref 5–7)
PLATELET # BLD AUTO: 212 10E3/UL (ref 150–450)
POTASSIUM BLD-SCNC: 3.7 MMOL/L (ref 3.5–5)
PROT SERPL-MCNC: 5.5 G/DL (ref 6–8)
RBC # BLD AUTO: 3.23 10E6/UL (ref 3.8–5.2)
SODIUM SERPL-SCNC: 136 MMOL/L (ref 136–145)
SP GR UR STRIP: 1.01 (ref 1–1.03)
UROBILINOGEN UR STRIP-MCNC: <2 MG/DL
WBC # BLD AUTO: 9.7 10E3/UL (ref 4–11)

## 2022-06-01 PROCEDURE — 81003 URINALYSIS AUTO W/O SCOPE: CPT | Performed by: PHYSICIAN ASSISTANT

## 2022-06-01 PROCEDURE — 82565 ASSAY OF CREATININE: CPT | Performed by: OBSTETRICS & GYNECOLOGY

## 2022-06-01 PROCEDURE — 82962 GLUCOSE BLOOD TEST: CPT

## 2022-06-01 PROCEDURE — 250N000013 HC RX MED GY IP 250 OP 250 PS 637: Performed by: PHYSICIAN ASSISTANT

## 2022-06-01 PROCEDURE — 85018 HEMOGLOBIN: CPT | Performed by: PHYSICIAN ASSISTANT

## 2022-06-01 PROCEDURE — 80053 COMPREHEN METABOLIC PANEL: CPT | Performed by: PHYSICIAN ASSISTANT

## 2022-06-01 PROCEDURE — 250N000013 HC RX MED GY IP 250 OP 250 PS 637: Performed by: OBSTETRICS & GYNECOLOGY

## 2022-06-01 PROCEDURE — 36415 COLL VENOUS BLD VENIPUNCTURE: CPT | Performed by: OBSTETRICS & GYNECOLOGY

## 2022-06-01 PROCEDURE — 85014 HEMATOCRIT: CPT | Performed by: PHYSICIAN ASSISTANT

## 2022-06-01 RX ADMIN — CARVEDILOL 25 MG: 12.5 TABLET, FILM COATED ORAL at 08:50

## 2022-06-01 RX ADMIN — PREDNISOLONE ACETATE 1 DROP: 10 SUSPENSION/ DROPS OPHTHALMIC at 08:50

## 2022-06-01 RX ADMIN — HYDRALAZINE HYDROCHLORIDE 100 MG: 50 TABLET, FILM COATED ORAL at 08:50

## 2022-06-01 RX ADMIN — FUROSEMIDE 20 MG: 20 TABLET ORAL at 08:50

## 2022-06-01 RX ADMIN — ACETAMINOPHEN 975 MG: 325 TABLET ORAL at 12:24

## 2022-06-01 RX ADMIN — METFORMIN HYDROCHLORIDE 500 MG: 500 TABLET, FILM COATED ORAL at 08:49

## 2022-06-01 RX ADMIN — ACETAMINOPHEN 975 MG: 325 TABLET ORAL at 01:21

## 2022-06-01 RX ADMIN — HYDRALAZINE HYDROCHLORIDE 100 MG: 50 TABLET, FILM COATED ORAL at 13:19

## 2022-06-01 RX ADMIN — PANTOPRAZOLE SODIUM 40 MG: 40 TABLET, DELAYED RELEASE ORAL at 08:53

## 2022-06-01 NOTE — PLAN OF CARE
PRIMARY DIAGNOSIS: ACUTE PAIN  OUTPATIENT/OBSERVATION GOALS TO BE MET BEFORE DISCHARGE:  1. Pain Status: Improved-controlled with oral pain medications.    2. Return to near baseline physical activity: Yes    3. Cleared for discharge by consultants (if involved): Yes    Discharge Planner Nurse   Safe discharge environment identified: Yes  Barriers to discharge: No       Entered by: Harrison Todd RN 06/01/2022 2:38 PM

## 2022-06-01 NOTE — PLAN OF CARE
"   PRIMARY DIAGNOSIS: \"GENERIC\" NURSING  OUTPATIENT/OBSERVATION GOALS TO BE MET BEFORE DISCHARGE:  ADLs back to baseline: Yes    Activity and level of assistance: Up with standby assistance.    Pain status: Improved-controlled with oral pain medications.    Return to near baseline physical activity: Yes     Discharge Planner Nurse   Safe discharge environment identified: Yes  Barriers to discharge: No       Entered by: Harrison Todd RN 06/01/2022 10:38 AM     Please review provider order for any additional goals.   Nurse to notify provider when observation goals have been met and patient is ready for discharge.  "

## 2022-06-01 NOTE — PROGRESS NOTES
GYN/ONC PROGRESS NOTE    cc: POD# 1 s/p Robotic assisted laparoscopic total hysterectomy, Bilateral salphingoophorectomy, [place] Lymph Node ICG Injection and Biopsy, Mini-laparotomy    HPI: pt feeling feverish this morning, denies chills. Abdominal pain controlled at present. Also notes dysuria, no hematuria. Passing flatus, no stools. Tolerating diet, no n/v. Denies cough, chest pain, SOB.     EXAM:    Intake/Output Summary (Last 24 hours) at 6/1/2022 0805  Last data filed at 6/1/2022 0630  Gross per 24 hour   Intake 2318 ml   Output 630 ml   Net 1688 ml       U/O: 350cc since midnight  Drains: n/a    /58 (BP Location: Left arm)   Pulse 75   Temp 100  F (37.8  C) (Oral)   Resp 18   Wt 65.3 kg (144 lb)   SpO2 93%   BMI 25.51 kg/m      GENERAL: alert, NAD  CV: RRR, no murmurs  RESPIRATORY: no dyspnea, clear anteriorly  ABDOMEN: slightly distended, hypoactive BS, soft, appropriately tender, incisions c/d/i, no signs of infection  EXTREMITY: mild edema bilaterally, no palpable cords  SKIN: warm and dry, no jaundice,no rashes  NEURO: cranial nerves II-XII grossly intact    LABS  Recent Labs   Lab Test 06/01/22  0705 05/31/22  0656 04/05/22  1305 09/16/21  0706 09/13/21  0125   WBC  --   --  6.7  --  6.7   RBC  --   --  3.66*  --  4.10   HGB 9.4* 10.3* 11.1*  --  12.3   HCT  --   --  31.7*  --  37.3   MCV  --   --  87  --  91   MCH  --   --  30.3  --  30.0   MCHC  --   --  35.0  --  33.0   PLT  --   --  200 186 227       Recent Labs   Lab Test 05/12/22  1500 04/05/22  1305   POTASSIUM 4.1 4.1   CHLORIDE 101 102   BUN 27 27       Recent Labs   Lab Test 04/05/22  1434 03/31/22  0922 09/13/21  1103 03/07/21  2128 03/07/21  2120   PROTEIN Negative  --  Negative   < >  --    BILITOTAL  --  0.7  --   --  0.5   AST  --  35  --   --  22   ALT  --  24  --   --  27    < > = values in this interval not displayed.       IMAGING  n/a    ASSESSMENT  Sharyn Trent is a very pleasant 81-year-old female with an endometrial  mass who is POD 1 s/p robotic assisted laparoscopic total hysterectomy, Bilateral salphingoophorectomy, [place] Lymph Node ICG Injection and Biopsy, Mini-laparotomy.      PLAN:    -Routine postop cares, overall doing well.     - Oncology: Awaiting tissue and washings pathology reports.  Pre-operative diagnosis: Neoplasm of uncertain behavior of uterus .  Post-operative diagnosis based on gross and frozen pathology reports: spindle cell neoplasm with cytologic atypia. There was no evidence of an endometrial carcinoma. Final pathology pending.  Pain: S/p TAP blocks. Continue scheduled Tylenol. Oxycodone and IV Dilaudid available prn. Abdominal binder, ice packs.   GI:  ADAT.   : Voiding. Check UA due to new dysuria this morning.   FEN: DC IVF when fully tolerating po intake.  DVT prophylaxis: SCDs. Ambulation encouraged.  ID: Tmax 100 this morning, now improved. Check WBC and UA. Encouraged use of IS and ambulation.  Heme: Hgb today 9.4.  Asymptomatic. VSS.      Dispo: pending progress; possibly home later today. DC once tolerating diet advancement, ambulating and pain adequately managed with oral pain medications and if pt remains afebrile.    Leah Solano PA-C  [facility]  [phone]        Reviewed labs- WBC WNL, UA negative. No further fevers. Reviewed with RN, pt is tolerating diet, pain is minimal, pt is voiding and passed a BM. Ambulating tp bathroom.   Discussed with trevon Mcconnell for discharge home today.

## 2022-06-01 NOTE — PLAN OF CARE
"  Problem: Plan of Care - These are the overarching goals to be used throughout the patient stay.    Goal: Plan of Care Review/Shift Note  Description: The Plan of Care Review/Shift note should be completed every shift.  The Outcome Evaluation is a brief statement about your assessment that the patient is improving, declining, or no change.  This information will be displayed automatically on your shift note.  Outcome: Ongoing, Progressing  Goal: Patient-Specific Goal (Individualized)  Description: You can add care plan individualizations to a care plan. Examples of Individualization might be:  \"Parent requests to be called daily at 9am for status\", \"I have a hard time hearing out of my right ear\", or \"Do not touch me to wake me up as it startles me\".  Outcome: Ongoing, Progressing  Goal: Absence of Hospital-Acquired Illness or Injury  Outcome: Ongoing, Progressing  Intervention: Identify and Manage Fall Risk  Recent Flowsheet Documentation  Taken 5/31/2022 1600 by Callie Martinez, RN  Safety Promotion/Fall Prevention:   activity supervised   room organization consistent   safety round/check completed  Taken 5/31/2022 1336 by Callie Martinez, RN  Safety Promotion/Fall Prevention:   activity supervised   room organization consistent   safety round/check completed  Goal: Optimal Comfort and Wellbeing  Outcome: Ongoing, Progressing   Goal Outcome Evaluation:                      "

## 2022-06-02 DIAGNOSIS — I25.119 CORONARY ARTERY DISEASE INVOLVING NATIVE CORONARY ARTERY OF NATIVE HEART WITH ANGINA PECTORIS (H): ICD-10-CM

## 2022-06-02 LAB
PATH REPORT.COMMENTS IMP SPEC: NORMAL
PATH REPORT.FINAL DX SPEC: NORMAL
PATH REPORT.GROSS SPEC: NORMAL
PATH REPORT.INTRAOP OBS SPEC DOC: NORMAL
PATH REPORT.MICROSCOPIC SPEC OTHER STN: NORMAL
PATH REPORT.RELEVANT HX SPEC: NORMAL
PHOTO IMAGE: NORMAL

## 2022-06-02 PROCEDURE — 88331 PATH CONSLTJ SURG 1 BLK 1SPC: CPT | Mod: 26 | Performed by: PATHOLOGY

## 2022-06-02 PROCEDURE — 88309 TISSUE EXAM BY PATHOLOGIST: CPT | Mod: 26 | Performed by: PATHOLOGY

## 2022-06-02 PROCEDURE — 88312 SPECIAL STAINS GROUP 1: CPT | Mod: 26 | Performed by: PATHOLOGY

## 2022-06-02 PROCEDURE — 88307 TISSUE EXAM BY PATHOLOGIST: CPT | Mod: 26 | Performed by: PATHOLOGY

## 2022-06-02 RX ORDER — HYDRALAZINE HYDROCHLORIDE 100 MG/1
100 TABLET, FILM COATED ORAL 3 TIMES DAILY
Qty: 270 TABLET | Refills: 0 | Status: SHIPPED | OUTPATIENT
Start: 2022-06-02 | End: 2022-09-07

## 2022-06-03 ENCOUNTER — VIRTUAL VISIT (OUTPATIENT)
Dept: PHARMACY | Facility: PHYSICIAN GROUP | Age: 82
End: 2022-06-03
Payer: COMMERCIAL

## 2022-06-03 ENCOUNTER — TELEPHONE (OUTPATIENT)
Dept: CARDIOLOGY | Facility: CLINIC | Age: 82
End: 2022-06-03
Payer: COMMERCIAL

## 2022-06-03 DIAGNOSIS — E11.9 DIABETES MELLITUS TYPE 2 WITHOUT RETINOPATHY (H): ICD-10-CM

## 2022-06-03 DIAGNOSIS — F41.9 ANXIETY: ICD-10-CM

## 2022-06-03 DIAGNOSIS — N18.31 CHRONIC KIDNEY DISEASE, STAGE 3A (H): ICD-10-CM

## 2022-06-03 DIAGNOSIS — K21.9 GASTROESOPHAGEAL REFLUX DISEASE WITHOUT ESOPHAGITIS: ICD-10-CM

## 2022-06-03 DIAGNOSIS — E78.5 DYSLIPIDEMIA, GOAL LDL BELOW 70: ICD-10-CM

## 2022-06-03 DIAGNOSIS — Z94.7: ICD-10-CM

## 2022-06-03 DIAGNOSIS — I10 BENIGN ESSENTIAL HYPERTENSION: ICD-10-CM

## 2022-06-03 DIAGNOSIS — I25.10 CORONARY ARTERY DISEASE INVOLVING NATIVE HEART, UNSPECIFIED VESSEL OR LESION TYPE, UNSPECIFIED WHETHER ANGINA PRESENT: ICD-10-CM

## 2022-06-03 DIAGNOSIS — N85.8 UTERINE MASS: Primary | ICD-10-CM

## 2022-06-03 DIAGNOSIS — Z78.9 TAKES DIETARY SUPPLEMENTS: ICD-10-CM

## 2022-06-03 DIAGNOSIS — I50.9 ACUTE ON CHRONIC CONGESTIVE HEART FAILURE, UNSPECIFIED HEART FAILURE TYPE (H): ICD-10-CM

## 2022-06-03 PROCEDURE — 99605 MTMS BY PHARM NP 15 MIN: CPT | Performed by: PHARMACIST

## 2022-06-03 PROCEDURE — 99607 MTMS BY PHARM ADDL 15 MIN: CPT | Performed by: PHARMACIST

## 2022-06-03 NOTE — LETTER
_  Medication List        Prepared on: 6/8/2022     Bring your Medication List when you go to the doctor, hospital, or   emergency room. And, share it with your family or caregivers.     Note any changes to how you take your medications.  Cross out medications when you no longer use them.    Medication How I take it Why I use it Prescriber   acetaminophen (TYLENOL) 500 MG tablet Take 500-1,000 mg by mouth every 6 hours as needed for mild pain Pain Self   aspirin (ASA) 325 MG EC tablet Take 325 mg by mouth every evening Heart Disease Heidy Akins MD   atorvastatin (LIPITOR) 80 MG tablet Take 80 mg by mouth At Bedtime Elevation of Both Cholesterol and Triglycerides in Blood Jamie Mcconnell MD   calcium carbonate (TUMS) 500 MG chewable tablet Take 1-2 chew tab by mouth daily Acid Indigestion; Heartburn; Calcium Supplement Self   carvedilol (COREG) 25 MG tablet Take 1 tablet (25 mg) by mouth 2 times daily (with meals) CHF (Congestive Heart Failure) (H) PEG Rios    escitalopram (LEXAPRO) 5 MG tablet Take 5 mg by mouth daily Anxiety Jamie Mcconnell MD   ferrous sulfate (FEROSUL) 325 (65 Fe) MG tablet Take 325 mg by mouth daily (with breakfast) Low Iron Self   furosemide (LASIX) 20 MG tablet Take 1 tablet (20 mg) by mouth daily High Blood Pressure Bethany Palomares MD   hydrALAZINE (APRESOLINE) 100 MG tablet Take 1 tablet (100 mg) by mouth 3 times daily Coronary artery disease  Heidy Akins MD   LORazepam (ATIVAN) 0.5 MG tablet Take 1 tablet (0.5 mg total) by mouth every 6 (six) hours as needed for anxiety (or tremors). Tremor Jamie Mcconnell MD   losartan (COZAAR) 50 MG tablet Take 1 tablet (50 mg) by mouth every evening High Blood Pressure PEG Rios CNP   metFORMIN (GLUCOPHAGE) 500 MG tablet Take 1 tablet (500 mg total) by mouth 2 (two) times a day with meals. At breakfast and at dinner Diabetes Igor Sen MD   Multiple Vitamins-Minerals  (ADULTS 50+) TABS Take 1 tablet by mouth every morning General Health Self   omeprazole (PRILOSEC) 20 MG capsule Take 20 mg by mouth daily before breakfast Acid Reflux, Stomach Jamie Mcconnell MD   prednisoLONE acetate (PRED-FORTE) 1 % ophthalmic suspension Place 1 drop into the right eye daily Eye Inflammation Vega Flaherty MD   PRESERVISION AREDS-2 ORAL Take 1 tablet by mouth 2 times daily (before meals) Eye Health Self         Add new medications, over-the-counter drugs, herbals, vitamins, or  minerals in the blank rows below.    Medication How I take it Why I use it Prescriber                          Allergies:      julia-kit bee sting; venom-honey bee [bee venom]; mercurial analogues [mercurial derivatives]; nitrofurantoin; sulfa (sulfonamide antibiotics) [sulfa drugs]        Side effects I have had:               Other Information:              My notes and questions:

## 2022-06-03 NOTE — LETTER
"Recommended To-Do List      Prepared on: 6/8/2022     You can get the best results from your medications by completing the items on this \"To-Do List.\"      Bring your To-Do List when you go to your doctor. And, share it with your family or caregivers.    My To-Do List:  What we talked about: What I should do:   Your medication dosage being too low    Change when you are taking Multivitamin Adults 50+ to morning to separate from Tums and maximize nutrient absorption.          What we talked about: What I should do:    You may benefit from increasing escitalopram dose to better help reduce your anxiety in the morning.   Consider talking with Dr. Mcconnell about increasing escitalopram dose to 7.5 or 10 mg to see if that helps more with reducing anxiety in the morning.                 "

## 2022-06-03 NOTE — PROGRESS NOTES
Medication Therapy Management (MTM) Encounter    ASSESSMENT:                            Medication Adherence/Access: No issues identified    Uterine mass, post hysterectomy:   Overall she reports doing well after surgery. Did not  oxycodone, she doesn't like taking this so she also isn't taking sennakot. She did have a bowel movement since being home. She has been using acetaminophen 500 mg and this seems to work.     Heart failure, Coronary artery disease:   Stable. Patient is meeting BP goal of < 140/90mmHg.  Current weight is not stable but may be effected by recent procedure, follow-up with cardiology next week and current diuretic dose seems to be appropriate. She is appropriately avoiding NSAID's, diltiazem/verapamil, and pioglitazone. Continue current therapy.     Hypertension, Chronic kidney disease: Stable. Blood pressure is at goal of <140/80 mmHg.     Hyperlipidemia: Stable. Patient is on high intensity statin which is indicated based on 2019 ACC/AHA guidelines for lipid management.      Type 2 Diabetes: A1c is at goal of <7.5%. Continuing lower metformin dose is appropriate with current GFR of 37. Continue to monitor renal function closely.     Anxiety: She may benefit from considering increase in escitalopram dose to see if that helps reduce need for morning lorazepam. Could consider small dose increase to 7.5 or 10 mg.     GERD: Stable. Continued PPI is appropriate given high dose aspirin use.     Macular degeneration, Fuchs' corneal dystrophy s/p DMEK right eye: Stable.      Supplements: She may benefit from moving multivitamin to morning to separate from Tums to maximize calcium absorption.     PLAN:                            1. Move multivitamin to morning to maximize absorption of calcium in vitamin and Tums.   2. May consider talking with Dr. Mcconnell about increasing escitalopram dose to 7.5 or 10 mg to see if that helps more with reducing anxiety in the morning.   3. Continue current  medications    Follow-up: Return in about 6 months (around 12/3/2022) for medication therapy management, with me, using a phone visit.    SUBJECTIVE/OBJECTIVE:                          Sharyn Trent is a 81 year old female called for a transitions of care visit. She was discharged from [facility] on 6/1/2022 for uterine mass.      Reason for visit: Hospital follow-up, medication review.    Allergies/ADRs: Reviewed in chart  Past Medical History: Reviewed in chart  Tobacco: She reports that she has never smoked. She has never used smokeless tobacco.  Alcohol: none    Medication Adherence/Access: no issues reported. She self-manages her medications. She gets medications prescribed by primary and cardiology.     Uterine mass, post hysterectomy:   Overall she reports doing well after surgery. Did not  oxycodone, she doesn't like taking this so she also isn't taking sennakot. She did have a bowel movement since being home. She has been using acetaminophen 500 mg and this seems to work.     Heart failure, Coronary artery disease:   Current medication(s):   carvedilol 25 mg twice daily  losartan 50 mg daily  furosemide 20 mg once daily  hydralazine 100 mg three times daily   aspirin 325 mg once daily   Follows with Dr. Akins at [facility], last visit 4/27/22. She does monitor weight at home, gained 4 pounds since surgery but symptoms otherwise stable. No issues voiding. She does have follow-up with cardiology next week. She hasn't been eating as much so she was surprised by weight gain. She has been taking higher dose of aspirin for a long time, was instructed to do so by cardiology. She is able to do hydralazine three times a day without issue.     Per last cardiology note-     Left ventricle ejection fraction is normal. The estimated left ventricular ejection fraction is 55%.    Hypertension, Chronic kidney disease:   Current medication(s):   carvedilol 25 mg twice daily  losartan 50 mg daily  furosemide 20 mg  once daily  hydralazine 100 mg three times daily   As noted above she follows with Dr. Akins at [facility].     BP Readings from Last 5 Encounters:   06/01/22 139/62   04/27/22 138/68   04/19/22 (!) 148/66   04/05/22 (!) 174/72   03/14/22 130/56       Creatinine   Date Value Ref Range Status   06/01/2022 1.43 (H) 0.60 - 1.10 mg/dL Final   06/01/2022 1.51 (H) 0.60 - 1.10 mg/dL Final     GFR Estimate   Date Value Ref Range Status   06/01/2022 37 (L) >60 mL/min/1.73m2 Final     Potassium   Date Value Ref Range Status   06/01/2022 3.7 3.5 - 5.0 mmol/L Final       Hyperlipidemia:   Current medication(s): atorvastatin 80 mg daily.    Patient reports no significant myalgias or other side effects.    Recent Labs   Lab Test 03/31/22  0922 04/13/21  0906   CHOL 126 120   HDL 46* 38*   LDL 69 63   TRIG 53 96       Type 2 Diabetes:   Current medication(s): metformin 500 mg twice a day   Patient is not experiencing side effects.  Symptoms of low blood sugar? none  Symptoms of high blood sugar? none  Eye exam: up to date; Foot exam: up to date  Aspirin: Taking 325mg daily for secondary prevention, dose per cardiology  Statin: Yes: atorvastatin 80 mg   ACEi/ARB: losartan 50 mg daily    Abstracted A1c result-  Lab Results   Component Value Date    13859 5.6 03/31/2022       Anxiety:   Current medication(s):   escitalopram 5 mg once daily   lorazepam 0.5 daily in morning as needed   She started escitalopram somewhat recently. She still wakes up feeling anxious and finds she needs to take lorazepam in the morning many days. It does help and allows her to start her day. She isn't having any side effects with escitalopram.    GERD:   Current medication(s):   omeprazole 20 mg once daily   Tums 1-2 tablets at bedtime  She does think this regimen works well for her. She does take higher dose aspirin at instruction of her cardiology provider. No current signs or symptoms of blood in stool or stomach upset.     Macular  degeneration, Fuchs' corneal dystrophy s/p DMEK right eye:  Current medication: prednisolone 1% 1 drop right eye once daily  Follows with Dr. Flaherty. She does use eye drop in right eye as directed. No issues. She uses artificial tears in her left eye as needed.       Supplements:   Currently taking:   Iron 65 mg- 1 tablet once a day  50+ multivitamin 1 tablet daily in PM  Tums- 1-2 chew tabs at bedtime   She currently takes a multivitamin and iron pill daily. She also takes Tums every night for acid reflux but also as a calcium supplement. She takes multivitamin and calcium together at night. No current issues with constipation or stomach upset with iron supplement.     Iron   Date Value Ref Range Status   05/12/2022 29 (L) 42 - 175 ug/dL Final     Hemoglobin   Date Value Ref Range Status   06/01/2022 9.4 (L) 11.7 - 15.7 g/dL Final   06/01/2022 9.4 (L) 11.7 - 15.7 g/dL Final   ]        Today's Vitals: There were no vitals taken for this visit.  ----------------  Post Discharge Medication Reconciliation Status: discharge medications reconciled, continue medications without change.    I spent 17 minutes with this patient today. All changes were made via collaborative practice agreement with Jamie Mcconnell MD. A copy of the visit note was provided to the patient's provider(s).    The patient was mailed a summary of these recommendations.     Neha Meraz, PharmD, BCACP  Medication Therapy Management Pharmacist  [facility]  Pager: [phone]      Telemedicine Visit Details  Type of service:  Telephone visit  Start Time: 9:32 AM  End Time: 9:49 AM  Originating Location (patient location): Home  Distant Location (provider location):  [facility] [place] [facility]     Medication Therapy Recommendations  Takes dietary supplements    Current Medication: Multiple Vitamins-Minerals (MULTIVITAMIN ADULTS 50+) TABS   Rationale: Incorrect administration - Dosage too low - Effectiveness    Recommendation: Change Administration Time   Status: Patient Agreed - Adherence/Education

## 2022-06-03 NOTE — LETTER
June 8, 2022  Sharyn Trent  [address] [place] [address]    Dear Ms. Twan, [facility]        Thank you for talking with me on Shravan 3, 2022 about your health and medications. As a follow-up to our conversation, I have included two documents:      1. Your Recommended To-Do List has steps you should take to get the best results from your medications.  2. Your Medication List will help you keep track of your medications and how to take them.    If you want to talk about these documents, please call Neha Meraz PharmD at phone: [phone], Monday-Friday 8-4:30pm.    I look forward to working with you and your doctors to make sure your medications work well for you.    Sincerely,    Neha Meraz, PharmD  [facility] Pharmacist, [facility]

## 2022-06-03 NOTE — TELEPHONE ENCOUNTER
Sharyn is calling today to report 4# wt gain and she recently was discharged from the hospital following a Total abdominal hysterectomy. She denies LE edema or abdominal bloating, has fatigue, but this may be related to her recent surgical procedure.   Sharyn is currently using Lasix 20mg daily for her diuretic regimen.    Discussed with Dr. Akins, who advised increasing her Lasix up to 40mg daily until she is seen in clinic next week on 6/7/22  RYAN /Yomaira Sanchez RN BSN, CHFN

## 2022-06-07 ENCOUNTER — TELEPHONE (OUTPATIENT)
Dept: CARDIOLOGY | Facility: CLINIC | Age: 82
End: 2022-06-07

## 2022-06-07 ENCOUNTER — OFFICE VISIT (OUTPATIENT)
Dept: CARDIOLOGY | Facility: CLINIC | Age: 82
End: 2022-06-07
Attending: NURSE PRACTITIONER
Payer: COMMERCIAL

## 2022-06-07 VITALS
HEART RATE: 63 BPM | DIASTOLIC BLOOD PRESSURE: 54 MMHG | HEIGHT: 63 IN | WEIGHT: 150 LBS | SYSTOLIC BLOOD PRESSURE: 122 MMHG | BODY MASS INDEX: 26.58 KG/M2

## 2022-06-07 DIAGNOSIS — I50.9 ACUTE ON CHRONIC CONGESTIVE HEART FAILURE, UNSPECIFIED HEART FAILURE TYPE (H): Primary | ICD-10-CM

## 2022-06-07 DIAGNOSIS — I10 HYPERTENSION, UNSPECIFIED TYPE: Primary | ICD-10-CM

## 2022-06-07 PROCEDURE — 99214 OFFICE O/P EST MOD 30 MIN: CPT | Mod: 25 | Performed by: INTERNAL MEDICINE

## 2022-06-07 PROCEDURE — 96374 THER/PROPH/DIAG INJ IV PUSH: CPT | Performed by: INTERNAL MEDICINE

## 2022-06-07 RX ORDER — FUROSEMIDE 10 MG/ML
80 INJECTION INTRAMUSCULAR; INTRAVENOUS ONCE
Status: DISCONTINUED | OUTPATIENT
Start: 2022-06-07 | End: 2022-06-07

## 2022-06-07 RX ORDER — CALCIUM CARBONATE 500 MG/1
1-2 TABLET, CHEWABLE ORAL DAILY
COMMUNITY

## 2022-06-07 RX ADMIN — FUROSEMIDE 80 MG: 10 INJECTION INTRAMUSCULAR; INTRAVENOUS at 15:05

## 2022-06-07 NOTE — LETTER
6/7/2022    Jamie Mcconnell MD  [facility] [address]  [facility] [address]    RE: Sharyn Trent       Dear Colleague,     I had the pleasure of seeing Sharyn Trent in the [facility].    HEART CARE NOTE          Assessment/Recommendations     1. HFpEF c/b ADHF   Assessment / Plan    Patient endorses symptoms consistent with with volume overload including weigh gain and abdominal distension despite escalating outpatient diuretic regimen; will give IV diuretics today and [facility] will call tomorrow to follow-up    BP adequately controlled today --> no changes to regimen at this time     2. CAD  Assessment / Plan    Denies further episodes of chest discomfort/pressure; s/p coronary angiogram significant for moderate LAD dz. Plan for medical management at that time    Continue ASA, high intensity atorvastatin, carvedilol, losartan     3. 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 Uterine mass  Assessment / Plan    S/p hysterectomy       History of Present Illness/Subjective      Ms. Sharyn Trent is a 80 y.o. female with a PMHx significant for HFpEF, hypertensive emergency, dyslipidemia, type 2 diabetes, non-obstructive CAD, heart block status post pacemaker, paroxysmal supraventricular tachycardia, melanoma, chronic left leg edema, chronic kidney disease stage III who presents to [facility] for follow-up care.     Today, Mrs. Trent endorses HF symptoms consistent with volume overload; Management plan as detailed above     ECG: Personally reviewed. Paced rhythm      Coronary angiogram:  RHC via right IJ  RA mean 4mmHg  PA mean 24mmHg  PCWP 21mmHg  LVEDP 19mmHg  Ao 153/62     PA sat 67%  Ao sat 94%     CO cindy 3.35     Angiography via left radial  LM short normal  LAD mid 50% narrowing with FFR 0.85  Circ normal  RCA normal     ECHO (personnaly Reviewed):     Left ventricle ejection fraction is normal. The estimated left  "ventricular ejection fraction is 55%.    Left ventricular diastolic function is abnormal.    Normal right ventricular size and systolic function.    No hemodynamically significant valvular heart abnormalities.    When compared to the previous study dated 3/20/2018, No significant change             Physical Examination Review of Systems   /54 (BP Location: Left arm, Patient Position: Sitting, Cuff Size: Adult Regular)   Pulse 63   Ht 1.6 m (5' 3\")   Wt 68 kg (150 lb)   BMI 26.57 kg/m    Body mass index is 26.57 kg/m .  Wt Readings from Last 3 Encounters:   06/07/22 68 kg (150 lb)   05/31/22 65.3 kg (144 lb)   04/27/22 67.6 kg (149 lb)     General Appearance:   no distress, normal body habitus   ENT/Mouth: membranes moist, no oral lesions or bleeding gums.      EYES:  no scleral icterus, normal conjunctivae   Neck: no carotid bruits or thyromegaly   Chest/Lungs:   lungs are clear to auscultation, no rales or wheezing, equal chest wall expansion    Cardiovascular:   Regular. Normal first and second heart sounds with no murmurs, rubs, or gallops; the carotid, radial and posterior tibial pulses are intact, + JVD and LE edema bilaterally    Abdomen:  no organomegaly, masses, bruits, or tenderness; bowel sounds are present   Extremities: no cyanosis or clubbing   Skin: no xanthelasma, warm.    Neurologic: alert and oriented x3, calm     Psychiatric: alert and oriented x3, calm     A complete 10 systems ROS was reviewed  And is negative except what is listed in the HPI.          Medical History  Surgical History Family History Social History   Past Medical History:   Diagnosis Date     Abnormal ECG      Anxiety      Arthritis      Chest pain      Chest pain      Chronic kidney disease     stage 3-mod.     Congestive heart failure (H)      Diabetes (H)      Dyslipidemia, goal LDL below 70      GERD (gastroesophageal reflux disease)      HTN (hypertension)      Hyperlipidemia      Macular degeneration (senile) of " retina      Melanoma of ankle (H)     L ankle     Other second degree atrioventricular block     Created by Conversion      Pacemaker      PSVT (paroxysmal supraventricular tachycardia) (H)      Right bundle branch block      Skin cancer     Past Surgical History:   Procedure Laterality Date     ABLATION OF DYSRHYTHMIC FOCUS  04/11/2013    AV analia reentry tachycardia, partially successful     BIOPSY SKIN (LOCATION)       CARDIAC CATHETERIZATION  03/10/2016     CV CORONARY ANGIOGRAM N/A 05/26/2021    Procedure: Coronary Angiogram;  Surgeon: Yony Gillis MD;  Location: [facility];  Service: Cardiology     CV LEFT HEART CATHETERIZATION WITHOUT LEFT VENTRICULOGRAM Left 05/26/2021    Procedure: Left Heart Catheterization Without Left Ventriculogram;  Surgeon: Yony Gillis MD;  Location: [facility];  Service: Cardiology     CV RIGHT HEART CATHETERIZATION N/A 05/26/2021    Procedure: Right Heart Catheterization;  Surgeon: Yony Gillis MD;  Location: [facility];  Service: Cardiology     DILATION AND CURETTAGE N/A 4/19/2022    Procedure: DILATION AND CURRETAGE;  Surgeon: Mary Reed MD;  Location: [facility] OR     EP PACEMAKER N/A 08/30/2021    Procedure: Electrophysiology Pacemaker;  Surgeon: Ab Saavedra MD;  Location: [facility] CV     FOOT SURGERY      L foot     HAND SURGERY      on both thumbs     HYSTERECTOMY, TOTAL, ROBOT-ASSISTED, LAP, DA HAROON XI, W/BI SAL-OOPH & SNT LYMPH NODE BX, MINI LAP Bilateral 5/31/2022    Procedure: ROBOTIC ASSISTED TOTAL LAPAROSCOPIC HYSTERECTOMY, BILATERAL SALPINGO-OOPHORECTOMY, SENTINEL LYMPH NODE INJECTION AND BIOPSY, MINI-LAPAROTOMY,;  Surgeon: Mary Reed MD;  Location: [facility] OR     HYSTEROSCOPY DIAGNOSTIC N/A 4/19/2022    Procedure: HYSTEROSCOPY, DIAGNOSTIC;  Surgeon: Mary Reed MD;  Location: [facility] OR     IMPLANT PACEMAKER   04/01/2011    Second-degree AV block     OTHER SURGICAL HISTORY      SVT Ablation     PELVIC EXAM UNDER ANESTHESIA, CERVICAL DILATION, N/A      PELVIC EXAMINATION UNDER ANESTHESIA N/A 4/19/2022    Procedure: PELVIC EXAM UNDER ANESTHESIA, CERVICAL DILATION,;  Surgeon: Zoila Riggs, Mray Malloy MD;  Location: [facility] SHLDR ARTHROSCOP,SURG,W/ROTAT CUFF REPR Left 03/09/2017    Procedure: LEFT SHOULDER ARTHROSCOPY DECOMPRESSION DISTAL CLAVICLE EXCISION  ROTATOR CUFF REPAIR BICEPS TENOTOMY AND EXTENSIVE DEBRIDEMENT;  Surgeon: Todd Riggs MD;  Location: [facility];  Service: Orthopedics     RELEASE CARPAL TUNNEL      both wrists     SKIN CANCER EXCISION      L ankle,Lleg and cheek     TOE SURGERY      L big toe joint replacement     TUBAL LIGATION      no family history of premature coronary artery disease Social History     Socioeconomic History     Marital status:      Spouse name: Not on file     Number of children: Not on file     Years of education: Not on file     Highest education level: Not on file   Occupational History     Not on file   Tobacco Use     Smoking status: Never Smoker     Smokeless tobacco: Never Used   Substance and Sexual Activity     Alcohol use: No     Drug use: No     Sexual activity: Yes     Partners: Male     Birth control/protection: Surgical   Other Topics Concern     Not on file   Social History Narrative     Not on file     Social Determinants of Health     Financial Resource Strain: Not on file   Food Insecurity: Not on file   Transportation Needs: Not on file   Physical Activity: Not on file   Stress: Not on file   Social Connections: Not on file   Intimate Partner Violence: Not on file   Housing Stability: Not on file           Lab Results    Chemistry/lipid CBC Cardiac Enzymes/BNP/TSH/INR   Lab Results   Component Value Date    CHOL 126 03/31/2022    HDL 46 (L) 03/31/2022    TRIG 53 03/31/2022    BUN 27 06/01/2022     06/01/2022    CO2 26  06/01/2022    Lab Results   Component Value Date    WBC 9.7 06/01/2022    HGB 9.4 (L) 06/01/2022    HGB 9.4 (L) 06/01/2022    HCT 29.4 (L) 06/01/2022    MCV 91 06/01/2022     06/01/2022    Lab Results   Component Value Date    TROPONINI 0.04 04/05/2022     (H) 04/05/2022    TSH 1.53 09/15/2021    INR 1.05 09/13/2021     Lab Results   Component Value Date    TROPONINI 0.04 04/05/2022          Weight:    Wt Readings from Last 3 Encounters:   05/31/22 65.3 kg (144 lb)   04/27/22 67.6 kg (149 lb)   04/19/22 65.7 kg (144 lb 14.4 oz)       Allergies  Allergies   Allergen Reactions     Herlinda-Kit Bee Sting Shortness Of Breath     Venom-Honey Bee [Bee Venom] Shortness Of Breath     Mercurial Analogues [Mercurial Derivatives] Dermatitis     blisters     Nitrofurantoin Other (See Comments)     Burning sensation across chest and arms  Other reaction(s): arms and chest burning     Sulfa (Sulfonamide Antibiotics) [Sulfa Drugs] Rash         Surgical History  Past Surgical History:   Procedure Laterality Date     ABLATION OF DYSRHYTHMIC FOCUS  04/11/2013    AV analia reentry tachycardia, partially successful     BIOPSY SKIN (LOCATION)       CARDIAC CATHETERIZATION  03/10/2016     CV CORONARY ANGIOGRAM N/A 05/26/2021    Procedure: Coronary Angiogram;  Surgeon: Yony Gillis MD;  Location: [facility] Cardiac [facility] Lab;  Service: Cardiology     CV LEFT HEART CATHETERIZATION WITHOUT LEFT VENTRICULOGRAM Left 05/26/2021    Procedure: Left Heart Catheterization Without Left Ventriculogram;  Surgeon: Yony Gillis MD;  Location: [facility] Cardiac Cath Lab;  Service: Cardiology     CV RIGHT HEART CATHETERIZATION N/A 05/26/2021    Procedure: Right Heart Catheterization;  Surgeon: Yony Gillis MD;  Location: [facility] Cardiac Cath Lab;  Service: Cardiology     DILATION AND CURETTAGE N/A 4/19/2022    Procedure: DILATION AND CURRETAGE;  Surgeon: Mary Reed MD;  Location: [facility]      EP PACEMAKER N/A 08/30/2021    Procedure: Electrophysiology Pacemaker;  Surgeon: Ab Saavedra MD;  Location: [facility] LAB CV     FOOT SURGERY      L foot     HAND SURGERY      on both thumbs     HYSTERECTOMY, TOTAL, ROBOT-ASSISTED, LAP, DA HAROON XI, W/BI SAL-OOPH & SNT LYMPH NODE BX, MINI LAP Bilateral 5/31/2022    Procedure: ROBOTIC ASSISTED TOTAL LAPAROSCOPIC HYSTERECTOMY, BILATERAL SALPINGO-OOPHORECTOMY, SENTINEL LYMPH NODE INJECTION AND BIOPSY, MINI-LAPAROTOMY,;  Surgeon: Mary Reed MD;  Location: [facility] OR     HYSTEROSCOPY DIAGNOSTIC N/A 4/19/2022    Procedure: HYSTEROSCOPY, DIAGNOSTIC;  Surgeon: Mary Reed MD;  Location: [facility] OR     IMPLANT PACEMAKER  04/01/2011    Second-degree AV block     OTHER SURGICAL HISTORY      SVT Ablation     PELVIC EXAM UNDER ANESTHESIA, CERVICAL DILATION, N/A      PELVIC EXAMINATION UNDER ANESTHESIA N/A 4/19/2022    Procedure: PELVIC EXAM UNDER ANESTHESIA, CERVICAL DILATION,;  Surgeon: Mary Reed MD;  Location: [facility]     NM SHLDR ARTHROSCOP,SURG,W/ROTAT CUFF REPR Left 03/09/2017    Procedure: LEFT SHOULDER ARTHROSCOPY DECOMPRESSION DISTAL CLAVICLE EXCISION  ROTATOR CUFF REPAIR BICEPS TENOTOMY AND EXTENSIVE DEBRIDEMENT;  Surgeon: Todd Riggs MD;  Location: [facility];  Service: Orthopedics     RELEASE CARPAL TUNNEL      both wrists     SKIN CANCER EXCISION      L ankle,Lleg and cheek     TOE SURGERY      L big toe joint replacement     TUBAL LIGATION         Social History  Tobacco:   History   Smoking Status     Never Smoker   Smokeless Tobacco     Never Used    Alcohol:   Social History    Substance and Sexual Activity      Alcohol use: No   Illicit Drugs:   History   Drug Use No       Family History  Family History   Problem Relation Age of Onset     Hypertension Mother      Cerebrovascular Disease Mother      Prostate Cancer Father      Cancer Father      Coronary Artery Disease  Father      Dyslipidemia Father      Dyslipidemia Sister      Dyslipidemia Brother      Hypertension Brother      Prostate Cancer Brother       Heidy Akins MD on 6/7/2022      cc: Jamie Mcconnell  \  Thank you for allowing me to participate in the care of your patient.      Sincerely,     Heidy Akins MD     [facility]  cc:   Lashell Murcia, APRN CNP  [address] [place] [address]

## 2022-06-07 NOTE — PROGRESS NOTES
HEART CARE NOTE          Assessment/Recommendations     1. HFpEF c/b ADHF   Assessment / Plan    Patient endorses symptoms consistent with with volume overload including weigh gain and abdominal distension despite escalating outpatient diuretic regimen; will give IV diuretics today and [facility] will call tomorrow to follow-up    BP adequately controlled today --> no changes to regimen at this time     2. CAD  Assessment / Plan    Denies further episodes of chest discomfort/pressure; s/p coronary angiogram significant for moderate LAD dz. Plan for medical management at that time    Continue ASA, high intensity atorvastatin, carvedilol, losartan     3. 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 Uterine mass  Assessment / Plan    S/p hysterectomy       History of Present Illness/Subjective      Ms. Sharyn Trent is a 80 y.o. female with a PMHx significant for HFpEF, hypertensive emergency, dyslipidemia, type 2 diabetes, non-obstructive CAD, heart block status post pacemaker, paroxysmal supraventricular tachycardia, melanoma, chronic left leg edema, chronic kidney disease stage III who presents to [facility] for follow-up care.     Today, Mrs. Trent endorses HF symptoms consistent with volume overload; Management plan as detailed above     ECG: Personally reviewed. Paced rhythm      Coronary angiogram:  RHC via right IJ  RA mean 4mmHg  PA mean 24mmHg  PCWP 21mmHg  LVEDP 19mmHg  Ao 153/62     PA sat 67%  Ao sat 94%     CO cindy 3.35     Angiography via left radial  LM short normal  LAD mid 50% narrowing with FFR 0.85  Circ normal  RCA normal     ECHO (personnaly Reviewed):     Left ventricle ejection fraction is normal. The estimated left ventricular ejection fraction is 55%.    Left ventricular diastolic function is abnormal.    Normal right ventricular size and systolic function.    No hemodynamically significant valvular heart abnormalities.    When  "compared to the previous study dated 3/20/2018, No significant change             Physical Examination Review of Systems   /54 (BP Location: Left arm, Patient Position: Sitting, Cuff Size: Adult Regular)   Pulse 63   Ht 1.6 m (5' 3\")   Wt 68 kg (150 lb)   BMI 26.57 kg/m    Body mass index is 26.57 kg/m .  Wt Readings from Last 3 Encounters:   06/07/22 68 kg (150 lb)   05/31/22 65.3 kg (144 lb)   04/27/22 67.6 kg (149 lb)     General Appearance:   no distress, normal body habitus   ENT/Mouth: membranes moist, no oral lesions or bleeding gums.      EYES:  no scleral icterus, normal conjunctivae   Neck: no carotid bruits or thyromegaly   Chest/Lungs:   lungs are clear to auscultation, no rales or wheezing, equal chest wall expansion    Cardiovascular:   Regular. Normal first and second heart sounds with no murmurs, rubs, or gallops; the carotid, radial and posterior tibial pulses are intact, + JVD and LE edema bilaterally    Abdomen:  no organomegaly, masses, bruits, or tenderness; bowel sounds are present   Extremities: no cyanosis or clubbing   Skin: no xanthelasma, warm.    Neurologic: alert and oriented x3, calm     Psychiatric: alert and oriented x3, calm     A complete 10 systems ROS was reviewed  And is negative except what is listed in the HPI.          Medical History  Surgical History Family History Social History   Past Medical History:   Diagnosis Date     Abnormal ECG      Anxiety      Arthritis      Chest pain      Chest pain      Chronic kidney disease     stage 3-mod.     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      Pacemaker      PSVT (paroxysmal supraventricular tachycardia) (H)      Right bundle branch " block      Skin cancer     Past Surgical History:   Procedure Laterality Date     ABLATION OF DYSRHYTHMIC FOCUS  04/11/2013    AV analia reentry tachycardia, partially successful     BIOPSY SKIN (LOCATION)       CARDIAC CATHETERIZATION  03/10/2016     CV CORONARY ANGIOGRAM N/A 05/26/2021    Procedure: Coronary Angiogram;  Surgeon: Yony Gillis MD;  Location: [facility] Cardiac Cath Lab;  Service: Cardiology     CV LEFT HEART CATHETERIZATION WITHOUT LEFT VENTRICULOGRAM Left 05/26/2021    Procedure: Left Heart Catheterization Without Left Ventriculogram;  Surgeon: Yony Gillis MD;  Location: [facility] Cardiac Cath Lab;  Service: Cardiology     CV RIGHT HEART CATHETERIZATION N/A 05/26/2021    Procedure: Right Heart Catheterization;  Surgeon: Yony Gillis MD;  Location: [facility] Cardiac Cath Lab;  Service: Cardiology     DILATION AND CURETTAGE N/A 4/19/2022    Procedure: DILATION AND CURRETAGE;  Surgeon: Mary Reed MD;  Location: [facility] [place] OR     EP PACEMAKER N/A 08/30/2021    Procedure: Electrophysiology Pacemaker;  Surgeon: Ab Saavedra MD;  Location: [facility] CV     FOOT SURGERY      L foot     HAND SURGERY      on both thumbs     HYSTERECTOMY, TOTAL, ROBOT-ASSISTED, LAP, DA HAROON XI, W/BI SAL-OOPH & SNT LYMPH NODE BX, MINI LAP Bilateral 5/31/2022    Procedure: ROBOTIC ASSISTED TOTAL LAPAROSCOPIC HYSTERECTOMY, BILATERAL SALPINGO-OOPHORECTOMY, SENTINEL LYMPH NODE INJECTION AND BIOPSY, MINI-LAPAROTOMY,;  Surgeon: Mary Reed MD;  Location: [facility] [place] OR     HYSTEROSCOPY DIAGNOSTIC N/A 4/19/2022    Procedure: HYSTEROSCOPY, DIAGNOSTIC;  Surgeon: Mary Reed MD;  Location: [facility] [place] OR     IMPLANT PACEMAKER  04/01/2011    Second-degree AV block     OTHER SURGICAL HISTORY      SVT Ablation     PELVIC EXAM UNDER ANESTHESIA, CERVICAL DILATION, N/A      PELVIC EXAMINATION UNDER ANESTHESIA N/A 4/19/2022    Procedure: PELVIC  EXAM UNDER ANESTHESIA, CERVICAL DILATION,;  Surgeon: Mary Reed MD;  Location: [facility] SHLDR ARTHROSCOP,SURG,W/ROTAT CUFF REPR Left 03/09/2017    Procedure: LEFT SHOULDER ARTHROSCOPY DECOMPRESSION DISTAL CLAVICLE EXCISION  ROTATOR CUFF REPAIR BICEPS TENOTOMY AND EXTENSIVE DEBRIDEMENT;  Surgeon: Todd Riggs MD;  Location: [facility];  Service: Orthopedics     RELEASE CARPAL TUNNEL      both wrists     SKIN CANCER EXCISION      L ankle,Lleg and cheek     TOE SURGERY      L big toe joint replacement     TUBAL LIGATION      no family history of premature coronary artery disease Social History     Socioeconomic History     Marital status:      Spouse name: Not on file     Number of children: Not on file     Years of education: Not on file     Highest education level: Not on file   Occupational History     Not on file   Tobacco Use     Smoking status: Never Smoker     Smokeless tobacco: Never Used   Substance and Sexual Activity     Alcohol use: No     Drug use: No     Sexual activity: Yes     Partners: Male     Birth control/protection: Surgical   Other Topics Concern     Not on file   Social History Narrative     Not on file     Social Determinants of Health     Financial Resource Strain: Not on file   Food Insecurity: Not on file   Transportation Needs: Not on file   Physical Activity: Not on file   Stress: Not on file   Social Connections: Not on file   Intimate Partner Violence: Not on file   Housing Stability: Not on file           Lab Results    Chemistry/lipid CBC Cardiac Enzymes/BNP/TSH/INR   Lab Results   Component Value Date    CHOL 126 03/31/2022    HDL 46 (L) 03/31/2022    TRIG 53 03/31/2022    BUN 27 06/01/2022     06/01/2022    CO2 26 06/01/2022    Lab Results   Component Value Date    WBC 9.7 06/01/2022    HGB 9.4 (L) 06/01/2022    HGB 9.4 (L) 06/01/2022    HCT 29.4 (L) 06/01/2022    MCV 91 06/01/2022     06/01/2022    Lab Results   Component  Value Date    TROPONINI 0.04 04/05/2022     (H) 04/05/2022    TSH 1.53 09/15/2021    INR 1.05 09/13/2021     Lab Results   Component Value Date    TROPONINI 0.04 04/05/2022          Weight:    Wt Readings from Last 3 Encounters:   05/31/22 65.3 kg (144 lb)   04/27/22 67.6 kg (149 lb)   04/19/22 65.7 kg (144 lb 14.4 oz)       Allergies  Allergies   Allergen Reactions     Herlinda-Kit Bee Sting Shortness Of Breath     Venom-Honey Bee [Bee Venom] Shortness Of Breath     Mercurial Analogues [Mercurial Derivatives] Dermatitis     blisters     Nitrofurantoin Other (See Comments)     Burning sensation across chest and arms  Other reaction(s): arms and chest burning     Sulfa (Sulfonamide Antibiotics) [Sulfa Drugs] Rash         Surgical History  Past Surgical History:   Procedure Laterality Date     ABLATION OF DYSRHYTHMIC FOCUS  04/11/2013    AV analia reentry tachycardia, partially successful     BIOPSY SKIN (LOCATION)       CARDIAC CATHETERIZATION  03/10/2016     CV CORONARY ANGIOGRAM N/A 05/26/2021    Procedure: Coronary Angiogram;  Surgeon: Yony Gillis MD;  Location: [facility] Cardiac Cath Lab;  Service: Cardiology     CV LEFT HEART CATHETERIZATION WITHOUT LEFT VENTRICULOGRAM Left 05/26/2021    Procedure: Left Heart Catheterization Without Left Ventriculogram;  Surgeon: Yony Gillis MD;  Location: [facility] Cardiac Cath Lab;  Service: Cardiology     CV RIGHT HEART CATHETERIZATION N/A 05/26/2021    Procedure: Right Heart Catheterization;  Surgeon: Yony Gillis MD;  Location: [facility] Cardiac Cath Lab;  Service: Cardiology     DILATION AND CURETTAGE N/A 4/19/2022    Procedure: DILATION AND CURRETAGE;  Surgeon: Mary Reed MD;  Location: [facility] OR     EP PACEMAKER N/A 08/30/2021    Procedure: Electrophysiology Pacemaker;  Surgeon: Ab Saavedra MD;  Location: [facility] LAB CV     FOOT SURGERY      L foot     HAND SURGERY      on both thumbs     HYSTERECTOMY,  TOTAL, ROBOT-ASSISTED, LAP, DA HAROON XI, W/BI SAL-OOPH & SNT LYMPH NODE BX, MINI LAP Bilateral 5/31/2022    Procedure: ROBOTIC ASSISTED TOTAL LAPAROSCOPIC HYSTERECTOMY, BILATERAL SALPINGO-OOPHORECTOMY, SENTINEL LYMPH NODE INJECTION AND BIOPSY, MINI-LAPAROTOMY,;  Surgeon: Mary Reed MD;  Location: [facility] OR     HYSTEROSCOPY DIAGNOSTIC N/A 4/19/2022    Procedure: HYSTEROSCOPY, DIAGNOSTIC;  Surgeon: Mary Reed MD;  Location: [facility] OR     IMPLANT PACEMAKER  04/01/2011    Second-degree AV block     OTHER SURGICAL HISTORY      SVT Ablation     PELVIC EXAM UNDER ANESTHESIA, CERVICAL DILATION, N/A      PELVIC EXAMINATION UNDER ANESTHESIA N/A 4/19/2022    Procedure: PELVIC EXAM UNDER ANESTHESIA, CERVICAL DILATION,;  Surgeon: Mary Reed MD;  Location: [facility] OR     LA SHLDR ARTHROSCOP,SURG,W/ROTAT CUFF REPR Left 03/09/2017    Procedure: LEFT SHOULDER ARTHROSCOPY DECOMPRESSION DISTAL CLAVICLE EXCISION  ROTATOR CUFF REPAIR BICEPS TENOTOMY AND EXTENSIVE DEBRIDEMENT;  Surgeon: Todd Riggs MD;  Location: [facility] OR;  Service: Orthopedics     RELEASE CARPAL TUNNEL      both wrists     SKIN CANCER EXCISION      L ankle,Lleg and cheek     TOE SURGERY      L big toe joint replacement     TUBAL LIGATION         Social History  Tobacco:   History   Smoking Status     Never Smoker   Smokeless Tobacco     Never Used    Alcohol:   Social History    Substance and Sexual Activity      Alcohol use: No   Illicit Drugs:   History   Drug Use No       Family History  Family History   Problem Relation Age of Onset     Hypertension Mother      Cerebrovascular Disease Mother      Prostate Cancer Father      Cancer Father      Coronary Artery Disease Father      Dyslipidemia Father      Dyslipidemia Sister      Dyslipidemia Brother      Hypertension Brother      Prostate Cancer Brother           Heidy Akins MD on 6/7/2022      cc: Jamie Mcconnell  \

## 2022-06-08 ENCOUNTER — LAB (OUTPATIENT)
Dept: LAB | Facility: HOSPITAL | Age: 82
End: 2022-06-08
Payer: COMMERCIAL

## 2022-06-08 DIAGNOSIS — I50.9 ACUTE ON CHRONIC CONGESTIVE HEART FAILURE, UNSPECIFIED HEART FAILURE TYPE (H): ICD-10-CM

## 2022-06-08 LAB
ANION GAP SERPL CALCULATED.3IONS-SCNC: 10 MMOL/L (ref 5–18)
BUN SERPL-MCNC: 32 MG/DL (ref 8–28)
CALCIUM SERPL-MCNC: 9.7 MG/DL (ref 8.5–10.5)
CHLORIDE BLD-SCNC: 100 MMOL/L (ref 98–107)
CO2 SERPL-SCNC: 27 MMOL/L (ref 22–31)
CREAT SERPL-MCNC: 1.37 MG/DL (ref 0.6–1.1)
GFR SERPL CREATININE-BSD FRML MDRD: 39 ML/MIN/1.73M2
GLUCOSE BLD-MCNC: 105 MG/DL (ref 70–125)
POTASSIUM BLD-SCNC: 4.1 MMOL/L (ref 3.5–5)
SODIUM SERPL-SCNC: 137 MMOL/L (ref 136–145)

## 2022-06-08 PROCEDURE — 36415 COLL VENOUS BLD VENIPUNCTURE: CPT

## 2022-06-08 PROCEDURE — 80048 BASIC METABOLIC PNL TOTAL CA: CPT

## 2022-06-08 RX ORDER — ACETAMINOPHEN 500 MG
500-1000 TABLET ORAL EVERY 6 HOURS PRN
COMMUNITY

## 2022-06-08 RX ORDER — FUROSEMIDE 20 MG
40 TABLET ORAL DAILY
Qty: 90 TABLET | Refills: 3 | Status: SHIPPED | OUTPATIENT
Start: 2022-06-08 | End: 2022-12-28

## 2022-06-08 RX ORDER — PHENOL 1.4 %
1 AEROSOL, SPRAY (ML) MUCOUS MEMBRANE EVERY MORNING
COMMUNITY

## 2022-06-08 NOTE — PATIENT INSTRUCTIONS
"Recommendations from today's MTM visit:                                                    MTM (medication therapy management) is a service provided by a clinical pharmacist designed to help you get the most of out of your medicines.      1. Move multivitamin to morning to maximize absorption of calcium in vitamin and Tums.   2. May consider talking with Dr. Mcconnell about increasing escitalopram dose to 7.5 or 10 mg to see if that helps more with reducing anxiety in the morning.   3. Continue current medications    Follow-up: Return in about 6 months (around 12/3/2022) for medication therapy management, with me, using a phone visit.    It was great speaking with you today.  I value your experience and would be very thankful for your time in providing feedback in our clinic survey. In the next few days, you may receive an email or text message from [employer] with a link to a survey related to your  clinical pharmacist.\"     To schedule another MTM appointment, please call the MTM scheduling line at [phone].    My Clinical Pharmacist's contact information:                                                      Please feel free to contact me with any questions or concerns you have.      Neha Meraz, PharmD, BCACP  Medication Therapy Management Pharmacist  [facility]  Pager: [phone]         "

## 2022-06-08 NOTE — TELEPHONE ENCOUNTER
"Pt reports wt is down to 143.8# from 145.6#   She feels that this is a help to her, she feels better overall today. \"I feel a spurt of energy today\" she states. Continues to have abdominal distention, although not as tense. Her Lasix daily dose was also increased to 40mg daily at her appt yesterday.   Will continue to monitor her daily wts and commit to the low sodium and fluid restriction.    Yomaira Sanchez RN BSN, CHFN        "

## 2022-06-10 ENCOUNTER — MEDICAL CORRESPONDENCE (OUTPATIENT)
Dept: HEALTH INFORMATION MANAGEMENT | Facility: CLINIC | Age: 82
End: 2022-06-10
Payer: COMMERCIAL

## 2022-06-14 ENCOUNTER — TELEPHONE (OUTPATIENT)
Dept: RHEUMATOLOGY | Facility: CLINIC | Age: 82
End: 2022-06-14

## 2022-06-14 NOTE — TELEPHONE ENCOUNTER
Impression: Primary open-angle glaucoma, severe stage, left eye: H40.1123. Plan:  Continue as previously prescribed Brimonidine 0.2% : Instill one drop in both eyes twice daily - Continue as previously prescribed Latanoprost 0.005 % eye drops : Instill one drop in both eyes at bedtime. Start Timolol BID in both eyes. Jeri from [facility] is calling because they just faxed a referral to for patient to see Dr. Felix for Sarcoidosis. They are wanting patient to be seen in the next 1-2 weeks. Please advise if Dr. Felix can see patient. Please call Jeri at [phone].

## 2022-06-15 ENCOUNTER — OFFICE VISIT (OUTPATIENT)
Dept: RHEUMATOLOGY | Facility: CLINIC | Age: 82
End: 2022-06-15

## 2022-06-15 ENCOUNTER — ANCILLARY PROCEDURE (OUTPATIENT)
Dept: CARDIOLOGY | Facility: CLINIC | Age: 82
End: 2022-06-15
Attending: INTERNAL MEDICINE
Payer: COMMERCIAL

## 2022-06-15 VITALS
HEART RATE: 68 BPM | BODY MASS INDEX: 26.57 KG/M2 | DIASTOLIC BLOOD PRESSURE: 64 MMHG | SYSTOLIC BLOOD PRESSURE: 120 MMHG | WEIGHT: 150 LBS

## 2022-06-15 DIAGNOSIS — I44.2 THIRD DEGREE AV BLOCK (H): ICD-10-CM

## 2022-06-15 DIAGNOSIS — L92.9 NON-CASEATING GRANULOMA: Primary | ICD-10-CM

## 2022-06-15 DIAGNOSIS — Z95.0 CARDIAC PACEMAKER: ICD-10-CM

## 2022-06-15 DIAGNOSIS — M15.0 PRIMARY OSTEOARTHRITIS INVOLVING MULTIPLE JOINTS: ICD-10-CM

## 2022-06-15 DIAGNOSIS — R91.1 LUNG NODULE: ICD-10-CM

## 2022-06-15 LAB
MDC_IDC_EPISODE_DTM: NORMAL
MDC_IDC_EPISODE_DURATION: 10 S
MDC_IDC_EPISODE_DURATION: 12 S
MDC_IDC_EPISODE_DURATION: 14 S
MDC_IDC_EPISODE_DURATION: 14 S
MDC_IDC_EPISODE_DURATION: 18 S
MDC_IDC_EPISODE_DURATION: 28 S
MDC_IDC_EPISODE_DURATION: 3986 S
MDC_IDC_EPISODE_ID: NORMAL
MDC_IDC_EPISODE_TYPE: NORMAL
MDC_IDC_LEAD_IMPLANT_DT: NORMAL
MDC_IDC_LEAD_IMPLANT_DT: NORMAL
MDC_IDC_LEAD_LOCATION: NORMAL
MDC_IDC_LEAD_LOCATION: NORMAL
MDC_IDC_LEAD_LOCATION_DETAIL_1: NORMAL
MDC_IDC_LEAD_LOCATION_DETAIL_1: NORMAL
MDC_IDC_LEAD_MFG: NORMAL
MDC_IDC_LEAD_MFG: NORMAL
MDC_IDC_LEAD_MODEL: NORMAL
MDC_IDC_LEAD_MODEL: NORMAL
MDC_IDC_LEAD_POLARITY_TYPE: NORMAL
MDC_IDC_LEAD_POLARITY_TYPE: NORMAL
MDC_IDC_LEAD_SERIAL: NORMAL
MDC_IDC_LEAD_SERIAL: NORMAL
MDC_IDC_MSMT_BATTERY_DTM: NORMAL
MDC_IDC_MSMT_BATTERY_REMAINING_LONGEVITY: 125 MO
MDC_IDC_MSMT_BATTERY_REMAINING_PERCENTAGE: 95.5 %
MDC_IDC_MSMT_BATTERY_RRT_TRIGGER: NORMAL
MDC_IDC_MSMT_BATTERY_STATUS: NORMAL
MDC_IDC_MSMT_BATTERY_VOLTAGE: 3.02 V
MDC_IDC_MSMT_LEADCHNL_RA_IMPEDANCE_VALUE: 390 OHM
MDC_IDC_MSMT_LEADCHNL_RA_LEAD_CHANNEL_STATUS: NORMAL
MDC_IDC_MSMT_LEADCHNL_RA_PACING_THRESHOLD_AMPLITUDE: 0.5 V
MDC_IDC_MSMT_LEADCHNL_RA_PACING_THRESHOLD_PULSEWIDTH: 0.5 MS
MDC_IDC_MSMT_LEADCHNL_RA_SENSING_INTR_AMPL: 2.9 MV
MDC_IDC_MSMT_LEADCHNL_RV_IMPEDANCE_VALUE: 440 OHM
MDC_IDC_MSMT_LEADCHNL_RV_LEAD_CHANNEL_STATUS: NORMAL
MDC_IDC_MSMT_LEADCHNL_RV_PACING_THRESHOLD_AMPLITUDE: 0.75 V
MDC_IDC_MSMT_LEADCHNL_RV_PACING_THRESHOLD_PULSEWIDTH: 0.5 MS
MDC_IDC_MSMT_LEADCHNL_RV_SENSING_INTR_AMPL: 10.9 MV
MDC_IDC_PG_IMPLANT_DTM: NORMAL
MDC_IDC_PG_MFG: NORMAL
MDC_IDC_PG_MODEL: NORMAL
MDC_IDC_PG_SERIAL: NORMAL
MDC_IDC_PG_TYPE: NORMAL
MDC_IDC_SESS_CLINIC_NAME: NORMAL
MDC_IDC_SESS_DTM: NORMAL
MDC_IDC_SESS_REPROGRAMMED: NO
MDC_IDC_SESS_TYPE: NORMAL
MDC_IDC_SET_BRADY_AT_MODE_SWITCH_MODE: NORMAL
MDC_IDC_SET_BRADY_AT_MODE_SWITCH_RATE: 180 {BEATS}/MIN
MDC_IDC_SET_BRADY_LOWRATE: 50 {BEATS}/MIN
MDC_IDC_SET_BRADY_MAX_SENSOR_RATE: 100 {BEATS}/MIN
MDC_IDC_SET_BRADY_MAX_TRACKING_RATE: 100 {BEATS}/MIN
MDC_IDC_SET_BRADY_MODE: NORMAL
MDC_IDC_SET_BRADY_PAV_DELAY_HIGH: 100 MS
MDC_IDC_SET_BRADY_PAV_DELAY_LOW: 300 MS
MDC_IDC_SET_BRADY_SAV_DELAY_HIGH: 100 MS
MDC_IDC_SET_BRADY_SAV_DELAY_LOW: 130 MS
MDC_IDC_SET_LEADCHNL_RA_PACING_AMPLITUDE: 1.5 V
MDC_IDC_SET_LEADCHNL_RA_PACING_ANODE_ELECTRODE_1: NORMAL
MDC_IDC_SET_LEADCHNL_RA_PACING_ANODE_LOCATION_1: NORMAL
MDC_IDC_SET_LEADCHNL_RA_PACING_CAPTURE_MODE: NORMAL
MDC_IDC_SET_LEADCHNL_RA_PACING_CATHODE_ELECTRODE_1: NORMAL
MDC_IDC_SET_LEADCHNL_RA_PACING_CATHODE_LOCATION_1: NORMAL
MDC_IDC_SET_LEADCHNL_RA_PACING_POLARITY: NORMAL
MDC_IDC_SET_LEADCHNL_RA_PACING_PULSEWIDTH: 0.5 MS
MDC_IDC_SET_LEADCHNL_RA_SENSING_ADAPTATION_MODE: NORMAL
MDC_IDC_SET_LEADCHNL_RA_SENSING_ANODE_ELECTRODE_1: NORMAL
MDC_IDC_SET_LEADCHNL_RA_SENSING_ANODE_LOCATION_1: NORMAL
MDC_IDC_SET_LEADCHNL_RA_SENSING_CATHODE_ELECTRODE_1: NORMAL
MDC_IDC_SET_LEADCHNL_RA_SENSING_CATHODE_LOCATION_1: NORMAL
MDC_IDC_SET_LEADCHNL_RA_SENSING_POLARITY: NORMAL
MDC_IDC_SET_LEADCHNL_RA_SENSING_SENSITIVITY: 0.2 MV
MDC_IDC_SET_LEADCHNL_RV_PACING_AMPLITUDE: 1 V
MDC_IDC_SET_LEADCHNL_RV_PACING_ANODE_ELECTRODE_1: NORMAL
MDC_IDC_SET_LEADCHNL_RV_PACING_ANODE_LOCATION_1: NORMAL
MDC_IDC_SET_LEADCHNL_RV_PACING_CAPTURE_MODE: NORMAL
MDC_IDC_SET_LEADCHNL_RV_PACING_CATHODE_ELECTRODE_1: NORMAL
MDC_IDC_SET_LEADCHNL_RV_PACING_CATHODE_LOCATION_1: NORMAL
MDC_IDC_SET_LEADCHNL_RV_PACING_POLARITY: NORMAL
MDC_IDC_SET_LEADCHNL_RV_PACING_PULSEWIDTH: 0.5 MS
MDC_IDC_SET_LEADCHNL_RV_SENSING_ADAPTATION_MODE: NORMAL
MDC_IDC_SET_LEADCHNL_RV_SENSING_ANODE_ELECTRODE_1: NORMAL
MDC_IDC_SET_LEADCHNL_RV_SENSING_ANODE_LOCATION_1: NORMAL
MDC_IDC_SET_LEADCHNL_RV_SENSING_CATHODE_ELECTRODE_1: NORMAL
MDC_IDC_SET_LEADCHNL_RV_SENSING_CATHODE_LOCATION_1: NORMAL
MDC_IDC_SET_LEADCHNL_RV_SENSING_POLARITY: NORMAL
MDC_IDC_SET_LEADCHNL_RV_SENSING_SENSITIVITY: 2 MV
MDC_IDC_STAT_AT_BURDEN_PERCENT: 1 %
MDC_IDC_STAT_AT_DTM_END: NORMAL
MDC_IDC_STAT_AT_DTM_START: NORMAL
MDC_IDC_STAT_AT_MODE_SW_COUNT: 51
MDC_IDC_STAT_AT_MODE_SW_COUNT_PER_DAY: 1
MDC_IDC_STAT_AT_MODE_SW_MAX_DURATION: 3986 S
MDC_IDC_STAT_AT_MODE_SW_PERCENT_TIME: 1 %
MDC_IDC_STAT_BRADY_AP_VP_PERCENT: 3.8 %
MDC_IDC_STAT_BRADY_AP_VS_PERCENT: 1 %
MDC_IDC_STAT_BRADY_AS_VP_PERCENT: 92 %
MDC_IDC_STAT_BRADY_AS_VS_PERCENT: 1.8 %
MDC_IDC_STAT_BRADY_DTM_END: NORMAL
MDC_IDC_STAT_BRADY_DTM_START: NORMAL
MDC_IDC_STAT_BRADY_RA_PERCENT_PACED: 1 %
MDC_IDC_STAT_BRADY_RV_PERCENT_PACED: 96 %
MDC_IDC_STAT_CRT_DTM_END: NORMAL
MDC_IDC_STAT_CRT_DTM_START: NORMAL
MDC_IDC_STAT_HEART_RATE_ATRIAL_MAX: 330 {BEATS}/MIN
MDC_IDC_STAT_HEART_RATE_ATRIAL_MEAN: 68 {BEATS}/MIN
MDC_IDC_STAT_HEART_RATE_ATRIAL_MIN: 40 {BEATS}/MIN
MDC_IDC_STAT_HEART_RATE_DTM_END: NORMAL
MDC_IDC_STAT_HEART_RATE_DTM_START: NORMAL
MDC_IDC_STAT_HEART_RATE_VENTRICULAR_MAX: 200 {BEATS}/MIN
MDC_IDC_STAT_HEART_RATE_VENTRICULAR_MEAN: 68 {BEATS}/MIN
MDC_IDC_STAT_HEART_RATE_VENTRICULAR_MIN: 30 {BEATS}/MIN

## 2022-06-15 PROCEDURE — 93296 REM INTERROG EVL PM/IDS: CPT | Performed by: INTERNAL MEDICINE

## 2022-06-15 PROCEDURE — 99204 OFFICE O/P NEW MOD 45 MIN: CPT | Performed by: INTERNAL MEDICINE

## 2022-06-15 PROCEDURE — 93294 REM INTERROG EVL PM/LDLS PM: CPT | Performed by: INTERNAL MEDICINE

## 2022-06-15 NOTE — TELEPHONE ENCOUNTER
Please call to see if pt can come in today at 2pm to see Dr Felix.     Dr Felix will be out of clinic the next 2 weeks, so availability is limited

## 2022-06-15 NOTE — PROGRESS NOTES
This document was created using a software with less than 100% fidelity, at times resulting in unintended, even erroneous syntax and grammar.  The reader is advised to keep this under consideration while reviewing, interpreting this note.      Rheumatology Consult Note      Sharyn Trent     YOB: 1940 Age: 81 year old    Date of visit: 6/15/22    PCP: Jamie Mcconnell    Chief Complaint   Patient presents with:  Consult      Assessment and Plan     Sharyn was seen today for consult.    Diagnoses and all orders for this visit:    Non-caseating granuloma    Primary osteoarthritis involving multiple joints    Lung nodule           This patient presents for evaluation of question of sarcoidosis, she had it discovered after incidental finding after she had had a CT scan of the chest abdomen and pelvis when she was evaluated at a emergency room with nonspecific symptoms, she reports no new joint symptoms she does have osteoarthritis which is virtually asymptomatic, her pulmonary symptoms includes occasional cough, she has no features suggestive of uveitis, neuropathy, she has had no features of erythema nodosum.  She has various comorbidities including diastolic disease.  I called Dr. Riggs's office who had requested this consultation for further discussion and clarity.  Message was left for her to call back.  At this time I have reassured the patient that from a rheumatologic perspective, even if this turns out to be sarcoidosis, she would want to consider at least a pulmonary visit and come back to rheumatology should she have joint symptoms..          HPI   Sharyn Trent is a 81 year old female  is seen today for evaluation of this patient is here from oncology accompanied by her daughter for evaluation of question of sarcoidosis.  She reports that in April of this year, not feeling well, she was evaluated at the emergency room and a question of possible stroke/MI came up and she had further work-up  "which then led to CT scan of lungs and abdomen and pelvis.  Where on an incidental finding she was found to have abnormalities of the uterus as well as lung nodules.  She went on to have the hysterectomy the lymph nodes there showed noncaseating granulomas as appended below.  She also recalls what sounds like bronchoscopy and biopsy the results of which he understood were \"not cancerous\" those data was not available today for review.    Apart from incisional discomfort in her abdomen and she reports no pain.  She gets occasional minimal joint pain such as in the knees.  She reports no features suggestive of erythema nodosum.  She has not had features of iritis.  She has had cataract surgery.  She reports no features suggestive of inflammatory joint disease.  She has not had a rash.  She has had no history of cervical lymphadenopathy or parotid area swelling.  She reports occasional cough she reports no other pulmonary symptoms.  Her daughter noted that she has had several preop during the recent past and no other abnormalities have been detected.  She has not been seen in pulmonary so far.  Her current list of diagnosis is reviewed.  She has had joint surgery such as in her left foot, sounds like first MTP arthroplasty.  In her family history there is no immediate family member with connective tissue disease psoriasis sarcoidosis her knees said to have lupus.  Alcohol none, she is not a smoker.  She retired as a  from school.  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           A) UTERUS, CERVIX, BILATERAL FALLOPIAN TUBES AND OVARIES, HYSTERECTOMY BILATERAL SALPINGO-OOPHORECTOMY:  -INACTIVE ENDOMETRIUM  -LEIOMYOMA WITH DEGENERATIVE CHANGES, 6.5 CM AND ADDITIONAL SMALLER LEIOMYOMA  -RIGHT PARATUBAL CYST  -CERVIX WITH NABOTHIAN CYSTS  -LEFT FALLOPIAN TUBE AND BILATERAL OVARIES ARE WITHOUT DIAGNOSTIC ABNORMALITY   -BLOOD VESSELS WITH MICROCALCIFICATIONS    B) SENTINEL LYMPH NODES, RIGHT OBTURATOR " #1, BIOPSY:  -NONNECROTIZING GRANULOMATOUS INFLAMMATION  -TWO LYMPH NODES NEGATIVE FOR METASTATIC TUMOR (0/2)      C) SENTINEL LYMPH NODE, RIGHT EXTERNAL ILIAC ARTERY #2, BIOPSY:  -NONNECROTIZING GRANULOMATOUS INFLAMMATION  -ONE LYMPH NODE NEGATIVE FOR METASTATIC TUMOR (0/1)      D) SENTINEL LYMPH NODE, RIGHT COMMON ILIAC ARTERY #3, BIOPSY:  -ONE LYMPH NODE NEGATIVE FOR METASTATIC TUMOR (0/1)     E) SENTINEL LYMPH NODE, LEFT EXTERNAL ILIAC, #1, BIOPSY:  -NONNECROTIZING GRANULOMATOUS INFLAMMATION  -ONE LYMPH NODE NEGATIVE FOR METASTATIC TUMOR (0/1)     Active Problem List     Patient Active Problem List   Diagnosis     Atypical chest pain     Malignant essential hypertension     Dyslipidemia, goal LDL below 70     DM (diabetes mellitus), type 2 (H)     Third degree AV block (H)     Chest pain     Ectopic atrial tachycardia (H)     Acute diastolic CHF (congestive heart failure) (H)     Acute respiratory failure with hypoxia (H)     Pneumonia of left lower lobe due to infectious organism     Stroke-like symptoms     Abnormal nuclear stress test     Chronic kidney disease, stage 3a (H)     Diabetes mellitus type 2 without retinopathy (H)     Gastroesophageal reflux disease without esophagitis     Thyroid nodule     Hypertensive emergency     Acute on chronic congestive heart failure, unspecified heart failure type (H)     Benign essential hypertension     Uterine mass     Past Medical History     Past Medical History:   Diagnosis Date     Abnormal ECG      Anxiety      Arthritis      Chest pain      Chest pain      Chronic kidney disease     stage 3-mod.     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      Pacemaker      PSVT (paroxysmal  supraventricular tachycardia) (H)      Right bundle branch block      Skin cancer      Past Surgical History     Past Surgical History:   Procedure Laterality Date     ABLATION OF DYSRHYTHMIC FOCUS  04/11/2013    AV analia reentry tachycardia, partially successful     BIOPSY SKIN (LOCATION)       CARDIAC CATHETERIZATION  03/10/2016     CV CORONARY ANGIOGRAM N/A 05/26/2021    Procedure: Coronary Angiogram;  Surgeon: Yony Gillis MD;  Location: [facility] Cardiac Cath Lab;  Service: Cardiology     CV LEFT HEART CATHETERIZATION WITHOUT LEFT VENTRICULOGRAM Left 05/26/2021    Procedure: Left Heart Catheterization Without Left Ventriculogram;  Surgeon: Yony Gillis MD;  Location: [facility] Cardiac Cath Lab;  Service: Cardiology     CV RIGHT HEART CATHETERIZATION N/A 05/26/2021    Procedure: Right Heart Catheterization;  Surgeon: Yony Gillis MD;  Location: [facility] Cardiac Cath Lab;  Service: Cardiology     DILATION AND CURETTAGE N/A 4/19/2022    Procedure: DILATION AND CURRETAGE;  Surgeon: Mary Reed MD;  Location: [facility] OR     EP PACEMAKER N/A 08/30/2021    Procedure: Electrophysiology Pacemaker;  Surgeon: Ab Saavedra MD;  Location: [facility] CV     FOOT SURGERY      L foot     HAND SURGERY      on both thumbs     HYSTERECTOMY, TOTAL, ROBOT-ASSISTED, LAP, DA HAROON XI, W/BI SAL-OOPH & SNT LYMPH NODE BX, MINI LAP Bilateral 5/31/2022    Procedure: ROBOTIC ASSISTED TOTAL LAPAROSCOPIC HYSTERECTOMY, BILATERAL SALPINGO-OOPHORECTOMY, SENTINEL LYMPH NODE INJECTION AND BIOPSY, MINI-LAPAROTOMY,;  Surgeon: Mary Reed MD;  Location: [facility] OR     HYSTEROSCOPY DIAGNOSTIC N/A 4/19/2022    Procedure: HYSTEROSCOPY, DIAGNOSTIC;  Surgeon: Mary Reed MD;  Location: [facility] OR     IMPLANT PACEMAKER  04/01/2011    Second-degree AV block     OTHER SURGICAL HISTORY      SVT Ablation     PELVIC EXAM UNDER ANESTHESIA, CERVICAL  DILATION, N/A      PELVIC EXAMINATION UNDER ANESTHESIA N/A 4/19/2022    Procedure: PELVIC EXAM UNDER ANESTHESIA, CERVICAL DILATION,;  Surgeon: Mary Reed MD;  Location: [facility] SHLDR ARTHROSCOP,SURG,W/ROTAT CUFF REPR Left 03/09/2017    Procedure: LEFT SHOULDER ARTHROSCOPY DECOMPRESSION DISTAL CLAVICLE EXCISION  ROTATOR CUFF REPAIR BICEPS TENOTOMY AND EXTENSIVE DEBRIDEMENT;  Surgeon: Todd Riggs MD;  Location: [facility];  Service: Orthopedics     RELEASE CARPAL TUNNEL      both wrists     SKIN CANCER EXCISION      L ankle,Lleg and cheek     TOE SURGERY      L big toe joint replacement     TUBAL LIGATION       Allergy     Allergies   Allergen Reactions     Herlinda-Kit Bee Sting Shortness Of Breath     Venom-Honey Bee [Bee Venom] Shortness Of Breath     Mercurial Analogues [Mercurial Derivatives] Dermatitis     blisters     Nitrofurantoin Other (See Comments)     Burning sensation across chest and arms  Other reaction(s): arms and chest burning     Sulfa (Sulfonamide Antibiotics) [Sulfa Drugs] Rash     Current Medication List     Current Outpatient Medications   Medication Sig     acetaminophen (TYLENOL) 500 MG tablet Take 500-1,000 mg by mouth every 6 hours as needed for mild pain     aspirin (ASA) 325 MG EC tablet Take 325 mg by mouth every evening     atorvastatin (LIPITOR) 80 MG tablet Take 80 mg by mouth At Bedtime     calcium carbonate (TUMS) 500 MG chewable tablet Take 1-2 chew tab by mouth daily     carvedilol (COREG) 25 MG tablet Take 1 tablet (25 mg) by mouth 2 times daily (with meals)     escitalopram (LEXAPRO) 5 MG tablet Take 5 mg by mouth daily     ferrous sulfate (FEROSUL) 325 (65 Fe) MG tablet Take 325 mg by mouth daily (with breakfast)     furosemide (LASIX) 20 MG tablet Take 2 tablets (40 mg) by mouth daily     hydrALAZINE (APRESOLINE) 100 MG tablet Take 1 tablet (100 mg) by mouth 3 times daily     LORazepam (ATIVAN) 0.5 MG tablet [LORAZEPAM (ATIVAN) 0.5 MG  TABLET] Take 1 tablet (0.5 mg total) by mouth every 6 (six) hours as needed for anxiety (or tremors).     losartan (COZAAR) 50 MG tablet Take 1 tablet (50 mg) by mouth every evening     metFORMIN (GLUCOPHAGE) 500 MG tablet [METFORMIN (GLUCOPHAGE) 500 MG TABLET] Take 1 tablet (500 mg total) by mouth 2 (two) times a day with meals. At breakfast and at dinner     Multiple Vitamins-Minerals (MULTIVITAMIN ADULTS 50+) TABS Take 1 tablet by mouth every morning     omeprazole (PRILOSEC) 20 MG capsule Take 20 mg by mouth daily before breakfast     prednisoLONE acetate (PRED-FORTE) 1 % ophthalmic suspension Place 1 drop into the right eye daily     vit C/E/Zn/coppr/lutein/zeaxan (PRESERVISION AREDS-2 ORAL) Take 1 tablet by mouth 2 times daily (before meals)     No current facility-administered medications for this visit.            Family History     Family History   Problem Relation Age of Onset     Hypertension Mother      Cerebrovascular Disease Mother      Prostate Cancer Father      Cancer Father      Coronary Artery Disease Father      Dyslipidemia Father      Dyslipidemia Sister      Dyslipidemia Brother      Hypertension Brother      Prostate Cancer Brother          Physical Exam     COMPREHENSIVE EXAMINATION:  Vitals:    06/15/22 1401   Weight: 68 kg (150 lb)     A well appearing alert oriented female. Vital data as noted above. Her eyes examined for inflammation/scleromalacia. ENT examined for oral mucositis, moisture, thrush, nasal deformity, external ear redness, deformity. Her neck is examined for suppleness and lymphadenopathy.  Cardiopulmonary examination without dyspnea at rest, use of accessory muscles of breathing, wheezing, edema, peripheral or central cyanosis.  Abdomen examined for softness, tenderness, obvious organomegaly.  Skin examined for heliotrope, malar area eruption, lupus pernio, periungual erythema, sclerodactyly, papules, erythema nodosum, purpura, nail pitting, onycholysis, and obvious  psoriasis lesion. Neurological examination done for alertness, speech, facial symmetry,  tone and power in upper and lower extremities, and gait. The joint examination is performed for swelling, tenderness, warmth, erythema, and range of motion in the following joints: DIPs, PIPs, MCPs, wrists, first CMC's, elbows, shoulders, hips, knees, ankles, feet; spine for range of motion and paraspinal muscles for tenderness. The salient normal / abnormal findings are appended.  She has Heberden's nodes.  She does not have dactylitis of the toes of the digits, no ocular inflammatory changes she does not have parotid swelling, submandibular swelling, or cervical lymphadenopathy.  No erythema nodosum.    Labs / Imaging (select studies)     No results found for: PPDINDURATIO, PPDREDNESS, TBGOLT, RHF, CCPABG, URIC, [id number], [id number], ANTIDNA, ANTIDNAINT, CARIA, [id number], [id number], ENASM, ENASCL, JO1, ENASSA, ENASSB, CENTA, [id number], [id number], [id number]   Hemoglobin   Date Value Ref Range Status   06/01/2022 9.4 (L) 11.7 - 15.7 g/dL Final   06/01/2022 9.4 (L) 11.7 - 15.7 g/dL Final   05/31/2022 10.3 (L) 11.7 - 15.7 g/dL Final     Urea Nitrogen   Date Value Ref Range Status   06/08/2022 32 (H) 8 - 28 mg/dL Final   06/01/2022 27 8 - 28 mg/dL Final   05/12/2022 27 8 - 28 mg/dL Final     CRP   Date Value Ref Range Status   09/13/2021 <0.1 0.0-<0.8 mg/dL Final   03/01/2021 0.3 0.0 - 0.8 mg/dL Final     AST   Date Value Ref Range Status   06/01/2022 21 0 - 40 U/L Final   03/31/2022 35 0 - 40 U/L Final   03/07/2021 22 0 - 40 U/L Final     Albumin   Date Value Ref Range Status   06/01/2022 3.1 (L) 3.5 - 5.0 g/dL Final   03/31/2022 3.9 3.5 - 5.0 g/dL Final   03/07/2021 3.3 (L) 3.5 - 5.0 g/dL Final     Alkaline Phosphatase   Date Value Ref Range Status   06/01/2022 41 (L) 45 - 120 U/L Final   03/31/2022 77 45 - 120 U/L Final   03/07/2021 79 45 - 120 U/L Final     ALT   Date Value Ref Range Status   06/01/2022 14 0 - 45 U/L Final   03/31/2022 24 0  - 45 U/L Final   03/07/2021 27 0 - 45 U/L Final          Immunization History     Immunization History   Administered Date(s) Administered     COVID-19,PF,Moderna 01/29/2021, 02/26/2021, 11/13/2021, 04/15/2022     Flu, Unspecified 09/23/2014, 10/06/2015     Pneumo Conj 13-V (2010&after) 02/27/2015     Tdap (Adacel,Boostrix) 02/27/2015     Zoster vaccine, live 07/19/2010

## 2022-06-23 ENCOUNTER — TRANSFERRED RECORDS (OUTPATIENT)
Dept: HEALTH INFORMATION MANAGEMENT | Facility: CLINIC | Age: 82
End: 2022-06-23

## 2022-06-28 ENCOUNTER — OFFICE VISIT (OUTPATIENT)
Dept: CARDIOLOGY | Facility: CLINIC | Age: 82
End: 2022-06-28
Payer: COMMERCIAL

## 2022-06-28 VITALS
BODY MASS INDEX: 25.52 KG/M2 | RESPIRATION RATE: 16 BRPM | HEIGHT: 63 IN | HEART RATE: 60 BPM | SYSTOLIC BLOOD PRESSURE: 122 MMHG | WEIGHT: 144 LBS | DIASTOLIC BLOOD PRESSURE: 60 MMHG

## 2022-06-28 DIAGNOSIS — I50.9 ACUTE ON CHRONIC CONGESTIVE HEART FAILURE, UNSPECIFIED HEART FAILURE TYPE (H): Primary | ICD-10-CM

## 2022-06-28 LAB
ANION GAP SERPL CALCULATED.3IONS-SCNC: 6 MMOL/L (ref 5–18)
BUN SERPL-MCNC: 40 MG/DL (ref 8–28)
CALCIUM SERPL-MCNC: 9.5 MG/DL (ref 8.5–10.5)
CHLORIDE BLD-SCNC: 98 MMOL/L (ref 98–107)
CO2 SERPL-SCNC: 33 MMOL/L (ref 22–31)
CREAT SERPL-MCNC: 1.54 MG/DL (ref 0.6–1.1)
GFR SERPL CREATININE-BSD FRML MDRD: 34 ML/MIN/1.73M2
GLUCOSE BLD-MCNC: 120 MG/DL (ref 70–125)
POTASSIUM BLD-SCNC: 4.1 MMOL/L (ref 3.5–5)
SODIUM SERPL-SCNC: 137 MMOL/L (ref 136–145)

## 2022-06-28 PROCEDURE — 36415 COLL VENOUS BLD VENIPUNCTURE: CPT | Performed by: INTERNAL MEDICINE

## 2022-06-28 PROCEDURE — 80048 BASIC METABOLIC PNL TOTAL CA: CPT | Performed by: INTERNAL MEDICINE

## 2022-06-28 PROCEDURE — 99214 OFFICE O/P EST MOD 30 MIN: CPT | Performed by: INTERNAL MEDICINE

## 2022-06-28 NOTE — PROGRESS NOTES
HEART CARE NOTE          Assessment/Recommendations     1. HFpEF c/b ADHF   Assessment / Plan    Patient denies HF symptoms including orthopnea, PND, weight gain, abdominal distension; she reports that her symptoms at last visit have resolved after IV diuretics and increasing her oral dose; Today, she reports significant urinary urgency and requests modified dosing so will alternate 20 mg and 40 mg daily doses; Patient will continue to keep a weight log and [facility] will call on Monday to follow-up     2. CAD  Assessment / Plan    Denies further episodes of chest discomfort/pressure; s/p coronary angiogram significant for moderate LAD dz. Plan for medical management at that time    Continue ASA, high intensity atorvastatin, carvedilol, losartan     3. 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 Uterine mass  Assessment / Plan    S/p hysterectomy; now undergoing additional work-up to r/o metastasis      History of Present Illness/Subjective      Ms. Sharyn Trent is a 80 y.o. female with a PMHx significant for HFpEF, hypertensive emergency, dyslipidemia, type 2 diabetes, non-obstructive CAD, heart block status post pacemaker, paroxysmal supraventricular tachycardia, melanoma, chronic left leg edema, chronic kidney disease stage III who presents to [facility] for follow-up care.      Today, Mrs. Trent denies HF symptoms; Management plan as detailed above     ECG: Personally reviewed. Paced rhythm      Coronary angiogram:  RHC via right IJ  RA mean 4mmHg  PA mean 24mmHg  PCWP 21mmHg  LVEDP 19mmHg  Ao 153/62     PA sat 67%  Ao sat 94%     CO cindy 3.35     Angiography via left radial  LM short normal  LAD mid 50% narrowing with FFR 0.85  Circ normal  RCA normal     ECHO (personnaly Reviewed):     Left ventricle ejection fraction is normal. The estimated left ventricular ejection fraction is 55%.    Left ventricular diastolic function is  "abnormal.    Normal right ventricular size and systolic function.    No hemodynamically significant valvular heart abnormalities.    When compared to the previous study dated 3/20/2018, No significant change          Physical Examination Review of Systems   /60 (BP Location: Left arm, Patient Position: Sitting, Cuff Size: Adult Regular)   Pulse 60   Resp 16   Ht 1.6 m (5' 3\")   Wt 65.3 kg (144 lb)   BMI 25.51 kg/m    Body mass index is 25.51 kg/m .  Wt Readings from Last 3 Encounters:   06/28/22 65.3 kg (144 lb)   06/15/22 68 kg (150 lb)   06/07/22 68 kg (150 lb)     General Appearance:   no distress, normal body habitus   ENT/Mouth: membranes moist, no oral lesions or bleeding gums.      EYES:  no scleral icterus, normal conjunctivae   Neck: no carotid bruits or thyromegaly   Chest/Lungs:   lungs are clear to auscultation, no rales or wheezing, equal chest wall expansion    Cardiovascular:   Regular. Normal first and second heart sounds with no murmurs, rubs, or gallops; the carotid, radial and posterior tibial pulses are intact, no JVD or LE edema bilaterally    Abdomen:  no organomegaly, masses, bruits, or tenderness; bowel sounds are present   Extremities: no cyanosis or clubbing   Skin: no xanthelasma, warm.    Neurologic: alert and oriented x3, calm     Psychiatric: alert and oriented x3, calm     A complete 10 systems ROS was reviewed  And is negative except what is listed in the HPI.          Medical History  Surgical History Family History Social History   Past Medical History:   Diagnosis Date     Abnormal ECG      Anxiety      Arthritis      Chest pain      Chest pain      Chronic kidney disease     stage 3-mod.     Congestive heart failure (H)      Diabetes (H)      Dyslipidemia, goal LDL below 70      GERD (gastroesophageal reflux disease)      HTN (hypertension)      Hyperlipidemia      Macular degeneration (senile) of retina      Melanoma of ankle (H)     L ankle     Other second degree " atrioventricular block     Created by Conversion      Pacemaker      PSVT (paroxysmal supraventricular tachycardia) (H)      Right bundle branch block      Skin cancer     Past Surgical History:   Procedure Laterality Date     ABLATION OF DYSRHYTHMIC FOCUS  04/11/2013    AV analia reentry tachycardia, partially successful     BIOPSY SKIN (LOCATION)       CARDIAC CATHETERIZATION  03/10/2016     CV CORONARY ANGIOGRAM N/A 05/26/2021    Procedure: Coronary Angiogram;  Surgeon: Yony Gillis MD;  Location: [facility] Cardiac [facility] Lab;  Service: Cardiology     CV LEFT HEART CATHETERIZATION WITHOUT LEFT VENTRICULOGRAM Left 05/26/2021    Procedure: Left Heart Catheterization Without Left Ventriculogram;  Surgeon: Yony Gillis MD;  Location: [facility] Cardiac [facility];  Service: Cardiology     CV RIGHT HEART CATHETERIZATION N/A 05/26/2021    Procedure: Right Heart Catheterization;  Surgeon: Yony Gillis MD;  Location: [facility] Cardiac [facility];  Service: Cardiology     DILATION AND CURETTAGE N/A 4/19/2022    Procedure: DILATION AND CURRETAGE;  Surgeon: Mary Reed MD;  Location: [facility] [place]     EP PACEMAKER N/A 08/30/2021    Procedure: Electrophysiology Pacemaker;  Surgeon: Ab Saavedra MD;  Location: [facility] CV     FOOT SURGERY      L foot     HAND SURGERY      on both thumbs     HYSTERECTOMY, TOTAL, ROBOT-ASSISTED, LAP, DA HAROON XI, W/BI SAL-OOPH & SNT LYMPH NODE BX, MINI LAP Bilateral 5/31/2022    Procedure: ROBOTIC ASSISTED TOTAL LAPAROSCOPIC HYSTERECTOMY, BILATERAL SALPINGO-OOPHORECTOMY, SENTINEL LYMPH NODE INJECTION AND BIOPSY, MINI-LAPAROTOMY,;  Surgeon: Mary Reed MD;  Location: [facility] [place]     HYSTEROSCOPY DIAGNOSTIC N/A 4/19/2022    Procedure: HYSTEROSCOPY, DIAGNOSTIC;  Surgeon: Mary Reed MD;  Location: [facility] [place]     IMPLANT PACEMAKER  04/01/2011    Second-degree AV block     OTHER SURGICAL HISTORY      SVT  Ablation     PELVIC EXAM UNDER ANESTHESIA, CERVICAL DILATION, N/A      PELVIC EXAMINATION UNDER ANESTHESIA N/A 4/19/2022    Procedure: PELVIC EXAM UNDER ANESTHESIA, CERVICAL DILATION,;  Surgeon: Mary Reed MD;  Location: [facility] SHLDR ARTHROSCOP,SURG,W/ROTAT CUFF REPR Left 03/09/2017    Procedure: LEFT SHOULDER ARTHROSCOPY DECOMPRESSION DISTAL CLAVICLE EXCISION  ROTATOR CUFF REPAIR BICEPS TENOTOMY AND EXTENSIVE DEBRIDEMENT;  Surgeon: Todd Riggs MD;  Location: [facility];  Service: Orthopedics     RELEASE CARPAL TUNNEL      both wrists     SKIN CANCER EXCISION      L ankle,Lleg and cheek     TOE SURGERY      L big toe joint replacement     TUBAL LIGATION      no family history of premature coronary artery disease Social History     Socioeconomic History     Marital status:      Spouse name: Not on file     Number of children: Not on file     Years of education: Not on file     Highest education level: Not on file   Occupational History     Not on file   Tobacco Use     Smoking status: Never Smoker     Smokeless tobacco: Never Used   Substance and Sexual Activity     Alcohol use: No     Drug use: No     Sexual activity: Yes     Partners: Male     Birth control/protection: Surgical   Other Topics Concern     Not on file   Social History Narrative     Not on file     Social Determinants of Health     Financial Resource Strain: Not on file   Food Insecurity: Not on file   Transportation Needs: Not on file   Physical Activity: Not on file   Stress: Not on file   Social Connections: Not on file   Intimate Partner Violence: Not on file   Housing Stability: Not on file           Lab Results    Chemistry/lipid CBC Cardiac Enzymes/BNP/TSH/INR   Lab Results   Component Value Date    CHOL 126 03/31/2022    HDL 46 (L) 03/31/2022    TRIG 53 03/31/2022    BUN 32 (H) 06/08/2022     06/08/2022    CO2 27 06/08/2022    Lab Results   Component Value Date    WBC 9.7 06/01/2022     HGB 9.4 (L) 06/01/2022    HGB 9.4 (L) 06/01/2022    HCT 29.4 (L) 06/01/2022    MCV 91 06/01/2022     06/01/2022    Lab Results   Component Value Date    TROPONINI 0.04 04/05/2022     (H) 04/05/2022    TSH 1.53 09/15/2021    INR 1.05 09/13/2021     Lab Results   Component Value Date    TROPONINI 0.04 04/05/2022          Weight:    Wt Readings from Last 3 Encounters:   06/15/22 68 kg (150 lb)   06/07/22 68 kg (150 lb)   05/31/22 65.3 kg (144 lb)       Allergies  Allergies   Allergen Reactions     Herlinda-Kit Bee Sting Shortness Of Breath     Venom-Honey Bee [Bee Venom] Shortness Of Breath     Mercurial Analogues [Mercurial Derivatives] Dermatitis     blisters     Nitrofurantoin Other (See Comments)     Burning sensation across chest and arms  Other reaction(s): arms and chest burning     Sulfa (Sulfonamide Antibiotics) [Sulfa Drugs] Rash         Surgical History  Past Surgical History:   Procedure Laterality Date     ABLATION OF DYSRHYTHMIC FOCUS  04/11/2013    AV analia reentry tachycardia, partially successful     BIOPSY SKIN (LOCATION)       CARDIAC CATHETERIZATION  03/10/2016     CV CORONARY ANGIOGRAM N/A 05/26/2021    Procedure: Coronary Angiogram;  Surgeon: Yony Gillis MD;  Location: [facility] Cardiac Cath Lab;  Service: Cardiology     CV LEFT HEART CATHETERIZATION WITHOUT LEFT VENTRICULOGRAM Left 05/26/2021    Procedure: Left Heart Catheterization Without Left Ventriculogram;  Surgeon: Yony Gillis MD;  Location: [facility] Cardiac Cath Lab;  Service: Cardiology     CV RIGHT HEART CATHETERIZATION N/A 05/26/2021    Procedure: Right Heart Catheterization;  Surgeon: Yony Gillis MD;  Location: [facility] Cardiac Cath Lab;  Service: Cardiology     DILATION AND CURETTAGE N/A 4/19/2022    Procedure: DILATION AND CURRETAGE;  Surgeon: Mary Reed MD;  Location: [facility] [place] [place]     EP PACEMAKER N/A 08/30/2021    Procedure: Electrophysiology Pacemaker;  Surgeon:  Ab Saavedra MD;  Location: [facility] LAB CV     FOOT SURGERY      L foot     HAND SURGERY      on both thumbs     HYSTERECTOMY, TOTAL, ROBOT-ASSISTED, LAP, DA HAROON XI, W/BI SAL-OOPH & SNT LYMPH NODE BX, MINI LAP Bilateral 5/31/2022    Procedure: ROBOTIC ASSISTED TOTAL LAPAROSCOPIC HYSTERECTOMY, BILATERAL SALPINGO-OOPHORECTOMY, SENTINEL LYMPH NODE INJECTION AND BIOPSY, MINI-LAPAROTOMY,;  Surgeon: Mary Reed MD;  Location: [facility] [place]     HYSTEROSCOPY DIAGNOSTIC N/A 4/19/2022    Procedure: HYSTEROSCOPY, DIAGNOSTIC;  Surgeon: Mary Reed MD;  Location: [facility] [place]     IMPLANT PACEMAKER  04/01/2011    Second-degree AV block     OTHER SURGICAL HISTORY      SVT Ablation     PELVIC EXAM UNDER ANESTHESIA, CERVICAL DILATION, N/A      PELVIC EXAMINATION UNDER ANESTHESIA N/A 4/19/2022    Procedure: PELVIC EXAM UNDER ANESTHESIA, CERVICAL DILATION,;  Surgeon: Mary Reed MD;  Location: [facility] [place]     IA SHLDR ARTHROSCOP,SURG,W/ROTAT CUFF REPR Left 03/09/2017    Procedure: LEFT SHOULDER ARTHROSCOPY DECOMPRESSION DISTAL CLAVICLE EXCISION  ROTATOR CUFF REPAIR BICEPS TENOTOMY AND EXTENSIVE DEBRIDEMENT;  Surgeon: Todd iRggs MD;  Location: [facility] OR;  Service: Orthopedics     RELEASE CARPAL TUNNEL      both wrists     SKIN CANCER EXCISION      L ankle,Lleg and cheek     TOE SURGERY      L big toe joint replacement     TUBAL LIGATION         Social History  Tobacco:   History   Smoking Status     Never Smoker   Smokeless Tobacco     Never Used    Alcohol:   Social History    Substance and Sexual Activity      Alcohol use: No   Illicit Drugs:   History   Drug Use No       Family History  Family History   Problem Relation Age of Onset     Hypertension Mother      Cerebrovascular Disease Mother      Prostate Cancer Father      Cancer Father      Coronary Artery Disease Father      Dyslipidemia Father      Dyslipidemia Sister      Dyslipidemia Brother       Hypertension Brother      Prostate Cancer Brother           Heidy Akins MD on 6/28/2022      cc: Jamie Mcconnell

## 2022-06-28 NOTE — LETTER
6/28/2022    Jamie Mcconnell MD  [facility] [address]  [facility] [address]    RE: Sharyn Trent       Dear Colleague,     I had the pleasure of seeing Sharyn Trent in the [facility].    HEART CARE NOTE          Assessment/Recommendations     1. HFpEF c/b ADHF   Assessment / Plan    Patient denies HF symptoms including orthopnea, PND, weight gain, abdominal distension; she reports that her symptoms at last visit have resolved after IV diuretics and increasing her oral dose; Today, she reports significant urinary urgency and requests modified dosing so will alternate 20 mg and 40 mg daily doses; Patient will continue to keep a weight log and [facility] will call on Monday to follow-up     2. CAD  Assessment / Plan    Denies further episodes of chest discomfort/pressure; s/p coronary angiogram significant for moderate LAD dz. Plan for medical management at that time    Continue ASA, high intensity atorvastatin, carvedilol, losartan     3. 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 Uterine mass  Assessment / Plan    S/p hysterectomy; now undergoing additional work-up to r/o metastasis      History of Present Illness/Subjective      Ms. Sharyn Trent is a 80 y.o. female with a PMHx significant for HFpEF, hypertensive emergency, dyslipidemia, type 2 diabetes, non-obstructive CAD, heart block status post pacemaker, paroxysmal supraventricular tachycardia, melanoma, chronic left leg edema, chronic kidney disease stage III who presents to [facility] for follow-up care.      Today, Mrs. Trent denies HF symptoms; Management plan as detailed above     ECG: Personally reviewed. Paced rhythm      Coronary angiogram:  RHC via right IJ  RA mean 4mmHg  PA mean 24mmHg  PCWP 21mmHg  LVEDP 19mmHg  Ao 153/62     PA sat 67%  Ao sat 94%     CO cindy 3.35     Angiography via left radial  LM short normal  LAD mid 50% narrowing with FFR  "0.85  Circ normal  RCA normal     ECHO (personnaly Reviewed):     Left ventricle ejection fraction is normal. The estimated left ventricular ejection fraction is 55%.    Left ventricular diastolic function is abnormal.    Normal right ventricular size and systolic function.    No hemodynamically significant valvular heart abnormalities.    When compared to the previous study dated 3/20/2018, No significant change          Physical Examination Review of Systems   /60 (BP Location: Left arm, Patient Position: Sitting, Cuff Size: Adult Regular)   Pulse 60   Resp 16   Ht 1.6 m (5' 3\")   Wt 65.3 kg (144 lb)   BMI 25.51 kg/m    Body mass index is 25.51 kg/m .  Wt Readings from Last 3 Encounters:   06/28/22 65.3 kg (144 lb)   06/15/22 68 kg (150 lb)   06/07/22 68 kg (150 lb)     General Appearance:   no distress, normal body habitus   ENT/Mouth: membranes moist, no oral lesions or bleeding gums.      EYES:  no scleral icterus, normal conjunctivae   Neck: no carotid bruits or thyromegaly   Chest/Lungs:   lungs are clear to auscultation, no rales or wheezing, equal chest wall expansion    Cardiovascular:   Regular. Normal first and second heart sounds with no murmurs, rubs, or gallops; the carotid, radial and posterior tibial pulses are intact, no JVD or LE edema bilaterally    Abdomen:  no organomegaly, masses, bruits, or tenderness; bowel sounds are present   Extremities: no cyanosis or clubbing   Skin: no xanthelasma, warm.    Neurologic: alert and oriented x3, calm     Psychiatric: alert and oriented x3, calm     A complete 10 systems ROS was reviewed  And is negative except what is listed in the HPI.          Medical History  Surgical History Family History Social History   Past Medical History:   Diagnosis Date     Abnormal ECG      Anxiety      Arthritis      Chest pain      Chest pain      Chronic kidney disease     stage 3-mod.     Congestive heart failure (H)      Diabetes (H)      Dyslipidemia, goal LDL " below 70      GERD (gastroesophageal reflux disease)      HTN (hypertension)      Hyperlipidemia      Macular degeneration (senile) of retina      Melanoma of ankle (H)     L ankle     Other second degree atrioventricular block     Created by Conversion      Pacemaker      PSVT (paroxysmal supraventricular tachycardia) (H)      Right bundle branch block      Skin cancer     Past Surgical History:   Procedure Laterality Date     ABLATION OF DYSRHYTHMIC FOCUS  04/11/2013    AV analia reentry tachycardia, partially successful     BIOPSY SKIN (LOCATION)       CARDIAC CATHETERIZATION  03/10/2016     CV CORONARY ANGIOGRAM N/A 05/26/2021    Procedure: Coronary Angiogram;  Surgeon: Yony Gillis MD;  Location: [facility];  Service: Cardiology     CV LEFT HEART CATHETERIZATION WITHOUT LEFT VENTRICULOGRAM Left 05/26/2021    Procedure: Left Heart Catheterization Without Left Ventriculogram;  Surgeon: Yony Gillis MD;  Location: [facility];  Service: Cardiology     CV RIGHT HEART CATHETERIZATION N/A 05/26/2021    Procedure: Right Heart Catheterization;  Surgeon: Yony Gillis MD;  Location: [facility];  Service: Cardiology     DILATION AND CURETTAGE N/A 4/19/2022    Procedure: DILATION AND CURRETAGE;  Surgeon: Mary Reed MD;  Location: [facility] OR     EP PACEMAKER N/A 08/30/2021    Procedure: Electrophysiology Pacemaker;  Surgeon: Ab Saavedra MD;  Location: [facility] CV     FOOT SURGERY      L foot     HAND SURGERY      on both thumbs     HYSTERECTOMY, TOTAL, ROBOT-ASSISTED, LAP, DA HAROON XI, W/BI SAL-OOPH & SNT LYMPH NODE BX, MINI LAP Bilateral 5/31/2022    Procedure: ROBOTIC ASSISTED TOTAL LAPAROSCOPIC HYSTERECTOMY, BILATERAL SALPINGO-OOPHORECTOMY, SENTINEL LYMPH NODE INJECTION AND BIOPSY, MINI-LAPAROTOMY,;  Surgeon: Mary Reed MD;  Location: [facility] OR     HYSTEROSCOPY DIAGNOSTIC N/A 4/19/2022     Procedure: HYSTEROSCOPY, DIAGNOSTIC;  Surgeon: Mary Reed MD;  Location: [facility] OR     IMPLANT PACEMAKER  04/01/2011    Second-degree AV block     OTHER SURGICAL HISTORY      SVT Ablation     PELVIC EXAM UNDER ANESTHESIA, CERVICAL DILATION, N/A      PELVIC EXAMINATION UNDER ANESTHESIA N/A 4/19/2022    Procedure: PELVIC EXAM UNDER ANESTHESIA, CERVICAL DILATION,;  Surgeon: Mary Reed MD;  Location: [facility] OR     VT SHLDR ARTHROSCOP,SURG,W/ROTAT CUFF REPR Left 03/09/2017    Procedure: LEFT SHOULDER ARTHROSCOPY DECOMPRESSION DISTAL CLAVICLE EXCISION  ROTATOR CUFF REPAIR BICEPS TENOTOMY AND EXTENSIVE DEBRIDEMENT;  Surgeon: Todd Riggs MD;  Location: [facility];  Service: Orthopedics     RELEASE CARPAL TUNNEL      both wrists     SKIN CANCER EXCISION      L ankle,Lleg and cheek     TOE SURGERY      L big toe joint replacement     TUBAL LIGATION      no family history of premature coronary artery disease Social History     Socioeconomic History     Marital status:      Spouse name: Not on file     Number of children: Not on file     Years of education: Not on file     Highest education level: Not on file   Occupational History     Not on file   Tobacco Use     Smoking status: Never Smoker     Smokeless tobacco: Never Used   Substance and Sexual Activity     Alcohol use: No     Drug use: No     Sexual activity: Yes     Partners: Male     Birth control/protection: Surgical   Other Topics Concern     Not on file   Social History Narrative     Not on file     Social Determinants of Health     Financial Resource Strain: Not on file   Food Insecurity: Not on file   Transportation Needs: Not on file   Physical Activity: Not on file   Stress: Not on file   Social Connections: Not on file   Intimate Partner Violence: Not on file   Housing Stability: Not on file           Lab Results    Chemistry/lipid CBC Cardiac Enzymes/BNP/TSH/INR   Lab Results   Component Value  Date    CHOL 126 03/31/2022    HDL 46 (L) 03/31/2022    TRIG 53 03/31/2022    BUN 32 (H) 06/08/2022     06/08/2022    CO2 27 06/08/2022    Lab Results   Component Value Date    WBC 9.7 06/01/2022    HGB 9.4 (L) 06/01/2022    HGB 9.4 (L) 06/01/2022    HCT 29.4 (L) 06/01/2022    MCV 91 06/01/2022     06/01/2022    Lab Results   Component Value Date    TROPONINI 0.04 04/05/2022     (H) 04/05/2022    TSH 1.53 09/15/2021    INR 1.05 09/13/2021     Lab Results   Component Value Date    TROPONINI 0.04 04/05/2022          Weight:    Wt Readings from Last 3 Encounters:   06/15/22 68 kg (150 lb)   06/07/22 68 kg (150 lb)   05/31/22 65.3 kg (144 lb)       Allergies  Allergies   Allergen Reactions     Herlinda-Kit Bee Sting Shortness Of Breath     Venom-Honey Bee [Bee Venom] Shortness Of Breath     Mercurial Analogues [Mercurial Derivatives] Dermatitis     blisters     Nitrofurantoin Other (See Comments)     Burning sensation across chest and arms  Other reaction(s): arms and chest burning     Sulfa (Sulfonamide Antibiotics) [Sulfa Drugs] Rash         Surgical History  Past Surgical History:   Procedure Laterality Date     ABLATION OF DYSRHYTHMIC FOCUS  04/11/2013    AV analia reentry tachycardia, partially successful     BIOPSY SKIN (LOCATION)       CARDIAC CATHETERIZATION  03/10/2016     CV CORONARY ANGIOGRAM N/A 05/26/2021    Procedure: Coronary Angiogram;  Surgeon: Yony Gillis MD;  Location: [facility] Cardiac Cath Lab;  Service: Cardiology     CV LEFT HEART CATHETERIZATION WITHOUT LEFT VENTRICULOGRAM Left 05/26/2021    Procedure: Left Heart Catheterization Without Left Ventriculogram;  Surgeon: Yony Gillis MD;  Location: [facility] Cardiac [facility] Lab;  Service: Cardiology     CV RIGHT HEART CATHETERIZATION N/A 05/26/2021    Procedure: Right Heart Catheterization;  Surgeon: Yony Gillis MD;  Location: [facility] Cardiac [facility] Lab;  Service: Cardiology     DILATION AND CURETTAGE N/A  4/19/2022    Procedure: DILATION AND CURRETAGE;  Surgeon: Mary Reed MD;  Location: [facility] OR     EP PACEMAKER N/A 08/30/2021    Procedure: Electrophysiology Pacemaker;  Surgeon: Ab Saavedra MD;  Location: [facility] LAB CV     FOOT SURGERY      L foot     HAND SURGERY      on both thumbs     HYSTERECTOMY, TOTAL, ROBOT-ASSISTED, LAP, DA HAROON XI, W/BI SAL-OOPH & SNT LYMPH NODE BX, MINI LAP Bilateral 5/31/2022    Procedure: ROBOTIC ASSISTED TOTAL LAPAROSCOPIC HYSTERECTOMY, BILATERAL SALPINGO-OOPHORECTOMY, SENTINEL LYMPH NODE INJECTION AND BIOPSY, MINI-LAPAROTOMY,;  Surgeon: Mary Reed MD;  Location: [facility] OR     HYSTEROSCOPY DIAGNOSTIC N/A 4/19/2022    Procedure: HYSTEROSCOPY, DIAGNOSTIC;  Surgeon: Mary Reed MD;  Location: [facility] OR     IMPLANT PACEMAKER  04/01/2011    Second-degree AV block     OTHER SURGICAL HISTORY      SVT Ablation     PELVIC EXAM UNDER ANESTHESIA, CERVICAL DILATION, N/A      PELVIC EXAMINATION UNDER ANESTHESIA N/A 4/19/2022    Procedure: PELVIC EXAM UNDER ANESTHESIA, CERVICAL DILATION,;  Surgeon: Mary Reed MD;  Location: [facility]     FL SHLDR ARTHROSCOP,SURG,W/ROTAT CUFF REPR Left 03/09/2017    Procedure: LEFT SHOULDER ARTHROSCOPY DECOMPRESSION DISTAL CLAVICLE EXCISION  ROTATOR CUFF REPAIR BICEPS TENOTOMY AND EXTENSIVE DEBRIDEMENT;  Surgeon: Todd Riggs MD;  Location: [facility];  Service: Orthopedics     RELEASE CARPAL TUNNEL      both wrists     SKIN CANCER EXCISION      L ankle,Lleg and cheek     TOE SURGERY      L big toe joint replacement     TUBAL LIGATION         Social History  Tobacco:   History   Smoking Status     Never Smoker   Smokeless Tobacco     Never Used    Alcohol:   Social History    Substance and Sexual Activity      Alcohol use: No   Illicit Drugs:   History   Drug Use No       Family History  Family History   Problem Relation Age of Onset     Hypertension  Mother      Cerebrovascular Disease Mother      Prostate Cancer Father      Cancer Father      Coronary Artery Disease Father      Dyslipidemia Father      Dyslipidemia Sister      Dyslipidemia Brother      Hypertension Brother      Prostate Cancer Brother           Heidy Akins MD on 6/28/2022      cc: Jamie Mcconnell        Thank you for allowing me to participate in the care of your patient.      Sincerely,     Heidy Akins MD     [facility]  cc:   Lashell Murcia, APRN CNP  [address] [place] [address]

## 2022-06-29 ENCOUNTER — TELEPHONE (OUTPATIENT)
Dept: CARDIOLOGY | Facility: CLINIC | Age: 82
End: 2022-06-29

## 2022-06-29 DIAGNOSIS — I50.31 ACUTE DIASTOLIC CHF (CONGESTIVE HEART FAILURE) (H): Primary | ICD-10-CM

## 2022-06-30 ENCOUNTER — TELEPHONE (OUTPATIENT)
Dept: CARDIOLOGY | Facility: CLINIC | Age: 82
End: 2022-06-30

## 2022-06-30 NOTE — TELEPHONE ENCOUNTER
----- Message from Heidy Akins MD sent at 6/30/2022  4:07 PM CDT -----  Regarding: Follow-up  Luc Burnette;  Can you contact Sharyn on Tuesday (7/5/22) to follow-up on her weight and HF symptoms on her new diuretic regimen?     Thanks;  ~ Sara

## 2022-07-05 NOTE — TELEPHONE ENCOUNTER
Sharyn is contacted to discuss her current symptoms today. She has been taking Furosemide 20mg alternating with 40mg every other day.  She has kept a wt log as follows:  140.8  141.6  140.8  141.0  141.0  141.4  140.4    She denies any swelling in LE edema, she denies abdominal bloating. No dizziness or lightheadedness noted. She has been up frequently at night to the bathroom, but this is explained to her that this is likely due to the kidneys getting improved bloodflow and working better  .  FYI to Dr. Akins,   She is going in for labs tomorrow  Yomaira Sanchez RN BSN, CHFN

## 2022-07-06 ENCOUNTER — OFFICE VISIT (OUTPATIENT)
Dept: NEUROLOGY | Facility: CLINIC | Age: 82
End: 2022-07-06
Payer: COMMERCIAL

## 2022-07-06 ENCOUNTER — LAB (OUTPATIENT)
Dept: LAB | Facility: HOSPITAL | Age: 82
End: 2022-07-06
Payer: COMMERCIAL

## 2022-07-06 VITALS
RESPIRATION RATE: 16 BRPM | SYSTOLIC BLOOD PRESSURE: 142 MMHG | DIASTOLIC BLOOD PRESSURE: 76 MMHG | BODY MASS INDEX: 25.51 KG/M2 | HEART RATE: 56 BPM | WEIGHT: 144 LBS

## 2022-07-06 DIAGNOSIS — R77.8 ABNORMAL SPEP: Primary | ICD-10-CM

## 2022-07-06 DIAGNOSIS — R20.0 NUMBNESS: ICD-10-CM

## 2022-07-06 DIAGNOSIS — R77.8 ABNORMAL SPEP: ICD-10-CM

## 2022-07-06 DIAGNOSIS — I50.31 ACUTE DIASTOLIC CHF (CONGESTIVE HEART FAILURE) (H): ICD-10-CM

## 2022-07-06 DIAGNOSIS — I10 PRIMARY HYPERTENSION: ICD-10-CM

## 2022-07-06 DIAGNOSIS — G62.9 NEUROPATHY: ICD-10-CM

## 2022-07-06 LAB
ANION GAP SERPL CALCULATED.3IONS-SCNC: 9 MMOL/L (ref 5–18)
BUN SERPL-MCNC: 34 MG/DL (ref 8–28)
CALCIUM SERPL-MCNC: 9.7 MG/DL (ref 8.5–10.5)
CHLORIDE BLD-SCNC: 101 MMOL/L (ref 98–107)
CO2 SERPL-SCNC: 29 MMOL/L (ref 22–31)
CREAT SERPL-MCNC: 1.35 MG/DL (ref 0.6–1.1)
GFR SERPL CREATININE-BSD FRML MDRD: 40 ML/MIN/1.73M2
GLUCOSE BLD-MCNC: 103 MG/DL (ref 70–125)
POTASSIUM BLD-SCNC: 4.1 MMOL/L (ref 3.5–5)
SODIUM SERPL-SCNC: 139 MMOL/L (ref 136–145)
TOTAL PROTEIN SERUM FOR ELP: 6.9 G/DL (ref 6.4–8.3)

## 2022-07-06 PROCEDURE — 80048 BASIC METABOLIC PNL TOTAL CA: CPT

## 2022-07-06 PROCEDURE — 84155 ASSAY OF PROTEIN SERUM: CPT

## 2022-07-06 PROCEDURE — 99214 OFFICE O/P EST MOD 30 MIN: CPT | Performed by: PSYCHIATRY & NEUROLOGY

## 2022-07-06 PROCEDURE — 84165 PROTEIN E-PHORESIS SERUM: CPT | Mod: TC | Performed by: PATHOLOGY

## 2022-07-06 PROCEDURE — 86334 IMMUNOFIX E-PHORESIS SERUM: CPT | Performed by: PATHOLOGY

## 2022-07-06 PROCEDURE — 36415 COLL VENOUS BLD VENIPUNCTURE: CPT

## 2022-07-06 RX ORDER — GABAPENTIN 100 MG/1
100 CAPSULE ORAL 3 TIMES DAILY
Qty: 270 CAPSULE | Refills: 1 | Status: SHIPPED | OUTPATIENT
Start: 2022-07-06 | End: 2022-11-14

## 2022-07-06 NOTE — LETTER
7/6/2022         RE: Sharyn Trent  [address] [place] [address]        Dear Colleague,    Thank you for referring your patient, Sharyn Trent, to the [facility] [place]. Please see a copy of my visit note below.    NEUROLOGY OUTPATIENT PROGRESS NOTE   Jul 6, 2022     CHIEF COMPLAINT/REASON FOR VISIT/REASON FOR CONSULT  Patient presents with:  Follow Up: 6 month - numbness     REASON FOR CONSULTATION- Numbness    HISTORY OF PRESENT ILLNESS  Sharyn Trent is a 81 year old female seen today for hospital follow-up.  Patient was seen in the hospital for an episode of shaking and headache.  He is complaining of bilateral hand numbness.  Stroke code was called.  Testing was negative.  Patient was diagnosed to have a hypertensive emergency.  She reports no recurrence of her symptoms.    In the hospital she was also complaining of some numbness in her feet which was suggestive of neuropathy.  CT of the L-spine did show mild to moderate spinal stenosis.  Did not completely fit with her symptoms.  She followed up in the neurology clinic for further evaluation.  EMG was done today.  Has also had blood work done in the hospital.  Reports ongoing numbness in the bottom of her feet.  Does have some balance issues.  Denies any other new concerns.    7/6/22  She returns today for follow-up of neuropathy.  Reports that the numbness is about the same in her feet.  Continues to have balance issues.  Encouraged her to exercise.  Discussed balance exercises.  Further reports no swelling in the feet.  Does report some leg cramps in the nighttime.  Does complain of more numbness in the morning and then taking lorazepam in the morning with symptoms improving.  Discussed that there is no rational for this and its possible the numbness might be more related to her anxiety worsening in the morning time.    Previous history is reviewed and this is unchanged.    PAST MEDICAL/SURGICAL HISTORY  Past Medical  History:   Diagnosis Date     Abnormal ECG      Anxiety      Arthritis      Chest pain      Chest pain      Chronic kidney disease     stage 3-mod.     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      Pacemaker      PSVT (paroxysmal supraventricular tachycardia) (H)      Right bundle branch block      Skin cancer      Patient Active Problem List   Diagnosis     Atypical chest pain     Malignant essential hypertension     Dyslipidemia, goal LDL below 70     DM (diabetes mellitus), type 2 (H)     Third degree AV block (H)     Chest pain     Ectopic atrial tachycardia (H)     Acute diastolic CHF (congestive heart failure) (H)     Acute respiratory failure with hypoxia (H)     Pneumonia of left lower lobe due to infectious organism     Stroke-like symptoms     Abnormal nuclear stress test     Chronic kidney disease, stage 3a (H)     Diabetes mellitus type 2 without retinopathy (H)     Gastroesophageal reflux disease without esophagitis     Thyroid nodule     Hypertensive emergency     Acute on chronic congestive heart failure, unspecified heart failure type (H)     Benign essential hypertension     Uterine mass       FAMILY HISTORY  Family History   Problem Relation Age of Onset     Hypertension Mother      Cerebrovascular Disease Mother      Prostate Cancer Father      Cancer Father      Coronary Artery Disease Father      Dyslipidemia Father      Dyslipidemia Sister      Dyslipidemia Brother      Hypertension Brother      Prostate Cancer Brother        SOCIAL HISTORY  Social History     Tobacco Use     Smoking status: Never Smoker     Smokeless tobacco: Never Used   Substance Use Topics     Alcohol use: No     Drug use: No       SYSTEMS  REVIEW  Twelve-system ROS was done and other than the HPI this was negative.  Pertinent positives noted in the HPI.  No new issues/concerns reported today.    MEDICATIONS  acetaminophen (TYLENOL) 500 MG tablet, Take 500-1,000 mg by mouth every 6 hours as needed for mild pain  aspirin (ASA) 325 MG EC tablet, Take 325 mg by mouth every evening  atorvastatin (LIPITOR) 80 MG tablet, Take 80 mg by mouth At Bedtime  calcium carbonate (TUMS) 500 MG chewable tablet, Take 1-2 chew tab by mouth daily  carvedilol (COREG) 25 MG tablet, Take 1 tablet (25 mg) by mouth 2 times daily (with meals)  escitalopram (LEXAPRO) 5 MG tablet, Take 5 mg by mouth daily  ferrous sulfate (FEROSUL) 325 (65 Fe) MG tablet, Take 325 mg by mouth daily (with breakfast)  furosemide (LASIX) 20 MG tablet, Take 2 tablets (40 mg) by mouth daily (Patient taking differently: Take 40 mg by mouth daily 1 tab one day and 2 tabs the next - alternating)  hydrALAZINE (APRESOLINE) 100 MG tablet, Take 1 tablet (100 mg) by mouth 3 times daily  LORazepam (ATIVAN) 0.5 MG tablet, [LORAZEPAM (ATIVAN) 0.5 MG TABLET] Take 1 tablet (0.5 mg total) by mouth every 6 (six) hours as needed for anxiety (or tremors).  losartan (COZAAR) 50 MG tablet, Take 1 tablet (50 mg) by mouth every evening  metFORMIN (GLUCOPHAGE) 500 MG tablet, [METFORMIN (GLUCOPHAGE) 500 MG TABLET] Take 1 tablet (500 mg total) by mouth 2 (two) times a day with meals. At breakfast and at dinner  Multiple Vitamins-Minerals (MULTIVITAMIN ADULTS 50+) TABS, Take 1 tablet by mouth every morning  omeprazole (PRILOSEC) 20 MG capsule, Take 20 mg by mouth daily before breakfast  prednisoLONE acetate (PRED-FORTE) 1 % ophthalmic suspension, Place 1 drop into the right eye daily  vit C/E/Zn/coppr/lutein/zeaxan (PRESERVISION AREDS-2 ORAL), Take 1 tablet by mouth 2 times daily (before meals)    No current facility-administered medications on file prior to visit.       PHYSICAL EXAMINATION  VITALS: BP (!) 142/76   Pulse 56    Resp 16   Wt 65.3 kg (144 lb)   BMI 25.51 kg/m    GENERAL: Healthy appearing, alert, no acute distress, normal habitus.  CARDIOVASCULAR: Extremities warm and well perfused. Pulses present.   NEUROLOGICAL:  Patient is awake and oriented to self, place and time.  Attention span is normal.  Memory is grossly intact.  Language is fluent and follows commands appropriately.  Appropriate fund of knowledge. Cranial nerves 2-12 are intact. There is no pronator drift.  Motor exam shows 5/5 strength in all extremities.  Tone is symmetric bilaterally in upper and lower extremities.  Reflexes are symmetric and 1+/diminished in upper extremities and lower extremities. Sensory exam is grossly intact to light touch, pin prick and vibration.  Finger to nose and heel to shin is without dysmetria.  Romberg is negative.  Gait is normal and the patient is able to do tandem walk and walk on toes and heels with some difficulty.  Exam unchanged compared to before.      DIAGNOSTICS  HEAD CT:  1.  No acute intracranial hemorrhage.  2.  No CT evidence acute infarct. Aspect score 10.  3.  Age-related changes described above.     Dr. Tashi Lackey was notified by Dr Kevin Cooper at  1:40 AM 09/13/2021.      HEAD CTA:   1.  No intracranial arterial large vessel occlusion.  2.  Right carotid siphon mild stenosis.   3.  Left carotid siphon severe stenosis.  4.  Otherwise, no significant stenosis/occlusion.      NECK CTA:  1.  Right vertebral artery origin moderate stenosis.  2.  Left proximal subclavian artery mild-to-moderate stenosis.  3.  Otherwise, no significant stenosis/occlusion. No dissection.  4.  Multiple thyroid nodules. Recommend nonemergent thyroid ultrasound based upon ACR white paper criteria.   5.  Lung apices demonstrate septal thickening with patchy groundglass opacities and consolidations most likely due to pulmonary edema. Please correlate clinically to exclude acute infectious inflammatory pneumonitis.     Carotid  US  IMPRESSION:  1.  Mild plaque formation, velocities consistent with less than 50% stenosis in the right internal carotid artery.  2.  Mild plaque formation, velocities consistent with less than 50% stenosis in the left internal carotid artery.  3.  Flow within the vertebral arteries is antegrade.  4.  Bilateral thyroid nodules, recommend further evaluation with dedicated thyroid ultrasound.     CT L spine  1.  No evidence of lumbar spine fracture.     2.  Multilevel degenerative change throughout the lumbar disc spaces with moderate spinal canal stenosis at L3-4. Moderate spinal canal stenosis at L4-5. Variable degrees of foraminal narrowing. Please see body of report for details at each level.    EMG  CLINICAL INTERPRETATION:  This is an abnormal nerve conduction and EMG study.  The study is suggestive of a sensorimotor polyneuropathy in both legs.  Further clinical correlation is needed.    RELEVANT LABS  Component      Latest Ref Rng & Units 9/13/2021 9/15/2021   Albumin %      51.0 - 67.0 %  61.4   Albumin Fraction      3.2 - 4.7 g/dL  3.9   Alpha 1 %      2.0 - 4.0 %  3.7   Alpha 1 Fraction      0.1 - 0.3 g/dL  0.2   Alpha 2 %      5.0 - 13.0 %  12.2   Alpha 2 Fraction      0.4 - 0.9 g/dL  0.8   Beta %      10.0 - 17.0 %  10.0   Beta Fraction      0.7 - 1.2 g/dL  0.6 (L)   Gamma Globulin %      9.0 - 20.0 %  12.7   Gamma Fraction      0.6 - 1.4 g/dL  0.8   ELP Interpretation:        Normal serum protein electrophoresis.   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 At the present time, the bands are too faint for unequivocal diagnosis.   Path ICD       I16.1   Interpreted By       Eduardo Dyer MD     Component      Latest Ref Rng & Units 5/12/2022   Vitamin B12      213 - 816 pg/mL 435     OUTSIDE RECORDS  Hospital notes reviewed.    IMPRESSION/REPORT/PLAN  Abnormal SPEP  Neuropathy  Primary hypertension  Neuropathic pain/muscle cramps    This is a 81 year old female with numbness in the bottom of her feet.  EMG is confirmatory for peripheral neuropathy.  CT of the lumbar spine does show spinal stenosis though this would not fit with her symptoms.  Exam does show decreased reflexes but otherwise negative for sensory loss.  She does have history of diabetes which could be the cause of the neuropathy.  Blood work overall otherwise has been noncontributory though has shown positive serum for electrophoresis though I suspect this to be a false positive. We will recheck serum protein electrophoresis today-6 months to see if further evaluation needs to be done or not.  She does have some family history of multiple myeloma.  Discussed prognosis.  Encourage exercise and healthy lifestyle.  We will provide her some balance exercises that she can do her own.      With complains of leg cramps will try gabapentin.  Does complain of of some pins-and-needles type pain as well.  We will start with a low-dose since she is concerned about weight gain.    Patient was seen in the hospital for a spell of bilateral hand numbness shaking and elevated blood pressure.  This was thought to be hypertensive emergency.  Head CT was negative for stroke.  MRI could not be done because of pacemaker.  CT angiogram showed left carotid stenosis though otherwise noncontributory.  Carotid ultrasound was negative.  Patient remains on aspirin 81 mg.  She is also on a statin.  No strokelike symptoms we will continue to monitor.  No change.  Blood pressure slightly elevated  today.  Could be whitecoat hypertension.  Seen by the cardiologist and they are trying to keep her weight low with use of diuretics.    He can see her back in 3-4 months.    -     Protein Immunofixation Serum; Future  -     Protein electrophoresis; Future  -     gabapentin (NEURONTIN) 100 MG capsule; Take 1 capsule (100 mg) by mouth 3 times daily    Return in about 4 months (around 11/6/2022) for In-Clinic Visit (must).    Over 37 minutes were spent coordinating the care for the patient on the day of the encounter.  This includes previsit, during visit and post visit activities as documented above.  Testing ordered.  Counseling patient.  Multiple problems reviewed/addressed.  Prescription management.  (Activities include but not inclusive of reviewing chart, reviewing outside records, reviewing labs and imaging study results as well as the images, patient visit time including getting history and exam,  use if applicable, review of test results with the patient and coming up with a plan in a shared model, counseling patient and family, education and answering patient questions, EMR , EMR diagnosis entry and problem list management, medication reconciliation and prescription management if applicable, paperwork if applicable, printing documents and documentation of the visit activities.)        Reji Davila MD  Neurologist  [facility]  Tel:- [phone]    This note was dictated using voice recognition software.  Any grammatical or context distortions are unintentional and inherent to the software.        Again, thank you for allowing me to participate in the care of your patient.        Sincerely,        Reji Davila MD

## 2022-07-06 NOTE — NURSING NOTE
Chief Complaint   Patient presents with     Follow Up     6 month - numbness     Mary Finch MA,CMA,11:13 AM

## 2022-07-06 NOTE — PROGRESS NOTES
NEUROLOGY OUTPATIENT PROGRESS NOTE   Jul 6, 2022     CHIEF COMPLAINT/REASON FOR VISIT/REASON FOR CONSULT  Patient presents with:  Follow Up: 6 month - numbness     REASON FOR CONSULTATION- Numbness    HISTORY OF PRESENT ILLNESS  Sharyn Trent is a 81 year old female seen today for hospital follow-up.  Patient was seen in the hospital for an episode of shaking and headache.  He is complaining of bilateral hand numbness.  Stroke code was called.  Testing was negative.  Patient was diagnosed to have a hypertensive emergency.  She reports no recurrence of her symptoms.    In the hospital she was also complaining of some numbness in her feet which was suggestive of neuropathy.  CT of the L-spine did show mild to moderate spinal stenosis.  Did not completely fit with her symptoms.  She followed up in the neurology clinic for further evaluation.  EMG was done today.  Has also had blood work done in the hospital.  Reports ongoing numbness in the bottom of her feet.  Does have some balance issues.  Denies any other new concerns.    7/6/22  She returns today for follow-up of neuropathy.  Reports that the numbness is about the same in her feet.  Continues to have balance issues.  Encouraged her to exercise.  Discussed balance exercises.  Further reports no swelling in the feet.  Does report some leg cramps in the nighttime.  Does complain of more numbness in the morning and then taking lorazepam in the morning with symptoms improving.  Discussed that there is no rational for this and its possible the numbness might be more related to her anxiety worsening in the morning time.    Previous history is reviewed and this is unchanged.    PAST MEDICAL/SURGICAL HISTORY  Past Medical History:   Diagnosis Date     Abnormal ECG      Anxiety      Arthritis      Chest pain      Chest pain      Chronic kidney disease     stage 3-mod.     Congestive heart failure (H)      Diabetes (H)      Dyslipidemia, goal LDL below 70      GERD  (gastroesophageal reflux disease)      HTN (hypertension)      Hyperlipidemia      Macular degeneration (senile) of retina      Melanoma of ankle (H)     L ankle     Other second degree atrioventricular block     Created by Conversion      Pacemaker      PSVT (paroxysmal supraventricular tachycardia) (H)      Right bundle branch block      Skin cancer      Patient Active Problem List   Diagnosis     Atypical chest pain     Malignant essential hypertension     Dyslipidemia, goal LDL below 70     DM (diabetes mellitus), type 2 (H)     Third degree AV block (H)     Chest pain     Ectopic atrial tachycardia (H)     Acute diastolic CHF (congestive heart failure) (H)     Acute respiratory failure with hypoxia (H)     Pneumonia of left lower lobe due to infectious organism     Stroke-like symptoms     Abnormal nuclear stress test     Chronic kidney disease, stage 3a (H)     Diabetes mellitus type 2 without retinopathy (H)     Gastroesophageal reflux disease without esophagitis     Thyroid nodule     Hypertensive emergency     Acute on chronic congestive heart failure, unspecified heart failure type (H)     Benign essential hypertension     Uterine mass       FAMILY HISTORY  Family History   Problem Relation Age of Onset     Hypertension Mother      Cerebrovascular Disease Mother      Prostate Cancer Father      Cancer Father      Coronary Artery Disease Father      Dyslipidemia Father      Dyslipidemia Sister      Dyslipidemia Brother      Hypertension Brother      Prostate Cancer Brother        SOCIAL HISTORY  Social History     Tobacco Use     Smoking status: Never Smoker     Smokeless tobacco: Never Used   Substance Use Topics     Alcohol use: No     Drug use: No       SYSTEMS REVIEW  Twelve-system ROS was done and other than the HPI this was negative.  Pertinent positives noted in the HPI.  No new issues/concerns reported today.    MEDICATIONS  acetaminophen (TYLENOL) 500 MG tablet, Take 500-1,000 mg by mouth every 6  hours as needed for mild pain  aspirin (ASA) 325 MG EC tablet, Take 325 mg by mouth every evening  atorvastatin (LIPITOR) 80 MG tablet, Take 80 mg by mouth At Bedtime  calcium carbonate (TUMS) 500 MG chewable tablet, Take 1-2 chew tab by mouth daily  carvedilol (COREG) 25 MG tablet, Take 1 tablet (25 mg) by mouth 2 times daily (with meals)  escitalopram (LEXAPRO) 5 MG tablet, Take 5 mg by mouth daily  ferrous sulfate (FEROSUL) 325 (65 Fe) MG tablet, Take 325 mg by mouth daily (with breakfast)  furosemide (LASIX) 20 MG tablet, Take 2 tablets (40 mg) by mouth daily (Patient taking differently: Take 40 mg by mouth daily 1 tab one day and 2 tabs the next - alternating)  hydrALAZINE (APRESOLINE) 100 MG tablet, Take 1 tablet (100 mg) by mouth 3 times daily  LORazepam (ATIVAN) 0.5 MG tablet, [LORAZEPAM (ATIVAN) 0.5 MG TABLET] Take 1 tablet (0.5 mg total) by mouth every 6 (six) hours as needed for anxiety (or tremors).  losartan (COZAAR) 50 MG tablet, Take 1 tablet (50 mg) by mouth every evening  metFORMIN (GLUCOPHAGE) 500 MG tablet, [METFORMIN (GLUCOPHAGE) 500 MG TABLET] Take 1 tablet (500 mg total) by mouth 2 (two) times a day with meals. At breakfast and at dinner  Multiple Vitamins-Minerals (MULTIVITAMIN ADULTS 50+) TABS, Take 1 tablet by mouth every morning  omeprazole (PRILOSEC) 20 MG capsule, Take 20 mg by mouth daily before breakfast  prednisoLONE acetate (PRED-FORTE) 1 % ophthalmic suspension, Place 1 drop into the right eye daily  vit C/E/Zn/coppr/lutein/zeaxan (PRESERVISION AREDS-2 ORAL), Take 1 tablet by mouth 2 times daily (before meals)    No current facility-administered medications on file prior to visit.       PHYSICAL EXAMINATION  VITALS: BP (!) 142/76   Pulse 56   Resp 16   Wt 65.3 kg (144 lb)   BMI 25.51 kg/m    GENERAL: Healthy appearing, alert, no acute distress, normal habitus.  CARDIOVASCULAR: Extremities warm and well perfused. Pulses present.   NEUROLOGICAL:  Patient is awake and oriented to  self, place and time.  Attention span is normal.  Memory is grossly intact.  Language is fluent and follows commands appropriately.  Appropriate fund of knowledge. Cranial nerves 2-12 are intact. There is no pronator drift.  Motor exam shows 5/5 strength in all extremities.  Tone is symmetric bilaterally in upper and lower extremities.  Reflexes are symmetric and 1+/diminished in upper extremities and lower extremities. Sensory exam is grossly intact to light touch, pin prick and vibration.  Finger to nose and heel to shin is without dysmetria.  Romberg is negative.  Gait is normal and the patient is able to do tandem walk and walk on toes and heels with some difficulty.  Exam unchanged compared to before.      DIAGNOSTICS  HEAD CT:  1.  No acute intracranial hemorrhage.  2.  No CT evidence acute infarct. Aspect score 10.  3.  Age-related changes described above.     Dr. Tashi Lackey was notified by Dr Kevin Cooper at  1:40 AM 09/13/2021.      HEAD CTA:   1.  No intracranial arterial large vessel occlusion.  2.  Right carotid siphon mild stenosis.   3.  Left carotid siphon severe stenosis.  4.  Otherwise, no significant stenosis/occlusion.      NECK CTA:  1.  Right vertebral artery origin moderate stenosis.  2.  Left proximal subclavian artery mild-to-moderate stenosis.  3.  Otherwise, no significant stenosis/occlusion. No dissection.  4.  Multiple thyroid nodules. Recommend nonemergent thyroid ultrasound based upon ACR white paper criteria.   5.  Lung apices demonstrate septal thickening with patchy groundglass opacities and consolidations most likely due to pulmonary edema. Please correlate clinically to exclude acute infectious inflammatory pneumonitis.     Carotid US  IMPRESSION:  1.  Mild plaque formation, velocities consistent with less than 50% stenosis in the right internal carotid artery.  2.  Mild plaque formation, velocities consistent with less than 50% stenosis in the left internal carotid artery.  3.   Flow within the vertebral arteries is antegrade.  4.  Bilateral thyroid nodules, recommend further evaluation with dedicated thyroid ultrasound.     CT L spine  1.  No evidence of lumbar spine fracture.     2.  Multilevel degenerative change throughout the lumbar disc spaces with moderate spinal canal stenosis at L3-4. Moderate spinal canal stenosis at L4-5. Variable degrees of foraminal narrowing. Please see body of report for details at each level.    EMG  CLINICAL INTERPRETATION:  This is an abnormal nerve conduction and EMG study.  The study is suggestive of a sensorimotor polyneuropathy in both legs.  Further clinical correlation is needed.    RELEVANT LABS  Component      Latest Ref Rng & Units 9/13/2021 9/15/2021   Albumin %      51.0 - 67.0 %  61.4   Albumin Fraction      3.2 - 4.7 g/dL  3.9   Alpha 1 %      2.0 - 4.0 %  3.7   Alpha 1 Fraction      0.1 - 0.3 g/dL  0.2   Alpha 2 %      5.0 - 13.0 %  12.2   Alpha 2 Fraction      0.4 - 0.9 g/dL  0.8   Beta %      10.0 - 17.0 %  10.0   Beta Fraction      0.7 - 1.2 g/dL  0.6 (L)   Gamma Globulin %      9.0 - 20.0 %  12.7   Gamma Fraction      0.6 - 1.4 g/dL  0.8   ELP Interpretation:        Normal serum protein electrophoresis.   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 At the present time, the bands are too faint for unequivocal diagnosis.   Path ICD       I16.1   Interpreted By       Eduardo Dyer MD     Component      Latest Ref Rng & Units 5/12/2022   Vitamin B12      213 - 816  pg/mL 435     OUTSIDE RECORDS  Hospital notes reviewed.    IMPRESSION/REPORT/PLAN  Abnormal SPEP  Neuropathy  Primary hypertension  Neuropathic pain/muscle cramps    This is a 81 year old female with numbness in the bottom of her feet.  EMG is confirmatory for peripheral neuropathy.  CT of the lumbar spine does show spinal stenosis though this would not fit with her symptoms.  Exam does show decreased reflexes but otherwise negative for sensory loss.  She does have history of diabetes which could be the cause of the neuropathy.  Blood work overall otherwise has been noncontributory though has shown positive serum for electrophoresis though I suspect this to be a false positive. We will recheck serum protein electrophoresis today-6 months to see if further evaluation needs to be done or not.  She does have some family history of multiple myeloma.  Discussed prognosis.  Encourage exercise and healthy lifestyle.  We will provide her some balance exercises that she can do her own.      With complains of leg cramps will try gabapentin.  Does complain of of some pins-and-needles type pain as well.  We will start with a low-dose since she is concerned about weight gain.    Patient was seen in the hospital for a spell of bilateral hand numbness shaking and elevated blood pressure.  This was thought to be hypertensive emergency.  Head CT was negative for stroke.  MRI could not be done because of pacemaker.  CT angiogram showed left carotid stenosis though otherwise noncontributory.  Carotid ultrasound was negative.  Patient remains on aspirin 81 mg.  She is also on a statin.  No strokelike symptoms we will continue to monitor.  No change.  Blood pressure slightly elevated today.  Could be whitecoat hypertension.  Seen by the cardiologist and they are trying to keep her weight low with use of diuretics.    He can see her back in 3-4 months.    -     Protein Immunofixation Serum; Future  -     Protein electrophoresis;  Future  -     gabapentin (NEURONTIN) 100 MG capsule; Take 1 capsule (100 mg) by mouth 3 times daily    Return in about 4 months (around 11/6/2022) for In-Clinic Visit (must).    Over 37 minutes were spent coordinating the care for the patient on the day of the encounter.  This includes previsit, during visit and post visit activities as documented above.  Testing ordered.  Counseling patient.  Multiple problems reviewed/addressed.  Prescription management.  (Activities include but not inclusive of reviewing chart, reviewing outside records, reviewing labs and imaging study results as well as the images, patient visit time including getting history and exam,  use if applicable, review of test results with the patient and coming up with a plan in a shared model, counseling patient and family, education and answering patient questions, EMR , EMR diagnosis entry and problem list management, medication reconciliation and prescription management if applicable, paperwork if applicable, printing documents and documentation of the visit activities.)        Reji Davila MD  Neurologist  [facility]  Tel:- [phone]    This note was dictated using voice recognition software.  Any grammatical or context distortions are unintentional and inherent to the software.

## 2022-07-07 ENCOUNTER — OFFICE VISIT (OUTPATIENT)
Dept: PULMONOLOGY | Facility: OTHER | Age: 82
End: 2022-07-07

## 2022-07-07 VITALS
SYSTOLIC BLOOD PRESSURE: 132 MMHG | DIASTOLIC BLOOD PRESSURE: 62 MMHG | HEIGHT: 63 IN | WEIGHT: 144.1 LBS | HEART RATE: 60 BPM | OXYGEN SATURATION: 96 % | BODY MASS INDEX: 25.53 KG/M2

## 2022-07-07 DIAGNOSIS — D86.9 SARCOIDOSIS: Primary | ICD-10-CM

## 2022-07-07 DIAGNOSIS — R59.1 LYMPHADENOPATHY: ICD-10-CM

## 2022-07-07 LAB
ALBUMIN SERPL ELPH-MCNC: 4.2 G/DL (ref 3.7–5.1)
ALPHA1 GLOB SERPL ELPH-MCNC: 0.3 G/DL (ref 0.2–0.4)
ALPHA2 GLOB SERPL ELPH-MCNC: 0.8 G/DL (ref 0.5–0.9)
B-GLOBULIN SERPL ELPH-MCNC: 0.7 G/DL (ref 0.6–1)
GAMMA GLOB SERPL ELPH-MCNC: 1 G/DL (ref 0.7–1.6)
M PROTEIN SERPL ELPH-MCNC: 0 G/DL
PROT PATTERN SERPL ELPH-IMP: NORMAL
PROT PATTERN SERPL IFE-IMP: NORMAL

## 2022-07-07 PROCEDURE — 99204 OFFICE O/P NEW MOD 45 MIN: CPT | Performed by: INTERNAL MEDICINE

## 2022-07-07 PROCEDURE — 86334 IMMUNOFIX E-PHORESIS SERUM: CPT | Mod: 26

## 2022-07-07 PROCEDURE — 84165 PROTEIN E-PHORESIS SERUM: CPT | Mod: 26 | Performed by: PATHOLOGY

## 2022-07-07 NOTE — PATIENT INSTRUCTIONS
Your lymph nodes in the pelvis and chest are consistent with sarcoidosis.    This does not often cause problems, but we will keep an eye on things.    Yearly - in your primary care clinic  Metabolic panel and liver function tests  Eye exam  EKG    We will do some lung tests and a follow up CT this fall.

## 2022-07-07 NOTE — LETTER
7/7/2022        RE: Sharyn Trent  [address] [place] [address]      Maeve Mcconnell and Jay Riggs,    Please see my note below.  Sharyn's lymph node findings are consistent with sarcoidosis.  Fortunately she does not require any treatment at this time but we will do surveillance.    She is still having some right upper leg pain that she attributes to her lymph node dissection.  Otherwise she is having no symptoms and feels like she is recovering well.    Please let me know if you have any questions.    Thank you    Eduardo Andersen MD    LUNG NODULE & INTERVENTIONAL PULMONARY CLINIC  [facility]     Sharyn Trent MRN# [id number]   Age: 81 year old YOB: 1940       Requesting Physician: No referring provider defined for this encounter.       Assessment and Plan:    1.  Sarcoidosis.  Seemingly stage I as far as the lungs go.  No evidence of aggressive disease although we discussed typical surveillance.    Will do a CT scan and pulmonary function test with a follow-up visit later this fall.    Metabolic panel, LFTs, EKG and eye exam yearly    2.  Lymphadenopathy.  Secondary to above.  No further biopsy or evaluation necessary.               History:     Sharyn Trent is a 81 year old female with sig h/o for fibroids who is here for evaluation/followup of sarcoidosis.  She had some concerning symptoms earlier this spring in the emergency department and had a CT scan done.  She was found to have lymphadenopathy and a pelvic mass.  A PET scan showed metabolic activity.  Lymph node biopsy found granulomas in the lungs.  Lymph node dissection during her pelvic surgery also found granulomatous changes without necrosis.      She is recovering from her surgery but still having some pain at the upper part of her right leg.      - My interpretation of the images relevant for this visit includes: PET avid lymphadenopathy           Past Medical  History:      Past Medical History:   Diagnosis Date     Abnormal ECG      Anxiety      Arthritis      Chest pain      Chest pain      Chronic kidney disease     stage 3-mod.     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      Pacemaker      PSVT (paroxysmal supraventricular tachycardia) (H)      Right bundle branch block      Skin cancer            Past Surgical History:      Past Surgical History:   Procedure Laterality Date     ABLATION OF DYSRHYTHMIC FOCUS  04/11/2013    AV analia reentry tachycardia, partially successful     BIOPSY SKIN (LOCATION)       CARDIAC CATHETERIZATION  03/10/2016     CV CORONARY ANGIOGRAM N/A 05/26/2021    Procedure: Coronary Angiogram;  Surgeon: Yony Gillis MD;  Location: [facility] Cardiac Cath Lab;  Service: Cardiology     CV LEFT HEART CATHETERIZATION WITHOUT LEFT VENTRICULOGRAM Left 05/26/2021    Procedure: Left Heart Catheterization Without Left Ventriculogram;  Surgeon: Yony Gillis MD;  Location: [facility] Cardiac Cath Lab;  Service: Cardiology     CV RIGHT HEART CATHETERIZATION N/A 05/26/2021    Procedure: Right Heart Catheterization;  Surgeon: Yony Gillis MD;  Location: [facility] Cardiac Cath Lab;  Service: Cardiology     DILATION AND CURETTAGE N/A 4/19/2022    Procedure: DILATION AND CURRETAGE;  Surgeon: Mary Reed MD;  Location: [facility] OR     EP PACEMAKER N/A 08/30/2021    Procedure: Electrophysiology Pacemaker;  Surgeon: Ab Saavedra MD;  Location: [facility] LAB CV     FOOT SURGERY      L foot     HAND SURGERY      on both thumbs     HYSTERECTOMY, TOTAL, ROBOT-ASSISTED, LAP, DA HAROON XI, W/BI SAL-OOPH & SNT LYMPH NODE BX, MINI  LAP Bilateral 5/31/2022    Procedure: ROBOTIC ASSISTED TOTAL LAPAROSCOPIC HYSTERECTOMY, BILATERAL SALPINGO-OOPHORECTOMY, SENTINEL LYMPH NODE INJECTION AND BIOPSY, MINI-LAPAROTOMY,;  Surgeon: Mary Reed MD;  Location: [facility] OR     HYSTEROSCOPY DIAGNOSTIC N/A 4/19/2022    Procedure: HYSTEROSCOPY, DIAGNOSTIC;  Surgeon: Mary Reed MD;  Location: [facility] OR     IMPLANT PACEMAKER  04/01/2011    Second-degree AV block     OTHER SURGICAL HISTORY      SVT Ablation     PELVIC EXAM UNDER ANESTHESIA, CERVICAL DILATION, N/A      PELVIC EXAMINATION UNDER ANESTHESIA N/A 4/19/2022    Procedure: PELVIC EXAM UNDER ANESTHESIA, CERVICAL DILATION,;  Surgeon: Mary Reed MD;  Location: [facility] OR     KS SHLDR ARTHROSCOP,SURG,W/ROTAT CUFF REPR Left 03/09/2017    Procedure: LEFT SHOULDER ARTHROSCOPY DECOMPRESSION DISTAL CLAVICLE EXCISION  ROTATOR CUFF REPAIR BICEPS TENOTOMY AND EXTENSIVE DEBRIDEMENT;  Surgeon: Todd Riggs MD;  Location: [facility];  Service: Orthopedics     RELEASE CARPAL TUNNEL      both wrists     SKIN CANCER EXCISION      L ankle,Lleg and cheek     TOE SURGERY      L big toe joint replacement     TUBAL LIGATION            Social History:     Social History     Tobacco Use     Smoking status: Never Smoker     Smokeless tobacco: Never Used   Substance Use Topics     Alcohol use: No          Family History:     Family History   Problem Relation Age of Onset     Hypertension Mother      Cerebrovascular Disease Mother      Prostate Cancer Father      Cancer Father      Coronary Artery Disease Father      Dyslipidemia Father      Dyslipidemia Sister      Dyslipidemia Brother      Hypertension Brother      Prostate Cancer Brother            Allergies:      Allergies   Allergen Reactions     Herlinda-Kit Bee Sting Shortness Of Breath     Venom-Honey Bee [Bee Venom] Shortness Of Breath     Mercurial Analogues [Mercurial Derivatives] Dermatitis      blisters     Nitrofurantoin Other (See Comments)     Burning sensation across chest and arms       Sulfa (Sulfonamide Antibiotics) [Sulfa Drugs] Rash          Medications:     Current Outpatient Medications   Medication Sig     acetaminophen (TYLENOL) 500 MG tablet Take 500-1,000 mg by mouth every 6 hours as needed for mild pain     aspirin (ASA) 325 MG EC tablet Take 325 mg by mouth every evening     atorvastatin (LIPITOR) 80 MG tablet Take 80 mg by mouth At Bedtime     calcium carbonate (TUMS) 500 MG chewable tablet Take 1-2 chew tab by mouth daily     carvedilol (COREG) 25 MG tablet Take 1 tablet (25 mg) by mouth 2 times daily (with meals)     escitalopram (LEXAPRO) 5 MG tablet Take 5 mg by mouth daily     ferrous sulfate (FEROSUL) 325 (65 Fe) MG tablet Take 325 mg by mouth daily (with breakfast)     furosemide (LASIX) 20 MG tablet Take 2 tablets (40 mg) by mouth daily     gabapentin (NEURONTIN) 100 MG capsule Take 1 capsule (100 mg) by mouth 3 times daily     hydrALAZINE (APRESOLINE) 100 MG tablet Take 1 tablet (100 mg) by mouth 3 times daily     LORazepam (ATIVAN) 0.5 MG tablet [LORAZEPAM (ATIVAN) 0.5 MG TABLET] Take 1 tablet (0.5 mg total) by mouth every 6 (six) hours as needed for anxiety (or tremors).     losartan (COZAAR) 50 MG tablet Take 1 tablet (50 mg) by mouth every evening     metFORMIN (GLUCOPHAGE) 500 MG tablet [METFORMIN (GLUCOPHAGE) 500 MG TABLET] Take 1 tablet (500 mg total) by mouth 2 (two) times a day with meals. At breakfast and at dinner     Multiple Vitamins-Minerals (MULTIVITAMIN ADULTS 50+) TABS Take 1 tablet by mouth every morning     omeprazole (PRILOSEC) 20 MG capsule Take 20 mg by mouth daily before breakfast     prednisoLONE acetate (PRED-FORTE) 1 % ophthalmic suspension Place 1 drop into the right eye daily     vit C/E/Zn/coppr/lutein/zeaxan (PRESERVISION AREDS-2 ORAL) Take 1 tablet by mouth 2 times daily (before meals)     No current facility-administered medications for this visit.  "         Review of Systems:     See HPI         Physical Exam:   /62   Pulse 60   Ht 1.6 m (5' 3\")   Wt 65.4 kg (144 lb 1.6 oz)   SpO2 96%   BMI 25.53 kg/m      Constitutional - looks well, in no apparent distress  Eyes - no redness or discharge  Respiratory -breathing appears comfortable. No wheeze or rhonchi.   Cardiac -- Normal rate, rhythm.   Skin - No appreciable discoloration or lesions (very limited exam)  Neurological - No apparent tremors. Speech fluent and articlate  Psychiatric - no signs of delirium or anxiety          Current Laboratory Data:   All laboratory and imaging data reviewed.    No results found for this or any previous visit (from the past 24 hour(s)).                     Sincerely,        Eduardo Andersen MD    "

## 2022-07-07 NOTE — PROGRESS NOTES
LUNG NODULE & INTERVENTIONAL PULMONARY CLINIC  CLINICS & SURGERY CENTER, [facility]     Sharyn Trent MRN# [id number]   Age: 81 year old YOB: 1940       Requesting Physician: No referring provider defined for this encounter.       Assessment and Plan:    1.  Sarcoidosis.  Seemingly stage I as far as the lungs go.  No evidence of aggressive disease although we discussed typical surveillance.    Will do a CT scan and pulmonary function test with a follow-up visit later this fall.    Metabolic panel, LFTs, EKG and eye exam yearly    2.  Lymphadenopathy.  Secondary to above.  No further biopsy or evaluation necessary.               History:     Sharyn Trent is a 81 year old female with sig h/o for fibroids who is here for evaluation/followup of sarcoidosis.  She had some concerning symptoms earlier this spring in the emergency department and had a CT scan done.  She was found to have lymphadenopathy and a pelvic mass.  A PET scan showed metabolic activity.  Lymph node biopsy found granulomas in the lungs.  Lymph node dissection during her pelvic surgery also found granulomatous changes without necrosis.      She is recovering from her surgery but still having some pain at the upper part of her right leg.      - My interpretation of the images relevant for this visit includes: PET avid lymphadenopathy           Past Medical History:      Past Medical History:   Diagnosis Date     Abnormal ECG      Anxiety      Arthritis      Chest pain      Chest pain      Chronic kidney disease     stage 3-mod.     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      Pacemaker      PSVT (paroxysmal  supraventricular tachycardia) (H)      Right bundle branch block      Skin cancer            Past Surgical History:      Past Surgical History:   Procedure Laterality Date     ABLATION OF DYSRHYTHMIC FOCUS  04/11/2013    AV analia reentry tachycardia, partially successful     BIOPSY SKIN (LOCATION)       CARDIAC CATHETERIZATION  03/10/2016     CV CORONARY ANGIOGRAM N/A 05/26/2021    Procedure: Coronary Angiogram;  Surgeon: Yony Gillis MD;  Location: [facility] Cardiac Cath Lab;  Service: Cardiology     CV LEFT HEART CATHETERIZATION WITHOUT LEFT VENTRICULOGRAM Left 05/26/2021    Procedure: Left Heart Catheterization Without Left Ventriculogram;  Surgeon: Yony Gillis MD;  Location: [facility] Cardiac Cath Lab;  Service: Cardiology     CV RIGHT HEART CATHETERIZATION N/A 05/26/2021    Procedure: Right Heart Catheterization;  Surgeon: Yony Gillis MD;  Location: [facility] Cardiac Cath Lab;  Service: Cardiology     DILATION AND CURETTAGE N/A 4/19/2022    Procedure: DILATION AND CURRETAGE;  Surgeon: Mary Reed MD;  Location: [facility] OR     EP PACEMAKER N/A 08/30/2021    Procedure: Electrophysiology Pacemaker;  Surgeon: Ab Saavedra MD;  Location: [facility] CV     FOOT SURGERY      L foot     HAND SURGERY      on both thumbs     HYSTERECTOMY, TOTAL, ROBOT-ASSISTED, LAP, DA HAROON XI, W/BI SAL-OOPH & SNT LYMPH NODE BX, MINI LAP Bilateral 5/31/2022    Procedure: ROBOTIC ASSISTED TOTAL LAPAROSCOPIC HYSTERECTOMY, BILATERAL SALPINGO-OOPHORECTOMY, SENTINEL LYMPH NODE INJECTION AND BIOPSY, MINI-LAPAROTOMY,;  Surgeon: Mary Reed MD;  Location: [facility] OR     HYSTEROSCOPY DIAGNOSTIC N/A 4/19/2022    Procedure: HYSTEROSCOPY, DIAGNOSTIC;  Surgeon: Mary Reed MD;  Location: [facility] OR     IMPLANT PACEMAKER  04/01/2011    Second-degree AV block     OTHER SURGICAL HISTORY      SVT Ablation     PELVIC EXAM UNDER ANESTHESIA,  CERVICAL DILATION, N/A      PELVIC EXAMINATION UNDER ANESTHESIA N/A 4/19/2022    Procedure: PELVIC EXAM UNDER ANESTHESIA, CERVICAL DILATION,;  Surgeon: Zoila Riggs, Mary Malloy MD;  Location: [facility] SHLDR ARTHROSCOP,SURG,W/ROTAT CUFF REPR Left 03/09/2017    Procedure: LEFT SHOULDER ARTHROSCOPY DECOMPRESSION DISTAL CLAVICLE EXCISION  ROTATOR CUFF REPAIR BICEPS TENOTOMY AND EXTENSIVE DEBRIDEMENT;  Surgeon: Tdod Riggs MD;  Location: [facility];  Service: Orthopedics     RELEASE CARPAL TUNNEL      both wrists     SKIN CANCER EXCISION      L ankle,Lleg and cheek     TOE SURGERY      L big toe joint replacement     TUBAL LIGATION            Social History:     Social History     Tobacco Use     Smoking status: Never Smoker     Smokeless tobacco: Never Used   Substance Use Topics     Alcohol use: No          Family History:     Family History   Problem Relation Age of Onset     Hypertension Mother      Cerebrovascular Disease Mother      Prostate Cancer Father      Cancer Father      Coronary Artery Disease Father      Dyslipidemia Father      Dyslipidemia Sister      Dyslipidemia Brother      Hypertension Brother      Prostate Cancer Brother            Allergies:      Allergies   Allergen Reactions     Herlinda-Kit Bee Sting Shortness Of Breath     Venom-Honey Bee [Bee Venom] Shortness Of Breath     Mercurial Analogues [Mercurial Derivatives] Dermatitis     blisters     Nitrofurantoin Other (See Comments)     Burning sensation across chest and arms       Sulfa (Sulfonamide Antibiotics) [Sulfa Drugs] Rash          Medications:     Current Outpatient Medications   Medication Sig     acetaminophen (TYLENOL) 500 MG tablet Take 500-1,000 mg by mouth every 6 hours as needed for mild pain     aspirin (ASA) 325 MG EC tablet Take 325 mg by mouth every evening     atorvastatin (LIPITOR) 80 MG tablet Take 80 mg by mouth At Bedtime     calcium carbonate (TUMS) 500 MG chewable tablet Take 1-2 chew tab by  "mouth daily     carvedilol (COREG) 25 MG tablet Take 1 tablet (25 mg) by mouth 2 times daily (with meals)     escitalopram (LEXAPRO) 5 MG tablet Take 5 mg by mouth daily     ferrous sulfate (FEROSUL) 325 (65 Fe) MG tablet Take 325 mg by mouth daily (with breakfast)     furosemide (LASIX) 20 MG tablet Take 2 tablets (40 mg) by mouth daily     gabapentin (NEURONTIN) 100 MG capsule Take 1 capsule (100 mg) by mouth 3 times daily     hydrALAZINE (APRESOLINE) 100 MG tablet Take 1 tablet (100 mg) by mouth 3 times daily     LORazepam (ATIVAN) 0.5 MG tablet [LORAZEPAM (ATIVAN) 0.5 MG TABLET] Take 1 tablet (0.5 mg total) by mouth every 6 (six) hours as needed for anxiety (or tremors).     losartan (COZAAR) 50 MG tablet Take 1 tablet (50 mg) by mouth every evening     metFORMIN (GLUCOPHAGE) 500 MG tablet [METFORMIN (GLUCOPHAGE) 500 MG TABLET] Take 1 tablet (500 mg total) by mouth 2 (two) times a day with meals. At breakfast and at dinner     Multiple Vitamins-Minerals (MULTIVITAMIN ADULTS 50+) TABS Take 1 tablet by mouth every morning     omeprazole (PRILOSEC) 20 MG capsule Take 20 mg by mouth daily before breakfast     prednisoLONE acetate (PRED-FORTE) 1 % ophthalmic suspension Place 1 drop into the right eye daily     vit C/E/Zn/coppr/lutein/zeaxan (PRESERVISION AREDS-2 ORAL) Take 1 tablet by mouth 2 times daily (before meals)     No current facility-administered medications for this visit.          Review of Systems:     See HPI         Physical Exam:   /62   Pulse 60   Ht 1.6 m (5' 3\")   Wt 65.4 kg (144 lb 1.6 oz)   SpO2 96%   BMI 25.53 kg/m      Constitutional - looks well, in no apparent distress  Eyes - no redness or discharge  Respiratory -breathing appears comfortable. No wheeze or rhonchi.   Cardiac -- Normal rate, rhythm.   Skin - No appreciable discoloration or lesions (very limited exam)  Neurological - No apparent tremors. Speech fluent and articlate  Psychiatric - no signs of delirium or anxiety       "    Current Laboratory Data:   All laboratory and imaging data reviewed.    No results found for this or any previous visit (from the past 24 hour(s)).

## 2022-07-28 ENCOUNTER — HOSPITAL ENCOUNTER (OUTPATIENT)
Dept: ULTRASOUND IMAGING | Facility: HOSPITAL | Age: 82
Discharge: HOME | End: 2022-07-28
Attending: FAMILY MEDICINE | Admitting: FAMILY MEDICINE
Payer: COMMERCIAL

## 2022-07-28 DIAGNOSIS — M79.662 PAIN OF LEFT CALF: ICD-10-CM

## 2022-07-28 PROCEDURE — 93971 EXTREMITY STUDY: CPT | Mod: LT

## 2022-07-29 ENCOUNTER — OFFICE VISIT (OUTPATIENT)
Dept: CARDIOLOGY | Facility: CLINIC | Age: 82
End: 2022-07-29
Payer: COMMERCIAL

## 2022-07-29 ENCOUNTER — TELEPHONE (OUTPATIENT)
Dept: CARDIOLOGY | Facility: CLINIC | Age: 82
End: 2022-07-29

## 2022-07-29 VITALS
HEIGHT: 63 IN | RESPIRATION RATE: 16 BRPM | DIASTOLIC BLOOD PRESSURE: 50 MMHG | WEIGHT: 147 LBS | HEART RATE: 61 BPM | BODY MASS INDEX: 26.05 KG/M2 | SYSTOLIC BLOOD PRESSURE: 122 MMHG

## 2022-07-29 DIAGNOSIS — I50.30 NYHA CLASS 3 HEART FAILURE WITH PRESERVED EJECTION FRACTION (H): Primary | ICD-10-CM

## 2022-07-29 DIAGNOSIS — I50.22 CHRONIC SYSTOLIC HEART FAILURE (H): Primary | ICD-10-CM

## 2022-07-29 PROCEDURE — 99214 OFFICE O/P EST MOD 30 MIN: CPT | Performed by: INTERNAL MEDICINE

## 2022-07-29 NOTE — LETTER
7/29/2022    Jamie Mcconnell MD  [facility] [address]  [facility] [address]    RE: Sharyn Trent       Dear Colleague,     I had the pleasure of seeing Sharyn Trent in the [facility].    HEART CARE NOTE          Assessment/Recommendations     1. HFpEF c/b ADHF   Assessment / Plan    Patient endorses about ~ 5 lbweight gain over the past few days including abdominal distension; will have her take furosemide 40 mg daily x6 days and then return to her alternating 20 mg/40 mg dosing; Patient will continue to keep a weight log and [facility] clinic will call on Thursday to follow-up     2. CAD  Assessment / Plan    Denies further episodes of chest discomfort/pressure; s/p coronary angiogram significant for moderate LAD dz. Plan for medical management at that time    Continue ASA, high intensity atorvastatin, carvedilol, losartan     3. 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 Uterine mass  Assessment / Plan    S/p hysterectomy; now undergoing additional work-up to r/o metastasis    4. Pulmonary sarcoidosis   Assessment / Plan    Followed by pulmonary - Stage 1; further evaluation underway    History of Present Illness/Subjective      Ms. Sharyn Trent is a 80 y.o. female with a PMHx significant for HFpEF, hypertensive emergency, dyslipidemia, type 2 diabetes, non-obstructive CAD, heart block status post pacemaker, paroxysmal supraventricular tachycardia, melanoma, chronic left leg edema, chronic kidney disease stage III who presents to [facility] for follow-up care.      Today, Mrs. Trent denies HF symptoms; Management plan as detailed above     ECG: Personally reviewed. Paced rhythm      Coronary angiogram:  RHC via right IJ  RA mean 4mmHg  PA mean 24mmHg  PCWP 21mmHg  LVEDP 19mmHg  Ao 153/62     PA sat 67%  Ao sat 94%     CO cindy 3.35     Angiography via left radial  LM short normal  LAD mid 50% narrowing with FFR 0.85  Circ  "normal  RCA normal     ECHO (personnaly Reviewed):     Left ventricle ejection fraction is normal. The estimated left ventricular ejection fraction is 55%.    Left ventricular diastolic function is abnormal.    Normal right ventricular size and systolic function.    No hemodynamically significant valvular heart abnormalities.    When compared to the previous study dated 3/20/2018, No significant change          Physical Examination Review of Systems   /50 (BP Location: Left arm, Patient Position: Sitting, Cuff Size: Adult Regular)   Pulse 61   Resp 16   Ht 1.6 m (5' 3\")   Wt 66.7 kg (147 lb)   BMI 26.04 kg/m    Body mass index is 26.04 kg/m .  Wt Readings from Last 3 Encounters:   07/29/22 66.7 kg (147 lb)   07/07/22 65.4 kg (144 lb 1.6 oz)   07/06/22 65.3 kg (144 lb)     General Appearance:   no distress, normal body habitus   ENT/Mouth: membranes moist, no oral lesions or bleeding gums.      EYES:  no scleral icterus, normal conjunctivae   Neck: no carotid bruits or thyromegaly   Chest/Lungs:   lungs are clear to auscultation, no rales or wheezing, equal chest wall expansion    Cardiovascular:   Regular. Normal first and second heart sounds with no murmurs, rubs, or gallops; the carotid, radial and posterior tibial pulses are intact, no JVD and trace-mild LE edema bilaterally    Abdomen:  no organomegaly, masses, bruits, or tenderness; bowel sounds are present   Extremities: no cyanosis or clubbing   Skin: no xanthelasma, warm.    Neurologic: alert and oriented x3, calm     Psychiatric: alert and oriented x3, calm     A complete 10 systems ROS was reviewed  And is negative except what is listed in the HPI.          Medical History  Surgical History Family History Social History   Past Medical History:   Diagnosis Date     Abnormal ECG      Anxiety      Arthritis      Chest pain      Chest pain      Chronic kidney disease     stage 3-mod.     Congestive heart failure (H)      Diabetes (H)      " Dyslipidemia, goal LDL below 70      GERD (gastroesophageal reflux disease)      HTN (hypertension)      Hyperlipidemia      Macular degeneration (senile) of retina      Melanoma of ankle (H)     L ankle     Other second degree atrioventricular block     Created by Conversion      Pacemaker      PSVT (paroxysmal supraventricular tachycardia) (H)      Right bundle branch block      Skin cancer     Past Surgical History:   Procedure Laterality Date     ABLATION OF DYSRHYTHMIC FOCUS  04/11/2013    AV analia reentry tachycardia, partially successful     BIOPSY SKIN (LOCATION)       CARDIAC CATHETERIZATION  03/10/2016     CV CORONARY ANGIOGRAM N/A 05/26/2021    Procedure: Coronary Angiogram;  Surgeon: Yony Gillis MD;  Location: [facility] Cardiac Cath Lab;  Service: Cardiology     CV LEFT HEART CATHETERIZATION WITHOUT LEFT VENTRICULOGRAM Left 05/26/2021    Procedure: Left Heart Catheterization Without Left Ventriculogram;  Surgeon: Yony Gillis MD;  Location: [facility] Cardiac Cath Lab;  Service: Cardiology     CV RIGHT HEART CATHETERIZATION N/A 05/26/2021    Procedure: Right Heart Catheterization;  Surgeon: Yony Gillis MD;  Location: [facility] Cardiac Cath Lab;  Service: Cardiology     DILATION AND CURETTAGE N/A 4/19/2022    Procedure: DILATION AND CURRETAGE;  Surgeon: Mary Reed MD;  Location: [facility] OR     EP PACEMAKER N/A 08/30/2021    Procedure: Electrophysiology Pacemaker;  Surgeon: Ab Saavedra MD;  Location: [facility] CV     FOOT SURGERY      L foot     HAND SURGERY      on both thumbs     HYSTERECTOMY, TOTAL, ROBOT-ASSISTED, LAP, DA HAROON XI, W/BI SAL-OOPH & SNT LYMPH NODE BX, MINI LAP Bilateral 5/31/2022    Procedure: ROBOTIC ASSISTED TOTAL LAPAROSCOPIC HYSTERECTOMY, BILATERAL SALPINGO-OOPHORECTOMY, SENTINEL LYMPH NODE INJECTION AND BIOPSY, MINI-LAPAROTOMY,;  Surgeon: Mary Reed MD;  Location: [facility] OR     HYSTEROSCOPY  DIAGNOSTIC N/A 4/19/2022    Procedure: HYSTEROSCOPY, DIAGNOSTIC;  Surgeon: Mary Reed MD;  Location: [facility]     IMPLANT PACEMAKER  04/01/2011    Second-degree AV block     OTHER SURGICAL HISTORY      SVT Ablation     PELVIC EXAM UNDER ANESTHESIA, CERVICAL DILATION, N/A      PELVIC EXAMINATION UNDER ANESTHESIA N/A 4/19/2022    Procedure: PELVIC EXAM UNDER ANESTHESIA, CERVICAL DILATION,;  Surgeon: Mary Reed MD;  Location: [facility]     [place] SHLDR ARTHROSCOP,SURG,W/ROTAT CUFF REPR Left 03/09/2017    Procedure: LEFT SHOULDER ARTHROSCOPY DECOMPRESSION DISTAL CLAVICLE EXCISION  ROTATOR CUFF REPAIR BICEPS TENOTOMY AND EXTENSIVE DEBRIDEMENT;  Surgeon: Todd Riggs MD;  Location: [facility];  Service: Orthopedics     RELEASE CARPAL TUNNEL      both wrists     SKIN CANCER EXCISION      L ankle,Lleg and cheek     TOE SURGERY      L big toe joint replacement     TUBAL LIGATION      no family history of premature coronary artery disease Social History     Socioeconomic History     Marital status:      Spouse name: Not on file     Number of children: Not on file     Years of education: Not on file     Highest education level: Not on file   Occupational History     Not on file   Tobacco Use     Smoking status: Never Smoker     Smokeless tobacco: Never Used   Substance and Sexual Activity     Alcohol use: No     Drug use: No     Sexual activity: Yes     Partners: Male     Birth control/protection: Surgical   Other Topics Concern     Not on file   Social History Narrative     Not on file     Social Determinants of Health     Financial Resource Strain: Not on file   Food Insecurity: Not on file   Transportation Needs: Not on file   Physical Activity: Not on file   Stress: Not on file   Social Connections: Not on file   Intimate Partner Violence: Not on file   Housing Stability: Not on file           Lab Results    Chemistry/lipid CBC Cardiac Enzymes/BNP/TSH/INR   Lab  Results   Component Value Date    CHOL 126 03/31/2022    HDL 46 (L) 03/31/2022    TRIG 53 03/31/2022    BUN 34 (H) 07/06/2022     07/06/2022    CO2 29 07/06/2022    Lab Results   Component Value Date    WBC 9.7 06/01/2022    HGB 9.4 (L) 06/01/2022    HGB 9.4 (L) 06/01/2022    HCT 29.4 (L) 06/01/2022    MCV 91 06/01/2022     06/01/2022    Lab Results   Component Value Date    TROPONINI 0.04 04/05/2022     (H) 04/05/2022    TSH 1.53 09/15/2021    INR 1.05 09/13/2021     Lab Results   Component Value Date    TROPONINI 0.04 04/05/2022          Weight:    Wt Readings from Last 3 Encounters:   07/07/22 65.4 kg (144 lb 1.6 oz)   07/06/22 65.3 kg (144 lb)   06/28/22 65.3 kg (144 lb)       Allergies  Allergies   Allergen Reactions     Herlinda-Kit Bee Sting Shortness Of Breath     Venom-Honey Bee [Bee Venom] Shortness Of Breath     Mercurial Analogues [Mercurial Derivatives] Dermatitis     blisters     Nitrofurantoin Other (See Comments)     Burning sensation across chest and arms       Sulfa (Sulfonamide Antibiotics) [Sulfa Drugs] Rash         Surgical History  Past Surgical History:   Procedure Laterality Date     ABLATION OF DYSRHYTHMIC FOCUS  04/11/2013    AV analia reentry tachycardia, partially successful     BIOPSY SKIN (LOCATION)       CARDIAC CATHETERIZATION  03/10/2016     CV CORONARY ANGIOGRAM N/A 05/26/2021    Procedure: Coronary Angiogram;  Surgeon: Yony Gillis MD;  Location: [facility] Cardiac Cath Lab;  Service: Cardiology     CV LEFT HEART CATHETERIZATION WITHOUT LEFT VENTRICULOGRAM Left 05/26/2021    Procedure: Left Heart Catheterization Without Left Ventriculogram;  Surgeon: Yony Gillis MD;  Location: [facility] Cardiac Cath Lab;  Service: Cardiology     CV RIGHT HEART CATHETERIZATION N/A 05/26/2021    Procedure: Right Heart Catheterization;  Surgeon: Yony Gillis MD;  Location: [facility] Cardiac Cath Lab;  Service: Cardiology     DILATION AND CURETTAGE N/A  4/19/2022    Procedure: DILATION AND CURRETAGE;  Surgeon: Mary Reed MD;  Location: [facility] OR     EP PACEMAKER N/A 08/30/2021    Procedure: Electrophysiology Pacemaker;  Surgeon: Ab Saavedra MD;  Location: [facility] LAB CV     FOOT SURGERY      L foot     HAND SURGERY      on both thumbs     HYSTERECTOMY, TOTAL, ROBOT-ASSISTED, LAP, DA HAROON XI, W/BI SAL-OOPH & SNT LYMPH NODE BX, MINI LAP Bilateral 5/31/2022    Procedure: ROBOTIC ASSISTED TOTAL LAPAROSCOPIC HYSTERECTOMY, BILATERAL SALPINGO-OOPHORECTOMY, SENTINEL LYMPH NODE INJECTION AND BIOPSY, MINI-LAPAROTOMY,;  Surgeon: Mary Reed MD;  Location: [facility] OR     HYSTEROSCOPY DIAGNOSTIC N/A 4/19/2022    Procedure: HYSTEROSCOPY, DIAGNOSTIC;  Surgeon: Mary Reed MD;  Location: [facility] OR     IMPLANT PACEMAKER  04/01/2011    Second-degree AV block     OTHER SURGICAL HISTORY      SVT Ablation     PELVIC EXAM UNDER ANESTHESIA, CERVICAL DILATION, N/A      PELVIC EXAMINATION UNDER ANESTHESIA N/A 4/19/2022    Procedure: PELVIC EXAM UNDER ANESTHESIA, CERVICAL DILATION,;  Surgeon: Mary Reed MD;  [place] [facility]     [place] SHLDR ARTHROSCOP,SURG,W/ROTAT CUFF REPR Left 03/09/2017    Procedure: LEFT SHOULDER ARTHROSCOPY DECOMPRESSION DISTAL CLAVICLE EXCISION  ROTATOR CUFF REPAIR BICEPS TENOTOMY AND EXTENSIVE DEBRIDEMENT;  Surgeon: Todd Riggs MD;  Location: [facility];  Service: Orthopedics     RELEASE CARPAL TUNNEL      both wrists     SKIN CANCER EXCISION      L ankle,Lleg and cheek     TOE SURGERY      L big toe joint replacement     TUBAL LIGATION         Social History  Tobacco:   History   Smoking Status     Never Smoker   Smokeless Tobacco     Never Used    Alcohol:   Social History    Substance and Sexual Activity      Alcohol use: No   Illicit Drugs:   History   Drug Use No       Family History  Family History   Problem Relation Age of Onset     Hypertension  Mother      Cerebrovascular Disease Mother      Prostate Cancer Father      Cancer Father      Coronary Artery Disease Father      Dyslipidemia Father      Dyslipidemia Sister      Dyslipidemia Brother      Hypertension Brother      Prostate Cancer Brother           Heidy Akins MD on 7/29/2022      cc: Jamie Mcconnell    Thank you for allowing me to participate in the care of your patient.      Sincerely,     Heidy Akins MD      [facility]  cc:   No referring provider defined for this encounter.

## 2022-07-29 NOTE — PATIENT INSTRUCTIONS
Thank you for allowing the [facility] clinic to be a part of your care. Please pay close attention to the following medications as they have been changed during this visit.    1.) Please start 40 mg of furosemide (two 20 mg tablets) every day for six (6) days. Please resume the alternating doses on the seventh day.

## 2022-07-29 NOTE — TELEPHONE ENCOUNTER
----- Message from Heidy Akins MD sent at 7/29/2022  2:55 PM CDT -----  Regarding: Follow-up  Please contact Sharyn next week Thursday (8/4/22) to follow-up on HF symptoms and weight gain. Please see clinic instruction notes regarding furosemide dosing.    Thanks;  ~ Sara

## 2022-07-29 NOTE — PROGRESS NOTES
HEART CARE NOTE          Assessment/Recommendations     1. HFpEF c/b ADHF   Assessment / Plan    Patient endorses about ~ 5 lbweight gain over the past few days including abdominal distension; will have her take furosemide 40 mg daily x6 days and then return to her alternating 20 mg/40 mg dosing; Patient will continue to keep a weight log and [facility] will call on Thursday to follow-up     2. CAD  Assessment / Plan    Denies further episodes of chest discomfort/pressure; s/p coronary angiogram significant for moderate LAD dz. Plan for medical management at that time    Continue ASA, high intensity atorvastatin, carvedilol, losartan     3. 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 Uterine mass  Assessment / Plan    S/p hysterectomy; now undergoing additional work-up to r/o metastasis    4. Pulmonary sarcoidosis   Assessment / Plan    Followed by pulmonary - Stage 1; further evaluation underway    History of Present Illness/Subjective      Ms. Sharyn Trent is a 80 y.o. female with a PMHx significant for HFpEF, hypertensive emergency, dyslipidemia, type 2 diabetes, non-obstructive CAD, heart block status post pacemaker, paroxysmal supraventricular tachycardia, melanoma, chronic left leg edema, chronic kidney disease stage III who presents to [facility] for follow-up care.      Today, Mrs. Trent denies HF symptoms; Management plan as detailed above     ECG: Personally reviewed. Paced rhythm      Coronary angiogram:  RHC via right IJ  RA mean 4mmHg  PA mean 24mmHg  PCWP 21mmHg  LVEDP 19mmHg  Ao 153/62     PA sat 67%  Ao sat 94%     CO cindy 3.35     Angiography via left radial  LM short normal  LAD mid 50% narrowing with FFR 0.85  Circ normal  RCA normal     ECHO (personnaly Reviewed):     Left ventricle ejection fraction is normal. The estimated left ventricular ejection fraction is 55%.    Left ventricular diastolic function is abnormal.    Normal  "right ventricular size and systolic function.    No hemodynamically significant valvular heart abnormalities.    When compared to the previous study dated 3/20/2018, No significant change          Physical Examination Review of Systems   /50 (BP Location: Left arm, Patient Position: Sitting, Cuff Size: Adult Regular)   Pulse 61   Resp 16   Ht 1.6 m (5' 3\")   Wt 66.7 kg (147 lb)   BMI 26.04 kg/m    Body mass index is 26.04 kg/m .  Wt Readings from Last 3 Encounters:   07/29/22 66.7 kg (147 lb)   07/07/22 65.4 kg (144 lb 1.6 oz)   07/06/22 65.3 kg (144 lb)     General Appearance:   no distress, normal body habitus   ENT/Mouth: membranes moist, no oral lesions or bleeding gums.      EYES:  no scleral icterus, normal conjunctivae   Neck: no carotid bruits or thyromegaly   Chest/Lungs:   lungs are clear to auscultation, no rales or wheezing, equal chest wall expansion    Cardiovascular:   Regular. Normal first and second heart sounds with no murmurs, rubs, or gallops; the carotid, radial and posterior tibial pulses are intact, no JVD and trace-mild LE edema bilaterally    Abdomen:  no organomegaly, masses, bruits, or tenderness; bowel sounds are present   Extremities: no cyanosis or clubbing   Skin: no xanthelasma, warm.    Neurologic: alert and oriented x3, calm     Psychiatric: alert and oriented x3, calm     A complete 10 systems ROS was reviewed  And is negative except what is listed in the HPI.          Medical History  Surgical History Family History Social History   Past Medical History:   Diagnosis Date     Abnormal ECG      Anxiety      Arthritis      Chest pain      Chest pain      Chronic kidney disease     stage 3-mod.     Congestive heart failure (H)      Diabetes (H)      Dyslipidemia, goal LDL below 70      GERD (gastroesophageal reflux disease)      HTN (hypertension)      Hyperlipidemia      Macular degeneration (senile) of retina      Melanoma of ankle (H)     L ankle     Other second degree " atrioventricular block     Created by Conversion      Pacemaker      PSVT (paroxysmal supraventricular tachycardia) (H)      Right bundle branch block      Skin cancer     Past Surgical History:   Procedure Laterality Date     ABLATION OF DYSRHYTHMIC FOCUS  04/11/2013    AV analia reentry tachycardia, partially successful     BIOPSY SKIN (LOCATION)       CARDIAC CATHETERIZATION  03/10/2016     CV CORONARY ANGIOGRAM N/A 05/26/2021    Procedure: Coronary Angiogram;  Surgeon: Yony Gillis MD;  Location: [facility] Cardiac Cath Lab;  Service: Cardiology     CV LEFT HEART CATHETERIZATION WITHOUT LEFT VENTRICULOGRAM Left 05/26/2021    Procedure: Left Heart Catheterization Without Left Ventriculogram;  Surgeon: Yony Gillis MD;  Location: [facility] Cardiac Cath Lab;  Service: Cardiology     CV RIGHT HEART CATHETERIZATION N/A 05/26/2021    Procedure: Right Heart Catheterization;  Surgeon: Yony Gillis MD;  Location: [facility] Cardiac Cath Lab;  Service: Cardiology     DILATION AND CURETTAGE N/A 4/19/2022    Procedure: DILATION AND CURRETAGE;  Surgeon: Mary Reed MD;  Location: [facility] OR     EP PACEMAKER N/A 08/30/2021    Procedure: Electrophysiology Pacemaker;  Surgeon: Ab Saavedra MD;  Location: [facility] CV     FOOT SURGERY      L foot     HAND SURGERY      on both thumbs     HYSTERECTOMY, TOTAL, ROBOT-ASSISTED, LAP, DA HAROON XI, W/BI SAL-OOPH & SNT LYMPH NODE BX, MINI LAP Bilateral 5/31/2022    Procedure: ROBOTIC ASSISTED TOTAL LAPAROSCOPIC HYSTERECTOMY, BILATERAL SALPINGO-OOPHORECTOMY, SENTINEL LYMPH NODE INJECTION AND BIOPSY, MINI-LAPAROTOMY,;  Surgeon: Mary Reed MD;  Location: [facility] OR     HYSTEROSCOPY DIAGNOSTIC N/A 4/19/2022    Procedure: HYSTEROSCOPY, DIAGNOSTIC;  Surgeon: Mary Reed MD;  Location: [facility] OR     IMPLANT PACEMAKER  04/01/2011    Second-degree AV block     OTHER SURGICAL HISTORY      SVT  Ablation     PELVIC EXAM UNDER ANESTHESIA, CERVICAL DILATION, N/A      PELVIC EXAMINATION UNDER ANESTHESIA N/A 4/19/2022    Procedure: PELVIC EXAM UNDER ANESTHESIA, CERVICAL DILATION,;  Surgeon: Mary Reed MD;  Location: [facility] SHLDR ARTHROSCOP,SURG,W/ROTAT CUFF REPR Left 03/09/2017    Procedure: LEFT SHOULDER ARTHROSCOPY DECOMPRESSION DISTAL CLAVICLE EXCISION  ROTATOR CUFF REPAIR BICEPS TENOTOMY AND EXTENSIVE DEBRIDEMENT;  Surgeon: Todd Riggs MD;  Location: [facility];  Service: Orthopedics     RELEASE CARPAL TUNNEL      both wrists     SKIN CANCER EXCISION      L ankle,Lleg and cheek     TOE SURGERY      L big toe joint replacement     TUBAL LIGATION      no family history of premature coronary artery disease Social History     Socioeconomic History     Marital status:      Spouse name: Not on file     Number of children: Not on file     Years of education: Not on file     Highest education level: Not on file   Occupational History     Not on file   Tobacco Use     Smoking status: Never Smoker     Smokeless tobacco: Never Used   Substance and Sexual Activity     Alcohol use: No     Drug use: No     Sexual activity: Yes     Partners: Male     Birth control/protection: Surgical   Other Topics Concern     Not on file   Social History Narrative     Not on file     Social Determinants of Health     Financial Resource Strain: Not on file   Food Insecurity: Not on file   Transportation Needs: Not on file   Physical Activity: Not on file   Stress: Not on file   Social Connections: Not on file   Intimate Partner Violence: Not on file   Housing Stability: Not on file           Lab Results    Chemistry/lipid CBC Cardiac Enzymes/BNP/TSH/INR   Lab Results   Component Value Date    CHOL 126 03/31/2022    HDL 46 (L) 03/31/2022    TRIG 53 03/31/2022    BUN 34 (H) 07/06/2022     07/06/2022    CO2 29 07/06/2022    Lab Results   Component Value Date    WBC 9.7 06/01/2022     HGB 9.4 (L) 06/01/2022    HGB 9.4 (L) 06/01/2022    HCT 29.4 (L) 06/01/2022    MCV 91 06/01/2022     06/01/2022    Lab Results   Component Value Date    TROPONINI 0.04 04/05/2022     (H) 04/05/2022    TSH 1.53 09/15/2021    INR 1.05 09/13/2021     Lab Results   Component Value Date    TROPONINI 0.04 04/05/2022          Weight:    Wt Readings from Last 3 Encounters:   07/07/22 65.4 kg (144 lb 1.6 oz)   07/06/22 65.3 kg (144 lb)   06/28/22 65.3 kg (144 lb)       Allergies  Allergies   Allergen Reactions     Herlinda-Kit Bee Sting Shortness Of Breath     Venom-Honey Bee [Bee Venom] Shortness Of Breath     Mercurial Analogues [Mercurial Derivatives] Dermatitis     blisters     Nitrofurantoin Other (See Comments)     Burning sensation across chest and arms       Sulfa (Sulfonamide Antibiotics) [Sulfa Drugs] Rash         Surgical History  Past Surgical History:   Procedure Laterality Date     ABLATION OF DYSRHYTHMIC FOCUS  04/11/2013    AV analia reentry tachycardia, partially successful     BIOPSY SKIN (LOCATION)       CARDIAC CATHETERIZATION  03/10/2016     CV CORONARY ANGIOGRAM N/A 05/26/2021    Procedure: Coronary Angiogram;  Surgeon: Yony Gillis MD;  Location: [facility] Cardiac Cath Lab;  Service: Cardiology     CV LEFT HEART CATHETERIZATION WITHOUT LEFT VENTRICULOGRAM Left 05/26/2021    Procedure: Left Heart Catheterization Without Left Ventriculogram;  Surgeon: Yony Gillis MD;  Location: [facility] Cardiac Cath Lab;  Service: Cardiology     CV RIGHT HEART CATHETERIZATION N/A 05/26/2021    Procedure: Right Heart Catheterization;  Surgeon: Yony Gillis MD;  Location: [facility] Cardiac Cath Lab;  Service: Cardiology     DILATION AND CURETTAGE N/A 4/19/2022    Procedure: DILATION AND CURRETAGE;  Surgeon: Mary Reed MD;  Location: [facility] OR     EP PACEMAKER N/A 08/30/2021    Procedure: Electrophysiology Pacemaker;  Surgeon: Ab Saavedra MD;  Location:  [facility] CATH LAB CV     FOOT SURGERY      L foot     HAND SURGERY      on both thumbs     HYSTERECTOMY, TOTAL, ROBOT-ASSISTED, LAP, DA HAROON XI, W/BI SAL-OOPH & SNT LYMPH NODE BX, MINI LAP Bilateral 5/31/2022    Procedure: ROBOTIC ASSISTED TOTAL LAPAROSCOPIC HYSTERECTOMY, BILATERAL SALPINGO-OOPHORECTOMY, SENTINEL LYMPH NODE INJECTION AND BIOPSY, MINI-LAPAROTOMY,;  Surgeon: Mary Reed MD;  Location: [facility] OR     HYSTEROSCOPY DIAGNOSTIC N/A 4/19/2022    Procedure: HYSTEROSCOPY, DIAGNOSTIC;  Surgeon: Mary Reed MD;  Location: [facility] OR     IMPLANT PACEMAKER  04/01/2011    Second-degree AV block     OTHER SURGICAL HISTORY      SVT Ablation     PELVIC EXAM UNDER ANESTHESIA, CERVICAL DILATION, N/A      PELVIC EXAMINATION UNDER ANESTHESIA N/A 4/19/2022    Procedure: PELVIC EXAM UNDER ANESTHESIA, CERVICAL DILATION,;  Surgeon: Mary Reed MD;  Location: [facility] OR     NH SHLDR ARTHROSCOP,SURG,W/ROTAT CUFF REPR Left 03/09/2017    Procedure: LEFT SHOULDER ARTHROSCOPY DECOMPRESSION DISTAL CLAVICLE EXCISION  ROTATOR CUFF REPAIR BICEPS TENOTOMY AND EXTENSIVE DEBRIDEMENT;  Surgeon: Todd Riggs MD;  Location: [facility] OR;  Service: Orthopedics     RELEASE CARPAL TUNNEL      both wrists     SKIN CANCER EXCISION      L ankle,Lleg and cheek     TOE SURGERY      L big toe joint replacement     TUBAL LIGATION         Social History  Tobacco:   History   Smoking Status     Never Smoker   Smokeless Tobacco     Never Used    Alcohol:   Social History    Substance and Sexual Activity      Alcohol use: No   Illicit Drugs:   History   Drug Use No       Family History  Family History   Problem Relation Age of Onset     Hypertension Mother      Cerebrovascular Disease Mother      Prostate Cancer Father      Cancer Father      Coronary Artery Disease Father      Dyslipidemia Father      Dyslipidemia Sister      Dyslipidemia Brother      Hypertension Brother       Prostate Cancer Brother           Heidy Akins MD on 7/29/2022      cc: Jamie Mcconnell

## 2022-08-02 ENCOUNTER — TELEPHONE (OUTPATIENT)
Dept: NEUROLOGY | Facility: CLINIC | Age: 82
End: 2022-08-02

## 2022-08-02 NOTE — TELEPHONE ENCOUNTER
Called and spoke with patient. Gave her the providers message.   She is wondering if Dr. Davila has a Hematologist/oncologist that he would recommend or if she should go through her primary clinic. Patient knows that provider is out of office for the next few days and is ok to wait.   Mary Finch MA,[facility],3:37 PM

## 2022-08-02 NOTE — TELEPHONE ENCOUNTER
[facility]    Phone Message    May a detailed message be left on voicemail: yes     Reason for Call: Requesting Results   Name/type of test: labs   Date of test: 07/06/  Was test done at a location other than [facility] (Please fill in the location if not [facility])?: No    Pt also returning missed call to discuss results and she would like to know why she needs to be referred to a hematologist       Action Taken: Message routed to:  [facility]y    Travel Screening: Not Applicable

## 2022-08-02 NOTE — TELEPHONE ENCOUNTER
"Test shows  \"Possible very small monoclonal IgM immunoglobulin of lambda light chain type. Pathologic significance requires clinical correlation.\"  Most likely this is false positive.  As discussed we need to make sure she does not have multiple myeloma needs to see an oncologist/hematologist for this.  "

## 2022-08-04 NOTE — TELEPHONE ENCOUNTER
Spoke with patient and she verbalized understanding to continue taking 40mg lasix and will come into [facility] to have her labs drawn either tomorrow or early next week.   BMP ordered.    Renee Pete RN

## 2022-08-04 NOTE — TELEPHONE ENCOUNTER
Called Pt to follow up on increased furosemide from 20mg to 40mg daily for 6 days then go back to 20mg. She reported significant weight increase over the last several days as follows:   7-30 133.6  7-31 133.6  8-1 133.4  8-2 142.6  8-3 No weight   8-4 141.8  She reported she has not moved her scale and takes her weights at the same time every day.    She feels a little bloated at times but feels this is from her hysterectomy. Denies swelling in her ankles. She does have new compression socks which she feels is helping with her swelling. Denies SOB, tenderness or hardness in abdomen. She does report a dry cough but not producing sputum. She does report urinating a lot more since her increase in furosemide. Her fluid intake is between 50-60oz every day. She is also trying to monitor her salt intake and makes sure she does not go over 2,00mg a day. She reports daughter does her food shopping and makes sure to give her low sodium options. She reports she started on Gabapentin 100mg TID (300mg/day) from neurology on 7/11 and she reports her provider said she may experience weight gain with this.     RONAK Lobo. Any further recommendations?    Renee Pete RN      CDT -----  Regarding: Follow-up  Please contact Sharyn next week Thursday (8/4/22) to follow-up on HF symptoms and weight gain. Please see clinic instruction notes regarding furosemide dosing.     Thanks;  ~ Sara

## 2022-08-04 NOTE — TELEPHONE ENCOUNTER
Please have her increase Lasix to 40 mg daily again.  She should have a BMP this week or early next week.  Please call her early next week for an update.  She has a follow-up appointment with Dr. Akins on 8/26 that she needs to keep.  Thank you

## 2022-08-05 ENCOUNTER — TELEPHONE (OUTPATIENT)
Dept: CARDIOLOGY | Facility: CLINIC | Age: 82
End: 2022-08-05

## 2022-08-05 ENCOUNTER — LAB (OUTPATIENT)
Dept: LAB | Facility: HOSPITAL | Age: 82
End: 2022-08-05
Payer: COMMERCIAL

## 2022-08-05 DIAGNOSIS — I50.22 CHRONIC SYSTOLIC HEART FAILURE (H): ICD-10-CM

## 2022-08-05 LAB
ANION GAP SERPL CALCULATED.3IONS-SCNC: 9 MMOL/L (ref 5–18)
BUN SERPL-MCNC: 35 MG/DL (ref 8–28)
CALCIUM SERPL-MCNC: 9.6 MG/DL (ref 8.5–10.5)
CHLORIDE BLD-SCNC: 102 MMOL/L (ref 98–107)
CO2 SERPL-SCNC: 29 MMOL/L (ref 22–31)
CREAT SERPL-MCNC: 1.41 MG/DL (ref 0.6–1.1)
GFR SERPL CREATININE-BSD FRML MDRD: 38 ML/MIN/1.73M2
GLUCOSE BLD-MCNC: 93 MG/DL (ref 70–125)
POTASSIUM BLD-SCNC: 4.2 MMOL/L (ref 3.5–5)
SODIUM SERPL-SCNC: 140 MMOL/L (ref 136–145)

## 2022-08-05 PROCEDURE — 36415 COLL VENOUS BLD VENIPUNCTURE: CPT

## 2022-08-05 PROCEDURE — 82374 ASSAY BLOOD CARBON DIOXIDE: CPT

## 2022-08-05 NOTE — TELEPHONE ENCOUNTER
Spoke with pt and updated on labs. She reports her symptoms remain unchanged. She did report she was given a new pair of compression socks which she likes and has been using daily. Cough remains unchanged. Reports feeling that she has a little more energy than before. No concerns with sleeping.    Renee Pete RN             From: Sridevi Garsia NP  Sent: 8/5/2022   2:39 PM CDT    Labs are stable since Lashell increased lasix 1 week ago.  Please check in with her to see if symptoms are better.

## 2022-08-05 NOTE — TELEPHONE ENCOUNTER
She reports her weights  8/5  141.8  8/4 141.0  8/3 142.6    She says her stays between 141 to 143 regularly.     CASTILLO Duran

## 2022-08-06 NOTE — TELEPHONE ENCOUNTER
She could start with PCP. Based on my experience most PCP are not comfortable with this she will be referred to Hematology.

## 2022-08-08 ENCOUNTER — TRANSCRIBE ORDERS (OUTPATIENT)
Dept: OTHER | Age: 82
End: 2022-08-08

## 2022-08-08 NOTE — TELEPHONE ENCOUNTER
Her weight is at baseline and symptoms have improved.  Please ask her to call with any worsening symptoms of fluid retention.

## 2022-08-08 NOTE — TELEPHONE ENCOUNTER
Spoke to pt, relayed Dr. Davila's message below.  Pt will contact hematology to schedule an appt.    Gloria Jason LPN on 8/8/2022 at 1:47 PM

## 2022-08-15 ENCOUNTER — TELEPHONE (OUTPATIENT)
Dept: NEUROLOGY | Facility: CLINIC | Age: 82
End: 2022-08-15

## 2022-08-15 DIAGNOSIS — R77.8 ABNORMAL SPEP: Primary | ICD-10-CM

## 2022-08-15 NOTE — TELEPHONE ENCOUNTER
----- Message from Reji Davila MD sent at 8/15/2022  3:37 PM CDT -----  Regarding: RE: Hematology Referral  Can you send me order enc.   ----- Message -----  From: Mary Finch MA  Sent: 8/15/2022   3:32 PM CDT  To: Reji Davila MD  Subject: FW: Hematology Referral                          Can you place hematology referral per patient request?   ----- Message -----  From: Reji Davila MD  Sent: 8/15/2022   3:31 PM CDT  To: [facility]  Subject: RE: Hematology Referral                            ----- Message -----  From: Musa Rose  Sent: 8/15/2022   3:26 PM CDT  To: Reji Davila MD  Subject: RE: Hematology Referral                          Thank you for your response. It appears she is thinking otherwise. Would your office be able to reach out to her to confirm the plan?      ----- Message -----  From: Reji Davila MD  Sent: 8/15/2022   3:21 PM CDT  To: Musa Rose  Subject: RE: Hematology Referral                          She was going to start with the primary care doctor first.  ----- Message -----  From: Musa Rose  Sent: 8/15/2022   1:24 PM CDT  To: Reji Davila MD  Subject: Hematology Referral                              Good Afternoon,    Patient called stating your office was submitting an hematology referral? We did not locate one as of yet and is inquiring if one will be placed.    Thank you!

## 2022-08-16 ENCOUNTER — PATIENT OUTREACH (OUTPATIENT)
Dept: ONCOLOGY | Facility: CLINIC | Age: 82
End: 2022-08-16

## 2022-08-24 ENCOUNTER — TELEPHONE (OUTPATIENT)
Dept: CARDIOLOGY | Facility: CLINIC | Age: 82
End: 2022-08-24

## 2022-08-24 ENCOUNTER — OFFICE VISIT (OUTPATIENT)
Dept: CARDIOLOGY | Facility: CLINIC | Age: 82
End: 2022-08-24
Payer: COMMERCIAL

## 2022-08-24 VITALS
HEIGHT: 63 IN | DIASTOLIC BLOOD PRESSURE: 58 MMHG | WEIGHT: 147 LBS | SYSTOLIC BLOOD PRESSURE: 136 MMHG | BODY MASS INDEX: 26.05 KG/M2 | RESPIRATION RATE: 16 BRPM | HEART RATE: 58 BPM

## 2022-08-24 DIAGNOSIS — I50.20 SYSTOLIC CONGESTIVE HEART FAILURE, UNSPECIFIED HF CHRONICITY (H): Primary | ICD-10-CM

## 2022-08-24 DIAGNOSIS — I50.30 NYHA CLASS 3 HEART FAILURE WITH PRESERVED EJECTION FRACTION (H): ICD-10-CM

## 2022-08-24 PROCEDURE — 99214 OFFICE O/P EST MOD 30 MIN: CPT | Mod: 25 | Performed by: INTERNAL MEDICINE

## 2022-08-24 PROCEDURE — 96374 THER/PROPH/DIAG INJ IV PUSH: CPT | Performed by: INTERNAL MEDICINE

## 2022-08-24 RX ORDER — FUROSEMIDE 10 MG/ML
80 INJECTION INTRAMUSCULAR; INTRAVENOUS ONCE
Status: COMPLETED | OUTPATIENT
Start: 2022-08-24 | End: 2022-08-24

## 2022-08-24 RX ORDER — ESCITALOPRAM OXALATE 10 MG/1
10 TABLET ORAL DAILY
COMMUNITY
Start: 2022-08-02

## 2022-08-24 RX ADMIN — FUROSEMIDE 80 MG: 10 INJECTION INTRAMUSCULAR; INTRAVENOUS at 15:03

## 2022-08-24 NOTE — LETTER
8/24/2022    Jamie Mcconnell MD  [facility] [address]  [facility] [address]    RE: Sharyn Trent       Dear Colleague,     I had the pleasure of seeing Sharyn Trent in the [facility].    HEART CARE NOTE          Assessment/Recommendations     1. HFpEF c/b ADHF   Assessment / Plan    Patient is hypervolemic on physical exam; endorses HF symptoms and fluid retention despite escalating oral diuretic regimen; will IV diuretics in clinic today and [facility] will call tomorrow to follow-up; no changes to furosemide - patient will continue to take 40 mg daily    Patient would like to be enrolled in [facility] remote monitoring      2. CAD  Assessment / Plan    Denies further episodes of chest discomfort/pressure; s/p coronary angiogram significant for moderate LAD dz. Plan for medical management at that time    Continue ASA, high intensity atorvastatin, carvedilol, losartan     3. 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 Uterine mass  Assessment / Plan    S/p hysterectomy; now undergoing additional work-up to r/o metastasis     4. Pulmonary sarcoidosis   Assessment / Plan    Followed by pulmonary - Stage 1; further evaluation underway      History of Present Illness/Subjective      Ms. Sharyn Trent is a 80 y.o. female with a PMHx significant for HFpEF, hypertensive emergency, dyslipidemia, type 2 diabetes, non-obstructive CAD, heart block status post pacemaker, paroxysmal supraventricular tachycardia, melanoma, chronic left leg edema, chronic kidney disease stage III who presents to [facility] clinic for follow-up care.      Today, Mrs. Trent endorses HF symptoms; Management plan as detailed above     ECG: Personally reviewed. Paced rhythm      Coronary angiogram:  RHC via right IJ  RA mean 4mmHg  PA mean 24mmHg  PCWP 21mmHg  LVEDP 19mmHg  Ao 153/62     PA sat 67%  Ao sat 94%     CO cindy 3.35     Angiography via left radial  LM short  "normal  LAD mid 50% narrowing with FFR 0.85  Circ normal  RCA normal     ECHO (personnaly Reviewed):     Left ventricle ejection fraction is normal. The estimated left ventricular ejection fraction is 55%.    Left ventricular diastolic function is abnormal.    Normal right ventricular size and systolic function.    No hemodynamically significant valvular heart abnormalities.    When compared to the previous study dated 3/20/2018, No significant change          Physical Examination Review of Systems   /58 (BP Location: Left arm, Patient Position: Sitting, Cuff Size: Adult Regular)   Pulse 58   Resp 16   Ht 1.6 m (5' 3\")   Wt 66.7 kg (147 lb)   BMI 26.04 kg/m    Body mass index is 26.04 kg/m .  Wt Readings from Last 3 Encounters:   08/24/22 66.7 kg (147 lb)   07/29/22 66.7 kg (147 lb)   07/07/22 65.4 kg (144 lb 1.6 oz)     General Appearance:   no distress, normal body habitus   ENT/Mouth: membranes moist, no oral lesions or bleeding gums.      EYES:  no scleral icterus, normal conjunctivae   Neck: no carotid bruits or thyromegaly   Chest/Lungs:   lungs are clear to auscultation, no rales or wheezing, equal chest wall expansion    Cardiovascular:   Regular. Normal first and second heart sounds with no murmurs, rubs, or gallops; the carotid, radial and posterior tibial pulses are intact, + JVD and LE edema bilaterally    Abdomen:  no organomegaly, masses, bruits, or tenderness; bowel sounds are present   Extremities: no cyanosis or clubbing   Skin: no xanthelasma, warm.    Neurologic: alert and oriented x3, calm     Psychiatric: alert and oriented x3, calm     A complete 10 systems ROS was reviewed  And is negative except what is listed in the HPI.          Medical History  Surgical History Family History Social History   Past Medical History:   Diagnosis Date     Abnormal ECG      Anxiety      Arthritis      Chest pain      Chest pain      Chronic kidney disease     stage 3-mod.     Congestive heart failure " (H)      Diabetes (H)      Dyslipidemia, goal LDL below 70      GERD (gastroesophageal reflux disease)      HTN (hypertension)      Hyperlipidemia      Macular degeneration (senile) of retina      Melanoma of ankle (H)     L ankle     Other second degree atrioventricular block     Created by Conversion      Pacemaker      PSVT (paroxysmal supraventricular tachycardia) (H)      Right bundle branch block      Skin cancer     Past Surgical History:   Procedure Laterality Date     ABLATION OF DYSRHYTHMIC FOCUS  04/11/2013    AV analia reentry tachycardia, partially successful     BIOPSY SKIN (LOCATION)       CARDIAC CATHETERIZATION  03/10/2016     CV CORONARY ANGIOGRAM N/A 05/26/2021    Procedure: Coronary Angiogram;  Surgeon: Yony Gillis MD;  Location: [facility] Cardiac Cath Lab;  Service: Cardiology     CV LEFT HEART CATHETERIZATION WITHOUT LEFT VENTRICULOGRAM Left 05/26/2021    Procedure: Left Heart Catheterization Without Left Ventriculogram;  Surgeon: Yony Gillis MD;  Location: [facility] Cardiac Cath Lab;  Service: Cardiology     CV RIGHT HEART CATHETERIZATION N/A 05/26/2021    Procedure: Right Heart Catheterization;  Surgeon: Yony Gillis MD;  Location: [facility] Cardiac Cath Lab;  Service: Cardiology     DILATION AND CURETTAGE N/A 4/19/2022    Procedure: DILATION AND CURRETAGE;  Surgeon: Mary Reed MD;  Location: [facility] [place] OR     EP PACEMAKER N/A 08/30/2021    Procedure: Electrophysiology Pacemaker;  Surgeon: Ab Saavedra MD;  Location: [facility] [place]     FOOT SURGERY      L foot     HAND SURGERY      on both thumbs     HYSTERECTOMY, TOTAL, ROBOT-ASSISTED, LAP, DA HAROON XI, W/BI SAL-OOPH & SNT LYMPH NODE BX, MINI LAP Bilateral 5/31/2022    Procedure: ROBOTIC ASSISTED TOTAL LAPAROSCOPIC HYSTERECTOMY, BILATERAL SALPINGO-OOPHORECTOMY, SENTINEL LYMPH NODE INJECTION AND BIOPSY, MINI-LAPAROTOMY,;  Surgeon: Mary Reed MD;  Location: [facility]  Main OR     HYSTEROSCOPY DIAGNOSTIC N/A 4/19/2022    Procedure: HYSTEROSCOPY, DIAGNOSTIC;  Surgeon: Mary Reed MD;  Location: [facility] OR     IMPLANT PACEMAKER  04/01/2011    Second-degree AV block     OTHER SURGICAL HISTORY      SVT Ablation     PELVIC EXAM UNDER ANESTHESIA, CERVICAL DILATION, N/A      PELVIC EXAMINATION UNDER ANESTHESIA N/A 4/19/2022    Procedure: PELVIC EXAM UNDER ANESTHESIA, CERVICAL DILATION,;  Surgeon: Mary Reed MD;  Location: [facility] OR     [place] SHLDR ARTHROSCOP,SURG,W/ROTAT CUFF REPR Left 03/09/2017    Procedure: LEFT SHOULDER ARTHROSCOPY DECOMPRESSION DISTAL CLAVICLE EXCISION  ROTATOR CUFF REPAIR BICEPS TENOTOMY AND EXTENSIVE DEBRIDEMENT;  Surgeon: Todd Riggs MD;  Location: [facility];  Service: Orthopedics     RELEASE CARPAL TUNNEL      both wrists     SKIN CANCER EXCISION      L ankle,Lleg and cheek     TOE SURGERY      L big toe joint replacement     TUBAL LIGATION      no family history of premature coronary artery disease Social History     Socioeconomic History     Marital status:      Spouse name: Not on file     Number of children: Not on file     Years of education: Not on file     Highest education level: Not on file   Occupational History     Not on file   Tobacco Use     Smoking status: Never Smoker     Smokeless tobacco: Never Used   Substance and Sexual Activity     Alcohol use: No     Drug use: No     Sexual activity: Yes     Partners: Male     Birth control/protection: Surgical   Other Topics Concern     Not on file   Social History Narrative     Not on file     Social Determinants of Health     Financial Resource Strain: Not on file   Food Insecurity: Not on file   Transportation Needs: Not on file   Physical Activity: Not on file   Stress: Not on file   Social Connections: Not on file   Intimate Partner Violence: Not on file   Housing Stability: Not on file           Lab Results    Chemistry/lipid CBC Cardiac  Enzymes/BNP/TSH/INR   Lab Results   Component Value Date    CHOL 126 03/31/2022    HDL 46 (L) 03/31/2022    TRIG 53 03/31/2022    BUN 35 (H) 08/05/2022     08/05/2022    CO2 29 08/05/2022    Lab Results   Component Value Date    WBC 9.7 06/01/2022    HGB 9.4 (L) 06/01/2022    HGB 9.4 (L) 06/01/2022    HCT 29.4 (L) 06/01/2022    MCV 91 06/01/2022     06/01/2022    Lab Results   Component Value Date    TROPONINI 0.04 04/05/2022     (H) 04/05/2022    TSH 1.53 09/15/2021    INR 1.05 09/13/2021     Lab Results   Component Value Date    TROPONINI 0.04 04/05/2022          Weight:    Wt Readings from Last 3 Encounters:   07/29/22 66.7 kg (147 lb)   07/07/22 65.4 kg (144 lb 1.6 oz)   07/06/22 65.3 kg (144 lb)       Allergies  Allergies   Allergen Reactions     Herlinda-Kit Bee Sting Shortness Of Breath     Venom-Honey Bee [Bee Venom] Shortness Of Breath     Mercurial Analogues [Mercurial Derivatives] Dermatitis     blisters     Nitrofurantoin Other (See Comments)     Burning sensation across chest and arms    Other reaction(s): arms and chest burning     Sulfa (Sulfonamide Antibiotics) [Sulfa Drugs] Rash     Sulfamethoxazole-Trimethoprim Rash     Other reaction(s): rash         Surgical History  Past Surgical History:   Procedure Laterality Date     ABLATION OF DYSRHYTHMIC FOCUS  04/11/2013    AV analia reentry tachycardia, partially successful     BIOPSY SKIN (LOCATION)       CARDIAC CATHETERIZATION  03/10/2016     CV CORONARY ANGIOGRAM N/A 05/26/2021    Procedure: Coronary Angiogram;  Surgeon: Yony Gillis MD;  Location: [facility] Cardiac Cath Lab;  Service: Cardiology     CV LEFT HEART CATHETERIZATION WITHOUT LEFT VENTRICULOGRAM Left 05/26/2021    Procedure: Left Heart Catheterization Without Left Ventriculogram;  Surgeon: Yony Gillis MD;  Location: [facility] Cardiac Cath Lab;  Service: Cardiology     CV RIGHT HEART CATHETERIZATION N/A 05/26/2021    Procedure: Right Heart  Catheterization;  Surgeon: Ynoy Gillis MD;  Location: [facility] Cardiac Cath Lab;  Service: Cardiology     DILATION AND CURETTAGE N/A 4/19/2022    Procedure: DILATION AND CURRETAGE;  Surgeon: Mary Reed MD;  Location: [facility] [place]     EP PACEMAKER N/A 08/30/2021    Procedure: Electrophysiology Pacemaker;  Surgeon: Ab Saavedra MD;  Location: [facility] CATH LAB CV     FOOT SURGERY      L foot     HAND SURGERY      on both thumbs     HYSTERECTOMY, TOTAL, ROBOT-ASSISTED, LAP, DA HAROON XI, W/BI SAL-OOPH & SNT LYMPH NODE BX, MINI LAP Bilateral 5/31/2022    Procedure: ROBOTIC ASSISTED TOTAL LAPAROSCOPIC HYSTERECTOMY, BILATERAL SALPINGO-OOPHORECTOMY, SENTINEL LYMPH NODE INJECTION AND BIOPSY, MINI-LAPAROTOMY,;  Surgeon: Mary Reed MD;  Location: [facility]     HYSTEROSCOPY DIAGNOSTIC N/A 4/19/2022    Procedure: HYSTEROSCOPY, DIAGNOSTIC;  Surgeon: Mary Reed MD;  Location: [facility] [place]     IMPLANT PACEMAKER  04/01/2011    Second-degree AV block     OTHER SURGICAL HISTORY      SVT Ablation     PELVIC EXAM UNDER ANESTHESIA, CERVICAL DILATION, N/A      PELVIC EXAMINATION UNDER ANESTHESIA N/A 4/19/2022    Procedure: PELVIC EXAM UNDER ANESTHESIA, CERVICAL DILATION,;  Surgeon: Mary Reed MD;  Location: [facility]     [place] SHLDR ARTHROSCOP,SURG,W/ROTAT CUFF REPR Left 03/09/2017    Procedure: LEFT SHOULDER ARTHROSCOPY DECOMPRESSION DISTAL CLAVICLE EXCISION  ROTATOR CUFF REPAIR BICEPS TENOTOMY AND EXTENSIVE DEBRIDEMENT;  Surgeon: Todd Riggs MD;  Location: [facility];  Service: Orthopedics     RELEASE CARPAL TUNNEL      both wrists     SKIN CANCER EXCISION      L ankle,Lleg and cheek     TOE SURGERY      L big toe joint replacement     TUBAL LIGATION         Social History  Tobacco:   History   Smoking Status     Never Smoker   Smokeless Tobacco     Never Used    Alcohol:   Social History    Substance and Sexual Activity       Alcohol use: No   Illicit Drugs:   History   Drug Use No       Family History  Family History   Problem Relation Age of Onset     Hypertension Mother      Cerebrovascular Disease Mother      Prostate Cancer Father      Cancer Father      Coronary Artery Disease Father      Dyslipidemia Father      Dyslipidemia Sister      Dyslipidemia Brother      Hypertension Brother      Prostate Cancer Brother           Heidy Akins MD on 8/24/2022      cc: Jamie Mcconnell      Administered Lasix 80mg IVP x1 followed by 10cc saline flush. Tolerated well. Pt advised of labs following day.     Renee Pete RN    Thank you for allowing me to participate in the care of your patient.      Sincerely,     Heidy Akins MD     [facility]  cc:   Heidy Akins MD  [address]  [place] [address]

## 2022-08-24 NOTE — PROGRESS NOTES
HEART CARE NOTE          Assessment/Recommendations     1. HFpEF c/b ADHF   Assessment / Plan    Patient is hypervolemic on physical exam; endorses HF symptoms and fluid retention despite escalating oral diuretic regimen; will IV diuretics in clinic today and [facility] will call tomorrow to follow-up; no changes to furosemide - patient will continue to take 40 mg daily    Patient would like to be enrolled in HRS remote monitoring      2. CAD  Assessment / Plan    Denies further episodes of chest discomfort/pressure; s/p coronary angiogram significant for moderate LAD dz. Plan for medical management at that time    Continue ASA, high intensity atorvastatin, carvedilol, losartan     3. 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 Uterine mass  Assessment / Plan    S/p hysterectomy; now undergoing additional work-up to r/o metastasis     4. Pulmonary sarcoidosis   Assessment / Plan    Followed by pulmonary - Stage 1; further evaluation underway      History of Present Illness/Subjective      Ms. Sharyn Trent is a 80 y.o. female with a PMHx significant for HFpEF, hypertensive emergency, dyslipidemia, type 2 diabetes, non-obstructive CAD, heart block status post pacemaker, paroxysmal supraventricular tachycardia, melanoma, chronic left leg edema, chronic kidney disease stage III who presents to [facility] for follow-up care.      Today, Mrs. Trent endorses HF symptoms; Management plan as detailed above     ECG: Personally reviewed. Paced rhythm      Coronary angiogram:  RHC via right IJ  RA mean 4mmHg  PA mean 24mmHg  PCWP 21mmHg  LVEDP 19mmHg  Ao 153/62     PA sat 67%  Ao sat 94%     CO cindy 3.35     Angiography via left radial  LM short normal  LAD mid 50% narrowing with FFR 0.85  Circ normal  RCA normal     ECHO (personnaly Reviewed):     Left ventricle ejection fraction is normal. The estimated left ventricular ejection fraction is 55%.    Left ventricular  "diastolic function is abnormal.    Normal right ventricular size and systolic function.    No hemodynamically significant valvular heart abnormalities.    When compared to the previous study dated 3/20/2018, No significant change          Physical Examination Review of Systems   /58 (BP Location: Left arm, Patient Position: Sitting, Cuff Size: Adult Regular)   Pulse 58   Resp 16   Ht 1.6 m (5' 3\")   Wt 66.7 kg (147 lb)   BMI 26.04 kg/m    Body mass index is 26.04 kg/m .  Wt Readings from Last 3 Encounters:   08/24/22 66.7 kg (147 lb)   07/29/22 66.7 kg (147 lb)   07/07/22 65.4 kg (144 lb 1.6 oz)     General Appearance:   no distress, normal body habitus   ENT/Mouth: membranes moist, no oral lesions or bleeding gums.      EYES:  no scleral icterus, normal conjunctivae   Neck: no carotid bruits or thyromegaly   Chest/Lungs:   lungs are clear to auscultation, no rales or wheezing, equal chest wall expansion    Cardiovascular:   Regular. Normal first and second heart sounds with no murmurs, rubs, or gallops; the carotid, radial and posterior tibial pulses are intact, + JVD and LE edema bilaterally    Abdomen:  no organomegaly, masses, bruits, or tenderness; bowel sounds are present   Extremities: no cyanosis or clubbing   Skin: no xanthelasma, warm.    Neurologic: alert and oriented x3, calm     Psychiatric: alert and oriented x3, calm     A complete 10 systems ROS was reviewed  And is negative except what is listed in the HPI.          Medical History  Surgical History Family History Social History   Past Medical History:   Diagnosis Date     Abnormal ECG      Anxiety      Arthritis      Chest pain      Chest pain      Chronic kidney disease     stage 3-mod.     Congestive heart failure (H)      Diabetes (H)      Dyslipidemia, goal LDL below 70      GERD (gastroesophageal reflux disease)      HTN (hypertension)      Hyperlipidemia      Macular degeneration (senile) of retina      Melanoma of ankle (H)     L " ankle     Other second degree atrioventricular block     Created by Conversion      Pacemaker      PSVT (paroxysmal supraventricular tachycardia) (H)      Right bundle branch block      Skin cancer     Past Surgical History:   Procedure Laterality Date     ABLATION OF DYSRHYTHMIC FOCUS  04/11/2013    AV analia reentry tachycardia, partially successful     BIOPSY SKIN (LOCATION)       CARDIAC CATHETERIZATION  03/10/2016     CV CORONARY ANGIOGRAM N/A 05/26/2021    Procedure: Coronary Angiogram;  Surgeon: Yony Gillis MD;  Location: [facility] Cardiac Cath Lab;  Service: Cardiology     CV LEFT HEART CATHETERIZATION WITHOUT LEFT VENTRICULOGRAM Left 05/26/2021    Procedure: Left Heart Catheterization Without Left Ventriculogram;  Surgeon: Yony Gillis MD;  Location: [facility] Cardiac Cath Lab;  Service: Cardiology     CV RIGHT HEART CATHETERIZATION N/A 05/26/2021    Procedure: Right Heart Catheterization;  Surgeon: Yony Gillis MD;  Location: [facility] Cardiac Cath Lab;  Service: Cardiology     DILATION AND CURETTAGE N/A 4/19/2022    Procedure: DILATION AND CURRETAGE;  Surgeon: Mary Reed MD;  Location: [facility] OR     EP PACEMAKER N/A 08/30/2021    Procedure: Electrophysiology Pacemaker;  Surgeon: Ab Saavedra MD;  Location: [facility] CV     FOOT SURGERY      L foot     HAND SURGERY      on both thumbs     HYSTERECTOMY, TOTAL, ROBOT-ASSISTED, LAP, DA HAROON XI, W/BI SAL-OOPH & SNT LYMPH NODE BX, MINI LAP Bilateral 5/31/2022    Procedure: ROBOTIC ASSISTED TOTAL LAPAROSCOPIC HYSTERECTOMY, BILATERAL SALPINGO-OOPHORECTOMY, SENTINEL LYMPH NODE INJECTION AND BIOPSY, MINI-LAPAROTOMY,;  Surgeon: Mary Reed MD;  Location: [facility] OR     HYSTEROSCOPY DIAGNOSTIC N/A 4/19/2022    Procedure: HYSTEROSCOPY, DIAGNOSTIC;  Surgeon: Mary Reed MD;  Location: [facility] OR     IMPLANT PACEMAKER  04/01/2011    Second-degree AV block      OTHER SURGICAL HISTORY      SVT Ablation     PELVIC EXAM UNDER ANESTHESIA, CERVICAL DILATION, N/A      PELVIC EXAMINATION UNDER ANESTHESIA N/A 4/19/2022    Procedure: PELVIC EXAM UNDER ANESTHESIA, CERVICAL DILATION,;  Surgeon: Mary Reed MD;  Location: [facility] SHLDR ARTHROSCOP,SURG,W/ROTAT CUFF REPR Left 03/09/2017    Procedure: LEFT SHOULDER ARTHROSCOPY DECOMPRESSION DISTAL CLAVICLE EXCISION  ROTATOR CUFF REPAIR BICEPS TENOTOMY AND EXTENSIVE DEBRIDEMENT;  Surgeon: Todd Riggs MD;  Location: [facility];  Service: Orthopedics     RELEASE CARPAL TUNNEL      both wrists     SKIN CANCER EXCISION      L ankle,Lleg and cheek     TOE SURGERY      L big toe joint replacement     TUBAL LIGATION      no family history of premature coronary artery disease Social History     Socioeconomic History     Marital status:      Spouse name: Not on file     Number of children: Not on file     Years of education: Not on file     Highest education level: Not on file   Occupational History     Not on file   Tobacco Use     Smoking status: Never Smoker     Smokeless tobacco: Never Used   Substance and Sexual Activity     Alcohol use: No     Drug use: No     Sexual activity: Yes     Partners: Male     Birth control/protection: Surgical   Other Topics Concern     Not on file   Social History Narrative     Not on file     Social Determinants of Health     Financial Resource Strain: Not on file   Food Insecurity: Not on file   Transportation Needs: Not on file   Physical Activity: Not on file   Stress: Not on file   Social Connections: Not on file   Intimate Partner Violence: Not on file   Housing Stability: Not on file           Lab Results    Chemistry/lipid CBC Cardiac Enzymes/BNP/TSH/INR   Lab Results   Component Value Date    CHOL 126 03/31/2022    HDL 46 (L) 03/31/2022    TRIG 53 03/31/2022    BUN 35 (H) 08/05/2022     08/05/2022    CO2 29 08/05/2022    Lab Results   Component  Value Date    WBC 9.7 06/01/2022    HGB 9.4 (L) 06/01/2022    HGB 9.4 (L) 06/01/2022    HCT 29.4 (L) 06/01/2022    MCV 91 06/01/2022     06/01/2022    Lab Results   Component Value Date    TROPONINI 0.04 04/05/2022     (H) 04/05/2022    TSH 1.53 09/15/2021    INR 1.05 09/13/2021     Lab Results   Component Value Date    TROPONINI 0.04 04/05/2022          Weight:    Wt Readings from Last 3 Encounters:   07/29/22 66.7 kg (147 lb)   07/07/22 65.4 kg (144 lb 1.6 oz)   07/06/22 65.3 kg (144 lb)       Allergies  Allergies   Allergen Reactions     Herlinda-Kit Bee Sting Shortness Of Breath     Venom-Honey Bee [Bee Venom] Shortness Of Breath     Mercurial Analogues [Mercurial Derivatives] Dermatitis     blisters     Nitrofurantoin Other (See Comments)     Burning sensation across chest and arms    Other reaction(s): arms and chest burning     Sulfa (Sulfonamide Antibiotics) [Sulfa Drugs] Rash     Sulfamethoxazole-Trimethoprim Rash     Other reaction(s): rash         Surgical History  Past Surgical History:   Procedure Laterality Date     ABLATION OF DYSRHYTHMIC FOCUS  04/11/2013    AV analia reentry tachycardia, partially successful     BIOPSY SKIN (LOCATION)       CARDIAC CATHETERIZATION  03/10/2016     CV CORONARY ANGIOGRAM N/A 05/26/2021    Procedure: Coronary Angiogram;  Surgeon: Yony Gillis MD;  Location: [facility] Cardiac Cath Lab;  Service: Cardiology     CV LEFT HEART CATHETERIZATION WITHOUT LEFT VENTRICULOGRAM Left 05/26/2021    Procedure: Left Heart Catheterization Without Left Ventriculogram;  Surgeon: Yony Gillis MD;  Location: [facility] Cardiac Cath Lab;  Service: Cardiology     CV RIGHT HEART CATHETERIZATION N/A 05/26/2021    Procedure: Right Heart Catheterization;  Surgeon: Yony Gillis MD;  Location: [facility] Cardiac Cath Lab;  Service: Cardiology     DILATION AND CURETTAGE N/A 4/19/2022    Procedure: DILATION AND CURRETAGE;  Surgeon: Mary Reed MD;   Location: [facility] OR     EP PACEMAKER N/A 08/30/2021    Procedure: Electrophysiology Pacemaker;  Surgeon: Ab Saavedra MD;  Location: [facility] LAB CV     FOOT SURGERY      L foot     HAND SURGERY      on both thumbs     HYSTERECTOMY, TOTAL, ROBOT-ASSISTED, LAP, DA HAROON XI, W/BI SAL-OOPH & SNT LYMPH NODE BX, MINI LAP Bilateral 5/31/2022    Procedure: ROBOTIC ASSISTED TOTAL LAPAROSCOPIC HYSTERECTOMY, BILATERAL SALPINGO-OOPHORECTOMY, SENTINEL LYMPH NODE INJECTION AND BIOPSY, MINI-LAPAROTOMY,;  Surgeon: Mary Reed MD;  Location: [facility] OR     HYSTEROSCOPY DIAGNOSTIC N/A 4/19/2022    Procedure: HYSTEROSCOPY, DIAGNOSTIC;  Surgeon: Mary Reed MD;  Location: [facility] OR     IMPLANT PACEMAKER  04/01/2011    Second-degree AV block     OTHER SURGICAL HISTORY      SVT Ablation     PELVIC EXAM UNDER ANESTHESIA, CERVICAL DILATION, N/A      PELVIC EXAMINATION UNDER ANESTHESIA N/A 4/19/2022    Procedure: PELVIC EXAM UNDER ANESTHESIA, CERVICAL DILATION,;  Surgeon: Mary Reed MD;  Location: [facility] [place]     DE SHLDR ARTHROSCOP,SURG,W/ROTAT CUFF REPR Left 03/09/2017    Procedure: LEFT SHOULDER ARTHROSCOPY DECOMPRESSION DISTAL CLAVICLE EXCISION  ROTATOR CUFF REPAIR BICEPS TENOTOMY AND EXTENSIVE DEBRIDEMENT;  Surgeon: Todd Riggs MD;  Location: [facility] OR;  Service: Orthopedics     RELEASE CARPAL TUNNEL      both wrists     SKIN CANCER EXCISION      L ankle,Lleg and cheek     TOE SURGERY      L big toe joint replacement     TUBAL LIGATION         Social History  Tobacco:   History   Smoking Status     Never Smoker   Smokeless Tobacco     Never Used    Alcohol:   Social History    Substance and Sexual Activity      Alcohol use: No   Illicit Drugs:   History   Drug Use No       Family History  Family History   Problem Relation Age of Onset     Hypertension Mother      Cerebrovascular Disease Mother      Prostate Cancer Father      Cancer Father       Coronary Artery Disease Father      Dyslipidemia Father      Dyslipidemia Sister      Dyslipidemia Brother      Hypertension Brother      Prostate Cancer Brother           Heidy Akins MD on 8/24/2022      cc: Jamie Mcconnell

## 2022-08-24 NOTE — PROGRESS NOTES
Administered Lasix 80mg IVP x1 followed by 10cc saline flush. Tolerated well. Pt advised of labs following day.     Renee Pete RN

## 2022-08-25 ENCOUNTER — LAB (OUTPATIENT)
Dept: LAB | Facility: HOSPITAL | Age: 82
End: 2022-08-25
Payer: COMMERCIAL

## 2022-08-25 DIAGNOSIS — I50.20 SYSTOLIC CONGESTIVE HEART FAILURE, UNSPECIFIED HF CHRONICITY (H): ICD-10-CM

## 2022-08-25 LAB
ANION GAP SERPL CALCULATED.3IONS-SCNC: 9 MMOL/L (ref 5–18)
BUN SERPL-MCNC: 38 MG/DL (ref 8–28)
CALCIUM SERPL-MCNC: 9.4 MG/DL (ref 8.5–10.5)
CHLORIDE BLD-SCNC: 99 MMOL/L (ref 98–107)
CO2 SERPL-SCNC: 29 MMOL/L (ref 22–31)
CREAT SERPL-MCNC: 1.57 MG/DL (ref 0.6–1.1)
GFR SERPL CREATININE-BSD FRML MDRD: 33 ML/MIN/1.73M2
GLUCOSE BLD-MCNC: 95 MG/DL (ref 70–125)
POTASSIUM BLD-SCNC: 4.3 MMOL/L (ref 3.5–5)
SODIUM SERPL-SCNC: 137 MMOL/L (ref 136–145)

## 2022-08-25 PROCEDURE — 80048 BASIC METABOLIC PNL TOTAL CA: CPT

## 2022-08-25 PROCEDURE — 36415 COLL VENOUS BLD VENIPUNCTURE: CPT

## 2022-08-25 NOTE — TELEPHONE ENCOUNTER
Spoke with Pt to follow up from IVP of Lasix 80mg yesterday at appt. She reports she had increased urinating all evening. She reported her ankles were swollen yesterday but not this morning. Reports feeling very well today.     Renee Pete RN

## 2022-09-03 DIAGNOSIS — I50.9 CHF (CONGESTIVE HEART FAILURE) (H): ICD-10-CM

## 2022-09-06 RX ORDER — CARVEDILOL 25 MG/1
TABLET ORAL
Qty: 180 TABLET | Refills: 1 | Status: SHIPPED | OUTPATIENT
Start: 2022-09-06 | End: 2023-03-06

## 2022-09-07 ENCOUNTER — ONCOLOGY VISIT (OUTPATIENT)
Dept: ONCOLOGY | Facility: HOSPITAL | Age: 82
End: 2022-09-07
Attending: PSYCHIATRY & NEUROLOGY
Payer: COMMERCIAL

## 2022-09-07 ENCOUNTER — LAB (OUTPATIENT)
Dept: INFUSION THERAPY | Facility: HOSPITAL | Age: 82
End: 2022-09-07
Attending: PSYCHIATRY & NEUROLOGY
Payer: COMMERCIAL

## 2022-09-07 VITALS
TEMPERATURE: 97.6 F | HEART RATE: 57 BPM | RESPIRATION RATE: 16 BRPM | WEIGHT: 145.4 LBS | SYSTOLIC BLOOD PRESSURE: 157 MMHG | BODY MASS INDEX: 27.45 KG/M2 | DIASTOLIC BLOOD PRESSURE: 72 MMHG | OXYGEN SATURATION: 96 % | HEIGHT: 61 IN

## 2022-09-07 DIAGNOSIS — R77.8 ABNORMAL SPEP: ICD-10-CM

## 2022-09-07 DIAGNOSIS — D63.1 ANEMIA OF CHRONIC RENAL FAILURE, STAGE 2 (MILD): ICD-10-CM

## 2022-09-07 DIAGNOSIS — D47.2 MGUS (MONOCLONAL GAMMOPATHY OF UNKNOWN SIGNIFICANCE): Primary | ICD-10-CM

## 2022-09-07 DIAGNOSIS — N18.2 ANEMIA OF CHRONIC RENAL FAILURE, STAGE 2 (MILD): ICD-10-CM

## 2022-09-07 DIAGNOSIS — I25.119 CORONARY ARTERY DISEASE INVOLVING NATIVE CORONARY ARTERY OF NATIVE HEART WITH ANGINA PECTORIS (H): ICD-10-CM

## 2022-09-07 LAB
ERYTHROCYTE [DISTWIDTH] IN BLOOD BY AUTOMATED COUNT: 14.4 % (ref 10–15)
HCT VFR BLD AUTO: 31.3 % (ref 35–47)
HGB BLD-MCNC: 10.3 G/DL (ref 11.7–15.7)
MCH RBC QN AUTO: 29.3 PG (ref 26.5–33)
MCHC RBC AUTO-ENTMCNC: 32.9 G/DL (ref 31.5–36.5)
MCV RBC AUTO: 89 FL (ref 78–100)
PLATELET # BLD AUTO: 197 10E3/UL (ref 150–450)
RBC # BLD AUTO: 3.51 10E6/UL (ref 3.8–5.2)
WBC # BLD AUTO: 5.6 10E3/UL (ref 4–11)

## 2022-09-07 PROCEDURE — 85014 HEMATOCRIT: CPT

## 2022-09-07 PROCEDURE — 36415 COLL VENOUS BLD VENIPUNCTURE: CPT

## 2022-09-07 PROCEDURE — G0463 HOSPITAL OUTPT CLINIC VISIT: HCPCS

## 2022-09-07 PROCEDURE — 99204 OFFICE O/P NEW MOD 45 MIN: CPT | Performed by: INTERNAL MEDICINE

## 2022-09-07 RX ORDER — HYDRALAZINE HYDROCHLORIDE 100 MG/1
100 TABLET, FILM COATED ORAL 3 TIMES DAILY
Qty: 270 TABLET | Refills: 0 | Status: SHIPPED | OUTPATIENT
Start: 2022-09-07 | End: 2022-11-23

## 2022-09-07 ASSESSMENT — PAIN SCALES - GENERAL: PAINLEVEL: NO PAIN (0)

## 2022-09-07 NOTE — LETTER
"    9/7/2022         RE: Sharyn Trent  [address] [place]  [place] [address]        Dear Colleague,    Thank you for referring your patient, Sharyn Trent, to the [facility]. Please see a copy of my visit note below.    Oncology Rooming Note    September 7, 2022 11:13 AM   Sharyn Trent is a 82 year old female who presents for:    Chief Complaint   Patient presents with     Hematology     Abnormal SEP     Initial Vitals: BP (!) 157/72   Pulse 57   Temp 97.6  F (36.4  C)   Resp 16   Ht 1.549 m (5' 1\")   Wt 66 kg (145 lb 6.4 oz)   SpO2 96%   BMI 27.47 kg/m   Estimated body mass index is 27.47 kg/m  as calculated from the following:    Height as of this encounter: 1.549 m (5' 1\").    Weight as of this encounter: 66 kg (145 lb 6.4 oz). Body surface area is 1.69 meters squared.  No Pain (0) Comment: Data Unavailable   No LMP recorded. Patient is postmenopausal.  Allergies reviewed: Yes  Medications reviewed: Yes    Medications: Medication refills not needed today.  Pharmacy name entered into [employer]: [id number] [address] [place] [facility] [address]    Clinical concerns: None       Shabana Valles LPN              [facility]ematology [facility] Oncology Consult Note      Patient: Sharyn Trent  MRN: [id number]  Date of Service: Sep 7, 2022      We have been asked by    to evaluate Sharyn Trent    Assessment/Plan:    1.  Monoclonal gammopathy of undetermined significance, she is an extremely low level of M protein.  Her laboratory studies from July 6 reviewed and show a normal serum protein electrophoresis with no monoclonal protein detected.  On her immunofixation she had a possible very small IgM lambda monoclonal protein noted.  She has chronic, mild renal insufficiency.  She also has associated anemia of chronic kidney disease.  This was all reviewed with the patient and her daughter.  I do not think she has myeloma.  She was happy to hear this.  " Per recommendations we will recheck the numbers in a year although I think it is extremely unlikely this will progress to anything which will need to be treated.     2.  Anemia of chronic kidney disease: She has not had hemoglobin checked in about 3 months.  We will check it again today.  I told her that typically if someone's hemoglobin dips below 9 then we consider treating with Procrit.  I think this can be followed by her primary care provider and she can be referred back in the future if she does develop more significant anemia.  Her white blood cell count and platelet count have been normal in the past.    ECOG Performance  0    Staging History:    Cancer Staging  No matching staging information was found for the patient.    History:    Sharyn is an 82-year-old woman who is referred to clinic today from the neurology clinic for evaluation of a possible monoclonal protein seen in the peripheral blood.  She is being evaluated for peripheral neuropathy in her feet.  Today she is feeling well.  She does note some tingling and numbness in her feet which is been present for about a year.  She does not report that it is particularly worsened over that timeframe.  She has no symptoms in her hands.  No abdominal pain or unintentional weight loss.  No bony pain.  No other acute complaints today.    Past History:    Past Medical History:   Diagnosis Date     Abnormal ECG      Anxiety      Arthritis      Chest pain      Chest pain      Chronic kidney disease     stage 3-mod.     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      Pacemaker      PSVT (paroxysmal supraventricular tachycardia) (H)      Right bundle branch block      Skin cancer      Family History   Problem Relation Age of Onset     Hypertension Mother      Cerebrovascular Disease Mother      Prostate Cancer Father      Cancer Father      Coronary Artery Disease Father      Dyslipidemia Father      Dyslipidemia Sister      Dyslipidemia Brother      Hypertension Brother      Prostate Cancer Brother       [unfilled] Social History     Socioeconomic History     Marital status:      Spouse name: Not on file     Number of children: Not on file     Years of education: Not on file     Highest education level: Not on file   Occupational History     Not on file   Tobacco Use     Smoking status: Never Smoker     Smokeless tobacco: Never Used   Substance and Sexual Activity     Alcohol use: No     Drug use: No     Sexual activity: Yes     Partners: Male     Birth control/protection: Surgical   Other Topics Concern     Not on file   Social History Narrative     Not on file     Social Determinants of Health     Financial Resource Strain: Not on file   Food Insecurity: Not on file   Transportation Needs: Not on file   Physical Activity: Not on file   Stress: Not on file   Social Connections: Not on file   Intimate Partner Violence: Not on file   Housing Stability: Not on file        Allergies:    Allergies   Allergen Reactions     Herlinda-Kit Bee Sting Shortness Of Breath     Venom-Honey Bee [Bee Venom] Shortness Of Breath     Mercurial Analogues [Mercurial Derivatives] Dermatitis     blisters     Nitrofurantoin Other (See Comments)     Burning sensation across chest and arms    Other reaction(s): arms and chest burning     Sulfa (Sulfonamide Antibiotics) [Sulfa Drugs] Rash     Sulfamethoxazole-Trimethoprim Rash     Other reaction(s): rash     Review of Systems:    As above in the history.     Review of Systems otherwise Negative for:  General: chills, fever or night sweats  Psychological: anxiety or depression  Ophthalmic: blurry vision, double vision or loss of vision, vision change  ENT: epistaxis, oral  "lesions, hearing changes  Hematological and Lymphatic: bleeding, bruising, jaundice, swollen lymph nodes  Endocrine: hot flashes, unexpected weight changes  Respiratory: cough, hemoptysis, orthopnea or shortness of breath/SOLIS  Cardiovascular: chest pain, edema, palpitations or PND  Gastrointestinal: abdominal pain, blood in stools, change in bowel habits, constipation, diarrhea or nausea/vomiting  Genito-Urinary: change in urinary stream, incontinence, frequency/urgency  Musculoskeletal: joint pain, stiffness, swelling, muscle pain  Neurological: dizziness, headaches, numbness/tingling  Dermatological: lumps and rash    Physical Exam:    BP (!) 157/72   Pulse 57   Temp 97.6  F (36.4  C)   Resp 16   Ht 1.549 m (5' 1\")   Wt 66 kg (145 lb 6.4 oz)   SpO2 96%   BMI 27.47 kg/m    Elarmindad Renuka martinezradha right thanks bye  General: patient appears stated age of 82 year old. Nontoxic and in no distress.   HEENT: Head: atraumatic, normocephalic. Sclerae anicteric.  Chest:  Normal respiratory effort  Cardiac:  No edema.   Abdomen: abdomen is non-distended  Extremities: normal tone and muscle bulk.   Skin: no lesions or rash on visible skin. Warm and dry.   CNS: alert and oriented. Grossly non-focal.   Psychiatric: normal mood and affect.     Lab Results:    No results found for this or any previous visit (from the past 168 hour(s)).    Imaging Results:    No results found.      Signed by: Gio Valencia MD        Again, thank you for allowing me to participate in the care of your patient.        Sincerely,        Gio Valencia MD    "

## 2022-09-07 NOTE — PROGRESS NOTES
[facility] Hematology and Oncology Consult Note      Patient: Sharyn Trent  MRN: [id number]  Date of Service: Sep 7, 2022      We have been asked by    to evaluate Sharyn Trent    Assessment/Plan:    1.  Monoclonal gammopathy of undetermined significance, she is an extremely low level of M protein.  Her laboratory studies from July 6 reviewed and show a normal serum protein electrophoresis with no monoclonal protein detected.  On her immunofixation she had a possible very small IgM lambda monoclonal protein noted.  She has chronic, mild renal insufficiency.  She also has associated anemia of chronic kidney disease.  This was all reviewed with the patient and her daughter.  I do not think she has myeloma.  She was happy to hear this.  Per recommendations we will recheck the numbers in a year although I think it is extremely unlikely this will progress to anything which will need to be treated.     2.  Anemia of chronic kidney disease: She has not had hemoglobin checked in about 3 months.  We will check it again today.  I told her that typically if someone's hemoglobin dips below 9 then we consider treating with Procrit.  I think this can be followed by her primary care provider and she can be referred back in the future if she does develop more significant anemia.  Her white blood cell count and platelet count have been normal in the past.    ECOG Performance  0    Staging History:    Cancer Staging  No matching staging information was found for the patient.    History:    Sharyn is an 82-year-old woman who is referred to clinic today from the neurology clinic for evaluation of a possible monoclonal protein seen in the peripheral blood.  She is being evaluated for peripheral neuropathy in her feet.  Today she is feeling well.  She does note some tingling and numbness in her feet which is been present for about a year.  She does not report that it is particularly worsened over that timeframe.  She has no  symptoms in her hands.  No abdominal pain or unintentional weight loss.  No bony pain.  No other acute complaints today.    Past History:    Past Medical History:   Diagnosis Date     Abnormal ECG      Anxiety      Arthritis      Chest pain      Chest pain      Chronic kidney disease     stage 3-mod.     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      Pacemaker      PSVT (paroxysmal supraventricular tachycardia) (H)      Right bundle branch block      Skin cancer     Family History   Problem Relation Age of Onset     Hypertension Mother      Cerebrovascular Disease Mother      Prostate Cancer Father      Cancer Father      Coronary Artery Disease Father      Dyslipidemia Father      Dyslipidemia Sister      Dyslipidemia Brother      Hypertension Brother      Prostate Cancer Brother       [unfilled] Social History     Socioeconomic History     Marital status:      Spouse name: Not on file     Number of children: Not on file     Years of education: Not on file     Highest education level: Not on file   Occupational History     Not on file   Tobacco Use     Smoking status: Never Smoker     Smokeless tobacco: Never Used   Substance and Sexual Activity     Alcohol use: No     Drug use: No     Sexual activity: Yes     Partners: Male     Birth control/protection: Surgical   Other Topics Concern     Not on file   Social History Narrative     Not on file     Social Determinants of Health     Financial Resource Strain: Not on file   Food Insecurity: Not on file   Transportation Needs: Not on file   Physical Activity: Not on file   Stress: Not on file   Social Connections: Not on file   Intimate Partner Violence: Not on file   Housing  "Stability: Not on file        Allergies:    Allergies   Allergen Reactions     Herlinda-Kit Bee Sting Shortness Of Breath     Venom-Honey Bee [Bee Venom] Shortness Of Breath     Mercurial Analogues [Mercurial Derivatives] Dermatitis     blisters     Nitrofurantoin Other (See Comments)     Burning sensation across chest and arms    Other reaction(s): arms and chest burning     Sulfa (Sulfonamide Antibiotics) [Sulfa Drugs] Rash     Sulfamethoxazole-Trimethoprim Rash     Other reaction(s): rash     Review of Systems:    As above in the history.     Review of Systems otherwise Negative for:  General: chills, fever or night sweats  Psychological: anxiety or depression  Ophthalmic: blurry vision, double vision or loss of vision, vision change  ENT: epistaxis, oral lesions, hearing changes  Hematological and Lymphatic: bleeding, bruising, jaundice, swollen lymph nodes  Endocrine: hot flashes, unexpected weight changes  Respiratory: cough, hemoptysis, orthopnea or shortness of breath/SOLIS  Cardiovascular: chest pain, edema, palpitations or PND  Gastrointestinal: abdominal pain, blood in stools, change in bowel habits, constipation, diarrhea or nausea/vomiting  Genito-Urinary: change in urinary stream, incontinence, frequency/urgency  Musculoskeletal: joint pain, stiffness, swelling, muscle pain  Neurological: dizziness, headaches, numbness/tingling  Dermatological: lumps and rash    Physical Exam:    BP (!) 157/72   Pulse 57   Temp 97.6  F (36.4  C)   Resp 16   Ht 1.549 m (5' 1\")   Wt 66 kg (145 lb 6.4 oz)   SpO2 96%   BMI 27.47 kg/m    Elarmindad Renuka carcamo right thanks bye  General: patient appears stated age of 82 year old. Nontoxic and in no distress.   HEENT: Head: atraumatic, normocephalic. Sclerae anicteric.  Chest:  Normal respiratory effort  Cardiac:  No edema.   Abdomen: abdomen is non-distended  Extremities: normal tone and muscle bulk.   Skin: no lesions or rash on visible skin. Warm and dry.   CNS: alert and " oriented. Grossly non-focal.   Psychiatric: normal mood and affect.     Lab Results:    No results found for this or any previous visit (from the past 168 hour(s)).    Imaging Results:    No results found.      Signed by: Gio Valencia MD

## 2022-09-07 NOTE — PROGRESS NOTES
"Oncology Rooming Note    September 7, 2022 11:13 AM   Sharyn Trent is a 82 year old female who presents for:    Chief Complaint   Patient presents with     Hematology     Abnormal SEP     Initial Vitals: BP (!) 157/72   Pulse 57   Temp 97.6  F (36.4  C)   Resp 16   Ht 1.549 m (5' 1\")   Wt 66 kg (145 lb 6.4 oz)   SpO2 96%   BMI 27.47 kg/m   Estimated body mass index is 27.47 kg/m  as calculated from the following:    Height as of this encounter: 1.549 m (5' 1\").    Weight as of this encounter: 66 kg (145 lb 6.4 oz). Body surface area is 1.69 meters squared.  No Pain (0) Comment: Data Unavailable   No LMP recorded. Patient is postmenopausal.  Allergies reviewed: Yes  Medications reviewed: Yes    Medications: Medication refills not needed today.  Pharmacy name entered into [facility]: [id number] [place], [place] - [address] AT [facility] [address]    Clinical concerns: None       Shabana Valles LPN            "

## 2022-09-09 ENCOUNTER — OFFICE VISIT (OUTPATIENT)
Dept: CARDIOLOGY | Facility: CLINIC | Age: 82
End: 2022-09-09
Payer: COMMERCIAL

## 2022-09-09 VITALS
SYSTOLIC BLOOD PRESSURE: 120 MMHG | HEART RATE: 58 BPM | DIASTOLIC BLOOD PRESSURE: 50 MMHG | RESPIRATION RATE: 16 BRPM | WEIGHT: 143 LBS | HEIGHT: 61 IN | BODY MASS INDEX: 27 KG/M2

## 2022-09-09 DIAGNOSIS — I50.20 SYSTOLIC CONGESTIVE HEART FAILURE, UNSPECIFIED HF CHRONICITY (H): Primary | ICD-10-CM

## 2022-09-09 PROCEDURE — 99213 OFFICE O/P EST LOW 20 MIN: CPT | Performed by: INTERNAL MEDICINE

## 2022-09-09 NOTE — LETTER
9/9/2022    Jamie Mcconnell MD  [facility] [address]  [facility] [address]    RE: Sharyn Trent       Dear Colleague,     I had the pleasure of seeing Sharyn Trent in the [facility].    HEART CARE NOTE          Assessment/Recommendations     1. HFpEF c/b ADHF   Assessment / Plan    Patient is near euvolemia on physical exam; denies HF symptoms of orthopnea, PND, fluid retention/edema; no changes to regimen at this time    2. CAD  Assessment / Plan    Denies further episodes of chest discomfort/pressure; s/p coronary angiogram significant for moderate LAD dz. Plan for medical management at that time    Continue ASA, high intensity atorvastatin, carvedilol, losartan     3. 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 Uterine mass  Assessment / Plan    S/p hysterectomy; now undergoing additional work-up to r/o metastasis     4. Pulmonary sarcoidosis   Assessment / Plan    Followed by pulmonary - Stage 1; further evaluation underway    History of Present Illness/Subjective      Ms. Sharyn Trent is a 80 y.o. female with a PMHx significant for HFpEF, hypertensive emergency, dyslipidemia, type 2 diabetes, non-obstructive CAD, heart block status post pacemaker, paroxysmal supraventricular tachycardia, melanoma, chronic left leg edema, chronic kidney disease stage III who presents to [facility] for follow-up care.      Today, Mrs. Trent denies HF symptoms or acute cardiac concerns ; Management plan as detailed above     ECG: Personally reviewed. Paced rhythm      Coronary angiogram:  RHC via right IJ  RA mean 4mmHg  PA mean 24mmHg  PCWP 21mmHg  LVEDP 19mmHg  Ao 153/62     PA sat 67%  Ao sat 94%     CO cindy 3.35     Angiography via left radial  LM short normal  LAD mid 50% narrowing with FFR 0.85  Circ normal  RCA normal     ECHO (personnaly Reviewed):     Left ventricle ejection fraction is normal. The estimated left ventricular  "ejection fraction is 55%.    Left ventricular diastolic function is abnormal.    Normal right ventricular size and systolic function.    No hemodynamically significant valvular heart abnormalities.    When compared to the previous study dated 3/20/2018, No significant change          Physical Examination Review of Systems   /50 (BP Location: Right arm, Patient Position: Sitting, Cuff Size: Adult Regular)   Pulse 58   Resp 16   Ht 1.549 m (5' 1\")   Wt 64.9 kg (143 lb)   BMI 27.02 kg/m    Body mass index is 27.02 kg/m .  Wt Readings from Last 3 Encounters:   09/09/22 64.9 kg (143 lb)   09/07/22 66 kg (145 lb 6.4 oz)   08/24/22 66.7 kg (147 lb)     General Appearance:   no distress, normal body habitus   ENT/Mouth: membranes moist, no oral lesions or bleeding gums.      EYES:  no scleral icterus, normal conjunctivae   Neck: no carotid bruits or thyromegaly   Chest/Lungs:   lungs are clear to auscultation, no rales or wheezing, equal chest wall expansion    Cardiovascular:   Regular. Normal first and second heart sounds with no murmurs, rubs, or gallops; the carotid, radial and posterior tibial pulses are intact, no JVD or LE edema bilaterally    Abdomen:  no organomegaly, masses, bruits, or tenderness; bowel sounds are present   Extremities: no cyanosis or clubbing   Skin: no xanthelasma, warm.    Neurologic: alert and oriented x3, calm     Psychiatric: alert and oriented x3, calm     A complete 10 systems ROS was reviewed  And is negative except what is listed in the HPI.          Medical History  Surgical History Family History Social History   Past Medical History:   Diagnosis Date     Abnormal ECG      Anxiety      Arthritis      Chest pain      Chest pain      Chronic kidney disease     stage 3-mod.     Congestive heart failure (H)      Diabetes (H)      Dyslipidemia, goal LDL below 70      GERD (gastroesophageal reflux disease)      HTN (hypertension)      Hyperlipidemia      Macular degeneration " (senile) of retina      Melanoma of ankle (H)     L ankle     Other second degree atrioventricular block     Created by Conversion      Pacemaker      PSVT (paroxysmal supraventricular tachycardia) (H)      Right bundle branch block      Skin cancer     Past Surgical History:   Procedure Laterality Date     ABLATION OF DYSRHYTHMIC FOCUS  04/11/2013    AV analia reentry tachycardia, partially successful     BIOPSY SKIN (LOCATION)       CARDIAC CATHETERIZATION  03/10/2016     CV CORONARY ANGIOGRAM N/A 05/26/2021    Procedure: Coronary Angiogram;  Surgeon: Yony Gillis MD;  Location: [facility] Cardiac Cath Lab;  Service: Cardiology     CV LEFT HEART CATHETERIZATION WITHOUT LEFT VENTRICULOGRAM Left 05/26/2021    Procedure: Left Heart Catheterization Without Left Ventriculogram;  Surgeon: Yony Gillis MD;  Location: [facility] Cardiac Cath Lab;  Service: Cardiology     CV RIGHT HEART CATHETERIZATION N/A 05/26/2021    Procedure: Right Heart Catheterization;  Surgeon: Yony Gillis MD;  Location: [facility] Cardiac Cath Lab;  Service: Cardiology     DILATION AND CURETTAGE N/A 4/19/2022    Procedure: DILATION AND CURRETAGE;  Surgeon: Mary Reed MD;  Location: [facility] OR     EP PACEMAKER N/A 08/30/2021    Procedure: Electrophysiology Pacemaker;  Surgeon: Ab Saavedra MD;  Location: [facility] LAB CV     FOOT SURGERY      L foot     HAND SURGERY      on both thumbs     HYSTERECTOMY, TOTAL, ROBOT-ASSISTED, LAP, DA HAROON XI, W/BI SAL-OOPH & SNT LYMPH NODE BX, MINI LAP Bilateral 5/31/2022    Procedure: ROBOTIC ASSISTED TOTAL LAPAROSCOPIC HYSTERECTOMY, BILATERAL SALPINGO-OOPHORECTOMY, SENTINEL LYMPH NODE INJECTION AND BIOPSY, MINI-LAPAROTOMY,;  Surgeon: Mary Reed MD;  Location: [facility] OR     HYSTEROSCOPY DIAGNOSTIC N/A 4/19/2022    Procedure: HYSTEROSCOPY, DIAGNOSTIC;  Surgeon: Mary Reed MD;  Location: [facility] OR     IMPLANT  PACEMAKER  04/01/2011    Second-degree AV block     OTHER SURGICAL HISTORY      SVT Ablation     PELVIC EXAM UNDER ANESTHESIA, CERVICAL DILATION, N/A      PELVIC EXAMINATION UNDER ANESTHESIA N/A 4/19/2022    Procedure: PELVIC EXAM UNDER ANESTHESIA, CERVICAL DILATION,;  Surgeon: Zoila Riggs, Mary Malloy MD;  Location: [facility] SHLDR ARTHROSCOP,SURG,W/ROTAT CUFF REPR Left 03/09/2017    Procedure: LEFT SHOULDER ARTHROSCOPY DECOMPRESSION DISTAL CLAVICLE EXCISION  ROTATOR CUFF REPAIR BICEPS TENOTOMY AND EXTENSIVE DEBRIDEMENT;  Surgeon: Todd Riggs MD;  Location: [facility];  Service: Orthopedics     RELEASE CARPAL TUNNEL      both wrists     SKIN CANCER EXCISION      L ankle,Lleg and cheek     TOE SURGERY      L big toe joint replacement     TUBAL LIGATION      no family history of premature coronary artery disease Social History     Socioeconomic History     Marital status:      Spouse name: Not on file     Number of children: Not on file     Years of education: Not on file     Highest education level: Not on file   Occupational History     Not on file   Tobacco Use     Smoking status: Never Smoker     Smokeless tobacco: Never Used   Substance and Sexual Activity     Alcohol use: No     Drug use: No     Sexual activity: Yes     Partners: Male     Birth control/protection: Surgical   Other Topics Concern     Not on file   Social History Narrative     Not on file     Social Determinants of Health     Financial Resource Strain: Not on file   Food Insecurity: Not on file   Transportation Needs: Not on file   Physical Activity: Not on file   Stress: Not on file   Social Connections: Not on file   Intimate Partner Violence: Not on file   Housing Stability: Not on file           Lab Results    Chemistry/lipid CBC Cardiac Enzymes/BNP/TSH/INR   Lab Results   Component Value Date    CHOL 126 03/31/2022    HDL 46 (L) 03/31/2022    TRIG 53 03/31/2022    BUN 38 (H) 08/25/2022     08/25/2022     CO2 29 08/25/2022    Lab Results   Component Value Date    WBC 5.6 09/07/2022    HGB 10.3 (L) 09/07/2022    HCT 31.3 (L) 09/07/2022    MCV 89 09/07/2022     09/07/2022    Lab Results   Component Value Date    TROPONINI 0.04 04/05/2022     (H) 04/05/2022    TSH 1.53 09/15/2021    INR 1.05 09/13/2021     Lab Results   Component Value Date    TROPONINI 0.04 04/05/2022          Weight:    Wt Readings from Last 3 Encounters:   09/09/22 64.9 kg (143 lb)   09/07/22 66 kg (145 lb 6.4 oz)   08/24/22 66.7 kg (147 lb)       Allergies  Allergies   Allergen Reactions     Herlinda-Kit Bee Sting Shortness Of Breath     Venom-Honey Bee [Bee Venom] Shortness Of Breath     Mercurial Analogues [Mercurial Derivatives] Dermatitis     blisters     Nitrofurantoin Other (See Comments)     Burning sensation across chest and arms    Other reaction(s): arms and chest burning     Sulfa (Sulfonamide Antibiotics) [Sulfa Drugs] Rash     Sulfamethoxazole-Trimethoprim Rash     Other reaction(s): rash         Surgical History  Past Surgical History:   Procedure Laterality Date     ABLATION OF DYSRHYTHMIC FOCUS  04/11/2013    AV analia reentry tachycardia, partially successful     BIOPSY SKIN (LOCATION)       CARDIAC CATHETERIZATION  03/10/2016     CV CORONARY ANGIOGRAM N/A 05/26/2021    Procedure: Coronary Angiogram;  Surgeon: Yony Gillis MD;  Location: [facility] Cardiac Cath Lab;  Service: Cardiology     CV LEFT HEART CATHETERIZATION WITHOUT LEFT VENTRICULOGRAM Left 05/26/2021    Procedure: Left Heart Catheterization Without Left Ventriculogram;  Surgeon: Yony Gillis MD;  Location: [facility] Cardiac Cath Lab;  Service: Cardiology     CV RIGHT HEART CATHETERIZATION N/A 05/26/2021    Procedure: Right Heart Catheterization;  Surgeon: Yony Gillis MD;  Location: [facility] Cardiac Cath Lab;  Service: Cardiology     DILATION AND CURETTAGE N/A 4/19/2022    Procedure: DILATION AND CURRETAGE;  Surgeon: Zoila  Mary Riggs MD;  Location: [facility] OR     EP PACEMAKER N/A 08/30/2021    Procedure: Electrophysiology Pacemaker;  Surgeon: Ab Saavedra MD;  Location: [facility] LAB CV     FOOT SURGERY      L foot     HAND SURGERY      on both thumbs     HYSTERECTOMY, TOTAL, ROBOT-ASSISTED, LAP, DA HAROON XI, W/BI SAL-OOPH & SNT LYMPH NODE BX, MINI LAP Bilateral 5/31/2022    Procedure: ROBOTIC ASSISTED TOTAL LAPAROSCOPIC HYSTERECTOMY, BILATERAL SALPINGO-OOPHORECTOMY, SENTINEL LYMPH NODE INJECTION AND BIOPSY, MINI-LAPAROTOMY,;  Surgeon: Mary Reed MD;  Location: [facility]     HYSTEROSCOPY DIAGNOSTIC N/A 4/19/2022    Procedure: HYSTEROSCOPY, DIAGNOSTIC;  Surgeon: Mary Reed MD;  Location: [facility] OR     IMPLANT PACEMAKER  04/01/2011    Second-degree AV block     OTHER SURGICAL HISTORY      SVT Ablation     PELVIC EXAM UNDER ANESTHESIA, CERVICAL DILATION, N/A      PELVIC EXAMINATION UNDER ANESTHESIA N/A 4/19/2022    Procedure: PELVIC EXAM UNDER ANESTHESIA, CERVICAL DILATION,;  Surgeon: Mary Reed MD;  Location: [facility]     [place] SHLDR ARTHROSCOP,SURG,W/ROTAT CUFF REPR Left 03/09/2017    Procedure: LEFT SHOULDER ARTHROSCOPY DECOMPRESSION DISTAL CLAVICLE EXCISION  ROTATOR CUFF REPAIR BICEPS TENOTOMY AND EXTENSIVE DEBRIDEMENT;  Surgeon: Todd Riggs MD;  Location: [facility];  Service: Orthopedics     RELEASE CARPAL TUNNEL      both wrists     SKIN CANCER EXCISION      L ankle,Lleg and cheek     TOE SURGERY      L big toe joint replacement     TUBAL LIGATION         Social History  Tobacco:   History   Smoking Status     Never Smoker   Smokeless Tobacco     Never Used    Alcohol:   Social History    Substance and Sexual Activity      Alcohol use: No   Illicit Drugs:   History   Drug Use No       Family History  Family History   Problem Relation Age of Onset     Hypertension Mother      Cerebrovascular Disease Mother      Prostate Cancer  Father      Cancer Father      Coronary Artery Disease Father      Dyslipidemia Father      Dyslipidemia Sister      Dyslipidemia Brother      Hypertension Brother      Prostate Cancer Brother           Heidy Akins MD on 9/9/2022      cc: Jamie Mcconnell        Thank you for allowing me to participate in the care of your patient.      Sincerely,     Heidy Akins MD     [facility]  cc:   Heidy Akins MD  [address] [phone]

## 2022-09-09 NOTE — PROGRESS NOTES
HEART CARE NOTE          Assessment/Recommendations     1. HFpEF c/b ADHF   Assessment / Plan    Patient is near euvolemia on physical exam; denies HF symptoms of orthopnea, PND, fluid retention/edema; no changes to regimen at this time    2. CAD  Assessment / Plan    Denies further episodes of chest discomfort/pressure; s/p coronary angiogram significant for moderate LAD dz. Plan for medical management at that time    Continue ASA, high intensity atorvastatin, carvedilol, losartan     3. 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 Uterine mass  Assessment / Plan    S/p hysterectomy; now undergoing additional work-up to r/o metastasis     4. Pulmonary sarcoidosis   Assessment / Plan    Followed by pulmonary - Stage 1; further evaluation underway    History of Present Illness/Subjective      Ms. Sharyn Trent is a 80 y.o. female with a PMHx significant for HFpEF, hypertensive emergency, dyslipidemia, type 2 diabetes, non-obstructive CAD, heart block status post pacemaker, paroxysmal supraventricular tachycardia, melanoma, chronic left leg edema, chronic kidney disease stage III who presents to [facility] for follow-up care.      Today, Mrs. Trent denies HF symptoms or acute cardiac concerns ; Management plan as detailed above     ECG: Personally reviewed. Paced rhythm      Coronary angiogram:  RHC via right IJ  RA mean 4mmHg  PA mean 24mmHg  PCWP 21mmHg  LVEDP 19mmHg  Ao 153/62     PA sat 67%  Ao sat 94%     CO cindy 3.35     Angiography via left radial  LM short normal  LAD mid 50% narrowing with FFR 0.85  Circ normal  RCA normal     ECHO (personnaly Reviewed):     Left ventricle ejection fraction is normal. The estimated left ventricular ejection fraction is 55%.    Left ventricular diastolic function is abnormal.    Normal right ventricular size and systolic function.    No hemodynamically significant valvular heart abnormalities.    When compared to  "the previous study dated 3/20/2018, No significant change          Physical Examination Review of Systems   /50 (BP Location: Right arm, Patient Position: Sitting, Cuff Size: Adult Regular)   Pulse 58   Resp 16   Ht 1.549 m (5' 1\")   Wt 64.9 kg (143 lb)   BMI 27.02 kg/m    Body mass index is 27.02 kg/m .  Wt Readings from Last 3 Encounters:   09/09/22 64.9 kg (143 lb)   09/07/22 66 kg (145 lb 6.4 oz)   08/24/22 66.7 kg (147 lb)     General Appearance:   no distress, normal body habitus   ENT/Mouth: membranes moist, no oral lesions or bleeding gums.      EYES:  no scleral icterus, normal conjunctivae   Neck: no carotid bruits or thyromegaly   Chest/Lungs:   lungs are clear to auscultation, no rales or wheezing, equal chest wall expansion    Cardiovascular:   Regular. Normal first and second heart sounds with no murmurs, rubs, or gallops; the carotid, radial and posterior tibial pulses are intact, no JVD or LE edema bilaterally    Abdomen:  no organomegaly, masses, bruits, or tenderness; bowel sounds are present   Extremities: no cyanosis or clubbing   Skin: no xanthelasma, warm.    Neurologic: alert and oriented x3, calm     Psychiatric: alert and oriented x3, calm     A complete 10 systems ROS was reviewed  And is negative except what is listed in the HPI.          Medical History  Surgical History Family History Social History   Past Medical History:   Diagnosis Date     Abnormal ECG      Anxiety      Arthritis      Chest pain      Chest pain      Chronic kidney disease     stage 3-mod.     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      Pacemaker      PSVT (paroxysmal supraventricular tachycardia) (H)      Right bundle " branch block      Skin cancer     Past Surgical History:   Procedure Laterality Date     ABLATION OF DYSRHYTHMIC FOCUS  04/11/2013    AV analia reentry tachycardia, partially successful     BIOPSY SKIN (LOCATION)       CARDIAC CATHETERIZATION  03/10/2016     CV CORONARY ANGIOGRAM N/A 05/26/2021    Procedure: Coronary Angiogram;  Surgeon: Yony Gillis MD;  Location: [facility] Cardiac Cath Lab;  Service: Cardiology     CV LEFT HEART CATHETERIZATION WITHOUT LEFT VENTRICULOGRAM Left 05/26/2021    Procedure: Left Heart Catheterization Without Left Ventriculogram;  Surgeon: Yony Gillis MD;  Location: [facility] Cardiac Cath Lab;  Service: Cardiology     CV RIGHT HEART CATHETERIZATION N/A 05/26/2021    Procedure: Right Heart Catheterization;  Surgeon: Yony Gillis MD;  Location: [facility] Cardiac Cath Lab;  Service: Cardiology     DILATION AND CURETTAGE N/A 4/19/2022    Procedure: DILATION AND CURRETAGE;  Surgeon: Mary Reed MD;  Location: [facility] OR     EP PACEMAKER N/A 08/30/2021    Procedure: Electrophysiology Pacemaker;  Surgeon: Ab Saavedra MD;  Location: [facility] CV     FOOT SURGERY      L foot     HAND SURGERY      on both thumbs     HYSTERECTOMY, TOTAL, ROBOT-ASSISTED, LAP, DA HAROON XI, W/BI SAL-OOPH & SNT LYMPH NODE BX, MINI LAP Bilateral 5/31/2022    Procedure: ROBOTIC ASSISTED TOTAL LAPAROSCOPIC HYSTERECTOMY, BILATERAL SALPINGO-OOPHORECTOMY, SENTINEL LYMPH NODE INJECTION AND BIOPSY, MINI-LAPAROTOMY,;  Surgeon: Mary Reed MD;  Location: [facility] OR     HYSTEROSCOPY DIAGNOSTIC N/A 4/19/2022    Procedure: HYSTEROSCOPY, DIAGNOSTIC;  Surgeon: Mary Reed MD;  Location: [facility] OR     IMPLANT PACEMAKER  04/01/2011    Second-degree AV block     OTHER SURGICAL HISTORY      SVT Ablation     PELVIC EXAM UNDER ANESTHESIA, CERVICAL DILATION, N/A      PELVIC EXAMINATION UNDER ANESTHESIA N/A 4/19/2022    Procedure:  PELVIC EXAM UNDER ANESTHESIA, CERVICAL DILATION,;  Surgeon: Mary Reed MD;  Location: [facility] SHLDR ARTHROSCOP,SURG,W/ROTAT CUFF REPR Left 03/09/2017    Procedure: LEFT SHOULDER ARTHROSCOPY DECOMPRESSION DISTAL CLAVICLE EXCISION  ROTATOR CUFF REPAIR BICEPS TENOTOMY AND EXTENSIVE DEBRIDEMENT;  Surgeon: Todd Riggs MD;  Location: [facility];  Service: Orthopedics     RELEASE CARPAL TUNNEL      both wrists     SKIN CANCER EXCISION      L ankle,Lleg and cheek     TOE SURGERY      L big toe joint replacement     TUBAL LIGATION      no family history of premature coronary artery disease Social History     Socioeconomic History     Marital status:      Spouse name: Not on file     Number of children: Not on file     Years of education: Not on file     Highest education level: Not on file   Occupational History     Not on file   Tobacco Use     Smoking status: Never Smoker     Smokeless tobacco: Never Used   Substance and Sexual Activity     Alcohol use: No     Drug use: No     Sexual activity: Yes     Partners: Male     Birth control/protection: Surgical   Other Topics Concern     Not on file   Social History Narrative     Not on file     Social Determinants of Health     Financial Resource Strain: Not on file   Food Insecurity: Not on file   Transportation Needs: Not on file   Physical Activity: Not on file   Stress: Not on file   Social Connections: Not on file   Intimate Partner Violence: Not on file   Housing Stability: Not on file           Lab Results    Chemistry/lipid CBC Cardiac Enzymes/BNP/TSH/INR   Lab Results   Component Value Date    CHOL 126 03/31/2022    HDL 46 (L) 03/31/2022    TRIG 53 03/31/2022    BUN 38 (H) 08/25/2022     08/25/2022    CO2 29 08/25/2022    Lab Results   Component Value Date    WBC 5.6 09/07/2022    HGB 10.3 (L) 09/07/2022    HCT 31.3 (L) 09/07/2022    MCV 89 09/07/2022     09/07/2022    Lab Results   Component Value Date     TROPONINI 0.04 04/05/2022     (H) 04/05/2022    TSH 1.53 09/15/2021    INR 1.05 09/13/2021     Lab Results   Component Value Date    TROPONINI 0.04 04/05/2022          Weight:    Wt Readings from Last 3 Encounters:   09/09/22 64.9 kg (143 lb)   09/07/22 66 kg (145 lb 6.4 oz)   08/24/22 66.7 kg (147 lb)       Allergies  Allergies   Allergen Reactions     Herlinda-Kit Bee Sting Shortness Of Breath     Venom-Honey Bee [Bee Venom] Shortness Of Breath     Mercurial Analogues [Mercurial Derivatives] Dermatitis     blisters     Nitrofurantoin Other (See Comments)     Burning sensation across chest and arms    Other reaction(s): arms and chest burning     Sulfa (Sulfonamide Antibiotics) [Sulfa Drugs] Rash     Sulfamethoxazole-Trimethoprim Rash     Other reaction(s): rash         Surgical History  Past Surgical History:   Procedure Laterality Date     ABLATION OF DYSRHYTHMIC FOCUS  04/11/2013    AV analia reentry tachycardia, partially successful     BIOPSY SKIN (LOCATION)       CARDIAC CATHETERIZATION  03/10/2016     CV CORONARY ANGIOGRAM N/A 05/26/2021    Procedure: Coronary Angiogram;  Surgeon: Yony Glilis MD;  Location: [facility] Cardiac Cath Lab;  Service: Cardiology     CV LEFT HEART CATHETERIZATION WITHOUT LEFT VENTRICULOGRAM Left 05/26/2021    Procedure: Left Heart Catheterization Without Left Ventriculogram;  Surgeon: Yony Gillis MD;  Location: [facility] Cardiac Cath Lab;  Service: Cardiology     CV RIGHT HEART CATHETERIZATION N/A 05/26/2021    Procedure: Right Heart Catheterization;  Surgeon: Yony Gillis MD;  Location: [facility] Cardiac Cath Lab;  Service: Cardiology     DILATION AND CURETTAGE N/A 4/19/2022    Procedure: DILATION AND CURRETAGE;  Surgeon: Mary Reed MD;  Location: [facility] OR     EP PACEMAKER N/A 08/30/2021    Procedure: Electrophysiology Pacemaker;  Surgeon: Ab Saavedra MD;  Location: [facility] CATH LAB CV     FOOT SURGERY      L foot      HAND SURGERY      on both thumbs     HYSTERECTOMY, TOTAL, ROBOT-ASSISTED, LAP, DA HAROON XI, W/BI SAL-OOPH & SNT LYMPH NODE BX, MINI LAP Bilateral 5/31/2022    Procedure: ROBOTIC ASSISTED TOTAL LAPAROSCOPIC HYSTERECTOMY, BILATERAL SALPINGO-OOPHORECTOMY, SENTINEL LYMPH NODE INJECTION AND BIOPSY, MINI-LAPAROTOMY,;  Surgeon: Mary Reed MD;  Location: [facility] OR     HYSTEROSCOPY DIAGNOSTIC N/A 4/19/2022    Procedure: HYSTEROSCOPY, DIAGNOSTIC;  Surgeon: Mary Reed MD;  Location: [facility] OR     IMPLANT PACEMAKER  04/01/2011    Second-degree AV block     OTHER SURGICAL HISTORY      SVT Ablation     PELVIC EXAM UNDER ANESTHESIA, CERVICAL DILATION, N/A      PELVIC EXAMINATION UNDER ANESTHESIA N/A 4/19/2022    Procedure: PELVIC EXAM UNDER ANESTHESIA, CERVICAL DILATION,;  Surgeon: Mary Reed MD;  Location: [facility] OR     WI SHLDR ARTHROSCOP,SURG,W/ROTAT CUFF REPR Left 03/09/2017    Procedure: LEFT SHOULDER ARTHROSCOPY DECOMPRESSION DISTAL CLAVICLE EXCISION  ROTATOR CUFF REPAIR BICEPS TENOTOMY AND EXTENSIVE DEBRIDEMENT;  Surgeon: Todd Riggs MD;  Location: [facility] OR;  Service: Orthopedics     RELEASE CARPAL TUNNEL      both wrists     SKIN CANCER EXCISION      L ankle,Lleg and cheek     TOE SURGERY      L big toe joint replacement     TUBAL LIGATION         Social History  Tobacco:   History   Smoking Status     Never Smoker   Smokeless Tobacco     Never Used    Alcohol:   Social History    Substance and Sexual Activity      Alcohol use: No   Illicit Drugs:   History   Drug Use No       Family History  Family History   Problem Relation Age of Onset     Hypertension Mother      Cerebrovascular Disease Mother      Prostate Cancer Father      Cancer Father      Coronary Artery Disease Father      Dyslipidemia Father      Dyslipidemia Sister      Dyslipidemia Brother      Hypertension Brother      Prostate Cancer Brother           Heidy  MD Tashi on 9/9/2022      cc: Jamie Mcconnell

## 2022-09-28 ENCOUNTER — TELEPHONE (OUTPATIENT)
Dept: CARDIOLOGY | Facility: CLINIC | Age: 82
End: 2022-09-28

## 2022-09-28 DIAGNOSIS — I50.30 NYHA CLASS 3 HEART FAILURE WITH PRESERVED EJECTION FRACTION (H): Primary | ICD-10-CM

## 2022-09-28 NOTE — TELEPHONE ENCOUNTER
Patient will be enrolled in [employer] technology. Nursing staff will monitor metric input and heart failure symptom questions daily via Clinician Connect Portal. Nursing will notify the heart failure provider if metrics fall outside the following parameters:  Notified for diastolic blood pressure greater than 100 and less than 50  Notified for systolic blood pressure greater than 160 and less than 80  Notified for heart rate greater than 100 and less than 50   Notified for pulse oximeter reading less than 90%  Notified of weight increase of 2 pounds in 1 days   Notified of weight increase of 5 pounds in 7 days   Notified of weight increase of 10 pounds from baseline weight  Patient will be prompted to answer daily symptom questions. Unfavorable questions will be addressed by nursing staff.   1. Have you experienced any increased shortness of breath with daily activity in the past 24 hours?   2. Have you had any increased or new swelling in your ankles, feet, or other body parts in the last 24 hours?   3. Have you experienced any increased tiredness or fatigue in the last 24 hours?

## 2022-09-29 ENCOUNTER — ANCILLARY PROCEDURE (OUTPATIENT)
Dept: CARDIOLOGY | Facility: CLINIC | Age: 82
End: 2022-09-29
Attending: INTERNAL MEDICINE
Payer: COMMERCIAL

## 2022-09-29 DIAGNOSIS — Z95.0 PACEMAKER: ICD-10-CM

## 2022-09-29 DIAGNOSIS — I44.1 SECOND DEGREE AV BLOCK: ICD-10-CM

## 2022-10-04 ENCOUNTER — ALLIED HEALTH/NURSE VISIT (OUTPATIENT)
Dept: CARDIOLOGY | Facility: CLINIC | Age: 82
End: 2022-10-04
Payer: COMMERCIAL

## 2022-10-04 DIAGNOSIS — I50.31 ACUTE DIASTOLIC CHF (CONGESTIVE HEART FAILURE) (H): Primary | ICD-10-CM

## 2022-10-04 PROCEDURE — 99454 REM MNTR PHYSIOL PARAM 16-30: CPT | Performed by: INTERNAL MEDICINE

## 2022-10-04 PROCEDURE — 99207 PR NO CHARGE LOS: CPT | Performed by: INTERNAL MEDICINE

## 2022-10-05 NOTE — PROGRESS NOTES
[facility]: HRS Routine Remote Evaluation     HRS Enrollment Date: 9/1/2022    Billing Dates: 9/1/2022 through 10/4/2022    HRS Alerts During Monitoring Period:   none        Future Appointments   Date Time Provider Department Center   11[facility]14/2022 10:20 [facility]   12/5/2022  1:20 PM Heidy Akins MD HRSJN [facility]   12/13/2022 12:00 [facility]   12/13/2022  1:00 PM MPMB PFT RM 1 MRPULM [facility]   12/13/2022  2:00 PM Eduardo Andersen MD [facility]   1/9/2023 12:00 AM JN [facility] REMOTE DEVICE CHECK FROM HOME HRCVN [facility]     Will continue with weekly monitoring with HF provider team.     TOTAL INTERACTIVE COMMUNICATION WITH PATIENT DURING THIS BILLING PERIOD: 0 minutes.      I have reviewed Yomaira Sanchez RN BSN, CHFN's note and agree.    Heidy Akins MD., MHS      READINGS:

## 2022-10-06 ENCOUNTER — LAB REQUISITION (OUTPATIENT)
Dept: LAB | Facility: CLINIC | Age: 82
End: 2022-10-06

## 2022-10-06 DIAGNOSIS — E11.22 TYPE 2 DIABETES MELLITUS WITH DIABETIC CHRONIC KIDNEY DISEASE (H): ICD-10-CM

## 2022-10-06 DIAGNOSIS — D50.9 IRON DEFICIENCY ANEMIA, UNSPECIFIED: ICD-10-CM

## 2022-10-06 LAB
ANION GAP SERPL CALCULATED.3IONS-SCNC: 13 MMOL/L (ref 7–15)
BUN SERPL-MCNC: 56.9 MG/DL (ref 8–23)
CALCIUM SERPL-MCNC: 9.1 MG/DL (ref 8.8–10.2)
CHLORIDE SERPL-SCNC: 99 MMOL/L (ref 98–107)
CREAT SERPL-MCNC: 1.57 MG/DL (ref 0.51–0.95)
DEPRECATED HCO3 PLAS-SCNC: 26 MMOL/L (ref 22–29)
GFR SERPL CREATININE-BSD FRML MDRD: 33 ML/MIN/1.73M2
GLUCOSE SERPL-MCNC: 116 MG/DL (ref 70–99)
POTASSIUM SERPL-SCNC: 4.1 MMOL/L (ref 3.4–5.3)
SODIUM SERPL-SCNC: 138 MMOL/L (ref 136–145)

## 2022-10-06 PROCEDURE — 83550 IRON BINDING TEST: CPT | Performed by: FAMILY MEDICINE

## 2022-10-06 PROCEDURE — 80048 BASIC METABOLIC PNL TOTAL CA: CPT | Performed by: FAMILY MEDICINE

## 2022-10-07 LAB
IRON SATN MFR SERPL: 14 % (ref 15–46)
IRON SERPL-MCNC: 50 UG/DL (ref 35–180)
TIBC SERPL-MCNC: 353 UG/DL (ref 240–430)

## 2022-10-12 ENCOUNTER — HOSPITAL ENCOUNTER (OUTPATIENT)
Dept: ULTRASOUND IMAGING | Facility: HOSPITAL | Age: 82
Discharge: HOME | End: 2022-10-12
Attending: FAMILY MEDICINE | Admitting: FAMILY MEDICINE
Payer: COMMERCIAL

## 2022-10-12 DIAGNOSIS — E04.1 THYROID NODULE: ICD-10-CM

## 2022-10-12 PROCEDURE — 76536 US EXAM OF HEAD AND NECK: CPT

## 2022-10-17 ENCOUNTER — TELEPHONE (OUTPATIENT)
Dept: CARDIOLOGY | Facility: CLINIC | Age: 82
End: 2022-10-17

## 2022-10-17 NOTE — TELEPHONE ENCOUNTER
Called Pt regarding BP reading in HRS of 154/124. She reported that she was just exercising prior to taking her BP. She did recheck a little later with her home BP cuff and was at 149/69 and feeling very well. She was unaware she could take her vitals more than once a day in HRS but will recheck in the future if she receives another higher reading.     Renee Pete RN     Total time 5 min.

## 2022-11-04 ENCOUNTER — ALLIED HEALTH/NURSE VISIT (OUTPATIENT)
Dept: CARDIOLOGY | Facility: CLINIC | Age: 82
End: 2022-11-04
Payer: COMMERCIAL

## 2022-11-04 DIAGNOSIS — I50.30 NYHA CLASS 3 HEART FAILURE WITH PRESERVED EJECTION FRACTION (H): Primary | ICD-10-CM

## 2022-11-04 PROCEDURE — 99454 REM MNTR PHYSIOL PARAM 16-30: CPT | Performed by: INTERNAL MEDICINE

## 2022-11-04 PROCEDURE — 99207 PR NO CHARGE LOS: CPT | Performed by: INTERNAL MEDICINE

## 2022-11-14 ENCOUNTER — OFFICE VISIT (OUTPATIENT)
Dept: NEUROLOGY | Facility: CLINIC | Age: 82
End: 2022-11-14
Payer: COMMERCIAL

## 2022-11-14 VITALS
WEIGHT: 138 LBS | SYSTOLIC BLOOD PRESSURE: 132 MMHG | BODY MASS INDEX: 26.07 KG/M2 | HEART RATE: 60 BPM | RESPIRATION RATE: 16 BRPM | DIASTOLIC BLOOD PRESSURE: 68 MMHG

## 2022-11-14 DIAGNOSIS — R20.0 NUMBNESS: ICD-10-CM

## 2022-11-14 DIAGNOSIS — R26.9 GAIT DIFFICULTY: ICD-10-CM

## 2022-11-14 DIAGNOSIS — G62.9 NEUROPATHY: ICD-10-CM

## 2022-11-14 DIAGNOSIS — R77.8 ABNORMAL SPEP: Primary | ICD-10-CM

## 2022-11-14 PROCEDURE — 99214 OFFICE O/P EST MOD 30 MIN: CPT | Performed by: PSYCHIATRY & NEUROLOGY

## 2022-11-14 RX ORDER — GABAPENTIN 100 MG/1
100 CAPSULE ORAL 3 TIMES DAILY
Qty: 270 CAPSULE | Refills: 3 | Status: SHIPPED | OUTPATIENT
Start: 2022-11-14 | End: 2024-01-10

## 2022-11-14 NOTE — NURSING NOTE
Chief Complaint   Patient presents with     Follow Up     Neuropathy     Mary Finch MA,CMA,10:28 AM

## 2022-11-14 NOTE — PROGRESS NOTES
NEUROLOGY OUTPATIENT PROGRESS NOTE   Nov 14, 2022     CHIEF COMPLAINT/REASON FOR VISIT/REASON FOR CONSULT  Patient presents with:  Follow Up: Neuropathy     REASON FOR CONSULTATION- Numbness    HISTORY OF PRESENT ILLNESS  Sharyn Trent is a 82 year old female seen today for hospital follow-up.  Patient was seen in the hospital for an episode of shaking and headache.  He is complaining of bilateral hand numbness.  Stroke code was called.  Testing was negative.  Patient was diagnosed to have a hypertensive emergency.  She reports no recurrence of her symptoms.    In the hospital she was also complaining of some numbness in her feet which was suggestive of neuropathy.  CT of the L-spine did show mild to moderate spinal stenosis.  Did not completely fit with her symptoms.  She followed up in the neurology clinic for further evaluation.  EMG was done today.  Has also had blood work done in the hospital.  Reports ongoing numbness in the bottom of her feet.  Does have some balance issues.  Denies any other new concerns.    7/6/22  She returns today for follow-up of neuropathy.  Reports that the numbness is about the same in her feet.  Continues to have balance issues.  Encouraged her to exercise.  Discussed balance exercises.  Further reports no swelling in the feet.  Does report some leg cramps in the nighttime.  Does complain of more numbness in the morning and then taking lorazepam in the morning with symptoms improving.  Discussed that there is no rational for this and its possible the numbness might be more related to her anxiety worsening in the morning time.    11/14/22  Patient returns today.  She reports that the numbness in the feet is about the same.  Continues to have balance issues.  Has had 2 falls.  Both of them involve turning.  She is using a cane when she goes out of the house.  Denies any other new symptoms.  Seen oncology and they do not think she has myeloma.  They are monitoring the serum for  electrophoresis.  Does have a lot of right knee issues which I suspect are affecting her balance.  She has seen orthopedics and they do not recommend surgery at this point.  Gabapentin is somewhat helpful for the muscle cramps and numbness and tingling in the feet.  Her primary had stopped it for the balance issues though she did not notice any change.  She is currently on the medication and wants to continue it.  No hand numbness.    Previous history is reviewed and this is unchanged.    PAST MEDICAL/SURGICAL HISTORY  Past Medical History:   Diagnosis Date     Abnormal ECG      Anxiety      Arthritis      Chest pain      Chest pain      Chronic kidney disease     stage 3-mod.     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      Pacemaker      PSVT (paroxysmal supraventricular tachycardia) (H)      Right bundle branch block      Skin cancer      Patient Active Problem List   Diagnosis     Atypical chest pain     Malignant essential hypertension     Dyslipidemia, goal LDL below 70     DM (diabetes mellitus), type 2 (H)     Third degree AV block (H)     Chest pain     Ectopic atrial tachycardia (H)     Acute diastolic CHF (congestive heart failure) (H)     Acute respiratory failure with hypoxia (H)     Pneumonia of left lower lobe due to infectious organism     Stroke-like symptoms     Abnormal nuclear stress test     Chronic kidney disease, stage 3a (H)     Diabetes mellitus type 2 without retinopathy (H)     Gastroesophageal reflux disease without esophagitis     Thyroid nodule     Hypertensive emergency     Acute on chronic congestive heart failure, unspecified heart failure type (H)     Benign essential hypertension     Uterine mass        FAMILY HISTORY  Family History   Problem Relation Age of Onset     Hypertension Mother      Cerebrovascular Disease Mother      Prostate Cancer Father      Cancer Father      Coronary Artery Disease Father      Dyslipidemia Father      Dyslipidemia Sister      Dyslipidemia Brother      Hypertension Brother      Prostate Cancer Brother        SOCIAL HISTORY  Social History     Tobacco Use     Smoking status: Never     Smokeless tobacco: Never   Substance Use Topics     Alcohol use: No     Drug use: No       SYSTEMS REVIEW  Twelve-system ROS was done and other than the HPI this was negative.  Pertinent positives noted in the HPI.  No new symptoms reported today.    MEDICATIONS  acetaminophen (TYLENOL) 500 MG tablet, Take 500-1,000 mg by mouth every 6 hours as needed for mild pain  aspirin (ASA) 325 MG EC tablet, Take 325 mg by mouth every evening  atorvastatin (LIPITOR) 80 MG tablet, Take 80 mg by mouth At Bedtime  calcium carbonate (TUMS) 500 MG chewable tablet, Take 1-2 chew tab by mouth daily  carvedilol (COREG) 25 MG tablet, TAKE 1 TABLET(25 MG) BY MOUTH TWICE DAILY WITH MEALS  escitalopram (LEXAPRO) 10 MG tablet, Take 10 mg by mouth daily  ferrous sulfate (FEROSUL) 325 (65 Fe) MG tablet, Take 325 mg by mouth daily (with breakfast)  furosemide (LASIX) 20 MG tablet, Take 2 tablets (40 mg) by mouth daily  hydrALAZINE (APRESOLINE) 100 MG tablet, Take 1 tablet (100 mg) by mouth 3 times daily  losartan (COZAAR) 50 MG tablet, Take 1 tablet (50 mg) by mouth every evening  metFORMIN (GLUCOPHAGE) 500 MG tablet, [METFORMIN (GLUCOPHAGE) 500 MG TABLET] Take 1 tablet (500 mg total) by mouth 2 (two) times a day with meals. At breakfast and at dinner  Multiple Vitamins-Minerals (MULTIVITAMIN ADULTS 50+) TABS, Take 1 tablet by mouth every morning  omeprazole (PRILOSEC) 20 MG capsule, Take 20 mg by mouth daily before breakfast  prednisoLONE acetate (PRED-FORTE) 1 % ophthalmic suspension, Place 1 drop into the right eye  daily  vit C/E/Zn/coppr/lutein/zeaxan (PRESERVISION AREDS-2 ORAL), Take 1 tablet by mouth 2 times daily (before meals)    No current facility-administered medications on file prior to visit.       PHYSICAL EXAMINATION  VITALS: /68   Pulse 60   Resp 16   Wt 62.6 kg (138 lb)   BMI 26.07 kg/m    GENERAL: Healthy appearing, alert, no acute distress, normal habitus.  CARDIOVASCULAR: Extremities warm and well perfused. Pulses present.   NEUROLOGICAL:  Patient is awake and oriented to self, place and time.  Attention span is normal.  Memory is grossly intact.  Language is fluent and follows commands appropriately.  Appropriate fund of knowledge. Cranial nerves 2-12 are intact. There is no pronator drift.  Motor exam shows 5/5 strength in all extremities.  Tone is symmetric bilaterally in upper and lower extremities.  Reflexes are symmetric and 1+/diminished in upper extremities and lower extremities. Sensory exam is grossly intact to light touch, pin prick and vibration.  Finger to nose and heel to shin is without dysmetria.  Romberg is negative.  Gait is slightly wide-based and the patient is able to do tandem walk and walk on toes and heels with some difficulty.  She does have some antalgic gait due to the right knee.  Exam similar to before.      DIAGNOSTICS  HEAD CT:  1.  No acute intracranial hemorrhage.  2.  No CT evidence acute infarct. Aspect score 10.  3.  Age-related changes described above.     Dr. Tashi Lackey was notified by Dr Kevin Cooper at  1:40 AM 09/13/2021.      HEAD CTA:   1.  No intracranial arterial large vessel occlusion.  2.  Right carotid siphon mild stenosis.   3.  Left carotid siphon severe stenosis.  4.  Otherwise, no significant stenosis/occlusion.      NECK CTA:  1.  Right vertebral artery origin moderate stenosis.  2.  Left proximal subclavian artery mild-to-moderate stenosis.  3.  Otherwise, no significant stenosis/occlusion. No dissection.  4.  Multiple thyroid nodules.  Recommend nonemergent thyroid ultrasound based upon ACR white paper criteria.   5.  Lung apices demonstrate septal thickening with patchy groundglass opacities and consolidations most likely due to pulmonary edema. Please correlate clinically to exclude acute infectious inflammatory pneumonitis.     Carotid US  IMPRESSION:  1.  Mild plaque formation, velocities consistent with less than 50% stenosis in the right internal carotid artery.  2.  Mild plaque formation, velocities consistent with less than 50% stenosis in the left internal carotid artery.  3.  Flow within the vertebral arteries is antegrade.  4.  Bilateral thyroid nodules, recommend further evaluation with dedicated thyroid ultrasound.     CT L spine  1.  No evidence of lumbar spine fracture.     2.  Multilevel degenerative change throughout the lumbar disc spaces with moderate spinal canal stenosis at L3-4. Moderate spinal canal stenosis at L4-5. Variable degrees of foraminal narrowing. Please see body of report for details at each level.    EMG  CLINICAL INTERPRETATION:  This is an abnormal nerve conduction and EMG study.  The study is suggestive of a sensorimotor polyneuropathy in both legs.  Further clinical correlation is needed.    RELEVANT LABS  Component      Latest Ref Rng & Units 9/13/2021 9/15/2021   Albumin %      51.0 - 67.0 %  61.4   Albumin Fraction      3.2 - 4.7 g/dL  3.9   Alpha 1 %      2.0 - 4.0 %  3.7   Alpha 1 Fraction      0.1 - 0.3 g/dL  0.2   Alpha 2 %      5.0 - 13.0 %  12.2   Alpha 2 Fraction      0.4 - 0.9 g/dL  0.8   Beta %      10.0 - 17.0 %  10.0   Beta Fraction      0.7 - 1.2 g/dL  0.6 (L)   Gamma Globulin %      9.0 - 20.0 %  12.7   Gamma Fraction      0.6 - 1.4 g/dL  0.8   ELP Interpretation:        Normal serum protein electrophoresis.   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 At the present time, the bands are too faint for unequivocal diagnosis.   Path ICD       I16.1   Interpreted By       Eduardo Dyer MD     Component      Latest Ref Rng & Units 5/12/2022   Vitamin B12      213 - 816 pg/mL 435     OUTSIDE RECORDS  Hospital notes reviewed.    SPEP  Component      Latest Ref Rng & Units 7/6/2022   Albumin Fraction      3.7 - 5.1 g/dL 4.2   Alpha 1 Fraction      0.2 - 0.4 g/dL 0.3   Alpha 2 Fraction      0.5 - 0.9 g/dL 0.8   Beta Fraction      0.6 - 1.0 g/dL 0.7   Gamma Fraction      0.7 - 1.6 g/dL 1.0   Monoclonal Peak      <=0.0 g/dL 0.0   ELP Interpretation:       No monoclonal protein seen by capillary electrophoresis. However, a possible very small IgM lambda monoclonal was seen in this sample by immunofixation which is a much more sensitive method for monoclonal detection. Pathologic significance requires clinical correlation. NORIS Ho M.D., Ph.D., Pathologist ().   Immunofixation ELP       Possible very small monoclonal IgM immunoglobulin of lambda light chain type. Pathologic significance requires clinical correlation.  NORIS Ho M.D., Ph.D., Pathologist ()   Total Protein Serum for ELP      6.4 - 8.3 g/dL 6.9     Hematology      IMPRESSION/REPORT/PLAN  Abnormal SPEP  Neuropathy  Primary hypertension  Neuropathic pain/muscle cramps    This is a 82 year old female with numbness in the bottom of her feet.  EMG is confirmatory for peripheral neuropathy. Exam does show decreased reflexes but otherwise negative for sensory loss.   CT of the lumbar spine does show spinal stenosis though this would not fit with  her symptoms.     Etiology for neuropathy would most likely be diabetes.  She did have a positive serum electrophoresis which is being monitored through oncology.  There is no concerns for multiple myeloma though there is some family history of multiple myeloma.  Discussed prognosis of neuropathy.  Will recommend exercise and healthy lifestyle.  Exam does look stable today.    She does complain of some muscle cramps and pins-and-needles type pain in the legs.  Gabapentin is currently helping and she wants to stay on the same dose.  Could further increase it down the road.    Patient was seen in the hospital for a spell of bilateral hand numbness shaking and elevated blood pressure.  This was thought to be hypertensive emergency.  Head CT was negative for stroke.  MRI could not be done because of pacemaker.  CT angiogram showed left carotid stenosis though otherwise noncontributory.  Carotid ultrasound was negative.  Patient remains on aspirin 81 mg.  She is also on a statin.  No strokelike symptoms we will continue to monitor.  No change.  Blood pressure slightly elevated today.  Could be whitecoat hypertension.  Seen by the cardiologist and they are trying to keep her weight low with use of diuretics. -- Stable    Return in 9 months.    -     gabapentin (NEURONTIN) 100 MG capsule; Take 1 capsule (100 mg) by mouth 3 times daily    Return in about 9 months (around 8/14/2023) for In-Clinic Visit (must), After testing.    Over 30 minutes were spent coordinating the care for the patient on the day of the encounter.  This includes previsit, during visit and post visit activities as documented above.  Counseling patient.  Multiple problems reviewed/addressed.  Prescription management.  Reviewing outside notes.  Reviewing blood work.  (Activities include but not inclusive of reviewing chart, reviewing outside records, reviewing labs and imaging study results as well as the images, patient visit time including getting  history and exam,  use if applicable, review of test results with the patient and coming up with a plan in a shared model, counseling patient and family, education and answering patient questions, EMR , EMR diagnosis entry and problem list management, medication reconciliation and prescription management if applicable, paperwork if applicable, printing documents and documentation of the visit activities.)        Reji Davila MD  Neurologist  [facility]  Tel:- [phone]    This note was dictated using voice recognition software.  Any grammatical or context distortions are unintentional and inherent to the software.

## 2022-11-14 NOTE — LETTER
11/14/2022         RE: Sharyn Trent  [address] [place] [address]        Dear Colleague,    Thank you for referring your patient, Sharyn Trent, to the [facility] [place]. Please see a copy of my visit note below.    NEUROLOGY OUTPATIENT PROGRESS NOTE   Nov 14, 2022     CHIEF COMPLAINT/REASON FOR VISIT/REASON FOR CONSULT  Patient presents with:  Follow Up: Neuropathy     REASON FOR CONSULTATION- Numbness    HISTORY OF PRESENT ILLNESS  Sharyn Trent is a 82 year old female seen today for hospital follow-up.  Patient was seen in the hospital for an episode of shaking and headache.  He is complaining of bilateral hand numbness.  Stroke code was called.  Testing was negative.  Patient was diagnosed to have a hypertensive emergency.  She reports no recurrence of her symptoms.    In the hospital she was also complaining of some numbness in her feet which was suggestive of neuropathy.  CT of the L-spine did show mild to moderate spinal stenosis.  Did not completely fit with her symptoms.  She followed up in the neurology clinic for further evaluation.  EMG was done today.  Has also had blood work done in the hospital.  Reports ongoing numbness in the bottom of her feet.  Does have some balance issues.  Denies any other new concerns.    7/6/22  She returns today for follow-up of neuropathy.  Reports that the numbness is about the same in her feet.  Continues to have balance issues.  Encouraged her to exercise.  Discussed balance exercises.  Further reports no swelling in the feet.  Does report some leg cramps in the nighttime.  Does complain of more numbness in the morning and then taking lorazepam in the morning with symptoms improving.  Discussed that there is no rational for this and its possible the numbness might be more related to her anxiety worsening in the morning time.    11/14/22  Patient returns today.  She reports that the numbness in the feet is about the same.  Continues  to have balance issues.  Has had 2 falls.  Both of them involve turning.  She is using a cane when she goes out of the house.  Denies any other new symptoms.  Seen oncology and they do not think she has myeloma.  They are monitoring the serum for electrophoresis.  Does have a lot of right knee issues which I suspect are affecting her balance.  She has seen orthopedics and they do not recommend surgery at this point.  Gabapentin is somewhat helpful for the muscle cramps and numbness and tingling in the feet.  Her primary had stopped it for the balance issues though she did not notice any change.  She is currently on the medication and wants to continue it.  No hand numbness.    Previous history is reviewed and this is unchanged.    PAST MEDICAL/SURGICAL HISTORY  Past Medical History:   Diagnosis Date     Abnormal ECG      Anxiety      Arthritis      Chest pain      Chest pain      Chronic kidney disease     stage 3-mod.     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      Pacemaker      PSVT (paroxysmal supraventricular tachycardia) (H)      Right bundle branch block      Skin cancer      Patient Active Problem List   Diagnosis     Atypical chest pain     Malignant essential hypertension     Dyslipidemia, goal LDL below 70     DM (diabetes mellitus), type 2 (H)     Third degree AV block (H)     Chest pain     Ectopic atrial tachycardia (H)     Acute diastolic CHF (congestive heart failure) (H)     Acute respiratory failure with hypoxia (H)     Pneumonia of left lower lobe due to infectious organism     Stroke-like symptoms     Abnormal nuclear stress test     Chronic kidney disease, stage 3a (H)     Diabetes mellitus type 2 without  retinopathy (H)     Gastroesophageal reflux disease without esophagitis     Thyroid nodule     Hypertensive emergency     Acute on chronic congestive heart failure, unspecified heart failure type (H)     Benign essential hypertension     Uterine mass       FAMILY HISTORY  Family History   Problem Relation Age of Onset     Hypertension Mother      Cerebrovascular Disease Mother      Prostate Cancer Father      Cancer Father      Coronary Artery Disease Father      Dyslipidemia Father      Dyslipidemia Sister      Dyslipidemia Brother      Hypertension Brother      Prostate Cancer Brother        SOCIAL HISTORY  Social History     Tobacco Use     Smoking status: Never     Smokeless tobacco: Never   Substance Use Topics     Alcohol use: No     Drug use: No       SYSTEMS REVIEW  Twelve-system ROS was done and other than the HPI this was negative.  Pertinent positives noted in the HPI.  No new symptoms reported today.    MEDICATIONS  acetaminophen (TYLENOL) 500 MG tablet, Take 500-1,000 mg by mouth every 6 hours as needed for mild pain  aspirin (ASA) 325 MG EC tablet, Take 325 mg by mouth every evening  atorvastatin (LIPITOR) 80 MG tablet, Take 80 mg by mouth At Bedtime  calcium carbonate (TUMS) 500 MG chewable tablet, Take 1-2 chew tab by mouth daily  carvedilol (COREG) 25 MG tablet, TAKE 1 TABLET(25 MG) BY MOUTH TWICE DAILY WITH MEALS  escitalopram (LEXAPRO) 10 MG tablet, Take 10 mg by mouth daily  ferrous sulfate (FEROSUL) 325 (65 Fe) MG tablet, Take 325 mg by mouth daily (with breakfast)  furosemide (LASIX) 20 MG tablet, Take 2 tablets (40 mg) by mouth daily  hydrALAZINE (APRESOLINE) 100 MG tablet, Take 1 tablet (100 mg) by mouth 3 times daily  losartan (COZAAR) 50 MG tablet, Take 1 tablet (50 mg) by mouth every evening  metFORMIN (GLUCOPHAGE) 500 MG tablet, [METFORMIN (GLUCOPHAGE) 500 MG TABLET] Take 1 tablet (500 mg total) by mouth 2 (two) times a day with meals. At breakfast and at dinner  Multiple  Vitamins-Minerals (MULTIVITAMIN ADULTS 50+) TABS, Take 1 tablet by mouth every morning  omeprazole (PRILOSEC) 20 MG capsule, Take 20 mg by mouth daily before breakfast  prednisoLONE acetate (PRED-FORTE) 1 % ophthalmic suspension, Place 1 drop into the right eye daily  vit C/E/Zn/coppr/lutein/zeaxan (PRESERVISION AREDS-2 ORAL), Take 1 tablet by mouth 2 times daily (before meals)    No current facility-administered medications on file prior to visit.       PHYSICAL EXAMINATION  VITALS: /68   Pulse 60   Resp 16   Wt 62.6 kg (138 lb)   BMI 26.07 kg/m    GENERAL: Healthy appearing, alert, no acute distress, normal habitus.  CARDIOVASCULAR: Extremities warm and well perfused. Pulses present.   NEUROLOGICAL:  Patient is awake and oriented to self, place and time.  Attention span is normal.  Memory is grossly intact.  Language is fluent and follows commands appropriately.  Appropriate fund of knowledge. Cranial nerves 2-12 are intact. There is no pronator drift.  Motor exam shows 5/5 strength in all extremities.  Tone is symmetric bilaterally in upper and lower extremities.  Reflexes are symmetric and 1+/diminished in upper extremities and lower extremities. Sensory exam is grossly intact to light touch, pin prick and vibration.  Finger to nose and heel to shin is without dysmetria.  Romberg is negative.  Gait is slightly wide-based and the patient is able to do tandem walk and walk on toes and heels with some difficulty.  She does have some antalgic gait due to the right knee.  Exam similar to before.      DIAGNOSTICS  HEAD CT:  1.  No acute intracranial hemorrhage.  2.  No CT evidence acute infarct. Aspect score 10.  3.  Age-related changes described above.     Dr. Tashi Lackey was notified by Dr Kevin Cooper at  1:40 AM 09/13/2021.      HEAD CTA:   1.  No intracranial arterial large vessel occlusion.  2.  Right carotid siphon mild stenosis.   3.  Left carotid siphon severe stenosis.  4.  Otherwise, no  significant stenosis/occlusion.      NECK CTA:  1.  Right vertebral artery origin moderate stenosis.  2.  Left proximal subclavian artery mild-to-moderate stenosis.  3.  Otherwise, no significant stenosis/occlusion. No dissection.  4.  Multiple thyroid nodules. Recommend nonemergent thyroid ultrasound based upon ACR white paper criteria.   5.  Lung apices demonstrate septal thickening with patchy groundglass opacities and consolidations most likely due to pulmonary edema. Please correlate clinically to exclude acute infectious inflammatory pneumonitis.     Carotid US  IMPRESSION:  1.  Mild plaque formation, velocities consistent with less than 50% stenosis in the right internal carotid artery.  2.  Mild plaque formation, velocities consistent with less than 50% stenosis in the left internal carotid artery.  3.  Flow within the vertebral arteries is antegrade.  4.  Bilateral thyroid nodules, recommend further evaluation with dedicated thyroid ultrasound.     CT L spine  1.  No evidence of lumbar spine fracture.     2.  Multilevel degenerative change throughout the lumbar disc spaces with moderate spinal canal stenosis at L3-4. Moderate spinal canal stenosis at L4-5. Variable degrees of foraminal narrowing. Please see body of report for details at each level.    EMG  CLINICAL INTERPRETATION:  This is an abnormal nerve conduction and EMG study.  The study is suggestive of a sensorimotor polyneuropathy in both legs.  Further clinical correlation is needed.    RELEVANT LABS  Component      Latest Ref Rng & Units 9/13/2021 9/15/2021   Albumin %      51.0 - 67.0 %  61.4   Albumin Fraction      3.2 - 4.7 g/dL  3.9   Alpha 1 %      2.0 - 4.0 %  3.7   Alpha 1 Fraction      0.1 - 0.3 g/dL  0.2   Alpha 2 %      5.0 - 13.0 %  12.2   Alpha 2 Fraction      0.4 - 0.9 g/dL  0.8   Beta %      10.0 - 17.0 %  10.0   Beta Fraction      0.7 - 1.2 g/dL  0.6 (L)   Gamma Globulin %      9.0 - 20.0 %  12.7   Gamma Fraction      0.6 - 1.4  g/dL  0.8   ELP Interpretation:        Normal serum protein electrophoresis.   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 At the present time, the bands are too faint for unequivocal diagnosis.   Path ICD       I16.1   Interpreted By       Eduardo Dyer MD     Component      Latest Ref Rng & Units 5/12/2022   Vitamin B12      213 - 816 pg/mL 435     OUTSIDE RECORDS  Hospital notes reviewed.    SPEP  Component      Latest Ref Rng & Units 7/6/2022   Albumin Fraction      3.7 - 5.1 g/dL 4.2   Alpha 1 Fraction      0.2 - 0.4 g/dL 0.3   Alpha 2 Fraction      0.5 - 0.9 g/dL 0.8   Beta Fraction      0.6 - 1.0 g/dL 0.7   Gamma Fraction      0.7 - 1.6 g/dL 1.0   Monoclonal Peak      <=0.0 g/dL 0.0   ELP Interpretation:       No monoclonal protein seen by capillary electrophoresis. However, a possible very small IgM lambda monoclonal was seen in this sample by immunofixation which is a much more sensitive method for monoclonal detection. Pathologic significance requires clinical correlation. NORIS Ho M.D., Ph.D., Pathologist ().   Immunofixation ELP       Possible very small monoclonal IgM immunoglobulin of lambda light chain type. Pathologic significance requires clinical correlation.  NORIS Ho M.D., Ph.D., Pathologist ()   Total Protein Serum for ELP      6.4 - 8.3 g/dL 6.9     Hematology      IMPRESSION/REPORT/PLAN  Abnormal SPEP  Neuropathy  Primary hypertension  Neuropathic pain/muscle cramps    This is a  82 year old female with numbness in the bottom of her feet.  EMG is confirmatory for peripheral neuropathy. Exam does show decreased reflexes but otherwise negative for sensory loss.   CT of the lumbar spine does show spinal stenosis though this would not fit with her symptoms.     Etiology for neuropathy would most likely be diabetes.  She did have a positive serum electrophoresis which is being monitored through oncology.  There is no concerns for multiple myeloma though there is some family history of multiple myeloma.  Discussed prognosis of neuropathy.  Will recommend exercise and healthy lifestyle.  Exam does look stable today.    She does complain of some muscle cramps and pins-and-needles type pain in the legs.  Gabapentin is currently helping and she wants to stay on the same dose.  Could further increase it down the road.    Patient was seen in the hospital for a spell of bilateral hand numbness shaking and elevated blood pressure.  This was thought to be hypertensive emergency.  Head CT was negative for stroke.  MRI could not be done because of pacemaker.  CT angiogram showed left carotid stenosis though otherwise noncontributory.  Carotid ultrasound was negative.  Patient remains on aspirin 81 mg.  She is also on a statin.  No strokelike symptoms we will continue to monitor.  No change.  Blood pressure slightly elevated today.  Could be whitecoat hypertension.  Seen by the cardiologist and they are trying to keep her weight low with use of diuretics. -- Stable    Return in 9 months.    -     gabapentin (NEURONTIN) 100 MG capsule; Take 1 capsule (100 mg) by mouth 3 times daily    Return in about 9 months (around 8/14/2023) for In-Clinic Visit (must), After testing.    Over 30 minutes were spent coordinating the care for the patient on the day of the encounter.  This includes previsit, during visit and post visit activities as documented above.  Counseling patient.  Multiple problems reviewed/addressed.   Prescription management.  Reviewing outside notes.  Reviewing blood work.  (Activities include but not inclusive of reviewing chart, reviewing outside records, reviewing labs and imaging study results as well as the images, patient visit time including getting history and exam,  use if applicable, review of test results with the patient and coming up with a plan in a shared model, counseling patient and family, education and answering patient questions, EMR , EMR diagnosis entry and problem list management, medication reconciliation and prescription management if applicable, paperwork if applicable, printing documents and documentation of the visit activities.)        Reji Davila MD  Neurologist  [facility]  Tel:- [phone]    This note was dictated using voice recognition software.  Any grammatical or context distortions are unintentional and inherent to the software.        Again, thank you for allowing me to participate in the care of your patient.        Sincerely,        Reji Davila MD

## 2022-11-16 NOTE — PROGRESS NOTES
[facility]: HRS Routine Remote Evaluation     HRS Enrollment Date:      9/1/22                                 Billing Dates: 10/5/22 through 11/4/22  HF Dx: [id number]      HRS Alerts During Monitoring Period:   10/17/22 HRS BP     These adjustments have been discussed with the patient/caregiver and they express verbal understanding.           Future Appointments   Date Time Provider Department Center   12/5/2022  4:00 AM [facility] CARDIOMEMS [facility]   12/5/2022  1:20 [facility]   12/13/2022 12:00 PM [facility] CT 2 JNCTSC [facility]   12/13/2022  1:00 PM [facility]   12/13/2022  2:00 PM [facility]   1/9/2023 12:00 AM [facility] REMOTE DEVICE CHECK FROM HOME CVN [facility]   8/15/2023 11:10 AM [facility]     Will continue with weekly monitoring with HF provider team.     TOTAL INTERACTIVE COMMUNICATION WITH PATIENT DURING THIS BILLING PERIOD: 5 minutes.    Juan Carlos QUIROGA RN  BSN    I have reviewed Juan Carlos CONN RN, BSN's note and agree.    Heidy Akins MD., MHS    READINGS:

## 2022-11-23 DIAGNOSIS — I25.119 CORONARY ARTERY DISEASE INVOLVING NATIVE CORONARY ARTERY OF NATIVE HEART WITH ANGINA PECTORIS (H): ICD-10-CM

## 2022-11-23 RX ORDER — HYDRALAZINE HYDROCHLORIDE 100 MG/1
100 TABLET, FILM COATED ORAL 3 TIMES DAILY
Qty: 270 TABLET | Refills: 0 | Status: SHIPPED | OUTPATIENT
Start: 2022-11-23 | End: 2023-03-07

## 2022-12-05 ENCOUNTER — OFFICE VISIT (OUTPATIENT)
Dept: CARDIOLOGY | Facility: CLINIC | Age: 82
End: 2022-12-05
Payer: COMMERCIAL

## 2022-12-05 ENCOUNTER — ALLIED HEALTH/NURSE VISIT (OUTPATIENT)
Dept: CARDIOLOGY | Facility: CLINIC | Age: 82
End: 2022-12-05
Payer: COMMERCIAL

## 2022-12-05 VITALS
WEIGHT: 143 LBS | SYSTOLIC BLOOD PRESSURE: 118 MMHG | BODY MASS INDEX: 27.02 KG/M2 | DIASTOLIC BLOOD PRESSURE: 54 MMHG | RESPIRATION RATE: 16 BRPM | HEART RATE: 56 BPM

## 2022-12-05 DIAGNOSIS — I50.30 NYHA CLASS 3 HEART FAILURE WITH PRESERVED EJECTION FRACTION (H): Primary | ICD-10-CM

## 2022-12-05 DIAGNOSIS — I50.20 SYSTOLIC CONGESTIVE HEART FAILURE, UNSPECIFIED HF CHRONICITY (H): Primary | ICD-10-CM

## 2022-12-05 PROCEDURE — 99214 OFFICE O/P EST MOD 30 MIN: CPT | Performed by: INTERNAL MEDICINE

## 2022-12-05 PROCEDURE — 99207 PR NO CHARGE LOS: CPT | Performed by: INTERNAL MEDICINE

## 2022-12-05 PROCEDURE — 99454 REM MNTR PHYSIOL PARAM 16-30: CPT | Performed by: INTERNAL MEDICINE

## 2022-12-05 NOTE — PROGRESS NOTES
HEART CARE NOTE          Assessment/Recommendations     1. HFpEF c/b ADHF   Assessment / Plan    Near euvolemia on physical exam; denies HF symptoms of orthopnea, PND, fluid retention or edema; no changes to regimen at this time     2. CAD  Assessment / Plan    S/p coronary angiogram significant for moderate LAD dz. Plan for medical management at that time    DENIE    Continue ASA, high intensity atorvastatin, carvedilol, losartan     3. 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 Uterine mass  Assessment / Plan    S/p hysterectomy; now undergoing additional work-up to r/o metastasis     4. Pulmonary sarcoidosis   Assessment / Plan    Followed by pulmonary - Stage 1; further evaluation underway    History of Present Illness/Subjective      Ms. Sharyn Trent is a 80 y.o. female with a PMHx significant for HFpEF, hypertensive emergency, dyslipidemia, type 2 diabetes, non-obstructive CAD, heart block status post pacemaker, paroxysmal supraventricular tachycardia, melanoma, chronic left leg edema, chronic kidney disease stage III who presents to [facility] for follow-up care.      Today, Mrs. Trent denies HF symptoms or acute cardiac concerns; Management plan as detailed above     ECG: Personally reviewed. Paced rhythm      Coronary angiogram:  RHC via right IJ  RA mean 4mmHg  PA mean 24mmHg  PCWP 21mmHg  LVEDP 19mmHg  Ao 153/62     PA sat 67%  Ao sat 94%     CO cindy 3.35     Angiography via left radial  LM short normal  LAD mid 50% narrowing with FFR 0.85  Circ normal  RCA normal     ECHO (personnaly Reviewed):     Left ventricle ejection fraction is normal. The estimated left ventricular ejection fraction is 55%.    Left ventricular diastolic function is abnormal.    Normal right ventricular size and systolic function.    No hemodynamically significant valvular heart abnormalities.    When compared to the previous study dated 3/20/2018, No significant  change          Physical Examination Review of Systems   /54 (BP Location: Left arm, Patient Position: Sitting, Cuff Size: Adult Regular)   Pulse 56   Resp 16   Wt 64.9 kg (143 lb)   BMI 27.02 kg/m    Body mass index is 27.02 kg/m .  Wt Readings from Last 3 Encounters:   12/05/22 64.9 kg (143 lb)   11/14/22 62.6 kg (138 lb)   09/09/22 64.9 kg (143 lb)     General Appearance:   no distress, normal body habitus   ENT/Mouth: membranes moist, no oral lesions or bleeding gums.      EYES:  no scleral icterus, normal conjunctivae   Neck: no carotid bruits or thyromegaly   Chest/Lungs:   lungs are clear to auscultation, no rales or wheezing, equal chest wall expansion    Cardiovascular:   Regular. Normal first and second heart sounds with no murmurs, rubs, or gallops; the carotid, radial and posterior tibial pulses are intact, no JVD or LE edema bilaterally    Abdomen:  no organomegaly, masses, bruits, or tenderness; bowel sounds are present   Extremities: no cyanosis or clubbing   Skin: no xanthelasma, warm.    Neurologic: alert and oriented x3, calm     Psychiatric: alert and oriented x3, calm     A complete 10 systems ROS was reviewed  And is negative except what is listed in the HPI.          Medical History  Surgical History Family History Social History   Past Medical History:   Diagnosis Date     Abnormal ECG      Anxiety      Arthritis      Chest pain      Chest pain      Chronic kidney disease     stage 3-mod.     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      Pacemaker      PSVT (paroxysmal supraventricular tachycardia) (H)      Right bundle branch block      Skin cancer     Past Surgical History:   Procedure Laterality  Date     ABLATION OF DYSRHYTHMIC FOCUS  04/11/2013    AV analia reentry tachycardia, partially successful     BIOPSY SKIN (LOCATION)       CARDIAC CATHETERIZATION  03/10/2016     CV CORONARY ANGIOGRAM N/A 05/26/2021    Procedure: Coronary Angiogram;  Surgeon: Yony Gillis MD;  Location: [facility] Cardiac Cath Lab;  Service: Cardiology     CV LEFT HEART CATHETERIZATION WITHOUT LEFT VENTRICULOGRAM Left 05/26/2021    Procedure: Left Heart Catheterization Without Left Ventriculogram;  Surgeon: Yony Gillis MD;  Location: [facility] Cardiac [facility] Lab;  Service: Cardiology     CV RIGHT HEART CATHETERIZATION N/A 05/26/2021    Procedure: Right Heart Catheterization;  Surgeon: Yony Gillis MD;  Location: [facility] Cardiac Cath Lab;  Service: Cardiology     DILATION AND CURETTAGE N/A 4/19/2022    Procedure: DILATION AND CURRETAGE;  Surgeon: Mary Reed MD;  Location: [facility] OR     EP PACEMAKER N/A 08/30/2021    Procedure: Electrophysiology Pacemaker;  Surgeon: Ab Saavedra MD;  Location: [facility] CV     FOOT SURGERY      L foot     HAND SURGERY      on both thumbs     HYSTERECTOMY, TOTAL, ROBOT-ASSISTED, LAP, DA HAROON XI, W/BI SAL-OOPH & SNT LYMPH NODE BX, MINI LAP Bilateral 5/31/2022    Procedure: ROBOTIC ASSISTED TOTAL LAPAROSCOPIC HYSTERECTOMY, BILATERAL SALPINGO-OOPHORECTOMY, SENTINEL LYMPH NODE INJECTION AND BIOPSY, MINI-LAPAROTOMY,;  Surgeon: Mary Reed MD;  Location: [facility] OR     HYSTEROSCOPY DIAGNOSTIC N/A 4/19/2022    Procedure: HYSTEROSCOPY, DIAGNOSTIC;  Surgeon: Mary Reed MD;  Location: [facility] OR     IMPLANT PACEMAKER  04/01/2011    Second-degree AV block     OTHER SURGICAL HISTORY      SVT Ablation     PELVIC EXAM UNDER ANESTHESIA, CERVICAL DILATION, N/A      PELVIC EXAMINATION UNDER ANESTHESIA N/A 4/19/2022    Procedure: PELVIC EXAM UNDER ANESTHESIA, CERVICAL DILATION,;  Surgeon: Mary Reed  MD Mellissa;  Location: [facility] SHLDR ARTHROSCOP,SURG,W/ROTAT CUFF REPR Left 03/09/2017    Procedure: LEFT SHOULDER ARTHROSCOPY DECOMPRESSION DISTAL CLAVICLE EXCISION  ROTATOR CUFF REPAIR BICEPS TENOTOMY AND EXTENSIVE DEBRIDEMENT;  Surgeon: Todd Riggs MD;  Location: [facility];  Service: Orthopedics     RELEASE CARPAL TUNNEL      both wrists     SKIN CANCER EXCISION      L ankle,Lleg and cheek     TOE SURGERY      L big toe joint replacement     TUBAL LIGATION      no family history of premature coronary artery disease Social History     Socioeconomic History     Marital status:      Spouse name: Not on file     Number of children: Not on file     Years of education: Not on file     Highest education level: Not on file   Occupational History     Not on file   Tobacco Use     Smoking status: Never     Smokeless tobacco: Never   Substance and Sexual Activity     Alcohol use: No     Drug use: No     Sexual activity: Yes     Partners: Male     Birth control/protection: Surgical   Other Topics Concern     Not on file   Social History Narrative     Not on file     Social Determinants of Health     Financial Resource Strain: Not on file   Food Insecurity: Not on file   Transportation Needs: Not on file   Physical Activity: Not on file   Stress: Not on file   Social Connections: Not on file   Intimate Partner Violence: Not on file   Housing Stability: Not on file           Lab Results    Chemistry/lipid CBC Cardiac Enzymes/BNP/TSH/INR   Lab Results   Component Value Date    CHOL 126 03/31/2022    HDL 46 (L) 03/31/2022    TRIG 53 03/31/2022    BUN 56.9 (H) 10/06/2022     10/06/2022    CO2 26 10/06/2022    Lab Results   Component Value Date    WBC 5.6 09/07/2022    HGB 10.3 (L) 09/07/2022    HCT 31.3 (L) 09/07/2022    MCV 89 09/07/2022     09/07/2022    Lab Results   Component Value Date    TROPONINI 0.04 04/05/2022     (H) 04/05/2022    TSH 1.53 09/15/2021    INR 1.05  09/13/2021     Lab Results   Component Value Date    TROPONINI 0.04 04/05/2022          Weight:    Wt Readings from Last 3 Encounters:   12/05/22 64.9 kg (143 lb)   11/14/22 62.6 kg (138 lb)   09/09/22 64.9 kg (143 lb)       Allergies  Allergies   Allergen Reactions     Herlinda-Kit Bee Sting Shortness Of Breath     Venom-Honey Bee [Bee Venom] Shortness Of Breath     Mercurial Analogues [Mercurial Derivatives] Dermatitis     blisters     Nitrofurantoin Other (See Comments)     Burning sensation across chest and arms    Other reaction(s): arms and chest burning     Sulfa (Sulfonamide Antibiotics) [Sulfa Drugs] Rash     Sulfamethoxazole-Trimethoprim Rash     Other reaction(s): rash         Surgical History  Past Surgical History:   Procedure Laterality Date     ABLATION OF DYSRHYTHMIC FOCUS  04/11/2013    AV analia reentry tachycardia, partially successful     BIOPSY SKIN (LOCATION)       CARDIAC CATHETERIZATION  03/10/2016     CV CORONARY ANGIOGRAM N/A 05/26/2021    Procedure: Coronary Angiogram;  Surgeon: Yony Gillis MD;  Location: [facility] Cardiac Cath Lab;  Service: Cardiology     CV LEFT HEART CATHETERIZATION WITHOUT LEFT VENTRICULOGRAM Left 05/26/2021    Procedure: Left Heart Catheterization Without Left Ventriculogram;  Surgeon: Yony Gillis MD;  Location: [facility] Cardiac Cath Lab;  Service: Cardiology     CV RIGHT HEART CATHETERIZATION N/A 05/26/2021    Procedure: Right Heart Catheterization;  Surgeon: Yony Gillis MD;  Location: [facility] Cardiac Cath Lab;  Service: Cardiology     DILATION AND CURETTAGE N/A 4/19/2022    Procedure: DILATION AND CURRETAGE;  Surgeon: Mary Reed MD;  Location: [facility] OR     EP PACEMAKER N/A 08/30/2021    Procedure: Electrophysiology Pacemaker;  Surgeon: Ab Saavedra MD;  Location: [facility] LAB CV     FOOT SURGERY      L foot     HAND SURGERY      on both thumbs     HYSTERECTOMY, TOTAL, ROBOT-ASSISTED, LAP, DA HAROON  XI, W/BI SAL-OOPH & SNT LYMPH NODE BX, MINI LAP Bilateral 5/31/2022    Procedure: ROBOTIC ASSISTED TOTAL LAPAROSCOPIC HYSTERECTOMY, BILATERAL SALPINGO-OOPHORECTOMY, SENTINEL LYMPH NODE INJECTION AND BIOPSY, MINI-LAPAROTOMY,;  Surgeon: Mary Reed MD;  Location: [facility]     HYSTEROSCOPY DIAGNOSTIC N/A 4/19/2022    Procedure: HYSTEROSCOPY, DIAGNOSTIC;  Surgeon: Mary Reed MD;  Location: [facility]     IMPLANT PACEMAKER  04/01/2011    Second-degree AV block     OTHER SURGICAL HISTORY      SVT Ablation     PELVIC EXAM UNDER ANESTHESIA, CERVICAL DILATION, N/A      PELVIC EXAMINATION UNDER ANESTHESIA N/A 4/19/2022    Procedure: PELVIC EXAM UNDER ANESTHESIA, CERVICAL DILATION,;  Surgeon: Mary Reed MD;  Location: [facility] OR     PA SHLDR ARTHROSCOP,SURG,W/ROTAT CUFF REPR Left 03/09/2017    Procedure: LEFT SHOULDER ARTHROSCOPY DECOMPRESSION DISTAL CLAVICLE EXCISION  ROTATOR CUFF REPAIR BICEPS TENOTOMY AND EXTENSIVE DEBRIDEMENT;  Surgeon: Todd Riggs MD;  Location: [facility] OR;  Service: Orthopedics     RELEASE CARPAL TUNNEL      both wrists     SKIN CANCER EXCISION      L ankle,Lleg and cheek     TOE SURGERY      L big toe joint replacement     TUBAL LIGATION         Social History  Tobacco:   History   Smoking Status     Never   Smokeless Tobacco     Never    Alcohol:   Social History    Substance and Sexual Activity      Alcohol use: No   Illicit Drugs:   History   Drug Use No       Family History  Family History   Problem Relation Age of Onset     Hypertension Mother      Cerebrovascular Disease Mother      Prostate Cancer Father      Cancer Father      Coronary Artery Disease Father      Dyslipidemia Father      Dyslipidemia Sister      Dyslipidemia Brother      Hypertension Brother      Prostate Cancer Brother           Heidy Akins MD on 12/5/2022      cc: Jamie Mcconnell

## 2022-12-05 NOTE — LETTER
12/5/2022    Jamie Mcconnell MD  [address]  [facility] [address]    RE: Sharyn Trent       Dear Colleague,     I had the pleasure of seeing Sharyn Trent in the [facility].    HEART CARE NOTE          Assessment/Recommendations     1. HFpEF c/b ADHF   Assessment / Plan    Near euvolemia on physical exam; denies HF symptoms of orthopnea, PND, fluid retention or edema; no changes to regimen at this time     2. CAD  Assessment / Plan    S/p coronary angiogram significant for moderate LAD dz. Plan for medical management at that time    DENIE    Continue ASA, high intensity atorvastatin, carvedilol, losartan     3. 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 Uterine mass  Assessment / Plan    S/p hysterectomy; now undergoing additional work-up to r/o metastasis     4. Pulmonary sarcoidosis   Assessment / Plan    Followed by pulmonary - Stage 1; further evaluation underway    History of Present Illness/Subjective      Ms. Sharyn Trent is a 80 y.o. female with a PMHx significant for HFpEF, hypertensive emergency, dyslipidemia, type 2 diabetes, non-obstructive CAD, heart block status post pacemaker, paroxysmal supraventricular tachycardia, melanoma, chronic left leg edema, chronic kidney disease stage III who presents to [facility] for follow-up care.      Today, Mrs. Trent denies HF symptoms or acute cardiac concerns; Management plan as detailed above     ECG: Personally reviewed. Paced rhythm      Coronary angiogram:  RHC via right IJ  RA mean 4mmHg  PA mean 24mmHg  PCWP 21mmHg  LVEDP 19mmHg  Ao 153/62     PA sat 67%  Ao sat 94%     CO cindy 3.35     Angiography via left radial  LM short normal  LAD mid 50% narrowing with FFR 0.85  Circ normal  RCA normal     ECHO (personnaly Reviewed):     Left ventricle ejection fraction is normal. The estimated left ventricular ejection fraction is 55%.    Left ventricular diastolic function is  abnormal.    Normal right ventricular size and systolic function.    No hemodynamically significant valvular heart abnormalities.    When compared to the previous study dated 3/20/2018, No significant change          Physical Examination Review of Systems   /54 (BP Location: Left arm, Patient Position: Sitting, Cuff Size: Adult Regular)   Pulse 56   Resp 16   Wt 64.9 kg (143 lb)   BMI 27.02 kg/m    Body mass index is 27.02 kg/m .  Wt Readings from Last 3 Encounters:   12/05/22 64.9 kg (143 lb)   11/14/22 62.6 kg (138 lb)   09/09/22 64.9 kg (143 lb)     General Appearance:   no distress, normal body habitus   ENT/Mouth: membranes moist, no oral lesions or bleeding gums.      EYES:  no scleral icterus, normal conjunctivae   Neck: no carotid bruits or thyromegaly   Chest/Lungs:   lungs are clear to auscultation, no rales or wheezing, equal chest wall expansion    Cardiovascular:   Regular. Normal first and second heart sounds with no murmurs, rubs, or gallops; the carotid, radial and posterior tibial pulses are intact, no JVD or LE edema bilaterally    Abdomen:  no organomegaly, masses, bruits, or tenderness; bowel sounds are present   Extremities: no cyanosis or clubbing   Skin: no xanthelasma, warm.    Neurologic: alert and oriented x3, calm     Psychiatric: alert and oriented x3, calm     A complete 10 systems ROS was reviewed  And is negative except what is listed in the HPI.          Medical History  Surgical History Family History Social History   Past Medical History:   Diagnosis Date     Abnormal ECG      Anxiety      Arthritis      Chest pain      Chest pain      Chronic kidney disease     stage 3-mod.     Congestive heart failure (H)      Diabetes (H)      Dyslipidemia, goal LDL below 70      GERD (gastroesophageal reflux disease)      HTN (hypertension)      Hyperlipidemia      Macular degeneration (senile) of retina      Melanoma of ankle (H)     L ankle     Other second degree atrioventricular  block     Created by Conversion      Pacemaker      PSVT (paroxysmal supraventricular tachycardia) (H)      Right bundle branch block      Skin cancer     Past Surgical History:   Procedure Laterality Date     ABLATION OF DYSRHYTHMIC FOCUS  04/11/2013    AV analia reentry tachycardia, partially successful     BIOPSY SKIN (LOCATION)       CARDIAC CATHETERIZATION  03/10/2016     CV CORONARY ANGIOGRAM N/A 05/26/2021    Procedure: Coronary Angiogram;  Surgeon: Yony Gillis MD;  Location: [facility] Cardiac Cath Lab;  Service: Cardiology     CV LEFT HEART CATHETERIZATION WITHOUT LEFT VENTRICULOGRAM Left 05/26/2021    Procedure: Left Heart Catheterization Without Left Ventriculogram;  Surgeon: Yony Gillis MD;  Location: [facility] Cardiac Cath Lab;  Service: Cardiology     CV RIGHT HEART CATHETERIZATION N/A 05/26/2021    Procedure: Right Heart Catheterization;  Surgeon: Yony Gillis MD;  Location: [facility] Cardiac Cath Lab;  Service: Cardiology     DILATION AND CURETTAGE N/A 4/19/2022    Procedure: DILATION AND CURRETAGE;  Surgeon: Mary Reed MD;  Location[facility] OR     EP PACEMAKER N/A 08/30/2021    Procedure: Electrophysiology Pacemaker;  Surgeon: Ab Saavedra MD;  Location: [facility] CV     FOOT SURGERY      L foot     HAND SURGERY      on both thumbs     HYSTERECTOMY, TOTAL, ROBOT-ASSISTED, LAP, DA HAROON XI, W/BI SAL-OOPH & SNT LYMPH NODE BX, MINI LAP Bilateral 5/31/2022    Procedure: ROBOTIC ASSISTED TOTAL LAPAROSCOPIC HYSTERECTOMY, BILATERAL SALPINGO-OOPHORECTOMY, SENTINEL LYMPH NODE INJECTION AND BIOPSY, MINI-LAPAROTOMY,;  Surgeon: Mary Reed MD;  Location: [facility] OR     HYSTEROSCOPY DIAGNOSTIC N/A 4/19/2022    Procedure: HYSTEROSCOPY, DIAGNOSTIC;  Surgeon: Mary Reed MD;  Location: [facility] OR     IMPLANT PACEMAKER  04/01/2011    Second-degree AV block     OTHER SURGICAL HISTORY      SVT Ablation      PELVIC EXAM UNDER ANESTHESIA, CERVICAL DILATION, N/A      PELVIC EXAMINATION UNDER ANESTHESIA N/A 4/19/2022    Procedure: PELVIC EXAM UNDER ANESTHESIA, CERVICAL DILATION,;  Surgeon: Mary Reed MD;  Location: [facility] SHLDR ARTHROSCOP,SURG,W/ROTAT CUFF REPR Left 03/09/2017    Procedure: LEFT SHOULDER ARTHROSCOPY DECOMPRESSION DISTAL CLAVICLE EXCISION  ROTATOR CUFF REPAIR BICEPS TENOTOMY AND EXTENSIVE DEBRIDEMENT;  Surgeon: Todd Riggs MD;  Location: [facility];  Service: Orthopedics     RELEASE CARPAL TUNNEL      both wrists     SKIN CANCER EXCISION      L ankle,Lleg and cheek     TOE SURGERY      L big toe joint replacement     TUBAL LIGATION      no family history of premature coronary artery disease Social History     Socioeconomic History     Marital status:      Spouse name: Not on file     Number of children: Not on file     Years of education: Not on file     Highest education level: Not on file   Occupational History     Not on file   Tobacco Use     Smoking status: Never     Smokeless tobacco: Never   Substance and Sexual Activity     Alcohol use: No     Drug use: No     Sexual activity: Yes     Partners: Male     Birth control/protection: Surgical   Other Topics Concern     Not on file   Social History Narrative     Not on file     Social Determinants of Health     Financial Resource Strain: Not on file   Food Insecurity: Not on file   Transportation Needs: Not on file   Physical Activity: Not on file   Stress: Not on file   Social Connections: Not on file   Intimate Partner Violence: Not on file   Housing Stability: Not on file           Lab Results    Chemistry/lipid CBC Cardiac Enzymes/BNP/TSH/INR   Lab Results   Component Value Date    CHOL 126 03/31/2022    HDL 46 (L) 03/31/2022    TRIG 53 03/31/2022    BUN 56.9 (H) 10/06/2022     10/06/2022    CO2 26 10/06/2022    Lab Results   Component Value Date    WBC 5.6 09/07/2022    HGB 10.3 (L)  09/07/2022    HCT 31.3 (L) 09/07/2022    MCV 89 09/07/2022     09/07/2022    Lab Results   Component Value Date    TROPONINI 0.04 04/05/2022     (H) 04/05/2022    TSH 1.53 09/15/2021    INR 1.05 09/13/2021     Lab Results   Component Value Date    TROPONINI 0.04 04/05/2022          Weight:    Wt Readings from Last 3 Encounters:   12/05/22 64.9 kg (143 lb)   11/14/22 62.6 kg (138 lb)   09/09/22 64.9 kg (143 lb)       Allergies  Allergies   Allergen Reactions     Herlinda-Kit Bee Sting Shortness Of Breath     Venom-Honey Bee [Bee Venom] Shortness Of Breath     Mercurial Analogues [Mercurial Derivatives] Dermatitis     blisters     Nitrofurantoin Other (See Comments)     Burning sensation across chest and arms    Other reaction(s): arms and chest burning     Sulfa (Sulfonamide Antibiotics) [Sulfa Drugs] Rash     Sulfamethoxazole-Trimethoprim Rash     Other reaction(s): rash         Surgical History  Past Surgical History:   Procedure Laterality Date     ABLATION OF DYSRHYTHMIC FOCUS  04/11/2013    AV analia reentry tachycardia, partially successful     BIOPSY SKIN (LOCATION)       CARDIAC CATHETERIZATION  03/10/2016     CV CORONARY ANGIOGRAM N/A 05/26/2021    Procedure: Coronary Angiogram;  Surgeon: Yony Gillis MD;  Location: [facility] Cardiac Cath Lab;  Service: Cardiology     CV LEFT HEART CATHETERIZATION WITHOUT LEFT VENTRICULOGRAM Left 05/26/2021    Procedure: Left Heart Catheterization Without Left Ventriculogram;  Surgeon: Yony Gillis MD;  Location: [facility] Cardiac Cath Lab;  Service: Cardiology     CV RIGHT HEART CATHETERIZATION N/A 05/26/2021    Procedure: Right Heart Catheterization;  Surgeon: Yony Gillis MD;  Location: [facility] Cardiac Cath Lab;  Service: Cardiology     DILATION AND CURETTAGE N/A 4/19/2022    Procedure: DILATION AND CURRETAGE;  Surgeon: Mary Reed MD;  Location: [facility] OR     EP PACEMAKER N/A 08/30/2021    Procedure:  Electrophysiology Pacemaker;  Surgeon: Ab Saavedra MD;  Location: [facility] LAB CV     FOOT SURGERY      L foot     HAND SURGERY      on both thumbs     HYSTERECTOMY, TOTAL, ROBOT-ASSISTED, LAP, DA HAROON XI, W/BI SAL-OOPH & SNT LYMPH NODE BX, MINI LAP Bilateral 5/31/2022    Procedure: ROBOTIC ASSISTED TOTAL LAPAROSCOPIC HYSTERECTOMY, BILATERAL SALPINGO-OOPHORECTOMY, SENTINEL LYMPH NODE INJECTION AND BIOPSY, MINI-LAPAROTOMY,;  Surgeon: Mary Reed MD;  Location: [facility] OR     HYSTEROSCOPY DIAGNOSTIC N/A 4/19/2022    Procedure: HYSTEROSCOPY, DIAGNOSTIC;  Surgeon: Mary Reed MD;  Location: [facility] OR     IMPLANT PACEMAKER  04/01/2011    Second-degree AV block     OTHER SURGICAL HISTORY      SVT Ablation     PELVIC EXAM UNDER ANESTHESIA, CERVICAL DILATION, N/A      PELVIC EXAMINATION UNDER ANESTHESIA N/A 4/19/2022    Procedure: PELVIC EXAM UNDER ANESTHESIA, CERVICAL DILATION,;  Surgeon: Mary Reed MD;  [facility]     [place] SHLDR ARTHROSCOP,SURG,W/ROTAT CUFF REPR Left 03/09/2017    Procedure: LEFT SHOULDER ARTHROSCOPY DECOMPRESSION DISTAL CLAVICLE EXCISION  ROTATOR CUFF REPAIR BICEPS TENOTOMY AND EXTENSIVE DEBRIDEMENT;  Surgeon: Todd Riggs MD;  Location: [facility] OR;  Service: Orthopedics     RELEASE CARPAL TUNNEL      both wrists     SKIN CANCER EXCISION      L ankle,Lleg and cheek     TOE SURGERY      L big toe joint replacement     TUBAL LIGATION         Social History  Tobacco:   History   Smoking Status     Never   Smokeless Tobacco     Never    Alcohol:   Social History    Substance and Sexual Activity      Alcohol use: No   Illicit Drugs:   History   Drug Use No       Family History  Family History   Problem Relation Age of Onset     Hypertension Mother      Cerebrovascular Disease Mother      Prostate Cancer Father      Cancer Father      Coronary Artery Disease Father      Dyslipidemia Father      Dyslipidemia Sister       Dyslipidemia Brother      Hypertension Brother      Prostate Cancer Brother           Heidy Akins MD on 12/5/2022      cc: Jamie Mcconnell    Thank you for allowing me to participate in the care of your patient.      Sincerely,     Heidy Akins MD     [facility]  cc:   Heidy Akins MD  [address] [phone]

## 2022-12-06 LAB
MDC_IDC_EPISODE_DTM: NORMAL
MDC_IDC_EPISODE_DURATION: 10 S
MDC_IDC_EPISODE_DURATION: 12 S
MDC_IDC_EPISODE_DURATION: 14 S
MDC_IDC_EPISODE_DURATION: 16 S
MDC_IDC_EPISODE_DURATION: 18 S
MDC_IDC_EPISODE_DURATION: 36 S
MDC_IDC_EPISODE_DURATION: 38 S
MDC_IDC_EPISODE_DURATION: 38 S
MDC_IDC_EPISODE_DURATION: 42 S
MDC_IDC_EPISODE_DURATION: 50 S
MDC_IDC_EPISODE_ID: NORMAL
MDC_IDC_EPISODE_TYPE: NORMAL
MDC_IDC_LEAD_IMPLANT_DT: NORMAL
MDC_IDC_LEAD_IMPLANT_DT: NORMAL
MDC_IDC_LEAD_LOCATION: NORMAL
MDC_IDC_LEAD_LOCATION: NORMAL
MDC_IDC_LEAD_LOCATION_DETAIL_1: NORMAL
MDC_IDC_LEAD_LOCATION_DETAIL_1: NORMAL
MDC_IDC_LEAD_MFG: NORMAL
MDC_IDC_LEAD_MFG: NORMAL
MDC_IDC_LEAD_MODEL: NORMAL
MDC_IDC_LEAD_MODEL: NORMAL
MDC_IDC_LEAD_POLARITY_TYPE: NORMAL
MDC_IDC_LEAD_POLARITY_TYPE: NORMAL
MDC_IDC_LEAD_SERIAL: NORMAL
MDC_IDC_LEAD_SERIAL: NORMAL
MDC_IDC_MSMT_BATTERY_DTM: NORMAL
MDC_IDC_MSMT_BATTERY_REMAINING_LONGEVITY: 118 MO
MDC_IDC_MSMT_BATTERY_REMAINING_PERCENTAGE: 91 %
MDC_IDC_MSMT_BATTERY_RRT_TRIGGER: NORMAL
MDC_IDC_MSMT_BATTERY_STATUS: NORMAL
MDC_IDC_MSMT_BATTERY_VOLTAGE: 3.02 V
MDC_IDC_MSMT_LEADCHNL_RA_IMPEDANCE_VALUE: 410 OHM
MDC_IDC_MSMT_LEADCHNL_RA_LEAD_CHANNEL_STATUS: NORMAL
MDC_IDC_MSMT_LEADCHNL_RA_PACING_THRESHOLD_AMPLITUDE: 0.62 V
MDC_IDC_MSMT_LEADCHNL_RA_PACING_THRESHOLD_PULSEWIDTH: 0.5 MS
MDC_IDC_MSMT_LEADCHNL_RA_SENSING_INTR_AMPL: 3.1 MV
MDC_IDC_MSMT_LEADCHNL_RV_IMPEDANCE_VALUE: 480 OHM
MDC_IDC_MSMT_LEADCHNL_RV_LEAD_CHANNEL_STATUS: NORMAL
MDC_IDC_MSMT_LEADCHNL_RV_PACING_THRESHOLD_AMPLITUDE: 0.88 V
MDC_IDC_MSMT_LEADCHNL_RV_PACING_THRESHOLD_PULSEWIDTH: 0.5 MS
MDC_IDC_MSMT_LEADCHNL_RV_SENSING_INTR_AMPL: 3.2 MV
MDC_IDC_PG_IMPLANT_DTM: NORMAL
MDC_IDC_PG_MFG: NORMAL
MDC_IDC_PG_MODEL: NORMAL
MDC_IDC_PG_SERIAL: NORMAL
MDC_IDC_PG_TYPE: NORMAL
MDC_IDC_SESS_CLINIC_NAME: NORMAL
MDC_IDC_SESS_DTM: NORMAL
MDC_IDC_SESS_REPROGRAMMED: NO
MDC_IDC_SESS_TYPE: NORMAL
MDC_IDC_SET_BRADY_AT_MODE_SWITCH_MODE: NORMAL
MDC_IDC_SET_BRADY_AT_MODE_SWITCH_RATE: 180 {BEATS}/MIN
MDC_IDC_SET_BRADY_LOWRATE: 50 {BEATS}/MIN
MDC_IDC_SET_BRADY_MAX_SENSOR_RATE: 100 {BEATS}/MIN
MDC_IDC_SET_BRADY_MAX_TRACKING_RATE: 100 {BEATS}/MIN
MDC_IDC_SET_BRADY_MODE: NORMAL
MDC_IDC_SET_BRADY_PAV_DELAY_HIGH: 100 MS
MDC_IDC_SET_BRADY_PAV_DELAY_LOW: 300 MS
MDC_IDC_SET_BRADY_SAV_DELAY_HIGH: 100 MS
MDC_IDC_SET_BRADY_SAV_DELAY_LOW: 130 MS
MDC_IDC_SET_LEADCHNL_RA_PACING_AMPLITUDE: 1.62
MDC_IDC_SET_LEADCHNL_RA_PACING_ANODE_ELECTRODE_1: NORMAL
MDC_IDC_SET_LEADCHNL_RA_PACING_ANODE_LOCATION_1: NORMAL
MDC_IDC_SET_LEADCHNL_RA_PACING_CAPTURE_MODE: NORMAL
MDC_IDC_SET_LEADCHNL_RA_PACING_CATHODE_ELECTRODE_1: NORMAL
MDC_IDC_SET_LEADCHNL_RA_PACING_CATHODE_LOCATION_1: NORMAL
MDC_IDC_SET_LEADCHNL_RA_PACING_POLARITY: NORMAL
MDC_IDC_SET_LEADCHNL_RA_PACING_PULSEWIDTH: 0.5 MS
MDC_IDC_SET_LEADCHNL_RA_SENSING_ADAPTATION_MODE: NORMAL
MDC_IDC_SET_LEADCHNL_RA_SENSING_ANODE_ELECTRODE_1: NORMAL
MDC_IDC_SET_LEADCHNL_RA_SENSING_ANODE_LOCATION_1: NORMAL
MDC_IDC_SET_LEADCHNL_RA_SENSING_CATHODE_ELECTRODE_1: NORMAL
MDC_IDC_SET_LEADCHNL_RA_SENSING_CATHODE_LOCATION_1: NORMAL
MDC_IDC_SET_LEADCHNL_RA_SENSING_POLARITY: NORMAL
MDC_IDC_SET_LEADCHNL_RA_SENSING_SENSITIVITY: 0.2 MV
MDC_IDC_SET_LEADCHNL_RV_PACING_AMPLITUDE: 1.12
MDC_IDC_SET_LEADCHNL_RV_PACING_ANODE_ELECTRODE_1: NORMAL
MDC_IDC_SET_LEADCHNL_RV_PACING_ANODE_LOCATION_1: NORMAL
MDC_IDC_SET_LEADCHNL_RV_PACING_CAPTURE_MODE: NORMAL
MDC_IDC_SET_LEADCHNL_RV_PACING_CATHODE_ELECTRODE_1: NORMAL
MDC_IDC_SET_LEADCHNL_RV_PACING_CATHODE_LOCATION_1: NORMAL
MDC_IDC_SET_LEADCHNL_RV_PACING_POLARITY: NORMAL
MDC_IDC_SET_LEADCHNL_RV_PACING_PULSEWIDTH: 0.5 MS
MDC_IDC_SET_LEADCHNL_RV_SENSING_ADAPTATION_MODE: NORMAL
MDC_IDC_SET_LEADCHNL_RV_SENSING_ANODE_ELECTRODE_1: NORMAL
MDC_IDC_SET_LEADCHNL_RV_SENSING_ANODE_LOCATION_1: NORMAL
MDC_IDC_SET_LEADCHNL_RV_SENSING_CATHODE_ELECTRODE_1: NORMAL
MDC_IDC_SET_LEADCHNL_RV_SENSING_CATHODE_LOCATION_1: NORMAL
MDC_IDC_SET_LEADCHNL_RV_SENSING_POLARITY: NORMAL
MDC_IDC_SET_LEADCHNL_RV_SENSING_SENSITIVITY: 2 MV
MDC_IDC_STAT_AT_BURDEN_PERCENT: 1 %
MDC_IDC_STAT_AT_DTM_END: NORMAL
MDC_IDC_STAT_AT_DTM_START: NORMAL
MDC_IDC_STAT_AT_MODE_SW_COUNT: 107
MDC_IDC_STAT_AT_MODE_SW_COUNT_PER_DAY: 1
MDC_IDC_STAT_AT_MODE_SW_MAX_DURATION: 3986 S
MDC_IDC_STAT_AT_MODE_SW_PERCENT_TIME: 1 %
MDC_IDC_STAT_BRADY_AP_VP_PERCENT: 2.6 %
MDC_IDC_STAT_BRADY_AP_VS_PERCENT: 1 %
MDC_IDC_STAT_BRADY_AS_VP_PERCENT: 94 %
MDC_IDC_STAT_BRADY_AS_VS_PERCENT: 1.2 %
MDC_IDC_STAT_BRADY_DTM_END: NORMAL
MDC_IDC_STAT_BRADY_DTM_START: NORMAL
MDC_IDC_STAT_BRADY_RA_PERCENT_PACED: 1 %
MDC_IDC_STAT_BRADY_RV_PERCENT_PACED: 97 %
MDC_IDC_STAT_CRT_DTM_END: NORMAL
MDC_IDC_STAT_CRT_DTM_START: NORMAL
MDC_IDC_STAT_HEART_RATE_ATRIAL_MAX: 330 {BEATS}/MIN
MDC_IDC_STAT_HEART_RATE_ATRIAL_MEAN: 67 {BEATS}/MIN
MDC_IDC_STAT_HEART_RATE_ATRIAL_MIN: 40 {BEATS}/MIN
MDC_IDC_STAT_HEART_RATE_DTM_END: NORMAL
MDC_IDC_STAT_HEART_RATE_DTM_START: NORMAL
MDC_IDC_STAT_HEART_RATE_VENTRICULAR_MAX: 200 {BEATS}/MIN
MDC_IDC_STAT_HEART_RATE_VENTRICULAR_MEAN: 67 {BEATS}/MIN
MDC_IDC_STAT_HEART_RATE_VENTRICULAR_MIN: 30 {BEATS}/MIN

## 2022-12-06 PROCEDURE — 93294 REM INTERROG EVL PM/LDLS PM: CPT | Performed by: INTERNAL MEDICINE

## 2022-12-06 PROCEDURE — 93296 REM INTERROG EVL PM/IDS: CPT | Performed by: INTERNAL MEDICINE

## 2022-12-13 ENCOUNTER — OFFICE VISIT (OUTPATIENT)
Dept: PULMONOLOGY | Facility: CLINIC | Age: 82
End: 2022-12-13
Payer: COMMERCIAL

## 2022-12-13 ENCOUNTER — ALLIED HEALTH/NURSE VISIT (OUTPATIENT)
Dept: PULMONOLOGY | Facility: CLINIC | Age: 82
End: 2022-12-13
Attending: INTERNAL MEDICINE
Payer: COMMERCIAL

## 2022-12-13 ENCOUNTER — HOSPITAL ENCOUNTER (OUTPATIENT)
Dept: CT IMAGING | Facility: HOSPITAL | Age: 82
Discharge: HOME | End: 2022-12-13
Attending: INTERNAL MEDICINE | Admitting: INTERNAL MEDICINE
Payer: COMMERCIAL

## 2022-12-13 VITALS
DIASTOLIC BLOOD PRESSURE: 64 MMHG | BODY MASS INDEX: 26.94 KG/M2 | HEART RATE: 58 BPM | WEIGHT: 142.6 LBS | OXYGEN SATURATION: 97 % | SYSTOLIC BLOOD PRESSURE: 112 MMHG

## 2022-12-13 DIAGNOSIS — I16.1 HYPERTENSIVE EMERGENCY: ICD-10-CM

## 2022-12-13 DIAGNOSIS — D86.9 SARCOIDOSIS: ICD-10-CM

## 2022-12-13 DIAGNOSIS — D86.9 SARCOIDOSIS: Primary | ICD-10-CM

## 2022-12-13 DIAGNOSIS — R59.1 LYMPHADENOPATHY: ICD-10-CM

## 2022-12-13 LAB — HGB BLD-MCNC: 9.5 G/DL

## 2022-12-13 PROCEDURE — 94729 DIFFUSING CAPACITY: CPT | Performed by: INTERNAL MEDICINE

## 2022-12-13 PROCEDURE — 85018 HEMOGLOBIN: CPT | Performed by: INTERNAL MEDICINE

## 2022-12-13 PROCEDURE — 94375 RESPIRATORY FLOW VOLUME LOOP: CPT | Performed by: INTERNAL MEDICINE

## 2022-12-13 PROCEDURE — 71250 CT THORAX DX C-: CPT

## 2022-12-13 PROCEDURE — 99207 PR DROP WITH A PROCEDURE: CPT | Mod: 25 | Performed by: INTERNAL MEDICINE

## 2022-12-13 PROCEDURE — 99214 OFFICE O/P EST MOD 30 MIN: CPT | Performed by: INTERNAL MEDICINE

## 2022-12-13 RX ORDER — LOSARTAN POTASSIUM 50 MG/1
TABLET ORAL
Qty: 90 TABLET | Refills: 3 | Status: SHIPPED | OUTPATIENT
Start: 2022-12-13 | End: 2023-12-13

## 2022-12-13 NOTE — PROGRESS NOTES
LUNG NODULE & INTERVENTIONAL PULMONARY CLINIC  CLINICS & SURGERY CENTER, [facility]     Sharyn Trent MRN# [id number]   Age: 82 year old YOB: 1940       Requesting Physician: No referring provider defined for this encounter.       Assessment and Plan:    1. Sarcoidosis - overall looks to be low likelihood for progression.  Her lymphadenopathy has not been biopsied but is consistent with this diagnosis.  I do not recommend further biopsies.    Recommendations for primary care  Yearly LFTs, metabolic panel, calcium level  Yearly EKG  Yearly eye exam    Unless there are concerning changes on any of these tests I do not think she needs further follow-up.             History:     Sharyn Trent is a 82 year old female with sig h/o for sarcoidosis diagnosed incidentally during gyn procedure who is here for evaluation/followup of lymphadenopathy.      - My interpretation of the images relevant for this visit includes: lymphadenopathy looks overall stable              Past Medical History:      Past Medical History:   Diagnosis Date     Abnormal ECG      Anxiety      Arthritis      Chest pain      Chest pain      Chronic kidney disease     stage 3-mod.     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      Pacemaker      PSVT (paroxysmal supraventricular tachycardia) (H)      Right bundle branch block      Skin cancer            Past Surgical History:      Past Surgical History:   Procedure Laterality Date     ABLATION OF DYSRHYTHMIC FOCUS  04/11/2013    AV analia reentry tachycardia, partially successful     BIOPSY SKIN (LOCATION)       CARDIAC CATHETERIZATION  03/10/2016     CV CORONARY  ANGIOGRAM N/A 05/26/2021    Procedure: Coronary Angiogram;  Surgeon: Yony Gillis MD;  Location: [facility] Cardiac Cath Lab;  Service: Cardiology     CV LEFT HEART CATHETERIZATION WITHOUT LEFT VENTRICULOGRAM Left 05/26/2021    Procedure: Left Heart Catheterization Without Left Ventriculogram;  Surgeon: Yony Gillis MD;  Location: [facility] Cardiac Cath Lab;  Service: Cardiology     CV RIGHT HEART CATHETERIZATION N/A 05/26/2021    Procedure: Right Heart Catheterization;  Surgeon: Yony Gillis MD;  Location: [facility] Cardiac Cath Lab;  Service: Cardiology     DILATION AND CURETTAGE N/A 4/19/2022    Procedure: DILATION AND CURRETAGE;  Surgeon: Mary Reed MD;  Location: [facility] OR     EP PACEMAKER N/A 08/30/2021    Procedure: Electrophysiology Pacemaker;  Surgeon: Ab Saavedra MD;  Location: [facility] CV     FOOT SURGERY      L foot     HAND SURGERY      on both thumbs     HYSTERECTOMY, TOTAL, ROBOT-ASSISTED, LAP, DA HAROON XI, W/BI SAL-OOPH & SNT LYMPH NODE BX, MINI LAP Bilateral 5/31/2022    Procedure: ROBOTIC ASSISTED TOTAL LAPAROSCOPIC HYSTERECTOMY, BILATERAL SALPINGO-OOPHORECTOMY, SENTINEL LYMPH NODE INJECTION AND BIOPSY, MINI-LAPAROTOMY,;  Surgeon: Mary Reed MD;  Location: [facility] OR     HYSTEROSCOPY DIAGNOSTIC N/A 4/19/2022    Procedure: HYSTEROSCOPY, DIAGNOSTIC;  Surgeon: Mary Reed MD;  Location: [facility] OR     IMPLANT PACEMAKER  04/01/2011    Second-degree AV block     OTHER SURGICAL HISTORY      SVT Ablation     PELVIC EXAM UNDER ANESTHESIA, CERVICAL DILATION, N/A      PELVIC EXAMINATION UNDER ANESTHESIA N/A 4/19/2022    Procedure: PELVIC EXAM UNDER ANESTHESIA, CERVICAL DILATION,;  Surgeon: Mary Reed MD;  Location: [facility] OR     NH SHLDR ARTHROSCOP,SURG,W/ROTAT CUFF REPR Left 03/09/2017    Procedure: LEFT SHOULDER ARTHROSCOPY DECOMPRESSION DISTAL CLAVICLE EXCISION  ROTATOR  CUFF REPAIR BICEPS TENOTOMY AND EXTENSIVE DEBRIDEMENT;  Surgeon: Todd Riggs MD;  Location: [facility];  Service: Orthopedics     RELEASE CARPAL TUNNEL      both wrists     SKIN CANCER EXCISION      L ankle,Lleg and cheek     TOE SURGERY      L big toe joint replacement     TUBAL LIGATION            Social History:     Social History     Tobacco Use     Smoking status: Never     Smokeless tobacco: Never   Substance Use Topics     Alcohol use: No          Family History:     Family History   Problem Relation Age of Onset     Hypertension Mother      Cerebrovascular Disease Mother      Prostate Cancer Father      Cancer Father      Coronary Artery Disease Father      Dyslipidemia Father      Dyslipidemia Sister      Dyslipidemia Brother      Hypertension Brother      Prostate Cancer Brother            Allergies:      Allergies   Allergen Reactions     Herlinda-Kit Bee Sting Shortness Of Breath     Venom-Honey Bee [Bee Venom] Shortness Of Breath     Mercurial Analogues [Mercurial Derivatives] Dermatitis     blisters     Nitrofurantoin Other (See Comments)     Burning sensation across chest and arms    Other reaction(s): arms and chest burning     Sulfa (Sulfonamide Antibiotics) [Sulfa Drugs] Rash     Sulfamethoxazole-Trimethoprim Rash     Other reaction(s): rash          Medications:     Current Outpatient Medications   Medication Sig     acetaminophen (TYLENOL) 500 MG tablet Take 500-1,000 mg by mouth every 6 hours as needed for mild pain     aspirin (ASA) 325 MG EC tablet Take 325 mg by mouth every evening     atorvastatin (LIPITOR) 80 MG tablet Take 80 mg by mouth At Bedtime     calcium carbonate (TUMS) 500 MG chewable tablet Take 1-2 chew tab by mouth daily     carvedilol (COREG) 25 MG tablet TAKE 1 TABLET(25 MG) BY MOUTH TWICE DAILY WITH MEALS     escitalopram (LEXAPRO) 10 MG tablet Take 10 mg by mouth daily     ferrous sulfate (FEROSUL) 325 (65 Fe) MG tablet Take 325 mg by mouth daily (with breakfast)      furosemide (LASIX) 20 MG tablet Take 2 tablets (40 mg) by mouth daily     gabapentin (NEURONTIN) 100 MG capsule Take 1 capsule (100 mg) by mouth 3 times daily     hydrALAZINE (APRESOLINE) 100 MG tablet Take 1 tablet (100 mg) by mouth 3 times daily     metFORMIN (GLUCOPHAGE) 500 MG tablet [METFORMIN (GLUCOPHAGE) 500 MG TABLET] Take 1 tablet (500 mg total) by mouth 2 (two) times a day with meals. At breakfast and at dinner     Multiple Vitamins-Minerals (MULTIVITAMIN ADULTS 50+) TABS Take 1 tablet by mouth every morning     omeprazole (PRILOSEC) 20 MG capsule Take 20 mg by mouth daily before breakfast     prednisoLONE acetate (PRED-FORTE) 1 % ophthalmic suspension Place 1 drop into the right eye daily     vit C/E/Zn/coppr/lutein/zeaxan (PRESERVISION AREDS-2 ORAL) Take 1 tablet by mouth 2 times daily (before meals)     losartan (COZAAR) 50 MG tablet TAKE 1 TABLET(50 MG) BY MOUTH EVERY EVENING     No current facility-administered medications for this visit.          Review of Systems:     See HPI         Physical Exam:   /64 (BP Location: Left arm)   Pulse 58   Wt 64.7 kg (142 lb 9.6 oz)   SpO2 97%   BMI 26.94 kg/m      Constitutional - looks well, in no apparent distress  Eyes - no redness or discharge  Respiratory -breathing appears comfortable. No wheeze or rhonchi.   Cardiac -- Normal rate, rhythm.   Skin - No appreciable discoloration or lesions (very limited exam)  Neurological - No apparent tremors. Speech fluent and articlate  Psychiatric - no signs of delirium or anxiety          Current Laboratory Data:   All laboratory and imaging data reviewed.    Results for orders placed or performed in visit on 12/13/22 (from the past 24 hour(s))   General PFT Lab (Please always keep checked)   Result Value Ref Range    FVC-Pred 2.24 L    FVC-Pre 2.30 L    FVC-%Pred-Pre 102 %    FEV1-Pre 1.70 L    FEV1-%Pred-Pre 99 %    FEV1FVC-Pred 77 %    FEV1FVC-Pre 74 %    FEFMax-Pred 4.21 L/sec    FEFMax-Pre 3.59 L/sec     FEFMax-%Pred-Pre 85 %    FEF2575-Pred 1.42 L/sec    FEF2575-Pre 1.26 L/sec    OQV4734-%Pred-Pre 88 %    ExpTime-Pre 6.46 sec    FIFMax-Pre 2.44 L/sec    VC-Pred 2.42 L    VC-Pre 2.09 L    VC-%Pred-Pre 86 %    IC-Pred 2.02 L    IC-Pre 1.73 L    IC-%Pred-Pre 85 %    ERV-Pred 0.40 L    ERV-Pre 0.36 L    ERV-%Pred-Pre 92 %    FEV1FEV6-Pred 77 %    FEV1FEV6-Pre 75 %    DLCOunc-Pred 16.73 ml/min/mmHg    DLCOunc-Pre 14.66 ml/min/mmHg    DLCOunc-%Pred-Pre 87 %    DLCOcor-Pre 17.13 ml/min/mmHg    DLCOcor-%Pred-Pre 102 %    VA-Pre 4.02 L    VA-%Pred-Pre 100 %    FEV1SVC-Pred 71 %    FEV1SVC-Pre 81 %   Hemoglobin POCT, Enter/Edit Result   Result Value Ref Range    Hemoglobin POCT 9.5 g/dL

## 2022-12-14 LAB
DLCOCOR-%PRED-PRE: 102 %
DLCOCOR-PRE: 17.13 ML/MIN/MMHG
DLCOUNC-%PRED-PRE: 87 %
DLCOUNC-PRE: 14.66 ML/MIN/MMHG
DLCOUNC-PRED: 16.73 ML/MIN/MMHG
ERV-%PRED-PRE: 92 %
ERV-PRE: 0.36 L
ERV-PRED: 0.4 L
EXPTIME-PRE: 6.46 SEC
FEF2575-%PRED-PRE: 88 %
FEF2575-PRE: 1.26 L/SEC
FEF2575-PRED: 1.42 L/SEC
FEFMAX-%PRED-PRE: 85 %
FEFMAX-PRE: 3.59 L/SEC
FEFMAX-PRED: 4.21 L/SEC
FEV1-%PRED-PRE: 99 %
FEV1-PRE: 1.7 L
FEV1FEV6-PRE: 75 %
FEV1FEV6-PRED: 77 %
FEV1FVC-PRE: 74 %
FEV1FVC-PRED: 77 %
FEV1SVC-PRE: 81 %
FEV1SVC-PRED: 71 %
FIFMAX-PRE: 2.44 L/SEC
FVC-%PRED-PRE: 102 %
FVC-PRE: 2.3 L
FVC-PRED: 2.24 L
IC-%PRED-PRE: 85 %
IC-PRE: 1.73 L
IC-PRED: 2.02 L
VA-%PRED-PRE: 100 %
VA-PRE: 4.02 L
VC-%PRED-PRE: 86 %
VC-PRE: 2.09 L
VC-PRED: 2.42 L

## 2022-12-19 NOTE — PROGRESS NOTES
[facility] Clinic: [facility] Routine Remote Evaluation     HRS Enrollment Date:          9/1/22                                   Billing Dates: 11/5/22 through 12/5/22  HF Dx: HFpEF      HRS Alerts During Monitoring Period:   0     No adjustments made this month.  Discussed with patient/caregiver and they will continue with current plan of care.           Future Appointments   Date Time Provider Department Center   1/5/2023  4:00 AM BEHZAD [facility]   1/9/2023 12:00 AM BRITT [facility]   2/6/2023  4:00 AM LISY [facility]   8/15/2023 11:10 AM [facility]     Will continue with weekly monitoring with HF provider team.     TOTAL INTERACTIVE COMMUNICATION WITH PATIENT DURING THIS BILLING PERIOD: 0 minutes.    Juan Carlos QUIROGA RN  BSN    I have reviewed Juan Carlos CONN RN, BSN's note and agree.    Heidy Akins MD., MHS    READINGS:

## 2022-12-28 DIAGNOSIS — I10 HYPERTENSION, UNSPECIFIED TYPE: ICD-10-CM

## 2022-12-28 RX ORDER — FUROSEMIDE 20 MG
40 TABLET ORAL DAILY
Qty: 90 TABLET | Refills: 3 | Status: SHIPPED | OUTPATIENT
Start: 2022-12-28 | End: 2023-06-20

## 2023-01-02 ENCOUNTER — TELEPHONE (OUTPATIENT)
Dept: CARDIOLOGY | Facility: CLINIC | Age: 83
End: 2023-01-02

## 2023-01-02 NOTE — TELEPHONE ENCOUNTER
[facility] Call [facility]    Phone Message    May a detailed message be left on voicemail: yes     Reason for Call: Other: Pt called and stated that her Ipad that we provided is not working properly to record her blood pressure and weight. She is asking that someone call her and assist with it.     Action Taken: Other: Cardio    Travel Screening: Not Applicable     Thank you!  [facility]

## 2023-01-03 NOTE — TELEPHONE ENCOUNTER
Called pt and gave her tech support line to [facility]. Pt will call today.    Renee Pete RN

## 2023-01-05 ENCOUNTER — ALLIED HEALTH/NURSE VISIT (OUTPATIENT)
Dept: CARDIOLOGY | Facility: CLINIC | Age: 83
End: 2023-01-05
Payer: COMMERCIAL

## 2023-01-05 ENCOUNTER — LAB REQUISITION (OUTPATIENT)
Dept: LAB | Facility: CLINIC | Age: 83
End: 2023-01-05

## 2023-01-05 DIAGNOSIS — I13.0 HYPERTENSIVE HEART AND CHRONIC KIDNEY DISEASE WITH HEART FAILURE AND STAGE 1 THROUGH STAGE 4 CHRONIC KIDNEY DISEASE, OR UNSPECIFIED CHRONIC KIDNEY DISEASE (H): ICD-10-CM

## 2023-01-05 DIAGNOSIS — I50.30 NYHA CLASS 3 HEART FAILURE WITH PRESERVED EJECTION FRACTION (H): Primary | ICD-10-CM

## 2023-01-05 LAB
ANION GAP SERPL CALCULATED.3IONS-SCNC: 17 MMOL/L (ref 7–15)
BUN SERPL-MCNC: 45.5 MG/DL (ref 8–23)
CALCIUM SERPL-MCNC: 10.1 MG/DL (ref 8.8–10.2)
CHLORIDE SERPL-SCNC: 100 MMOL/L (ref 98–107)
CREAT SERPL-MCNC: 1.53 MG/DL (ref 0.51–0.95)
DEPRECATED HCO3 PLAS-SCNC: 25 MMOL/L (ref 22–29)
GFR SERPL CREATININE-BSD FRML MDRD: 34 ML/MIN/1.73M2
GLUCOSE SERPL-MCNC: 113 MG/DL (ref 70–99)
POTASSIUM SERPL-SCNC: 4.5 MMOL/L (ref 3.4–5.3)
SODIUM SERPL-SCNC: 142 MMOL/L (ref 136–145)

## 2023-01-05 PROCEDURE — 99454 REM MNTR PHYSIOL PARAM 16-30: CPT | Performed by: INTERNAL MEDICINE

## 2023-01-05 PROCEDURE — 80048 BASIC METABOLIC PNL TOTAL CA: CPT | Performed by: FAMILY MEDICINE

## 2023-01-09 ENCOUNTER — ANCILLARY PROCEDURE (OUTPATIENT)
Dept: CARDIOLOGY | Facility: CLINIC | Age: 83
End: 2023-01-09
Attending: INTERNAL MEDICINE
Payer: COMMERCIAL

## 2023-01-09 DIAGNOSIS — I44.1 SECOND DEGREE AV BLOCK: ICD-10-CM

## 2023-01-09 DIAGNOSIS — Z95.0 PACEMAKER: ICD-10-CM

## 2023-01-10 ENCOUNTER — LAB REQUISITION (OUTPATIENT)
Dept: LAB | Facility: CLINIC | Age: 83
End: 2023-01-10

## 2023-01-10 ENCOUNTER — TRANSFERRED RECORDS (OUTPATIENT)
Dept: HEALTH INFORMATION MANAGEMENT | Facility: CLINIC | Age: 83
End: 2023-01-10

## 2023-01-10 DIAGNOSIS — D64.9 ANEMIA, UNSPECIFIED: ICD-10-CM

## 2023-01-10 PROCEDURE — 83516 IMMUNOASSAY NONANTIBODY: CPT | Performed by: FAMILY MEDICINE

## 2023-01-10 PROCEDURE — 82784 ASSAY IGA/IGD/IGG/IGM EACH: CPT | Performed by: FAMILY MEDICINE

## 2023-01-11 NOTE — PROGRESS NOTES
[facility] Heart [facility]-C.[facility].RAMY Clinic: [facility] Routine Remote Evaluation     HRS Enrollment Date:           9/1/22                                 Billing Dates: 12/6/22 through 1/5/23  HF Dx: HFpEF      HRS Alerts During Monitoring Period:   0     No adjustments made this month.  Discussed with patient/caregiver and they will continue with current plan of care.           Future Appointments   Date Time Provider Department Center   2/6/2023  4:00 AM [facility]   8/15/2023 11:10 AM [facility]     Will continue with weekly monitoring with HF provider team.     TOTAL INTERACTIVE COMMUNICATION WITH PATIENT DURING THIS BILLING PERIOD: 0 minutes.    Juan Carlos QUIROGA RN  BSN    I have reviewed Juan Carlos CONN RN, BSN's note and agree.    Heidy Akins MD., MHS,    READINGS:

## 2023-01-13 LAB
GLIADIN IGA SER-ACNC: 1.7 U/ML
GLIADIN IGG SER-ACNC: <0.6 U/ML
IGA SERPL-MCNC: 119 MG/DL (ref 84–499)
TTG IGA SER-ACNC: 0.6 U/ML
TTG IGG SER-ACNC: 0.9 U/ML

## 2023-01-16 LAB
MDC_IDC_EPISODE_DTM: NORMAL
MDC_IDC_EPISODE_DURATION: 10 S
MDC_IDC_EPISODE_DURATION: 10 S
MDC_IDC_EPISODE_DURATION: 100 S
MDC_IDC_EPISODE_DURATION: 100 S
MDC_IDC_EPISODE_DURATION: 116 S
MDC_IDC_EPISODE_DURATION: 12 S
MDC_IDC_EPISODE_DURATION: 16 S
MDC_IDC_EPISODE_DURATION: 22 S
MDC_IDC_EPISODE_DURATION: 28 S
MDC_IDC_EPISODE_DURATION: 30 S
MDC_IDC_EPISODE_DURATION: 36 S
MDC_IDC_EPISODE_DURATION: 44 S
MDC_IDC_EPISODE_DURATION: 56 S
MDC_IDC_EPISODE_ID: NORMAL
MDC_IDC_EPISODE_TYPE: NORMAL
MDC_IDC_LEAD_IMPLANT_DT: NORMAL
MDC_IDC_LEAD_IMPLANT_DT: NORMAL
MDC_IDC_LEAD_LOCATION: NORMAL
MDC_IDC_LEAD_LOCATION: NORMAL
MDC_IDC_LEAD_LOCATION_DETAIL_1: NORMAL
MDC_IDC_LEAD_LOCATION_DETAIL_1: NORMAL
MDC_IDC_LEAD_MFG: NORMAL
MDC_IDC_LEAD_MFG: NORMAL
MDC_IDC_LEAD_MODEL: NORMAL
MDC_IDC_LEAD_MODEL: NORMAL
MDC_IDC_LEAD_POLARITY_TYPE: NORMAL
MDC_IDC_LEAD_POLARITY_TYPE: NORMAL
MDC_IDC_LEAD_SERIAL: NORMAL
MDC_IDC_LEAD_SERIAL: NORMAL
MDC_IDC_MSMT_BATTERY_DTM: NORMAL
MDC_IDC_MSMT_BATTERY_REMAINING_LONGEVITY: 113 MO
MDC_IDC_MSMT_BATTERY_REMAINING_PERCENTAGE: 89 %
MDC_IDC_MSMT_BATTERY_RRT_TRIGGER: NORMAL
MDC_IDC_MSMT_BATTERY_STATUS: NORMAL
MDC_IDC_MSMT_BATTERY_VOLTAGE: 3.02 V
MDC_IDC_MSMT_LEADCHNL_RA_IMPEDANCE_VALUE: 400 OHM
MDC_IDC_MSMT_LEADCHNL_RA_LEAD_CHANNEL_STATUS: NORMAL
MDC_IDC_MSMT_LEADCHNL_RA_PACING_THRESHOLD_AMPLITUDE: 0.62 V
MDC_IDC_MSMT_LEADCHNL_RA_PACING_THRESHOLD_PULSEWIDTH: 0.5 MS
MDC_IDC_MSMT_LEADCHNL_RA_SENSING_INTR_AMPL: 3 MV
MDC_IDC_MSMT_LEADCHNL_RV_IMPEDANCE_VALUE: 450 OHM
MDC_IDC_MSMT_LEADCHNL_RV_LEAD_CHANNEL_STATUS: NORMAL
MDC_IDC_MSMT_LEADCHNL_RV_PACING_THRESHOLD_AMPLITUDE: 0.88 V
MDC_IDC_MSMT_LEADCHNL_RV_PACING_THRESHOLD_PULSEWIDTH: 0.5 MS
MDC_IDC_MSMT_LEADCHNL_RV_SENSING_INTR_AMPL: 3.4 MV
MDC_IDC_PG_IMPLANT_DTM: NORMAL
MDC_IDC_PG_MFG: NORMAL
MDC_IDC_PG_MODEL: NORMAL
MDC_IDC_PG_SERIAL: NORMAL
MDC_IDC_PG_TYPE: NORMAL
MDC_IDC_SESS_CLINIC_NAME: NORMAL
MDC_IDC_SESS_DTM: NORMAL
MDC_IDC_SESS_REPROGRAMMED: NO
MDC_IDC_SESS_TYPE: NORMAL
MDC_IDC_SET_BRADY_AT_MODE_SWITCH_MODE: NORMAL
MDC_IDC_SET_BRADY_AT_MODE_SWITCH_RATE: 180 {BEATS}/MIN
MDC_IDC_SET_BRADY_LOWRATE: 50 {BEATS}/MIN
MDC_IDC_SET_BRADY_MAX_SENSOR_RATE: 100 {BEATS}/MIN
MDC_IDC_SET_BRADY_MAX_TRACKING_RATE: 100 {BEATS}/MIN
MDC_IDC_SET_BRADY_MODE: NORMAL
MDC_IDC_SET_BRADY_PAV_DELAY_HIGH: 100 MS
MDC_IDC_SET_BRADY_PAV_DELAY_LOW: 300 MS
MDC_IDC_SET_BRADY_SAV_DELAY_HIGH: 100 MS
MDC_IDC_SET_BRADY_SAV_DELAY_LOW: 130 MS
MDC_IDC_SET_LEADCHNL_RA_PACING_AMPLITUDE: 1.62
MDC_IDC_SET_LEADCHNL_RA_PACING_ANODE_ELECTRODE_1: NORMAL
MDC_IDC_SET_LEADCHNL_RA_PACING_ANODE_LOCATION_1: NORMAL
MDC_IDC_SET_LEADCHNL_RA_PACING_CAPTURE_MODE: NORMAL
MDC_IDC_SET_LEADCHNL_RA_PACING_CATHODE_ELECTRODE_1: NORMAL
MDC_IDC_SET_LEADCHNL_RA_PACING_CATHODE_LOCATION_1: NORMAL
MDC_IDC_SET_LEADCHNL_RA_PACING_POLARITY: NORMAL
MDC_IDC_SET_LEADCHNL_RA_PACING_PULSEWIDTH: 0.5 MS
MDC_IDC_SET_LEADCHNL_RA_SENSING_ADAPTATION_MODE: NORMAL
MDC_IDC_SET_LEADCHNL_RA_SENSING_ANODE_ELECTRODE_1: NORMAL
MDC_IDC_SET_LEADCHNL_RA_SENSING_ANODE_LOCATION_1: NORMAL
MDC_IDC_SET_LEADCHNL_RA_SENSING_CATHODE_ELECTRODE_1: NORMAL
MDC_IDC_SET_LEADCHNL_RA_SENSING_CATHODE_LOCATION_1: NORMAL
MDC_IDC_SET_LEADCHNL_RA_SENSING_POLARITY: NORMAL
MDC_IDC_SET_LEADCHNL_RA_SENSING_SENSITIVITY: 0.2 MV
MDC_IDC_SET_LEADCHNL_RV_PACING_AMPLITUDE: 1.12
MDC_IDC_SET_LEADCHNL_RV_PACING_ANODE_ELECTRODE_1: NORMAL
MDC_IDC_SET_LEADCHNL_RV_PACING_ANODE_LOCATION_1: NORMAL
MDC_IDC_SET_LEADCHNL_RV_PACING_CAPTURE_MODE: NORMAL
MDC_IDC_SET_LEADCHNL_RV_PACING_CATHODE_ELECTRODE_1: NORMAL
MDC_IDC_SET_LEADCHNL_RV_PACING_CATHODE_LOCATION_1: NORMAL
MDC_IDC_SET_LEADCHNL_RV_PACING_POLARITY: NORMAL
MDC_IDC_SET_LEADCHNL_RV_PACING_PULSEWIDTH: 0.5 MS
MDC_IDC_SET_LEADCHNL_RV_SENSING_ADAPTATION_MODE: NORMAL
MDC_IDC_SET_LEADCHNL_RV_SENSING_ANODE_ELECTRODE_1: NORMAL
MDC_IDC_SET_LEADCHNL_RV_SENSING_ANODE_LOCATION_1: NORMAL
MDC_IDC_SET_LEADCHNL_RV_SENSING_CATHODE_ELECTRODE_1: NORMAL
MDC_IDC_SET_LEADCHNL_RV_SENSING_CATHODE_LOCATION_1: NORMAL
MDC_IDC_SET_LEADCHNL_RV_SENSING_POLARITY: NORMAL
MDC_IDC_SET_LEADCHNL_RV_SENSING_SENSITIVITY: 2 MV
MDC_IDC_STAT_AT_BURDEN_PERCENT: 1 %
MDC_IDC_STAT_AT_DTM_END: NORMAL
MDC_IDC_STAT_AT_DTM_START: NORMAL
MDC_IDC_STAT_AT_MODE_SW_COUNT: 198
MDC_IDC_STAT_AT_MODE_SW_COUNT_PER_DAY: 1
MDC_IDC_STAT_AT_MODE_SW_MAX_DURATION: 3986 S
MDC_IDC_STAT_AT_MODE_SW_PERCENT_TIME: 1 %
MDC_IDC_STAT_BRADY_AP_VP_PERCENT: 2.2 %
MDC_IDC_STAT_BRADY_AP_VS_PERCENT: 1 %
MDC_IDC_STAT_BRADY_AS_VP_PERCENT: 95 %
MDC_IDC_STAT_BRADY_AS_VS_PERCENT: 1 %
MDC_IDC_STAT_BRADY_DTM_END: NORMAL
MDC_IDC_STAT_BRADY_DTM_START: NORMAL
MDC_IDC_STAT_BRADY_RA_PERCENT_PACED: 1 %
MDC_IDC_STAT_BRADY_RV_PERCENT_PACED: 98 %
MDC_IDC_STAT_CRT_DTM_END: NORMAL
MDC_IDC_STAT_CRT_DTM_START: NORMAL
MDC_IDC_STAT_HEART_RATE_ATRIAL_MAX: 330 {BEATS}/MIN
MDC_IDC_STAT_HEART_RATE_ATRIAL_MEAN: 66 {BEATS}/MIN
MDC_IDC_STAT_HEART_RATE_ATRIAL_MIN: 40 {BEATS}/MIN
MDC_IDC_STAT_HEART_RATE_DTM_END: NORMAL
MDC_IDC_STAT_HEART_RATE_DTM_START: NORMAL
MDC_IDC_STAT_HEART_RATE_VENTRICULAR_MAX: 200 {BEATS}/MIN
MDC_IDC_STAT_HEART_RATE_VENTRICULAR_MEAN: 66 {BEATS}/MIN
MDC_IDC_STAT_HEART_RATE_VENTRICULAR_MIN: 30 {BEATS}/MIN

## 2023-01-16 PROCEDURE — 99207 PR NO CHARGE LOS: CPT | Performed by: INTERNAL MEDICINE

## 2023-02-06 ENCOUNTER — ALLIED HEALTH/NURSE VISIT (OUTPATIENT)
Dept: CARDIOLOGY | Facility: CLINIC | Age: 83
End: 2023-02-06
Payer: COMMERCIAL

## 2023-02-06 DIAGNOSIS — I50.30 NYHA CLASS 3 HEART FAILURE WITH PRESERVED EJECTION FRACTION (H): Primary | ICD-10-CM

## 2023-02-06 PROCEDURE — 99454 REM MNTR PHYSIOL PARAM 16-30: CPT | Performed by: INTERNAL MEDICINE

## 2023-02-07 NOTE — PROGRESS NOTES
CC:  Oneida ETIENNE Asher is here today for allergic reaction to doxycycline and bactrim.  Patient was on the medication for 1 week before she notice a reaction today. She have rash on her arms, chest and face. She was given amoxicillin yesterday by her PCP but have not taken it yet because she notice the reaction today.   Medications: medications verified and updated  Refills needed today?  NO    Patient would like communication of their results via:    Home Phone: [phone] (home)  Okay to leave a message containing results? Yes     There are no preventive care reminders to display for this patient.  Patient is up to date, no discussion needed.                 [facility] Clinic: HRS Routine Remote Evaluation     HRS Enrollment Date:         9/1/22                                   Billing Dates: 1/6/23 through 2/6/23  HF Dx: HFpEF      HRS Alerts During Monitoring Period:   0     No adjustments made this month.  Discussed with patient/caregiver and they will continue with current plan of care.           Future Appointments   Date Time Provider Department Center   8/15/2023 11:10 AM Reji Davila MD [facility]     Will continue with weekly monitoring with HF provider team.     TOTAL INTERACTIVE COMMUNICATION WITH PATIENT DURING THIS BILLING PERIOD: 0 minutes.    Juan Carlos QUIROGA RN  BSN    I have reviewed Juan Carlos CONN RN, BSN's note and agree.    Heidy Akins MD., MHS    READINGS:

## 2023-03-03 DIAGNOSIS — I50.9 CHF (CONGESTIVE HEART FAILURE) (H): ICD-10-CM

## 2023-03-06 RX ORDER — CARVEDILOL 25 MG/1
TABLET ORAL
Qty: 180 TABLET | Refills: 1 | Status: SHIPPED | OUTPATIENT
Start: 2023-03-06 | End: 2023-09-05

## 2023-03-07 DIAGNOSIS — I25.119 CORONARY ARTERY DISEASE INVOLVING NATIVE CORONARY ARTERY OF NATIVE HEART WITH ANGINA PECTORIS (H): ICD-10-CM

## 2023-03-07 RX ORDER — HYDRALAZINE HYDROCHLORIDE 100 MG/1
100 TABLET, FILM COATED ORAL 3 TIMES DAILY
Qty: 270 TABLET | Refills: 1 | Status: SHIPPED | OUTPATIENT
Start: 2023-03-07 | End: 2023-08-30

## 2023-03-09 ENCOUNTER — ALLIED HEALTH/NURSE VISIT (OUTPATIENT)
Dept: CARDIOLOGY | Facility: CLINIC | Age: 83
End: 2023-03-09
Payer: COMMERCIAL

## 2023-03-09 DIAGNOSIS — I50.30 NYHA CLASS 3 HEART FAILURE WITH PRESERVED EJECTION FRACTION (H): Primary | ICD-10-CM

## 2023-03-09 DIAGNOSIS — I25.119 CORONARY ARTERY DISEASE INVOLVING NATIVE CORONARY ARTERY OF NATIVE HEART WITH ANGINA PECTORIS (H): ICD-10-CM

## 2023-03-09 PROCEDURE — 99454 REM MNTR PHYSIOL PARAM 16-30: CPT | Performed by: INTERNAL MEDICINE

## 2023-03-16 NOTE — PROGRESS NOTES
[facility] Heart [facility]-C.O.RAMY Clinic: [facility] Routine Remote Evaluation     HRS Enrollment Date:           9/1/22                                 Billing Dates: 2/7/23 through 3/9/23  HF Dx: HFpEF      HRS Alerts During Monitoring Period:    0    No adjustments made this month.  Discussed with patient/caregiver and they will continue with current plan of care.           Future Appointments   Date Time Provider Department Center   4/10/2023  4:00 [facility]   8/15/2023 11:10 AM Reji Davila MD [facility]     Will continue with weekly monitoring with HF provider team.     TOTAL INTERACTIVE COMMUNICATION WITH PATIENT DURING THIS BILLING PERIOD: 0 minutes.    Juan Carlos QUIROGA RN  BSN    I have reviewed Juan Carlos CONN RN, BSN's note and agree.    Heidy Akins MD., MHS    READINGS:

## 2023-03-23 ENCOUNTER — TELEPHONE (OUTPATIENT)
Dept: CARDIOLOGY | Facility: CLINIC | Age: 83
End: 2023-03-23
Payer: COMMERCIAL

## 2023-03-23 DIAGNOSIS — I44.2 THIRD DEGREE AV BLOCK (H): Primary | ICD-10-CM

## 2023-03-23 NOTE — TELEPHONE ENCOUNTER
Sharyn is calling to request that she obtain a recommendation from Dr. Akins to establish with a EP Cardiologist to manage her device now that Dr. Saavedra has retired. She is offered Dr. Wade's information and order placed for consult to establish with him for oversight of the ppm device.  Yomaira CONN RN BSN, CHFN, PCCN-K

## 2023-03-28 ENCOUNTER — ANCILLARY PROCEDURE (OUTPATIENT)
Dept: CARDIOLOGY | Facility: CLINIC | Age: 83
End: 2023-03-28
Attending: INTERNAL MEDICINE
Payer: COMMERCIAL

## 2023-03-28 DIAGNOSIS — Z95.0 PACEMAKER: Primary | ICD-10-CM

## 2023-03-28 DIAGNOSIS — I44.1 SECOND DEGREE AV BLOCK: Primary | ICD-10-CM

## 2023-03-28 DIAGNOSIS — I44.2 COMPLETE ATRIOVENTRICULAR BLOCK (H): ICD-10-CM

## 2023-03-28 PROCEDURE — 93280 PM DEVICE PROGR EVAL DUAL: CPT | Performed by: INTERNAL MEDICINE

## 2023-04-10 ENCOUNTER — ALLIED HEALTH/NURSE VISIT (OUTPATIENT)
Dept: CARDIOLOGY | Facility: CLINIC | Age: 83
End: 2023-04-10
Payer: COMMERCIAL

## 2023-04-10 DIAGNOSIS — I50.30 NYHA CLASS 3 HEART FAILURE WITH PRESERVED EJECTION FRACTION (H): Primary | ICD-10-CM

## 2023-04-10 PROCEDURE — 99454 REM MNTR PHYSIOL PARAM 16-30: CPT | Performed by: INTERNAL MEDICINE

## 2023-04-11 NOTE — PROGRESS NOTES
[facility] Heart [facility]-C.O.RANTOLIN. Clinic: [facility] Routine Remote Evaluation     HRS Enrollment Date:    9/1/22                                        Billing Dates: 3/10/23 through 4/10/23  HF Dx: HFpEF      HRS Alerts During Monitoring Period:    0    No adjustments made this month.  Discussed with patient/caregiver and they will continue with current plan of care.           Future Appointments   Date Time Provider Department Center   6/7/2023 12:50 PM [facility]   6/21/2023 11:20 AM Bravo Lopez MD [facility]   7/6/2023 12:00 AM JN [facility]   8/15/2023 11:10 AM Reji Davila MD [facility]     Will continue with weekly monitoring with HF provider team.     TOTAL INTERACTIVE COMMUNICATION WITH PATIENT DURING THIS BILLING PERIOD: 0 minutes.    Juan Cralos QUIROGA RN  BSN    I have reviewed Juan Carlos CONN RN, BSN's note and agree.    Heidy Akins MD., MHS    READINGS:

## 2023-04-18 LAB
MDC_IDC_LEAD_IMPLANT_DT: NORMAL
MDC_IDC_LEAD_IMPLANT_DT: NORMAL
MDC_IDC_LEAD_LOCATION: NORMAL
MDC_IDC_LEAD_LOCATION: NORMAL
MDC_IDC_LEAD_LOCATION_DETAIL_1: NORMAL
MDC_IDC_LEAD_LOCATION_DETAIL_1: NORMAL
MDC_IDC_LEAD_MFG: NORMAL
MDC_IDC_LEAD_MFG: NORMAL
MDC_IDC_LEAD_MODEL: NORMAL
MDC_IDC_LEAD_MODEL: NORMAL
MDC_IDC_LEAD_POLARITY_TYPE: NORMAL
MDC_IDC_LEAD_POLARITY_TYPE: NORMAL
MDC_IDC_LEAD_SERIAL: NORMAL
MDC_IDC_LEAD_SERIAL: NORMAL
MDC_IDC_MSMT_BATTERY_REMAINING_LONGEVITY: 114 MO
MDC_IDC_MSMT_BATTERY_STATUS: NORMAL
MDC_IDC_MSMT_BATTERY_VOLTAGE: 3.02 V
MDC_IDC_MSMT_LEADCHNL_RA_IMPEDANCE_VALUE: 410 OHM
MDC_IDC_MSMT_LEADCHNL_RA_IMPEDANCE_VALUE: 412.5 OHM
MDC_IDC_MSMT_LEADCHNL_RA_PACING_THRESHOLD_AMPLITUDE: 0.75 V
MDC_IDC_MSMT_LEADCHNL_RA_PACING_THRESHOLD_PULSEWIDTH: 0.4 MS
MDC_IDC_MSMT_LEADCHNL_RA_PACING_THRESHOLD_PULSEWIDTH: 0.5 MS
MDC_IDC_MSMT_LEADCHNL_RA_PACING_THRESHOLD_PULSEWIDTH: 0.5 MS
MDC_IDC_MSMT_LEADCHNL_RA_SENSING_INTR_AMPL: 3.1 MV
MDC_IDC_MSMT_LEADCHNL_RA_SENSING_INTR_AMPL: 3.1 MV
MDC_IDC_MSMT_LEADCHNL_RV_IMPEDANCE_VALUE: 510 OHM
MDC_IDC_MSMT_LEADCHNL_RV_IMPEDANCE_VALUE: 512.5 OHM
MDC_IDC_MSMT_LEADCHNL_RV_PACING_THRESHOLD_AMPLITUDE: 1 V
MDC_IDC_MSMT_LEADCHNL_RV_PACING_THRESHOLD_PULSEWIDTH: 0.4 MS
MDC_IDC_MSMT_LEADCHNL_RV_PACING_THRESHOLD_PULSEWIDTH: 0.5 MS
MDC_IDC_MSMT_LEADCHNL_RV_PACING_THRESHOLD_PULSEWIDTH: 0.5 MS
MDC_IDC_MSMT_LEADCHNL_RV_SENSING_INTR_AMPL: 3.1 MV
MDC_IDC_MSMT_LEADCHNL_RV_SENSING_INTR_AMPL: 3.1 MV
MDC_IDC_PG_IMPLANT_DTM: NORMAL
MDC_IDC_PG_MFG: NORMAL
MDC_IDC_PG_MODEL: NORMAL
MDC_IDC_PG_SERIAL: NORMAL
MDC_IDC_PG_TYPE: NORMAL
MDC_IDC_SESS_CLINIC_NAME: NORMAL
MDC_IDC_SESS_DTM: NORMAL
MDC_IDC_SESS_TYPE: NORMAL
MDC_IDC_SET_BRADY_AT_MODE_SWITCH_MODE: NORMAL
MDC_IDC_SET_BRADY_AT_MODE_SWITCH_RATE: 180 {BEATS}/MIN
MDC_IDC_SET_BRADY_HYSTRATE: NORMAL
MDC_IDC_SET_BRADY_LOWRATE: 50 {BEATS}/MIN
MDC_IDC_SET_BRADY_MAX_SENSOR_RATE: 100 {BEATS}/MIN
MDC_IDC_SET_BRADY_MAX_TRACKING_RATE: 100 {BEATS}/MIN
MDC_IDC_SET_BRADY_MODE: NORMAL
MDC_IDC_SET_BRADY_NIGHT_RATE: NORMAL
MDC_IDC_SET_BRADY_PAV_DELAY_HIGH: 100 MS
MDC_IDC_SET_BRADY_PAV_DELAY_LOW: 300 MS
MDC_IDC_SET_BRADY_SAV_DELAY_HIGH: 100 MS
MDC_IDC_SET_BRADY_SAV_DELAY_LOW: 130 MS
MDC_IDC_SET_LEADCHNL_RA_PACING_AMPLITUDE: 1.62
MDC_IDC_SET_LEADCHNL_RA_PACING_CAPTURE_MODE: NORMAL
MDC_IDC_SET_LEADCHNL_RA_PACING_POLARITY: NORMAL
MDC_IDC_SET_LEADCHNL_RA_PACING_PULSEWIDTH: 0.5 MS
MDC_IDC_SET_LEADCHNL_RA_SENSING_ADAPTATION_MODE: NORMAL
MDC_IDC_SET_LEADCHNL_RA_SENSING_POLARITY: NORMAL
MDC_IDC_SET_LEADCHNL_RV_PACING_AMPLITUDE: 1.25 V
MDC_IDC_SET_LEADCHNL_RV_PACING_CAPTURE_MODE: NORMAL
MDC_IDC_SET_LEADCHNL_RV_PACING_POLARITY: NORMAL
MDC_IDC_SET_LEADCHNL_RV_PACING_PULSEWIDTH: 0.5 MS
MDC_IDC_SET_LEADCHNL_RV_SENSING_POLARITY: NORMAL
MDC_IDC_SET_LEADCHNL_RV_SENSING_SENSITIVITY: 2 MV
MDC_IDC_STAT_AT_MODE_SW_COUNT: 234
MDC_IDC_STAT_BRADY_RA_PERCENT_PACED: 0.44 %
MDC_IDC_STAT_BRADY_RV_PERCENT_PACED: 98 %

## 2023-05-02 ENCOUNTER — TELEPHONE (OUTPATIENT)
Dept: NEUROLOGY | Facility: CLINIC | Age: 83
End: 2023-05-02

## 2023-05-02 NOTE — TELEPHONE ENCOUNTER
[facility] Call Center    Phone Message    May a detailed message be left on voicemail: yes     Reason for Call: Symptoms or Concerns     If patient has red-flag symptoms, warm transfer to triage line    Current symptom or concern: Numbness on lip and 3 inches back to jaw    Symptoms have been present for:  3 day(s)    Has patient previously been seen for this? No    By Dr. Davila    Date: 5/2/23    Are there any new or worsening symptoms? Yes: Pt is calling because she is having numbness on her left lower lip and back to her jaw. Pt visited the dentist and the dentist states that it may be something with her nerves. Pt describes the numbness as a Novocain shot. Please call Pt to discuss      Action Taken: Message routed to:  Other: [facility] neurology    Travel Screening: Not Applicable

## 2023-05-03 NOTE — TELEPHONE ENCOUNTER
[facility] Call [facility]    Phone Message    May a detailed message be left on voicemail: yes     Reason for Call: Patient is returning missed call to nurse, pt will be home today to receive calls. Please call back at  [phone]    Action Taken: Other: [facility] neurology    Travel Screening: Not Applicable

## 2023-05-03 NOTE — TELEPHONE ENCOUNTER
Called Sharyn, She reports numbness in left lip/jaw that started Sunday night. She had tooth pain proceeding this numbness and had a dental cleaning last week, no recent dental work. Pt needs crown on tooth. Was having pain and sensitivity on left side of mouth/face that has resolved.     She went to dentist who looked at her teeth/jaw and xrays, dentist does not feel that numbness is related to dental health. Asked pt to follow up with neurology.     Pt has no other neurological symptoms, she is scheduled with Dr. Davila next week. RN asked her to follow up as scheduled and present to ED with any new/changing symptoms like- numbness, weakness, vision changes, speech changes, or balance disturbance. She verbalizes understanding.     Routing to covering neurologist to review, please advise if anything additional recommended prior to appt next week.     Sha Harry RN, BSN  [facility]

## 2023-05-03 NOTE — TELEPHONE ENCOUNTER
TR, would like to discuss symptoms further. Per chart review pt has not had facial symptoms in past, only numbness in hands and feet.     Pt has appt 5/10.     Sha Harry RN, BSN  [facility]

## 2023-05-10 ENCOUNTER — OFFICE VISIT (OUTPATIENT)
Dept: NEUROLOGY | Facility: CLINIC | Age: 83
End: 2023-05-10
Payer: COMMERCIAL

## 2023-05-10 VITALS
DIASTOLIC BLOOD PRESSURE: 61 MMHG | WEIGHT: 142 LBS | HEART RATE: 60 BPM | RESPIRATION RATE: 16 BRPM | SYSTOLIC BLOOD PRESSURE: 126 MMHG | BODY MASS INDEX: 26.83 KG/M2

## 2023-05-10 DIAGNOSIS — R77.8 ABNORMAL SPEP: ICD-10-CM

## 2023-05-10 DIAGNOSIS — R26.9 GAIT DIFFICULTY: ICD-10-CM

## 2023-05-10 DIAGNOSIS — E11.49 DIABETES MELLITUS TYPE 2 WITH NEUROLOGICAL MANIFESTATIONS (H): ICD-10-CM

## 2023-05-10 DIAGNOSIS — G62.9 NEUROPATHY: Primary | ICD-10-CM

## 2023-05-10 DIAGNOSIS — R20.0 NUMBNESS: ICD-10-CM

## 2023-05-10 PROCEDURE — 99215 OFFICE O/P EST HI 40 MIN: CPT | Performed by: PSYCHIATRY & NEUROLOGY

## 2023-05-10 NOTE — LETTER
5/10/2023         RE: Sharyn Trent  [address] [place] [address]        Dear Colleague,    Thank you for referring your patient, Sharyn Trent, to the [facility] [place]. Please see a copy of my visit note below.    NEUROLOGY OUTPATIENT PROGRESS NOTE   May 10, 2023     CHIEF COMPLAINT/REASON FOR VISIT/REASON FOR CONSULT  Patient presents with:  Follow Up    REASON FOR CONSULTATION- Numbness    HISTORY OF PRESENT ILLNESS  Sharyn Trent is a 82 year old female seen today for hospital follow-up.  Patient was seen in the hospital for an episode of shaking and headache.  He is complaining of bilateral hand numbness.  Stroke code was called.  Testing was negative.  Patient was diagnosed to have a hypertensive emergency.  She reports no recurrence of her symptoms.    In the hospital she was also complaining of some numbness in her feet which was suggestive of neuropathy.  CT of the L-spine did show mild to moderate spinal stenosis.  Did not completely fit with her symptoms.  She followed up in the neurology clinic for further evaluation.  EMG was done today.  Has also had blood work done in the hospital.  Reports ongoing numbness in the bottom of her feet.  Does have some balance issues.  Denies any other new concerns.    7/6/22  She returns today for follow-up of neuropathy.  Reports that the numbness is about the same in her feet.  Continues to have balance issues.  Encouraged her to exercise.  Discussed balance exercises.  Further reports no swelling in the feet.  Does report some leg cramps in the nighttime.  Does complain of more numbness in the morning and then taking lorazepam in the morning with symptoms improving.  Discussed that there is no rational for this and its possible the numbness might be more related to her anxiety worsening in the morning time.    11/14/22  Patient returns today.  She reports that the numbness in the feet is about the same.  Continues to have  balance issues.  Has had 2 falls.  Both of them involve turning.  She is using a cane when she goes out of the house.  Denies any other new symptoms.  Seen oncology and they do not think she has myeloma.  They are monitoring the serum for electrophoresis.  Does have a lot of right knee issues which I suspect are affecting her balance.  She has seen orthopedics and they do not recommend surgery at this point.  Gabapentin is somewhat helpful for the muscle cramps and numbness and tingling in the feet.  Her primary had stopped it for the balance issues though she did not notice any change.  She is currently on the medication and wants to continue it.  No hand numbness.    5/10/23  Patient returns today  1.  About 10 days ago she started having a toothache on the lower left side.  She then started having some numbness in the left part of her face.  The region from the left lower lip to her jaw is involved.  There is no pain.  She can eat fine.  There is no difficulty with swallowing.  There is no food coming out of her mouth.  Denies being anxious.  She does have a previous history of sarcoidosis that is under good control.  Is not on any immunomodulating medications.    In terms of her neuropathy she reports it spread to the top of her feet.  She is taking gabapentin which is helping.  Reports some locking of her fingers no new major cramps.  A1c has been around 5.  Has not been recently checked.    Previous history is reviewed and this is unchanged.    PAST MEDICAL/SURGICAL HISTORY  Past Medical History:   Diagnosis Date     Abnormal ECG      Anxiety      Arthritis      Chest pain      Chest pain      Chronic kidney disease     stage 3-mod.     Congestive heart failure (H)      Diabetes (H)      Dyslipidemia, goal LDL below 70      GERD (gastroesophageal reflux disease)      HTN (hypertension)      Hyperlipidemia      Macular degeneration (senile) of retina      Melanoma of ankle (H)     L ankle     Other second  degree atrioventricular block     Created by Conversion      Pacemaker      PSVT (paroxysmal supraventricular tachycardia) (H)      Right bundle branch block      Skin cancer      Patient Active Problem List   Diagnosis     Atypical chest pain     Malignant essential hypertension     Dyslipidemia, goal LDL below 70     DM (diabetes mellitus), type 2 (H)     Third degree AV block (H)     Chest pain     Ectopic atrial tachycardia (H)     Acute diastolic CHF (congestive heart failure) (H)     Acute respiratory failure with hypoxia (H)     Pneumonia of left lower lobe due to infectious organism     Stroke-like symptoms     Abnormal nuclear stress test     Chronic kidney disease, stage 3a (H)     Diabetes mellitus type 2 without retinopathy (H)     Gastroesophageal reflux disease without esophagitis     Thyroid nodule     Hypertensive emergency     Acute on chronic congestive heart failure, unspecified heart failure type (H)     Benign essential hypertension     Uterine mass       FAMILY HISTORY  Family History   Problem Relation Age of Onset     Hypertension Mother      Cerebrovascular Disease Mother      Prostate Cancer Father      Cancer Father      Coronary Artery Disease Father      Dyslipidemia Father      Dyslipidemia Sister      Dyslipidemia Brother      Hypertension Brother      Prostate Cancer Brother        SOCIAL HISTORY  Social History     Tobacco Use     Smoking status: Never     Smokeless tobacco: Never   Substance Use Topics     Alcohol use: No     Drug use: No       SYSTEMS REVIEW  Twelve-system ROS was done and other than the HPI this was negative.  Pertinent positives noted in the HPI.  No new issues reported..    MEDICATIONS  acetaminophen (TYLENOL) 500 MG tablet, Take 500-1,000 mg by mouth every 6 hours as needed for mild pain  aspirin (ASA) 325 MG EC tablet, Take 325 mg by mouth every evening  atorvastatin (LIPITOR) 80 MG tablet, Take 80 mg by mouth At Bedtime  calcium carbonate (TUMS) 500 MG  chewable tablet, Take 1-2 chew tab by mouth daily  carvedilol (COREG) 25 MG tablet, TAKE 1 TABLET(25 MG) BY MOUTH TWICE DAILY WITH MEALS  escitalopram (LEXAPRO) 10 MG tablet, Take 10 mg by mouth daily  ferrous sulfate (FEROSUL) 325 (65 Fe) MG tablet, Take 325 mg by mouth daily (with breakfast)  furosemide (LASIX) 20 MG tablet, Take 2 tablets (40 mg) by mouth daily  gabapentin (NEURONTIN) 100 MG capsule, Take 1 capsule (100 mg) by mouth 3 times daily  hydrALAZINE (APRESOLINE) 100 MG tablet, Take 1 tablet (100 mg) by mouth 3 times daily  losartan (COZAAR) 50 MG tablet, TAKE 1 TABLET(50 MG) BY MOUTH EVERY EVENING  metFORMIN (GLUCOPHAGE) 500 MG tablet, [METFORMIN (GLUCOPHAGE) 500 MG TABLET] Take 1 tablet (500 mg total) by mouth 2 (two) times a day with meals. At breakfast and at dinner  Multiple Vitamins-Minerals (MULTIVITAMIN ADULTS 50+) TABS, Take 1 tablet by mouth every morning  omeprazole (PRILOSEC) 20 MG capsule, Take 20 mg by mouth daily before breakfast  prednisoLONE acetate (PRED-FORTE) 1 % ophthalmic suspension, Place 1 drop into the right eye daily  vit C/E/Zn/coppr/lutein/zeaxan (PRESERVISION AREDS-2 ORAL), Take 1 tablet by mouth 2 times daily (before meals)    No current facility-administered medications on file prior to visit.       PHYSICAL EXAMINATION  VITALS: /61   Pulse 60   Resp 16   Wt 64.4 kg (142 lb)   BMI 26.83 kg/m    GENERAL: Healthy appearing, alert, no acute distress, normal habitus.  CARDIOVASCULAR: Extremities warm and well perfused. Pulses present.   NEUROLOGICAL:  Patient is awake and oriented to self, place and time.  Attention span is normal.  Memory is grossly intact.  Language is fluent and follows commands appropriately.  Appropriate fund of knowledge. Cranial nerves 2-12 are intact. There is no pronator drift.  Motor exam shows 5/5 strength in all extremities.  Tone is symmetric bilaterally in upper and lower extremities.  Reflexes are symmetric and 1+/diminished in upper  extremities and lower extremities. Sensory exam is grossly intact to light touch, pin prick and vibration.  Finger to nose and heel to shin is without dysmetria.  Romberg is negative.  Gait is slightly wide-based and the patient is able to do tandem walk and walk on toes and heels with some difficulty.  She does have some antalgic gait due to the right knee.  Exam stable.  No facial numbness on pinprick testing.      DIAGNOSTICS  HEAD CT:  1.  No acute intracranial hemorrhage.  2.  No CT evidence acute infarct. Aspect score 10.  3.  Age-related changes described above.     Dr. Tashi Lackey was notified by Dr Kevin Cooper at  1:40 AM 09/13/2021.      HEAD CTA:   1.  No intracranial arterial large vessel occlusion.  2.  Right carotid siphon mild stenosis.   3.  Left carotid siphon severe stenosis.  4.  Otherwise, no significant stenosis/occlusion.      NECK CTA:  1.  Right vertebral artery origin moderate stenosis.  2.  Left proximal subclavian artery mild-to-moderate stenosis.  3.  Otherwise, no significant stenosis/occlusion. No dissection.  4.  Multiple thyroid nodules. Recommend nonemergent thyroid ultrasound based upon ACR white paper criteria.   5.  Lung apices demonstrate septal thickening with patchy groundglass opacities and consolidations most likely due to pulmonary edema. Please correlate clinically to exclude acute infectious inflammatory pneumonitis.     Carotid US  IMPRESSION:  1.  Mild plaque formation, velocities consistent with less than 50% stenosis in the right internal carotid artery.  2.  Mild plaque formation, velocities consistent with less than 50% stenosis in the left internal carotid artery.  3.  Flow within the vertebral arteries is antegrade.  4.  Bilateral thyroid nodules, recommend further evaluation with dedicated thyroid ultrasound.     CT L spine  1.  No evidence of lumbar spine fracture.     2.  Multilevel degenerative change throughout the lumbar disc spaces with moderate spinal  canal stenosis at L3-4. Moderate spinal canal stenosis at L4-5. Variable degrees of foraminal narrowing. Please see body of report for details at each level.    EMG  CLINICAL INTERPRETATION:  This is an abnormal nerve conduction and EMG study.  The study is suggestive of a sensorimotor polyneuropathy in both legs.  Further clinical correlation is needed.    RELEVANT LABS  Component      Latest Ref Rng & Units 9/13/2021 9/15/2021   Albumin %      51.0 - 67.0 %  61.4   Albumin Fraction      3.2 - 4.7 g/dL  3.9   Alpha 1 %      2.0 - 4.0 %  3.7   Alpha 1 Fraction      0.1 - 0.3 g/dL  0.2   Alpha 2 %      5.0 - 13.0 %  12.2   Alpha 2 Fraction      0.4 - 0.9 g/dL  0.8   Beta %      10.0 - 17.0 %  10.0   Beta Fraction      0.7 - 1.2 g/dL  0.6 (L)   Gamma Globulin %      9.0 - 20.0 %  12.7   Gamma Fraction      0.6 - 1.4 g/dL  0.8   ELP Interpretation:        Normal serum protein electrophoresis.   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 At the present time, the bands are too faint for unequivocal diagnosis.   Path ICD       I16.1   Interpreted By       Eduardo Dyer MD     Component      Latest Ref Rng & Units 5/12/2022   Vitamin B12      213 - 816 pg/mL 435     OUTSIDE RECORDS  Hospital notes reviewed.    SPEP  Component      Latest Ref Rng & Units 7/6/2022   Albumin Fraction      3.7 - 5.1 g/dL 4.2   Alpha 1 Fraction      0.2 - 0.4 g/dL 0.3   Alpha 2 Fraction      0.5 - 0.9 g/dL 0.8   Beta Fraction      0.6 - 1.0 g/dL 0.7   Gamma  Fraction      0.7 - 1.6 g/dL 1.0   Monoclonal Peak      <=0.0 g/dL 0.0   ELP Interpretation:       No monoclonal protein seen by capillary electrophoresis. However, a possible very small IgM lambda monoclonal was seen in this sample by immunofixation which is a much more sensitive method for monoclonal detection. Pathologic significance requires clinical correlation. NORIS Ho M.D., Ph.D., Pathologist ().   Immunofixation ELP       Possible very small monoclonal IgM immunoglobulin of lambda light chain type. Pathologic significance requires clinical correlation.  NORIS Ho M.D., Ph.D., Pathologist ()   Total Protein Serum for ELP      6.4 - 8.3 g/dL 6.9     Hematology        IMPRESSION/REPORT/PLAN  Abnormal SPEP  Neuropathy  Primary hypertension  Neuropathic pain/muscle cramps  New onset facial numbness  History of sarcoidosis    This is a 82 year old female with numbness in the bottom of her feet.  EMG is confirmatory for peripheral neuropathy. Exam does show decreased reflexes but otherwise negative for sensory loss.   CT of the lumbar spine does show spinal stenosis though this would not fit with her symptoms.     Etiology for neuropathy would most likely be diabetes.  She did have a positive serum electrophoresis which is being monitored through oncology.  There is no concerns for multiple myeloma though there is some family history of multiple myeloma.  Discussed prognosis of neuropathy.  Will recommend exercise and healthy lifestyle.  Exam is stable.    She does complain of some muscle cramps and pins-and-needles type pain in the legs.  Gabapentin is currently helping and she wants to stay on the same dose.  Could further increase it down the road.  Continue current dose for right now.    Patient was seen in the hospital for a spell of bilateral hand numbness shaking and elevated blood pressure.  This was thought to be hypertensive emergency.  Head CT was negative for stroke.   MRI could not be done because of pacemaker.  CT angiogram showed left carotid stenosis though otherwise noncontributory.  Carotid ultrasound was negative.  Patient remains on aspirin 81 mg.  She is also on a statin.  No strokelike symptoms we will continue to monitor.  No change.  Blood pressure slightly elevated today.  Could be whitecoat hypertension.  Seen by the cardiologist and they are trying to keep her weight low with use of diuretics. -- Stable    She now reports left facial numbness.  MRI brain is not possible due to pacemaker.  We will check a CT of the head with contrast.  Potentially her symptoms could be related to history of sarcoidosis.  Could consider immunomodulatory medications.  We will check blood work to look for other causes.  Could also be related to her diabetes though she reports its been under good control.    Return back in 1 month.    -     gabapentin (NEURONTIN) 100 MG capsule; Take 1 capsule (100 mg) by mouth 3 times daily  -     ANCA IgG by IFA with Reflex to Titer; Future  -     Angiotensin converting enzyme; Future  -     Anti Nuclear Renita IgG by IFA with Reflex; Future  -     Vitamin B12; Future  -     Vitamin B1 whole blood; Future  -     TSH with free T4 reflex; Future  -     Cancel: Protein Immunofixation Serum; Future  -     Cancel: Protein electrophoresis; Future  -     Lyme Disease Total Abs Bld with Reflex to Confirm CLIA; Future  -     Hemoglobin A1c; Future  -     CBC with Platelets & Differential; Future  -     Comprehensive metabolic panel; Future  -     Erythrocyte sedimentation rate auto; Future  -     CT Head w/o & w Contrast; Future    Return in about 1 month (around 6/10/2023) for In-Clinic Visit (must), After testing.    Over 40 minutes were spent coordinating the care for the patient on the day of the encounter.  This includes previsit, during visit and post visit activities as documented above.  Counseling patient.  New problem.  Testing ordered.  Multiple  problems reviewed/addressed.  High risk.  (Activities include but not inclusive of reviewing chart, reviewing outside records, reviewing labs and imaging study results as well as the images, patient visit time including getting history and exam,  use if applicable, review of test results with the patient and coming up with a plan in a shared model, counseling patient and family, education and answering patient questions, EMR , EMR diagnosis entry and problem list management, medication reconciliation and prescription management if applicable, paperwork if applicable, printing documents and documentation of the visit activities.)        Reji Davila MD  Neurologist  [facility]  Tel:- [phone]    This note was dictated using voice recognition software.  Any grammatical or context distortions are unintentional and inherent to the software.        Again, thank you for allowing me to participate in the care of your patient.        Sincerely,        Reji Davila MD

## 2023-05-10 NOTE — PROGRESS NOTES
NEUROLOGY OUTPATIENT PROGRESS NOTE   May 10, 2023     CHIEF COMPLAINT/REASON FOR VISIT/REASON FOR CONSULT  Patient presents with:  Follow Up    REASON FOR CONSULTATION- Numbness    HISTORY OF PRESENT ILLNESS  Sharyn Trent is a 82 year old female seen today for hospital follow-up.  Patient was seen in the hospital for an episode of shaking and headache.  He is complaining of bilateral hand numbness.  Stroke code was called.  Testing was negative.  Patient was diagnosed to have a hypertensive emergency.  She reports no recurrence of her symptoms.    In the hospital she was also complaining of some numbness in her feet which was suggestive of neuropathy.  CT of the L-spine did show mild to moderate spinal stenosis.  Did not completely fit with her symptoms.  She followed up in the neurology clinic for further evaluation.  EMG was done today.  Has also had blood work done in the hospital.  Reports ongoing numbness in the bottom of her feet.  Does have some balance issues.  Denies any other new concerns.    7/6/22  She returns today for follow-up of neuropathy.  Reports that the numbness is about the same in her feet.  Continues to have balance issues.  Encouraged her to exercise.  Discussed balance exercises.  Further reports no swelling in the feet.  Does report some leg cramps in the nighttime.  Does complain of more numbness in the morning and then taking lorazepam in the morning with symptoms improving.  Discussed that there is no rational for this and its possible the numbness might be more related to her anxiety worsening in the morning time.    11/14/22  Patient returns today.  She reports that the numbness in the feet is about the same.  Continues to have balance issues.  Has had 2 falls.  Both of them involve turning.  She is using a cane when she goes out of the house.  Denies any other new symptoms.  Seen oncology and they do not think she has myeloma.  They are monitoring the serum for electrophoresis.   Does have a lot of right knee issues which I suspect are affecting her balance.  She has seen orthopedics and they do not recommend surgery at this point.  Gabapentin is somewhat helpful for the muscle cramps and numbness and tingling in the feet.  Her primary had stopped it for the balance issues though she did not notice any change.  She is currently on the medication and wants to continue it.  No hand numbness.    5/10/23  Patient returns today  1.  About 10 days ago she started having a toothache on the lower left side.  She then started having some numbness in the left part of her face.  The region from the left lower lip to her jaw is involved.  There is no pain.  She can eat fine.  There is no difficulty with swallowing.  There is no food coming out of her mouth.  Denies being anxious.  She does have a previous history of sarcoidosis that is under good control.  Is not on any immunomodulating medications.    In terms of her neuropathy she reports it spread to the top of her feet.  She is taking gabapentin which is helping.  Reports some locking of her fingers no new major cramps.  A1c has been around 5.  Has not been recently checked.    Previous history is reviewed and this is unchanged.    PAST MEDICAL/SURGICAL HISTORY  Past Medical History:   Diagnosis Date     Abnormal ECG      Anxiety      Arthritis      Chest pain      Chest pain      Chronic kidney disease     stage 3-mod.     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      Pacemaker      PSVT (paroxysmal supraventricular tachycardia) (H)      Right bundle branch block      Skin cancer      Patient Active Problem List   Diagnosis     Atypical chest pain     Malignant  essential hypertension     Dyslipidemia, goal LDL below 70     DM (diabetes mellitus), type 2 (H)     Third degree AV block (H)     Chest pain     Ectopic atrial tachycardia (H)     Acute diastolic CHF (congestive heart failure) (H)     Acute respiratory failure with hypoxia (H)     Pneumonia of left lower lobe due to infectious organism     Stroke-like symptoms     Abnormal nuclear stress test     Chronic kidney disease, stage 3a (H)     Diabetes mellitus type 2 without retinopathy (H)     Gastroesophageal reflux disease without esophagitis     Thyroid nodule     Hypertensive emergency     Acute on chronic congestive heart failure, unspecified heart failure type (H)     Benign essential hypertension     Uterine mass       FAMILY HISTORY  Family History   Problem Relation Age of Onset     Hypertension Mother      Cerebrovascular Disease Mother      Prostate Cancer Father      Cancer Father      Coronary Artery Disease Father      Dyslipidemia Father      Dyslipidemia Sister      Dyslipidemia Brother      Hypertension Brother      Prostate Cancer Brother        SOCIAL HISTORY  Social History     Tobacco Use     Smoking status: Never     Smokeless tobacco: Never   Substance Use Topics     Alcohol use: No     Drug use: No       SYSTEMS REVIEW  Twelve-system ROS was done and other than the HPI this was negative.  Pertinent positives noted in the HPI.  No new issues reported..    MEDICATIONS  acetaminophen (TYLENOL) 500 MG tablet, Take 500-1,000 mg by mouth every 6 hours as needed for mild pain  aspirin (ASA) 325 MG EC tablet, Take 325 mg by mouth every evening  atorvastatin (LIPITOR) 80 MG tablet, Take 80 mg by mouth At Bedtime  calcium carbonate (TUMS) 500 MG chewable tablet, Take 1-2 chew tab by mouth daily  carvedilol (COREG) 25 MG tablet, TAKE 1 TABLET(25 MG) BY MOUTH TWICE DAILY WITH MEALS  escitalopram (LEXAPRO) 10 MG tablet, Take 10 mg by mouth daily  ferrous sulfate (FEROSUL) 325 (65 Fe) MG tablet, Take 325 mg  by mouth daily (with breakfast)  furosemide (LASIX) 20 MG tablet, Take 2 tablets (40 mg) by mouth daily  gabapentin (NEURONTIN) 100 MG capsule, Take 1 capsule (100 mg) by mouth 3 times daily  hydrALAZINE (APRESOLINE) 100 MG tablet, Take 1 tablet (100 mg) by mouth 3 times daily  losartan (COZAAR) 50 MG tablet, TAKE 1 TABLET(50 MG) BY MOUTH EVERY EVENING  metFORMIN (GLUCOPHAGE) 500 MG tablet, [METFORMIN (GLUCOPHAGE) 500 MG TABLET] Take 1 tablet (500 mg total) by mouth 2 (two) times a day with meals. At breakfast and at dinner  Multiple Vitamins-Minerals (MULTIVITAMIN ADULTS 50+) TABS, Take 1 tablet by mouth every morning  omeprazole (PRILOSEC) 20 MG capsule, Take 20 mg by mouth daily before breakfast  prednisoLONE acetate (PRED-FORTE) 1 % ophthalmic suspension, Place 1 drop into the right eye daily  vit C/E/Zn/coppr/lutein/zeaxan (PRESERVISION AREDS-2 ORAL), Take 1 tablet by mouth 2 times daily (before meals)    No current facility-administered medications on file prior to visit.       PHYSICAL EXAMINATION  VITALS: /61   Pulse 60   Resp 16   Wt 64.4 kg (142 lb)   BMI 26.83 kg/m    GENERAL: Healthy appearing, alert, no acute distress, normal habitus.  CARDIOVASCULAR: Extremities warm and well perfused. Pulses present.   NEUROLOGICAL:  Patient is awake and oriented to self, place and time.  Attention span is normal.  Memory is grossly intact.  Language is fluent and follows commands appropriately.  Appropriate fund of knowledge. Cranial nerves 2-12 are intact. There is no pronator drift.  Motor exam shows 5/5 strength in all extremities.  Tone is symmetric bilaterally in upper and lower extremities.  Reflexes are symmetric and 1+/diminished in upper extremities and lower extremities. Sensory exam is grossly intact to light touch, pin prick and vibration.  Finger to nose and heel to shin is without dysmetria.  Romberg is negative.  Gait is slightly wide-based and the patient is able to do tandem walk and walk  on toes and heels with some difficulty.  She does have some antalgic gait due to the right knee.  Exam stable.  No facial numbness on pinprick testing.      DIAGNOSTICS  HEAD CT:  1.  No acute intracranial hemorrhage.  2.  No CT evidence acute infarct. Aspect score 10.  3.  Age-related changes described above.     Dr. Tashi Lackey was notified by Dr Kevin Cooper at  1:40 AM 09/13/2021.      HEAD CTA:   1.  No intracranial arterial large vessel occlusion.  2.  Right carotid siphon mild stenosis.   3.  Left carotid siphon severe stenosis.  4.  Otherwise, no significant stenosis/occlusion.      NECK CTA:  1.  Right vertebral artery origin moderate stenosis.  2.  Left proximal subclavian artery mild-to-moderate stenosis.  3.  Otherwise, no significant stenosis/occlusion. No dissection.  4.  Multiple thyroid nodules. Recommend nonemergent thyroid ultrasound based upon ACR white paper criteria.   5.  Lung apices demonstrate septal thickening with patchy groundglass opacities and consolidations most likely due to pulmonary edema. Please correlate clinically to exclude acute infectious inflammatory pneumonitis.     Carotid US  IMPRESSION:  1.  Mild plaque formation, velocities consistent with less than 50% stenosis in the right internal carotid artery.  2.  Mild plaque formation, velocities consistent with less than 50% stenosis in the left internal carotid artery.  3.  Flow within the vertebral arteries is antegrade.  4.  Bilateral thyroid nodules, recommend further evaluation with dedicated thyroid ultrasound.     CT L spine  1.  No evidence of lumbar spine fracture.     2.  Multilevel degenerative change throughout the lumbar disc spaces with moderate spinal canal stenosis at L3-4. Moderate spinal canal stenosis at L4-5. Variable degrees of foraminal narrowing. Please see body of report for details at each level.    EMG  CLINICAL INTERPRETATION:  This is an abnormal nerve conduction and EMG study.  The study is  suggestive of a sensorimotor polyneuropathy in both legs.  Further clinical correlation is needed.    RELEVANT LABS  Component      Latest Ref Rng & Units 9/13/2021 9/15/2021   Albumin %      51.0 - 67.0 %  61.4   Albumin Fraction      3.2 - 4.7 g/dL  3.9   Alpha 1 %      2.0 - 4.0 %  3.7   Alpha 1 Fraction      0.1 - 0.3 g/dL  0.2   Alpha 2 %      5.0 - 13.0 %  12.2   Alpha 2 Fraction      0.4 - 0.9 g/dL  0.8   Beta %      10.0 - 17.0 %  10.0   Beta Fraction      0.7 - 1.2 g/dL  0.6 (L)   Gamma Globulin %      9.0 - 20.0 %  12.7   Gamma Fraction      0.6 - 1.4 g/dL  0.8   ELP Interpretation:        Normal serum protein electrophoresis.   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 At the present time, the bands are too faint for unequivocal diagnosis.   Path ICD       I16.1   Interpreted By       Eduardo Dyer MD     Component      Latest Ref Rng & Units 5/12/2022   Vitamin B12      213 - 816 pg/mL 435     OUTSIDE RECORDS  Hospital notes reviewed.    SPEP  Component      Latest Ref Rng & Units 7/6/2022   Albumin Fraction      3.7 - 5.1 g/dL 4.2   Alpha 1 Fraction      0.2 - 0.4 g/dL 0.3   Alpha 2 Fraction      0.5 - 0.9 g/dL 0.8   Beta Fraction      0.6 - 1.0 g/dL 0.7   Gamma Fraction      0.7 - 1.6 g/dL 1.0   Monoclonal Peak      <=0.0 g/dL 0.0   ELP Interpretation:       No monoclonal protein seen by capillary electrophoresis. However, a possible very small IgM lambda monoclonal was seen in this sample by immunofixation which is a much  more sensitive method for monoclonal detection. Pathologic significance requires clinical correlation. NORIS Ho M.D., Ph.D., Pathologist ().   Immunofixation ELP       Possible very small monoclonal IgM immunoglobulin of lambda light chain type. Pathologic significance requires clinical correlation.  NORIS Ho M.D., Ph.D., Pathologist ()   Total Protein Serum for ELP      6.4 - 8.3 g/dL 6.9     Hematology        IMPRESSION/REPORT/PLAN  Abnormal SPEP  Neuropathy  Primary hypertension  Neuropathic pain/muscle cramps  New onset facial numbness  History of sarcoidosis    This is a 82 year old female with numbness in the bottom of her feet.  EMG is confirmatory for peripheral neuropathy. Exam does show decreased reflexes but otherwise negative for sensory loss.   CT of the lumbar spine does show spinal stenosis though this would not fit with her symptoms.     Etiology for neuropathy would most likely be diabetes.  She did have a positive serum electrophoresis which is being monitored through oncology.  There is no concerns for multiple myeloma though there is some family history of multiple myeloma.  Discussed prognosis of neuropathy.  Will recommend exercise and healthy lifestyle.  Exam is stable.    She does complain of some muscle cramps and pins-and-needles type pain in the legs.  Gabapentin is currently helping and she wants to stay on the same dose.  Could further increase it down the road.  Continue current dose for right now.    Patient was seen in the hospital for a spell of bilateral hand numbness shaking and elevated blood pressure.  This was thought to be hypertensive emergency.  Head CT was negative for stroke.  MRI could not be done because of pacemaker.  CT angiogram showed left carotid stenosis though otherwise noncontributory.  Carotid ultrasound was negative.  Patient remains on aspirin 81 mg.  She is also on a statin.  No strokelike symptoms we will continue to  monitor.  No change.  Blood pressure slightly elevated today.  Could be whitecoat hypertension.  Seen by the cardiologist and they are trying to keep her weight low with use of diuretics. -- Stable    She now reports left facial numbness.  MRI brain is not possible due to pacemaker.  We will check a CT of the head with contrast.  Potentially her symptoms could be related to history of sarcoidosis.  Could consider immunomodulatory medications.  We will check blood work to look for other causes.  Could also be related to her diabetes though she reports its been under good control.    Return back in 1 month.    -     gabapentin (NEURONTIN) 100 MG capsule; Take 1 capsule (100 mg) by mouth 3 times daily  -     ANCA IgG by IFA with Reflex to Titer; Future  -     Angiotensin converting enzyme; Future  -     Anti Nuclear Renita IgG by IFA with Reflex; Future  -     Vitamin B12; Future  -     Vitamin B1 whole blood; Future  -     TSH with free T4 reflex; Future  -     Cancel: Protein Immunofixation Serum; Future  -     Cancel: Protein electrophoresis; Future  -     Lyme Disease Total Abs Bld with Reflex to Confirm CLIA; Future  -     Hemoglobin A1c; Future  -     CBC with Platelets & Differential; Future  -     Comprehensive metabolic panel; Future  -     Erythrocyte sedimentation rate auto; Future  -     CT Head w/o & w Contrast; Future    Return in about 1 month (around 6/10/2023) for In-Clinic Visit (must), After testing.    Over 40 minutes were spent coordinating the care for the patient on the day of the encounter.  This includes previsit, during visit and post visit activities as documented above.  Counseling patient.  New problem.  Testing ordered.  Multiple problems reviewed/addressed.  High risk.  (Activities include but not inclusive of reviewing chart, reviewing outside records, reviewing labs and imaging study results as well as the images, patient visit time including getting history and exam,  use if  applicable, review of test results with the patient and coming up with a plan in a shared model, counseling patient and family, education and answering patient questions, EMR , EMR diagnosis entry and problem list management, medication reconciliation and prescription management if applicable, paperwork if applicable, printing documents and documentation of the visit activities.)        Reji Davila MD  Neurologist  [facility]  Tel:- [phone]    This note was dictated using voice recognition software.  Any grammatical or context distortions are unintentional and inherent to the software.

## 2023-05-11 ENCOUNTER — ALLIED HEALTH/NURSE VISIT (OUTPATIENT)
Dept: CARDIOLOGY | Facility: CLINIC | Age: 83
End: 2023-05-11
Payer: COMMERCIAL

## 2023-05-11 DIAGNOSIS — I50.30 NYHA CLASS 3 HEART FAILURE WITH PRESERVED EJECTION FRACTION (H): Primary | ICD-10-CM

## 2023-05-11 PROCEDURE — 99454 REM MNTR PHYSIOL PARAM 16-30: CPT | Performed by: INTERNAL MEDICINE

## 2023-05-12 ENCOUNTER — HOSPITAL ENCOUNTER (OUTPATIENT)
Dept: CT IMAGING | Facility: HOSPITAL | Age: 83
Discharge: HOME | End: 2023-05-12
Attending: PSYCHIATRY & NEUROLOGY
Payer: COMMERCIAL

## 2023-05-12 ENCOUNTER — LAB (OUTPATIENT)
Dept: LAB | Facility: HOSPITAL | Age: 83
End: 2023-05-12
Attending: PSYCHIATRY & NEUROLOGY
Payer: COMMERCIAL

## 2023-05-12 DIAGNOSIS — R20.0 NUMBNESS: ICD-10-CM

## 2023-05-12 DIAGNOSIS — E11.49 DIABETES MELLITUS TYPE 2 WITH NEUROLOGICAL MANIFESTATIONS (H): ICD-10-CM

## 2023-05-12 LAB
ALBUMIN SERPL BCG-MCNC: 4.2 G/DL (ref 3.5–5.2)
ALP SERPL-CCNC: 70 U/L (ref 35–104)
ALT SERPL W P-5'-P-CCNC: 24 U/L (ref 10–35)
ANION GAP SERPL CALCULATED.3IONS-SCNC: 11 MMOL/L (ref 7–15)
AST SERPL W P-5'-P-CCNC: 33 U/L (ref 10–35)
BASOPHILS # BLD AUTO: 0.1 10E3/UL (ref 0–0.2)
BASOPHILS NFR BLD AUTO: 1 %
BILIRUB SERPL-MCNC: 0.4 MG/DL
BUN SERPL-MCNC: 42.8 MG/DL (ref 8–23)
CALCIUM SERPL-MCNC: 9.5 MG/DL (ref 8.8–10.2)
CHLORIDE SERPL-SCNC: 95 MMOL/L (ref 98–107)
CREAT SERPL-MCNC: 1.58 MG/DL (ref 0.51–0.95)
DEPRECATED HCO3 PLAS-SCNC: 29 MMOL/L (ref 22–29)
EOSINOPHIL # BLD AUTO: 0.2 10E3/UL (ref 0–0.7)
EOSINOPHIL NFR BLD AUTO: 3 %
ERYTHROCYTE [DISTWIDTH] IN BLOOD BY AUTOMATED COUNT: 14.4 % (ref 10–15)
ERYTHROCYTE [SEDIMENTATION RATE] IN BLOOD BY WESTERGREN METHOD: 17 MM/HR (ref 0–30)
GFR SERPL CREATININE-BSD FRML MDRD: 33 ML/MIN/1.73M2
GLUCOSE SERPL-MCNC: 85 MG/DL (ref 70–99)
HBA1C MFR BLD: 5.3 %
HCT VFR BLD AUTO: 32.6 % (ref 35–47)
HGB BLD-MCNC: 10.9 G/DL (ref 11.7–15.7)
IMM GRANULOCYTES # BLD: 0 10E3/UL
IMM GRANULOCYTES NFR BLD: 0 %
LYMPHOCYTES # BLD AUTO: 0.9 10E3/UL (ref 0.8–5.3)
LYMPHOCYTES NFR BLD AUTO: 16 %
MCH RBC QN AUTO: 31.1 PG (ref 26.5–33)
MCHC RBC AUTO-ENTMCNC: 33.4 G/DL (ref 31.5–36.5)
MCV RBC AUTO: 93 FL (ref 78–100)
MONOCYTES # BLD AUTO: 1 10E3/UL (ref 0–1.3)
MONOCYTES NFR BLD AUTO: 18 %
NEUTROPHILS # BLD AUTO: 3.4 10E3/UL (ref 1.6–8.3)
NEUTROPHILS NFR BLD AUTO: 62 %
NRBC # BLD AUTO: 0 10E3/UL
NRBC BLD AUTO-RTO: 0 /100
PLATELET # BLD AUTO: 177 10E3/UL (ref 150–450)
POTASSIUM SERPL-SCNC: 4.2 MMOL/L (ref 3.4–5.3)
PROT SERPL-MCNC: 6.9 G/DL (ref 6.4–8.3)
RBC # BLD AUTO: 3.5 10E6/UL (ref 3.8–5.2)
SODIUM SERPL-SCNC: 135 MMOL/L (ref 136–145)
T4 FREE SERPL-MCNC: 1 NG/DL (ref 0.9–1.7)
TSH SERPL DL<=0.005 MIU/L-ACNC: 6.36 UIU/ML (ref 0.3–4.2)
VIT B12 SERPL-MCNC: 1000 PG/ML (ref 232–1245)
WBC # BLD AUTO: 5.5 10E3/UL (ref 4–11)

## 2023-05-12 PROCEDURE — 84425 ASSAY OF VITAMIN B-1: CPT

## 2023-05-12 PROCEDURE — 82607 VITAMIN B-12: CPT

## 2023-05-12 PROCEDURE — 86038 ANTINUCLEAR ANTIBODIES: CPT

## 2023-05-12 PROCEDURE — 80053 COMPREHEN METABOLIC PANEL: CPT

## 2023-05-12 PROCEDURE — 84443 ASSAY THYROID STIM HORMONE: CPT

## 2023-05-12 PROCEDURE — 83036 HEMOGLOBIN GLYCOSYLATED A1C: CPT

## 2023-05-12 PROCEDURE — 36415 COLL VENOUS BLD VENIPUNCTURE: CPT

## 2023-05-12 PROCEDURE — 86618 LYME DISEASE ANTIBODY: CPT

## 2023-05-12 PROCEDURE — 85652 RBC SED RATE AUTOMATED: CPT

## 2023-05-12 PROCEDURE — 85014 HEMATOCRIT: CPT

## 2023-05-12 PROCEDURE — 82164 ANGIOTENSIN I ENZYME TEST: CPT

## 2023-05-12 PROCEDURE — 84439 ASSAY OF FREE THYROXINE: CPT

## 2023-05-12 PROCEDURE — 70450 CT HEAD/BRAIN W/O DYE: CPT

## 2023-05-12 PROCEDURE — 86036 ANCA SCREEN EACH ANTIBODY: CPT

## 2023-05-14 LAB — ACE SERPL-CCNC: 46 U/L

## 2023-05-15 LAB — B BURGDOR IGG+IGM SER QL: 0.41

## 2023-05-16 LAB
ANA PAT SER IF-IMP: ABNORMAL
ANA SER QL IF: POSITIVE
ANA TITR SER IF: ABNORMAL {TITER}
ANCA AB PATTERN SER IF-IMP: NORMAL
C-ANCA TITR SER IF: NORMAL {TITER}

## 2023-05-17 LAB — VIT B1 PYROPHOSHATE BLD-SCNC: 116 NMOL/L

## 2023-05-17 NOTE — PROGRESS NOTES
[facility]: [facility] Routine Remote Evaluation     HRS Enrollment Date: 9/1/22  Billing Dates: 4/11/23 through 5/11/23  HF Dx:[id number]    HRS Alerts During Monitoring Period:   No alerts this period.     No adjustments made this month.  Discussed with patient/caregiver and they will continue with current plan of care.       Future Appointments   Date Time Provider Department Center   6/7/2023 12:50 PM Heidy Akins MD [facility]   6/12/2023  4:00 AM [facility] CARDIOMEM[facility]   6/16/2023 10:30 AM Reji Davila MD [facility]   6/21/2023 11:20 AM Bravo Lopez MD [facility]   7/6/2023 12:00 AM [facility] REMOTE DEVICE CHECK [place] [facility]   8/15/2023 11:10 AM Reji Davila MD [facility]     Will continue with weekly monitoring with HF provider team.     TOTAL INTERACTIVE COMMUNICATION WITH PATIENT DURING THIS BILLING PERIOD: 0 minutes.      I have reviewed Renee Pete RN's note and agree.    Heidy Akins MD., MHS    READINGS:

## 2023-05-18 ENCOUNTER — TELEPHONE (OUTPATIENT)
Dept: NEUROLOGY | Facility: CLINIC | Age: 83
End: 2023-05-18
Payer: COMMERCIAL

## 2023-05-18 NOTE — TELEPHONE ENCOUNTER
Called and spoke with patient to let her know that provider is out of the office until 5/22/23.   Will send message to provider and get back to her early next week.

## 2023-05-18 NOTE — TELEPHONE ENCOUNTER
[facility] Call Center    Phone Message    May a detailed message be left on voicemail: yes     Reason for Call: Patient called requesting her CT scans and Lab results.           Action Taken: Other: [facility]    Travel Screening: Not Applicable

## 2023-05-22 NOTE — TELEPHONE ENCOUNTER
[facility]    Phone Message    May a detailed message be left on voicemail: yes     Reason for Call: Requesting Results   Name/type of test: CT  Date of test: 5/12/2023  Was test done at a location other than [facility] (Please fill in the location if not [facility])?: No    Pt is requesting a call back to review the results from her CT on 5/12/23.     Patient is also wondering if she should proceed with having some dental work done on 5/24/23. Please call pt back at # [phone].      Action Taken: Message routed to:  Other: [facility]     Travel Screening: Not Applicable

## 2023-05-23 NOTE — TELEPHONE ENCOUNTER
[facility] Call Center    Phone Message    May a detailed message be left on voicemail: yes     Reason for Call:  Patient called to discuss if she can still get dental work done, pt needs to know by today. Please call back at [phone]- dooe    Action Taken: Other: [facility]    Travel Screening: Not Applicable

## 2023-06-07 ENCOUNTER — OFFICE VISIT (OUTPATIENT)
Dept: CARDIOLOGY | Facility: CLINIC | Age: 83
End: 2023-06-07
Payer: COMMERCIAL

## 2023-06-07 VITALS
HEART RATE: 60 BPM | SYSTOLIC BLOOD PRESSURE: 122 MMHG | RESPIRATION RATE: 16 BRPM | BODY MASS INDEX: 27.74 KG/M2 | WEIGHT: 146.8 LBS | DIASTOLIC BLOOD PRESSURE: 58 MMHG

## 2023-06-07 DIAGNOSIS — I50.30 NYHA CLASS 3 HEART FAILURE WITH PRESERVED EJECTION FRACTION (H): Primary | ICD-10-CM

## 2023-06-07 PROCEDURE — 99213 OFFICE O/P EST LOW 20 MIN: CPT | Performed by: INTERNAL MEDICINE

## 2023-06-07 NOTE — LETTER
6/7/2023    Jamie Mcconnell MD  [address]  [facility] [address]    RE: Sharyn Trent       Dear Colleague,     I had the pleasure of seeing Sharyn Trent in the [facility].    HEART CARE NOTE          Assessment/Recommendations     1. HFpEF c/b ADHF   Assessment / Plan  Near euvolemia on physical exam; denies HF symptoms of orthopnea, PND, fluid retention or edema - no changes to regimen at this time     2. CAD  Assessment / Plan  S/p coronary angiogram significant for moderate LAD dz. Plan for medical management at that time  Denies chest pain or anginal equivalents   Continue ASA, high intensity atorvastatin, carvedilol, losartan     3. 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 Uterine mass  Assessment / Plan  S/p hysterectomy; now undergoing additional work-up to r/o metastasis     4. Pulmonary sarcoidosis   Assessment / Plan  Followed by pulmonary - Stage 1; further evaluation underway    History of Present Illness/Subjective      Ms. Sharyn Trent is a 80 y.o. female with a PMHx significant for HFpEF, hypertensive emergency, dyslipidemia, type 2 diabetes, non-obstructive CAD, heart block status post pacemaker, paroxysmal supraventricular tachycardia, melanoma, chronic left leg edema, chronic kidney disease stage III who presents to [facility] for follow-up care.      Today, Mrs. Trent denies HF symptoms or acute cardiac concerns; Management plan as detailed above     ECG: Personally reviewed. Paced rhythm      Coronary angiogram:  RHC via right IJ  RA mean 4mmHg  PA mean 24mmHg  PCWP 21mmHg  LVEDP 19mmHg  Ao 153/62     PA sat 67%  Ao sat 94%     CO cindy 3.35     Angiography via left radial  LM short normal  LAD mid 50% narrowing with FFR 0.85  Circ normal  RCA normal     ECHO (personnaly Reviewed):   Left ventricle ejection fraction is normal. The estimated left ventricular ejection fraction is 55%.  Left ventricular  diastolic function is abnormal.  Normal right ventricular size and systolic function.  No hemodynamically significant valvular heart abnormalities.  When compared to the previous study dated 3/20/2018, No significant change          Physical Examination Review of Systems   /58 (BP Location: Right arm, Patient Position: Sitting, Cuff Size: Adult Regular)   Pulse 60   Resp 16   Wt 66.6 kg (146 lb 12.8 oz)   BMI 27.74 kg/m    Body mass index is 27.74 kg/m .  Wt Readings from Last 3 Encounters:   06/07/23 66.6 kg (146 lb 12.8 oz)   05/10/23 64.4 kg (142 lb)   12/13/22 64.7 kg (142 lb 9.6 oz)     General Appearance:   no distress, normal body habitus   ENT/Mouth: membranes moist, no oral lesions or bleeding gums.      EYES:  no scleral icterus, normal conjunctivae   Neck: no carotid bruits or thyromegaly   Chest/Lungs:   lungs are clear to auscultation, no rales or wheezing, equal chest wall expansion    Cardiovascular:   Regular. Normal first and second heart sounds with no murmurs, rubs, or gallops; the carotid, radial and posterior tibial pulses are intact, no JVD or LE edema bilaterally    Abdomen:  no organomegaly, masses, bruits, or tenderness; bowel sounds are present   Extremities: no cyanosis or clubbing   Skin: no xanthelasma, warm.    Neurologic: alert and oriented x3, calm    Psychiatric: alert and oriented x3, calm     A complete 10 systems ROS was reviewed  And is negative except what is listed in the HPI.          Medical History  Surgical History Family History Social History   Past Medical History:   Diagnosis Date    Abnormal ECG     Anxiety     Arthritis     Chest pain     Chest pain     Chronic kidney disease     stage 3-mod.    Congestive heart failure (H)     Diabetes (H)     Dyslipidemia, goal LDL below 70     GERD (gastroesophageal reflux disease)     HTN (hypertension)     Hyperlipidemia     Macular degeneration (senile) of retina     Melanoma of ankle (H)     L ankle    Other second  degree atrioventricular block     Created by Conversion     Pacemaker     PSVT (paroxysmal supraventricular tachycardia) (H)     Right bundle branch block     Skin cancer     Past Surgical History:   Procedure Laterality Date    ABLATION OF DYSRHYTHMIC FOCUS  04/11/2013    AV analia reentry tachycardia, partially successful    BIOPSY SKIN (LOCATION)      CARDIAC CATHETERIZATION  03/10/2016    CV CORONARY ANGIOGRAM N/A 05/26/2021    Procedure: Coronary Angiogram;  Surgeon: Yony Gillis MD;  Location: [facility] Cardiac Cath Lab;  Service: Cardiology    CV LEFT HEART CATHETERIZATION WITHOUT LEFT VENTRICULOGRAM Left 05/26/2021    Procedure: Left Heart Catheterization Without Left Ventriculogram;  Surgeon: Yony Gillis MD;  Location: [facility] Cardiac Cath Lab;  Service: Cardiology    CV RIGHT HEART CATHETERIZATION N/A 05/26/2021    Procedure: Right Heart Catheterization;  Surgeon: Yony Gillis MD;  Location: [facility] Cardiac Cath Lab;  Service: Cardiology    DILATION AND CURETTAGE N/A 4/19/2022    Procedure: DILATION AND CURRETAGE;  Surgeon: Mary Reed MD;  Location: [facility] OR    EP PACEMAKER N/A 08/30/2021    Procedure: Electrophysiology Pacemaker;  Surgeon: Ab Saavedra MD;  [place] [facility] [place]    FOOT SURGERY      L foot    HAND SURGERY      on both thumbs    HYSTERECTOMY, TOTAL, ROBOT-ASSISTED, LAP, DA HAROON XI, W/BI SAL-OOPH & SNT LYMPH NODE BX, MINI LAP Bilateral 5/31/2022    Procedure: ROBOTIC ASSISTED TOTAL LAPAROSCOPIC HYSTERECTOMY, BILATERAL SALPINGO-OOPHORECTOMY, SENTINEL LYMPH NODE INJECTION AND BIOPSY, MINI-LAPAROTOMY,;  Surgeon: Mary Reed MD;  Location: [facility] OR    HYSTEROSCOPY DIAGNOSTIC N/A 4/19/2022    Procedure: HYSTEROSCOPY, DIAGNOSTIC;  Surgeon: Mary Reed MD;  Location: [facility] OR    IMPLANT PACEMAKER  04/01/2011    Second-degree AV block    OTHER SURGICAL HISTORY      SVT Ablation     PELVIC EXAM UNDER ANESTHESIA, CERVICAL DILATION, N/A     PELVIC EXAMINATION UNDER ANESTHESIA N/A 4/19/2022    Procedure: PELVIC EXAM UNDER ANESTHESIA, CERVICAL DILATION,;  Surgeon: Mary Reed MD;  Location: [facility] SHLDR ARTHROSCOP,SURG,W/ROTAT CUFF REPR Left 03/09/2017    Procedure: LEFT SHOULDER ARTHROSCOPY DECOMPRESSION DISTAL CLAVICLE EXCISION  ROTATOR CUFF REPAIR BICEPS TENOTOMY AND EXTENSIVE DEBRIDEMENT;  Surgeon: Todd Riggs MD;  Location: [facility];  Service: Orthopedics    RELEASE CARPAL TUNNEL      both wrists    SKIN CANCER EXCISION      L ankle,Lleg and cheek    TOE SURGERY      L big toe joint replacement    TUBAL LIGATION      no family history of premature coronary artery disease Social History     Socioeconomic History    Marital status:      Spouse name: Not on file    Number of children: Not on file    Years of education: Not on file    Highest education level: Not on file   Occupational History    Not on file   Tobacco Use    Smoking status: Never    Smokeless tobacco: Never   Vaping Use    Vaping status: Not on file   Substance and Sexual Activity    Alcohol use: No    Drug use: No    Sexual activity: Yes     Partners: Male     Birth control/protection: Surgical   Other Topics Concern    Not on file   Social History Narrative    Not on file     Social Determinants of Health     Financial Resource Strain: Not on file   Food Insecurity: Not on file   Transportation Needs: Not on file   Physical Activity: Not on file   Stress: Not on file   Social Connections: Not on file   Intimate Partner Violence: Not on file   Housing Stability: Not on file           Lab Results    Chemistry/lipid CBC Cardiac Enzymes/BNP/TSH/INR   Lab Results   Component Value Date    CHOL 126 03/31/2022    HDL 46 (L) 03/31/2022    TRIG 53 03/31/2022    BUN 42.8 (H) 05/12/2023     (L) 05/12/2023    CO2 29 05/12/2023    Lab Results   Component Value Date    WBC 5.5  05/12/2023    HGB 10.9 (L) 05/12/2023    HCT 32.6 (L) 05/12/2023    MCV 93 05/12/2023     05/12/2023    Lab Results   Component Value Date    TROPONINI 0.04 04/05/2022     (H) 04/05/2022    TSH 6.36 (H) 05/12/2023    INR 1.05 09/13/2021     Lab Results   Component Value Date    TROPONINI 0.04 04/05/2022          Weight:    Wt Readings from Last 3 Encounters:   05/10/23 64.4 kg (142 lb)   12/13/22 64.7 kg (142 lb 9.6 oz)   12/05/22 64.9 kg (143 lb)       Allergies  Allergies   Allergen Reactions    Herlinda-Kit Bee Sting Shortness Of Breath    Venom-Honey Bee [Bee Venom] Shortness Of Breath    Mercurial Analogues [Mercurial Derivatives] Dermatitis     blisters    Nitrofurantoin Other (See Comments)     Burning sensation across chest and arms    Other reaction(s): arms and chest burning    Sulfa (Sulfonamide Antibiotics) [Sulfa Antibiotics] Rash    Sulfamethoxazole-Trimethoprim Rash     Other reaction(s): rash         Surgical History  Past Surgical History:   Procedure Laterality Date    ABLATION OF DYSRHYTHMIC FOCUS  04/11/2013    AV analia reentry tachycardia, partially successful    BIOPSY SKIN (LOCATION)      CARDIAC CATHETERIZATION  03/10/2016    CV CORONARY ANGIOGRAM N/A 05/26/2021    Procedure: Coronary Angiogram;  Surgeon: Yony Gillis MD;  Location: [facility] Cardiac Cath Lab;  Service: Cardiology    CV LEFT HEART CATHETERIZATION WITHOUT LEFT VENTRICULOGRAM Left 05/26/2021    Procedure: Left Heart Catheterization Without Left Ventriculogram;  Surgeon: Yony Gillis MD;  Location: [facility] Cardiac Cath Lab;  Service: Cardiology    CV RIGHT HEART CATHETERIZATION N/A 05/26/2021    Procedure: Right Heart Catheterization;  Surgeon: Yony Gillis MD;  Location: [facility] Cardiac Cath Lab;  Service: Cardiology    DILATION AND CURETTAGE N/A 4/19/2022    Procedure: DILATION AND CURRETAGE;  Surgeon: Mary Reed MD;  Location: [facility] OR    EP PACEMAKER N/A  08/30/2021    Procedure: Electrophysiology Pacemaker;  Surgeon: Ab Saavedra MD;  Location: [facility] LAB CV    FOOT SURGERY      L foot    HAND SURGERY      on both thumbs    HYSTERECTOMY, TOTAL, ROBOT-ASSISTED, LAP, DA HAROON XI, W/BI SAL-OOPH & SNT LYMPH NODE BX, MINI LAP Bilateral 5/31/2022    Procedure: ROBOTIC ASSISTED TOTAL LAPAROSCOPIC HYSTERECTOMY, BILATERAL SALPINGO-OOPHORECTOMY, SENTINEL LYMPH NODE INJECTION AND BIOPSY, MINI-LAPAROTOMY,;  Surgeon: Mary Reed MD;  Location: [facility] OR    HYSTEROSCOPY DIAGNOSTIC N/A 4/19/2022    Procedure: HYSTEROSCOPY, DIAGNOSTIC;  Surgeon: Mary Reed MD;  Location: [facility]    IMPLANT PACEMAKER  04/01/2011    Second-degree AV block    OTHER SURGICAL HISTORY      SVT Ablation    PELVIC EXAM UNDER ANESTHESIA, CERVICAL DILATION, N/A     PELVIC EXAMINATION UNDER ANESTHESIA N/A 4/19/2022    Procedure: PELVIC EXAM UNDER ANESTHESIA, CERVICAL DILATION,;  Surgeon: Mary Reed MD;  Location: [facility]    MS SHLDR ARTHROSCOP,SURG,W/ROTAT CUFF REPR Left 03/09/2017    Procedure: LEFT SHOULDER ARTHROSCOPY DECOMPRESSION DISTAL CLAVICLE EXCISION  ROTATOR CUFF REPAIR BICEPS TENOTOMY AND EXTENSIVE DEBRIDEMENT;  Surgeon: Todd Riggs MD;  Location: [facility];  Service: Orthopedics    RELEASE CARPAL TUNNEL      both wrists    SKIN CANCER EXCISION      L ankle,Lleg and cheek    TOE SURGERY      L big toe joint replacement    TUBAL LIGATION         Social History  Tobacco:   History   Smoking Status    Never   Smokeless Tobacco    Never    Alcohol:   Social History    Substance and Sexual Activity      Alcohol use: No   Illicit Drugs:   History   Drug Use No       Family History  Family History   Problem Relation Age of Onset    Hypertension Mother     Cerebrovascular Disease Mother     Prostate Cancer Father     Cancer Father     Coronary Artery Disease Father     Dyslipidemia Father     Dyslipidemia  Sister     Dyslipidemia Brother     Hypertension Brother     Prostate Cancer Brother       Heidy Aikns MD on 6/7/2023    cc: Jamie Mcconnell    Thank you for allowing me to participate in the care of your patient.      Sincerely,     Heidy Akins MD   [facility]  cc: No referring provider defined for this encounter.

## 2023-06-07 NOTE — PROGRESS NOTES
HEART CARE NOTE          Assessment/Recommendations     1. HFpEF c/b ADHF   Assessment / Plan    Near euvolemia on physical exam; denies HF symptoms of orthopnea, PND, fluid retention or edema - no changes to regimen at this time     2. CAD  Assessment / Plan    S/p coronary angiogram significant for moderate LAD dz. Plan for medical management at that time    Denies chest pain or anginal equivalents     Continue ASA, high intensity atorvastatin, carvedilol, losartan     3. 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 Uterine mass  Assessment / Plan    S/p hysterectomy; now undergoing additional work-up to r/o metastasis     4. Pulmonary sarcoidosis   Assessment / Plan    Followed by pulmonary - Stage 1; further evaluation underway    History of Present Illness/Subjective      Ms. Sharyn Trent is a 80 y.o. female with a PMHx significant for HFpEF, hypertensive emergency, dyslipidemia, type 2 diabetes, non-obstructive CAD, heart block status post pacemaker, paroxysmal supraventricular tachycardia, melanoma, chronic left leg edema, chronic kidney disease stage III who presents to [facility] for follow-up care.      Today, Mrs. Trent denies HF symptoms or acute cardiac concerns; Management plan as detailed above     ECG: Personally reviewed. Paced rhythm      Coronary angiogram:  RHC via right IJ  RA mean 4mmHg  PA mean 24mmHg  PCWP 21mmHg  LVEDP 19mmHg  Ao 153/62     PA sat 67%  Ao sat 94%     CO cindy 3.35     Angiography via left radial  LM short normal  LAD mid 50% narrowing with FFR 0.85  Circ normal  RCA normal     ECHO (personnaly Reviewed):     Left ventricle ejection fraction is normal. The estimated left ventricular ejection fraction is 55%.    Left ventricular diastolic function is abnormal.    Normal right ventricular size and systolic function.    No hemodynamically significant valvular heart abnormalities.    When compared to the previous  study dated 3/20/2018, No significant change          Physical Examination Review of Systems   /58 (BP Location: Right arm, Patient Position: Sitting, Cuff Size: Adult Regular)   Pulse 60   Resp 16   Wt 66.6 kg (146 lb 12.8 oz)   BMI 27.74 kg/m    Body mass index is 27.74 kg/m .  Wt Readings from Last 3 Encounters:   06/07/23 66.6 kg (146 lb 12.8 oz)   05/10/23 64.4 kg (142 lb)   12/13/22 64.7 kg (142 lb 9.6 oz)     General Appearance:   no distress, normal body habitus   ENT/Mouth: membranes moist, no oral lesions or bleeding gums.      EYES:  no scleral icterus, normal conjunctivae   Neck: no carotid bruits or thyromegaly   Chest/Lungs:   lungs are clear to auscultation, no rales or wheezing, equal chest wall expansion    Cardiovascular:   Regular. Normal first and second heart sounds with no murmurs, rubs, or gallops; the carotid, radial and posterior tibial pulses are intact, no JVD or LE edema bilaterally    Abdomen:  no organomegaly, masses, bruits, or tenderness; bowel sounds are present   Extremities: no cyanosis or clubbing   Skin: no xanthelasma, warm.    Neurologic: alert and oriented x3, calm    Psychiatric: alert and oriented x3, calm     A complete 10 systems ROS was reviewed  And is negative except what is listed in the HPI.          Medical History  Surgical History Family History Social History   Past Medical History:   Diagnosis Date     Abnormal ECG      Anxiety      Arthritis      Chest pain      Chest pain      Chronic kidney disease     stage 3-mod.     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      Pacemaker      PSVT (paroxysmal supraventricular tachycardia) (H)      Right bundle branch block       Skin cancer     Past Surgical History:   Procedure Laterality Date     ABLATION OF DYSRHYTHMIC FOCUS  04/11/2013    AV analia reentry tachycardia, partially successful     BIOPSY SKIN (LOCATION)       CARDIAC CATHETERIZATION  03/10/2016     CV CORONARY ANGIOGRAM N/A 05/26/2021    Procedure: Coronary Angiogram;  Surgeon: Yony Gillis MD;  Location: [facility] Cardiac Cath Lab;  Service: Cardiology     CV LEFT HEART CATHETERIZATION WITHOUT LEFT VENTRICULOGRAM Left 05/26/2021    Procedure: Left Heart Catheterization Without Left Ventriculogram;  Surgeon: Yony Gillis MD;  Location: [facility] Cardiac Cath Lab;  Service: Cardiology     CV RIGHT HEART CATHETERIZATION N/A 05/26/2021    Procedure: Right Heart Catheterization;  Surgeon: Yony Gillis MD;  Location: [facility] Cardiac Cath Lab;  Service: Cardiology     DILATION AND CURETTAGE N/A 4/19/2022    Procedure: DILATION AND CURRETAGE;  Surgeon: Mary Reed MD;  Location: [facility] OR     EP PACEMAKER N/A 08/30/2021    Procedure: Electrophysiology Pacemaker;  Surgeon: Ab Saavedra MD;  Location: [facility] CV     FOOT SURGERY      L foot     HAND SURGERY      on both thumbs     HYSTERECTOMY, TOTAL, ROBOT-ASSISTED, LAP, DA HAROON XI, W/BI SAL-OOPH & SNT LYMPH NODE BX, MINI LAP Bilateral 5/31/2022    Procedure: ROBOTIC ASSISTED TOTAL LAPAROSCOPIC HYSTERECTOMY, BILATERAL SALPINGO-OOPHORECTOMY, SENTINEL LYMPH NODE INJECTION AND BIOPSY, MINI-LAPAROTOMY,;  Surgeon: Mary Reed MD;  Location: [facility] OR     HYSTEROSCOPY DIAGNOSTIC N/A 4/19/2022    Procedure: HYSTEROSCOPY, DIAGNOSTIC;  Surgeon: Mary Reed MD;  Location: [facility] OR     IMPLANT PACEMAKER  04/01/2011    Second-degree AV block     OTHER SURGICAL HISTORY      SVT Ablation     PELVIC EXAM UNDER ANESTHESIA, CERVICAL DILATION, N/A      PELVIC EXAMINATION UNDER ANESTHESIA N/A 4/19/2022    Procedure: PELVIC EXAM UNDER  ANESTHESIA, CERVICAL DILATION,;  Surgeon: Mary Reed MD;  Location: [facility] SHLDR ARTHROSCOP,SURG,W/ROTAT CUFF REPR Left 03/09/2017    Procedure: LEFT SHOULDER ARTHROSCOPY DECOMPRESSION DISTAL CLAVICLE EXCISION  ROTATOR CUFF REPAIR BICEPS TENOTOMY AND EXTENSIVE DEBRIDEMENT;  Surgeon: Todd Riggs MD;  Location: [facility];  Service: Orthopedics     RELEASE CARPAL TUNNEL      both wrists     SKIN CANCER EXCISION      L ankle,Lleg and cheek     TOE SURGERY      L big toe joint replacement     TUBAL LIGATION      no family history of premature coronary artery disease Social History     Socioeconomic History     Marital status:      Spouse name: Not on file     Number of children: Not on file     Years of education: Not on file     Highest education level: Not on file   Occupational History     Not on file   Tobacco Use     Smoking status: Never     Smokeless tobacco: Never   Vaping Use     Vaping status: Not on file   Substance and Sexual Activity     Alcohol use: No     Drug use: No     Sexual activity: Yes     Partners: Male     Birth control/protection: Surgical   Other Topics Concern     Not on file   Social History Narrative     Not on file     Social Determinants of Health     Financial Resource Strain: Not on file   Food Insecurity: Not on file   Transportation Needs: Not on file   Physical Activity: Not on file   Stress: Not on file   Social Connections: Not on file   Intimate Partner Violence: Not on file   Housing Stability: Not on file           Lab Results    Chemistry/lipid CBC Cardiac Enzymes/BNP/TSH/INR   Lab Results   Component Value Date    CHOL 126 03/31/2022    HDL 46 (L) 03/31/2022    TRIG 53 03/31/2022    BUN 42.8 (H) 05/12/2023     (L) 05/12/2023    CO2 29 05/12/2023    Lab Results   Component Value Date    WBC 5.5 05/12/2023    HGB 10.9 (L) 05/12/2023    HCT 32.6 (L) 05/12/2023    MCV 93 05/12/2023     05/12/2023    Lab Results    Component Value Date    TROPONINI 0.04 04/05/2022     (H) 04/05/2022    TSH 6.36 (H) 05/12/2023    INR 1.05 09/13/2021     Lab Results   Component Value Date    TROPONINI 0.04 04/05/2022          Weight:    Wt Readings from Last 3 Encounters:   05/10/23 64.4 kg (142 lb)   12/13/22 64.7 kg (142 lb 9.6 oz)   12/05/22 64.9 kg (143 lb)       Allergies  Allergies   Allergen Reactions     Herlinda-Kit Bee Sting Shortness Of Breath     Venom-Honey Bee [Bee Venom] Shortness Of Breath     Mercurial Analogues [Mercurial Derivatives] Dermatitis     blisters     Nitrofurantoin Other (See Comments)     Burning sensation across chest and arms    Other reaction(s): arms and chest burning     Sulfa (Sulfonamide Antibiotics) [Sulfa Antibiotics] Rash     Sulfamethoxazole-Trimethoprim Rash     Other reaction(s): rash         Surgical History  Past Surgical History:   Procedure Laterality Date     ABLATION OF DYSRHYTHMIC FOCUS  04/11/2013    AV analia reentry tachycardia, partially successful     BIOPSY SKIN (LOCATION)       CARDIAC CATHETERIZATION  03/10/2016     CV CORONARY ANGIOGRAM N/A 05/26/2021    Procedure: Coronary Angiogram;  Surgeon: Yony Gillis MD;  Location: [facility] Cardiac Cath Lab;  Service: Cardiology     CV LEFT HEART CATHETERIZATION WITHOUT LEFT VENTRICULOGRAM Left 05/26/2021    Procedure: Left Heart Catheterization Without Left Ventriculogram;  Surgeon: Yony Gillis MD;  Location: [facility] Cardiac Cath Lab;  Service: Cardiology     CV RIGHT HEART CATHETERIZATION N/A 05/26/2021    Procedure: Right Heart Catheterization;  Surgeon: Yony Gillis MD;  Location: [facility] Cardiac Cath Lab;  Service: Cardiology     DILATION AND CURETTAGE N/A 4/19/2022    Procedure: DILATION AND CURRETAGE;  Surgeon: Mary Reed MD;  Location: [facility] OR     EP PACEMAKER N/A 08/30/2021    Procedure: Electrophysiology Pacemaker;  Surgeon: Ab Saavedra MD;  Location: [facility]  LAB CV     FOOT SURGERY      L foot     HAND SURGERY      on both thumbs     HYSTERECTOMY, TOTAL, ROBOT-ASSISTED, LAP, DA HAROON XI, W/BI SAL-OOPH & SNT LYMPH NODE BX, MINI LAP Bilateral 5/31/2022    Procedure: ROBOTIC ASSISTED TOTAL LAPAROSCOPIC HYSTERECTOMY, BILATERAL SALPINGO-OOPHORECTOMY, SENTINEL LYMPH NODE INJECTION AND BIOPSY, MINI-LAPAROTOMY,;  Surgeon: Mary Reed MD;  Location: [facility] OR     HYSTEROSCOPY DIAGNOSTIC N/A 4/19/2022    Procedure: HYSTEROSCOPY, DIAGNOSTIC;  Surgeon: Mary Reed MD;  Location: [facility] OR     IMPLANT PACEMAKER  04/01/2011    Second-degree AV block     OTHER SURGICAL HISTORY      SVT Ablation     PELVIC EXAM UNDER ANESTHESIA, CERVICAL DILATION, N/A      PELVIC EXAMINATION UNDER ANESTHESIA N/A 4/19/2022    Procedure: PELVIC EXAM UNDER ANESTHESIA, CERVICAL DILATION,;  Surgeon: Mary Reed MD;  Location: [facility] OR     DC SHLDR ARTHROSCOP,SURG,W/ROTAT CUFF REPR Left 03/09/2017    Procedure: LEFT SHOULDER ARTHROSCOPY DECOMPRESSION DISTAL CLAVICLE EXCISION  ROTATOR CUFF REPAIR BICEPS TENOTOMY AND EXTENSIVE DEBRIDEMENT;  Surgeon: Todd Riggs MD;  Location: [facility] OR;  Service: Orthopedics     RELEASE CARPAL TUNNEL      both wrists     SKIN CANCER EXCISION      L ankle,Lleg and cheek     TOE SURGERY      L big toe joint replacement     TUBAL LIGATION         Social History  Tobacco:   History   Smoking Status     Never   Smokeless Tobacco     Never    Alcohol:   Social History    Substance and Sexual Activity      Alcohol use: No   Illicit Drugs:   History   Drug Use No       Family History  Family History   Problem Relation Age of Onset     Hypertension Mother      Cerebrovascular Disease Mother      Prostate Cancer Father      Cancer Father      Coronary Artery Disease Father      Dyslipidemia Father      Dyslipidemia Sister      Dyslipidemia Brother      Hypertension Brother      Prostate Cancer Brother            Heidy Akins MD on 6/7/2023      cc: Jamie Mcconnell

## 2023-06-12 ENCOUNTER — ALLIED HEALTH/NURSE VISIT (OUTPATIENT)
Dept: CARDIOLOGY | Facility: CLINIC | Age: 83
End: 2023-06-12
Payer: COMMERCIAL

## 2023-06-12 DIAGNOSIS — I50.30 HEART FAILURE WITH PRESERVED EJECTION FRACTION, NYHA CLASS I (H): Primary | ICD-10-CM

## 2023-06-12 PROCEDURE — 99454 REM MNTR PHYSIOL PARAM 16-30: CPT | Performed by: INTERNAL MEDICINE

## 2023-06-15 NOTE — PROGRESS NOTES
[facility]: HRS Routine Remote Evaluation     HRS Enrollment Date:   9-1-22                                    Billing Dates: 5/12/23  through 6/12/23  HF Dx:[id number]      HRS Alerts During Monitoring Period: none     These adjustments have been discussed with the patient/caregiver and they express verbal understanding.           Future Appointments   Date Time Provider Department Center   6/16/2023 10:30 AM Reji Davila MD [facility]   6/21/2023 11:20 [facility]   7/6/2023 12:00 AM [facility] REMOTE DEVICE CHECK FROM HOME HRCVN [facility]   7/13/2023  4:00 AM [facility]   8/14/2023  4:00 AM [facility]   8/15/2023 11:10 [facility]     Will continue with weekly monitoring with HF provider team.     TOTAL INTERACTIVE COMMUNICATION WITH PATIENT DURING THIS BILLING PERIOD: 00 minutes.     Yomaira CONN RN BSN, CHFN, PCCN-K    I have reviewed Yomaira Sanchez RN BSN, CHFN's note and agree.    Heidy Akins MD., MHS      READINGS:

## 2023-06-16 ENCOUNTER — OFFICE VISIT (OUTPATIENT)
Dept: NEUROLOGY | Facility: CLINIC | Age: 83
End: 2023-06-16
Payer: COMMERCIAL

## 2023-06-16 VITALS
BODY MASS INDEX: 27.89 KG/M2 | SYSTOLIC BLOOD PRESSURE: 111 MMHG | WEIGHT: 147.7 LBS | DIASTOLIC BLOOD PRESSURE: 48 MMHG | HEART RATE: 62 BPM | HEIGHT: 61 IN

## 2023-06-16 DIAGNOSIS — D86.9 SARCOIDOSIS: ICD-10-CM

## 2023-06-16 DIAGNOSIS — G62.9 NEUROPATHY: ICD-10-CM

## 2023-06-16 DIAGNOSIS — R76.8 FALSE POSITIVE ANA: ICD-10-CM

## 2023-06-16 DIAGNOSIS — R20.0 NUMBNESS: Primary | ICD-10-CM

## 2023-06-16 DIAGNOSIS — E11.49 DIABETES MELLITUS TYPE 2 WITH NEUROLOGICAL MANIFESTATIONS (H): ICD-10-CM

## 2023-06-16 PROCEDURE — 99214 OFFICE O/P EST MOD 30 MIN: CPT | Performed by: PSYCHIATRY & NEUROLOGY

## 2023-06-16 RX ORDER — FLUOROMETHOLONE 0.1 %
1 SUSPENSION, DROPS(FINAL DOSAGE FORM)(ML) OPHTHALMIC (EYE) DAILY
COMMUNITY

## 2023-06-16 NOTE — PROGRESS NOTES
NEUROLOGY OUTPATIENT PROGRESS NOTE   Jun 16, 2023     CHIEF COMPLAINT/REASON FOR VISIT/REASON FOR CONSULT  No chief complaint on file.    REASON FOR CONSULTATION- Numbness    HISTORY OF PRESENT ILLNESS  Sharyn Trent is a 82 year old female seen today for hospital follow-up.  Patient was seen in the hospital for an episode of shaking and headache.  He is complaining of bilateral hand numbness.  Stroke code was called.  Testing was negative.  Patient was diagnosed to have a hypertensive emergency.  She reports no recurrence of her symptoms.    In the hospital she was also complaining of some numbness in her feet which was suggestive of neuropathy.  CT of the L-spine did show mild to moderate spinal stenosis.  Did not completely fit with her symptoms.  She followed up in the neurology clinic for further evaluation.  EMG was done today.  Has also had blood work done in the hospital.  Reports ongoing numbness in the bottom of her feet.  Does have some balance issues.  Denies any other new concerns.    7/6/22  She returns today for follow-up of neuropathy.  Reports that the numbness is about the same in her feet.  Continues to have balance issues.  Encouraged her to exercise.  Discussed balance exercises.  Further reports no swelling in the feet.  Does report some leg cramps in the nighttime.  Does complain of more numbness in the morning and then taking lorazepam in the morning with symptoms improving.  Discussed that there is no rational for this and its possible the numbness might be more related to her anxiety worsening in the morning time.    11/14/22  Patient returns today.  She reports that the numbness in the feet is about the same.  Continues to have balance issues.  Has had 2 falls.  Both of them involve turning.  She is using a cane when she goes out of the house.  Denies any other new symptoms.  Seen oncology and they do not think she has myeloma.  They are monitoring the serum for electrophoresis.  Does have  a lot of right knee issues which I suspect are affecting her balance.  She has seen orthopedics and they do not recommend surgery at this point.  Gabapentin is somewhat helpful for the muscle cramps and numbness and tingling in the feet.  Her primary had stopped it for the balance issues though she did not notice any change.  She is currently on the medication and wants to continue it.  No hand numbness.    5/10/23  Patient returns today  1.  About 10 days ago she started having a toothache on the lower left side.  She then started having some numbness in the left part of her face.  The region from the left lower lip to her jaw is involved.  There is no pain.  She can eat fine.  There is no difficulty with swallowing.  There is no food coming out of her mouth.  Denies being anxious.  She does have a previous history of sarcoidosis that is under good control.  Is not on any immunomodulating medications.    In terms of her neuropathy she reports it spread to the top of her feet.  She is taking gabapentin which is helping.  Reports some locking of her fingers no new major cramps.  A1c has been around 5.  Has not been recently checked.    6/16/23  Returns today  1.  Continues to have numbness in the left lower lip.  No change.  No significant improvement either.  No other new neurological symptoms  2.  Continues to have worsening numbness slightly above the ankle.  She recently tested normal on the A1c though has not been watching what she has been eating.  No proximal leg symptoms no major back pain.  3.  Neuropathic pain is under good control.  Remains on the gabapentin.  Feels that it does not help with the numbness.    No other new issues.    Previous history is reviewed and this is unchanged.    PAST MEDICAL/SURGICAL HISTORY  Past Medical History:   Diagnosis Date     Abnormal ECG      Anxiety      Arthritis      Chest pain      Chest pain      Chronic kidney disease     stage 3-mod.     Congestive heart failure  (H)      Diabetes (H)      Dyslipidemia, goal LDL below 70      GERD (gastroesophageal reflux disease)      HTN (hypertension)      Hyperlipidemia      Macular degeneration (senile) of retina      Melanoma of ankle (H)     L ankle     Other second degree atrioventricular block     Created by Conversion      Pacemaker      PSVT (paroxysmal supraventricular tachycardia) (H)      Right bundle branch block      Skin cancer      Patient Active Problem List   Diagnosis     Atypical chest pain     Malignant essential hypertension     Dyslipidemia, goal LDL below 70     DM (diabetes mellitus), type 2 (H)     Third degree AV block (H)     Chest pain     Ectopic atrial tachycardia (H)     Acute diastolic CHF (congestive heart failure) (H)     Acute respiratory failure with hypoxia (H)     Pneumonia of left lower lobe due to infectious organism     Stroke-like symptoms     Abnormal nuclear stress test     Chronic kidney disease, stage 3a (H)     Diabetes mellitus type 2 without retinopathy (H)     Gastroesophageal reflux disease without esophagitis     Thyroid nodule     Hypertensive emergency     Acute on chronic congestive heart failure, unspecified heart failure type (H)     Benign essential hypertension     Uterine mass       FAMILY HISTORY  Family History   Problem Relation Age of Onset     Hypertension Mother      Cerebrovascular Disease Mother      Prostate Cancer Father      Cancer Father      Coronary Artery Disease Father      Dyslipidemia Father      Dyslipidemia Sister      Dyslipidemia Brother      Hypertension Brother      Prostate Cancer Brother        SOCIAL HISTORY  Social History     Tobacco Use     Smoking status: Never     Smokeless tobacco: Never   Substance Use Topics     Alcohol use: No     Drug use: No       SYSTEMS REVIEW  Twelve-system ROS was done and other than the HPI this was negative.  Pertinent positives noted in the HPI.  No new concerns/symptoms.    MEDICATIONS  acetaminophen (TYLENOL) 500 MG  tablet, Take 500-1,000 mg by mouth every 6 hours as needed for mild pain  aspirin (ASA) 325 MG EC tablet, Take 325 mg by mouth every evening  atorvastatin (LIPITOR) 80 MG tablet, Take 80 mg by mouth At Bedtime  calcium carbonate (TUMS) 500 MG chewable tablet, Take 1-2 chew tab by mouth daily  carvedilol (COREG) 25 MG tablet, TAKE 1 TABLET(25 MG) BY MOUTH TWICE DAILY WITH MEALS  escitalopram (LEXAPRO) 10 MG tablet, Take 10 mg by mouth daily  ferrous sulfate (FEROSUL) 325 (65 Fe) MG tablet, Take 325 mg by mouth daily (with breakfast)  furosemide (LASIX) 20 MG tablet, Take 2 tablets (40 mg) by mouth daily  gabapentin (NEURONTIN) 100 MG capsule, Take 1 capsule (100 mg) by mouth 3 times daily  hydrALAZINE (APRESOLINE) 100 MG tablet, Take 1 tablet (100 mg) by mouth 3 times daily  losartan (COZAAR) 50 MG tablet, TAKE 1 TABLET(50 MG) BY MOUTH EVERY EVENING  metFORMIN (GLUCOPHAGE) 500 MG tablet, [METFORMIN (GLUCOPHAGE) 500 MG TABLET] Take 1 tablet (500 mg total) by mouth 2 (two) times a day with meals. At breakfast and at dinner  Multiple Vitamins-Minerals (MULTIVITAMIN ADULTS 50+) TABS, Take 1 tablet by mouth every morning  omeprazole (PRILOSEC) 20 MG capsule, Take 20 mg by mouth daily before breakfast  prednisoLONE acetate (PRED-FORTE) 1 % ophthalmic suspension, Place 1 drop into the right eye daily  vit C/E/Zn/coppr/lutein/zeaxan (PRESERVISION AREDS-2 ORAL), Take 1 tablet by mouth 2 times daily (before meals)    No current facility-administered medications on file prior to visit.       PHYSICAL EXAMINATION  VITALS: There were no vitals taken for this visit.  GENERAL: Healthy appearing, alert, no acute distress, normal habitus.  CARDIOVASCULAR: Extremities warm and well perfused. Pulses present.   NEUROLOGICAL:  Patient is awake and oriented to self, place and time.  Attention span is normal.  Memory is grossly intact.  Language is fluent and follows commands appropriately.  Appropriate fund of knowledge. Cranial nerves  2-12 are intact. There is no pronator drift.  Motor exam shows 5/5 strength in all extremities.  Tone is symmetric bilaterally in upper and lower extremities.  Reflexes are symmetric and 1+/diminished in upper extremities and lower extremities. Sensory exam is grossly intact to light touch, pin prick and vibration.  Finger to nose and heel to shin is without dysmetria.  Romberg is negative.  Gait is slightly wide-based and the patient is able to do tandem walk and walk on toes and heels with some difficulty.  She does have some antalgic gait due to the right knee.  Slightly more gait difficulty.  Exam stable compared to before.  No change.      DIAGNOSTICS  HEAD CT:  1.  No acute intracranial hemorrhage.  2.  No CT evidence acute infarct. Aspect score 10.  3.  Age-related changes described above.     Dr. Tashi Lackey was notified by Dr Kevin Cooper at  1:40 AM 09/13/2021.      HEAD CTA:   1.  No intracranial arterial large vessel occlusion.  2.  Right carotid siphon mild stenosis.   3.  Left carotid siphon severe stenosis.  4.  Otherwise, no significant stenosis/occlusion.      NECK CTA:  1.  Right vertebral artery origin moderate stenosis.  2.  Left proximal subclavian artery mild-to-moderate stenosis.  3.  Otherwise, no significant stenosis/occlusion. No dissection.  4.  Multiple thyroid nodules. Recommend nonemergent thyroid ultrasound based upon ACR white paper criteria.   5.  Lung apices demonstrate septal thickening with patchy groundglass opacities and consolidations most likely due to pulmonary edema. Please correlate clinically to exclude acute infectious inflammatory pneumonitis.     Carotid US  IMPRESSION:  1.  Mild plaque formation, velocities consistent with less than 50% stenosis in the right internal carotid artery.  2.  Mild plaque formation, velocities consistent with less than 50% stenosis in the left internal carotid artery.  3.  Flow within the vertebral arteries is antegrade.  4.  Bilateral  thyroid nodules, recommend further evaluation with dedicated thyroid ultrasound.     CT L spine  1.  No evidence of lumbar spine fracture.     2.  Multilevel degenerative change throughout the lumbar disc spaces with moderate spinal canal stenosis at L3-4. Moderate spinal canal stenosis at L4-5. Variable degrees of foraminal narrowing. Please see body of report for details at each level.    EMG  CLINICAL INTERPRETATION:  This is an abnormal nerve conduction and EMG study.  The study is suggestive of a sensorimotor polyneuropathy in both legs.  Further clinical correlation is needed.    RELEVANT LABS  Component      Latest Ref Rng & Units 9/13/2021 9/15/2021   Albumin %      51.0 - 67.0 %  61.4   Albumin Fraction      3.2 - 4.7 g/dL  3.9   Alpha 1 %      2.0 - 4.0 %  3.7   Alpha 1 Fraction      0.1 - 0.3 g/dL  0.2   Alpha 2 %      5.0 - 13.0 %  12.2   Alpha 2 Fraction      0.4 - 0.9 g/dL  0.8   Beta %      10.0 - 17.0 %  10.0   Beta Fraction      0.7 - 1.2 g/dL  0.6 (L)   Gamma Globulin %      9.0 - 20.0 %  12.7   Gamma Fraction      0.6 - 1.4 g/dL  0.8   ELP Interpretation:        Normal serum protein electrophoresis.   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 At the present time, the bands are too faint for unequivocal diagnosis.   Path ICD       I16.1   Interpreted By       Eduardo Dyer MD     Component      Latest Ref Rng & Units 5/12/2022   Vitamin B12      213 - 816 pg/mL 435     OUTSIDE RECORDS  Hospital notes  reviewed.    SPEP  Component      Latest Ref Rng & Units 7/6/2022   Albumin Fraction      3.7 - 5.1 g/dL 4.2   Alpha 1 Fraction      0.2 - 0.4 g/dL 0.3   Alpha 2 Fraction      0.5 - 0.9 g/dL 0.8   Beta Fraction      0.6 - 1.0 g/dL 0.7   Gamma Fraction      0.7 - 1.6 g/dL 1.0   Monoclonal Peak      <=0.0 g/dL 0.0   ELP Interpretation:       No monoclonal protein seen by capillary electrophoresis. However, a possible very small IgM lambda monoclonal was seen in this sample by immunofixation which is a much more sensitive method for monoclonal detection. Pathologic significance requires clinical correlation. NORIS Ho M.D., Ph.D., Pathologist ().   Immunofixation ELP       Possible very small monoclonal IgM immunoglobulin of lambda light chain type. Pathologic significance requires clinical correlation.  NORIS Ho M.D., Ph.D., Pathologist ()   Total Protein Serum for ELP      6.4 - 8.3 g/dL 6.9     Hematology      CT head  IMPRESSION:  1.  No acute intracranial process or significant change since 04/05/2022.  2.  No significant TMJ arthropathy.    LABS  Component      Latest Ref Rng 5/12/2023  4:29 PM   WBC      4.0 - 11.0 10e3/uL 5.5    RBC Count      3.80 - 5.20 10e6/uL 3.50 (L)    Hemoglobin      11.7 - 15.7 g/dL 10.9 (L)    Hematocrit      35.0 - 47.0 % 32.6 (L)    MCV      78 - 100 fL 93    MCH      26.5 - 33.0 pg 31.1    MCHC      31.5 - 36.5 g/dL 33.4    RDW      10.0 - 15.0 % 14.4    Platelet Count      150 - 450 10e3/uL 177    % Neutrophils      % 62    % Lymphocytes      % 16    % Monocytes      % 18    % Eosinophils      % 3    % Basophils      % 1    % Immature Granulocytes      % 0    NRBCs per 100 WBC      <1 /100 0    Absolute Neutrophils      1.6 - 8.3 10e3/uL 3.4    Absolute Lymphocytes      0.8 - 5.3 10e3/uL 0.9    Absolute Monocytes      0.0 - 1.3 10e3/uL 1.0    Absolute Eosinophils      0.0 - 0.7 10e3/uL 0.2    Absolute Basophils      0.0 - 0.2 10e3/uL 0.1     Absolute Immature Granulocytes      <=0.4 10e3/uL 0.0    Absolute NRBCs      10e3/uL 0.0    Sodium      136 - 145 mmol/L 135 (L)    Potassium      3.4 - 5.3 mmol/L 4.2    Chloride      98 - 107 mmol/L 95 (L)    Carbon Dioxide (CO2)      22 - 29 mmol/L 29    Anion Gap      7 - 15 mmol/L 11    Urea Nitrogen      8.0 - 23.0 mg/dL 42.8 (H)    Creatinine      0.51 - 0.95 mg/dL 1.58 (H)    Calcium      8.8 - 10.2 mg/dL 9.5    Glucose      70 - 99 mg/dL 85    Alkaline Phosphatase      35 - 104 U/L 70    AST      10 - 35 U/L 33    ALT      10 - 35 U/L 24    Protein Total      6.4 - 8.3 g/dL 6.9    Albumin      3.5 - 5.2 g/dL 4.2    Bilirubin Total      <=1.2 mg/dL 0.4    GFR Estimate      >60 mL/min/1.73m2 33 (L)    LADARIUS interpretation      Negative  Positive !    LADARIUS pattern 1 Homogeneous    LADARIUS titer 1 1:320    Neutrophil Cytoplasmic Antibody      <1:10  <1:10    Neutrophil Cytoplasmic Antibody Pattern The ANCA IFA is <1:10. No further testing will be performed.    Angiotensin Converting Enzyme      16 - 85 U/L 46    Vitamin B12      232 - 1,245 pg/mL 1,000    Vitamin B1 Whole Blood Level      70 - 180 nmol/L 116    TSH      0.30 - 4.20 uIU/mL 6.36 (H)    Lyme Disease Antibodies Serum      <0.90  0.41    Hemoglobin A1C      <5.7 % 5.3    Sed Rate      0 - 30 mm/hr 17    T4 Free      0.90 - 1.70 ng/dL 1.00       Legend:  (L) Low  (H) High  ! Abnormal    IMPRESSION/REPORT/PLAN  Abnormal SPEP  Neuropathy-worsening  Primary hypertension  Neuropathic pain/muscle cramps  New onset facial numbness-stable  History of sarcoidosis  Positive LADARIUS-suspect false positive    This is a 82 year old female with numbness in the bottom of her feet.  EMG is confirmatory for peripheral neuropathy. Exam does show decreased reflexes but otherwise negative for sensory loss.   CT of the lumbar spine does show spinal stenosis though this would not fit with her symptoms.     Etiology for neuropathy would most likely be diabetes.  She did have a  positive serum electrophoresis which is being monitored through oncology.  There is no concerns for multiple myeloma though there is some family history of multiple myeloma.  Discussed prognosis of neuropathy.  Will recommend exercise and healthy lifestyle.     She does report some worsening of numbness though exam is stable.  Would recommend getting the diabetes under better control and continuing exercise    She does complain of some muscle cramps and pins-and-needles type pain in the legs.  Gabapentin is currently helping and she wants to stay on the same dose.  Could further increase it down the road.  No worsening neuropathic pain.  We will continue current dose of gabapentin.    Previously-patient was seen in the hospital for a spell of bilateral hand numbness shaking and elevated blood pressure.  This was thought to be hypertensive emergency.  Head CT was negative for stroke.  MRI could not be done because of pacemaker.  CT angiogram showed left carotid stenosis though otherwise noncontributory.  Carotid ultrasound was negative.  Patient remains on aspirin 81 mg.  She is also on a statin.  No strokelike symptoms we will continue to monitor.  No change.  Blood pressure slightly elevated today.  Could be whitecoat hypertension.  Seen by the cardiologist and they are trying to keep her weight low with use of diuretics. -- Stable    She now reports left facial numbness.  MRI brain is not possible due to pacemaker.  CT of the head with contrast was negative for any new structural lesions.  Blood work overall has been noncontributory though she did test positive for LADARIUS.  Most likely this is a false positive.  She does have a history of sarcoidosis which could be a cause for her numbness or could be related to diabetes.  Given stable symptoms with no imaging findings we will hold off on trying aggressive treatment of sarcoidosis at this point.  Continue working with primary care for vascular risk factor management.       Return back on as-needed basis or in 1 year.    -     gabapentin (NEURONTIN) 100 MG capsule; Take 1 capsule (100 mg) by mouth 3 times daily    No follow-ups on file.    Over 40 minutes were spent coordinating the care for the patient on the day of the encounter.  This includes previsit, during visit and post visit activities as documented above.  Counseling patient.  New problem.  Testing ordered.  Multiple problems reviewed/addressed.  High risk.  (Activities include but not inclusive of reviewing chart, reviewing outside records, reviewing labs and imaging study results as well as the images, patient visit time including getting history and exam,  use if applicable, review of test results with the patient and coming up with a plan in a shared model, counseling patient and family, education and answering patient questions, EMR , EMR diagnosis entry and problem list management, medication reconciliation and prescription management if applicable, paperwork if applicable, printing documents and documentation of the visit activities.)        Reji Davila MD  Neurologist  [facility]  Tel:- [phone]    This note was dictated using voice recognition software.  Any grammatical or context distortions are unintentional and inherent to the software.

## 2023-06-16 NOTE — LETTER
6/16/2023         RE: Sharyn Trent  [address] [place] [address]        Dear Colleague,    Thank you for referring your patient, Sharyn Trent, to the [facility] [place]. Please see a copy of my visit note below.    NEUROLOGY OUTPATIENT PROGRESS NOTE   Jun 16, 2023     CHIEF COMPLAINT/REASON FOR VISIT/REASON FOR CONSULT  No chief complaint on file.    REASON FOR CONSULTATION- Numbness    HISTORY OF PRESENT ILLNESS  Sharyn Trent is a 82 year old female seen today for hospital follow-up.  Patient was seen in the hospital for an episode of shaking and headache.  He is complaining of bilateral hand numbness.  Stroke code was called.  Testing was negative.  Patient was diagnosed to have a hypertensive emergency.  She reports no recurrence of her symptoms.    In the hospital she was also complaining of some numbness in her feet which was suggestive of neuropathy.  CT of the L-spine did show mild to moderate spinal stenosis.  Did not completely fit with her symptoms.  She followed up in the neurology clinic for further evaluation.  EMG was done today.  Has also had blood work done in the hospital.  Reports ongoing numbness in the bottom of her feet.  Does have some balance issues.  Denies any other new concerns.    7/6/22  She returns today for follow-up of neuropathy.  Reports that the numbness is about the same in her feet.  Continues to have balance issues.  Encouraged her to exercise.  Discussed balance exercises.  Further reports no swelling in the feet.  Does report some leg cramps in the nighttime.  Does complain of more numbness in the morning and then taking lorazepam in the morning with symptoms improving.  Discussed that there is no rational for this and its possible the numbness might be more related to her anxiety worsening in the morning time.    11/14/22  Patient returns today.  She reports that the numbness in the feet is about the same.  Continues to have balance  issues.  Has had 2 falls.  Both of them involve turning.  She is using a cane when she goes out of the house.  Denies any other new symptoms.  Seen oncology and they do not think she has myeloma.  They are monitoring the serum for electrophoresis.  Does have a lot of right knee issues which I suspect are affecting her balance.  She has seen orthopedics and they do not recommend surgery at this point.  Gabapentin is somewhat helpful for the muscle cramps and numbness and tingling in the feet.  Her primary had stopped it for the balance issues though she did not notice any change.  She is currently on the medication and wants to continue it.  No hand numbness.    5/10/23  Patient returns today  1.  About 10 days ago she started having a toothache on the lower left side.  She then started having some numbness in the left part of her face.  The region from the left lower lip to her jaw is involved.  There is no pain.  She can eat fine.  There is no difficulty with swallowing.  There is no food coming out of her mouth.  Denies being anxious.  She does have a previous history of sarcoidosis that is under good control.  Is not on any immunomodulating medications.    In terms of her neuropathy she reports it spread to the top of her feet.  She is taking gabapentin which is helping.  Reports some locking of her fingers no new major cramps.  A1c has been around 5.  Has not been recently checked.    6/16/23  Returns today  1.  Continues to have numbness in the left lower lip.  No change.  No significant improvement either.  No other new neurological symptoms  2.  Continues to have worsening numbness slightly above the ankle.  She recently tested normal on the A1c though has not been watching what she has been eating.  No proximal leg symptoms no major back pain.  3.  Neuropathic pain is under good control.  Remains on the gabapentin.  Feels that it does not help with the numbness.    No other new issues.    Previous history is  reviewed and this is unchanged.    PAST MEDICAL/SURGICAL HISTORY  Past Medical History:   Diagnosis Date     Abnormal ECG      Anxiety      Arthritis      Chest pain      Chest pain      Chronic kidney disease     stage 3-mod.     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      Pacemaker      PSVT (paroxysmal supraventricular tachycardia) (H)      Right bundle branch block      Skin cancer      Patient Active Problem List   Diagnosis     Atypical chest pain     Malignant essential hypertension     Dyslipidemia, goal LDL below 70     DM (diabetes mellitus), type 2 (H)     Third degree AV block (H)     Chest pain     Ectopic atrial tachycardia (H)     Acute diastolic CHF (congestive heart failure) (H)     Acute respiratory failure with hypoxia (H)     Pneumonia of left lower lobe due to infectious organism     Stroke-like symptoms     Abnormal nuclear stress test     Chronic kidney disease, stage 3a (H)     Diabetes mellitus type 2 without retinopathy (H)     Gastroesophageal reflux disease without esophagitis     Thyroid nodule     Hypertensive emergency     Acute on chronic congestive heart failure, unspecified heart failure type (H)     Benign essential hypertension     Uterine mass       FAMILY HISTORY  Family History   Problem Relation Age of Onset     Hypertension Mother      Cerebrovascular Disease Mother      Prostate Cancer Father      Cancer Father      Coronary Artery Disease Father      Dyslipidemia Father      Dyslipidemia Sister      Dyslipidemia Brother      Hypertension Brother      Prostate Cancer Brother        SOCIAL HISTORY  Social History     Tobacco Use     Smoking status: Never     Smokeless tobacco: Never    Substance Use Topics     Alcohol use: No     Drug use: No       SYSTEMS REVIEW  Twelve-system ROS was done and other than the HPI this was negative.  Pertinent positives noted in the HPI.  No new concerns/symptoms.    MEDICATIONS  acetaminophen (TYLENOL) 500 MG tablet, Take 500-1,000 mg by mouth every 6 hours as needed for mild pain  aspirin (ASA) 325 MG EC tablet, Take 325 mg by mouth every evening  atorvastatin (LIPITOR) 80 MG tablet, Take 80 mg by mouth At Bedtime  calcium carbonate (TUMS) 500 MG chewable tablet, Take 1-2 chew tab by mouth daily  carvedilol (COREG) 25 MG tablet, TAKE 1 TABLET(25 MG) BY MOUTH TWICE DAILY WITH MEALS  escitalopram (LEXAPRO) 10 MG tablet, Take 10 mg by mouth daily  ferrous sulfate (FEROSUL) 325 (65 Fe) MG tablet, Take 325 mg by mouth daily (with breakfast)  furosemide (LASIX) 20 MG tablet, Take 2 tablets (40 mg) by mouth daily  gabapentin (NEURONTIN) 100 MG capsule, Take 1 capsule (100 mg) by mouth 3 times daily  hydrALAZINE (APRESOLINE) 100 MG tablet, Take 1 tablet (100 mg) by mouth 3 times daily  losartan (COZAAR) 50 MG tablet, TAKE 1 TABLET(50 MG) BY MOUTH EVERY EVENING  metFORMIN (GLUCOPHAGE) 500 MG tablet, [METFORMIN (GLUCOPHAGE) 500 MG TABLET] Take 1 tablet (500 mg total) by mouth 2 (two) times a day with meals. At breakfast and at dinner  Multiple Vitamins-Minerals (MULTIVITAMIN ADULTS 50+) TABS, Take 1 tablet by mouth every morning  omeprazole (PRILOSEC) 20 MG capsule, Take 20 mg by mouth daily before breakfast  prednisoLONE acetate (PRED-FORTE) 1 % ophthalmic suspension, Place 1 drop into the right eye daily  vit C/E/Zn/coppr/lutein/zeaxan (PRESERVISION AREDS-2 ORAL), Take 1 tablet by mouth 2 times daily (before meals)    No current facility-administered medications on file prior to visit.       PHYSICAL EXAMINATION  VITALS: There were no vitals taken for this visit.  GENERAL: Healthy appearing, alert, no acute distress, normal habitus.  CARDIOVASCULAR: Extremities warm  and well perfused. Pulses present.   NEUROLOGICAL:  Patient is awake and oriented to self, place and time.  Attention span is normal.  Memory is grossly intact.  Language is fluent and follows commands appropriately.  Appropriate fund of knowledge. Cranial nerves 2-12 are intact. There is no pronator drift.  Motor exam shows 5/5 strength in all extremities.  Tone is symmetric bilaterally in upper and lower extremities.  Reflexes are symmetric and 1+/diminished in upper extremities and lower extremities. Sensory exam is grossly intact to light touch, pin prick and vibration.  Finger to nose and heel to shin is without dysmetria.  Romberg is negative.  Gait is slightly wide-based and the patient is able to do tandem walk and walk on toes and heels with some difficulty.  She does have some antalgic gait due to the right knee.  Slightly more gait difficulty.  Exam stable compared to before.  No change.      DIAGNOSTICS  HEAD CT:  1.  No acute intracranial hemorrhage.  2.  No CT evidence acute infarct. Aspect score 10.  3.  Age-related changes described above.     Dr. Tashi Lackey was notified by Dr Kevin Cooper at  1:40 AM 09/13/2021.      HEAD CTA:   1.  No intracranial arterial large vessel occlusion.  2.  Right carotid siphon mild stenosis.   3.  Left carotid siphon severe stenosis.  4.  Otherwise, no significant stenosis/occlusion.      NECK CTA:  1.  Right vertebral artery origin moderate stenosis.  2.  Left proximal subclavian artery mild-to-moderate stenosis.  3.  Otherwise, no significant stenosis/occlusion. No dissection.  4.  Multiple thyroid nodules. Recommend nonemergent thyroid ultrasound based upon ACR white paper criteria.   5.  Lung apices demonstrate septal thickening with patchy groundglass opacities and consolidations most likely due to pulmonary edema. Please correlate clinically to exclude acute infectious inflammatory pneumonitis.     Carotid US  IMPRESSION:  1.  Mild plaque formation, velocities  consistent with less than 50% stenosis in the right internal carotid artery.  2.  Mild plaque formation, velocities consistent with less than 50% stenosis in the left internal carotid artery.  3.  Flow within the vertebral arteries is antegrade.  4.  Bilateral thyroid nodules, recommend further evaluation with dedicated thyroid ultrasound.     CT L spine  1.  No evidence of lumbar spine fracture.     2.  Multilevel degenerative change throughout the lumbar disc spaces with moderate spinal canal stenosis at L3-4. Moderate spinal canal stenosis at L4-5. Variable degrees of foraminal narrowing. Please see body of report for details at each level.    EMG  CLINICAL INTERPRETATION:  This is an abnormal nerve conduction and EMG study.  The study is suggestive of a sensorimotor polyneuropathy in both legs.  Further clinical correlation is needed.    RELEVANT LABS  Component      Latest Ref Rng & Units 9/13/2021 9/15/2021   Albumin %      51.0 - 67.0 %  61.4   Albumin Fraction      3.2 - 4.7 g/dL  3.9   Alpha 1 %      2.0 - 4.0 %  3.7   Alpha 1 Fraction      0.1 - 0.3 g/dL  0.2   Alpha 2 %      5.0 - 13.0 %  12.2   Alpha 2 Fraction      0.4 - 0.9 g/dL  0.8   Beta %      10.0 - 17.0 %  10.0   Beta Fraction      0.7 - 1.2 g/dL  0.6 (L)   Gamma Globulin %      9.0 - 20.0 %  12.7   Gamma Fraction      0.6 - 1.4 g/dL  0.8   ELP Interpretation:        Normal serum protein electrophoresis.   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 At the present time, the  bands are too faint for unequivocal diagnosis.   Path ICD       I16.1   Interpreted By       Eduardo Dyer MD     Component      Latest Ref Rng & Units 5/12/2022   Vitamin B12      213 - 816 pg/mL 435     OUTSIDE RECORDS  Hospital notes reviewed.    SPEP  Component      Latest Ref Rng & Units 7/6/2022   Albumin Fraction      3.7 - 5.1 g/dL 4.2   Alpha 1 Fraction      0.2 - 0.4 g/dL 0.3   Alpha 2 Fraction      0.5 - 0.9 g/dL 0.8   Beta Fraction      0.6 - 1.0 g/dL 0.7   Gamma Fraction      0.7 - 1.6 g/dL 1.0   Monoclonal Peak      <=0.0 g/dL 0.0   ELP Interpretation:       No monoclonal protein seen by capillary electrophoresis. However, a possible very small IgM lambda monoclonal was seen in this sample by immunofixation which is a much more sensitive method for monoclonal detection. Pathologic significance requires clinical correlation. NORIS Ho M.D., Ph.D., Pathologist ().   Immunofixation ELP       Possible very small monoclonal IgM immunoglobulin of lambda light chain type. Pathologic significance requires clinical correlation.  NORIS Ho M.D., Ph.D., Pathologist ()   Total Protein Serum for ELP      6.4 - 8.3 g/dL 6.9     Hematology      CT head  IMPRESSION:  1.  No acute intracranial process or significant change since 04/05/2022.  2.  No significant TMJ arthropathy.    LABS  Component      Latest Ref Rng 5/12/2023  4:29 PM   WBC      4.0 - 11.0 10e3/uL 5.5    RBC Count      3.80 - 5.20 10e6/uL 3.50 (L)    Hemoglobin      11.7 - 15.7 g/dL 10.9 (L)    Hematocrit      35.0 - 47.0 % 32.6 (L)    MCV      78 - 100 fL 93    MCH      26.5 - 33.0 pg 31.1    MCHC      31.5 - 36.5 g/dL 33.4    RDW      10.0 - 15.0 % 14.4    Platelet Count      150 - 450 10e3/uL 177    % Neutrophils      % 62    % Lymphocytes      % 16    % Monocytes      % 18    % Eosinophils      % 3    % Basophils      % 1    % Immature Granulocytes      % 0    NRBCs per 100 WBC      <1 /100 0    Absolute  Neutrophils      1.6 - 8.3 10e3/uL 3.4    Absolute Lymphocytes      0.8 - 5.3 10e3/uL 0.9    Absolute Monocytes      0.0 - 1.3 10e3/uL 1.0    Absolute Eosinophils      0.0 - 0.7 10e3/uL 0.2    Absolute Basophils      0.0 - 0.2 10e3/uL 0.1    Absolute Immature Granulocytes      <=0.4 10e3/uL 0.0    Absolute NRBCs      10e3/uL 0.0    Sodium      136 - 145 mmol/L 135 (L)    Potassium      3.4 - 5.3 mmol/L 4.2    Chloride      98 - 107 mmol/L 95 (L)    Carbon Dioxide (CO2)      22 - 29 mmol/L 29    Anion Gap      7 - 15 mmol/L 11    Urea Nitrogen      8.0 - 23.0 mg/dL 42.8 (H)    Creatinine      0.51 - 0.95 mg/dL 1.58 (H)    Calcium      8.8 - 10.2 mg/dL 9.5    Glucose      70 - 99 mg/dL 85    Alkaline Phosphatase      35 - 104 U/L 70    AST      10 - 35 U/L 33    ALT      10 - 35 U/L 24    Protein Total      6.4 - 8.3 g/dL 6.9    Albumin      3.5 - 5.2 g/dL 4.2    Bilirubin Total      <=1.2 mg/dL 0.4    GFR Estimate      >60 mL/min/1.73m2 33 (L)    LADARIUS interpretation      Negative  Positive !    LADARIUS pattern 1 Homogeneous    LADARIUS titer 1 1:320    Neutrophil Cytoplasmic Antibody      <1:10  <1:10    Neutrophil Cytoplasmic Antibody Pattern The ANCA IFA is <1:10. No further testing will be performed.    Angiotensin Converting Enzyme      16 - 85 U/L 46    Vitamin B12      232 - 1,245 pg/mL 1,000    Vitamin B1 Whole Blood Level      70 - 180 nmol/L 116    TSH      0.30 - 4.20 uIU/mL 6.36 (H)    Lyme Disease Antibodies Serum      <0.90  0.41    Hemoglobin A1C      <5.7 % 5.3    Sed Rate      0 - 30 mm/hr 17    T4 Free      0.90 - 1.70 ng/dL 1.00       Legend:  (L) Low  (H) High  ! Abnormal    IMPRESSION/REPORT/PLAN  Abnormal SPEP  Neuropathy-worsening  Primary hypertension  Neuropathic pain/muscle cramps  New onset facial numbness-stable  History of sarcoidosis  Positive LADARIUS-suspect false positive    This is a 82 year old female with numbness in the bottom of her feet.  EMG is confirmatory for peripheral neuropathy. Exam does  show decreased reflexes but otherwise negative for sensory loss.   CT of the lumbar spine does show spinal stenosis though this would not fit with her symptoms.     Etiology for neuropathy would most likely be diabetes.  She did have a positive serum electrophoresis which is being monitored through oncology.  There is no concerns for multiple myeloma though there is some family history of multiple myeloma.  Discussed prognosis of neuropathy.  Will recommend exercise and healthy lifestyle.     She does report some worsening of numbness though exam is stable.  Would recommend getting the diabetes under better control and continuing exercise    She does complain of some muscle cramps and pins-and-needles type pain in the legs.  Gabapentin is currently helping and she wants to stay on the same dose.  Could further increase it down the road.  No worsening neuropathic pain.  We will continue current dose of gabapentin.    Previously-patient was seen in the hospital for a spell of bilateral hand numbness shaking and elevated blood pressure.  This was thought to be hypertensive emergency.  Head CT was negative for stroke.  MRI could not be done because of pacemaker.  CT angiogram showed left carotid stenosis though otherwise noncontributory.  Carotid ultrasound was negative.  Patient remains on aspirin 81 mg.  She is also on a statin.  No strokelike symptoms we will continue to monitor.  No change.  Blood pressure slightly elevated today.  Could be whitecoat hypertension.  Seen by the cardiologist and they are trying to keep her weight low with use of diuretics. -- Stable    She now reports left facial numbness.  MRI brain is not possible due to pacemaker.  CT of the head with contrast was negative for any new structural lesions.  Blood work overall has been noncontributory though she did test positive for LADARIUS.  Most likely this is a false positive.  She does have a history of sarcoidosis which could be a cause for her  numbness or could be related to diabetes.  Given stable symptoms with no imaging findings we will hold off on trying aggressive treatment of sarcoidosis at this point.  Continue working with primary care for vascular risk factor management.      Return back on as-needed basis or in 1 year.    -     gabapentin (NEURONTIN) 100 MG capsule; Take 1 capsule (100 mg) by mouth 3 times daily    No follow-ups on file.    Over 40 minutes were spent coordinating the care for the patient on the day of the encounter.  This includes previsit, during visit and post visit activities as documented above.  Counseling patient.  New problem.  Testing ordered.  Multiple problems reviewed/addressed.  High risk.  (Activities include but not inclusive of reviewing chart, reviewing outside records, reviewing labs and imaging study results as well as the images, patient visit time including getting history and exam,  use if applicable, review of test results with the patient and coming up with a plan in a shared model, counseling patient and family, education and answering patient questions, EMR , EMR diagnosis entry and problem list management, medication reconciliation and prescription management if applicable, paperwork if applicable, printing documents and documentation of the visit activities.)        Reji Davila MD  Neurologist  [facility]  Tel:- [phone]    This note was dictated using voice recognition software.  Any grammatical or context distortions are unintentional and inherent to the software.        Again, thank you for allowing me to participate in the care of your patient.        Sincerely,        Reji Davila MD

## 2023-06-16 NOTE — NURSING NOTE
Chief Complaint   Patient presents with     NEUROPATHY     Follow up     Karly Chan on 6/16/2023 at 10:38 AM

## 2023-06-20 DIAGNOSIS — I10 HYPERTENSION, UNSPECIFIED TYPE: ICD-10-CM

## 2023-06-20 RX ORDER — FUROSEMIDE 20 MG
40 TABLET ORAL DAILY
Qty: 60 TABLET | Refills: 0 | Status: SHIPPED | OUTPATIENT
Start: 2023-06-20 | End: 2023-06-21

## 2023-06-21 ENCOUNTER — OFFICE VISIT (OUTPATIENT)
Dept: CARDIOLOGY | Facility: CLINIC | Age: 83
End: 2023-06-21
Payer: COMMERCIAL

## 2023-06-21 VITALS
BODY MASS INDEX: 27.78 KG/M2 | SYSTOLIC BLOOD PRESSURE: 116 MMHG | OXYGEN SATURATION: 95 % | DIASTOLIC BLOOD PRESSURE: 46 MMHG | RESPIRATION RATE: 16 BRPM | HEART RATE: 61 BPM | WEIGHT: 147 LBS

## 2023-06-21 DIAGNOSIS — I25.10 NONOBSTRUCTIVE ATHEROSCLEROSIS OF CORONARY ARTERY: ICD-10-CM

## 2023-06-21 DIAGNOSIS — I10 ESSENTIAL HYPERTENSION: ICD-10-CM

## 2023-06-21 DIAGNOSIS — I50.32 CHRONIC HEART FAILURE WITH PRESERVED EJECTION FRACTION (H): ICD-10-CM

## 2023-06-21 DIAGNOSIS — I10 HYPERTENSION, UNSPECIFIED TYPE: ICD-10-CM

## 2023-06-21 DIAGNOSIS — N18.31 CHRONIC KIDNEY DISEASE, STAGE 3A (H): ICD-10-CM

## 2023-06-21 DIAGNOSIS — I44.2 THIRD DEGREE AV BLOCK (H): ICD-10-CM

## 2023-06-21 DIAGNOSIS — E78.5 HYPERLIPIDEMIA, UNSPECIFIED HYPERLIPIDEMIA TYPE: ICD-10-CM

## 2023-06-21 DIAGNOSIS — E11.69 TYPE 2 DIABETES MELLITUS WITH OTHER SPECIFIED COMPLICATION, WITHOUT LONG-TERM CURRENT USE OF INSULIN (H): ICD-10-CM

## 2023-06-21 DIAGNOSIS — I47.19 PAROXYSMAL ATRIAL TACHYCARDIA (H): ICD-10-CM

## 2023-06-21 DIAGNOSIS — Z95.0 CARDIAC PACEMAKER IN SITU: Primary | ICD-10-CM

## 2023-06-21 PROCEDURE — 99215 OFFICE O/P EST HI 40 MIN: CPT | Performed by: GENERAL ACUTE CARE HOSPITAL

## 2023-06-21 RX ORDER — FUROSEMIDE 20 MG
40 TABLET ORAL DAILY
Qty: 180 TABLET | Refills: 3 | Status: SHIPPED | OUTPATIENT
Start: 2023-06-21 | End: 2024-07-15

## 2023-06-21 NOTE — PATIENT INSTRUCTIONS
We will have you see one of our electrophysiologists to see if you have atrial fibrillation and need to be on a blood thinner.  We will schedule you for an echocardiogram.  See me back in 1 year.

## 2023-06-21 NOTE — PROGRESS NOTES
HEART CARE ENCOUNTER NOTE          Assessment/Recommendations   Assessment:    1. Possible new-onset paroxysmal atrial fibrillation noted on her most recent device interrogation. She previously has had paroxysmal atrial tachycardia of short duration, but a sustained episode of an atrial arrhythmia lasting for over 6 hours is suspicious for atrial fibrillation. [id number] score is at least 6 (hypertension, age 75 or greater, diabetes mellitus, coronary artery disease, female gender.  2. Complete heart block status post [employer] dual-chamber permanent pacemaker placement 4/12/2011 with a generator replacement 8/30/2021. Normal device function noted recently with 98% right ventricular pacing.  3. Chronic congestive heart failure with preserved left ventricular ejection fraction. NYHA class II, euvolemic.  4. 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  5. Nonobstructive coronary artery disease seen on coronary angiography 5/26/2021.  6. Essential hypertension. Controlled.  7. Hyperlipidemia. Last LDL 69 mg/dL.  8. Non insulin-dependent diabetes mellitus type 2. Last hemoglobin A1c 5.3%.  9. Chronic kidney disease stage III.  10. BMI 27.78.    Plan:  1. Referral to electrophysiology to review her last pacemaker interrogation report to determine if she truly has new-onset atrial fibrillation, or if this is just another episode of her known atrial tachycardia. She should initiate oral anticoagulation if she does have atrial fibrillation.  2. Continue daily aspirin for now.  3. Transthoracic echocardiogram.  4. Atorvastatin 80 mg daily.  5. Furosemide 40 mg daily.  6. If she has any decompensation of congestive heart failure, she may benefit from addition of spironolactone and/or a sodium-glucose co-transporter 2 inhibitor.  7. Follow-up with me in 1 year.       A total time of 40 minutes was spent on the date of this encounter.    History of  Present Illness   Ms. Sharyn Trent is a 82 year old female with a significant past history of HFpEF, complete heart block s/p 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 She previously followed in electrophysiology clinic with my former colleague Dr. Ab Saavedra, who has since retired. She also follows in the Heart Failure [facility] clinic with Dr. Sara Akins.    She reports doing well. No chest pain/pressure/tightness, shortness of breath at rest or with exertion, light headedness/dizziness, pre-syncope, syncope, lower extremity swelling, palpitations, paroxysmal nocturnal dyspnea (PND), or orthopnea.     Cardiac Problems and Cardiac Diagnostics     Most Recent Cardiac testing:  ECG dated 4/5/2022 (personaly reviewed and interpreted): A-sensed, V-paced with PVCs    Pulmonary function tests 12/13/2022 (report reviewed):  FEV1/FVC  is normal   FEV1 is normal   FVC is normal   DLCO is normal when it is corrected for hemoglobin.     Impression:  Pulmonary Function Test is normal.     Device check 3/28/2023 (report reviewed):  Device: [employer], [employer] (D) PPM  Pacing %/Programmed: AP- <1%, - 98%, DDD 50/100  Lead(s): Stable  Battery longevity: Estimating 9.2 years remaining.  Presenting rhythm: AS/ @ 65 bpm  Underlying rhythm: CHB, rare R's @ VVI 30  Heart rates: Histograms show primarily 50-80 bpm.  Atrial arrhythmias: Since 3/14/2022- 32 mode switch, EGMs show AT/AF with the longest episode lasting 6 hours and 9 minutes. AF burden <1%, V-rates  >= 120 bpm ~ 5%. Patient cannot correlate symptoms.   Anticoagulant: ASA  Ventricular arrhythmias: Since 3/14/2022- None  Lead/device alerts: Assurity device on advisory  Comments: Normal  device function. No changes made.     ECHO 3/2/2021 (report reviewed):     Left ventricle ejection fraction is normal. The estimated left ventricular ejection fraction is 55%.    Left ventricular diastolic function is abnormal.    Normal right ventricular size and systolic function.    No hemodynamically significant valvular heart abnormalities.    When compared to the previous study dated 3/20/2018, No significant change    Stress test 5/12/2021 (report reviewed):      The nuclear stress test is abnormal.     Nuclear images demonstrate a small area of moderate ischemia involving the distal anteroseptal, anterior and apical wall, although specificity reduced due to breast attenuation.     The left ventricular ejection fraction at stress is 64% without wall motion abnormality.     A prior study was conducted on 3/23/2018.  Imaging appears more consistent with ischemia than infarction on the current study.    Cardiac cath 5/26/2021 (report reviewed):   RA mean 4mmHg  PA mean 24mmHg  PCWP 21mmHg  LVEDP 19mmHg  Ao 153/62     PA sat 67%  Ao sat 94%     CO cindy 3.35     Angiography via left radial  LM short normal  LAD mid 50% narrowing with FFR 0.85  Circ normal  RCA normal     Medications  Allergies   Current Outpatient Medications   Medication Sig Dispense Refill     acetaminophen (TYLENOL) 500 MG tablet Take 500-1,000 mg by mouth every 6 hours as needed for mild pain       aspirin (ASA) 325 MG EC tablet Take 325 mg by mouth every evening       atorvastatin (LIPITOR) 80 MG tablet Take 80 mg by mouth At Bedtime       calcium carbonate (TUMS) 500 MG chewable tablet Take 1-2 chew tab by mouth daily       carvedilol (COREG) 25 MG tablet TAKE 1 TABLET(25 MG) BY MOUTH TWICE DAILY WITH MEALS 180 tablet 1     escitalopram (LEXAPRO) 10 MG tablet Take 10 mg by mouth daily       ferrous sulfate (FEROSUL) 325 (65 Fe) MG tablet Take 325 mg by mouth daily (with breakfast)       fluorometholone (FML LIQUIFILM) 0.1 % ophthalmic  suspension Place 1 drop into the right eye daily       furosemide (LASIX) 20 MG tablet Take 2 tablets (40 mg) by mouth daily 60 tablet 0     gabapentin (NEURONTIN) 100 MG capsule Take 1 capsule (100 mg) by mouth 3 times daily 270 capsule 3     hydrALAZINE (APRESOLINE) 100 MG tablet Take 1 tablet (100 mg) by mouth 3 times daily 270 tablet 1     losartan (COZAAR) 50 MG tablet TAKE 1 TABLET(50 MG) BY MOUTH EVERY EVENING 90 tablet 3     metFORMIN (GLUCOPHAGE) 500 MG tablet [METFORMIN (GLUCOPHAGE) 500 MG TABLET] Take 1 tablet (500 mg total) by mouth 2 (two) times a day with meals. At breakfast and at dinner 60 tablet 0     Multiple Vitamins-Minerals (MULTIVITAMIN ADULTS 50+) TABS Take 1 tablet by mouth every morning       omeprazole (PRILOSEC) 20 MG capsule Take 20 mg by mouth daily before breakfast       vit C/E/Zn/coppr/lutein/zeaxan (PRESERVISION AREDS-2 ORAL) Take 1 tablet by mouth 2 times daily (before meals)       prednisoLONE acetate (PRED-FORTE) 1 % ophthalmic suspension Place 1 drop into the right eye daily (Patient not taking: Reported on 6/16/2023)        Allergies   Allergen Reactions     Herlinda-Kit Bee Sting Shortness Of Breath     Venom-Honey Bee [Bee Venom] Shortness Of Breath     Mercurial Analogues [Mercurial Derivatives] Dermatitis     blisters     Nitrofurantoin Other (See Comments)     Burning sensation across chest and arms    Other reaction(s): arms and chest burning     Sulfa (Sulfonamide Antibiotics) [Sulfa Antibiotics] Rash     Sulfamethoxazole-Trimethoprim Rash     Other reaction(s): rash        Physical Examination Review of Systems   /46 (BP Location: Right arm, Patient Position: Sitting, Cuff Size: Adult Regular)   Pulse 61   Resp 16   Wt 66.7 kg (147 lb)   SpO2 95%   BMI 27.78 kg/m    Body mass index is 27.78 kg/m .  Wt Readings from Last 3 Encounters:   06/21/23 66.7 kg (147 lb)   06/16/23 67 kg (147 lb 11.2 oz)   06/07/23 66.6 kg (146 lb 12.8 oz)       General Appearance:    Pleasant female, appears stated age. no acute distress, normal body habitus   ENT/Mouth: membranes moist, no apparent gingival bleeding.      EYES:  no scleral icterus, normal conjunctivae   Neck: no carotid bruits. No anterior cervical lymphadenopaty   Respiratory:   lungs are clear to auscultation, no rales or wheezing, equal chest wall expansion    Cardiovascular:   Regular rhythm, normal rate. Normal first and second heart sounds with no murmurs, rubs, or gallops; the carotid, radial and posterior tibial pulses are intact, Jugular venous pressure normal, no edema bilaterally    Abdomen/GI:  no organomegaly, masses, bruits, or tenderness; bowel sounds are present   Extremities: no cyanosis or clubbing   Skin: no xanthelasma, warm.    Heme/lymph/ Immunology No apparent bleeding noted.   Neurologic: Alert and oriented. normal gait, no tremors     Psychiatric: Pleasant, calm, appropriate affect.    A complete 10 system review of systems was performed and is negative except as mentioned in the HPI/subjective.         Past History   Past Medical History:   Past Medical History:   Diagnosis Date     Abnormal ECG      Anxiety      Arthritis      Chest pain      Chest pain      Chronic kidney disease     stage 3-mod.     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      Pacemaker      PSVT (paroxysmal supraventricular tachycardia) (H)      Right bundle branch block      Skin cancer        Past Surgical History:   Past Surgical History:   Procedure Laterality Date     ABLATION OF DYSRHYTHMIC FOCUS  04/11/2013    AV analia reentry tachycardia, partially successful     BIOPSY SKIN (LOCATION)       CARDIAC CATHETERIZATION  03/10/2016     CV  CORONARY ANGIOGRAM N/A 05/26/2021    Procedure: Coronary Angiogram;  Surgeon: Yony Gillis MD;  Location: [facility] Cardiac Cath Lab;  Service: Cardiology     CV LEFT HEART CATHETERIZATION WITHOUT LEFT VENTRICULOGRAM Left 05/26/2021    Procedure: Left Heart Catheterization Without Left Ventriculogram;  Surgeon: Yony Gillis MD;  Location: [facility] Cardiac Cath Lab;  Service: Cardiology     CV RIGHT HEART CATHETERIZATION N/A 05/26/2021    Procedure: Right Heart Catheterization;  Surgeon: Yony Gillis MD;  Location: [facility] Cardiac Cath Lab;  Service: Cardiology     DILATION AND CURETTAGE N/A 4/19/2022    Procedure: DILATION AND CURRETAGE;  Surgeon: Mary Reed MD;  Location: [facility] OR     EP PACEMAKER N/A 08/30/2021    Procedure: Electrophysiology Pacemaker;  Surgeon: Ab Saavedra MD;  Location: [facility] CV     FOOT SURGERY      L foot     HAND SURGERY      on both thumbs     HYSTERECTOMY, TOTAL, ROBOT-ASSISTED, LAP, DA HAROON XI, W/BI SAL-OOPH & SNT LYMPH NODE BX, MINI LAP Bilateral 5/31/2022    Procedure: ROBOTIC ASSISTED TOTAL LAPAROSCOPIC HYSTERECTOMY, BILATERAL SALPINGO-OOPHORECTOMY, SENTINEL LYMPH NODE INJECTION AND BIOPSY, MINI-LAPAROTOMY,;  Surgeon: Mary Reed MD;  Location: [facility] OR     HYSTEROSCOPY DIAGNOSTIC N/A 4/19/2022    Procedure: HYSTEROSCOPY, DIAGNOSTIC;  Surgeon: Mary Reed MD;  Location: [facility] OR     IMPLANT PACEMAKER  04/01/2011    Second-degree AV block     OTHER SURGICAL HISTORY      SVT Ablation     PELVIC EXAM UNDER ANESTHESIA, CERVICAL DILATION, N/A      PELVIC EXAMINATION UNDER ANESTHESIA N/A 4/19/2022    Procedure: PELVIC EXAM UNDER ANESTHESIA, CERVICAL DILATION,;  Surgeon: Mary Reed MD;  Location: [facility] OR     AL SHLDR ARTHROSCOP,SURG,W/ROTAT CUFF REPR Left 03/09/2017    Procedure: LEFT SHOULDER ARTHROSCOPY DECOMPRESSION DISTAL CLAVICLE EXCISION   ROTATOR CUFF REPAIR BICEPS TENOTOMY AND EXTENSIVE DEBRIDEMENT;  Surgeon: Todd Riggs MD;  Location: [facility] OR;  Service: Orthopedics     RELEASE CARPAL TUNNEL      both wrists     SKIN CANCER EXCISION      L ankle,Lleg and cheek     TOE SURGERY      L big toe joint replacement     TUBAL LIGATION         Family History:   Family History   Problem Relation Age of Onset     Hypertension Mother      Cerebrovascular Disease Mother      Prostate Cancer Father      Cancer Father      Coronary Artery Disease Father      Dyslipidemia Father      Dyslipidemia Sister      Dyslipidemia Brother      Hypertension Brother      Prostate Cancer Brother      Social History:   Social History     Socioeconomic History     Marital status:      Spouse name: Not on file     Number of children: Not on file     Years of education: Not on file     Highest education level: Not on file   Occupational History     Not on file   Tobacco Use     Smoking status: Never     Smokeless tobacco: Never   Substance and Sexual Activity     Alcohol use: No     Drug use: No     Sexual activity: Yes     Partners: Male     Birth control/protection: Surgical   Other Topics Concern     Not on file   Social History Narrative     Not on file     Social Determinants of Health     Financial Resource Strain: Not on file   Food Insecurity: Not on file   Transportation Needs: Not on file   Physical Activity: Not on file   Stress: Not on file   Social Connections: Not on file   Intimate Partner Violence: Not on file   Housing Stability: Not on file              Lab Results    Chemistry/lipid CBC Cardiac Enzymes/BNP/TSH/INR   Lab Results   Component Value Date    CHOL 126 03/31/2022    HDL 46 (L) 03/31/2022    LDL 69 03/31/2022    TRIG 53 03/31/2022    CR 1.58 (H) 05/12/2023    BUN 42.8 (H) 05/12/2023    POTASSIUM 4.2 05/12/2023     (L) 05/12/2023    CO2 29 05/12/2023      Lab Results   Component Value Date    WBC 5.5 05/12/2023    HGB 10.9 (L)  05/12/2023    HCT 32.6 (L) 05/12/2023    MCV 93 05/12/2023     05/12/2023    A1C 5.3 05/12/2023     Lab Results   Component Value Date    A1C 5.3 05/12/2023    Lab Results   Component Value Date    TROPONINI 0.04 04/05/2022     (H) 04/05/2022    TSH 6.36 (H) 05/12/2023    INR 1.05 09/13/2021          Bravo Lopez MD [facility]  Non-Invasive Cardiologist  [facility]  Pager [phone]

## 2023-06-21 NOTE — LETTER
6/21/2023    Jamie Mcconnell MD  [address]  [facility] [address]    RE: Sharyn Trent       Dear Colleague,     I had the pleasure of seeing Sharyn Trent in the [facility].  HEART CARE ENCOUNTER NOTE          Assessment/Recommendations   Assessment:    Possible new-onset paroxysmal atrial fibrillation noted on her most recent device interrogation. She previously has had paroxysmal atrial tachycardia of short duration, but a sustained episode of an atrial arrhythmia lasting for over 6 hours is suspicious for atrial fibrillation. [id number] score is at least 6 (hypertension, age 75 or greater, diabetes mellitus, coronary artery disease, female gender.  Complete heart block status post [employer] dual-chamber permanent pacemaker placement 4/12/2011 with a generator replacement 8/30/2021. Normal device function noted recently with 98% right ventricular pacing.  Chronic congestive heart failure with preserved left ventricular ejection fraction. NYHA class II, euvolemic.  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  Nonobstructive coronary artery disease seen on coronary angiography 5/26/2021.  Essential hypertension. Controlled.  Hyperlipidemia. Last LDL 69 mg/dL.  Non insulin-dependent diabetes mellitus type 2. Last hemoglobin A1c 5.3%.  Chronic kidney disease stage III.  BMI 27.78.    Plan:  Referral to electrophysiology to review her last pacemaker interrogation report to determine if she truly has new-onset atrial fibrillation, or if this is just another episode of her known atrial tachycardia. She should initiate oral anticoagulation if she does have atrial fibrillation.  Continue daily aspirin for now.  Transthoracic echocardiogram.  Atorvastatin 80 mg daily.  Furosemide 40 mg daily.  If she has any decompensation of congestive heart failure, she may benefit from addition of spironolactone and/or a sodium-glucose  co-transporter 2 inhibitor.  Follow-up with me in 1 year.       A total time of 40 minutes was spent on the date of this encounter.    History of Present Illness   Ms. Sharyn Trent is a 82 year old female with a significant past history of HFpEF, complete heart block s/p 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 She previously followed in electrophysiology clinic with my former colleague Dr. Ab Saavedra, who has since retired. She also follows in the Heart Failure [facility] clinic with Dr. Sara Akins.    She reports doing well. No chest pain/pressure/tightness, shortness of breath at rest or with exertion, light headedness/dizziness, pre-syncope, syncope, lower extremity swelling, palpitations, paroxysmal nocturnal dyspnea (PND), or orthopnea.     Cardiac Problems and Cardiac Diagnostics     Most Recent Cardiac testing:  ECG dated 4/5/2022 (personaly reviewed and interpreted): A-sensed, V-paced with PVCs    Pulmonary function tests 12/13/2022 (report reviewed):  FEV1/FVC  is normal   FEV1 is normal   FVC is normal   DLCO is normal when it is corrected for hemoglobin.     Impression:  Pulmonary Function Test is normal.     Device check 3/28/2023 (report reviewed):  Device: [employer], [employer]urity (D) PPM  Pacing %/Programmed: AP- <1%, - 98%, DDD 50/100  Lead(s): Stable  Battery longevity: Estimating 9.2 years remaining.  Presenting rhythm: AS/ @ 65 bpm  Underlying rhythm: CHB, rare R's @ VVI 30  Heart rates: Histograms show primarily 50-80 bpm.  Atrial arrhythmias: Since 3/14/2022- 32 mode switch, EGMs show AT/AF with the longest episode lasting 6 hours and 9 minutes. AF burden <1%, V-rates  >= 120 bpm ~ 5%. Patient cannot correlate  symptoms.   Anticoagulant: ASA  Ventricular arrhythmias: Since 3/14/2022- None  Lead/device alerts: Assurity device on advisory  Comments: Normal device function. No changes made.     ECHO 3/2/2021 (report reviewed):     Left ventricle ejection fraction is normal. The estimated left ventricular ejection fraction is 55%.    Left ventricular diastolic function is abnormal.    Normal right ventricular size and systolic function.    No hemodynamically significant valvular heart abnormalities.    When compared to the previous study dated 3/20/2018, No significant change    Stress test 5/12/2021 (report reviewed):     The nuclear stress test is abnormal.    Nuclear images demonstrate a small area of moderate ischemia involving the distal anteroseptal, anterior and apical wall, although specificity reduced due to breast attenuation.    The left ventricular ejection fraction at stress is 64% without wall motion abnormality.    A prior study was conducted on 3/23/2018.  Imaging appears more consistent with ischemia than infarction on the current study.    Cardiac cath 5/26/2021 (report reviewed):   RA mean 4mmHg  PA mean 24mmHg  PCWP 21mmHg  LVEDP 19mmHg  Ao 153/62     PA sat 67%  Ao sat 94%     CO cindy 3.35     Angiography via left radial  LM short normal  LAD mid 50% narrowing with FFR 0.85  Circ normal  RCA normal     Medications  Allergies   Current Outpatient Medications   Medication Sig Dispense Refill    acetaminophen (TYLENOL) 500 MG tablet Take 500-1,000 mg by mouth every 6 hours as needed for mild pain      aspirin (ASA) 325 MG EC tablet Take 325 mg by mouth every evening      atorvastatin (LIPITOR) 80 MG tablet Take 80 mg by mouth At Bedtime      calcium carbonate (TUMS) 500 MG chewable tablet Take 1-2 chew tab by mouth daily      carvedilol (COREG) 25 MG tablet TAKE 1 TABLET(25 MG) BY MOUTH TWICE DAILY WITH MEALS 180 tablet 1    escitalopram (LEXAPRO) 10 MG tablet Take 10 mg by mouth daily      ferrous sulfate  (FEROSUL) 325 (65 Fe) MG tablet Take 325 mg by mouth daily (with breakfast)      fluorometholone (FML LIQUIFILM) 0.1 % ophthalmic suspension Place 1 drop into the right eye daily      furosemide (LASIX) 20 MG tablet Take 2 tablets (40 mg) by mouth daily 60 tablet 0    gabapentin (NEURONTIN) 100 MG capsule Take 1 capsule (100 mg) by mouth 3 times daily 270 capsule 3    hydrALAZINE (APRESOLINE) 100 MG tablet Take 1 tablet (100 mg) by mouth 3 times daily 270 tablet 1    losartan (COZAAR) 50 MG tablet TAKE 1 TABLET(50 MG) BY MOUTH EVERY EVENING 90 tablet 3    metFORMIN (GLUCOPHAGE) 500 MG tablet [METFORMIN (GLUCOPHAGE) 500 MG TABLET] Take 1 tablet (500 mg total) by mouth 2 (two) times a day with meals. At breakfast and at dinner 60 tablet 0    Multiple Vitamins-Minerals (MULTIVITAMIN ADULTS 50+) TABS Take 1 tablet by mouth every morning      omeprazole (PRILOSEC) 20 MG capsule Take 20 mg by mouth daily before breakfast      vit C/E/Zn/coppr/lutein/zeaxan (PRESERVISION AREDS-2 ORAL) Take 1 tablet by mouth 2 times daily (before meals)      prednisoLONE acetate (PRED-FORTE) 1 % ophthalmic suspension Place 1 drop into the right eye daily (Patient not taking: Reported on 6/16/2023)        Allergies   Allergen Reactions    Herlinda-Kit Bee Sting Shortness Of Breath    Venom-Honey Bee [Bee Venom] Shortness Of Breath    Mercurial Analogues [Mercurial Derivatives] Dermatitis     blisters    Nitrofurantoin Other (See Comments)     Burning sensation across chest and arms    Other reaction(s): arms and chest burning    Sulfa (Sulfonamide Antibiotics) [Sulfa Antibiotics] Rash    Sulfamethoxazole-Trimethoprim Rash     Other reaction(s): rash        Physical Examination Review of Systems   /46 (BP Location: Right arm, Patient Position: Sitting, Cuff Size: Adult Regular)   Pulse 61   Resp 16   Wt 66.7 kg (147 lb)   SpO2 95%   BMI 27.78 kg/m    Body mass index is 27.78 kg/m .  Wt Readings from Last 3 Encounters:   06/21/23 66.7 kg  (147 lb)   06/16/23 67 kg (147 lb 11.2 oz)   06/07/23 66.6 kg (146 lb 12.8 oz)       General Appearance:   Pleasant female, appears stated age. no acute distress, normal body habitus   ENT/Mouth: membranes moist, no apparent gingival bleeding.      EYES:  no scleral icterus, normal conjunctivae   Neck: no carotid bruits. No anterior cervical lymphadenopaty   Respiratory:   lungs are clear to auscultation, no rales or wheezing, equal chest wall expansion    Cardiovascular:   Regular rhythm, normal rate. Normal first and second heart sounds with no murmurs, rubs, or gallops; the carotid, radial and posterior tibial pulses are intact, Jugular venous pressure normal, no edema bilaterally    Abdomen/GI:  no organomegaly, masses, bruits, or tenderness; bowel sounds are present   Extremities: no cyanosis or clubbing   Skin: no xanthelasma, warm.    Heme/lymph/ Immunology No apparent bleeding noted.   Neurologic: Alert and oriented. normal gait, no tremors     Psychiatric: Pleasant, calm, appropriate affect.    A complete 10 system review of systems was performed and is negative except as mentioned in the HPI/subjective.         Past History   Past Medical History:   Past Medical History:   Diagnosis Date    Abnormal ECG     Anxiety     Arthritis     Chest pain     Chest pain     Chronic kidney disease     stage 3-mod.    Congestive heart failure (H)     Diabetes (H)     Dyslipidemia, goal LDL below 70     GERD (gastroesophageal reflux disease)     HTN (hypertension)     Hyperlipidemia     Macular degeneration (senile) of retina     Melanoma of ankle (H)     L ankle    Other second degree atrioventricular block     Created by Conversion     Pacemaker     PSVT (paroxysmal supraventricular tachycardia) (H)     Right bundle branch block     Skin cancer        Past Surgical History:   Past Surgical History:   Procedure Laterality Date    ABLATION OF DYSRHYTHMIC FOCUS  04/11/2013    AV analia reentry tachycardia, partially  successful    BIOPSY SKIN (LOCATION)      CARDIAC CATHETERIZATION  03/10/2016    CV CORONARY ANGIOGRAM N/A 05/26/2021    Procedure: Coronary Angiogram;  Surgeon: Yony Gillis MD;  Location: [facility] Cardiac Cath Lab;  Service: Cardiology    CV LEFT HEART CATHETERIZATION WITHOUT LEFT VENTRICULOGRAM Left 05/26/2021    Procedure: Left Heart Catheterization Without Left Ventriculogram;  Surgeon: Yony Gillis MD;  Location: [facility] Cardiac Cath Lab;  Service: Cardiology    CV RIGHT HEART CATHETERIZATION N/A 05/26/2021    Procedure: Right Heart Catheterization;  Surgeon: Yony Gillis MD;  Location: [facility] Cardiac Cath Lab;  Service: Cardiology    DILATION AND CURETTAGE N/A 4/19/2022    Procedure: DILATION AND CURRETAGE;  Surgeon: Mary Reed MD;  Location: [facility] OR    EP PACEMAKER N/A 08/30/2021    Procedure: Electrophysiology Pacemaker;  Surgeon: Ab Saavedra MD;  Location: [facility] CV    FOOT SURGERY      L foot    HAND SURGERY      on both thumbs    HYSTERECTOMY, TOTAL, ROBOT-ASSISTED, LAP, DA HAROON XI, W/BI SAL-OOPH & SNT LYMPH NODE BX, MINI LAP Bilateral 5/31/2022    Procedure: ROBOTIC ASSISTED TOTAL LAPAROSCOPIC HYSTERECTOMY, BILATERAL SALPINGO-OOPHORECTOMY, SENTINEL LYMPH NODE INJECTION AND BIOPSY, MINI-LAPAROTOMY,;  Surgeon: Mary Reed MD;  Location: [facility] OR    HYSTEROSCOPY DIAGNOSTIC N/A 4/19/2022    Procedure: HYSTEROSCOPY, DIAGNOSTIC;  Surgeon: Mary Reed MD;  Location: [facility] OR    IMPLANT PACEMAKER  04/01/2011    Second-degree AV block    OTHER SURGICAL HISTORY      SVT Ablation    PELVIC EXAM UNDER ANESTHESIA, CERVICAL DILATION, N/A     PELVIC EXAMINATION UNDER ANESTHESIA N/A 4/19/2022    Procedure: PELVIC EXAM UNDER ANESTHESIA, CERVICAL DILATION,;  Surgeon: Mary Reed MD;  Location: [facility] OR    OK SHLDR ARTHROSCOP,SURG,W/ROTAT CUFF REPR Left 03/09/2017     Procedure: LEFT SHOULDER ARTHROSCOPY DECOMPRESSION DISTAL CLAVICLE EXCISION  ROTATOR CUFF REPAIR BICEPS TENOTOMY AND EXTENSIVE DEBRIDEMENT;  Surgeon: Todd Riggs MD;  Location: [facility] OR;  Service: Orthopedics    RELEASE CARPAL TUNNEL      both wrists    SKIN CANCER EXCISION      L ankle,Lleg and cheek    TOE SURGERY      L big toe joint replacement    TUBAL LIGATION         Family History:   Family History   Problem Relation Age of Onset    Hypertension Mother     Cerebrovascular Disease Mother     Prostate Cancer Father     Cancer Father     Coronary Artery Disease Father     Dyslipidemia Father     Dyslipidemia Sister     Dyslipidemia Brother     Hypertension Brother     Prostate Cancer Brother      Social History:   Social History     Socioeconomic History    Marital status:      Spouse name: Not on file    Number of children: Not on file    Years of education: Not on file    Highest education level: Not on file   Occupational History    Not on file   Tobacco Use    Smoking status: Never    Smokeless tobacco: Never   Substance and Sexual Activity    Alcohol use: No    Drug use: No    Sexual activity: Yes     Partners: Male     Birth control/protection: Surgical   Other Topics Concern    Not on file   Social History Narrative    Not on file     Social Determinants of Health     Financial Resource Strain: Not on file   Food Insecurity: Not on file   Transportation Needs: Not on file   Physical Activity: Not on file   Stress: Not on file   Social Connections: Not on file   Intimate Partner Violence: Not on file   Housing Stability: Not on file              Lab Results    Chemistry/lipid CBC Cardiac Enzymes/BNP/TSH/INR   Lab Results   Component Value Date    CHOL 126 03/31/2022    HDL 46 (L) 03/31/2022    LDL 69 03/31/2022    TRIG 53 03/31/2022    CR 1.58 (H) 05/12/2023    BUN 42.8 (H) 05/12/2023    POTASSIUM 4.2 05/12/2023     (L) 05/12/2023    CO2 29 05/12/2023      Lab Results    Component Value Date    WBC 5.5 05/12/2023    HGB 10.9 (L) 05/12/2023    HCT 32.6 (L) 05/12/2023    MCV 93 05/12/2023     05/12/2023    A1C 5.3 05/12/2023     Lab Results   Component Value Date    A1C 5.3 05/12/2023    Lab Results   Component Value Date    TROPONINI 0.04 04/05/2022     (H) 04/05/2022    TSH 6.36 (H) 05/12/2023    INR 1.05 09/13/2021          Bravo Lopez MD [facility]  Non-Invasive Cardiologist  [facility]  Pager [phone]        Thank you for allowing me to participate in the care of your patient.      Sincerely,     Bravo Lopez MD     [facility]  cc:   Heidy Akins MD  [address]  [place] [address]

## 2023-06-22 ENCOUNTER — TRANSFERRED RECORDS (OUTPATIENT)
Dept: HEALTH INFORMATION MANAGEMENT | Facility: CLINIC | Age: 83
End: 2023-06-22

## 2023-06-22 ENCOUNTER — OFFICE VISIT (OUTPATIENT)
Dept: CARDIOLOGY | Facility: CLINIC | Age: 83
End: 2023-06-22
Attending: INTERNAL MEDICINE
Payer: COMMERCIAL

## 2023-06-22 VITALS
HEART RATE: 60 BPM | DIASTOLIC BLOOD PRESSURE: 50 MMHG | RESPIRATION RATE: 16 BRPM | SYSTOLIC BLOOD PRESSURE: 108 MMHG | WEIGHT: 147.4 LBS | BODY MASS INDEX: 27.85 KG/M2

## 2023-06-22 DIAGNOSIS — I47.19 PAROXYSMAL ATRIAL TACHYCARDIA (H): ICD-10-CM

## 2023-06-22 DIAGNOSIS — I44.2 COMPLETE ATRIOVENTRICULAR BLOCK (H): ICD-10-CM

## 2023-06-22 DIAGNOSIS — I48.0 PAROXYSMAL ATRIAL FIBRILLATION (H): Primary | ICD-10-CM

## 2023-06-22 DIAGNOSIS — Z95.0 PACEMAKER: ICD-10-CM

## 2023-06-22 LAB
MDC_IDC_LEAD_IMPLANT_DT: NORMAL
MDC_IDC_LEAD_IMPLANT_DT: NORMAL
MDC_IDC_LEAD_LOCATION: NORMAL
MDC_IDC_LEAD_LOCATION: NORMAL
MDC_IDC_LEAD_LOCATION_DETAIL_1: NORMAL
MDC_IDC_LEAD_LOCATION_DETAIL_1: NORMAL
MDC_IDC_LEAD_MFG: NORMAL
MDC_IDC_LEAD_MFG: NORMAL
MDC_IDC_LEAD_MODEL: NORMAL
MDC_IDC_LEAD_MODEL: NORMAL
MDC_IDC_LEAD_POLARITY_TYPE: NORMAL
MDC_IDC_LEAD_POLARITY_TYPE: NORMAL
MDC_IDC_LEAD_SERIAL: NORMAL
MDC_IDC_LEAD_SERIAL: NORMAL
MDC_IDC_MSMT_BATTERY_DTM: NORMAL
MDC_IDC_MSMT_BATTERY_REMAINING_LONGEVITY: 110 MO
MDC_IDC_MSMT_BATTERY_STATUS: NORMAL
MDC_IDC_MSMT_BATTERY_VOLTAGE: 3.02 V
MDC_IDC_MSMT_LEADCHNL_RA_IMPEDANCE_VALUE: 410 OHM
MDC_IDC_MSMT_LEADCHNL_RA_SENSING_INTR_AMPL: 3.2 MV
MDC_IDC_MSMT_LEADCHNL_RV_IMPEDANCE_VALUE: 530 OHM
MDC_IDC_PG_IMPLANT_DTM: NORMAL
MDC_IDC_PG_MFG: NORMAL
MDC_IDC_PG_MODEL: NORMAL
MDC_IDC_PG_SERIAL: NORMAL
MDC_IDC_PG_TYPE: NORMAL
MDC_IDC_SESS_CLINIC_NAME: NORMAL
MDC_IDC_SESS_DTM: NORMAL
MDC_IDC_SESS_TYPE: NORMAL
MDC_IDC_SET_BRADY_AT_MODE_SWITCH_MODE: NORMAL
MDC_IDC_SET_BRADY_AT_MODE_SWITCH_RATE: 180 {BEATS}/MIN
MDC_IDC_SET_BRADY_HYSTRATE: NORMAL
MDC_IDC_SET_BRADY_LOWRATE: 50 {BEATS}/MIN
MDC_IDC_SET_BRADY_MAX_SENSOR_RATE: 100 {BEATS}/MIN
MDC_IDC_SET_BRADY_MAX_TRACKING_RATE: 100 {BEATS}/MIN
MDC_IDC_SET_BRADY_MODE: NORMAL
MDC_IDC_SET_BRADY_NIGHT_RATE: NORMAL
MDC_IDC_SET_BRADY_PAV_DELAY_HIGH: 100 MS
MDC_IDC_SET_BRADY_PAV_DELAY_LOW: 300 MS
MDC_IDC_SET_BRADY_SAV_DELAY_HIGH: 100 MS
MDC_IDC_SET_BRADY_SAV_DELAY_LOW: 130 MS
MDC_IDC_SET_LEADCHNL_RA_PACING_AMPLITUDE: NORMAL
MDC_IDC_SET_LEADCHNL_RA_PACING_CAPTURE_MODE: NORMAL
MDC_IDC_SET_LEADCHNL_RA_PACING_POLARITY: NORMAL
MDC_IDC_SET_LEADCHNL_RA_PACING_PULSEWIDTH: 0.5 MS
MDC_IDC_SET_LEADCHNL_RA_SENSING_ADAPTATION_MODE: NORMAL
MDC_IDC_SET_LEADCHNL_RA_SENSING_POLARITY: NORMAL
MDC_IDC_SET_LEADCHNL_RV_PACING_AMPLITUDE: NORMAL
MDC_IDC_SET_LEADCHNL_RV_PACING_CAPTURE_MODE: NORMAL
MDC_IDC_SET_LEADCHNL_RV_PACING_POLARITY: NORMAL
MDC_IDC_SET_LEADCHNL_RV_PACING_PULSEWIDTH: 0.5 MS
MDC_IDC_SET_LEADCHNL_RV_SENSING_POLARITY: NORMAL
MDC_IDC_SET_LEADCHNL_RV_SENSING_SENSITIVITY: 2 MV
MDC_IDC_STAT_AT_MODE_SW_COUNT: 69
MDC_IDC_STAT_BRADY_RA_PERCENT_PACED: 0.55 %
MDC_IDC_STAT_BRADY_RV_PERCENT_PACED: 98 %
MDC_IDC_STAT_EPISODE_RECENT_COUNT: 0
MDC_IDC_STAT_EPISODE_TYPE: NORMAL
MDC_IDC_STAT_EPISODE_VENDOR_TYPE: NORMAL

## 2023-06-22 PROCEDURE — 99215 OFFICE O/P EST HI 40 MIN: CPT | Performed by: INTERNAL MEDICINE

## 2023-06-22 PROCEDURE — 93288 INTERROG EVL PM/LDLS PM IP: CPT | Performed by: INTERNAL MEDICINE

## 2023-06-22 NOTE — LETTER
6/22/2023    Jamie Mcconnell MD  [address]  [facility] [address]    RE: Sharyn Trent       Dear Colleague,     I had the pleasure of seeing Sharyn Trent in the [facility].    HEART CARE ENCOUNTER CONSULTATON NOTE      [facility]  [phone]      Assessment/Recommendations   Assessment/Plan:    Sharyn Trent is a very pleasant 82 year old female with a significant past history of HFpEF, complete heart block s/p 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    1. Complete heart block s/p dual chamber pacemaker  - RV pacing burden 97%  - Given high RV pacing burden TTE ordered, will follow up  - will need upgrade if she has a decline in LVEF  - Check today shows device is working well    2. Atrial fibrillation  - completely asymptomatic from Afib stand point  - continue Coreg for now    3. Anticoagulation  - CHADSVASC score 6  - Recommend starting anticoagulation with Eliquis, stop Aspirin 325 daily if starting Eliquis    Time spent: 45 minutes spent on the date of the encounter doing chart review, history and exam, documentation and further activities as noted above.         History of Present Illness/Subjective    HPI: Sharyn Trent is a very pleasant 82 year old female with a significant past history of HFpEF, complete heart block s/p 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    Sharyn comes in today with her daughter. She reports doing well, no  specific cardiac symptoms as such. No chest pain, shortness of breath, orthopnea, PND, presyncope or syncope.    She is not able to correlated episodes of AF with any symptoms.     Recent Event monitor(personally reviewed):    Device: [employer] (D) pacemaker  Pacing %/Programmed: AP <1%,  98%, DDD 50-100bpm  Lead(s): Stable, no lead testing as previously done 3/28/23  Battery longevity: Estimating 8.9-9.2 years remaining  Presenting rhythm: ASVP 60bpm  Underlying rhythm: SR w/ CHB, no R's @ VVI 30bpm  Heart rates: Stable, HR's per histogram range 50-120bpm and primarily 60-80bpm  Atrial arrhythmias: Since 3/28/23, 69 mode switches logged, available EGM's suggest AT/AF, per episode log longest episode lasting ~37 mins on 6/5/23, burden <1%, v-rate >/=120bpm <5%, patient denies symptoms.  Anticoagulant: None  Ventricular arrhythmias: None  Lead/device alerts: NA  Comments: Normal device function. No changes.  Plan: Remote device check scheduled for 9/28/23.    ECHO 3/2/2021 (report reviewed):     Left ventricle ejection fraction is normal. The estimated left ventricular ejection fraction is 55%.    Left ventricular diastolic function is abnormal.    Normal right ventricular size and systolic function.    No hemodynamically significant valvular heart abnormalities.    When compared to the previous study dated 3/20/2018, No significant change     Stress test 5/12/2021 (report reviewed):     The nuclear stress test is abnormal.    Nuclear images demonstrate a small area of moderate ischemia involving the distal anteroseptal, anterior and apical wall, although specificity reduced due to breast attenuation.    The left ventricular ejection fraction at stress is 64% without wall motion abnormality.    A prior study was conducted on 3/23/2018.  Imaging appears more consistent with ischemia than infarction on the current study.     Cardiac cath 5/26/2021 (report reviewed):   RA mean 4mmHg  PA mean 24mmHg  PCWP  21mmHg  LVEDP 19mmHg  Ao 153/62     PA sat 67%  Ao sat 94%     CO cindy 3.35     Angiography via left radial  LM short normal  LAD mid 50% narrowing with FFR 0.85  Circ normal  RCA normal    Labs below reviewed personally     Physical Examination  Review of Systems   Vitals: /50 (BP Location: Left arm, Patient Position: Sitting, Cuff Size: Adult Regular)   Pulse 60   Resp 16   Wt 66.9 kg (147 lb 6.4 oz)   BMI 27.85 kg/m    BMI= Body mass index is 27.85 kg/m .  Wt Readings from Last 3 Encounters:   06/22/23 66.9 kg (147 lb 6.4 oz)   06/21/23 66.7 kg (147 lb)   06/16/23 67 kg (147 lb 11.2 oz)       General Appearance:   no distress, normal body habitus   ENT/Mouth: membranes moist, no oral lesions or bleeding gums.      EYES:  no scleral icterus, normal conjunctivae   Neck: no carotid bruits or thyromegaly   Chest/Lungs:   lungs are clear to auscultation, no rales or wheezing, no sternal scar, equal chest wall expansion    Cardiovascular:   Regular. Normal first and second heart sounds with no murmurs, rubs, or gallops; the carotid, radial and posterior tibial pulses are intact, no edema bilaterally    Abdomen:  no organomegaly, masses, bruits, or tenderness; bowel sounds are present   Extremities: no cyanosis or clubbing   Skin: no xanthelasma, warm.    Neurologic: normal  bilateral, no tremors     Psychiatric: alert and oriented x3, calm        Please refer above for cardiac ROS details.        Medical History  Surgical History Family History Social History   Past Medical History:   Diagnosis Date    Abnormal ECG     Anxiety     Arthritis     Chest pain     Chest pain     Chronic kidney disease     stage 3-mod.    Congestive heart failure (H)     Diabetes (H)     Dyslipidemia, goal LDL below 70     GERD (gastroesophageal reflux disease)     HTN (hypertension)     Hyperlipidemia     Macular degeneration (senile) of retina     Melanoma of ankle (H)     L ankle    Other second degree atrioventricular block      Created by Conversion     Pacemaker     PSVT (paroxysmal supraventricular tachycardia) (H)     Right bundle branch block     Skin cancer      Past Surgical History:   Procedure Laterality Date    ABLATION OF DYSRHYTHMIC FOCUS  04/11/2013    AV analia reentry tachycardia, partially successful    BIOPSY SKIN (LOCATION)      CARDIAC CATHETERIZATION  03/10/2016    CV CORONARY ANGIOGRAM N/A 05/26/2021    Procedure: Coronary Angiogram;  Surgeon: Yony Gillis MD;  Location: [facility] Cardiac Cath Lab;  Service: Cardiology    CV LEFT HEART CATHETERIZATION WITHOUT LEFT VENTRICULOGRAM Left 05/26/2021    Procedure: Left Heart Catheterization Without Left Ventriculogram;  Surgeon: Yony Gillis MD;  Location: [facility] Cardiac Cath Lab;  Service: Cardiology    CV RIGHT HEART CATHETERIZATION N/A 05/26/2021    Procedure: Right Heart Catheterization;  Surgeon: Yony Gillis MD;  Location: [facility] Cardiac Cath Lab;  Service: Cardiology    DILATION AND CURETTAGE N/A 4/19/2022    Procedure: DILATION AND CURRETAGE;  Surgeon: Mary Reed MD;  Location: [facility] OR    EP PACEMAKER N/A 08/30/2021    Procedure: Electrophysiology Pacemaker;  Surgeon: Ab Saavedra MD;  Location: [facility] LAB CV    FOOT SURGERY      L foot    HAND SURGERY      on both thumbs    HYSTERECTOMY, TOTAL, ROBOT-ASSISTED, LAP, DA HAROON XI, W/BI SAL-OOPH & SNT LYMPH NODE BX, MINI LAP Bilateral 5/31/2022    Procedure: ROBOTIC ASSISTED TOTAL LAPAROSCOPIC HYSTERECTOMY, BILATERAL SALPINGO-OOPHORECTOMY, SENTINEL LYMPH NODE INJECTION AND BIOPSY, MINI-LAPAROTOMY,;  Surgeon: Mary Reed MD;  Location: [facility] OR    HYSTEROSCOPY DIAGNOSTIC N/A 4/19/2022    Procedure: HYSTEROSCOPY, DIAGNOSTIC;  Surgeon: Mary Reed MD;  Location: [facility] OR    IMPLANT PACEMAKER  04/01/2011    Second-degree AV block    OTHER SURGICAL HISTORY      SVT Ablation    PELVIC EXAM UNDER  ANESTHESIA, CERVICAL DILATION, N/A     PELVIC EXAMINATION UNDER ANESTHESIA N/A 4/19/2022    Procedure: PELVIC EXAM UNDER ANESTHESIA, CERVICAL DILATION,;  Surgeon: Zoila Riggs, Mary Malloy MD;  Location: [facility] SHLDR ARTHROSCOP,SURG,W/ROTAT CUFF REPR Left 03/09/2017    Procedure: LEFT SHOULDER ARTHROSCOPY DECOMPRESSION DISTAL CLAVICLE EXCISION  ROTATOR CUFF REPAIR BICEPS TENOTOMY AND EXTENSIVE DEBRIDEMENT;  Surgeon: Todd Riggs MD;  Location: [facility];  Service: Orthopedics    RELEASE CARPAL TUNNEL      both wrists    SKIN CANCER EXCISION      L ankle,Lleg and cheek    TOE SURGERY      L big toe joint replacement    TUBAL LIGATION       Family History   Problem Relation Age of Onset    Hypertension Mother     Cerebrovascular Disease Mother     Prostate Cancer Father     Cancer Father     Coronary Artery Disease Father     Dyslipidemia Father     Dyslipidemia Sister     Dyslipidemia Brother     Hypertension Brother     Prostate Cancer Brother         Social History     Socioeconomic History    Marital status:      Spouse name: Not on file    Number of children: Not on file    Years of education: Not on file    Highest education level: Not on file   Occupational History    Not on file   Tobacco Use    Smoking status: Never    Smokeless tobacco: Never   Substance and Sexual Activity    Alcohol use: No    Drug use: No    Sexual activity: Yes     Partners: Male     Birth control/protection: Surgical   Other Topics Concern    Not on file   Social History Narrative    Not on file     Social Determinants of Health     Financial Resource Strain: Not on file   Food Insecurity: Not on file   Transportation Needs: Not on file   Physical Activity: Not on file   Stress: Not on file   Social Connections: Not on file   Intimate Partner Violence: Not on file   Housing Stability: Not on file           Medications  Allergies   Current Outpatient Medications   Medication Sig Dispense Refill     acetaminophen (TYLENOL) 500 MG tablet Take 500-1,000 mg by mouth every 6 hours as needed for mild pain      apixaban ANTICOAGULANT (ELIQUIS) 5 MG tablet Take 1 tablet (5 mg) by mouth 2 times daily 60 tablet 1    atorvastatin (LIPITOR) 80 MG tablet Take 80 mg by mouth At Bedtime      calcium carbonate (TUMS) 500 MG chewable tablet Take 1-2 chew tab by mouth daily      carvedilol (COREG) 25 MG tablet TAKE 1 TABLET(25 MG) BY MOUTH TWICE DAILY WITH MEALS 180 tablet 1    escitalopram (LEXAPRO) 10 MG tablet Take 10 mg by mouth daily      ferrous sulfate (FEROSUL) 325 (65 Fe) MG tablet Take 325 mg by mouth daily (with breakfast)      fluorometholone (FML LIQUIFILM) 0.1 % ophthalmic suspension Place 1 drop into the right eye daily      furosemide (LASIX) 20 MG tablet Take 2 tablets (40 mg) by mouth daily 180 tablet 3    gabapentin (NEURONTIN) 100 MG capsule Take 1 capsule (100 mg) by mouth 3 times daily 270 capsule 3    hydrALAZINE (APRESOLINE) 100 MG tablet Take 1 tablet (100 mg) by mouth 3 times daily 270 tablet 1    losartan (COZAAR) 50 MG tablet TAKE 1 TABLET(50 MG) BY MOUTH EVERY EVENING 90 tablet 3    metFORMIN (GLUCOPHAGE) 500 MG tablet [METFORMIN (GLUCOPHAGE) 500 MG TABLET] Take 1 tablet (500 mg total) by mouth 2 (two) times a day with meals. At breakfast and at dinner 60 tablet 0    Multiple Vitamins-Minerals (MULTIVITAMIN ADULTS 50+) TABS Take 1 tablet by mouth every morning      omeprazole (PRILOSEC) 20 MG capsule Take 20 mg by mouth daily before breakfast      vit C/E/Zn/coppr/lutein/zeaxan (PRESERVISION AREDS-2 ORAL) Take 1 tablet by mouth 2 times daily (before meals)      prednisoLONE acetate (PRED-FORTE) 1 % ophthalmic suspension Place 1 drop into the right eye daily (Patient not taking: Reported on 6/16/2023)         Allergies   Allergen Reactions    Herlinda-Kit Bee Sting Shortness Of Breath    Venom-Honey Bee [Bee Venom] Shortness Of Breath    Mercurial Analogues [Mercurial Derivatives] Dermatitis     blisters     Nitrofurantoin Other (See Comments)     Burning sensation across chest and arms    Other reaction(s): arms and chest burning    Sulfa (Sulfonamide Antibiotics) [Sulfa Antibiotics] Rash    Sulfamethoxazole-Trimethoprim Rash     Other reaction(s): rash          Lab Results    Chemistry/lipid CBC Cardiac Enzymes/BNP/TSH/INR   Recent Labs   Lab Test 03/31/22  0922   CHOL 126   HDL 46*   LDL 69   TRIG 53     Recent Labs   Lab Test 03/31/22  0922 04/13/21  0906 04/28/20  0835   LDL 69 63 67     Recent Labs   Lab Test 05/12/23  1629   *   POTASSIUM 4.2   CHLORIDE 95*   CO2 29   GLC 85   BUN 42.8*   CR 1.58*   GFRESTIMATED 33*   MAURICIO 9.5     Recent Labs   Lab Test 05/12/23  1629 01/05/23  1052 10/06/22  1634   CR 1.58* 1.53* 1.57*     Recent Labs   Lab Test 05/12/23  1629 09/13/21  1042 03/02/21  0505   A1C 5.3 5.4 5.9*          Recent Labs   Lab Test 05/12/23  1629   WBC 5.5   HGB 10.9*   HCT 32.6*   MCV 93        Recent Labs   Lab Test 05/12/23  1629 12/13/22  1320 09/07/22  1157   HGB 10.9* 9.5 10.3*    Recent Labs   Lab Test 04/05/22  1703 04/05/22  1425 04/05/22  1305   TROPONINI 0.04 0.04 0.02     Recent Labs   Lab Test 04/05/22  1305 09/13/21  0125 05/06/21  2310   * 255* 148     Recent Labs   Lab Test 05/12/23  1629   TSH 6.36*     Recent Labs   Lab Test 09/13/21  0124 05/06/21  2311 03/19/18  2331   INR 1.05 1.05 0.94        Chintan Fritz MD      Thank you for allowing me to participate in the care of your patient.      Sincerely,     Chintan Fritz MD     [facility]  cc:   Linnette Wade MD  [address] [facility] [address]  [place] [address]

## 2023-06-22 NOTE — PROGRESS NOTES
HEART CARE ENCOUNTER CONSULTATON NOTE      [facility]  [phone]      Assessment/Recommendations   Assessment/Plan:    Sharyn Trent is a very pleasant 82 year old female with a significant past history of HFpEF, complete heart block s/p 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    1. Complete heart block s/p dual chamber pacemaker  - RV pacing burden 97%  - Given high RV pacing burden TTE ordered, will follow up  - will need upgrade if she has a decline in LVEF  - Check today shows device is working well    2. Atrial fibrillation  - completely asymptomatic from Afib stand point  - continue Coreg for now    3. Anticoagulation  - CHADSVASC score 6  - Recommend starting anticoagulation with Eliquis, stop Aspirin 325 daily if starting Eliquis    Time spent: 45 minutes spent on the date of the encounter doing chart review, history and exam, documentation and further activities as noted above.         History of Present Illness/Subjective    HPI: Sharyn Trent is a very pleasant 82 year old female with a significant past history of HFpEF, complete heart block s/p 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    Sharyn comes in today with her daughter. She reports doing well, no specific cardiac symptoms as such. No chest pain, shortness of breath, orthopnea, PND, presyncope or syncope.    She is not able to correlated episodes of AF with any symptoms.     Recent Event  monitor(personally reviewed):    Device: [employer] (D) pacemaker  Pacing %/Programmed: AP <1%,  98%, DDD 50-100bpm  Lead(s): Stable, no lead testing as previously done 3/28/23  Battery longevity: Estimating 8.9-9.2 years remaining  Presenting rhythm: ASVP 60bpm  Underlying rhythm: SR w/ CHB, no R's @ VVI 30bpm  Heart rates: Stable, HR's per histogram range 50-120bpm and primarily 60-80bpm  Atrial arrhythmias: Since 3/28/23, 69 mode switches logged, available EGM's suggest AT/AF, per episode log longest episode lasting ~37 mins on 6/5/23, burden <1%, v-rate >/=120bpm <5%, patient denies symptoms.  Anticoagulant: None  Ventricular arrhythmias: None  Lead/device alerts: NA  Comments: Normal device function. No changes.  Plan: Remote device check scheduled for 9/28/23.    ECHO 3/2/2021 (report reviewed):     Left ventricle ejection fraction is normal. The estimated left ventricular ejection fraction is 55%.    Left ventricular diastolic function is abnormal.    Normal right ventricular size and systolic function.    No hemodynamically significant valvular heart abnormalities.    When compared to the previous study dated 3/20/2018, No significant change     Stress test 5/12/2021 (report reviewed):      The nuclear stress test is abnormal.     Nuclear images demonstrate a small area of moderate ischemia involving the distal anteroseptal, anterior and apical wall, although specificity reduced due to breast attenuation.     The left ventricular ejection fraction at stress is 64% without wall motion abnormality.     A prior study was conducted on 3/23/2018.  Imaging appears more consistent with ischemia than infarction on the current study.     Cardiac cath 5/26/2021 (report reviewed):   RA mean 4mmHg  PA mean 24mmHg  PCWP 21mmHg  LVEDP 19mmHg  Ao 153/62     PA sat 67%  Ao sat 94%     CO cindy 3.35     Angiography via left radial  LM short normal  LAD mid 50% narrowing with FFR 0.85  Circ normal  RCA normal    Labs  below reviewed personally     Physical Examination  Review of Systems   Vitals: /50 (BP Location: Left arm, Patient Position: Sitting, Cuff Size: Adult Regular)   Pulse 60   Resp 16   Wt 66.9 kg (147 lb 6.4 oz)   BMI 27.85 kg/m    BMI= Body mass index is 27.85 kg/m .  Wt Readings from Last 3 Encounters:   06/22/23 66.9 kg (147 lb 6.4 oz)   06/21/23 66.7 kg (147 lb)   06/16/23 67 kg (147 lb 11.2 oz)       General Appearance:   no distress, normal body habitus   ENT/Mouth: membranes moist, no oral lesions or bleeding gums.      EYES:  no scleral icterus, normal conjunctivae   Neck: no carotid bruits or thyromegaly   Chest/Lungs:   lungs are clear to auscultation, no rales or wheezing, no sternal scar, equal chest wall expansion    Cardiovascular:   Regular. Normal first and second heart sounds with no murmurs, rubs, or gallops; the carotid, radial and posterior tibial pulses are intact, no edema bilaterally    Abdomen:  no organomegaly, masses, bruits, or tenderness; bowel sounds are present   Extremities: no cyanosis or clubbing   Skin: no xanthelasma, warm.    Neurologic: normal  bilateral, no tremors     Psychiatric: alert and oriented x3, calm        Please refer above for cardiac ROS details.        Medical History  Surgical History Family History Social History   Past Medical History:   Diagnosis Date     Abnormal ECG      Anxiety      Arthritis      Chest pain      Chest pain      Chronic kidney disease     stage 3-mod.     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      Pacemaker      PSVT (paroxysmal supraventricular tachycardia) (H)      Right bundle branch block      Skin cancer      Past Surgical History:    Procedure Laterality Date     ABLATION OF DYSRHYTHMIC FOCUS  04/11/2013    AV analia reentry tachycardia, partially successful     BIOPSY SKIN (LOCATION)       CARDIAC CATHETERIZATION  03/10/2016     CV CORONARY ANGIOGRAM N/A 05/26/2021    Procedure: Coronary Angiogram;  Surgeon: Yony Gillis MD;  Location: [facility] Cardiac [facility] Lab;  Service: Cardiology     CV LEFT HEART CATHETERIZATION WITHOUT LEFT VENTRICULOGRAM Left 05/26/2021    Procedure: Left Heart Catheterization Without Left Ventriculogram;  Surgeon: Yony Gillis MD;  Location: [facility];  Service: Cardiology     CV RIGHT HEART CATHETERIZATION N/A 05/26/2021    Procedure: Right Heart Catheterization;  Surgeon: Yony Gillis MD;  Location: [facility];  Service: Cardiology     DILATION AND CURETTAGE N/A 4/19/2022    Procedure: DILATION AND CURRETAGE;  Surgeon: Mary Reed MD;  Location: [facility] OR     EP PACEMAKER N/A 08/30/2021    Procedure: Electrophysiology Pacemaker;  Surgeon: Ab Saavedra MD;  Location: [facility] CV     FOOT SURGERY      L foot     HAND SURGERY      on both thumbs     HYSTERECTOMY, TOTAL, ROBOT-ASSISTED, LAP, DA HAROON XI, W/BI SAL-OOPH & SNT LYMPH NODE BX, MINI LAP Bilateral 5/31/2022    Procedure: ROBOTIC ASSISTED TOTAL LAPAROSCOPIC HYSTERECTOMY, BILATERAL SALPINGO-OOPHORECTOMY, SENTINEL LYMPH NODE INJECTION AND BIOPSY, MINI-LAPAROTOMY,;  Surgeon: Mary Reed MD;  Location: [facility] OR     HYSTEROSCOPY DIAGNOSTIC N/A 4/19/2022    Procedure: HYSTEROSCOPY, DIAGNOSTIC;  Surgeon: Mary Reed MD;  Location: [facility] OR     IMPLANT PACEMAKER  04/01/2011    Second-degree AV block     OTHER SURGICAL HISTORY      SVT Ablation     PELVIC EXAM UNDER ANESTHESIA, CERVICAL DILATION, N/A      PELVIC EXAMINATION UNDER ANESTHESIA N/A 4/19/2022    Procedure: PELVIC EXAM UNDER ANESTHESIA, CERVICAL DILATION,;  Surgeon:  Zoila Riggs, Mary Malloy MD;  Location: [facility] SHLDR ARTHROSCOP,SURG,W/ROTAT CUFF REPR Left 03/09/2017    Procedure: LEFT SHOULDER ARTHROSCOPY DECOMPRESSION DISTAL CLAVICLE EXCISION  ROTATOR CUFF REPAIR BICEPS TENOTOMY AND EXTENSIVE DEBRIDEMENT;  Surgeon: Todd Riggs MD;  Location: [facility];  Service: Orthopedics     RELEASE CARPAL TUNNEL      both wrists     SKIN CANCER EXCISION      L ankle,Lleg and cheek     TOE SURGERY      L big toe joint replacement     TUBAL LIGATION       Family History   Problem Relation Age of Onset     Hypertension Mother      Cerebrovascular Disease Mother      Prostate Cancer Father      Cancer Father      Coronary Artery Disease Father      Dyslipidemia Father      Dyslipidemia Sister      Dyslipidemia Brother      Hypertension Brother      Prostate Cancer Brother         Social History     Socioeconomic History     Marital status:      Spouse name: Not on file     Number of children: Not on file     Years of education: Not on file     Highest education level: Not on file   Occupational History     Not on file   Tobacco Use     Smoking status: Never     Smokeless tobacco: Never   Substance and Sexual Activity     Alcohol use: No     Drug use: No     Sexual activity: Yes     Partners: Male     Birth control/protection: Surgical   Other Topics Concern     Not on file   Social History Narrative     Not on file     Social Determinants of Health     Financial Resource Strain: Not on file   Food Insecurity: Not on file   Transportation Needs: Not on file   Physical Activity: Not on file   Stress: Not on file   Social Connections: Not on file   Intimate Partner Violence: Not on file   Housing Stability: Not on file           Medications  Allergies   Current Outpatient Medications   Medication Sig Dispense Refill     acetaminophen (TYLENOL) 500 MG tablet Take 500-1,000 mg by mouth every 6 hours as needed for mild pain       apixaban ANTICOAGULANT  (ELIQUIS) 5 MG tablet Take 1 tablet (5 mg) by mouth 2 times daily 60 tablet 1     atorvastatin (LIPITOR) 80 MG tablet Take 80 mg by mouth At Bedtime       calcium carbonate (TUMS) 500 MG chewable tablet Take 1-2 chew tab by mouth daily       carvedilol (COREG) 25 MG tablet TAKE 1 TABLET(25 MG) BY MOUTH TWICE DAILY WITH MEALS 180 tablet 1     escitalopram (LEXAPRO) 10 MG tablet Take 10 mg by mouth daily       ferrous sulfate (FEROSUL) 325 (65 Fe) MG tablet Take 325 mg by mouth daily (with breakfast)       fluorometholone (FML LIQUIFILM) 0.1 % ophthalmic suspension Place 1 drop into the right eye daily       furosemide (LASIX) 20 MG tablet Take 2 tablets (40 mg) by mouth daily 180 tablet 3     gabapentin (NEURONTIN) 100 MG capsule Take 1 capsule (100 mg) by mouth 3 times daily 270 capsule 3     hydrALAZINE (APRESOLINE) 100 MG tablet Take 1 tablet (100 mg) by mouth 3 times daily 270 tablet 1     losartan (COZAAR) 50 MG tablet TAKE 1 TABLET(50 MG) BY MOUTH EVERY EVENING 90 tablet 3     metFORMIN (GLUCOPHAGE) 500 MG tablet [METFORMIN (GLUCOPHAGE) 500 MG TABLET] Take 1 tablet (500 mg total) by mouth 2 (two) times a day with meals. At breakfast and at dinner 60 tablet 0     Multiple Vitamins-Minerals (MULTIVITAMIN ADULTS 50+) TABS Take 1 tablet by mouth every morning       omeprazole (PRILOSEC) 20 MG capsule Take 20 mg by mouth daily before breakfast       vit C/E/Zn/coppr/lutein/zeaxan (PRESERVISION AREDS-2 ORAL) Take 1 tablet by mouth 2 times daily (before meals)       prednisoLONE acetate (PRED-FORTE) 1 % ophthalmic suspension Place 1 drop into the right eye daily (Patient not taking: Reported on 6/16/2023)         Allergies   Allergen Reactions     Herlinda-Kit Bee Sting Shortness Of Breath     Venom-Honey Bee [Bee Venom] Shortness Of Breath     Mercurial Analogues [Mercurial Derivatives] Dermatitis     blisters     Nitrofurantoin Other (See Comments)     Burning sensation across chest and arms    Other reaction(s): arms  and chest burning     Sulfa (Sulfonamide Antibiotics) [Sulfa Antibiotics] Rash     Sulfamethoxazole-Trimethoprim Rash     Other reaction(s): rash          Lab Results    Chemistry/lipid CBC Cardiac Enzymes/BNP/TSH/INR   Recent Labs   Lab Test 03/31/22  0922   CHOL 126   HDL 46*   LDL 69   TRIG 53     Recent Labs   Lab Test 03/31/22  0922 04/13/21  0906 04/28/20  0835   LDL 69 63 67     Recent Labs   Lab Test 05/12/23  1629   *   POTASSIUM 4.2   CHLORIDE 95*   CO2 29   GLC 85   BUN 42.8*   CR 1.58*   GFRESTIMATED 33*   MAURICIO 9.5     Recent Labs   Lab Test 05/12/23  1629 01/05/23  1052 10/06/22  1634   CR 1.58* 1.53* 1.57*     Recent Labs   Lab Test 05/12/23  1629 09/13/21  1042 03/02/21  0505   A1C 5.3 5.4 5.9*          Recent Labs   Lab Test 05/12/23  1629   WBC 5.5   HGB 10.9*   HCT 32.6*   MCV 93        Recent Labs   Lab Test 05/12/23  1629 12/13/22  1320 09/07/22  1157   HGB 10.9* 9.5 10.3*    Recent Labs   Lab Test 04/05/22  1703 04/05/22  1425 04/05/22  1305   TROPONINI 0.04 0.04 0.02     Recent Labs   Lab Test 04/05/22  1305 09/13/21  0125 05/06/21  2310   * 255* 148     Recent Labs   Lab Test 05/12/23  1629   TSH 6.36*     Recent Labs   Lab Test 09/13/21  0124 05/06/21  2311 03/19/18  2331   INR 1.05 1.05 0.94        Chintan Fritz MD

## 2023-06-22 NOTE — PATIENT INSTRUCTIONS
[facility]  Cardiac Electrophysiology  [address]  [place] [address]   Office: [phone]  Fax: [phone]       Thank you for seeing us in clinic today - it is a pleasure to be a part of your care team.  Below is a summary of our plan from today's visit.      Pacemaker  - using it 98% if the time  - will get an ultrasound of the heart and check the pumping function    Atrial fibrillation  - start taking Eliquis and stop Aspirin for stroke prevention    Please do not hesitate to be in touch with our office at [phone] with any questions that may arise.      Thank you for trusting us with your care,    Chintan Fritz MD  Clinical Cardiac Electrophysiology  [facility]  [address]  [place] [address]   Office: [phone]  Fax: [phone]

## 2023-07-13 ENCOUNTER — ALLIED HEALTH/NURSE VISIT (OUTPATIENT)
Dept: CARDIOLOGY | Facility: CLINIC | Age: 83
End: 2023-07-13
Payer: COMMERCIAL

## 2023-07-13 DIAGNOSIS — I50.32 CHRONIC HEART FAILURE WITH PRESERVED EJECTION FRACTION (H): Primary | ICD-10-CM

## 2023-07-13 PROCEDURE — 99454 REM MNTR PHYSIOL PARAM 16-30: CPT | Performed by: INTERNAL MEDICINE

## 2023-07-17 ENCOUNTER — TRANSFERRED RECORDS (OUTPATIENT)
Dept: HEALTH INFORMATION MANAGEMENT | Facility: CLINIC | Age: 83
End: 2023-07-17

## 2023-07-17 ENCOUNTER — LAB REQUISITION (OUTPATIENT)
Dept: LAB | Facility: CLINIC | Age: 83
End: 2023-07-17

## 2023-07-17 DIAGNOSIS — I50.32 CHRONIC DIASTOLIC (CONGESTIVE) HEART FAILURE (H): ICD-10-CM

## 2023-07-17 DIAGNOSIS — I13.0 HYPERTENSIVE HEART AND CHRONIC KIDNEY DISEASE WITH HEART FAILURE AND STAGE 1 THROUGH STAGE 4 CHRONIC KIDNEY DISEASE, OR UNSPECIFIED CHRONIC KIDNEY DISEASE (H): ICD-10-CM

## 2023-07-17 DIAGNOSIS — E11.22 TYPE 2 DIABETES MELLITUS WITH DIABETIC CHRONIC KIDNEY DISEASE (H): ICD-10-CM

## 2023-07-17 DIAGNOSIS — E04.1 NONTOXIC SINGLE THYROID NODULE: ICD-10-CM

## 2023-07-17 LAB
ANION GAP SERPL CALCULATED.3IONS-SCNC: 12 MMOL/L (ref 7–15)
BUN SERPL-MCNC: 33.9 MG/DL (ref 8–23)
CALCIUM SERPL-MCNC: 9.2 MG/DL (ref 8.8–10.2)
CHLORIDE SERPL-SCNC: 100 MMOL/L (ref 98–107)
CHOLEST SERPL-MCNC: 111 MG/DL
CREAT SERPL-MCNC: 1.5 MG/DL (ref 0.51–0.95)
DEPRECATED HCO3 PLAS-SCNC: 28 MMOL/L (ref 22–29)
GFR SERPL CREATININE-BSD FRML MDRD: 35 ML/MIN/1.73M2
GLUCOSE SERPL-MCNC: 95 MG/DL (ref 70–99)
HDLC SERPL-MCNC: 45 MG/DL
LDLC SERPL CALC-MCNC: 50 MG/DL
MAGNESIUM SERPL-MCNC: 1.9 MG/DL (ref 1.7–2.3)
NONHDLC SERPL-MCNC: 66 MG/DL
POTASSIUM SERPL-SCNC: 4.3 MMOL/L (ref 3.4–5.3)
SODIUM SERPL-SCNC: 140 MMOL/L (ref 136–145)
TRIGL SERPL-MCNC: 78 MG/DL
TSH SERPL DL<=0.005 MIU/L-ACNC: 6.36 UIU/ML (ref 0.3–4.2)

## 2023-07-17 PROCEDURE — 84443 ASSAY THYROID STIM HORMONE: CPT | Performed by: FAMILY MEDICINE

## 2023-07-17 PROCEDURE — 80061 LIPID PANEL: CPT | Performed by: FAMILY MEDICINE

## 2023-07-17 PROCEDURE — 83735 ASSAY OF MAGNESIUM: CPT | Performed by: FAMILY MEDICINE

## 2023-07-17 PROCEDURE — 80048 BASIC METABOLIC PNL TOTAL CA: CPT | Performed by: FAMILY MEDICINE

## 2023-07-18 ENCOUNTER — TELEPHONE (OUTPATIENT)
Dept: CARDIOLOGY | Facility: CLINIC | Age: 83
End: 2023-07-18
Payer: COMMERCIAL

## 2023-07-18 DIAGNOSIS — I50.20 SYSTOLIC CONGESTIVE HEART FAILURE, UNSPECIFIED HF CHRONICITY (H): Primary | ICD-10-CM

## 2023-07-26 NOTE — PROGRESS NOTES
[facility]: [facility] Routine Remote Evaluation     HRS Enrollment Date:         9/1/22                               Billing Dates: 6/13/23 through 7/13/23  HF Dx: HFpEF      HRS Alerts During Monitoring Period:    0    No adjustments made this month.  Discussed with patient/caregiver and they will continue with current plan of care.           Future Appointments   Date Time Provider Department Center   8/7/2023 10:00 AM BRITT [facility] ECHO STAFF JNCVTS [facility]   8/14/2023  4:00 AM EBHZAD [facility] CARDIOMEMS HRSJN [facility]   8/15/2023 11:10 AM Reji Davila MD NUNEU [facility]PL[facility]   9/28/2023 12:00 AM BRITT [facility] REMOTE DEVICE CHECK FROM HOME HRCVN [facility]   12/4/2023  1:30 PM Eduardo Andersen MD [facility]UL[facility]   6/21/2024 11:10 AM Reji Davila MD NUNEU [facility] MPLW     Will continue with weekly monitoring with HF provider team.     TOTAL INTERACTIVE COMMUNICATION WITH PATIENT DURING THIS BILLING PERIOD: 0 minutes.    Juan Carlos QUIROGA RN  BSN    I have reviewed Juan Carlos CONN RN, BSN's note and agree.    Heidy Akins MD., MHS    READINGS:

## 2023-08-02 ENCOUNTER — TELEPHONE (OUTPATIENT)
Dept: NEUROLOGY | Facility: CLINIC | Age: 83
End: 2023-08-02
Payer: COMMERCIAL

## 2023-08-02 NOTE — TELEPHONE ENCOUNTER
Spoke to pt, notified per Dr. Davila's last OV note on 6-16-23:    Return back on as-needed basis or in 1 year     Appt in August cancelled. She already has follow up scheduled in June 2024. She will keep this appt for now, may decide to cancel later if she feels she doesn't need.    Gloria Jason, LPN on 8/2/2023 at 3:24 PM

## 2023-08-02 NOTE — TELEPHONE ENCOUNTER
[facility] Call Center    Phone Message    May a detailed message be left on voicemail: yes     Reason for Call: Other: Pt is requesting  a call back to see if her appt on 8/15/23 is needed or not. Please call back to advise at # [phone].    Action Taken: Message routed to:  Other: [facility]     Travel Screening: Not Applicable

## 2023-08-07 ENCOUNTER — HOSPITAL ENCOUNTER (OUTPATIENT)
Dept: CARDIOLOGY | Facility: HOSPITAL | Age: 83
Discharge: HOME | End: 2023-08-07
Attending: GENERAL ACUTE CARE HOSPITAL | Admitting: GENERAL ACUTE CARE HOSPITAL
Payer: COMMERCIAL

## 2023-08-07 DIAGNOSIS — I44.2 THIRD DEGREE AV BLOCK (H): ICD-10-CM

## 2023-08-07 DIAGNOSIS — Z95.0 CARDIAC PACEMAKER IN SITU: ICD-10-CM

## 2023-08-07 DIAGNOSIS — I47.19 PAROXYSMAL ATRIAL TACHYCARDIA (H): ICD-10-CM

## 2023-08-07 DIAGNOSIS — I50.32 CHRONIC HEART FAILURE WITH PRESERVED EJECTION FRACTION (H): ICD-10-CM

## 2023-08-07 LAB — LVEF ECHO: NORMAL

## 2023-08-07 PROCEDURE — 93306 TTE W/DOPPLER COMPLETE: CPT

## 2023-08-07 PROCEDURE — 93306 TTE W/DOPPLER COMPLETE: CPT | Mod: 26 | Performed by: INTERNAL MEDICINE

## 2023-08-11 DIAGNOSIS — I47.19 PAROXYSMAL ATRIAL TACHYCARDIA (H): ICD-10-CM

## 2023-08-11 DIAGNOSIS — I48.0 PAROXYSMAL ATRIAL FIBRILLATION (H): ICD-10-CM

## 2023-08-11 DIAGNOSIS — I50.20 SYSTOLIC CONGESTIVE HEART FAILURE, UNSPECIFIED HF CHRONICITY (H): ICD-10-CM

## 2023-08-11 DIAGNOSIS — Z95.0 CARDIAC PACEMAKER IN SITU: ICD-10-CM

## 2023-08-11 DIAGNOSIS — I50.32 CHRONIC HEART FAILURE WITH PRESERVED EJECTION FRACTION (H): ICD-10-CM

## 2023-08-11 DIAGNOSIS — I44.2 THIRD DEGREE AV BLOCK (H): Primary | ICD-10-CM

## 2023-08-14 ENCOUNTER — ALLIED HEALTH/NURSE VISIT (OUTPATIENT)
Dept: CARDIOLOGY | Facility: CLINIC | Age: 83
End: 2023-08-14
Payer: COMMERCIAL

## 2023-08-14 DIAGNOSIS — I48.0 PAROXYSMAL ATRIAL FIBRILLATION (H): ICD-10-CM

## 2023-08-14 DIAGNOSIS — I50.32 CHRONIC HEART FAILURE WITH PRESERVED EJECTION FRACTION (H): Primary | ICD-10-CM

## 2023-08-14 PROCEDURE — 99454 REM MNTR PHYSIOL PARAM 16-30: CPT | Performed by: INTERNAL MEDICINE

## 2023-08-23 NOTE — PROGRESS NOTES
[facility]:   HRS ([employer]) Routine Remote Evaluation    HRS Enrollment date: 9/1/22     Dates: 7/14/23 through 8/14/23    This is not the first billing cycle.    Adjustments made in the last month: no    No adjustments made this month.  Discussed with patient/caregiver and they will continue current plan of care.     Last 30 days:      Total Minutes Spent: 0 minutes     Future Appointments   Date Time Provider Department Center   9/28/2023 12:00 AM JN [facility] REMOTE DEVICE CHECK FROM HOME HRCVN [facility]   12/4/2023  1:30 PM Eduardo Andersen MD [facility]   6/21/2024 11:10 AM Reji Davila MD NUNEU [facility]     Juan Carlos QUIROGA RN  BSN    I have reviewed Juan Carlos CONN RN, BSN's note and agree.    Heidy Akins MD., MHS

## 2023-08-30 ENCOUNTER — TELEPHONE (OUTPATIENT)
Dept: CARDIOLOGY | Facility: CLINIC | Age: 83
End: 2023-08-30
Payer: COMMERCIAL

## 2023-08-30 DIAGNOSIS — I25.119 CORONARY ARTERY DISEASE INVOLVING NATIVE CORONARY ARTERY OF NATIVE HEART WITH ANGINA PECTORIS (H): Primary | ICD-10-CM

## 2023-08-30 RX ORDER — HYDRALAZINE HYDROCHLORIDE 100 MG/1
100 TABLET, FILM COATED ORAL 3 TIMES DAILY
Qty: 270 TABLET | Refills: 1 | Status: SHIPPED | OUTPATIENT
Start: 2023-08-30 | End: 2024-03-05

## 2023-09-04 DIAGNOSIS — I50.9 CHF (CONGESTIVE HEART FAILURE) (H): ICD-10-CM

## 2023-09-05 RX ORDER — CARVEDILOL 25 MG/1
TABLET ORAL
Qty: 180 TABLET | Refills: 1 | Status: SHIPPED | OUTPATIENT
Start: 2023-09-05 | End: 2024-03-05

## 2023-09-14 ENCOUNTER — ALLIED HEALTH/NURSE VISIT (OUTPATIENT)
Dept: CARDIOLOGY | Facility: CLINIC | Age: 83
End: 2023-09-14
Payer: COMMERCIAL

## 2023-09-14 DIAGNOSIS — I50.31 ACUTE DIASTOLIC CHF (CONGESTIVE HEART FAILURE) (H): Primary | ICD-10-CM

## 2023-09-14 PROCEDURE — 99454 REM MNTR PHYSIOL PARAM 16-30: CPT | Performed by: INTERNAL MEDICINE

## 2023-09-28 ENCOUNTER — ANCILLARY PROCEDURE (OUTPATIENT)
Dept: CARDIOLOGY | Facility: CLINIC | Age: 83
End: 2023-09-28
Attending: INTERNAL MEDICINE
Payer: COMMERCIAL

## 2023-09-28 DIAGNOSIS — Z95.0 CARDIAC PACEMAKER IN SITU: ICD-10-CM

## 2023-09-28 DIAGNOSIS — I44.1 SECOND DEGREE AV BLOCK: ICD-10-CM

## 2023-09-28 LAB
MDC_IDC_EPISODE_DTM: NORMAL
MDC_IDC_EPISODE_DURATION: 10 S
MDC_IDC_EPISODE_DURATION: 106 S
MDC_IDC_EPISODE_DURATION: 106 S
MDC_IDC_EPISODE_DURATION: 112 S
MDC_IDC_EPISODE_DURATION: 12 S
MDC_IDC_EPISODE_DURATION: 12 S
MDC_IDC_EPISODE_DURATION: 14 S
MDC_IDC_EPISODE_DURATION: 20 S
MDC_IDC_EPISODE_DURATION: 200 S
MDC_IDC_EPISODE_DURATION: 22 S
MDC_IDC_EPISODE_DURATION: 30 S
MDC_IDC_EPISODE_DURATION: 54 S
MDC_IDC_EPISODE_DURATION: 58 S
MDC_IDC_EPISODE_DURATION: 64 S
MDC_IDC_EPISODE_DURATION: 76 S
MDC_IDC_EPISODE_DURATION: 86 S
MDC_IDC_EPISODE_DURATION: 92 S
MDC_IDC_EPISODE_ID: NORMAL
MDC_IDC_EPISODE_TYPE: NORMAL
MDC_IDC_LEAD_IMPLANT_DT: NORMAL
MDC_IDC_LEAD_IMPLANT_DT: NORMAL
MDC_IDC_LEAD_LOCATION: NORMAL
MDC_IDC_LEAD_LOCATION: NORMAL
MDC_IDC_LEAD_LOCATION_DETAIL_1: NORMAL
MDC_IDC_LEAD_LOCATION_DETAIL_1: NORMAL
MDC_IDC_LEAD_MFG: NORMAL
MDC_IDC_LEAD_MFG: NORMAL
MDC_IDC_LEAD_MODEL: NORMAL
MDC_IDC_LEAD_MODEL: NORMAL
MDC_IDC_LEAD_POLARITY_TYPE: NORMAL
MDC_IDC_LEAD_POLARITY_TYPE: NORMAL
MDC_IDC_LEAD_SERIAL: NORMAL
MDC_IDC_LEAD_SERIAL: NORMAL
MDC_IDC_MSMT_BATTERY_DTM: NORMAL
MDC_IDC_MSMT_BATTERY_REMAINING_LONGEVITY: 104 MO
MDC_IDC_MSMT_BATTERY_REMAINING_PERCENTAGE: 81 %
MDC_IDC_MSMT_BATTERY_RRT_TRIGGER: NORMAL
MDC_IDC_MSMT_BATTERY_STATUS: NORMAL
MDC_IDC_MSMT_BATTERY_VOLTAGE: 3.02 V
MDC_IDC_MSMT_LEADCHNL_RA_IMPEDANCE_VALUE: 400 OHM
MDC_IDC_MSMT_LEADCHNL_RA_LEAD_CHANNEL_STATUS: NORMAL
MDC_IDC_MSMT_LEADCHNL_RA_PACING_THRESHOLD_AMPLITUDE: 0.5 V
MDC_IDC_MSMT_LEADCHNL_RA_PACING_THRESHOLD_PULSEWIDTH: 0.5 MS
MDC_IDC_MSMT_LEADCHNL_RA_SENSING_INTR_AMPL: 2.9 MV
MDC_IDC_MSMT_LEADCHNL_RV_IMPEDANCE_VALUE: 510 OHM
MDC_IDC_MSMT_LEADCHNL_RV_LEAD_CHANNEL_STATUS: NORMAL
MDC_IDC_MSMT_LEADCHNL_RV_PACING_THRESHOLD_AMPLITUDE: 1 V
MDC_IDC_MSMT_LEADCHNL_RV_PACING_THRESHOLD_PULSEWIDTH: 0.5 MS
MDC_IDC_MSMT_LEADCHNL_RV_SENSING_INTR_AMPL: 6 MV
MDC_IDC_PG_IMPLANT_DTM: NORMAL
MDC_IDC_PG_MFG: NORMAL
MDC_IDC_PG_MODEL: NORMAL
MDC_IDC_PG_SERIAL: NORMAL
MDC_IDC_PG_TYPE: NORMAL
MDC_IDC_SESS_CLINIC_NAME: NORMAL
MDC_IDC_SESS_DTM: NORMAL
MDC_IDC_SESS_REPROGRAMMED: NO
MDC_IDC_SESS_TYPE: NORMAL
MDC_IDC_SET_BRADY_AT_MODE_SWITCH_MODE: NORMAL
MDC_IDC_SET_BRADY_AT_MODE_SWITCH_RATE: 180 {BEATS}/MIN
MDC_IDC_SET_BRADY_LOWRATE: 50 {BEATS}/MIN
MDC_IDC_SET_BRADY_MAX_SENSOR_RATE: 100 {BEATS}/MIN
MDC_IDC_SET_BRADY_MAX_TRACKING_RATE: 100 {BEATS}/MIN
MDC_IDC_SET_BRADY_MODE: NORMAL
MDC_IDC_SET_BRADY_PAV_DELAY_HIGH: 100 MS
MDC_IDC_SET_BRADY_PAV_DELAY_LOW: 300 MS
MDC_IDC_SET_BRADY_SAV_DELAY_HIGH: 100 MS
MDC_IDC_SET_BRADY_SAV_DELAY_LOW: 130 MS
MDC_IDC_SET_LEADCHNL_RA_PACING_AMPLITUDE: 1.5 V
MDC_IDC_SET_LEADCHNL_RA_PACING_ANODE_ELECTRODE_1: NORMAL
MDC_IDC_SET_LEADCHNL_RA_PACING_ANODE_LOCATION_1: NORMAL
MDC_IDC_SET_LEADCHNL_RA_PACING_CAPTURE_MODE: NORMAL
MDC_IDC_SET_LEADCHNL_RA_PACING_CATHODE_ELECTRODE_1: NORMAL
MDC_IDC_SET_LEADCHNL_RA_PACING_CATHODE_LOCATION_1: NORMAL
MDC_IDC_SET_LEADCHNL_RA_PACING_POLARITY: NORMAL
MDC_IDC_SET_LEADCHNL_RA_PACING_PULSEWIDTH: 0.5 MS
MDC_IDC_SET_LEADCHNL_RA_SENSING_ADAPTATION_MODE: NORMAL
MDC_IDC_SET_LEADCHNL_RA_SENSING_ANODE_ELECTRODE_1: NORMAL
MDC_IDC_SET_LEADCHNL_RA_SENSING_ANODE_LOCATION_1: NORMAL
MDC_IDC_SET_LEADCHNL_RA_SENSING_CATHODE_ELECTRODE_1: NORMAL
MDC_IDC_SET_LEADCHNL_RA_SENSING_CATHODE_LOCATION_1: NORMAL
MDC_IDC_SET_LEADCHNL_RA_SENSING_POLARITY: NORMAL
MDC_IDC_SET_LEADCHNL_RA_SENSING_SENSITIVITY: 0.2 MV
MDC_IDC_SET_LEADCHNL_RV_PACING_AMPLITUDE: 1.25 V
MDC_IDC_SET_LEADCHNL_RV_PACING_ANODE_ELECTRODE_1: NORMAL
MDC_IDC_SET_LEADCHNL_RV_PACING_ANODE_LOCATION_1: NORMAL
MDC_IDC_SET_LEADCHNL_RV_PACING_CAPTURE_MODE: NORMAL
MDC_IDC_SET_LEADCHNL_RV_PACING_CATHODE_ELECTRODE_1: NORMAL
MDC_IDC_SET_LEADCHNL_RV_PACING_CATHODE_LOCATION_1: NORMAL
MDC_IDC_SET_LEADCHNL_RV_PACING_POLARITY: NORMAL
MDC_IDC_SET_LEADCHNL_RV_PACING_PULSEWIDTH: 0.5 MS
MDC_IDC_SET_LEADCHNL_RV_SENSING_ADAPTATION_MODE: NORMAL
MDC_IDC_SET_LEADCHNL_RV_SENSING_ANODE_ELECTRODE_1: NORMAL
MDC_IDC_SET_LEADCHNL_RV_SENSING_ANODE_LOCATION_1: NORMAL
MDC_IDC_SET_LEADCHNL_RV_SENSING_CATHODE_ELECTRODE_1: NORMAL
MDC_IDC_SET_LEADCHNL_RV_SENSING_CATHODE_LOCATION_1: NORMAL
MDC_IDC_SET_LEADCHNL_RV_SENSING_POLARITY: NORMAL
MDC_IDC_SET_LEADCHNL_RV_SENSING_SENSITIVITY: 2 MV
MDC_IDC_STAT_AT_BURDEN_PERCENT: 1 %
MDC_IDC_STAT_AT_DTM_END: NORMAL
MDC_IDC_STAT_AT_DTM_START: NORMAL
MDC_IDC_STAT_AT_MODE_SW_COUNT: 65
MDC_IDC_STAT_AT_MODE_SW_COUNT_PER_DAY: 1
MDC_IDC_STAT_AT_MODE_SW_MAX_DURATION: 264 S
MDC_IDC_STAT_AT_MODE_SW_PERCENT_TIME: 1 %
MDC_IDC_STAT_BRADY_AP_VP_PERCENT: 2.8 %
MDC_IDC_STAT_BRADY_AP_VS_PERCENT: 1 %
MDC_IDC_STAT_BRADY_AS_VP_PERCENT: 96 %
MDC_IDC_STAT_BRADY_AS_VS_PERCENT: 1 %
MDC_IDC_STAT_BRADY_DTM_END: NORMAL
MDC_IDC_STAT_BRADY_DTM_START: NORMAL
MDC_IDC_STAT_BRADY_RA_PERCENT_PACED: 1.2 %
MDC_IDC_STAT_BRADY_RV_PERCENT_PACED: 99 %
MDC_IDC_STAT_CRT_DTM_END: NORMAL
MDC_IDC_STAT_CRT_DTM_START: NORMAL
MDC_IDC_STAT_HEART_RATE_ATRIAL_MAX: 330 {BEATS}/MIN
MDC_IDC_STAT_HEART_RATE_ATRIAL_MEAN: 64 {BEATS}/MIN
MDC_IDC_STAT_HEART_RATE_ATRIAL_MIN: 40 {BEATS}/MIN
MDC_IDC_STAT_HEART_RATE_DTM_END: NORMAL
MDC_IDC_STAT_HEART_RATE_DTM_START: NORMAL
MDC_IDC_STAT_HEART_RATE_VENTRICULAR_MAX: 240 {BEATS}/MIN
MDC_IDC_STAT_HEART_RATE_VENTRICULAR_MEAN: 64 {BEATS}/MIN
MDC_IDC_STAT_HEART_RATE_VENTRICULAR_MIN: 30 {BEATS}/MIN

## 2023-09-28 PROCEDURE — 93294 REM INTERROG EVL PM/LDLS PM: CPT | Performed by: INTERNAL MEDICINE

## 2023-09-28 PROCEDURE — 93296 REM INTERROG EVL PM/IDS: CPT | Performed by: INTERNAL MEDICINE

## 2023-10-05 NOTE — PROGRESS NOTES
[facility]:   [facility] ([employer] Routine Remote Evaluation    HRS Enrollment date: 9/1/22     Dates: 8/15/23 through 9/14/23    This is not the first billing cycle.    Adjustments made in the last month: no    No adjustments made this month.  Discussed with patient/caregiver and they will continue current plan of care.     Last 30 days:      Total Minutes Spent: 0 minutes     Future Appointments   Date Time Provider Department Center   10/16/2023  4:00 AM N [facility] CARDIOMEMS HRSJN [facility]   12/4/2023  1:30 PM Eduardo Andersen MD MBPUL [facility]   1/5/2024 12:00 AM BRITT [facility] REMOTE DEVICE CHECK FROM HOME HRCVN [facility]   6/21/2024 10:30 [facility]     Juan Carlos QUIROGA RN  BSN      I have reviewed Juan Carlos CONN RN, BSN's note and agree.    Heidy Akins MD., MHS

## 2023-10-10 ENCOUNTER — TRANSFERRED RECORDS (OUTPATIENT)
Dept: HEALTH INFORMATION MANAGEMENT | Facility: CLINIC | Age: 83
End: 2023-10-10

## 2023-10-10 ENCOUNTER — LAB REQUISITION (OUTPATIENT)
Dept: LAB | Facility: CLINIC | Age: 83
End: 2023-10-10

## 2023-10-10 DIAGNOSIS — E04.1 NONTOXIC SINGLE THYROID NODULE: ICD-10-CM

## 2023-10-10 PROCEDURE — 84439 ASSAY OF FREE THYROXINE: CPT | Performed by: FAMILY MEDICINE

## 2023-10-10 PROCEDURE — 84443 ASSAY THYROID STIM HORMONE: CPT | Performed by: FAMILY MEDICINE

## 2023-10-11 LAB
T4 FREE SERPL-MCNC: 1.11 NG/DL (ref 0.9–1.7)
TSH SERPL DL<=0.005 MIU/L-ACNC: 6.02 UIU/ML (ref 0.3–4.2)

## 2023-10-16 ENCOUNTER — ALLIED HEALTH/NURSE VISIT (OUTPATIENT)
Dept: CARDIOLOGY | Facility: CLINIC | Age: 83
End: 2023-10-16
Payer: COMMERCIAL

## 2023-10-16 DIAGNOSIS — I50.9 ACUTE ON CHRONIC CONGESTIVE HEART FAILURE, UNSPECIFIED HEART FAILURE TYPE (H): Primary | ICD-10-CM

## 2023-10-16 PROCEDURE — 99454 REM MNTR PHYSIOL PARAM 16-30: CPT | Performed by: INTERNAL MEDICINE

## 2023-11-15 ENCOUNTER — OFFICE VISIT (OUTPATIENT)
Dept: CARDIOLOGY | Facility: CLINIC | Age: 83
End: 2023-11-15
Payer: COMMERCIAL

## 2023-11-15 ENCOUNTER — APPOINTMENT (OUTPATIENT)
Dept: CARDIOLOGY | Facility: CLINIC | Age: 83
End: 2023-11-15
Payer: COMMERCIAL

## 2023-11-15 VITALS
BODY MASS INDEX: 28.72 KG/M2 | RESPIRATION RATE: 16 BRPM | DIASTOLIC BLOOD PRESSURE: 62 MMHG | HEART RATE: 58 BPM | SYSTOLIC BLOOD PRESSURE: 114 MMHG | WEIGHT: 152 LBS

## 2023-11-15 DIAGNOSIS — I50.30 NYHA CLASS 3 HEART FAILURE WITH PRESERVED EJECTION FRACTION (H): Primary | ICD-10-CM

## 2023-11-15 PROCEDURE — 99214 OFFICE O/P EST MOD 30 MIN: CPT | Performed by: INTERNAL MEDICINE

## 2023-11-15 RX ORDER — LEVOTHYROXINE SODIUM 50 UG/1
50 TABLET ORAL DAILY
COMMUNITY

## 2023-11-15 NOTE — PROGRESS NOTES
HEART CARE NOTE          Assessment/Recommendations     1. HFpEF c/b ADHF   Assessment / Plan  Near euvolemia on physical exam; denies HF symptoms of orthopnea, PND, fluid retention or edema - no changes to regimen at this time     2. CAD  Assessment / Plan  S/p coronary angiogram significant for moderate LAD dz. Plan for medical management at that time  Denies chest pain or anginal equivalents   Continue high intensity atorvastatin, carvedilol, losartan     3. 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 Afib  Assessment / Plan  Follows with Dr. Fritz in EP - please see detailed note in chart; currently on apixaban      5. Uterine mass  Assessment / Plan  S/p hysterectomy; now undergoing additional work-up to r/o metastasis     6. Pulmonary sarcoidosis   Assessment / Plan  Followed by pulmonary - Stage 1; further evaluation underway    35 minutes spent reviewing prior records (including documentation, laboratory studies, cardiac testing/imaging), history and physical exam, planning, and subsequent documentation.      History of Present Illness/Subjective      Ms. Sharyn Trent is a 80 y.o. female with a PMHx significant for HFpEF, hypertensive emergency, dyslipidemia, type 2 diabetes, non-obstructive CAD, heart block status post pacemaker, paroxysmal supraventricular tachycardia, melanoma, chronic left leg edema, chronic kidney disease stage III who presents to [facility] for follow-up care.      Today, Mrs. Trent denies HF symptoms or acute cardiac concerns; Management plan as detailed above     ECG: Personally reviewed. Paced rhythm      Coronary angiogram:  RHC via right IJ  RA mean 4mmHg  PA mean 24mmHg  PCWP 21mmHg  LVEDP 19mmHg  Ao 153/62     PA sat 67%  Ao sat 94%     CO cindy 3.35     Angiography via left radial  LM short normal  LAD mid 50% narrowing with FFR 0.85  Circ normal  RCA normal     ECHO (personnaly Reviewed):   Left ventricle  ejection fraction is normal. The estimated left ventricular ejection fraction is 55%.  Left ventricular diastolic function is abnormal.  Normal right ventricular size and systolic function.  No hemodynamically significant valvular heart abnormalities.  When compared to the previous study dated 3/20/2018, No significant change      Medical history and pertinent documents reviewed in Care Everywhere please where applicable see details above        Physical Examination Review of Systems   /62 (BP Location: Left arm, Patient Position: Sitting, Cuff Size: Adult Regular)   Pulse 58   Resp 16   Wt 68.9 kg (152 lb)   BMI 28.72 kg/m    Body mass index is 28.72 kg/m .  Wt Readings from Last 3 Encounters:   11/15/23 68.9 kg (152 lb)   06/22/23 66.9 kg (147 lb 6.4 oz)   06/21/23 66.7 kg (147 lb)     General Appearance:   no distress, normal body habitus   ENT/Mouth: membranes moist, no oral lesions or bleeding gums.      EYES:  no scleral icterus, normal conjunctivae   Neck: no carotid bruits or thyromegaly   Chest/Lungs:   lungs are clear to auscultation, no rales or wheezing, equal chest wall expansion    Cardiovascular:   Regular. Normal first and second heart sounds with, rubs, or gallops; the carotid, radial and posterior tibial pulses are intact, no JVD or LE edema bilaterally    Abdomen:  no organomegaly, masses, bruits, or tenderness; bowel sounds are present   Extremities: no cyanosis or clubbing   Skin: no xanthelasma, warm.    Neurologic: NAD     Psychiatric: alert and oriented x3, calm     A complete 10 systems ROS was reviewed  And is negative except what is listed in the HPI.          Medical History  Surgical History Family History Social History   Past Medical History:   Diagnosis Date    Abnormal ECG     Anxiety     Arthritis     Chest pain     Chest pain     Chronic kidney disease     stage 3-mod.    Congestive heart failure (H)     Diabetes (H)     Dyslipidemia, goal LDL below 70     GERD  (gastroesophageal reflux disease)     HTN (hypertension)     Hyperlipidemia     Macular degeneration (senile) of retina     Melanoma of ankle (H)     L ankle    Other second degree atrioventricular block     Created by Conversion     Pacemaker     PSVT (paroxysmal supraventricular tachycardia)     Right bundle branch block     Skin cancer     Past Surgical History:   Procedure Laterality Date    ABLATION OF DYSRHYTHMIC FOCUS  04/11/2013    AV analia reentry tachycardia, partially successful    BIOPSY SKIN (LOCATION)      CARDIAC CATHETERIZATION  03/10/2016    CV CORONARY ANGIOGRAM N/A 05/26/2021    Procedure: Coronary Angiogram;  Surgeon: Yony Gillis MD;  Location: [facility];  Service: Cardiology    CV LEFT HEART CATHETERIZATION WITHOUT LEFT VENTRICULOGRAM Left 05/26/2021    Procedure: Left Heart Catheterization Without Left Ventriculogram;  Surgeon: Yony Gillis MD;  Location: [facility];  Service: Cardiology    CV RIGHT HEART CATHETERIZATION N/A 05/26/2021    Procedure: Right Heart Catheterization;  Surgeon: Yony Gillis MD;  Location: [facility];  Service: Cardiology    DILATION AND CURETTAGE N/A 4/19/2022    Procedure: DILATION AND CURRETAGE;  Surgeon: Mary Reed MD;  Location: [facility] OR    EP PACEMAKER N/A 08/30/2021    Procedure: Electrophysiology Pacemaker;  Surgeon: Ab Saavedra MD;  Location: [facility] CV    FOOT SURGERY      L foot    HAND SURGERY      on both thumbs    HYSTERECTOMY, TOTAL, ROBOT-ASSISTED, LAP, DA HAROON XI, W/BI SAL-OOPH & SNT LYMPH NODE BX, MINI LAP Bilateral 5/31/2022    Procedure: ROBOTIC ASSISTED TOTAL LAPAROSCOPIC HYSTERECTOMY, BILATERAL SALPINGO-OOPHORECTOMY, SENTINEL LYMPH NODE INJECTION AND BIOPSY, MINI-LAPAROTOMY,;  Surgeon: Mary Reed MD;  Location: [facility] OR    HYSTEROSCOPY DIAGNOSTIC N/A 4/19/2022    Procedure: HYSTEROSCOPY, DIAGNOSTIC;   Surgeon: Mary Reed MD;  Location: [facility] OR    IMPLANT PACEMAKER  04/01/2011    Second-degree AV block    OTHER SURGICAL HISTORY      SVT Ablation    PELVIC EXAM UNDER ANESTHESIA, CERVICAL DILATION, N/A     PELVIC EXAMINATION UNDER ANESTHESIA N/A 4/19/2022    Procedure: PELVIC EXAM UNDER ANESTHESIA, CERVICAL DILATION,;  Surgeon: Mary Reed MD;  Location: [facility] OR    UT SHLDR ARTHROSCOP,SURG,W/ROTAT CUFF REPR Left 03/09/2017    Procedure: LEFT SHOULDER ARTHROSCOPY DECOMPRESSION DISTAL CLAVICLE EXCISION  ROTATOR CUFF REPAIR BICEPS TENOTOMY AND EXTENSIVE DEBRIDEMENT;  Surgeon: Todd Riggs MD;  Location: [facility] OR;  Service: Orthopedics    RELEASE CARPAL TUNNEL      both wrists    SKIN CANCER EXCISION      L ankle,Lleg and cheek    TOE SURGERY      L big toe joint replacement    TUBAL LIGATION      no family history of premature coronary artery disease Social History     Socioeconomic History    Marital status:      Spouse name: Not on file    Number of children: Not on file    Years of education: Not on file    Highest education level: Not on file   Occupational History    Not on file   Tobacco Use    Smoking status: Never    Smokeless tobacco: Never   Substance and Sexual Activity    Alcohol use: No    Drug use: No    Sexual activity: Yes     Partners: Male     Birth control/protection: Surgical   Other Topics Concern    Not on file   Social History Narrative    Not on file     Social Determinants of Health     Financial Resource Strain: Not on file   Food Insecurity: Not on file   Transportation Needs: Not on file   Physical Activity: Not on file   Stress: Not on file   Social Connections: Not on file   Interpersonal Safety: Not on file   Housing Stability: Not on file           Lab Results    Chemistry/lipid CBC Cardiac Enzymes/BNP/TSH/INR   Lab Results   Component Value Date    CHOL 111 07/17/2023    HDL 45 (L) 07/17/2023    TRIG 78 07/17/2023     BUN 33.9 (H) 07/17/2023     07/17/2023    CO2 28 07/17/2023    Lab Results   Component Value Date    WBC 5.5 05/12/2023    HGB 10.9 (L) 05/12/2023    HCT 32.6 (L) 05/12/2023    MCV 93 05/12/2023     05/12/2023    Lab Results   Component Value Date    TROPONINI 0.04 04/05/2022     (H) 04/05/2022    TSH 6.02 (H) 10/10/2023    INR 1.05 09/13/2021     Lab Results   Component Value Date    TROPONINI 0.04 04/05/2022          Weight:    Wt Readings from Last 3 Encounters:   11/15/23 68.9 kg (152 lb)   06/22/23 66.9 kg (147 lb 6.4 oz)   06/21/23 66.7 kg (147 lb)       Allergies  Allergies   Allergen Reactions    Herlinda-Kit Bee Sting Shortness Of Breath    Venom-Honey Bee [Bee Venom] Shortness Of Breath    Mercurial Analogues [Mercurial Derivatives] Dermatitis     blisters    Nitrofurantoin Other (See Comments)     Burning sensation across chest and arms    Other reaction(s): arms and chest burning    Sulfa (Sulfonamide Antibiotics) [Sulfa Antibiotics] Rash    Sulfamethoxazole-Trimethoprim Rash     Other reaction(s): rash         Surgical History  Past Surgical History:   Procedure Laterality Date    ABLATION OF DYSRHYTHMIC FOCUS  04/11/2013    AV analia reentry tachycardia, partially successful    BIOPSY SKIN (LOCATION)      CARDIAC CATHETERIZATION  03/10/2016    CV CORONARY ANGIOGRAM N/A 05/26/2021    Procedure: Coronary Angiogram;  Surgeon: Yony Gillis MD;  Location: [facility];  Service: Cardiology    CV LEFT HEART CATHETERIZATION WITHOUT LEFT VENTRICULOGRAM Left 05/26/2021    Procedure: Left Heart Catheterization Without Left Ventriculogram;  Surgeon: Yony Gillis MD;  Location: [facility];  Service: Cardiology    CV RIGHT HEART CATHETERIZATION N/A 05/26/2021    Procedure: Right Heart Catheterization;  Surgeon: Yony Gillis MD;  Location: [facility];  Service: Cardiology    DILATION AND CURETTAGE N/A 4/19/2022    Procedure:  DILATION AND CURRETAGE;  Surgeon: Mary Reed MD;  Location: [facility] OR    EP PACEMAKER N/A 08/30/2021    Procedure: Electrophysiology Pacemaker;  Surgeon: Ab Saavedra MD;  Location: [facility] LAB CV    FOOT SURGERY      L foot    HAND SURGERY      on both thumbs    HYSTERECTOMY, TOTAL, ROBOT-ASSISTED, LAP, DA HAROON XI, W/BI SAL-OOPH & SNT LYMPH NODE BX, MINI LAP Bilateral 5/31/2022    Procedure: ROBOTIC ASSISTED TOTAL LAPAROSCOPIC HYSTERECTOMY, BILATERAL SALPINGO-OOPHORECTOMY, SENTINEL LYMPH NODE INJECTION AND BIOPSY, MINI-LAPAROTOMY,;  Surgeon: Mary Reed MD;  Location: [facility] OR    HYSTEROSCOPY DIAGNOSTIC N/A 4/19/2022    Procedure: HYSTEROSCOPY, DIAGNOSTIC;  Surgeon: Mary Reed MD;  Location: [facility] OR    IMPLANT PACEMAKER  04/01/2011    Second-degree AV block    OTHER SURGICAL HISTORY      SVT Ablation    PELVIC EXAM UNDER ANESTHESIA, CERVICAL DILATION, N/A     PELVIC EXAMINATION UNDER ANESTHESIA N/A 4/19/2022    Procedure: PELVIC EXAM UNDER ANESTHESIA, CERVICAL DILATION,;  Surgeon: Mary Reed MD;  Location: [facility]    PA SHLDR ARTHROSCOP,SURG,W/ROTAT CUFF REPR Left 03/09/2017    Procedure: LEFT SHOULDER ARTHROSCOPY DECOMPRESSION DISTAL CLAVICLE EXCISION  ROTATOR CUFF REPAIR BICEPS TENOTOMY AND EXTENSIVE DEBRIDEMENT;  Surgeon: Todd Riggs MD;  Location: [facility];  Service: Orthopedics    RELEASE CARPAL TUNNEL      both wrists    SKIN CANCER EXCISION      L ankle,Lleg and cheek    TOE SURGERY      L big toe joint replacement    TUBAL LIGATION         Social History  Tobacco:   History   Smoking Status    Never   Smokeless Tobacco    Never    Alcohol:   Social History    Substance and Sexual Activity      Alcohol use: No   Illicit Drugs:   History   Drug Use No       Family History  Family History   Problem Relation Age of Onset    Hypertension Mother     Cerebrovascular Disease Mother     Prostate  Cancer Father     Cancer Father     Coronary Artery Disease Father     Dyslipidemia Father     Dyslipidemia Sister     Dyslipidemia Brother     Hypertension Brother     Prostate Cancer Brother           Heidy Akins MD on 11/15/2023      cc: Jamie Mcconnell

## 2023-11-15 NOTE — LETTER
11/15/2023    Jmaie Mcconnell MD  [address]  [facility] [address]    RE: Sharyn Trent       Dear Colleague,     I had the pleasure of seeing Sharyn Trent in the [facility].    HEART CARE NOTE          Assessment/Recommendations     1. HFpEF c/b ADHF   Assessment / Plan  Near euvolemia on physical exam; denies HF symptoms of orthopnea, PND, fluid retention or edema - no changes to regimen at this time     2. CAD  Assessment / Plan  S/p coronary angiogram significant for moderate LAD dz. Plan for medical management at that time  Denies chest pain or anginal equivalents   Continue high intensity atorvastatin, carvedilol, losartan     3. 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 Afib  Assessment / Plan  Follows with Dr. Fritz in EP - please see detailed note in chart; currently on apixaban      5. Uterine mass  Assessment / Plan  S/p hysterectomy; now undergoing additional work-up to r/o metastasis     6. Pulmonary sarcoidosis   Assessment / Plan  Followed by pulmonary - Stage 1; further evaluation underway    35 minutes spent reviewing prior records (including documentation, laboratory studies, cardiac testing/imaging), history and physical exam, planning, and subsequent documentation.      History of Present Illness/Subjective      Ms. Sharyn Trent is a 80 y.o. female with a PMHx significant for HFpEF, hypertensive emergency, dyslipidemia, type 2 diabetes, non-obstructive CAD, heart block status post pacemaker, paroxysmal supraventricular tachycardia, melanoma, chronic left leg edema, chronic kidney disease stage III who presents to [facility] for follow-up care.      Today, Mrs. Trent denies HF symptoms or acute cardiac concerns; Management plan as detailed above     ECG: Personally reviewed. Paced rhythm      Coronary angiogram:  RHC via right IJ  RA mean 4mmHg  PA mean 24mmHg  PCWP 21mmHg  LVEDP 19mmHg  Ao 153/62     PA sat  67%  Ao sat 94%     CO cindy 3.35     Angiography via left radial  LM short normal  LAD mid 50% narrowing with FFR 0.85  Circ normal  RCA normal     ECHO (personnaly Reviewed):   Left ventricle ejection fraction is normal. The estimated left ventricular ejection fraction is 55%.  Left ventricular diastolic function is abnormal.  Normal right ventricular size and systolic function.  No hemodynamically significant valvular heart abnormalities.  When compared to the previous study dated 3/20/2018, No significant change      Medical history and pertinent documents reviewed in Care Everywhere please where applicable see details above        Physical Examination Review of Systems   /62 (BP Location: Left arm, Patient Position: Sitting, Cuff Size: Adult Regular)   Pulse 58   Resp 16   Wt 68.9 kg (152 lb)   BMI 28.72 kg/m    Body mass index is 28.72 kg/m .  Wt Readings from Last 3 Encounters:   11/15/23 68.9 kg (152 lb)   06/22/23 66.9 kg (147 lb 6.4 oz)   06/21/23 66.7 kg (147 lb)     General Appearance:   no distress, normal body habitus   ENT/Mouth: membranes moist, no oral lesions or bleeding gums.      EYES:  no scleral icterus, normal conjunctivae   Neck: no carotid bruits or thyromegaly   Chest/Lungs:   lungs are clear to auscultation, no rales or wheezing, equal chest wall expansion    Cardiovascular:   Regular. Normal first and second heart sounds with, rubs, or gallops; the carotid, radial and posterior tibial pulses are intact, no JVD or LE edema bilaterally    Abdomen:  no organomegaly, masses, bruits, or tenderness; bowel sounds are present   Extremities: no cyanosis or clubbing   Skin: no xanthelasma, warm.    Neurologic: NAD     Psychiatric: alert and oriented x3, calm     A complete 10 systems ROS was reviewed  And is negative except what is listed in the HPI.          Medical History  Surgical History Family History Social History   Past Medical History:   Diagnosis Date    Abnormal ECG     Anxiety      Arthritis     Chest pain     Chest pain     Chronic kidney disease     stage 3-mod.    Congestive heart failure (H)     Diabetes (H)     Dyslipidemia, goal LDL below 70     GERD (gastroesophageal reflux disease)     HTN (hypertension)     Hyperlipidemia     Macular degeneration (senile) of retina     Melanoma of ankle (H)     L ankle    Other second degree atrioventricular block     Created by Conversion     Pacemaker     PSVT (paroxysmal supraventricular tachycardia)     Right bundle branch block     Skin cancer     Past Surgical History:   Procedure Laterality Date    ABLATION OF DYSRHYTHMIC FOCUS  04/11/2013    AV analia reentry tachycardia, partially successful    BIOPSY SKIN (LOCATION)      CARDIAC CATHETERIZATION  03/10/2016    CV CORONARY ANGIOGRAM N/A 05/26/2021    Procedure: Coronary Angiogram;  Surgeon: Yony Gillis MD;  Location: [facility] Cardiac Cath Lab;  Service: Cardiology    CV LEFT HEART CATHETERIZATION WITHOUT LEFT VENTRICULOGRAM Left 05/26/2021    Procedure: Left Heart Catheterization Without Left Ventriculogram;  Surgeon: Yony Gillis MD;  Location: [facility] Cardiac Cath Lab;  Service: Cardiology    CV RIGHT HEART CATHETERIZATION N/A 05/26/2021    Procedure: Right Heart Catheterization;  Surgeon: Yony Gillis MD;  Location: [facility] Cardiac Cath Lab;  Service: Cardiology    DILATION AND CURETTAGE N/A 4/19/2022    Procedure: DILATION AND CURRETAGE;  Surgeon: Mary Reed MD;  [place] [facility] [place]    EP PACEMAKER N/A 08/30/2021    Procedure: Electrophysiology Pacemaker;  Surgeon: Ab Saavedra MD;  Location: [facility] CATH LAB CV    FOOT SURGERY      L foot    HAND SURGERY      on both thumbs    HYSTERECTOMY, TOTAL, ROBOT-ASSISTED, LAP, DA HAROON XI, W/BI SAL-OOPH & SNT LYMPH NODE BX, MINI LAP Bilateral 5/31/2022    Procedure: ROBOTIC ASSISTED TOTAL LAPAROSCOPIC HYSTERECTOMY, BILATERAL SALPINGO-OOPHORECTOMY, SENTINEL LYMPH NODE INJECTION  AND BIOPSY, MINI-LAPAROTOMY,;  Surgeon: Mary Reed MD;  [facility]    HYSTEROSCOPY DIAGNOSTIC N/A 4/19/2022    Procedure: HYSTEROSCOPY, DIAGNOSTIC;  Surgeon: Mary Reed MD;  [facility]    IMPLANT PACEMAKER  04/01/2011    Second-degree AV block    OTHER SURGICAL HISTORY      SVT Ablation    PELVIC EXAM UNDER ANESTHESIA, CERVICAL DILATION, N/A     PELVIC EXAMINATION UNDER ANESTHESIA N/A 4/19/2022    Procedure: PELVIC EXAM UNDER ANESTHESIA, CERVICAL DILATION,;  Surgeon: Mary Reed MD;  [facility] [facility]    MT SHLDR ARTHROSCOP,SURG,W/ROTAT CUFF REPR Left 03/09/2017    Procedure: LEFT SHOULDER ARTHROSCOPY DECOMPRESSION DISTAL CLAVICLE EXCISION  ROTATOR CUFF REPAIR BICEPS TENOTOMY AND EXTENSIVE DEBRIDEMENT;  Surgeon: Todd Riggs MD;  Location: [facility] OR;  Service: Orthopedics    RELEASE CARPAL TUNNEL      both wrists    SKIN CANCER EXCISION      L ankle,Lleg and cheek    TOE SURGERY      L big toe joint replacement    TUBAL LIGATION      no family history of premature coronary artery disease Social History     Socioeconomic History    Marital status:      Spouse name: Not on file    Number of children: Not on file    Years of education: Not on file    Highest education level: Not on file   Occupational History    Not on file   Tobacco Use    Smoking status: Never    Smokeless tobacco: Never   Substance and Sexual Activity    Alcohol use: No    Drug use: No    Sexual activity: Yes     Partners: Male     Birth control/protection: Surgical   Other Topics Concern    Not on file   Social History Narrative    Not on file     Social Determinants of Health     Financial Resource Strain: Not on file   Food Insecurity: Not on file   Transportation Needs: Not on file   Physical Activity: Not on file   Stress: Not on file   Social Connections: Not on file   Interpersonal Safety: Not on file   Housing Stability: Not on file            Lab Results    Chemistry/lipid CBC Cardiac Enzymes/BNP/TSH/INR   Lab Results   Component Value Date    CHOL 111 07/17/2023    HDL 45 (L) 07/17/2023    TRIG 78 07/17/2023    BUN 33.9 (H) 07/17/2023     07/17/2023    CO2 28 07/17/2023    Lab Results   Component Value Date    WBC 5.5 05/12/2023    HGB 10.9 (L) 05/12/2023    HCT 32.6 (L) 05/12/2023    MCV 93 05/12/2023     05/12/2023    Lab Results   Component Value Date    TROPONINI 0.04 04/05/2022     (H) 04/05/2022    TSH 6.02 (H) 10/10/2023    INR 1.05 09/13/2021     Lab Results   Component Value Date    TROPONINI 0.04 04/05/2022          Weight:    Wt Readings from Last 3 Encounters:   11/15/23 68.9 kg (152 lb)   06/22/23 66.9 kg (147 lb 6.4 oz)   06/21/23 66.7 kg (147 lb)       Allergies  Allergies   Allergen Reactions    Herlinda-Kit Bee Sting Shortness Of Breath    Venom-Honey Bee [Bee Venom] Shortness Of Breath    Mercurial Analogues [Mercurial Derivatives] Dermatitis     blisters    Nitrofurantoin Other (See Comments)     Burning sensation across chest and arms    Other reaction(s): arms and chest burning    Sulfa (Sulfonamide Antibiotics) [Sulfa Antibiotics] Rash    Sulfamethoxazole-Trimethoprim Rash     Other reaction(s): rash         Surgical History  Past Surgical History:   Procedure Laterality Date    ABLATION OF DYSRHYTHMIC FOCUS  04/11/2013    AV analia reentry tachycardia, partially successful    BIOPSY SKIN (LOCATION)      CARDIAC CATHETERIZATION  03/10/2016    CV CORONARY ANGIOGRAM N/A 05/26/2021    Procedure: Coronary Angiogram;  Surgeon: Yony Gillis MD;  Location: [facility] Cardiac Cath Lab;  Service: Cardiology    CV LEFT HEART CATHETERIZATION WITHOUT LEFT VENTRICULOGRAM Left 05/26/2021    Procedure: Left Heart Catheterization Without Left Ventriculogram;  Surgeon: Yony Gillis MD;  Location: [facility] Cardiac Cath Lab;  Service: Cardiology    CV RIGHT HEART CATHETERIZATION N/A 05/26/2021    Procedure:  Right Heart Catheterization;  Surgeon: Yony Gillis MD;  Location: [facility] Cardiac Cath Lab;  Service: Cardiology    DILATION AND CURETTAGE N/A 4/19/2022    Procedure: DILATION AND CURRETAGE;  Surgeon: Mary Reed MD;  Location: [facility] OR    EP PACEMAKER N/A 08/30/2021    Procedure: Electrophysiology Pacemaker;  Surgeon: Ab Saavedra MD;  Location: [facility] CATH LAB CV    FOOT SURGERY      L foot    HAND SURGERY      on both thumbs    HYSTERECTOMY, TOTAL, ROBOT-ASSISTED, LAP, DA HAROON XI, W/BI SAL-OOPH & SNT LYMPH NODE BX, MINI LAP Bilateral 5/31/2022    Procedure: ROBOTIC ASSISTED TOTAL LAPAROSCOPIC HYSTERECTOMY, BILATERAL SALPINGO-OOPHORECTOMY, SENTINEL LYMPH NODE INJECTION AND BIOPSY, MINI-LAPAROTOMY,;  Surgeon: Mary Reed MD;  Location: [facility]    HYSTEROSCOPY DIAGNOSTIC N/A 4/19/2022    Procedure: HYSTEROSCOPY, DIAGNOSTIC;  Surgeon: Mary Reed MD;  Location: [facility] OR    IMPLANT PACEMAKER  04/01/2011    Second-degree AV block    OTHER SURGICAL HISTORY      SVT Ablation    PELVIC EXAM UNDER ANESTHESIA, CERVICAL DILATION, N/A     PELVIC EXAMINATION UNDER ANESTHESIA N/A 4/19/2022    Procedure: PELVIC EXAM UNDER ANESTHESIA, CERVICAL DILATION,;  Surgeon: Mary Reed MD;  Location: [facility]    TN SHLDR ARTHROSCOP,SURG,W/ROTAT CUFF REPR Left 03/09/2017    Procedure: LEFT SHOULDER ARTHROSCOPY DECOMPRESSION DISTAL CLAVICLE EXCISION  ROTATOR CUFF REPAIR BICEPS TENOTOMY AND EXTENSIVE DEBRIDEMENT;  Surgeon: Todd Riggs MD;  Location: [facility];  Service: Orthopedics    RELEASE CARPAL TUNNEL      both wrists    SKIN CANCER EXCISION      L ankle,Lleg and cheek    TOE SURGERY      L big toe joint replacement    TUBAL LIGATION         Social History  Tobacco:   History   Smoking Status    Never   Smokeless Tobacco    Never    Alcohol:   Social History    Substance and Sexual Activity      Alcohol use:  No   Illicit Drugs:   History   Drug Use No       Family History  Family History   Problem Relation Age of Onset    Hypertension Mother     Cerebrovascular Disease Mother     Prostate Cancer Father     Cancer Father     Coronary Artery Disease Father     Dyslipidemia Father     Dyslipidemia Sister     Dyslipidemia Brother     Hypertension Brother     Prostate Cancer Brother           Heidy Akins MD on 11/15/2023      cc: Jamie Mcconnell      Thank you for allowing me to participate in the care of your patient.      Sincerely,     Heidy Akins MD     [facility]  cc:   Heidy Akins MD  [address] [address]

## 2023-11-16 ENCOUNTER — ALLIED HEALTH/NURSE VISIT (OUTPATIENT)
Dept: CARDIOLOGY | Facility: CLINIC | Age: 83
End: 2023-11-16
Payer: COMMERCIAL

## 2023-11-16 DIAGNOSIS — I50.31 ACUTE DIASTOLIC CHF (CONGESTIVE HEART FAILURE) (H): Primary | ICD-10-CM

## 2023-11-16 PROCEDURE — 99454 REM MNTR PHYSIOL PARAM 16-30: CPT | Performed by: INTERNAL MEDICINE

## 2023-12-04 ENCOUNTER — OFFICE VISIT (OUTPATIENT)
Dept: PULMONOLOGY | Facility: CLINIC | Age: 83
End: 2023-12-04
Payer: COMMERCIAL

## 2023-12-04 VITALS
SYSTOLIC BLOOD PRESSURE: 152 MMHG | DIASTOLIC BLOOD PRESSURE: 67 MMHG | OXYGEN SATURATION: 98 % | HEART RATE: 61 BPM | WEIGHT: 154.6 LBS | BODY MASS INDEX: 29.21 KG/M2

## 2023-12-04 DIAGNOSIS — R59.1 LYMPHADENOPATHY: ICD-10-CM

## 2023-12-04 DIAGNOSIS — D86.9 SARCOIDOSIS: Primary | ICD-10-CM

## 2023-12-04 PROCEDURE — 99214 OFFICE O/P EST MOD 30 MIN: CPT | Performed by: INTERNAL MEDICINE

## 2023-12-04 NOTE — LETTER
12/4/2023         RE: Sharyn Trent  [address] [place] [address]        Dear Colleague,    Thank you for referring your patient, Sharyn Trent, to the [facility]. Please see a copy of my visit note below.    [facility]  [facility] & SUR[facility]ERY [facility], [facility]     Sharyn Trent MRN# [id number]   Age: 82 year old YOB: 1940       Requesting Physician: No referring provider defined for this encounter.       Assessment and Plan:    1. Sarcoidosis - overall looks to be low likelihood for progression.  Granulomas found during her Gyn surgery were entirely within the lymph nodes.  She does not have significantly enlarged lymph nodes on her chest CT although they do have some PET activity.    Similar to my opinion previously the risk of progression is low.  I offered referral to the sarcoidosis Center at the [place] but she declined which I think is reasonable.    Recommendations for primary care  Yearly LFTs, metabolic panel, calcium level  Yearly EKG  Yearly eye exam    Unless there are concerning changes on any of these tests I do not think she needs further follow-up with me.      She should make her other specialist aware of this diagnosis but overall the likelihood of other organ involvement is low.             History:     Sharyn Trent is a 82 year old female with sig h/o for sarcoidosis diagnosed incidentally during gyn procedure who is here for evaluation/followup of lymphadenopathy. She is doing fine.  He has not had any changes in her health recently.  No pulmonary symptoms.    - My interpretation of the images relevant for this visit includes: lymphadenopathy looks overall stable            Past Medical History:      Past Medical History:   Diagnosis Date     Abnormal ECG      Anxiety      Arthritis      Chest pain      Chest pain      Chronic kidney disease     stage 3-mod.      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      Pacemaker      PSVT (paroxysmal supraventricular tachycardia)      Right bundle branch block      Skin cancer            Past Surgical History:      Past Surgical History:   Procedure Laterality Date     ABLATION OF DYSRHYTHMIC FOCUS  04/11/2013    AV analia reentry tachycardia, partially successful     BIOPSY SKIN (LOCATION)       CARDIAC CATHETERIZATION  03/10/2016     CV CORONARY ANGIOGRAM N/A 05/26/2021    Procedure: Coronary Angiogram;  Surgeon: Yony Gillis MD;  Location: [facility] Cardiac Cath Lab;  Service: Cardiology     CV LEFT HEART CATHETERIZATION WITHOUT LEFT VENTRICULOGRAM Left 05/26/2021    Procedure: Left Heart Catheterization Without Left Ventriculogram;  Surgeon: Yony Gillis MD;  Location: [facility] Cardiac Cath Lab;  Service: Cardiology     CV RIGHT HEART CATHETERIZATION N/A 05/26/2021    Procedure: Right Heart Catheterization;  Surgeon: Yony Gillis MD;  Location: [facility] Cardiac Cath Lab;  Service: Cardiology     DILATION AND CURETTAGE N/A 4/19/2022    Procedure: DILATION AND CURRETAGE;  Surgeon: Mary Reed MD;  Location: [facility] OR     EP PACEMAKER N/A 08/30/2021    Procedure: Electrophysiology Pacemaker;  Surgeon: Ab Saavedra MD;  Location: [facility] CV     FOOT SURGERY      L foot     HAND SURGERY      on both thumbs     HYSTERECTOMY, TOTAL, ROBOT-ASSISTED, LAP, DA HAROON XI, W/BI SAL-OOPH & SNT LYMPH NODE BX, MINI LAP Bilateral 5/31/2022    Procedure: ROBOTIC ASSISTED TOTAL LAPAROSCOPIC HYSTERECTOMY, BILATERAL SALPINGO-OOPHORECTOMY, SENTINEL LYMPH NODE INJECTION AND BIOPSY, MINI-LAPAROTOMY,;   Surgeon: Mary Reed MD;  Location: [facility] OR     HYSTEROSCOPY DIAGNOSTIC N/A 4/19/2022    Procedure: HYSTEROSCOPY, DIAGNOSTIC;  Surgeon: Mary eRed MD;  Location: [facility] OR     IMPLANT PACEMAKER  04/01/2011    Second-degree AV block     OTHER SURGICAL HISTORY      SVT Ablation     PELVIC EXAM UNDER ANESTHESIA, CERVICAL DILATION, N/A      PELVIC EXAMINATION UNDER ANESTHESIA N/A 4/19/2022    Procedure: PELVIC EXAM UNDER ANESTHESIA, CERVICAL DILATION,;  Surgeon: Mary Reed MD;  Location: [facility] OR     MD SHLDR ARTHROSCOP,SURG,W/ROTAT CUFF REPR Left 03/09/2017    Procedure: LEFT SHOULDER ARTHROSCOPY DECOMPRESSION DISTAL CLAVICLE EXCISION  ROTATOR CUFF REPAIR BICEPS TENOTOMY AND EXTENSIVE DEBRIDEMENT;  Surgeon: Todd Riggs MD;  Location: [facility];  Service: Orthopedics     RELEASE CARPAL TUNNEL      both wrists     SKIN CANCER EXCISION      L ankle,Lleg and cheek     TOE SURGERY      L big toe joint replacement     TUBAL LIGATION            Social History:     Social History     Tobacco Use     Smoking status: Never     Smokeless tobacco: Never   Substance Use Topics     Alcohol use: No          Family History:     Family History   Problem Relation Age of Onset     Hypertension Mother      Cerebrovascular Disease Mother      Prostate Cancer Father      Cancer Father      Coronary Artery Disease Father      Dyslipidemia Father      Dyslipidemia Sister      Dyslipidemia Brother      Hypertension Brother      Prostate Cancer Brother            Allergies:      Allergies   Allergen Reactions     Herlinda-Kit Bee Sting Shortness Of Breath     Venom-Honey Bee [Bee Venom] Shortness Of Breath     Mercurial Analogues [Mercurial Derivatives] Dermatitis     blisters     Nitrofurantoin Other (See Comments)     Burning sensation across chest and arms    Other reaction(s): arms and chest burning     Sulfa (Sulfonamide Antibiotics) [Sulfa Antibiotics] Rash           Medications:     Current Outpatient Medications   Medication Sig     acetaminophen (TYLENOL) 500 MG tablet Take 500-1,000 mg by mouth every 6 hours as needed for mild pain     apixaban ANTICOAGULANT (ELIQUIS) 5 MG tablet Take 1 tablet (5 mg) by mouth 2 times daily     atorvastatin (LIPITOR) 80 MG tablet Take 80 mg by mouth At Bedtime     calcium carbonate (TUMS) 500 MG chewable tablet Take 1-2 chew tab by mouth daily     carvedilol (COREG) 25 MG tablet TAKE 1 TABLET(25 MG) BY MOUTH TWICE DAILY WITH MEALS     escitalopram (LEXAPRO) 10 MG tablet Take 10 mg by mouth daily     ferrous sulfate (FEROSUL) 325 (65 Fe) MG tablet Take 325 mg by mouth daily (with breakfast)     fluorometholone (FML LIQUIFILM) 0.1 % ophthalmic suspension Place 1 drop into the right eye daily     furosemide (LASIX) 20 MG tablet Take 2 tablets (40 mg) by mouth daily     gabapentin (NEURONTIN) 100 MG capsule Take 1 capsule (100 mg) by mouth 3 times daily     hydrALAZINE (APRESOLINE) 100 MG tablet Take 1 tablet (100 mg) by mouth 3 times daily     levothyroxine (SYNTHROID/LEVOTHROID) 25 MCG tablet Take 25 mcg by mouth daily     losartan (COZAAR) 50 MG tablet TAKE 1 TABLET(50 MG) BY MOUTH EVERY EVENING     metFORMIN (GLUCOPHAGE) 500 MG tablet [METFORMIN (GLUCOPHAGE) 500 MG TABLET] Take 1 tablet (500 mg total) by mouth 2 (two) times a day with meals. At breakfast and at dinner (Patient taking differently: Take 500 mg by mouth daily (with breakfast))     Multiple Vitamins-Minerals (MULTIVITAMIN ADULTS 50+) TABS Take 1 tablet by mouth every morning     omeprazole (PRILOSEC) 20 MG capsule Take 20 mg by mouth daily before breakfast     vit C/E/Zn/coppr/lutein/zeaxan (PRESERVISION AREDS-2 ORAL) Take 1 tablet by mouth 2 times daily (before meals)     No current facility-administered medications for this visit.          Review of Systems:     See HPI         Physical Exam:   BP (!) 152/67 (BP Location: Left arm, Patient Position: Sitting, Cuff Size:  Adult Regular)   Pulse 61   Wt 70.1 kg (154 lb 9.6 oz)   SpO2 98%   BMI 29.21 kg/m      Constitutional - looks well, in no apparent distress  Eyes - no redness or discharge  Respiratory -breathing appears comfortable. No wheeze or rhonchi.   Cardiac -- Normal rate, rhythm.   Skin - No appreciable discoloration or lesions (very limited exam)  Neurological - No apparent tremors. Speech fluent and articlate  Psychiatric - no signs of delirium or anxiety          Current Laboratory Data:   All laboratory and imaging data reviewed.    No results found for this or any previous visit (from the past 24 hour(s)).                 Again, thank you for allowing me to participate in the care of your patient.        Sincerely,        Eduardo Andersen MD

## 2023-12-04 NOTE — PROGRESS NOTES
LUNG NODULE & INTERVENTIONAL PULMONARY CLINIC  CLINICS & SURGERY [facility]ENTER, [facility], [facility]     Sharyn Trent MRN# [id number]   Age: 82 year old YOB: 1940       Requesting Physician: No referring provider defined for this encounter.       Assessment and Plan:    1. Sarcoidosis - overall looks to be low likelihood for progression.  Granulomas found during her Gyn surgery were entirely within the lymph nodes.  She does not have significantly enlarged lymph nodes on her chest CT although they do have some PET activity.    Similar to my opinion previously the risk of progression is low.  I offered referral to the sarcoidosis Center at the [place] but she declined which I think is reasonable.    Recommendations for primary care  Yearly LFTs, metabolic panel, calcium level  Yearly EKG  Yearly eye exam    Unless there are concerning changes on any of these tests I do not think she needs further follow-up with me.      She should make her other specialist aware of this diagnosis but overall the likelihood of other organ involvement is low.             History:     Sharyn Trent is a 82 year old female with sig h/o for sarcoidosis diagnosed incidentally during gyn procedure who is here for evaluation/followup of lymphadenopathy. She is doing fine.  He has not had any changes in her health recently.  No pulmonary symptoms.    - My interpretation of the images relevant for this visit includes: lymphadenopathy looks overall stable            Past Medical History:      Past Medical History:   Diagnosis Date    Abnormal ECG     Anxiety     Arthritis     Chest pain     Chest pain     Chronic kidney disease     stage 3-mod.    Congestive heart failure (H)     Diabetes (H)     Dyslipidemia, goal LDL below 70     GERD (gastroesophageal reflux disease)     HTN (hypertension)     Hyperlipidemia     Macular degeneration (senile) of retina     Melanoma of ankle (H)     L ankle    Other  second degree atrioventricular block     Created by Conversion     Pacemaker     PSVT (paroxysmal supraventricular tachycardia)     Right bundle branch block     Skin cancer            Past Surgical History:      Past Surgical History:   Procedure Laterality Date    ABLATION OF DYSRHYTHMIC FOCUS  04/11/2013    AV analia reentry tachycardia, partially successful    BIOPSY SKIN (LOCATION)      CARDIAC CATHETERIZATION  03/10/2016    CV CORONARY ANGIOGRAM N/A 05/26/2021    Procedure: Coronary Angiogram;  Surgeon: Yony Gillis MD;  Location: [facility] Cardiac Cath Lab;  Service: Cardiology    CV LEFT HEART CATHETERIZATION WITHOUT LEFT VENTRICULOGRAM Left 05/26/2021    Procedure: Left Heart Catheterization Without Left Ventriculogram;  Surgeon: Yony Gillis MD;  Location: [facility] Cardiac Cath Lab;  Service: Cardiology    CV RIGHT HEART CATHETERIZATION N/A 05/26/2021    Procedure: Right Heart Catheterization;  Surgeon: Yony Gillis MD;  Location: [facility] Cardiac Cath Lab;  Service: Cardiology    DILATION AND CURETTAGE N/A 4/19/2022    Procedure: DILATION AND CURRETAGE;  Surgeon: Mary Reed MD;  Location: [facility] OR    EP PACEMAKER N/A 08/30/2021    Procedure: Electrophysiology Pacemaker;  Surgeon: Ab Saavedra MD;  Location: [facility] CV    FOOT SURGERY      L foot    HAND SURGERY      on both thumbs    HYSTERECTOMY, TOTAL, ROBOT-ASSISTED, LAP, DA HAROON XI, W/BI SAL-OOPH & SNT LYMPH NODE BX, MINI LAP Bilateral 5/31/2022    Procedure: ROBOTIC ASSISTED TOTAL LAPAROSCOPIC HYSTERECTOMY, BILATERAL SALPINGO-OOPHORECTOMY, SENTINEL LYMPH NODE INJECTION AND BIOPSY, MINI-LAPAROTOMY,;  Surgeon: Mary Reed MD;  Location: [facility] OR    HYSTEROSCOPY DIAGNOSTIC N/A 4/19/2022    Procedure: HYSTEROSCOPY, DIAGNOSTIC;  Surgeon: Mary Reed MD;  Location: [facility] OR    IMPLANT PACEMAKER  04/01/2011    Second-degree AV block     OTHER SURGICAL HISTORY      SVT Ablation    PELVIC EXAM UNDER ANESTHESIA, CERVICAL DILATION, N/A     PELVIC EXAMINATION UNDER ANESTHESIA N/A 4/19/2022    Procedure: PELVIC EXAM UNDER ANESTHESIA, CERVICAL DILATION,;  Surgeon: Mary Reed MD;  Location: [facility]    VT SHLDR ARTHROSCOP,SURG,W/ROTAT CUFF REPR Left 03/09/2017    Procedure: LEFT SHOULDER ARTHROSCOPY DECOMPRESSION DISTAL CLAVICLE EXCISION  ROTATOR CUFF REPAIR BICEPS TENOTOMY AND EXTENSIVE DEBRIDEMENT;  Surgeon: Todd Riggs MD;  Location: [facility];  Service: Orthopedics    RELEASE CARPAL TUNNEL      both wrists    SKIN CANCER EXCISION      L ankle,Lleg and cheek    TOE SURGERY      L big toe joint replacement    TUBAL LIGATION            Social History:     Social History     Tobacco Use    Smoking status: Never    Smokeless tobacco: Never   Substance Use Topics    Alcohol use: No          Family History:     Family History   Problem Relation Age of Onset    Hypertension Mother     Cerebrovascular Disease Mother     Prostate Cancer Father     Cancer Father     Coronary Artery Disease Father     Dyslipidemia Father     Dyslipidemia Sister     Dyslipidemia Brother     Hypertension Brother     Prostate Cancer Brother            Allergies:      Allergies   Allergen Reactions    Herlinda-Kit Bee Sting Shortness Of Breath    Venom-Honey Bee [Bee Venom] Shortness Of Breath    Mercurial Analogues [Mercurial Derivatives] Dermatitis     blisters    Nitrofurantoin Other (See Comments)     Burning sensation across chest and arms    Other reaction(s): arms and chest burning    Sulfa (Sulfonamide Antibiotics) [Sulfa Antibiotics] Rash          Medications:     Current Outpatient Medications   Medication Sig    acetaminophen (TYLENOL) 500 MG tablet Take 500-1,000 mg by mouth every 6 hours as needed for mild pain    apixaban ANTICOAGULANT (ELIQUIS) 5 MG tablet Take 1 tablet (5 mg) by mouth 2 times daily    atorvastatin (LIPITOR) 80 MG tablet  Take 80 mg by mouth At Bedtime    calcium carbonate (TUMS) 500 MG chewable tablet Take 1-2 chew tab by mouth daily    carvedilol (COREG) 25 MG tablet TAKE 1 TABLET(25 MG) BY MOUTH TWICE DAILY WITH MEALS    escitalopram (LEXAPRO) 10 MG tablet Take 10 mg by mouth daily    ferrous sulfate (FEROSUL) 325 (65 Fe) MG tablet Take 325 mg by mouth daily (with breakfast)    fluorometholone (FML LIQUIFILM) 0.1 % ophthalmic suspension Place 1 drop into the right eye daily    furosemide (LASIX) 20 MG tablet Take 2 tablets (40 mg) by mouth daily    gabapentin (NEURONTIN) 100 MG capsule Take 1 capsule (100 mg) by mouth 3 times daily    hydrALAZINE (APRESOLINE) 100 MG tablet Take 1 tablet (100 mg) by mouth 3 times daily    levothyroxine (SYNTHROID/LEVOTHROID) 25 MCG tablet Take 25 mcg by mouth daily    losartan (COZAAR) 50 MG tablet TAKE 1 TABLET(50 MG) BY MOUTH EVERY EVENING    metFORMIN (GLUCOPHAGE) 500 MG tablet [METFORMIN (GLUCOPHAGE) 500 MG TABLET] Take 1 tablet (500 mg total) by mouth 2 (two) times a day with meals. At breakfast and at dinner (Patient taking differently: Take 500 mg by mouth daily (with breakfast))    Multiple Vitamins-Minerals (MULTIVITAMIN ADULTS 50+) TABS Take 1 tablet by mouth every morning    omeprazole (PRILOSEC) 20 MG capsule Take 20 mg by mouth daily before breakfast    vit C/E/Zn/coppr/lutein/zeaxan (PRESERVISION AREDS-2 ORAL) Take 1 tablet by mouth 2 times daily (before meals)     No current facility-administered medications for this visit.          Review of Systems:     See HPI         Physical Exam:   BP (!) 152/67 (BP Location: Left arm, Patient Position: Sitting, Cuff Size: Adult Regular)   Pulse 61   Wt 70.1 kg (154 lb 9.6 oz)   SpO2 98%   BMI 29.21 kg/m      Constitutional - looks well, in no apparent distress  Eyes - no redness or discharge  Respiratory -breathing appears comfortable. No wheeze or rhonchi.   Cardiac -- Normal rate, rhythm.   Skin - No appreciable discoloration or lesions  (very limited exam)  Neurological - No apparent tremors. Speech fluent and articlate  Psychiatric - no signs of delirium or anxiety          Current Laboratory Data:   All laboratory and imaging data reviewed.    No results found for this or any previous visit (from the past 24 hour(s)).

## 2023-12-11 DIAGNOSIS — I16.1 HYPERTENSIVE EMERGENCY: ICD-10-CM

## 2023-12-12 NOTE — PROGRESS NOTES
[facility]:   [facility] ([employer]) Routine Remote Evaluation    HRS Enrollment date: 9/1/2022     Dates: 9/15/2023 through 10/16/2023    This is not the first billing cycle.    Alerts in the last month: none  Current Diuretic dose Lasix 40mg daily      No adjustments made this month.  Discussed with patient/caregiver and they will continue current plan of care.         Readings:          Total Minutes Spent: 00 minutes   Yomaira CONN RN BSN, CHFN, PCCN-K      Future Appointments   Date Time Provider Department Center   12/18/2023  4:00 AM [facility]   1/5/2024 12:00 AM BRITT [facility] REMOTE DEVICE CHECK FROM HOME HRCVN [facility]   1/18/2024  4:00 AM [facility]   6/21/2024 10:30 AM [facility]     I have reviewed Yomaira Sanchez RN BSN, CHFN's note and agree.    Heidy Akins MD., MHS

## 2023-12-13 ENCOUNTER — LAB REQUISITION (OUTPATIENT)
Dept: LAB | Facility: CLINIC | Age: 83
End: 2023-12-13

## 2023-12-13 DIAGNOSIS — E04.1 NONTOXIC SINGLE THYROID NODULE: ICD-10-CM

## 2023-12-13 PROCEDURE — 86376 MICROSOMAL ANTIBODY EACH: CPT | Performed by: FAMILY MEDICINE

## 2023-12-13 RX ORDER — LOSARTAN POTASSIUM 50 MG/1
TABLET ORAL
Qty: 90 TABLET | Refills: 1 | Status: SHIPPED | OUTPATIENT
Start: 2023-12-13 | End: 2024-06-25

## 2023-12-14 LAB — THYROPEROXIDASE AB SERPL-ACNC: 21 IU/ML

## 2023-12-18 ENCOUNTER — ALLIED HEALTH/NURSE VISIT (OUTPATIENT)
Dept: CARDIOLOGY | Facility: CLINIC | Age: 83
End: 2023-12-18
Payer: COMMERCIAL

## 2023-12-18 DIAGNOSIS — I50.30 NYHA CLASS 3 HEART FAILURE WITH PRESERVED EJECTION FRACTION (H): Primary | ICD-10-CM

## 2023-12-18 PROCEDURE — 99454 REM MNTR PHYSIOL PARAM 16-30: CPT | Performed by: INTERNAL MEDICINE

## 2024-01-03 ENCOUNTER — TELEPHONE (OUTPATIENT)
Dept: CARDIOLOGY | Facility: CLINIC | Age: 84
End: 2024-01-03
Payer: COMMERCIAL

## 2024-01-03 NOTE — TELEPHONE ENCOUNTER
Sharyn is contacted with recommendations to increase Lasix dose to 40mg bid x 3 days then return to 40mg once daily dose. Yomaira CONN RN BSN, CHFN, PCCN-K

## 2024-01-03 NOTE — TELEPHONE ENCOUNTER
[facility]:   HRS (NN LABS) Daily Alert     January 3, 2024    Alert(s): Vitals  b/p 148/61     wt 154.4#    Current diuretic dose: Furosemide 40mg daily dose  Carvedilol 25mg bid w/ meals  Hydralazine 100mg tid  Losartan 50mg daily       Recent plan of care changes: reports some dietary indiscretion over holidays, some lower extremity edema noted, especially at the end of the day. Breathing is pretty much the same, no difficulty sleeping noted. Denies abdominal bloating.     Goal Weight Range: 149#     Time spent in review this day: 10 min.      Lashell Murcia CNP any room to do titration protocol?  Recommendations for diuretic management?    Yomaira CONN RN BSN, CHFN, PCCN-K          Future Appointments   Date Time Provider Department Center   1/5/2024 12:00 AM JN [facility] REMOTE DEVICE CHECK FROM HOME HRCVN [facility]   1/18/2024  4:00 AM [facility]   2/19/2024  4:00 AM [facility] CARDIOMEMS [facility]   3/21/2024  4:00 AM [facility]   4/22/2024  4:00 AM [facility]   6/21/2024 10:30 AM Reji Davila MD [facility]

## 2024-01-03 NOTE — TELEPHONE ENCOUNTER
Recommend increasing Lasix to 40 mg twice a day for 3 days and continuing 40 mg daily thereafter.  Based on her blood pressures on HRS they have been pretty stable.  Do not recommend protocol at this time.  Thank you

## 2024-01-05 ENCOUNTER — ANCILLARY PROCEDURE (OUTPATIENT)
Dept: CARDIOLOGY | Facility: CLINIC | Age: 84
End: 2024-01-05
Attending: INTERNAL MEDICINE
Payer: COMMERCIAL

## 2024-01-05 DIAGNOSIS — Z95.0 CARDIAC PACEMAKER IN SITU: ICD-10-CM

## 2024-01-05 DIAGNOSIS — I44.1 SECOND DEGREE AV BLOCK: ICD-10-CM

## 2024-01-08 DIAGNOSIS — G62.9 NEUROPATHY: ICD-10-CM

## 2024-01-08 NOTE — TELEPHONE ENCOUNTER
Refill request for: Gabapentin    Directions: Take 1 capsule TID     LOV: 6/16/23  NOV: 6/21/24    90 day supply with 1 refills Medication T'd for review and signature

## 2024-01-10 RX ORDER — GABAPENTIN 100 MG/1
100 CAPSULE ORAL 3 TIMES DAILY
Qty: 270 CAPSULE | Refills: 1 | Status: SHIPPED | OUTPATIENT
Start: 2024-01-10 | End: 2024-06-21

## 2024-01-18 ENCOUNTER — ALLIED HEALTH/NURSE VISIT (OUTPATIENT)
Dept: CARDIOLOGY | Facility: CLINIC | Age: 84
End: 2024-01-18
Payer: COMMERCIAL

## 2024-01-18 DIAGNOSIS — I50.30 NYHA CLASS 3 HEART FAILURE WITH PRESERVED EJECTION FRACTION (H): Primary | ICD-10-CM

## 2024-01-18 PROCEDURE — 99454 REM MNTR PHYSIOL PARAM 16-30: CPT | Performed by: INTERNAL MEDICINE

## 2024-01-22 NOTE — PROGRESS NOTES
[facility]:   [facility] ([employer]) Routine Remote Evaluation    HRS Enrollment date: 9/1/2022     Dates: 10/17/23 through 11/16/2023    This is not the first billing cycle.    Alerts in the last month: none    No adjustments made this month.  Discussed with patient/caregiver and they will continue current plan of care.       Readings:        Total Minutes Spent: 00 minutes   Yomaira CONN RN BSN, CHFN, PCCN-K    I have reviewed Yomaira Sanchez RN BSN, CHFN's note and agree.    Heidy Akins MD., MHS      Future Appointments   Date Time Provider Department Center   2/19/2024  4:00 AM [facility] CARDIOMEMS [facility]   3/21/2024  4:00 AM [facility] CARDIOMEMS [facility]N [facility] N   4/22/2024  4:00 AM [facility] CARDIOMEMS HRSN [facility] N   6/21/2024 10:30 AM [facility]

## 2024-01-23 ENCOUNTER — TELEPHONE (OUTPATIENT)
Dept: ONCOLOGY | Facility: HOSPITAL | Age: 84
End: 2024-01-23
Payer: COMMERCIAL

## 2024-01-23 DIAGNOSIS — D47.2 MGUS (MONOCLONAL GAMMOPATHY OF UNKNOWN SIGNIFICANCE): Primary | ICD-10-CM

## 2024-02-01 LAB
MDC_IDC_EPISODE_DTM: NORMAL
MDC_IDC_EPISODE_DURATION: 10 S
MDC_IDC_EPISODE_DURATION: 12 S
MDC_IDC_EPISODE_DURATION: 14 S
MDC_IDC_EPISODE_DURATION: 16 S
MDC_IDC_EPISODE_DURATION: 8 S
MDC_IDC_EPISODE_ID: NORMAL
MDC_IDC_EPISODE_TYPE: NORMAL
MDC_IDC_LEAD_CONNECTION_STATUS: NORMAL
MDC_IDC_LEAD_CONNECTION_STATUS: NORMAL
MDC_IDC_LEAD_IMPLANT_DT: NORMAL
MDC_IDC_LEAD_IMPLANT_DT: NORMAL
MDC_IDC_LEAD_LOCATION: NORMAL
MDC_IDC_LEAD_LOCATION: NORMAL
MDC_IDC_LEAD_LOCATION_DETAIL_1: NORMAL
MDC_IDC_LEAD_LOCATION_DETAIL_1: NORMAL
MDC_IDC_LEAD_MFG: NORMAL
MDC_IDC_LEAD_MFG: NORMAL
MDC_IDC_LEAD_MODEL: NORMAL
MDC_IDC_LEAD_MODEL: NORMAL
MDC_IDC_LEAD_POLARITY_TYPE: NORMAL
MDC_IDC_LEAD_POLARITY_TYPE: NORMAL
MDC_IDC_LEAD_SERIAL: NORMAL
MDC_IDC_LEAD_SERIAL: NORMAL
MDC_IDC_MSMT_BATTERY_DTM: NORMAL
MDC_IDC_MSMT_BATTERY_REMAINING_LONGEVITY: 101 MO
MDC_IDC_MSMT_BATTERY_REMAINING_PERCENTAGE: 78 %
MDC_IDC_MSMT_BATTERY_RRT_TRIGGER: NORMAL
MDC_IDC_MSMT_BATTERY_STATUS: NORMAL
MDC_IDC_MSMT_BATTERY_VOLTAGE: 3.02 V
MDC_IDC_MSMT_LEADCHNL_RA_IMPEDANCE_VALUE: 400 OHM
MDC_IDC_MSMT_LEADCHNL_RA_LEAD_CHANNEL_STATUS: NORMAL
MDC_IDC_MSMT_LEADCHNL_RA_PACING_THRESHOLD_AMPLITUDE: 0.62 V
MDC_IDC_MSMT_LEADCHNL_RA_PACING_THRESHOLD_PULSEWIDTH: 0.5 MS
MDC_IDC_MSMT_LEADCHNL_RA_SENSING_INTR_AMPL: 3 MV
MDC_IDC_MSMT_LEADCHNL_RV_IMPEDANCE_VALUE: 450 OHM
MDC_IDC_MSMT_LEADCHNL_RV_LEAD_CHANNEL_STATUS: NORMAL
MDC_IDC_MSMT_LEADCHNL_RV_PACING_THRESHOLD_AMPLITUDE: 0.75 V
MDC_IDC_MSMT_LEADCHNL_RV_PACING_THRESHOLD_PULSEWIDTH: 0.5 MS
MDC_IDC_MSMT_LEADCHNL_RV_SENSING_INTR_AMPL: 8.5 MV
MDC_IDC_PG_IMPLANT_DTM: NORMAL
MDC_IDC_PG_MFG: NORMAL
MDC_IDC_PG_MODEL: NORMAL
MDC_IDC_PG_SERIAL: NORMAL
MDC_IDC_PG_TYPE: NORMAL
MDC_IDC_SESS_CLINIC_NAME: NORMAL
MDC_IDC_SESS_DTM: NORMAL
MDC_IDC_SESS_REPROGRAMMED: NO
MDC_IDC_SESS_TYPE: NORMAL
MDC_IDC_SET_BRADY_AT_MODE_SWITCH_MODE: NORMAL
MDC_IDC_SET_BRADY_AT_MODE_SWITCH_RATE: 180 {BEATS}/MIN
MDC_IDC_SET_BRADY_LOWRATE: 50 {BEATS}/MIN
MDC_IDC_SET_BRADY_MAX_SENSOR_RATE: 100 {BEATS}/MIN
MDC_IDC_SET_BRADY_MAX_TRACKING_RATE: 100 {BEATS}/MIN
MDC_IDC_SET_BRADY_MODE: NORMAL
MDC_IDC_SET_BRADY_PAV_DELAY_HIGH: 100 MS
MDC_IDC_SET_BRADY_PAV_DELAY_LOW: 300 MS
MDC_IDC_SET_BRADY_SAV_DELAY_HIGH: 100 MS
MDC_IDC_SET_BRADY_SAV_DELAY_LOW: 130 MS
MDC_IDC_SET_LEADCHNL_RA_PACING_AMPLITUDE: 1.62
MDC_IDC_SET_LEADCHNL_RA_PACING_ANODE_ELECTRODE_1: NORMAL
MDC_IDC_SET_LEADCHNL_RA_PACING_ANODE_LOCATION_1: NORMAL
MDC_IDC_SET_LEADCHNL_RA_PACING_CAPTURE_MODE: NORMAL
MDC_IDC_SET_LEADCHNL_RA_PACING_CATHODE_ELECTRODE_1: NORMAL
MDC_IDC_SET_LEADCHNL_RA_PACING_CATHODE_LOCATION_1: NORMAL
MDC_IDC_SET_LEADCHNL_RA_PACING_POLARITY: NORMAL
MDC_IDC_SET_LEADCHNL_RA_PACING_PULSEWIDTH: 0.5 MS
MDC_IDC_SET_LEADCHNL_RA_SENSING_ADAPTATION_MODE: NORMAL
MDC_IDC_SET_LEADCHNL_RA_SENSING_ANODE_ELECTRODE_1: NORMAL
MDC_IDC_SET_LEADCHNL_RA_SENSING_ANODE_LOCATION_1: NORMAL
MDC_IDC_SET_LEADCHNL_RA_SENSING_CATHODE_ELECTRODE_1: NORMAL
MDC_IDC_SET_LEADCHNL_RA_SENSING_CATHODE_LOCATION_1: NORMAL
MDC_IDC_SET_LEADCHNL_RA_SENSING_POLARITY: NORMAL
MDC_IDC_SET_LEADCHNL_RA_SENSING_SENSITIVITY: 0.2 MV
MDC_IDC_SET_LEADCHNL_RV_PACING_AMPLITUDE: 1 V
MDC_IDC_SET_LEADCHNL_RV_PACING_ANODE_ELECTRODE_1: NORMAL
MDC_IDC_SET_LEADCHNL_RV_PACING_ANODE_LOCATION_1: NORMAL
MDC_IDC_SET_LEADCHNL_RV_PACING_CAPTURE_MODE: NORMAL
MDC_IDC_SET_LEADCHNL_RV_PACING_CATHODE_ELECTRODE_1: NORMAL
MDC_IDC_SET_LEADCHNL_RV_PACING_CATHODE_LOCATION_1: NORMAL
MDC_IDC_SET_LEADCHNL_RV_PACING_POLARITY: NORMAL
MDC_IDC_SET_LEADCHNL_RV_PACING_PULSEWIDTH: 0.5 MS
MDC_IDC_SET_LEADCHNL_RV_SENSING_ADAPTATION_MODE: NORMAL
MDC_IDC_SET_LEADCHNL_RV_SENSING_ANODE_ELECTRODE_1: NORMAL
MDC_IDC_SET_LEADCHNL_RV_SENSING_ANODE_LOCATION_1: NORMAL
MDC_IDC_SET_LEADCHNL_RV_SENSING_CATHODE_ELECTRODE_1: NORMAL
MDC_IDC_SET_LEADCHNL_RV_SENSING_CATHODE_LOCATION_1: NORMAL
MDC_IDC_SET_LEADCHNL_RV_SENSING_POLARITY: NORMAL
MDC_IDC_SET_LEADCHNL_RV_SENSING_SENSITIVITY: 2 MV
MDC_IDC_STAT_AT_BURDEN_PERCENT: 1 %
MDC_IDC_STAT_AT_DTM_END: NORMAL
MDC_IDC_STAT_AT_DTM_START: NORMAL
MDC_IDC_STAT_AT_MODE_SW_COUNT: 150
MDC_IDC_STAT_AT_MODE_SW_COUNT_PER_DAY: 1
MDC_IDC_STAT_AT_MODE_SW_MAX_DURATION: 562 S
MDC_IDC_STAT_AT_MODE_SW_PERCENT_TIME: 1 %
MDC_IDC_STAT_BRADY_AP_VP_PERCENT: 2.9 %
MDC_IDC_STAT_BRADY_AP_VS_PERCENT: 1 %
MDC_IDC_STAT_BRADY_AS_VP_PERCENT: 95 %
MDC_IDC_STAT_BRADY_AS_VS_PERCENT: 1 %
MDC_IDC_STAT_BRADY_DTM_END: NORMAL
MDC_IDC_STAT_BRADY_DTM_START: NORMAL
MDC_IDC_STAT_BRADY_RA_PERCENT_PACED: 1.1 %
MDC_IDC_STAT_BRADY_RV_PERCENT_PACED: 98 %
MDC_IDC_STAT_CRT_DTM_END: NORMAL
MDC_IDC_STAT_CRT_DTM_START: NORMAL
MDC_IDC_STAT_EPISODE_RECENT_COUNT: 85
MDC_IDC_STAT_EPISODE_RECENT_COUNT_DTM_END: NORMAL
MDC_IDC_STAT_EPISODE_RECENT_COUNT_DTM_START: NORMAL
MDC_IDC_STAT_EPISODE_TYPE: NORMAL
MDC_IDC_STAT_EPISODE_VENDOR_TYPE: NORMAL
MDC_IDC_STAT_HEART_RATE_ATRIAL_MAX: 330 {BEATS}/MIN
MDC_IDC_STAT_HEART_RATE_ATRIAL_MEAN: 64 {BEATS}/MIN
MDC_IDC_STAT_HEART_RATE_ATRIAL_MIN: 40 {BEATS}/MIN
MDC_IDC_STAT_HEART_RATE_DTM_END: NORMAL
MDC_IDC_STAT_HEART_RATE_DTM_START: NORMAL
MDC_IDC_STAT_HEART_RATE_VENTRICULAR_MAX: 240 {BEATS}/MIN
MDC_IDC_STAT_HEART_RATE_VENTRICULAR_MEAN: 65 {BEATS}/MIN
MDC_IDC_STAT_HEART_RATE_VENTRICULAR_MIN: 30 {BEATS}/MIN

## 2024-02-01 PROCEDURE — 93294 REM INTERROG EVL PM/LDLS PM: CPT | Performed by: INTERNAL MEDICINE

## 2024-02-01 PROCEDURE — 93296 REM INTERROG EVL PM/IDS: CPT | Performed by: INTERNAL MEDICINE

## 2024-02-02 ENCOUNTER — LAB REQUISITION (OUTPATIENT)
Dept: LAB | Facility: CLINIC | Age: 84
End: 2024-02-02

## 2024-02-02 ENCOUNTER — TRANSFERRED RECORDS (OUTPATIENT)
Dept: HEALTH INFORMATION MANAGEMENT | Facility: CLINIC | Age: 84
End: 2024-02-02

## 2024-02-02 DIAGNOSIS — E03.9 HYPOTHYROIDISM, UNSPECIFIED: ICD-10-CM

## 2024-02-02 DIAGNOSIS — I13.0 HYPERTENSIVE HEART AND CHRONIC KIDNEY DISEASE WITH HEART FAILURE AND STAGE 1 THROUGH STAGE 4 CHRONIC KIDNEY DISEASE, OR UNSPECIFIED CHRONIC KIDNEY DISEASE (H): ICD-10-CM

## 2024-02-02 PROCEDURE — 84443 ASSAY THYROID STIM HORMONE: CPT | Performed by: FAMILY MEDICINE

## 2024-02-02 PROCEDURE — 80048 BASIC METABOLIC PNL TOTAL CA: CPT | Performed by: FAMILY MEDICINE

## 2024-02-03 LAB
ANION GAP SERPL CALCULATED.3IONS-SCNC: 13 MMOL/L (ref 7–15)
BUN SERPL-MCNC: 35.5 MG/DL (ref 8–23)
CALCIUM SERPL-MCNC: 10.1 MG/DL (ref 8.8–10.2)
CHLORIDE SERPL-SCNC: 100 MMOL/L (ref 98–107)
CREAT SERPL-MCNC: 1.45 MG/DL (ref 0.51–0.95)
DEPRECATED HCO3 PLAS-SCNC: 24 MMOL/L (ref 22–29)
EGFRCR SERPLBLD CKD-EPI 2021: 36 ML/MIN/1.73M2
GLUCOSE SERPL-MCNC: 144 MG/DL (ref 70–99)
POTASSIUM SERPL-SCNC: 4.4 MMOL/L (ref 3.4–5.3)
SODIUM SERPL-SCNC: 137 MMOL/L (ref 135–145)
TSH SERPL DL<=0.005 MIU/L-ACNC: 5.59 UIU/ML (ref 0.3–4.2)

## 2024-02-14 ENCOUNTER — LAB (OUTPATIENT)
Dept: LAB | Facility: HOSPITAL | Age: 84
End: 2024-02-14
Attending: FAMILY MEDICINE
Payer: COMMERCIAL

## 2024-02-14 ENCOUNTER — HOSPITAL ENCOUNTER (OUTPATIENT)
Dept: ULTRASOUND IMAGING | Facility: HOSPITAL | Age: 84
Discharge: HOME | End: 2024-02-14
Attending: FAMILY MEDICINE
Payer: COMMERCIAL

## 2024-02-14 DIAGNOSIS — E04.1 THYROID NODULE: ICD-10-CM

## 2024-02-14 DIAGNOSIS — D47.2 MGUS (MONOCLONAL GAMMOPATHY OF UNKNOWN SIGNIFICANCE): ICD-10-CM

## 2024-02-14 LAB
ALBUMIN SERPL BCG-MCNC: 4.1 G/DL (ref 3.5–5.2)
ALP SERPL-CCNC: 88 U/L (ref 40–150)
ALT SERPL W P-5'-P-CCNC: 20 U/L (ref 0–50)
ANION GAP SERPL CALCULATED.3IONS-SCNC: 10 MMOL/L (ref 7–15)
AST SERPL W P-5'-P-CCNC: 21 U/L (ref 0–45)
BASOPHILS # BLD AUTO: 0.1 10E3/UL (ref 0–0.2)
BASOPHILS NFR BLD AUTO: 1 %
BILIRUB SERPL-MCNC: 0.4 MG/DL
BUN SERPL-MCNC: 41.5 MG/DL (ref 8–23)
CALCIUM SERPL-MCNC: 9.2 MG/DL (ref 8.8–10.2)
CHLORIDE SERPL-SCNC: 102 MMOL/L (ref 98–107)
CREAT SERPL-MCNC: 1.57 MG/DL (ref 0.51–0.95)
DEPRECATED HCO3 PLAS-SCNC: 29 MMOL/L (ref 22–29)
EGFRCR SERPLBLD CKD-EPI 2021: 33 ML/MIN/1.73M2
EOSINOPHIL # BLD AUTO: 0.2 10E3/UL (ref 0–0.7)
EOSINOPHIL NFR BLD AUTO: 4 %
ERYTHROCYTE [DISTWIDTH] IN BLOOD BY AUTOMATED COUNT: 14.1 % (ref 10–15)
GLUCOSE SERPL-MCNC: 143 MG/DL (ref 70–99)
HCT VFR BLD AUTO: 36 % (ref 35–47)
HGB BLD-MCNC: 11.8 G/DL (ref 11.7–15.7)
IMM GRANULOCYTES # BLD: 0 10E3/UL
IMM GRANULOCYTES NFR BLD: 0 %
LYMPHOCYTES # BLD AUTO: 0.9 10E3/UL (ref 0.8–5.3)
LYMPHOCYTES NFR BLD AUTO: 13 %
MCH RBC QN AUTO: 31 PG (ref 26.5–33)
MCHC RBC AUTO-ENTMCNC: 32.8 G/DL (ref 31.5–36.5)
MCV RBC AUTO: 95 FL (ref 78–100)
MONOCYTES # BLD AUTO: 0.8 10E3/UL (ref 0–1.3)
MONOCYTES NFR BLD AUTO: 12 %
NEUTROPHILS # BLD AUTO: 4.7 10E3/UL (ref 1.6–8.3)
NEUTROPHILS NFR BLD AUTO: 70 %
NRBC # BLD AUTO: 0 10E3/UL
NRBC BLD AUTO-RTO: 0 /100
PLATELET # BLD AUTO: 191 10E3/UL (ref 150–450)
POTASSIUM SERPL-SCNC: 4.4 MMOL/L (ref 3.4–5.3)
PROT SERPL-MCNC: 7.1 G/DL (ref 6.4–8.3)
RBC # BLD AUTO: 3.81 10E6/UL (ref 3.8–5.2)
SODIUM SERPL-SCNC: 141 MMOL/L (ref 135–145)
WBC # BLD AUTO: 6.6 10E3/UL (ref 4–11)

## 2024-02-14 PROCEDURE — 80053 COMPREHEN METABOLIC PANEL: CPT

## 2024-02-14 PROCEDURE — 36415 COLL VENOUS BLD VENIPUNCTURE: CPT

## 2024-02-14 PROCEDURE — 83521 IG LIGHT CHAINS FREE EACH: CPT

## 2024-02-14 PROCEDURE — 84155 ASSAY OF PROTEIN SERUM: CPT

## 2024-02-14 PROCEDURE — 85025 COMPLETE CBC W/AUTO DIFF WBC: CPT

## 2024-02-14 PROCEDURE — 76536 US EXAM OF HEAD AND NECK: CPT

## 2024-02-14 PROCEDURE — 84165 PROTEIN E-PHORESIS SERUM: CPT | Mod: TC | Performed by: PATHOLOGY

## 2024-02-14 PROCEDURE — 82784 ASSAY IGA/IGD/IGG/IGM EACH: CPT

## 2024-02-15 LAB
ALBUMIN SERPL ELPH-MCNC: 4 G/DL (ref 3.7–5.1)
ALPHA1 GLOB SERPL ELPH-MCNC: 0.3 G/DL (ref 0.2–0.4)
ALPHA2 GLOB SERPL ELPH-MCNC: 0.8 G/DL (ref 0.5–0.9)
B-GLOBULIN SERPL ELPH-MCNC: 0.7 G/DL (ref 0.6–1)
GAMMA GLOB SERPL ELPH-MCNC: 1.1 G/DL (ref 0.7–1.6)
IGA SERPL-MCNC: 139 MG/DL (ref 84–499)
IGG SERPL-MCNC: 1136 MG/DL (ref 610–1616)
IGM SERPL-MCNC: 178 MG/DL (ref 35–242)
KAPPA LC FREE SER-MCNC: 7.41 MG/DL (ref 0.33–1.94)
KAPPA LC FREE/LAMBDA FREE SER NEPH: 1.57 {RATIO} (ref 0.26–1.65)
LAMBDA LC FREE SERPL-MCNC: 4.72 MG/DL (ref 0.57–2.63)
M PROTEIN SERPL ELPH-MCNC: 0 G/DL
PROT PATTERN SERPL ELPH-IMP: NORMAL
TOTAL PROTEIN SERUM FOR ELP: 6.8 G/DL (ref 6.4–8.3)

## 2024-02-15 PROCEDURE — 84165 PROTEIN E-PHORESIS SERUM: CPT | Mod: 26 | Performed by: PATHOLOGY

## 2024-02-15 NOTE — PROGRESS NOTES
[facility]:   [facility] ([employer]) Routine Remote Evaluation    HRS Enrollment date: 9/1/2022     Dates: 11/17/23 through 12/18/23    This is not the first billing cycle.    Alerts in the last month: None    No adjustments made this month.  Discussed with patient/caregiver and they will continue current plan of care.     I have reviewed Ashlie Montelongo CMA 's note and agree.    Heidy Akins MD., MHS     Readings:            Total Minutes Spent: 00 minutes     Future Appointments   Date Time Provider Department Center   2/19/2024  4:00 AM [facility]   3/1/2024  3:30 PM Gio Valencia MD [facility]   3/21/2024  4:00 AM [facility] CARDIOMEMS [facility]   4/22/2024  4:00 AM [facility] CARDIOMEMS [facility]   6/18/2024 10:00 AM [facility] DEVICE NURSE 1 HRCVN [facility]   6/18/2024 10:50 AM Bravo Lopez MD [facility]   6/21/2024 10:30 AM Reji Davila MD [facility]

## 2024-02-19 ENCOUNTER — ALLIED HEALTH/NURSE VISIT (OUTPATIENT)
Dept: CARDIOLOGY | Facility: CLINIC | Age: 84
End: 2024-02-19
Payer: COMMERCIAL

## 2024-02-19 DIAGNOSIS — I50.31 ACUTE DIASTOLIC CHF (CONGESTIVE HEART FAILURE) (H): Primary | ICD-10-CM

## 2024-02-19 PROCEDURE — 99454 REM MNTR PHYSIOL PARAM 16-30: CPT | Performed by: INTERNAL MEDICINE

## 2024-03-01 ENCOUNTER — ONCOLOGY VISIT (OUTPATIENT)
Dept: ONCOLOGY | Facility: HOSPITAL | Age: 84
End: 2024-03-01
Attending: INTERNAL MEDICINE
Payer: COMMERCIAL

## 2024-03-01 VITALS
HEIGHT: 61 IN | BODY MASS INDEX: 29.27 KG/M2 | HEART RATE: 59 BPM | RESPIRATION RATE: 24 BRPM | WEIGHT: 155 LBS | DIASTOLIC BLOOD PRESSURE: 75 MMHG | SYSTOLIC BLOOD PRESSURE: 180 MMHG | TEMPERATURE: 97.4 F | OXYGEN SATURATION: 97 %

## 2024-03-01 DIAGNOSIS — D47.2 MONOCLONAL PARAPROTEINEMIA: Primary | ICD-10-CM

## 2024-03-01 PROCEDURE — G0463 HOSPITAL OUTPT CLINIC VISIT: HCPCS | Performed by: INTERNAL MEDICINE

## 2024-03-01 PROCEDURE — 99213 OFFICE O/P EST LOW 20 MIN: CPT | Performed by: INTERNAL MEDICINE

## 2024-03-01 ASSESSMENT — PAIN SCALES - GENERAL: PAINLEVEL: NO PAIN (0)

## 2024-03-01 NOTE — Clinical Note
"    3/1/2024         RE: Sharyn Trent  [address] [place] [address]        Dear Colleague,    Thank you for referring your patient, Sharyn Trent, to the [facility]. Please see a copy of my visit note below.    Oncology Rooming Note    March 1, 2024 3:40 PM   Sharyn Trent is a 83 year old female who presents for:    Chief Complaint   Patient presents with    Oncology Clinic Visit     1 year follow up with prior lab review related to MGUS (monoclonal gammopathy of unknown significance); Abnormal SPEP; Anemia of chronic renal failure, stage 2 (mild)       Initial Vitals: BP (!) 180/75 (BP Location: Left arm, Patient Position: Sitting, Cuff Size: Adult Regular)   Pulse 59   Temp 97.4  F (36.3  C) (Tympanic)   Resp 24   Ht 1.549 m (5' 1\")   Wt 70.3 kg (155 lb)   SpO2 97%   BMI 29.29 kg/m   Estimated body mass index is 29.29 kg/m  as calculated from the following:    Height as of this encounter: 1.549 m (5' 1\").    Weight as of this encounter: 70.3 kg (155 lb). Body surface area is 1.74 meters squared.  No Pain (0) Comment: Data Unavailable   No LMP recorded. Patient is postmenopausal.  Allergies reviewed: Yes  Medications reviewed: Yes    Medications: Medication refills not needed today.  Pharmacy name entered into [employer]: [facility] #[id number] [address] [place] [facility] [address]    Frailty Screening:   Is the patient here for a new oncology consult visit in cancer care? 2. No      Clinical concerns: 1 year follow up with prior lab review related to MGUS (monoclonal gammopathy of unknown significance); Abnormal SPEP; Anemia of chronic renal failure, stage 2 (mild)      JULISA EDGAR CMA                Again, thank you for allowing me to participate in the care of your patient.        Sincerely,        Gio Valencia MD  "

## 2024-03-01 NOTE — PROGRESS NOTES
"Oncology Rooming Note    March 1, 2024 3:40 PM   Sharyn Trent is a 83 year old female who presents for:    Chief Complaint   Patient presents with    Oncology Clinic Visit     1 year follow up with prior lab review related to MGUS (monoclonal gammopathy of unknown significance); Abnormal SPEP; Anemia of chronic renal failure, stage 2 (mild)       Initial Vitals: BP (!) 180/75 (BP Location: Left arm, Patient Position: Sitting, Cuff Size: Adult Regular)   Pulse 59   Temp 97.4  F (36.3  C) (Tympanic)   Resp 24   Ht 1.549 m (5' 1\")   Wt 70.3 kg (155 lb)   SpO2 97%   BMI 29.29 kg/m   Estimated body mass index is 29.29 kg/m  as calculated from the following:    Height as of this encounter: 1.549 m (5' 1\").    Weight as of this encounter: 70.3 kg (155 lb). Body surface area is 1.74 meters squared.  No Pain (0) Comment: Data Unavailable   No LMP recorded. Patient is postmenopausal.  Allergies reviewed: Yes  Medications reviewed: Yes    Medications: Medication refills not needed today.  Pharmacy name entered into [employer]: [facility] #[id number] [facility] [address] [place] [facility] [address]    Frailty Screening:   Is the patient here for a new oncology consult visit in cancer care? 2. No      Clinical concerns: 1 year follow up with prior lab review related to MGUS (monoclonal gammopathy of unknown significance); Abnormal SPEP; Anemia of chronic renal failure, stage 2 (mild)      JULISA EDGAR CMA            "

## 2024-03-04 DIAGNOSIS — I50.9 CHF (CONGESTIVE HEART FAILURE) (H): ICD-10-CM

## 2024-03-05 ENCOUNTER — TELEPHONE (OUTPATIENT)
Dept: CARDIOLOGY | Facility: CLINIC | Age: 84
End: 2024-03-05
Payer: COMMERCIAL

## 2024-03-05 DIAGNOSIS — I25.119 CORONARY ARTERY DISEASE INVOLVING NATIVE CORONARY ARTERY OF NATIVE HEART WITH ANGINA PECTORIS (H): ICD-10-CM

## 2024-03-05 DIAGNOSIS — I50.9 CHF (CONGESTIVE HEART FAILURE) (H): Primary | ICD-10-CM

## 2024-03-05 RX ORDER — CARVEDILOL 25 MG/1
25 TABLET ORAL 2 TIMES DAILY WITH MEALS
Qty: 180 TABLET | Refills: 3 | Status: SHIPPED | OUTPATIENT
Start: 2024-03-05

## 2024-03-05 RX ORDER — HYDRALAZINE HYDROCHLORIDE 100 MG/1
100 TABLET, FILM COATED ORAL 3 TIMES DAILY
Qty: 270 TABLET | Refills: 3 | Status: SHIPPED | OUTPATIENT
Start: 2024-03-05

## 2024-03-05 RX ORDER — CARVEDILOL 25 MG/1
TABLET ORAL
Qty: 180 TABLET | Refills: 1 | OUTPATIENT
Start: 2024-03-05

## 2024-03-05 NOTE — TELEPHONE ENCOUNTER
Spoke with patient and pharmacy. Pharmacy didn't receive them. E Scripts sent.     Zachery Arias RN [facility]

## 2024-03-05 NOTE — TELEPHONE ENCOUNTER
[facility] Call Center    Phone Message    May a detailed message be left on voicemail: yes     Reason for Call: Medication Refill Request    Has the patient contacted the pharmacy for the refill? Yes   Name of medication being requested: carvedilol (COREG) 25 MG tablet  hydrALAZINE (APRESOLINE) 100 MG tablet  Provider who prescribed the medication: Dr. Akins and Lashell Murcia  Pharmacy: [facility] #[id number] [facility] [address] [place] [facility] [address]    Date medication is needed: ASAP   Patient called stating pharmacy has been trying to get refill orders, but have not received any response. Please review and send orders as needed. Thank you!    Action Taken: Other: Cardiology    Travel Screening: Not Applicable    Thank you!  [facility]

## 2024-03-21 ENCOUNTER — ALLIED HEALTH/NURSE VISIT (OUTPATIENT)
Dept: CARDIOLOGY | Facility: CLINIC | Age: 84
End: 2024-03-21
Payer: COMMERCIAL

## 2024-03-21 DIAGNOSIS — I50.30 NYHA CLASS 3 HEART FAILURE WITH PRESERVED EJECTION FRACTION (H): Primary | ICD-10-CM

## 2024-03-21 PROCEDURE — 99454 REM MNTR PHYSIOL PARAM 16-30: CPT | Performed by: INTERNAL MEDICINE

## 2024-03-22 NOTE — PROGRESS NOTES
[facility]:   [facility] ([employer]) Routine Remote Evaluation    HRS Enrollment date:  9/1/22      Dates: 01/19/2024 through 02/19/2024    This is not the first billing cycle.    Alerts in the last month: 0    No adjustments made this month.  Discussed with patient/caregiver and they will continue current plan of care.        Readings:      Total Minutes Spent: 0 minutes     Future Appointments   Date Time Provider Department Center   4/22/2024  4:00 AM [facility] CARDIOMEMS HRSJN [facility]   5/1/2024  3:20 PM [facility]   5/23/2024  4:00 AM [facility] CARDIOMEMS HRSJN [facility]N   6/18/2024 10:00 AM [facility]N [facility]SE [facility]   6/18/2024 10:50 AM [facility]   6/21/2024 10:30 AM [facility]   6/24/2024  4:00 AM [facility] CARDIOMEMS HRSJN [facility] S[facility]N   7/25/2024  4:00 AM [facility] CARDIOMEMS HRSJN [facility] S[facility]N   8/26/2024  4:00 AM [facility] CARDIOMEMS HRSJN [facility] S[facility]N      I have reviewed Zachery Arias, RN's note and agree.    Heidy Akins MD., S

## 2024-04-02 NOTE — PROGRESS NOTES
[facility]:   [facility] ([employer]) Routine Remote Evaluation    HRS Enrollment date: 9/1/22     Dates: 2/20/24 through 3/21/24    This is not the first billing cycle.    Alerts in the last month:     None    No adjustments made this month.  Discussed with patient/caregiver and they will continue current plan of care.      Readings:         Total Minutes Spent: 0 minutes     Ashlie Montelongo CMA    Future Appointments   Date Time Provider Department Center   4/9/2024 12:00 AM JN [facility] REMOTE DEVICE CHECK FROM HOME HRCVN [facility]   4/22/2024  4:00 AM [facility] CARDIOMEMS HRSJN [facility]N   5/1/2024  3:20 PM [facility]   5/23/2024  4:00 AM [facility] CARDIOMEMS HRSJN [facility] S[facility]N   6/18/2024 10:00 AM BRITT [facility]SE [facility]   6/18/2024 10:50 AM [facility]   6/21/2024 10:30 AM [facility]   6/24/2024  4:00 AM S[facility]N [facility] CARDIOMEMS HRSJN [facility] S[facility]N   7/25/2024  4:00 AM S[facility]N [facility] CARDIOMEMS HRSJN [facility] S[facility]N   8/26/2024  4:00 AM S[facility]N [facility] CARDIOMEMS HRSJN [facility] SJN     I have reviewed Ashlie Montelongo CMA 's note and agree.    Heidy Akins MD., S

## 2024-04-09 ENCOUNTER — ANCILLARY PROCEDURE (OUTPATIENT)
Dept: CARDIOLOGY | Facility: CLINIC | Age: 84
End: 2024-04-09
Attending: INTERNAL MEDICINE
Payer: COMMERCIAL

## 2024-04-09 DIAGNOSIS — I50.30 NYHA CLASS 3 HEART FAILURE WITH PRESERVED EJECTION FRACTION (H): Primary | ICD-10-CM

## 2024-04-09 DIAGNOSIS — I44.1 SECOND DEGREE AV BLOCK: ICD-10-CM

## 2024-04-09 DIAGNOSIS — I48.0 PAROXYSMAL ATRIAL FIBRILLATION (H): ICD-10-CM

## 2024-04-09 DIAGNOSIS — I44.2 THIRD DEGREE AV BLOCK (H): ICD-10-CM

## 2024-04-09 DIAGNOSIS — Z95.0 CARDIAC PACEMAKER IN SITU: ICD-10-CM

## 2024-04-10 LAB
MDC_IDC_EPISODE_DTM: NORMAL
MDC_IDC_EPISODE_DURATION: 12 S
MDC_IDC_EPISODE_DURATION: 14 S
MDC_IDC_EPISODE_DURATION: 18 S
MDC_IDC_EPISODE_DURATION: 20 S
MDC_IDC_EPISODE_DURATION: 22 S
MDC_IDC_EPISODE_DURATION: 24 S
MDC_IDC_EPISODE_DURATION: 26 S
MDC_IDC_EPISODE_DURATION: 44 S
MDC_IDC_EPISODE_DURATION: 54 S
MDC_IDC_EPISODE_DURATION: 64 S
MDC_IDC_EPISODE_DURATION: 66 S
MDC_IDC_EPISODE_DURATION: 88 S
MDC_IDC_EPISODE_ID: NORMAL
MDC_IDC_EPISODE_TYPE: NORMAL
MDC_IDC_LEAD_CONNECTION_STATUS: NORMAL
MDC_IDC_LEAD_CONNECTION_STATUS: NORMAL
MDC_IDC_LEAD_IMPLANT_DT: NORMAL
MDC_IDC_LEAD_IMPLANT_DT: NORMAL
MDC_IDC_LEAD_LOCATION: NORMAL
MDC_IDC_LEAD_LOCATION: NORMAL
MDC_IDC_LEAD_LOCATION_DETAIL_1: NORMAL
MDC_IDC_LEAD_LOCATION_DETAIL_1: NORMAL
MDC_IDC_LEAD_MFG: NORMAL
MDC_IDC_LEAD_MFG: NORMAL
MDC_IDC_LEAD_MODEL: NORMAL
MDC_IDC_LEAD_MODEL: NORMAL
MDC_IDC_LEAD_POLARITY_TYPE: NORMAL
MDC_IDC_LEAD_POLARITY_TYPE: NORMAL
MDC_IDC_LEAD_SERIAL: NORMAL
MDC_IDC_LEAD_SERIAL: NORMAL
MDC_IDC_MSMT_BATTERY_DTM: NORMAL
MDC_IDC_MSMT_BATTERY_REMAINING_LONGEVITY: 98 MO
MDC_IDC_MSMT_BATTERY_REMAINING_PERCENTAGE: 76 %
MDC_IDC_MSMT_BATTERY_RRT_TRIGGER: NORMAL
MDC_IDC_MSMT_BATTERY_STATUS: NORMAL
MDC_IDC_MSMT_BATTERY_VOLTAGE: 3.02 V
MDC_IDC_MSMT_LEADCHNL_RA_IMPEDANCE_VALUE: 400 OHM
MDC_IDC_MSMT_LEADCHNL_RA_LEAD_CHANNEL_STATUS: NORMAL
MDC_IDC_MSMT_LEADCHNL_RA_PACING_THRESHOLD_AMPLITUDE: 0.62 V
MDC_IDC_MSMT_LEADCHNL_RA_PACING_THRESHOLD_PULSEWIDTH: 0.5 MS
MDC_IDC_MSMT_LEADCHNL_RA_SENSING_INTR_AMPL: 2.9 MV
MDC_IDC_MSMT_LEADCHNL_RV_IMPEDANCE_VALUE: 460 OHM
MDC_IDC_MSMT_LEADCHNL_RV_LEAD_CHANNEL_STATUS: NORMAL
MDC_IDC_MSMT_LEADCHNL_RV_PACING_THRESHOLD_AMPLITUDE: 1 V
MDC_IDC_MSMT_LEADCHNL_RV_PACING_THRESHOLD_PULSEWIDTH: 0.5 MS
MDC_IDC_MSMT_LEADCHNL_RV_SENSING_INTR_AMPL: 3.4 MV
MDC_IDC_PG_IMPLANT_DTM: NORMAL
MDC_IDC_PG_MFG: NORMAL
MDC_IDC_PG_MODEL: NORMAL
MDC_IDC_PG_SERIAL: NORMAL
MDC_IDC_PG_TYPE: NORMAL
MDC_IDC_SESS_CLINIC_NAME: NORMAL
MDC_IDC_SESS_DTM: NORMAL
MDC_IDC_SESS_REPROGRAMMED: NO
MDC_IDC_SESS_TYPE: NORMAL
MDC_IDC_SET_BRADY_AT_MODE_SWITCH_MODE: NORMAL
MDC_IDC_SET_BRADY_AT_MODE_SWITCH_RATE: 180 {BEATS}/MIN
MDC_IDC_SET_BRADY_LOWRATE: 50 {BEATS}/MIN
MDC_IDC_SET_BRADY_MAX_SENSOR_RATE: 100 {BEATS}/MIN
MDC_IDC_SET_BRADY_MAX_TRACKING_RATE: 100 {BEATS}/MIN
MDC_IDC_SET_BRADY_MODE: NORMAL
MDC_IDC_SET_BRADY_PAV_DELAY_HIGH: 100 MS
MDC_IDC_SET_BRADY_PAV_DELAY_LOW: 300 MS
MDC_IDC_SET_BRADY_SAV_DELAY_HIGH: 100 MS
MDC_IDC_SET_BRADY_SAV_DELAY_LOW: 130 MS
MDC_IDC_SET_LEADCHNL_RA_PACING_AMPLITUDE: 1.62
MDC_IDC_SET_LEADCHNL_RA_PACING_ANODE_ELECTRODE_1: NORMAL
MDC_IDC_SET_LEADCHNL_RA_PACING_ANODE_LOCATION_1: NORMAL
MDC_IDC_SET_LEADCHNL_RA_PACING_CAPTURE_MODE: NORMAL
MDC_IDC_SET_LEADCHNL_RA_PACING_CATHODE_ELECTRODE_1: NORMAL
MDC_IDC_SET_LEADCHNL_RA_PACING_CATHODE_LOCATION_1: NORMAL
MDC_IDC_SET_LEADCHNL_RA_PACING_POLARITY: NORMAL
MDC_IDC_SET_LEADCHNL_RA_PACING_PULSEWIDTH: 0.5 MS
MDC_IDC_SET_LEADCHNL_RA_SENSING_ADAPTATION_MODE: NORMAL
MDC_IDC_SET_LEADCHNL_RA_SENSING_ANODE_ELECTRODE_1: NORMAL
MDC_IDC_SET_LEADCHNL_RA_SENSING_ANODE_LOCATION_1: NORMAL
MDC_IDC_SET_LEADCHNL_RA_SENSING_CATHODE_ELECTRODE_1: NORMAL
MDC_IDC_SET_LEADCHNL_RA_SENSING_CATHODE_LOCATION_1: NORMAL
MDC_IDC_SET_LEADCHNL_RA_SENSING_POLARITY: NORMAL
MDC_IDC_SET_LEADCHNL_RA_SENSING_SENSITIVITY: 0.2 MV
MDC_IDC_SET_LEADCHNL_RV_PACING_AMPLITUDE: 1.25 V
MDC_IDC_SET_LEADCHNL_RV_PACING_ANODE_ELECTRODE_1: NORMAL
MDC_IDC_SET_LEADCHNL_RV_PACING_ANODE_LOCATION_1: NORMAL
MDC_IDC_SET_LEADCHNL_RV_PACING_CAPTURE_MODE: NORMAL
MDC_IDC_SET_LEADCHNL_RV_PACING_CATHODE_ELECTRODE_1: NORMAL
MDC_IDC_SET_LEADCHNL_RV_PACING_CATHODE_LOCATION_1: NORMAL
MDC_IDC_SET_LEADCHNL_RV_PACING_POLARITY: NORMAL
MDC_IDC_SET_LEADCHNL_RV_PACING_PULSEWIDTH: 0.5 MS
MDC_IDC_SET_LEADCHNL_RV_SENSING_ADAPTATION_MODE: NORMAL
MDC_IDC_SET_LEADCHNL_RV_SENSING_ANODE_ELECTRODE_1: NORMAL
MDC_IDC_SET_LEADCHNL_RV_SENSING_ANODE_LOCATION_1: NORMAL
MDC_IDC_SET_LEADCHNL_RV_SENSING_CATHODE_ELECTRODE_1: NORMAL
MDC_IDC_SET_LEADCHNL_RV_SENSING_CATHODE_LOCATION_1: NORMAL
MDC_IDC_SET_LEADCHNL_RV_SENSING_POLARITY: NORMAL
MDC_IDC_SET_LEADCHNL_RV_SENSING_SENSITIVITY: 2 MV
MDC_IDC_STAT_AT_BURDEN_PERCENT: 1 %
MDC_IDC_STAT_AT_DTM_END: NORMAL
MDC_IDC_STAT_AT_DTM_START: NORMAL
MDC_IDC_STAT_AT_MODE_SW_COUNT: 182
MDC_IDC_STAT_AT_MODE_SW_COUNT_PER_DAY: 1
MDC_IDC_STAT_AT_MODE_SW_MAX_DURATION: 562 S
MDC_IDC_STAT_AT_MODE_SW_PERCENT_TIME: 1 %
MDC_IDC_STAT_BRADY_AP_VP_PERCENT: 2.8 %
MDC_IDC_STAT_BRADY_AP_VS_PERCENT: 1 %
MDC_IDC_STAT_BRADY_AS_VP_PERCENT: 96 %
MDC_IDC_STAT_BRADY_AS_VS_PERCENT: 1 %
MDC_IDC_STAT_BRADY_DTM_END: NORMAL
MDC_IDC_STAT_BRADY_DTM_START: NORMAL
MDC_IDC_STAT_BRADY_RA_PERCENT_PACED: 1 %
MDC_IDC_STAT_BRADY_RV_PERCENT_PACED: 98 %
MDC_IDC_STAT_CRT_DTM_END: NORMAL
MDC_IDC_STAT_CRT_DTM_START: NORMAL
MDC_IDC_STAT_HEART_RATE_ATRIAL_MAX: 330 {BEATS}/MIN
MDC_IDC_STAT_HEART_RATE_ATRIAL_MEAN: 64 {BEATS}/MIN
MDC_IDC_STAT_HEART_RATE_ATRIAL_MIN: 40 {BEATS}/MIN
MDC_IDC_STAT_HEART_RATE_DTM_END: NORMAL
MDC_IDC_STAT_HEART_RATE_DTM_START: NORMAL
MDC_IDC_STAT_HEART_RATE_VENTRICULAR_MAX: 240 {BEATS}/MIN
MDC_IDC_STAT_HEART_RATE_VENTRICULAR_MEAN: 64 {BEATS}/MIN
MDC_IDC_STAT_HEART_RATE_VENTRICULAR_MIN: 30 {BEATS}/MIN

## 2024-04-10 PROCEDURE — 99207 CARDIAC DEVICE CHECK - REMOTE: CPT | Performed by: INTERNAL MEDICINE

## 2024-04-22 ENCOUNTER — ALLIED HEALTH/NURSE VISIT (OUTPATIENT)
Dept: CARDIOLOGY | Facility: CLINIC | Age: 84
End: 2024-04-22
Payer: COMMERCIAL

## 2024-04-22 DIAGNOSIS — I50.32 CHRONIC HEART FAILURE WITH PRESERVED EJECTION FRACTION (H): Primary | ICD-10-CM

## 2024-04-22 PROCEDURE — 99454 REM MNTR PHYSIOL PARAM 16-30: CPT | Performed by: INTERNAL MEDICINE

## 2024-05-01 ENCOUNTER — OFFICE VISIT (OUTPATIENT)
Dept: CARDIOLOGY | Facility: CLINIC | Age: 84
End: 2024-05-01
Payer: COMMERCIAL

## 2024-05-01 VITALS
HEART RATE: 58 BPM | SYSTOLIC BLOOD PRESSURE: 138 MMHG | BODY MASS INDEX: 30.23 KG/M2 | WEIGHT: 160 LBS | RESPIRATION RATE: 16 BRPM | DIASTOLIC BLOOD PRESSURE: 58 MMHG

## 2024-05-01 DIAGNOSIS — I50.30 NYHA CLASS 3 HEART FAILURE WITH PRESERVED EJECTION FRACTION (H): Primary | ICD-10-CM

## 2024-05-01 LAB — NT-PROBNP SERPL-MCNC: 1734 PG/ML (ref 0–1800)

## 2024-05-01 PROCEDURE — 99214 OFFICE O/P EST MOD 30 MIN: CPT | Performed by: INTERNAL MEDICINE

## 2024-05-01 PROCEDURE — 83880 ASSAY OF NATRIURETIC PEPTIDE: CPT | Performed by: INTERNAL MEDICINE

## 2024-05-01 PROCEDURE — G2211 COMPLEX E/M VISIT ADD ON: HCPCS | Performed by: INTERNAL MEDICINE

## 2024-05-01 PROCEDURE — 36415 COLL VENOUS BLD VENIPUNCTURE: CPT | Performed by: INTERNAL MEDICINE

## 2024-05-01 RX ORDER — DIPHENHYDRAMINE HCL 25 MG
1-2 CAPSULE ORAL
COMMUNITY

## 2024-05-01 NOTE — PROGRESS NOTES
HEART CARE NOTE          Assessment/Recommendations     1. HFpEF  Assessment / Plan  Near euvolemia on physical exam; denies HF symptoms of orthopnea, PND, fluid retention or edema - no changes to regimen at this time     2. CAD  Assessment / Plan  S/p coronary angiogram significant for moderate LAD dz. Plan for medical management at that time  Denies chest pain or anginal equivalents  Continue high intensity atorvastatin, carvedilol, losartan     3. 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 Afib  Assessment / Plan  Follows with Dr. Fritz in EP - please see detailed note in chart; currently on apixaban      5. Uterine mass  Assessment / Plan  S/p hysterectomy; now undergoing additional work-up to r/o metastasis     6. Pulmonary sarcoidosis   Assessment / Plan  Followed by pulmonary - Stage 1; further evaluation underway      30 minutes spent reviewing prior records (including documentation, laboratory studies, cardiac testing/imaging), history and physical exam, planning, and subsequent documentation.    The longitudinal plan of care for HFpEF was addressed during this visit. Due to the added complexity in care, I will continue to support Ms. Sharyn Trent  in the subsequent management of this condition(s) and with the ongoing continuity of care of this condition(s).      History of Present Illness/Subjective    Ms. Sharyn Trent is a 80 y.o. female with a PMHx significant for HFpEF, hypertensive emergency, dyslipidemia, type 2 diabetes, non-obstructive CAD, heart block status post pacemaker, paroxysmal supraventricular tachycardia, melanoma, chronic left leg edema, chronic kidney disease stage III who presents to [facility] for follow-up care.      Today, Mrs. Trent denies HF symptoms or acute cardiac concerns; Management plan as detailed above     ECG: Personally reviewed. Paced rhythm      Coronary angiogram:  RHC via right IJ  RA mean  4mmHg  PA mean 24mmHg  PCWP 21mmHg  LVEDP 19mmHg  Ao 153/62     PA sat 67%  Ao sat 94%     CO cindy 3.35     Angiography via left radial  LM short normal  LAD mid 50% narrowing with FFR 0.85  Circ normal  RCA normal     ECHO (personnaly Reviewed):   Left ventricle ejection fraction is normal. The estimated left ventricular ejection fraction is 55%.  Left ventricular diastolic function is abnormal.  Normal right ventricular size and systolic function.  No hemodynamically significant valvular heart abnormalities.  When compared to the previous study dated 3/20/2018, No significant change    Lab results: personally reviewed May 1, 2024; notable for CKD - stable renal function    Medical history and pertinent documents reviewed in Care Everywhere please where applicable see details above        Physical Examination Review of Systems   /58 (BP Location: Left arm, Patient Position: Sitting, Cuff Size: Adult Regular)   Pulse 58   Resp 16   Wt 72.6 kg (160 lb)   BMI 30.23 kg/m    Body mass index is 30.23 kg/m .  Wt Readings from Last 3 Encounters:   05/01/24 72.6 kg (160 lb)   03/01/24 70.3 kg (155 lb)   12/04/23 70.1 kg (154 lb 9.6 oz)     General Appearance:   no distress, normal body habitus   ENT/Mouth: membranes moist, no oral lesions or bleeding gums.      EYES:  no scleral icterus, normal conjunctivae   Neck: no carotid bruits or thyromegaly   Chest/Lungs:   lungs are clear to auscultation, no rales or wheezing, equal chest wall expansion    Cardiovascular:   Regular. Normal first and second heart sounds with no murmurs, rubs, or gallops; the carotid, radial and posterior tibial pulses are intact, no JVD or LE  edema bilaterally    Abdomen:  no organomegaly, masses, bruits, or tenderness; bowel sounds are present   Extremities: no cyanosis or clubbing   Skin: no xanthelasma, warm.    Neurologic: NAD     Psychiatric: alert and oriented x3, calm     A complete 10 systems ROS was reviewed  And is negative  except what is listed in the HPI.          Medical History  Surgical History Family History Social History   Past Medical History:   Diagnosis Date    Abnormal ECG     Anxiety     Arthritis     Chest pain     Chest pain     Chronic kidney disease     stage 3-mod.    Congestive heart failure (H)     Diabetes (H)     Dyslipidemia, goal LDL below 70     GERD (gastroesophageal reflux disease)     HTN (hypertension)     Hyperlipidemia     Macular degeneration (senile) of retina     Melanoma of ankle (H)     L ankle    Other second degree atrioventricular block     Created by Conversion     Pacemaker     PSVT (paroxysmal supraventricular tachycardia)     Right bundle branch block     Skin cancer     Past Surgical History:   Procedure Laterality Date    ABLATION OF DYSRHYTHMIC FOCUS  04/11/2013    AV analia reentry tachycardia, partially successful    BIOPSY SKIN (LOCATION)      CARDIAC CATHETERIZATION  03/10/2016    CV CORONARY ANGIOGRAM N/A 05/26/2021    Procedure: Coronary Angiogram;  Surgeon: Yony Gillis MD;  Location: [facility] Cardiac Cath Lab;  Service: Cardiology    CV LEFT HEART CATHETERIZATION WITHOUT LEFT VENTRICULOGRAM Left 05/26/2021    Procedure: Left Heart Catheterization Without Left Ventriculogram;  Surgeon: Yony Gillis MD;  Location: [facility] Cardiac Cath Lab;  Service: Cardiology    CV RIGHT HEART CATHETERIZATION N/A 05/26/2021    Procedure: Right Heart Catheterization;  Surgeon: Yony Gillis MD;  Location: [facility] Cardiac Cath Lab;  Service: Cardiology    DILATION AND CURETTAGE N/A 4/19/2022    Procedure: DILATION AND CURRETAGE;  Surgeon: Mary Reed MD;  Location: [facility] OR    EP PACEMAKER N/A 08/30/2021    Procedure: Electrophysiology Pacemaker;  Surgeon: Ab Saavedra MD;  Location: [facility] LAB CV    FOOT SURGERY      L foot    HAND SURGERY      on both thumbs    HYSTERECTOMY, TOTAL, ROBOT-ASSISTED, LAP, DA HAROON XI, W/BI SAL-OOPH &  SNT LYMPH NODE BX, MINI LAP Bilateral 5/31/2022    Procedure: ROBOTIC ASSISTED TOTAL LAPAROSCOPIC HYSTERECTOMY, BILATERAL SALPINGO-OOPHORECTOMY, SENTINEL LYMPH NODE INJECTION AND BIOPSY, MINI-LAPAROTOMY,;  Surgeon: Mary Reed MD;  Location: [facility] [place]    HYSTEROSCOPY DIAGNOSTIC N/A 4/19/2022    Procedure: HYSTEROSCOPY, DIAGNOSTIC;  Surgeon: Mary Reed MD;  Location: [facility] [place]    IMPLANT PACEMAKER  04/01/2011    Second-degree AV block    OTHER SURGICAL HISTORY      SVT Ablation    PELVIC EXAM UNDER ANESTHESIA, CERVICAL DILATION, N/A     PELVIC EXAMINATION UNDER ANESTHESIA N/A 4/19/2022    Procedure: PELVIC EXAM UNDER ANESTHESIA, CERVICAL DILATION,;  Surgeon: Mary Reed MD;  Location: [facility] [place]    OH SHLDR ARTHROSCOP,SURG,W/ROTAT CUFF REPR Left 03/09/2017    Procedure: LEFT SHOULDER ARTHROSCOPY DECOMPRESSION DISTAL CLAVICLE EXCISION  ROTATOR CUFF REPAIR BICEPS TENOTOMY AND EXTENSIVE DEBRIDEMENT;  Surgeon: Todd Riggs MD;  Location: [facility];  Service: Orthopedics    RELEASE CARPAL TUNNEL      both wrists    SKIN CANCER EXCISION      L ankle,Lleg and cheek    TOE SURGERY      L big toe joint replacement    TUBAL LIGATION      no family history of premature coronary artery disease Social History     Socioeconomic History    Marital status:      Spouse name: Not on file    Number of children: Not on file    Years of education: Not on file    Highest education level: Not on file   Occupational History    Not on file   Tobacco Use    Smoking status: Never     Passive exposure: Never    Smokeless tobacco: Never   Vaping Use    Vaping status: Never Used   Substance and Sexual Activity    Alcohol use: No    Drug use: No    Sexual activity: Yes     Partners: Male     Birth control/protection: Surgical   Other Topics Concern    Not on file   Social History Narrative    Not on file     Social Determinants of Health     Financial Resource  Strain: Not on file   Food Insecurity: Not on file   Transportation Needs: Not on file   Physical Activity: Not on file   Stress: Not on file   Social Connections: Not on file   Interpersonal Safety: Not on file   Housing Stability: Not on file           Lab Results    Chemistry/lipid CBC Cardiac Enzymes/BNP/TSH/INR   Lab Results   Component Value Date    CHOL 111 07/17/2023    HDL 45 (L) 07/17/2023    TRIG 78 07/17/2023    BUN 41.5 (H) 02/14/2024     02/14/2024    CO2 29 02/14/2024    Lab Results   Component Value Date    WBC 6.6 02/14/2024    HGB 11.8 02/14/2024    HCT 36.0 02/14/2024    MCV 95 02/14/2024     02/14/2024    Lab Results   Component Value Date    TROPONINI 0.04 04/05/2022     (H) 04/05/2022    TSH 5.59 (H) 02/02/2024    INR 1.05 09/13/2021     Lab Results   Component Value Date    TROPONINI 0.04 04/05/2022          Weight:    Wt Readings from Last 3 Encounters:   03/01/24 70.3 kg (155 lb)   12/04/23 70.1 kg (154 lb 9.6 oz)   11/15/23 68.9 kg (152 lb)       Allergies  Allergies   Allergen Reactions    Herlinda-Kit Bee Sting Shortness Of Breath    Venom-Honey Bee [Bee Venom] Shortness Of Breath    Mercurial Analogues [Mercurial Derivatives] Dermatitis     blisters    Nitrofurantoin Other (See Comments)     Burning sensation across chest and arms    Other reaction(s): arms and chest burning    Sulfa (Sulfonamide Antibiotics) [Sulfa Antibiotics] Rash         Surgical History  Past Surgical History:   Procedure Laterality Date    ABLATION OF DYSRHYTHMIC FOCUS  04/11/2013    AV analia reentry tachycardia, partially successful    BIOPSY SKIN (LOCATION)      CARDIAC CATHETERIZATION  03/10/2016    CV CORONARY ANGIOGRAM N/A 05/26/2021    Procedure: Coronary Angiogram;  Surgeon: Yony Gillis MD;  Location: [facility] Cardiac Cath Lab;  Service: Cardiology    CV LEFT HEART CATHETERIZATION WITHOUT LEFT VENTRICULOGRAM Left 05/26/2021    Procedure: Left Heart Catheterization Without Left  Ventriculogram;  Surgeon: Yony Gillis MD;  Location: [facility] Cardiac Cath Lab;  Service: Cardiology    CV RIGHT HEART CATHETERIZATION N/A 05/26/2021    Procedure: Right Heart Catheterization;  Surgeon: Yony Gillis MD;  Location: [facility] Cardiac Cath Lab;  Service: Cardiology    DILATION AND CURETTAGE N/A 4/19/2022    Procedure: DILATION AND CURRETAGE;  Surgeon: Mary Reed MD;  Location: [facility] [place]    EP PACEMAKER N/A 08/30/2021    Procedure: Electrophysiology Pacemaker;  Surgeon: Ab Saavedra MD;  Location: [facility] CV    FOOT SURGERY      L foot    HAND SURGERY      on both thumbs    HYSTERECTOMY, TOTAL, ROBOT-ASSISTED, LAP, DA HAROON XI, W/BI SAL-OOPH & SNT LYMPH NODE BX, MINI LAP Bilateral 5/31/2022    Procedure: ROBOTIC ASSISTED TOTAL LAPAROSCOPIC HYSTERECTOMY, BILATERAL SALPINGO-OOPHORECTOMY, SENTINEL LYMPH NODE INJECTION AND BIOPSY, MINI-LAPAROTOMY,;  Surgeon: Mary Reed MD;  Location: [facility] [place]    HYSTEROSCOPY DIAGNOSTIC N/A 4/19/2022    Procedure: HYSTEROSCOPY, DIAGNOSTIC;  Surgeon: Mary Reed MD;  Location: [facility] [place]    IMPLANT PACEMAKER  04/01/2011    Second-degree AV block    OTHER SURGICAL HISTORY      SVT Ablation    PELVIC EXAM UNDER ANESTHESIA, CERVICAL DILATION, N/A     PELVIC EXAMINATION UNDER ANESTHESIA N/A 4/19/2022    Procedure: PELVIC EXAM UNDER ANESTHESIA, CERVICAL DILATION,;  Surgeon: Mary Reed MD;  [place] [facility] [place]    [place] SHLDR ARTHROSCOP,SURG,W/ROTAT CUFF REPR Left 03/09/2017    Procedure: LEFT SHOULDER ARTHROSCOPY DECOMPRESSION DISTAL CLAVICLE EXCISION  ROTATOR CUFF REPAIR BICEPS TENOTOMY AND EXTENSIVE DEBRIDEMENT;  Surgeon: Todd Riggs MD;  Location: [facility];  Service: Orthopedics    RELEASE CARPAL TUNNEL      both wrists    SKIN CANCER EXCISION      L ankle,Lleg and cheek    TOE SURGERY      L big toe joint replacement    TUBAL LIGATION          Social History  Tobacco:   History   Smoking Status    Never   Smokeless Tobacco    Never    Alcohol:   Social History    Substance and Sexual Activity      Alcohol use: No   Illicit Drugs:   History   Drug Use No       Family History  Family History   Problem Relation Age of Onset    Hypertension Mother     Cerebrovascular Disease Mother     Prostate Cancer Father     Cancer Father     Coronary Artery Disease Father     Dyslipidemia Father     Dyslipidemia Sister     Dyslipidemia Brother     Hypertension Brother     Prostate Cancer Brother           Heidy Akins MD on 5/1/2024      cc: Jamie Mcconnell

## 2024-05-01 NOTE — LETTER
5/1/2024    Jamie Mcconnell MD  [address]  [facility] [address]    RE: Sharyn Trent       Dear Colleague,     I had the pleasure of seeing Sharyn Trent in the [facility].    HEART CARE NOTE          Assessment/Recommendations     1. HFpEF  Assessment / Plan  Near euvolemia on physical exam; denies HF symptoms of orthopnea, PND, fluid retention or edema - no changes to regimen at this time     2. CAD  Assessment / Plan  S/p coronary angiogram significant for moderate LAD dz. Plan for medical management at that time  Denies chest pain or anginal equivalents  Continue high intensity atorvastatin, carvedilol, losartan     3. 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 Afib  Assessment / Plan  Follows with Dr. Fritz in EP - please see detailed note in chart; currently on apixaban      5. Uterine mass  Assessment / Plan  S/p hysterectomy; now undergoing additional work-up to r/o metastasis     6. Pulmonary sarcoidosis   Assessment / Plan  Followed by pulmonary - Stage 1; further evaluation underway      30 minutes spent reviewing prior records (including documentation, laboratory studies, cardiac testing/imaging), history and physical exam, planning, and subsequent documentation.    The longitudinal plan of care for HFpEF was addressed during this visit. Due to the added complexity in care, I will continue to support Ms. Sharyn Trent  in the subsequent management of this condition(s) and with the ongoing continuity of care of this condition(s).      History of Present Illness/Subjective    Ms. Sharyn Trent is a 80 y.o. female with a PMHx significant for HFpEF, hypertensive emergency, dyslipidemia, type 2 diabetes, non-obstructive CAD, heart block status post pacemaker, paroxysmal supraventricular tachycardia, melanoma, chronic left leg edema, chronic kidney disease stage III who presents to [facility] for follow-up care.       Today, Mrs. Trent denies HF symptoms or acute cardiac concerns; Management plan as detailed above     ECG: Personally reviewed. Paced rhythm      Coronary angiogram:  RHC via right IJ  RA mean 4mmHg  PA mean 24mmHg  PCWP 21mmHg  LVEDP 19mmHg  Ao 153/62     PA sat 67%  Ao sat 94%     CO cindy 3.35     Angiography via left radial  LM short normal  LAD mid 50% narrowing with FFR 0.85  Circ normal  RCA normal     ECHO (personnaly Reviewed):   Left ventricle ejection fraction is normal. The estimated left ventricular ejection fraction is 55%.  Left ventricular diastolic function is abnormal.  Normal right ventricular size and systolic function.  No hemodynamically significant valvular heart abnormalities.  When compared to the previous study dated 3/20/2018, No significant change    Lab results: personally reviewed May 1, 2024; notable for CKD - stable renal function    Medical history and pertinent documents reviewed in Care Everywhere please where applicable see details above        Physical Examination Review of Systems   /58 (BP Location: Left arm, Patient Position: Sitting, Cuff Size: Adult Regular)   Pulse 58   Resp 16   Wt 72.6 kg (160 lb)   BMI 30.23 kg/m    Body mass index is 30.23 kg/m .  Wt Readings from Last 3 Encounters:   05/01/24 72.6 kg (160 lb)   03/01/24 70.3 kg (155 lb)   12/04/23 70.1 kg (154 lb 9.6 oz)     General Appearance:   no distress, normal body habitus   ENT/Mouth: membranes moist, no oral lesions or bleeding gums.      EYES:  no scleral icterus, normal conjunctivae   Neck: no carotid bruits or thyromegaly   Chest/Lungs:   lungs are clear to auscultation, no rales or wheezing, equal chest wall expansion    Cardiovascular:   Regular. Normal first and second heart sounds with no murmurs, rubs, or gallops; the carotid, radial and posterior tibial pulses are intact, no JVD or LE  edema bilaterally    Abdomen:  no organomegaly, masses, bruits, or tenderness; bowel sounds are present    Extremities: no cyanosis or clubbing   Skin: no xanthelasma, warm.    Neurologic: NAD     Psychiatric: alert and oriented x3, calm     A complete 10 systems ROS was reviewed  And is negative except what is listed in the HPI.          Medical History  Surgical History Family History Social History   Past Medical History:   Diagnosis Date    Abnormal ECG     Anxiety     Arthritis     Chest pain     Chest pain     Chronic kidney disease     stage 3-mod.    Congestive heart failure (H)     Diabetes (H)     Dyslipidemia, goal LDL below 70     GERD (gastroesophageal reflux disease)     HTN (hypertension)     Hyperlipidemia     Macular degeneration (senile) of retina     Melanoma of ankle (H)     L ankle    Other second degree atrioventricular block     Created by Conversion     Pacemaker     PSVT (paroxysmal supraventricular tachycardia)     Right bundle branch block     Skin cancer     Past Surgical History:   Procedure Laterality Date    ABLATION OF DYSRHYTHMIC FOCUS  04/11/2013    AV analia reentry tachycardia, partially successful    BIOPSY SKIN (LOCATION)      CARDIAC CATHETERIZATION  03/10/2016    CV CORONARY ANGIOGRAM N/A 05/26/2021    Procedure: Coronary Angiogram;  Surgeon: Yony Gillis MD;  Location: [facility] Cardiac Cath Lab;  Service: Cardiology    CV LEFT HEART CATHETERIZATION WITHOUT LEFT VENTRICULOGRAM Left 05/26/2021    Procedure: Left Heart Catheterization Without Left Ventriculogram;  Surgeon: Yony Gillis MD;  Location: [facility] Cardiac Cath Lab;  Service: Cardiology    CV RIGHT HEART CATHETERIZATION N/A 05/26/2021    Procedure: Right Heart Catheterization;  Surgeon: Yony Gillis MD;  Location: [facility] Cardiac Cath Lab;  Service: Cardiology    DILATION AND CURETTAGE N/A 4/19/2022    Procedure: DILATION AND CURRETAGE;  Surgeon: Mary Reed MD;  Location: [facility] OR    EP PACEMAKER N/A 08/30/2021    Procedure: Electrophysiology Pacemaker;   Surgeon: Ab Saavedra MD;  Location: [facility] LAB CV    FOOT SURGERY      L foot    HAND SURGERY      on both thumbs    HYSTERECTOMY, TOTAL, ROBOT-ASSISTED, LAP, DA HAROON XI, W/BI SAL-OOPH & SNT LYMPH NODE BX, MINI LAP Bilateral 5/31/2022    Procedure: ROBOTIC ASSISTED TOTAL LAPAROSCOPIC HYSTERECTOMY, BILATERAL SALPINGO-OOPHORECTOMY, SENTINEL LYMPH NODE INJECTION AND BIOPSY, MINI-LAPAROTOMY,;  Surgeon: Mary Reed MD;  Location: [facility] OR    HYSTEROSCOPY DIAGNOSTIC N/A 4/19/2022    Procedure: HYSTEROSCOPY, DIAGNOSTIC;  Surgeon: Mary Reed MD;  Location: [facility] OR    IMPLANT PACEMAKER  04/01/2011    Second-degree AV block    OTHER SURGICAL HISTORY      SVT Ablation    PELVIC EXAM UNDER ANESTHESIA, CERVICAL DILATION, N/A     PELVIC EXAMINATION UNDER ANESTHESIA N/A 4/19/2022    Procedure: PELVIC EXAM UNDER ANESTHESIA, CERVICAL DILATION,;  Surgeon: Mary Reed MD;  Location: [facility] OR    MS SHLDR ARTHROSCOP,SURG,W/ROTAT CUFF REPR Left 03/09/2017    Procedure: LEFT SHOULDER ARTHROSCOPY DECOMPRESSION DISTAL CLAVICLE EXCISION  ROTATOR CUFF REPAIR BICEPS TENOTOMY AND EXTENSIVE DEBRIDEMENT;  Surgeon: Todd Riggs MD;  Location: [facility] OR;  Service: Orthopedics    RELEASE CARPAL TUNNEL      both wrists    SKIN CANCER EXCISION      L ankle,Lleg and cheek    TOE SURGERY      L big toe joint replacement    TUBAL LIGATION      no family history of premature coronary artery disease Social History     Socioeconomic History    Marital status:      Spouse name: Not on file    Number of children: Not on file    Years of education: Not on file    Highest education level: Not on file   Occupational History    Not on file   Tobacco Use    Smoking status: Never     Passive exposure: Never    Smokeless tobacco: Never   Vaping Use    Vaping status: Never Used   Substance and Sexual Activity    Alcohol use: No    Drug use: No    Sexual activity:  Yes     Partners: Male     Birth control/protection: Surgical   Other Topics Concern    Not on file   Social History Narrative    Not on file     Social Determinants of Health     Financial Resource Strain: Not on file   Food Insecurity: Not on file   Transportation Needs: Not on file   Physical Activity: Not on file   Stress: Not on file   Social Connections: Not on file   Interpersonal Safety: Not on file   Housing Stability: Not on file           Lab Results    Chemistry/lipid CBC Cardiac Enzymes/BNP/TSH/INR   Lab Results   Component Value Date    CHOL 111 07/17/2023    HDL 45 (L) 07/17/2023    TRIG 78 07/17/2023    BUN 41.5 (H) 02/14/2024     02/14/2024    CO2 29 02/14/2024    Lab Results   Component Value Date    WBC 6.6 02/14/2024    HGB 11.8 02/14/2024    HCT 36.0 02/14/2024    MCV 95 02/14/2024     02/14/2024    Lab Results   Component Value Date    TROPONINI 0.04 04/05/2022     (H) 04/05/2022    TSH 5.59 (H) 02/02/2024    INR 1.05 09/13/2021     Lab Results   Component Value Date    TROPONINI 0.04 04/05/2022          Weight:    Wt Readings from Last 3 Encounters:   03/01/24 70.3 kg (155 lb)   12/04/23 70.1 kg (154 lb 9.6 oz)   11/15/23 68.9 kg (152 lb)       Allergies  Allergies   Allergen Reactions    Herlinda-Kit Bee Sting Shortness Of Breath    Venom-Honey Bee [Bee Venom] Shortness Of Breath    Mercurial Analogues [Mercurial Derivatives] Dermatitis     blisters    Nitrofurantoin Other (See Comments)     Burning sensation across chest and arms    Other reaction(s): arms and chest burning    Sulfa (Sulfonamide Antibiotics) [Sulfa Antibiotics] Rash         Surgical History  Past Surgical History:   Procedure Laterality Date    ABLATION OF DYSRHYTHMIC FOCUS  04/11/2013    AV analia reentry tachycardia, partially successful    BIOPSY SKIN (LOCATION)      CARDIAC CATHETERIZATION  03/10/2016    CV CORONARY ANGIOGRAM N/A 05/26/2021    Procedure: Coronary Angiogram;  Surgeon: Yony Gillis,  MD;  Location: [facility] Cardiac Cath Lab;  Service: Cardiology    CV LEFT HEART CATHETERIZATION WITHOUT LEFT VENTRICULOGRAM Left 05/26/2021    Procedure: Left Heart Catheterization Without Left Ventriculogram;  Surgeon: Yony Gillis MD;  Location: [facility] Cardiac Cath Lab;  Service: Cardiology    CV RIGHT HEART CATHETERIZATION N/A 05/26/2021    Procedure: Right Heart Catheterization;  Surgeon: Yony Gillis MD;  Location: [facility] Cardiac Cath Lab;  Service: Cardiology    DILATION AND CURETTAGE N/A 4/19/2022    Procedure: DILATION AND CURRETAGE;  Surgeon: Mary Reed MD;  Location: [facility] OR    EP PACEMAKER N/A 08/30/2021    Procedure: Electrophysiology Pacemaker;  Surgeon: Ab Saavedra MD;  Location: [facility] CV    FOOT SURGERY      L foot    HAND SURGERY      on both thumbs    HYSTERECTOMY, TOTAL, ROBOT-ASSISTED, LAP, DA HAROON XI, W/BI SAL-OOPH & SNT LYMPH NODE BX, MINI LAP Bilateral 5/31/2022    Procedure: ROBOTIC ASSISTED TOTAL LAPAROSCOPIC HYSTERECTOMY, BILATERAL SALPINGO-OOPHORECTOMY, SENTINEL LYMPH NODE INJECTION AND BIOPSY, MINI-LAPAROTOMY,;  Surgeon: Mary Reed MD;  Location: [facility] OR    HYSTEROSCOPY DIAGNOSTIC N/A 4/19/2022    Procedure: HYSTEROSCOPY, DIAGNOSTIC;  Surgeon: Mary Reed MD;  Location: [facility] OR    IMPLANT PACEMAKER  04/01/2011    Second-degree AV block    OTHER SURGICAL HISTORY      SVT Ablation    PELVIC EXAM UNDER ANESTHESIA, CERVICAL DILATION, N/A     PELVIC EXAMINATION UNDER ANESTHESIA N/A 4/19/2022    Procedure: PELVIC EXAM UNDER ANESTHESIA, CERVICAL DILATION,;  Surgeon: Mary Reed MD;  Location: [facility] OR    [place] SHLDR ARTHROSCOP,SURG,W/ROTAT CUFF REPR Left 03/09/2017    Procedure: LEFT SHOULDER ARTHROSCOPY DECOMPRESSION DISTAL CLAVICLE EXCISION  ROTATOR CUFF REPAIR BICEPS TENOTOMY AND EXTENSIVE DEBRIDEMENT;  Surgeon: Todd Riggs MD;  Location: [facility] OR;  Service: Orthopedics    RELEASE CARPAL TUNNEL      both wrists    SKIN CANCER EXCISION      L ankle,Lleg and cheek    TOE SURGERY      L big toe joint replacement    TUBAL LIGATION         Social History  Tobacco:   History   Smoking Status    Never   Smokeless Tobacco    Never    Alcohol:   Social History    Substance and Sexual Activity      Alcohol use: No   Illicit Drugs:   History   Drug Use No       Family History  Family History   Problem Relation Age of Onset    Hypertension Mother     Cerebrovascular Disease Mother     Prostate Cancer Father     Cancer Father     Coronary Artery Disease Father     Dyslipidemia Father     Dyslipidemia Sister     Dyslipidemia Brother     Hypertension Brother     Prostate Cancer Brother           Heidy Akins MD on 5/1/2024      cc: Jamie Mcconnell      Thank you for allowing me to participate in the care of your patient.      Sincerely,     Heidy Akins MD     [facility]  cc:   Heidy Akins MD  [address] [address]

## 2024-05-08 ENCOUNTER — TELEPHONE (OUTPATIENT)
Dept: ANTICOAGULATION | Facility: CLINIC | Age: 84
End: 2024-05-08
Payer: COMMERCIAL

## 2024-05-08 DIAGNOSIS — I48.0 PAROXYSMAL ATRIAL FIBRILLATION (H): ICD-10-CM

## 2024-05-08 NOTE — TELEPHONE ENCOUNTER
ANTICOAGULATION DIRECT ORAL ANTICOAGULANT MONITORING    SUBJECTIVE     The [facility]tico[facility] is evaluating Sharyn Trent's Apixaban (Eliquis) as part of its Anticoagulation Monitoring Program.    Indication:Atrial Fibrillation  Current dose per medication list: Apixaban 5 mg BID  Recent hospitalizations/ED/Office Visits for bleeding/clotting concerns: No  Other bleeding or side effect concerns: No  Additional findings: note stroke like symptoms on diagnosis list.    OBJECTIVE     Age: 83 year old    Wt Readings from Last 2 Encounters:   05/01/24 72.6 kg (160 lb)   03/01/24 70.3 kg (155 lb)      Lab Results   Component Value Date    CR 1.57 (H) 02/14/2024    CR 1.45 (H) 02/02/2024    CR 1.50 (H) 07/17/2023     Creatinine Clearance (using actual bodyweight, mL/min):     Lab Results   Component Value Date    HGB 11.8 02/14/2024     02/14/2024     ASSESSMENT/PLAN     A chart review for Direct Oral Anticoagulant (DOAC) Stewardship has been completed for:     Dosing: recommend adjustment to Apixaban 2.5 mg BID for age >= 80 years and creatinine >= 1.5 mg/dL (consistent with package insert dosing)    Plan made per [facility] anticoagulation protocol    Natasha Stephen RN  Anticoagulation Clinic

## 2024-05-15 NOTE — TELEPHONE ENCOUNTER
ANTICOAGULATION  STEWARDSHIP    Spoke with Sharyn to review advised dosage change for Apixaban (Eliquis).    Education provided: how to take apixaban (Eilquis) safely: take doses as close to every 12 hours as possible; at the same time each day, may split tablets in order to start new dose & finish current supply of medication., not discontinue therapy without talking to their provider first, and may take longer than usual for bleeding to stop and patient may bruise or bleed more easily; any unusual bleeding to their provider.     They verbalized understanding of new Apixaban (Eliquis) dose/tablet strength  They were notified Rx for new dosage would be sent to preferred pharmacy    Natasha Stephen RN  [facility]

## 2024-05-15 NOTE — TELEPHONE ENCOUNTER
Heidy Akins MD  You7 days ago     TB  Sure     You  Heidy Akins MD7 days ago     LR  Sharyn flagged for review of her Apixaban within our new DOAC monitoring program due to her age and Creatinine. Do you agree with lowering her dose of Eliquis to 2.5 mg BID?    Natasha CHONG ACN

## 2024-05-23 ENCOUNTER — ALLIED HEALTH/NURSE VISIT (OUTPATIENT)
Dept: CARDIOLOGY | Facility: CLINIC | Age: 84
End: 2024-05-23
Payer: COMMERCIAL

## 2024-05-23 DIAGNOSIS — I50.30 NYHA CLASS 3 HEART FAILURE WITH PRESERVED EJECTION FRACTION (H): Primary | ICD-10-CM

## 2024-05-23 PROCEDURE — 99454 REM MNTR PHYSIOL PARAM 16-30: CPT | Performed by: INTERNAL MEDICINE

## 2024-05-23 NOTE — PROGRESS NOTES
[facility] Hematology and Oncology Progress Note    Patient: Sharyn Trent  MRN: [id number]  Date of Service: Mar 1, 2024        Assessment and Plan:    1.  Monoclonal gammopathy of undetermined significance: I reviewed her labs from February 14.  Serum protein electrophoresis shows no monoclonal protein, kappa lambda light chains are both elevated with a normal ratio and her quantitative immunoglobulins are all normal.  Her labs have been reviewed going back to 2021.  Her serum protein electrophoresis has always been negative.  Immunofixation's have commented on possible faint band.  Even if she does have a monoclonal protein, it is present at such a low level that it is never going to cause any problem for her.  I do not think she needs any routine follow-up in the hematology clinic.    2.  Anemia of chronic kidney disease: CBC from today is reviewed and shows a white count of 6.6, hemoglobin 11.8 and platelets 191,000.  Hemoglobin is improved when compared to a year ago.  No further evaluation is needed.    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  She is here to review her labs.  Feeling okay.  No change in her neuropathic symptoms.  No new complaints today.    Review of Systems:    As above in the history.     Review of Systems otherwise Negative for:  General: chills, fever or night sweats  Psychological: anxiety or depression  Ophthalmic: blurry vision, double vision or loss of vision, vision change  ENT: epistaxis, oral lesions, hearing changes  Hematological and Lymphatic: bleeding, bruising, jaundice, swollen lymph nodes  Endocrine: hot flashes, unexpected weight changes  Respiratory: cough, hemoptysis, orthopnea or shortness of breath/SOLIS  Cardiovascular: chest pain, edema, palpitations or PND  Gastrointestinal: abdominal pain, blood in stools, change in bowel habits,  "constipation, diarrhea or nausea/vomiting  Genito-Urinary: change in urinary stream, incontinence, frequency/urgency  Musculoskeletal: joint pain, stiffness, swelling, muscle pain  Neurological: dizziness, headaches, numbness/tingling  Dermatological: lumps and rash    Past History:    Past Medical History:   Diagnosis Date    Abnormal ECG     Anxiety     Arthritis     Chest pain     Chest pain     Chronic kidney disease     stage 3-mod.    Congestive heart failure (H)     Diabetes (H)     Dyslipidemia, goal LDL below 70     GERD (gastroesophageal reflux disease)     HTN (hypertension)     Hyperlipidemia     Macular degeneration (senile) of retina     Melanoma of ankle (H)     L ankle    Other second degree atrioventricular block     Created by Conversion     Pacemaker     PSVT (paroxysmal supraventricular tachycardia) (H24)     Right bundle branch block     Skin cancer      Physical Exam:    BP (!) 180/75 (BP Location: Left arm, Patient Position: Sitting, Cuff Size: Adult Regular)   Pulse 59   Temp 97.4  F (36.3  C) (Tympanic)   Resp 24   Ht 1.549 m (5' 1\")   Wt 70.3 kg (155 lb)   SpO2 97%   BMI 29.29 kg/m      General: patient appears stated age of 83 year old. Nontoxic and in no distress.   HEENT: Head: atraumatic, normocephalic. Sclerae anicteric.  Chest:  Normal respiratory effort  Cardiac:  No edema.   Abdomen: abdomen is non-distended  Extremities: normal tone and muscle bulk.  Skin: no lesions or rash on visible skin. Warm and dry.   CNS: alert and oriented. Grossly non-focal.   Psychiatric: normal mood and affect.     Lab Results:    No results found for this or any previous visit (from the past 168 hour(s)).    Imaging:    No results found.      Signed by: Gio Valencia MD    "

## 2024-06-18 ENCOUNTER — OFFICE VISIT (OUTPATIENT)
Dept: CARDIOLOGY | Facility: CLINIC | Age: 84
End: 2024-06-18
Attending: INTERNAL MEDICINE
Payer: COMMERCIAL

## 2024-06-18 VITALS
SYSTOLIC BLOOD PRESSURE: 129 MMHG | WEIGHT: 161 LBS | DIASTOLIC BLOOD PRESSURE: 61 MMHG | HEART RATE: 65 BPM | RESPIRATION RATE: 14 BRPM | BODY MASS INDEX: 30.42 KG/M2

## 2024-06-18 DIAGNOSIS — Z95.0 CARDIAC PACEMAKER IN SITU: ICD-10-CM

## 2024-06-18 DIAGNOSIS — I49.5 SICK SINUS SYNDROME (H): Primary | ICD-10-CM

## 2024-06-18 DIAGNOSIS — I50.32 CHRONIC HEART FAILURE WITH PRESERVED EJECTION FRACTION (H): Primary | ICD-10-CM

## 2024-06-18 DIAGNOSIS — I44.2 COMPLETE ATRIOVENTRICULAR BLOCK (H): ICD-10-CM

## 2024-06-18 DIAGNOSIS — I44.1 SECOND DEGREE AV BLOCK: ICD-10-CM

## 2024-06-18 DIAGNOSIS — I44.2 THIRD DEGREE AV BLOCK (H): ICD-10-CM

## 2024-06-18 DIAGNOSIS — I35.0 NONRHEUMATIC AORTIC VALVE STENOSIS: ICD-10-CM

## 2024-06-18 DIAGNOSIS — I10 ESSENTIAL HYPERTENSION: ICD-10-CM

## 2024-06-18 DIAGNOSIS — I47.19 PAROXYSMAL ATRIAL TACHYCARDIA (H): ICD-10-CM

## 2024-06-18 DIAGNOSIS — I48.0 PAROXYSMAL ATRIAL FIBRILLATION (H): ICD-10-CM

## 2024-06-18 DIAGNOSIS — N18.30 STAGE 3 CHRONIC KIDNEY DISEASE, UNSPECIFIED WHETHER STAGE 3A OR 3B CKD (H): ICD-10-CM

## 2024-06-18 DIAGNOSIS — Z95.0 PACEMAKER: ICD-10-CM

## 2024-06-18 PROCEDURE — G2211 COMPLEX E/M VISIT ADD ON: HCPCS | Performed by: GENERAL ACUTE CARE HOSPITAL

## 2024-06-18 PROCEDURE — 99214 OFFICE O/P EST MOD 30 MIN: CPT | Performed by: GENERAL ACUTE CARE HOSPITAL

## 2024-06-18 NOTE — LETTER
6/18/2024    Jamie Mcconnell MD  [address]  [facility] [address]    RE: Sharyn Trent       Dear Colleague,     I had the pleasure of seeing Sharyn Trent in the [facility].  HEART CARE ENCOUNTER NOTE        Assessment/Recommendations   Assessment:    Paroxysmal atrial fibrillation first noted 4/4/2022 on device interrogation. She denies symptoms. [id number] score is at least 6 (hypertension, age 75 or greater, diabetes mellitus, coronary artery disease, female gender). HAS-BLED score is at least 2 (age greater than 65, easy bruising).  Complete heart block status post [employer] dual-chamber permanent pacemaker placement 4/12/2011 with a generator replacement 8/30/2021. Normal device function noted recently with 98% right ventricular pacing.  Chronic congestive heart failure with preserved left ventricular ejection fraction. NYHA class II, euvolemic.  Mild aortic stenosis last assessed on transthoracic echocardiogram 8/7/2023.  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  Nonobstructive coronary artery disease seen on coronary angiography 5/26/2021.  Essential hypertension. Controlled.  Hyperlipidemia. Last LDL 50 mg/dL.  Non insulin-dependent diabetes mellitus type 2. Last hemoglobin A1c 5.3%.  Chronic kidney disease stage III.  Body mass index is 30.42 kg/m .    Plan:  Apixaban 2.5 mg twice daily.  We discussed percutaneous left atrial appendage closure which she will consider.  Furosemide 40 mg daily.  Atorvastatin 80 mg daily.  If she has any decompensation of congestive heart failure, she may benefit from addition of spironolactone and/or a sodium-glucose co-transporter 2 inhibitor.  Repeat transthoracic echocardiogram in 2026 to monitor her aortic stenosis.  She follows in Heart Failure CORE clinic with Dr. Sara Aikns.  Follow-up with me in 1 year.    (Patient) Has documented nonvalvular atrial  fibrillation (NVAF) and is currently on oral anticoagulant therapy Eliquis   [id number] -VASc = 6 (hypertension, age 75 or greater, diabetes mellitus, coronary artery disease, female gender).HAS-BLED risk score: 2 (age greater than 65, easy bruising).  Indication(s) to consider non-oral anticoagulation therapy: easy bruising  Pt has no contra-indication to come off oral anti-coagulant therapy if LAAC device is successfully placed.     I have personally reviewed the patients chart and discussed the following with the patient/family; 1) The choices available for reducing stroke risk from atrial fibrillation, 2) Treatment options available including respective risk/benefits, and 3) What factors are most important for the patient in making their decision.  The [facility] shared decision making tool https://www.cardiosmart.org/SDM/Decision-Aids/Find-Decision-Aids/Atrial-Fibrillation  was used to guide this conversation.   The patient was counseled that their decision could be made at this time or in the future if more time was needed to consider their decision.       The patient is an appropriate candidate to proceed with left atrial appendage screening and implant.          History of Present Illness   Ms. Sharyn Trent is a 83 year old female with a significant past history of HFpEF, complete heart block s/p 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 She also follows in the Heart Failure CORE clinic with Dr. Sara Akins.    She reports doing well. She since started apixaban for incidentally noted AF on her device interrogations. She is asymptomatic from this. She notes easy bruising otherwise she is tolerating apixaban. She has had some weight gain  which she attributes to eating too many sweets. No chest pain/pressure/tightness, shortness of breath at rest or with exertion, light headedness/dizziness, pre-syncope, syncope, lower extremity swelling, palpitations, paroxysmal nocturnal dyspnea (PND), or orthopnea.     Cardiac Problems and Cardiac Diagnostics     Most Recent Cardiac testing:  ECG dated 4/5/2022 (personaly reviewed and interpreted): A-sensed, V-paced with PVCs     Pulmonary function tests 12/13/2022 (report reviewed):  FEV1/FVC  is normal   FEV1 is normal   FVC is normal   DLCO is normal when it is corrected for hemoglobin.     Impression:  Pulmonary Function Test is normal.      Device interrogation 6/18/2024 (report reviewed):  Device: [employer] (D) Pacemaker  Pacing %/Programmed: AP <1%,  98% / DDD   Lead(s): RA, RV both have stable measurements  Battery longevity: Estimates 8 years  Presenting rhythm: AS/ 70 bpm  Underlying rhythm: SR w/CHB, no R's at VVI 40 bpm  Heart rates: , primarily 50-60 bpm  Atrial High rates: 196 mode switches, burden <1%. Longest episode 15 minutes. VR >= 120 bpm 0%.  Anticoagulant: ASA  Ventricular High rates: None  Comments: Normal device function. No changes made.     ECHO 8/7/2023 (report reviewed):   The left ventricle is normal in size.  Left ventricular function is normal.The ejection fraction is 55-60%.  Septal wall motion abnormality may reflect pacemaker activation.  There is a pacemaker lead in the right ventricle.  The left atrium is mildly dilated.  There is mild to moderate (1-2+) mitral regurgitation.  Mild valvular aortic stenosis.     Stress test 5/12/2021 (report reviewed):     The nuclear stress test is abnormal.    Nuclear images demonstrate a small area of moderate ischemia involving the distal anteroseptal, anterior and apical wall, although specificity reduced due to breast attenuation.    The left ventricular ejection fraction at stress is 64% without wall motion  abnormality.    A prior study was conducted on 3/23/2018.  Imaging appears more consistent with ischemia than infarction on the current study.     Cardiac cath 5/26/2021 (report reviewed):   RA mean 4mmHg  PA mean 24mmHg  PCWP 21mmHg  LVEDP 19mmHg  Ao 153/62     PA sat 67%  Ao sat 94%     CO cindy 3.35     Angiography via left radial  LM short normal  LAD mid 50% narrowing with FFR 0.85  Circ normal  RCA normal     Medications  Allergies   Current Outpatient Medications   Medication Sig Dispense Refill    acetaminophen (TYLENOL) 500 MG tablet Take 500-1,000 mg by mouth every 6 hours as needed for mild pain      apixaban ANTICOAGULANT (ELIQUIS) 2.5 MG tablet Take 1 tablet (2.5 mg) by mouth 2 times daily 60 tablet 2    atorvastatin (LIPITOR) 80 MG tablet Take 80 mg by mouth At Bedtime      calcium carbonate (TUMS) 500 MG chewable tablet Take 1-2 chew tab by mouth daily      carvedilol (COREG) 25 MG tablet Take 1 tablet (25 mg) by mouth 2 times daily (with meals) 180 tablet 3    escitalopram (LEXAPRO) 10 MG tablet Take 10 mg by mouth daily      ferrous sulfate (FEROSUL) 325 (65 Fe) MG tablet Take 325 mg by mouth daily (with breakfast)      fluorometholone (FML LIQUIFILM) 0.1 % ophthalmic suspension Place 1 drop into the right eye daily      furosemide (LASIX) 20 MG tablet Take 2 tablets (40 mg) by mouth daily 180 tablet 3    gabapentin (NEURONTIN) 100 MG capsule TAKE 1 CAPSULE(100 MG) BY MOUTH THREE TIMES DAILY 270 capsule 1    hydrALAZINE (APRESOLINE) 100 MG tablet Take 1 tablet (100 mg) by mouth 3 times daily 270 tablet 3    hypromellose-dextran (HYPROMELLOSE-DEXTRAN 0.3-0.1%) 0.1-0.3 % ophthalmic solution 1-2 drops      levothyroxine (SYNTHROID/LEVOTHROID) 50 MCG tablet Take 50 mcg by mouth daily      losartan (COZAAR) 50 MG tablet TAKE 1 TABLET(50 MG) BY MOUTH EVERY EVENING 90 tablet 1    Multiple Vitamins-Minerals (MULTIVITAMIN ADULTS 50+) TABS Take 1 tablet by mouth every morning      omeprazole (PRILOSEC) 20 MG  capsule Take 20 mg by mouth daily before breakfast      vit C/E/Zn/coppr/lutein/zeaxan (PRESERVISION AREDS-2 ORAL) Take 1 tablet by mouth 2 times daily (before meals)        Allergies   Allergen Reactions    Herlinda-Kit Bee Sting Shortness Of Breath    Venom-Honey Bee [Bee Venom] Shortness Of Breath    Mercurial Analogues [Mercurial Derivatives] Dermatitis     blisters    Nitrofurantoin Other (See Comments)     Burning sensation across chest and arms    Other reaction(s): arms and chest burning    Sulfa (Sulfonamide Antibiotics) [Sulfa Antibiotics] Rash        Physical Examination Review of Systems   /61 (BP Location: Right arm, Patient Position: Sitting, Cuff Size: Adult Large)   Pulse 65   Resp 14   Wt 73 kg (161 lb)   BMI 30.42 kg/m    Body mass index is 30.42 kg/m .  Wt Readings from Last 3 Encounters:   06/18/24 73 kg (161 lb)   05/01/24 72.6 kg (160 lb)   03/01/24 70.3 kg (155 lb)       General Appearance:   Pleasant  female, appears  stated age. no acute distress, normal body habitus   ENT/Mouth: membranes moist, no apparent gingival bleeding.      EYES:  no scleral icterus, normal conjunctivae   Neck: no carotid bruits. No anterior cervical lymphadenopaty   Respiratory:   lungs are clear to auscultation, no rales or wheezing, equal chest wall expansion    Cardiovascular:   Regular rhythm, normal rate. Normal first and second heart sounds with a soft systolic murmur. No rubs,or gallops; the carotid, radial and posterior tibial pulses are intact, Jugular venous pressure normal, no edema bilaterally    Abdomen/GI:  no organomegaly, masses, bruits, or tenderness; bowel sounds are present   Extremities: no cyanosis or clubbing   Skin: no xanthelasma, warm.    Heme/lymph/ Immunology No apparent bleeding noted.   Neurologic: Alert and oriented. normal gait, no tremors     Psychiatric: Pleasant, calm, appropriate affect.    A complete 10 system review of systems was performed and is negative except as  mentioned in the HPI/subjective.         Past History   Past Medical History:   Past Medical History:   Diagnosis Date    Abnormal ECG     Anxiety     Arthritis     Chest pain     Chest pain     Chronic kidney disease     stage 3-mod.    Congestive heart failure (H)     Diabetes (H)     Dyslipidemia, goal LDL below 70     GERD (gastroesophageal reflux disease)     HTN (hypertension)     Hyperlipidemia     Macular degeneration (senile) of retina     Melanoma of ankle (H)     L ankle    Other second degree atrioventricular block     Created by Conversion     Pacemaker     PSVT (paroxysmal supraventricular tachycardia) (H24)     Right bundle branch block     Skin cancer        Past Surgical History:   Past Surgical History:   Procedure Laterality Date    ABLATION OF DYSRHYTHMIC FOCUS  04/11/2013    AV analia reentry tachycardia, partially successful    BIOPSY SKIN (LOCATION)      CARDIAC CATHETERIZATION  03/10/2016    CV CORONARY ANGIOGRAM N/A 05/26/2021    Procedure: Coronary Angiogram;  Surgeon: Yony Gillis MD;  Location: [facility] Cardiac Cath Lab;  Service: Cardiology    CV LEFT HEART CATHETERIZATION WITHOUT LEFT VENTRICULOGRAM Left 05/26/2021    Procedure: Left Heart Catheterization Without Left Ventriculogram;  Surgeon: Yony Gillis MD;  Location: [facility] Cardiac Cath Lab;  Service: Cardiology    CV RIGHT HEART CATHETERIZATION N/A 05/26/2021    Procedure: Right Heart Catheterization;  Surgeon: Yony Gillis MD;  Location: [facility] Cardiac Cath Lab;  Service: Cardiology    DILATION AND CURETTAGE N/A 4/19/2022    Procedure: DILATION AND CURRETAGE;  Surgeon: Mary Reed MD;  [place] [facility] [place]    [place] PACEMAKER N/A 08/30/2021    Procedure: Electrophysiology Pacemaker;  Surgeon: Ab Saavedra MD;  Location: [facility] CATH LAB CV    FOOT SURGERY      L foot    HAND SURGERY      on both thumbs    HYSTERECTOMY, TOTAL, ROBOT-ASSISTED, LAP, DA HAROON XI, W/BI  SAL-OOPH & SNT LYMPH NODE BX, MINI LAP Bilateral 5/31/2022    Procedure: ROBOTIC ASSISTED TOTAL LAPAROSCOPIC HYSTERECTOMY, BILATERAL SALPINGO-OOPHORECTOMY, SENTINEL LYMPH NODE INJECTION AND BIOPSY, MINI-LAPAROTOMY,;  Surgeon: Mary Reed MD;  Location: [facility] [place]    HYSTEROSCOPY DIAGNOSTIC N/A 4/19/2022    Procedure: HYSTEROSCOPY, DIAGNOSTIC;  Surgeon: Mary Reed MD;  Location: [facility] [place]    IMPLANT PACEMAKER  04/01/2011    Second-degree AV block    OTHER SURGICAL HISTORY      SVT Ablation    PELVIC EXAM UNDER ANESTHESIA, CERVICAL DILATION, N/A     PELVIC EXAMINATION UNDER ANESTHESIA N/A 4/19/2022    Procedure: PELVIC EXAM UNDER ANESTHESIA, CERVICAL DILATION,;  Surgeon: Mary Reed MD;  Location: [facility] [place]    WV SHLDR ARTHROSCOP,SURG,W/ROTAT CUFF REPR Left 03/09/2017    Procedure: LEFT SHOULDER ARTHROSCOPY DECOMPRESSION DISTAL CLAVICLE EXCISION  ROTATOR CUFF REPAIR BICEPS TENOTOMY AND EXTENSIVE DEBRIDEMENT;  Surgeon: Todd Riggs MD;  Location: [facility] OR;  Service: Orthopedics    RELEASE CARPAL TUNNEL      both wrists    SKIN CANCER EXCISION      L ankle,Lleg and cheek    TOE SURGERY      L big toe joint replacement    TUBAL LIGATION         Family History:   Family History   Problem Relation Age of Onset    Hypertension Mother     Cerebrovascular Disease Mother     Prostate Cancer Father     Cancer Father     Coronary Artery Disease Father     Dyslipidemia Father     Dyslipidemia Sister     Dyslipidemia Brother     Hypertension Brother     Prostate Cancer Brother         Social History:   Social History     Socioeconomic History    Marital status:      Spouse name: Not on file    Number of children: Not on file    Years of education: Not on file    Highest education level: Not on file   Occupational History    Not on file   Tobacco Use    Smoking status: Never     Passive exposure: Never    Smokeless tobacco: Never   Vaping Use     Vaping status: Never Used   Substance and Sexual Activity    Alcohol use: No    Drug use: No    Sexual activity: Yes     Partners: Male     Birth control/protection: Surgical   Other Topics Concern    Not on file   Social History Narrative    Not on file     Social Determinants of Health     Financial Resource Strain: Not on file   Food Insecurity: Not on file   Transportation Needs: Not on file   Physical Activity: Not on file   Stress: Not on file   Social Connections: Not on file   Interpersonal Safety: Not on file   Housing Stability: Not on file              Lab Results    Chemistry/lipid CBC Cardiac Enzymes/BNP/TSH/INR   Lab Results   Component Value Date    CHOL 111 07/17/2023    HDL 45 (L) 07/17/2023    LDL 50 07/17/2023    TRIG 78 07/17/2023    CR 1.57 (H) 02/14/2024    BUN 41.5 (H) 02/14/2024    POTASSIUM 4.4 02/14/2024     02/14/2024    CO2 29 02/14/2024      Lab Results   Component Value Date    WBC 6.6 02/14/2024    HGB 11.8 02/14/2024    HCT 36.0 02/14/2024    MCV 95 02/14/2024     02/14/2024    A1C 5.3 05/12/2023     Lab Results   Component Value Date    A1C 5.3 05/12/2023    Lab Results   Component Value Date    TROPONINI 0.04 04/05/2022     (H) 04/05/2022    NTBNP 1,734 05/01/2024    TSH 5.59 (H) 02/02/2024    INR 1.05 09/13/2021          Bravo Lopez MD [facility]  Non-Invasive Cardiologist  [facility]  Pager [phone]      Thank you for allowing me to participate in the care of your patient.      Sincerely,     Bravo Lopez MD     [facility]  cc:   Linnette Wade MD  [address]  [place] [address]

## 2024-06-18 NOTE — PATIENT INSTRUCTIONS
Continue your current medications.  If you decide you are interested in a Watchman device to get off blood thinners, let me know.  See me back in 1 year.

## 2024-06-18 NOTE — PROGRESS NOTES
HEART CARE ENCOUNTER NOTE        Assessment/Recommendations   Assessment:    Paroxysmal atrial fibrillation first noted 4/4/2022 on device interrogation. She denies symptoms. [id number] score is at least 6 (hypertension, age 75 or greater, diabetes mellitus, coronary artery disease, female gender). HAS-BLED score is at least 2 (age greater than 65, easy bruising).  Complete heart block status post [employer] dual-chamber permanent pacemaker placement 4/12/2011 with a generator replacement 8/30/2021. Normal device function noted recently with 98% right ventricular pacing.  Chronic congestive heart failure with preserved left ventricular ejection fraction. NYHA class II, euvolemic.  Mild aortic stenosis last assessed on transthoracic echocardiogram 8/7/2023.  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  Nonobstructive coronary artery disease seen on coronary angiography 5/26/2021.  Essential hypertension. Controlled.  Hyperlipidemia. Last LDL 50 mg/dL.  Non insulin-dependent diabetes mellitus type 2. Last hemoglobin A1c 5.3%.  Chronic kidney disease stage III.  Body mass index is 30.42 kg/m .    Plan:  Apixaban 2.5 mg twice daily.  We discussed percutaneous left atrial appendage closure which she will consider.  Furosemide 40 mg daily.  Atorvastatin 80 mg daily.  If she has any decompensation of congestive heart failure, she may benefit from addition of spironolactone and/or a sodium-glucose co-transporter 2 inhibitor.  Repeat transthoracic echocardiogram in 2026 to monitor her aortic stenosis.  She follows in Heart Failure [facility] clinic with Dr. Sara Akins.  Follow-up with me in 1 year.    (Patient) Has documented nonvalvular atrial fibrillation (NVAF) and is currently on oral anticoagulant therapy Eliquis   [id number] -VASc = 6 (hypertension, age 75 or greater, diabetes mellitus, coronary artery disease, female gender).HAS-BLED risk score:  2 (age greater than 65, easy bruising).  Indication(s) to consider non-oral anticoagulation therapy: easy bruising  Pt has no contra-indication to come off oral anti-coagulant therapy if LAAC device is successfully placed.     I have personally reviewed the patients chart and discussed the following with the patient/family; 1) The choices available for reducing stroke risk from atrial fibrillation, 2) Treatment options available including respective risk/benefits, and 3) What factors are most important for the patient in making their decision.  The [facility] shared decision making tool https://www.cardiosmart.org/SDM/Decision-Aids/Find-Decision-Aids/Atrial-Fibrillation  was used to guide this conversation.   The patient was counseled that their decision could be made at this time or in the future if more time was needed to consider their decision.       The patient is an appropriate candidate to proceed with left atrial appendage screening and implant.          History of Present Illness   Ms. Sharyn Trent is a 83 year old female with a significant past history of HFpEF, complete heart block s/p 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 She also follows in the Heart Failure CORE clinic with Dr. Sara Akins.    She reports doing well. She since started apixaban for incidentally noted AF on her device interrogations. She is asymptomatic from this. She notes easy bruising otherwise she is tolerating apixaban. She has had some weight gain which she attributes to eating too many sweets. No chest pain/pressure/tightness, shortness of breath at rest or with exertion, light headedness/dizziness, pre-syncope, syncope, lower extremity swelling,  palpitations, paroxysmal nocturnal dyspnea (PND), or orthopnea.     Cardiac Problems and Cardiac Diagnostics     Most Recent Cardiac testing:  ECG dated 4/5/2022 (personaly reviewed and interpreted): A-sensed, V-paced with PVCs     Pulmonary function tests 12/13/2022 (report reviewed):  FEV1/FVC  is normal   FEV1 is normal   FVC is normal   DLCO is normal when it is corrected for hemoglobin.     Impression:  Pulmonary Function Test is normal.      Device interrogation 6/18/2024 (report reviewed):  Device: [employer] (D) Pacemaker  Pacing %/Programmed: AP <1%,  98% / DDD   Lead(s): RA, RV both have stable measurements  Battery longevity: Estimates 8 years  Presenting rhythm: AS/ 70 bpm  Underlying rhythm: SR w/CHB, no R's at VVI 40 bpm  Heart rates: , primarily 50-60 bpm  Atrial High rates: 196 mode switches, burden <1%. Longest episode 15 minutes. VR >= 120 bpm 0%.  Anticoagulant: ASA  Ventricular High rates: None  Comments: Normal device function. No changes made.     ECHO 8/7/2023 (report reviewed):   The left ventricle is normal in size.  Left ventricular function is normal.The ejection fraction is 55-60%.  Septal wall motion abnormality may reflect pacemaker activation.  There is a pacemaker lead in the right ventricle.  The left atrium is mildly dilated.  There is mild to moderate (1-2+) mitral regurgitation.  Mild valvular aortic stenosis.     Stress test 5/12/2021 (report reviewed):     The nuclear stress test is abnormal.    Nuclear images demonstrate a small area of moderate ischemia involving the distal anteroseptal, anterior and apical wall, although specificity reduced due to breast attenuation.    The left ventricular ejection fraction at stress is 64% without wall motion abnormality.    A prior study was conducted on 3/23/2018.  Imaging appears more consistent with ischemia than infarction on the current study.     Cardiac cath 5/26/2021 (report reviewed):   RA mean 4mmHg  PA mean  24mmHg  PCWP 21mmHg  LVEDP 19mmHg  Ao 153/62     PA sat 67%  Ao sat 94%     CO cindy 3.35     Angiography via left radial  LM short normal  LAD mid 50% narrowing with FFR 0.85  Circ normal  RCA normal     Medications  Allergies   Current Outpatient Medications   Medication Sig Dispense Refill    acetaminophen (TYLENOL) 500 MG tablet Take 500-1,000 mg by mouth every 6 hours as needed for mild pain      apixaban ANTICOAGULANT (ELIQUIS) 2.5 MG tablet Take 1 tablet (2.5 mg) by mouth 2 times daily 60 tablet 2    atorvastatin (LIPITOR) 80 MG tablet Take 80 mg by mouth At Bedtime      calcium carbonate (TUMS) 500 MG chewable tablet Take 1-2 chew tab by mouth daily      carvedilol (COREG) 25 MG tablet Take 1 tablet (25 mg) by mouth 2 times daily (with meals) 180 tablet 3    escitalopram (LEXAPRO) 10 MG tablet Take 10 mg by mouth daily      ferrous sulfate (FEROSUL) 325 (65 Fe) MG tablet Take 325 mg by mouth daily (with breakfast)      fluorometholone (FML LIQUIFILM) 0.1 % ophthalmic suspension Place 1 drop into the right eye daily      furosemide (LASIX) 20 MG tablet Take 2 tablets (40 mg) by mouth daily 180 tablet 3    gabapentin (NEURONTIN) 100 MG capsule TAKE 1 CAPSULE(100 MG) BY MOUTH THREE TIMES DAILY 270 capsule 1    hydrALAZINE (APRESOLINE) 100 MG tablet Take 1 tablet (100 mg) by mouth 3 times daily 270 tablet 3    hypromellose-dextran (HYPROMELLOSE-DEXTRAN 0.3-0.1%) 0.1-0.3 % ophthalmic solution 1-2 drops      levothyroxine (SYNTHROID/LEVOTHROID) 50 MCG tablet Take 50 mcg by mouth daily      losartan (COZAAR) 50 MG tablet TAKE 1 TABLET(50 MG) BY MOUTH EVERY EVENING 90 tablet 1    Multiple Vitamins-Minerals (MULTIVITAMIN ADULTS 50+) TABS Take 1 tablet by mouth every morning      omeprazole (PRILOSEC) 20 MG capsule Take 20 mg by mouth daily before breakfast      vit C/E/Zn/coppr/lutein/zeaxan (PRESERVISION AREDS-2 ORAL) Take 1 tablet by mouth 2 times daily (before meals)        Allergies   Allergen Reactions    Herlinda-Kit  Bee Sting Shortness Of Breath    Venom-Honey Bee [Bee Venom] Shortness Of Breath    Mercurial Analogues [Mercurial Derivatives] Dermatitis     blisters    Nitrofurantoin Other (See Comments)     Burning sensation across chest and arms    Other reaction(s): arms and chest burning    Sulfa (Sulfonamide Antibiotics) [Sulfa Antibiotics] Rash        Physical Examination Review of Systems   /61 (BP Location: Right arm, Patient Position: Sitting, Cuff Size: Adult Large)   Pulse 65   Resp 14   Wt 73 kg (161 lb)   BMI 30.42 kg/m    Body mass index is 30.42 kg/m .  Wt Readings from Last 3 Encounters:   06/18/24 73 kg (161 lb)   05/01/24 72.6 kg (160 lb)   03/01/24 70.3 kg (155 lb)       General Appearance:   Pleasant  female, appears  stated age. no acute distress, normal body habitus   ENT/Mouth: membranes moist, no apparent gingival bleeding.      EYES:  no scleral icterus, normal conjunctivae   Neck: no carotid bruits. No anterior cervical lymphadenopaty   Respiratory:   lungs are clear to auscultation, no rales or wheezing, equal chest wall expansion    Cardiovascular:   Regular rhythm, normal rate. Normal first and second heart sounds with a soft systolic murmur. No rubs,or gallops; the carotid, radial and posterior tibial pulses are intact, Jugular venous pressure normal, no edema bilaterally    Abdomen/GI:  no organomegaly, masses, bruits, or tenderness; bowel sounds are present   Extremities: no cyanosis or clubbing   Skin: no xanthelasma, warm.    Heme/lymph/ Immunology No apparent bleeding noted.   Neurologic: Alert and oriented. normal gait, no tremors     Psychiatric: Pleasant, calm, appropriate affect.    A complete 10 system review of systems was performed and is negative except as mentioned in the HPI/subjective.         Past History   Past Medical History:   Past Medical History:   Diagnosis Date    Abnormal ECG     Anxiety     Arthritis     Chest pain     Chest pain     Chronic kidney disease      stage 3-mod.    Congestive heart failure (H)     Diabetes (H)     Dyslipidemia, goal LDL below 70     GERD (gastroesophageal reflux disease)     HTN (hypertension)     Hyperlipidemia     Macular degeneration (senile) of retina     Melanoma of ankle (H)     L ankle    Other second degree atrioventricular block     Created by Conversion     Pacemaker     PSVT (paroxysmal supraventricular tachycardia) (H24)     Right bundle branch block     Skin cancer        Past Surgical History:   Past Surgical History:   Procedure Laterality Date    ABLATION OF DYSRHYTHMIC FOCUS  04/11/2013    AV analia reentry tachycardia, partially successful    BIOPSY SKIN (LOCATION)      CARDIAC CATHETERIZATION  03/10/2016    CV CORONARY ANGIOGRAM N/A 05/26/2021    Procedure: Coronary Angiogram;  Surgeon: Yony Gillis MD;  Location: [facility];  Service: Cardiology    CV LEFT HEART CATHETERIZATION WITHOUT LEFT VENTRICULOGRAM Left 05/26/2021    Procedure: Left Heart Catheterization Without Left Ventriculogram;  Surgeon: Yony Gillis MD;  Location: [facility];  Service: Cardiology    CV RIGHT HEART CATHETERIZATION N/A 05/26/2021    Procedure: Right Heart Catheterization;  Surgeon: Yony Gillis MD;  Location: [facility];  Service: Cardiology    DILATION AND CURETTAGE N/A 4/19/2022    Procedure: DILATION AND CURRETAGE;  Surgeon: Mary Reed MD;  Location: [facility] [place] OR    EP PACEMAKER N/A 08/30/2021    Procedure: Electrophysiology Pacemaker;  Surgeon: Ab Saavedra MD;  Location: [facility] CATH LAB CV    FOOT SURGERY      L foot    HAND SURGERY      on both thumbs    HYSTERECTOMY, TOTAL, ROBOT-ASSISTED, LAP, DA HAROON XI, W/BI SAL-OOPH & SNT LYMPH NODE BX, MINI LAP Bilateral 5/31/2022    Procedure: ROBOTIC ASSISTED TOTAL LAPAROSCOPIC HYSTERECTOMY, BILATERAL SALPINGO-OOPHORECTOMY, SENTINEL LYMPH NODE INJECTION AND BIOPSY, MINI-LAPAROTOMY,;  Surgeon:  Mary Reed MD;  Location: [facility] OR    HYSTEROSCOPY DIAGNOSTIC N/A 4/19/2022    Procedure: HYSTEROSCOPY, DIAGNOSTIC;  Surgeon: Mary Reed MD;  Location: [facility] OR    IMPLANT PACEMAKER  04/01/2011    Second-degree AV block    OTHER SURGICAL HISTORY      SVT Ablation    PELVIC EXAM UNDER ANESTHESIA, CERVICAL DILATION, N/A     PELVIC EXAMINATION UNDER ANESTHESIA N/A 4/19/2022    Procedure: PELVIC EXAM UNDER ANESTHESIA, CERVICAL DILATION,;  Surgeon: Mary Reed MD;  Location: [facility] OR    IA SHLDR ARTHROSCOP,SURG,W/ROTAT CUFF REPR Left 03/09/2017    Procedure: LEFT SHOULDER ARTHROSCOPY DECOMPRESSION DISTAL CLAVICLE EXCISION  ROTATOR CUFF REPAIR BICEPS TENOTOMY AND EXTENSIVE DEBRIDEMENT;  Surgeon: Todd Riggs MD;  Location: [facility];  Service: Orthopedics    RELEASE CARPAL TUNNEL      both wrists    SKIN CANCER EXCISION      L ankle,Lleg and cheek    TOE SURGERY      L big toe joint replacement    TUBAL LIGATION         Family History:   Family History   Problem Relation Age of Onset    Hypertension Mother     Cerebrovascular Disease Mother     Prostate Cancer Father     Cancer Father     Coronary Artery Disease Father     Dyslipidemia Father     Dyslipidemia Sister     Dyslipidemia Brother     Hypertension Brother     Prostate Cancer Brother         Social History:   Social History     Socioeconomic History    Marital status:      Spouse name: Not on file    Number of children: Not on file    Years of education: Not on file    Highest education level: Not on file   Occupational History    Not on file   Tobacco Use    Smoking status: Never     Passive exposure: Never    Smokeless tobacco: Never   Vaping Use    Vaping status: Never Used   Substance and Sexual Activity    Alcohol use: No    Drug use: No    Sexual activity: Yes     Partners: Male     Birth control/protection: Surgical   Other Topics Concern    Not on file   Social  History Narrative    Not on file     Social Determinants of Health     Financial Resource Strain: Not on file   Food Insecurity: Not on file   Transportation Needs: Not on file   Physical Activity: Not on file   Stress: Not on file   Social Connections: Not on file   Interpersonal Safety: Not on file   Housing Stability: Not on file              Lab Results    Chemistry/lipid CBC Cardiac Enzymes/BNP/TSH/INR   Lab Results   Component Value Date    CHOL 111 07/17/2023    HDL 45 (L) 07/17/2023    LDL 50 07/17/2023    TRIG 78 07/17/2023    CR 1.57 (H) 02/14/2024    BUN 41.5 (H) 02/14/2024    POTASSIUM 4.4 02/14/2024     02/14/2024    CO2 29 02/14/2024      Lab Results   Component Value Date    WBC 6.6 02/14/2024    HGB 11.8 02/14/2024    HCT 36.0 02/14/2024    MCV 95 02/14/2024     02/14/2024    A1C 5.3 05/12/2023     Lab Results   Component Value Date    A1C 5.3 05/12/2023    Lab Results   Component Value Date    TROPONINI 0.04 04/05/2022     (H) 04/05/2022    NTBNP 1,734 05/01/2024    TSH 5.59 (H) 02/02/2024    INR 1.05 09/13/2021          Bravo Lopez MD [facility]  Non-Invasive Cardiologist  [facility]  Pager [phone]

## 2024-06-19 LAB
MDC_IDC_LEAD_CONNECTION_STATUS: NORMAL
MDC_IDC_LEAD_CONNECTION_STATUS: NORMAL
MDC_IDC_LEAD_IMPLANT_DT: NORMAL
MDC_IDC_LEAD_IMPLANT_DT: NORMAL
MDC_IDC_LEAD_LOCATION: NORMAL
MDC_IDC_LEAD_LOCATION: NORMAL
MDC_IDC_LEAD_LOCATION_DETAIL_1: NORMAL
MDC_IDC_LEAD_LOCATION_DETAIL_1: NORMAL
MDC_IDC_LEAD_MFG: NORMAL
MDC_IDC_LEAD_MFG: NORMAL
MDC_IDC_LEAD_MODEL: NORMAL
MDC_IDC_LEAD_MODEL: NORMAL
MDC_IDC_LEAD_POLARITY_TYPE: NORMAL
MDC_IDC_LEAD_POLARITY_TYPE: NORMAL
MDC_IDC_LEAD_SERIAL: NORMAL
MDC_IDC_LEAD_SERIAL: NORMAL
MDC_IDC_MSMT_BATTERY_DTM: NORMAL
MDC_IDC_MSMT_BATTERY_REMAINING_LONGEVITY: 96 MO
MDC_IDC_MSMT_BATTERY_STATUS: NORMAL
MDC_IDC_MSMT_CAP_CHARGE_TYPE: NORMAL
MDC_IDC_MSMT_LEADCHNL_RA_IMPEDANCE_VALUE: 410 OHM
MDC_IDC_MSMT_LEADCHNL_RA_PACING_THRESHOLD_AMPLITUDE: 0.5 V
MDC_IDC_MSMT_LEADCHNL_RA_PACING_THRESHOLD_PULSEWIDTH: 0.5 MS
MDC_IDC_MSMT_LEADCHNL_RA_SENSING_INTR_AMPL: 3.2 MV
MDC_IDC_MSMT_LEADCHNL_RV_IMPEDANCE_VALUE: 510 OHM
MDC_IDC_MSMT_LEADCHNL_RV_PACING_THRESHOLD_AMPLITUDE: 1 V
MDC_IDC_MSMT_LEADCHNL_RV_PACING_THRESHOLD_PULSEWIDTH: 0.5 MS
MDC_IDC_PG_IMPLANT_DTM: NORMAL
MDC_IDC_PG_MFG: NORMAL
MDC_IDC_PG_MODEL: NORMAL
MDC_IDC_PG_SERIAL: NORMAL
MDC_IDC_PG_TYPE: NORMAL
MDC_IDC_SESS_CLINIC_NAME: NORMAL
MDC_IDC_SESS_DTM: NORMAL
MDC_IDC_SESS_TYPE: NORMAL
MDC_IDC_SET_BRADY_AT_MODE_SWITCH_MODE: NORMAL
MDC_IDC_SET_BRADY_AT_MODE_SWITCH_RATE: 180 {BEATS}/MIN
MDC_IDC_SET_BRADY_LOWRATE: 50 {BEATS}/MIN
MDC_IDC_SET_BRADY_MAX_SENSOR_RATE: 100 {BEATS}/MIN
MDC_IDC_SET_BRADY_MAX_TRACKING_RATE: 100 {BEATS}/MIN
MDC_IDC_SET_BRADY_MODE: NORMAL
MDC_IDC_SET_BRADY_PAV_DELAY_HIGH: 100 MS
MDC_IDC_SET_BRADY_PAV_DELAY_LOW: 300 MS
MDC_IDC_SET_BRADY_SAV_DELAY_HIGH: 100 MS
MDC_IDC_SET_BRADY_SAV_DELAY_LOW: 130 MS
MDC_IDC_SET_LEADCHNL_RA_PACING_AMPLITUDE: NORMAL
MDC_IDC_SET_LEADCHNL_RA_PACING_ANODE_ELECTRODE_1: NORMAL
MDC_IDC_SET_LEADCHNL_RA_PACING_ANODE_LOCATION_1: NORMAL
MDC_IDC_SET_LEADCHNL_RA_PACING_CAPTURE_MODE: NORMAL
MDC_IDC_SET_LEADCHNL_RA_PACING_CATHODE_ELECTRODE_1: NORMAL
MDC_IDC_SET_LEADCHNL_RA_PACING_CATHODE_LOCATION_1: NORMAL
MDC_IDC_SET_LEADCHNL_RA_PACING_POLARITY: NORMAL
MDC_IDC_SET_LEADCHNL_RA_PACING_PULSEWIDTH: 0.5 MS
MDC_IDC_SET_LEADCHNL_RA_SENSING_ADAPTATION_MODE: NORMAL
MDC_IDC_SET_LEADCHNL_RA_SENSING_ANODE_ELECTRODE_1: NORMAL
MDC_IDC_SET_LEADCHNL_RA_SENSING_ANODE_LOCATION_1: NORMAL
MDC_IDC_SET_LEADCHNL_RA_SENSING_CATHODE_ELECTRODE_1: NORMAL
MDC_IDC_SET_LEADCHNL_RA_SENSING_CATHODE_LOCATION_1: NORMAL
MDC_IDC_SET_LEADCHNL_RA_SENSING_POLARITY: NORMAL
MDC_IDC_SET_LEADCHNL_RA_SENSING_SENSITIVITY: 0.2 MV
MDC_IDC_SET_LEADCHNL_RV_PACING_AMPLITUDE: NORMAL
MDC_IDC_SET_LEADCHNL_RV_PACING_ANODE_ELECTRODE_1: NORMAL
MDC_IDC_SET_LEADCHNL_RV_PACING_ANODE_LOCATION_1: NORMAL
MDC_IDC_SET_LEADCHNL_RV_PACING_CAPTURE_MODE: NORMAL
MDC_IDC_SET_LEADCHNL_RV_PACING_CATHODE_ELECTRODE_1: NORMAL
MDC_IDC_SET_LEADCHNL_RV_PACING_CATHODE_LOCATION_1: NORMAL
MDC_IDC_SET_LEADCHNL_RV_PACING_POLARITY: NORMAL
MDC_IDC_SET_LEADCHNL_RV_PACING_PULSEWIDTH: 0.5 MS
MDC_IDC_SET_LEADCHNL_RV_SENSING_ADAPTATION_MODE: NORMAL
MDC_IDC_SET_LEADCHNL_RV_SENSING_ANODE_ELECTRODE_1: NORMAL
MDC_IDC_SET_LEADCHNL_RV_SENSING_ANODE_LOCATION_1: NORMAL
MDC_IDC_SET_LEADCHNL_RV_SENSING_CATHODE_ELECTRODE_1: NORMAL
MDC_IDC_SET_LEADCHNL_RV_SENSING_CATHODE_LOCATION_1: NORMAL
MDC_IDC_SET_LEADCHNL_RV_SENSING_POLARITY: NORMAL
MDC_IDC_SET_LEADCHNL_RV_SENSING_SENSITIVITY: 2 MV
MDC_IDC_STAT_AT_BURDEN_PERCENT: 1 %
MDC_IDC_STAT_AT_MODE_SW_COUNT: 196
MDC_IDC_STAT_BRADY_DTM_END: NORMAL
MDC_IDC_STAT_BRADY_DTM_START: NORMAL
MDC_IDC_STAT_BRADY_RA_PERCENT_PACED: 1 %
MDC_IDC_STAT_BRADY_RV_PERCENT_PACED: 98 %
MDC_IDC_STAT_EPISODE_RECENT_COUNT: 0
MDC_IDC_STAT_EPISODE_RECENT_COUNT: 196
MDC_IDC_STAT_EPISODE_RECENT_COUNT_DTM_END: NORMAL
MDC_IDC_STAT_EPISODE_RECENT_COUNT_DTM_END: NORMAL
MDC_IDC_STAT_EPISODE_RECENT_COUNT_DTM_START: NORMAL
MDC_IDC_STAT_EPISODE_RECENT_COUNT_DTM_START: NORMAL
MDC_IDC_STAT_EPISODE_TYPE: NORMAL
MDC_IDC_STAT_EPISODE_TYPE: NORMAL
MDC_IDC_STAT_EPISODE_VENDOR_TYPE: NORMAL

## 2024-06-19 PROCEDURE — 93280 PM DEVICE PROGR EVAL DUAL: CPT | Performed by: INTERNAL MEDICINE

## 2024-06-21 ENCOUNTER — OFFICE VISIT (OUTPATIENT)
Dept: NEUROLOGY | Facility: CLINIC | Age: 84
End: 2024-06-21
Payer: COMMERCIAL

## 2024-06-21 VITALS
HEIGHT: 61 IN | DIASTOLIC BLOOD PRESSURE: 62 MMHG | SYSTOLIC BLOOD PRESSURE: 128 MMHG | BODY MASS INDEX: 30.4 KG/M2 | HEART RATE: 58 BPM | WEIGHT: 161 LBS

## 2024-06-21 DIAGNOSIS — R76.8 FALSE POSITIVE ANA: ICD-10-CM

## 2024-06-21 DIAGNOSIS — R20.0 NUMBNESS: ICD-10-CM

## 2024-06-21 DIAGNOSIS — R26.9 GAIT DIFFICULTY: ICD-10-CM

## 2024-06-21 DIAGNOSIS — D86.9 SARCOIDOSIS: Primary | ICD-10-CM

## 2024-06-21 DIAGNOSIS — G62.9 NEUROPATHY: ICD-10-CM

## 2024-06-21 DIAGNOSIS — R77.8 ABNORMAL SPEP: ICD-10-CM

## 2024-06-21 PROCEDURE — 99214 OFFICE O/P EST MOD 30 MIN: CPT | Performed by: PSYCHIATRY & NEUROLOGY

## 2024-06-21 RX ORDER — GABAPENTIN 100 MG/1
100 CAPSULE ORAL 3 TIMES DAILY
Qty: 270 CAPSULE | Refills: 3 | Status: SHIPPED | OUTPATIENT
Start: 2024-06-21

## 2024-06-21 NOTE — LETTER
6/21/2024      Sharyn Trent  [address] [place] [address]      Dear Colleague,    Thank you for referring your patient, Sharyn Trent, to the [facility] [place]. Please see a copy of my visit note below.    NEUROLOGY OUTPATIENT PROGRESS NOTE   Jun 21, 2024     CHIEF COMPLAINT/REASON FOR VISIT/REASON FOR CONSULT  Patient presents with:  NEUROPATHY: Patient is having tingling on the top of her feet and ankles. She is also having pain with moving her left arm.     REASON FOR CONSULTATION- Numbness    HISTORY OF PRESENT ILLNESS  Sharyn Trent is a 83 year old female seen today for hospital follow-up.  Patient was seen in the hospital for an episode of shaking and headache.  He is complaining of bilateral hand numbness.  Stroke code was called.  Testing was negative.  Patient was diagnosed to have a hypertensive emergency.  She reports no recurrence of her symptoms.    In the hospital she was also complaining of some numbness in her feet which was suggestive of neuropathy.  CT of the L-spine did show mild to moderate spinal stenosis.  Did not completely fit with her symptoms.  She followed up in the neurology clinic for further evaluation.  EMG was done today.  Has also had blood work done in the hospital.  Reports ongoing numbness in the bottom of her feet.  Does have some balance issues.  Denies any other new concerns.    7/6/22  She returns today for follow-up of neuropathy.  Reports that the numbness is about the same in her feet.  Continues to have balance issues.  Encouraged her to exercise.  Discussed balance exercises.  Further reports no swelling in the feet.  Does report some leg cramps in the nighttime.  Does complain of more numbness in the morning and then taking lorazepam in the morning with symptoms improving.  Discussed that there is no rational for this and its possible the numbness might be more related to her anxiety worsening in the morning time.    11/14/22  Patient  returns today.  She reports that the numbness in the feet is about the same.  Continues to have balance issues.  Has had 2 falls.  Both of them involve turning.  She is using a cane when she goes out of the house.  Denies any other new symptoms.  Seen oncology and they do not think she has myeloma.  They are monitoring the serum for electrophoresis.  Does have a lot of right knee issues which I suspect are affecting her balance.  She has seen orthopedics and they do not recommend surgery at this point.  Gabapentin is somewhat helpful for the muscle cramps and numbness and tingling in the feet.  Her primary had stopped it for the balance issues though she did not notice any change.  She is currently on the medication and wants to continue it.  No hand numbness.    5/10/23  Patient returns today  1.  About 10 days ago she started having a toothache on the lower left side.  She then started having some numbness in the left part of her face.  The region from the left lower lip to her jaw is involved.  There is no pain.  She can eat fine.  There is no difficulty with swallowing.  There is no food coming out of her mouth.  Denies being anxious.  She does have a previous history of sarcoidosis that is under good control.  Is not on any immunomodulating medications.    In terms of her neuropathy she reports it spread to the top of her feet.  She is taking gabapentin which is helping.  Reports some locking of her fingers no new major cramps.  A1c has been around 5.  Has not been recently checked.    6/16/23  Returns today  1.  Continues to have numbness in the left lower lip.  No change.  No significant improvement either.  No other new neurological symptoms  2.  Continues to have worsening numbness slightly above the ankle.  She recently tested normal on the A1c though has not been watching what she has been eating.  No proximal leg symptoms no major back pain.  3.  Neuropathic pain is under good control.  Remains on the  gabapentin.  Feels that it does not help with the numbness.    No other new issues.    6/21/24  Patient returns today  1.  Numbness is slightly worse compared to before.  Feels that the numbness is spreading to the top of the feet.  Balance is also worse.  Is trying to exercise and walk 1 foot in front of the other.  Trying to stay active.  2.  Pain she is taking gabapentin.  This is not too bothersome.  Also using Tylenol for arm which is helping with the feet as well for neuropathic  3.  She does have decreased range of motion of her right hand.  Does have some numbness in her hands.  The numbness seems more like carpal tunnel syndrome.  For the decreased range of motion I think it is more of a shoulder issue than a neurological recommended she follow-up with her shoulder doctor/orthopedic doctor.  4.  Facial numbness has not improved.  Not too bothersome.  No other new concerns.    Previous history is reviewed and this is unchanged.    PAST MEDICAL/SURGICAL HISTORY  Past Medical History:   Diagnosis Date     Abnormal ECG      Anxiety      Arthritis      Chest pain      Chest pain      Chronic kidney disease     stage 3-mod.     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      Pacemaker      PSVT (paroxysmal supraventricular tachycardia) (H24)      Right bundle branch block      Skin cancer      Patient Active Problem List   Diagnosis     Atypical chest pain     Malignant essential hypertension     Dyslipidemia, goal LDL below 70     DM (diabetes mellitus), type 2 (H)     Third degree AV block (H)     Chest pain     Ectopic atrial tachycardia (H24)     Acute diastolic CHF (congestive heart failure) (H)     Acute respiratory failure  with hypoxia (H)     Pneumonia of left lower lobe due to infectious organism     Stroke-like symptoms     Abnormal nuclear stress test     Chronic kidney disease, stage 3a (H)     Diabetes mellitus type 2 without retinopathy (H)     Gastroesophageal reflux disease without esophagitis     Thyroid nodule     Hypertensive emergency     Acute on chronic congestive heart failure, unspecified heart failure type (H)     Benign essential hypertension     Uterine mass       FAMILY HISTORY  Family History   Problem Relation Age of Onset     Hypertension Mother      Cerebrovascular Disease Mother      Prostate Cancer Father      Cancer Father      Coronary Artery Disease Father      Dyslipidemia Father      Dyslipidemia Sister      Dyslipidemia Brother      Hypertension Brother      Prostate Cancer Brother        SOCIAL HISTORY  Social History     Tobacco Use     Smoking status: Never     Passive exposure: Never     Smokeless tobacco: Never   Vaping Use     Vaping status: Never Used   Substance Use Topics     Alcohol use: No     Drug use: No       SYSTEMS REVIEW  Twelve-system ROS was done and other than the HPI this was negative.  Pertinent positives noted in the HPI.  No new concerns/symptoms.    MEDICATIONS  Current Outpatient Medications   Medication Sig Dispense Refill     acetaminophen (TYLENOL) 500 MG tablet Take 500-1,000 mg by mouth every 6 hours as needed for mild pain       apixaban ANTICOAGULANT (ELIQUIS) 2.5 MG tablet Take 1 tablet (2.5 mg) by mouth 2 times daily 60 tablet 2     atorvastatin (LIPITOR) 80 MG tablet Take 80 mg by mouth At Bedtime       calcium carbonate (TUMS) 500 MG chewable tablet Take 1-2 chew tab by mouth daily       carvedilol (COREG) 25 MG tablet Take 1 tablet (25 mg) by mouth 2 times daily (with meals) 180 tablet 3     escitalopram (LEXAPRO) 10 MG tablet Take 10 mg by mouth daily       ferrous sulfate (FEROSUL) 325 (65 Fe) MG tablet Take 325 mg by mouth daily (with breakfast)        "fluorometholone (FML LIQUIFILM) 0.1 % ophthalmic suspension Place 1 drop into the right eye daily       furosemide (LASIX) 20 MG tablet Take 2 tablets (40 mg) by mouth daily 180 tablet 3     gabapentin (NEURONTIN) 100 MG capsule Take 1 capsule (100 mg) by mouth 3 times daily 270 capsule 3     hydrALAZINE (APRESOLINE) 100 MG tablet Take 1 tablet (100 mg) by mouth 3 times daily 270 tablet 3     hypromellose-dextran (HYPROMELLOSE-DEXTRAN 0.3-0.1%) 0.1-0.3 % ophthalmic solution 1-2 drops       levothyroxine (SYNTHROID/LEVOTHROID) 50 MCG tablet Take 50 mcg by mouth daily       losartan (COZAAR) 50 MG tablet TAKE 1 TABLET(50 MG) BY MOUTH EVERY EVENING 90 tablet 1     Multiple Vitamins-Minerals (MULTIVITAMIN ADULTS 50+) TABS Take 1 tablet by mouth every morning       omeprazole (PRILOSEC) 20 MG capsule Take 20 mg by mouth daily before breakfast       vit C/E/Zn/coppr/lutein/zeaxan (PRESERVISION AREDS-2 ORAL) Take 1 tablet by mouth 2 times daily (before meals)       No current facility-administered medications for this visit.        PHYSICAL EXAMINATION  VITALS: /62   Pulse 58   Ht 1.549 m (5' 1\")   Wt 73 kg (161 lb)   BMI 30.42 kg/m    GENERAL: Healthy appearing, alert, no acute distress, normal habitus.  CARDIOVASCULAR: Extremities warm and well perfused. Pulses present.   NEUROLOGICAL:  Patient is awake and oriented to self, place and time.  Attention span is normal.  Memory is grossly intact.  Language is fluent and follows commands appropriately.  Appropriate fund of knowledge. Cranial nerves 2-12 are intact. There is no pronator drift.  Motor exam shows 5/5 strength in all extremities.  Tone is symmetric bilaterally in upper and lower extremities.  Reflexes are symmetric and 1+/diminished in upper extremities and lower extremities. Sensory exam is grossly intact to light touch, pin prick and vibration.  Finger to nose and heel to shin is without dysmetria.  Romberg is negative.  Gait is slightly wide-based " and the patient is able to do tandem walk and walk on toes and heels with some difficulty.  She does have some antalgic gait due to the right knee.  Slightly more gait difficulty.  Significant change in exam.      DIAGNOSTICS  HEAD CT:  1.  No acute intracranial hemorrhage.  2.  No CT evidence acute infarct. Aspect score 10.  3.  Age-related changes described above.     Dr. Tashi Lackey was notified by Dr Kevin Cooper at  1:40 AM 09/13/2021.      HEAD CTA:   1.  No intracranial arterial large vessel occlusion.  2.  Right carotid siphon mild stenosis.   3.  Left carotid siphon severe stenosis.  4.  Otherwise, no significant stenosis/occlusion.      NECK CTA:  1.  Right vertebral artery origin moderate stenosis.  2.  Left proximal subclavian artery mild-to-moderate stenosis.  3.  Otherwise, no significant stenosis/occlusion. No dissection.  4.  Multiple thyroid nodules. Recommend nonemergent thyroid ultrasound based upon ACR white paper criteria.   5.  Lung apices demonstrate septal thickening with patchy groundglass opacities and consolidations most likely due to pulmonary edema. Please correlate clinically to exclude acute infectious inflammatory pneumonitis.     Carotid US  IMPRESSION:  1.  Mild plaque formation, velocities consistent with less than 50% stenosis in the right internal carotid artery.  2.  Mild plaque formation, velocities consistent with less than 50% stenosis in the left internal carotid artery.  3.  Flow within the vertebral arteries is antegrade.  4.  Bilateral thyroid nodules, recommend further evaluation with dedicated thyroid ultrasound.     CT L spine  1.  No evidence of lumbar spine fracture.     2.  Multilevel degenerative change throughout the lumbar disc spaces with moderate spinal canal stenosis at L3-4. Moderate spinal canal stenosis at L4-5. Variable degrees of foraminal narrowing. Please see body of report for details at each level.    EMG  CLINICAL INTERPRETATION:  This is an  abnormal nerve conduction and EMG study.  The study is suggestive of a sensorimotor polyneuropathy in both legs.  Further clinical correlation is needed.    RELEVANT LABS  Component      Latest Ref Rng & Units 9/13/2021 9/15/2021   Albumin %      51.0 - 67.0 %  61.4   Albumin Fraction      3.2 - 4.7 g/dL  3.9   Alpha 1 %      2.0 - 4.0 %  3.7   Alpha 1 Fraction      0.1 - 0.3 g/dL  0.2   Alpha 2 %      5.0 - 13.0 %  12.2   Alpha 2 Fraction      0.4 - 0.9 g/dL  0.8   Beta %      10.0 - 17.0 %  10.0   Beta Fraction      0.7 - 1.2 g/dL  0.6 (L)   Gamma Globulin %      9.0 - 20.0 %  12.7   Gamma Fraction      0.6 - 1.4 g/dL  0.8   ELP Interpretation:        Normal serum protein electrophoresis.   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 At the present time, the bands are too faint for unequivocal diagnosis.   Path ICD       I16.1   Interpreted By       Eduardo Dyer MD     Component      Latest Ref Rng & Units 5/12/2022   Vitamin B12      213 - 816 pg/mL 435     OUTSIDE RECORDS  Hospital notes reviewed.    SPEP  Component      Latest Ref Rng & Units 7/6/2022   Albumin Fraction      3.7 - 5.1 g/dL 4.2   Alpha 1 Fraction      0.2 - 0.4 g/dL 0.3   Alpha 2 Fraction      0.5 - 0.9 g/dL 0.8   Beta Fraction      0.6 - 1.0 g/dL 0.7   Gamma Fraction      0.7 - 1.6 g/dL 1.0   Monoclonal Peak      <=0.0 g/dL 0.0   ELP Interpretation:       No monoclonal protein seen by capillary electrophoresis. However, a possible very small IgM lambda monoclonal  was seen in this sample by immunofixation which is a much more sensitive method for monoclonal detection. Pathologic significance requires clinical correlation. NORIS Ho M.D., Ph.D., Pathologist ().   Immunofixation ELP       Possible very small monoclonal IgM immunoglobulin of lambda light chain type. Pathologic significance requires clinical correlation.  NORIS Ho M.D., Ph.D., Pathologist ()   Total Protein Serum for ELP      6.4 - 8.3 g/dL 6.9     Hematology      CT head  IMPRESSION:  1.  No acute intracranial process or significant change since 04/05/2022.  2.  No significant TMJ arthropathy.      LABS  Component      Latest Ref Rng 5/12/2023  4:29 PM   WBC      4.0 - 11.0 10e3/uL 5.5    RBC Count      3.80 - 5.20 10e6/uL 3.50 (L)    Hemoglobin      11.7 - 15.7 g/dL 10.9 (L)    Hematocrit      35.0 - 47.0 % 32.6 (L)    MCV      78 - 100 fL 93    MCH      26.5 - 33.0 pg 31.1    MCHC      31.5 - 36.5 g/dL 33.4    RDW      10.0 - 15.0 % 14.4    Platelet Count      150 - 450 10e3/uL 177    % Neutrophils      % 62    % Lymphocytes      % 16    % Monocytes      % 18    % Eosinophils      % 3    % Basophils      % 1    % Immature Granulocytes      % 0    NRBCs per 100 WBC      <1 /100 0    Absolute Neutrophils      1.6 - 8.3 10e3/uL 3.4    Absolute Lymphocytes      0.8 - 5.3 10e3/uL 0.9    Absolute Monocytes      0.0 - 1.3 10e3/uL 1.0    Absolute Eosinophils      0.0 - 0.7 10e3/uL 0.2    Absolute Basophils      0.0 - 0.2 10e3/uL 0.1    Absolute Immature Granulocytes      <=0.4 10e3/uL 0.0    Absolute NRBCs      10e3/uL 0.0    Sodium      136 - 145 mmol/L 135 (L)    Potassium      3.4 - 5.3 mmol/L 4.2    Chloride      98 - 107 mmol/L 95 (L)    Carbon Dioxide (CO2)      22 - 29 mmol/L 29    Anion Gap      7 - 15 mmol/L 11    Urea Nitrogen      8.0 - 23.0 mg/dL 42.8 (H)    Creatinine      0.51 - 0.95 mg/dL 1.58 (H)    Calcium      8.8 - 10.2 mg/dL 9.5    Glucose      70 - 99 mg/dL 85     Alkaline Phosphatase      35 - 104 U/L 70    AST      10 - 35 U/L 33    ALT      10 - 35 U/L 24    Protein Total      6.4 - 8.3 g/dL 6.9    Albumin      3.5 - 5.2 g/dL 4.2    Bilirubin Total      <=1.2 mg/dL 0.4    GFR Estimate      >60 mL/min/1.73m2 33 (L)    LADARIUS interpretation      Negative  Positive !    LADARIUS pattern 1 Homogeneous    LADARIUS titer 1 1:320    Neutrophil Cytoplasmic Antibody      <1:10  <1:10    Neutrophil Cytoplasmic Antibody Pattern The ANCA IFA is <1:10. No further testing will be performed.    Angiotensin Converting Enzyme      16 - 85 U/L 46    Vitamin B12      232 - 1,245 pg/mL 1,000    Vitamin B1 Whole Blood Level      70 - 180 nmol/L 116    TSH      0.30 - 4.20 uIU/mL 6.36 (H)    Lyme Disease Antibodies Serum      <0.90  0.41    Hemoglobin A1C      <5.7 % 5.3    Sed Rate      0 - 30 mm/hr 17    T4 Free      0.90 - 1.70 ng/dL 1.00       Legend:  (L) Low  (H) High  ! Abnormal  Component      Latest Ref Rn 2/14/2024  2:08 PM   Albumin Fraction      3.7 - 5.1 g/dL 4.0    Alpha 1 Fraction      0.2 - 0.4 g/dL 0.3    Alpha 2 Fraction      0.5 - 0.9 g/dL 0.8    Beta Fraction      0.6 - 1.0 g/dL 0.7    Gamma Fraction      0.7 - 1.6 g/dL 1.1    Monoclonal Peak      <=0.0 g/dL 0.0    ELP Interpretation: Essentially normal electrophoretic pattern. No obvious monoclonal proteins seen. Pathologic significance requires clinical correlation. Andrew Rocha MD    Kappa Free Lt Chain      0.33 - 1.94 mg/dL 7.41 (H)    Lambda Free Lt Chain      0.57 - 2.63 mg/dL 4.72 (H)    Kappa Lambda Ratio      0.26 - 1.65  1.57    Total Protein Serum for ELP      6.4 - 8.3 g/dL 6.8       Legend:  (H) High    IMPRESSION/REPORT/PLAN  Abnormal SPEP  Diabetic neuropathy-worsening  Primary hypertension  Neuropathic pain/muscle cramps  New onset facial numbness-stable  History of sarcoidosis  Positive LADARIUS-suspect false positive    This is a 83 year old female with numbness in the bottom of her feet.  EMG is confirmatory for  peripheral neuropathy. Exam does show decreased reflexes but otherwise negative for sensory loss.   CT of the lumbar spine does show spinal stenosis though this would not fit with her symptoms.     Etiology for neuropathy would most likely be diabetes.  She did have a positive serum electrophoresis which is being monitored through oncology.  There is no concerns for multiple myeloma though there is some family history of multiple myeloma.  Discussed prognosis of neuropathy.  Will recommend exercise and healthy lifestyle.     Exam overall is stable.  There might be some mild progression.  Gabapentin does help with neuropathic pain/cramping.  Recommend improving lifestyle/exercise/doing balance exercises.    Previously-patient was seen in the hospital for a spell of bilateral hand numbness shaking and elevated blood pressure.  This was thought to be hypertensive emergency.  Head CT was negative for stroke.  MRI could not be done because of pacemaker.  CT angiogram showed left carotid stenosis though otherwise noncontributory.  Carotid ultrasound was negative.  Patient remains on aspirin 81 mg.  She is also on a statin.  No strokelike symptoms we will continue to monitor.  No change.  Blood pressure slightly elevated today.  Could be whitecoat hypertension.  Seen by the cardiologist and they are trying to keep her weight low with use of diuretics. -- Stable    She now reports left facial numbness.  MRI brain is not possible due to pacemaker.  CT of the head with contrast was negative for any new structural lesions.  Blood work overall has been noncontributory though she did test positive for LADARIUS.  Most likely this is a false positive.  She does have a history of sarcoidosis which could be a cause for her numbness or could be related to diabetes.  Given stable symptoms with no imaging findings we will hold off on trying aggressive treatment of sarcoidosis at this point.  Continue working with primary care for vascular  risk factor management.  Stable/improved.    She does complain of some shoulder issues which I suspect is more orthopedic.  She does have some numbness in her hands which is more likely related to carpal tunnel.  She is already had carpal tunnel surgery.  Recommend follow-up with orthopedic doctor/sports medicine for her shoulder issues.    Return back on as-needed basis or in 1 year.      -     gabapentin (NEURONTIN) 100 MG capsule; Take 1 capsule (100 mg) by mouth 3 times daily      Return in about 1 year (around 6/21/2025) for In-Clinic Visit (must), After testing.    Over 30 minutes were spent coordinating the care for the patient on the day of the encounter.  This includes previsit, during visit and post visit activities as documented above.  Counseling patient.  Reviewing chart.  Prescription management.  Multiple problems addressed.  (Activities include but not inclusive of reviewing chart, reviewing outside records, reviewing labs and imaging study results as well as the images, patient visit time including getting history and exam,  use if applicable, review of test results with the patient and coming up with a plan in a shared model, counseling patient and family, education and answering patient questions, EMR , EMR diagnosis entry and problem list management, medication reconciliation and prescription management if applicable, paperwork if applicable, printing documents and documentation of the visit activities.)        Reji Davila MD  Neurologist  [facility]  Tel:- [phone]    This note was dictated using voice recognition software.  Any grammatical or context distortions are unintentional and inherent to the software.      Again, thank you for allowing me to participate in the care of your patient.        Sincerely,        Reji Davila MD

## 2024-06-21 NOTE — PROGRESS NOTES
NEUROLOGY OUTPATIENT PROGRESS NOTE   Jun 21, 2024     CHIEF COMPLAINT/REASON FOR VISIT/REASON FOR CONSULT  Patient presents with:  NEUROPATHY: Patient is having tingling on the top of her feet and ankles. She is also having pain with moving her left arm.     REASON FOR CONSULTATION- Numbness    HISTORY OF PRESENT ILLNESS  Sharyn Trent is a 83 year old female seen today for hospital follow-up.  Patient was seen in the hospital for an episode of shaking and headache.  He is complaining of bilateral hand numbness.  Stroke code was called.  Testing was negative.  Patient was diagnosed to have a hypertensive emergency.  She reports no recurrence of her symptoms.    In the hospital she was also complaining of some numbness in her feet which was suggestive of neuropathy.  CT of the L-spine did show mild to moderate spinal stenosis.  Did not completely fit with her symptoms.  She followed up in the neurology clinic for further evaluation.  EMG was done today.  Has also had blood work done in the hospital.  Reports ongoing numbness in the bottom of her feet.  Does have some balance issues.  Denies any other new concerns.    7/6/22  She returns today for follow-up of neuropathy.  Reports that the numbness is about the same in her feet.  Continues to have balance issues.  Encouraged her to exercise.  Discussed balance exercises.  Further reports no swelling in the feet.  Does report some leg cramps in the nighttime.  Does complain of more numbness in the morning and then taking lorazepam in the morning with symptoms improving.  Discussed that there is no rational for this and its possible the numbness might be more related to her anxiety worsening in the morning time.    11/14/22  Patient returns today.  She reports that the numbness in the feet is about the same.  Continues to have balance issues.  Has had 2 falls.  Both of them involve turning.  She is using a cane when she goes out of the house.  Denies any other new  symptoms.  Seen oncology and they do not think she has myeloma.  They are monitoring the serum for electrophoresis.  Does have a lot of right knee issues which I suspect are affecting her balance.  She has seen orthopedics and they do not recommend surgery at this point.  Gabapentin is somewhat helpful for the muscle cramps and numbness and tingling in the feet.  Her primary had stopped it for the balance issues though she did not notice any change.  She is currently on the medication and wants to continue it.  No hand numbness.    5/10/23  Patient returns today  1.  About 10 days ago she started having a toothache on the lower left side.  She then started having some numbness in the left part of her face.  The region from the left lower lip to her jaw is involved.  There is no pain.  She can eat fine.  There is no difficulty with swallowing.  There is no food coming out of her mouth.  Denies being anxious.  She does have a previous history of sarcoidosis that is under good control.  Is not on any immunomodulating medications.    In terms of her neuropathy she reports it spread to the top of her feet.  She is taking gabapentin which is helping.  Reports some locking of her fingers no new major cramps.  A1c has been around 5.  Has not been recently checked.    6/16/23  Returns today  1.  Continues to have numbness in the left lower lip.  No change.  No significant improvement either.  No other new neurological symptoms  2.  Continues to have worsening numbness slightly above the ankle.  She recently tested normal on the A1c though has not been watching what she has been eating.  No proximal leg symptoms no major back pain.  3.  Neuropathic pain is under good control.  Remains on the gabapentin.  Feels that it does not help with the numbness.    No other new issues.    6/21/24  Patient returns today  1.  Numbness is slightly worse compared to before.  Feels that the numbness is spreading to the top of the feet.   Balance is also worse.  Is trying to exercise and walk 1 foot in front of the other.  Trying to stay active.  2.  Pain she is taking gabapentin.  This is not too bothersome.  Also using Tylenol for arm which is helping with the feet as well for neuropathic  3.  She does have decreased range of motion of her right hand.  Does have some numbness in her hands.  The numbness seems more like carpal tunnel syndrome.  For the decreased range of motion I think it is more of a shoulder issue than a neurological recommended she follow-up with her shoulder doctor/orthopedic doctor.  4.  Facial numbness has not improved.  Not too bothersome.  No other new concerns.    Previous history is reviewed and this is unchanged.    PAST MEDICAL/SURGICAL HISTORY  Past Medical History:   Diagnosis Date    Abnormal ECG     Anxiety     Arthritis     Chest pain     Chest pain     Chronic kidney disease     stage 3-mod.    Congestive heart failure (H)     Diabetes (H)     Dyslipidemia, goal LDL below 70     GERD (gastroesophageal reflux disease)     HTN (hypertension)     Hyperlipidemia     Macular degeneration (senile) of retina     Melanoma of ankle (H)     L ankle    Other second degree atrioventricular block     Created by Conversion     Pacemaker     PSVT (paroxysmal supraventricular tachycardia) (H24)     Right bundle branch block     Skin cancer      Patient Active Problem List   Diagnosis    Atypical chest pain    Malignant essential hypertension    Dyslipidemia, goal LDL below 70    DM (diabetes mellitus), type 2 (H)    Third degree AV block (H)    Chest pain    Ectopic atrial tachycardia (H24)    Acute diastolic CHF (congestive heart failure) (H)    Acute respiratory failure with hypoxia (H)    Pneumonia of left lower lobe due to infectious organism    Stroke-like symptoms    Abnormal nuclear stress test    Chronic kidney disease, stage 3a (H)    Diabetes mellitus type 2 without retinopathy (H)    Gastroesophageal reflux disease  without esophagitis    Thyroid nodule    Hypertensive emergency    Acute on chronic congestive heart failure, unspecified heart failure type (H)    Benign essential hypertension    Uterine mass       FAMILY HISTORY  Family History   Problem Relation Age of Onset    Hypertension Mother     Cerebrovascular Disease Mother     Prostate Cancer Father     Cancer Father     Coronary Artery Disease Father     Dyslipidemia Father     Dyslipidemia Sister     Dyslipidemia Brother     Hypertension Brother     Prostate Cancer Brother        SOCIAL HISTORY  Social History     Tobacco Use    Smoking status: Never     Passive exposure: Never    Smokeless tobacco: Never   Vaping Use    Vaping status: Never Used   Substance Use Topics    Alcohol use: No    Drug use: No       SYSTEMS REVIEW  Twelve-system ROS was done and other than the HPI this was negative.  Pertinent positives noted in the HPI.  No new concerns/symptoms.    MEDICATIONS  Current Outpatient Medications   Medication Sig Dispense Refill    acetaminophen (TYLENOL) 500 MG tablet Take 500-1,000 mg by mouth every 6 hours as needed for mild pain      apixaban ANTICOAGULANT (ELIQUIS) 2.5 MG tablet Take 1 tablet (2.5 mg) by mouth 2 times daily 60 tablet 2    atorvastatin (LIPITOR) 80 MG tablet Take 80 mg by mouth At Bedtime      calcium carbonate (TUMS) 500 MG chewable tablet Take 1-2 chew tab by mouth daily      carvedilol (COREG) 25 MG tablet Take 1 tablet (25 mg) by mouth 2 times daily (with meals) 180 tablet 3    escitalopram (LEXAPRO) 10 MG tablet Take 10 mg by mouth daily      ferrous sulfate (FEROSUL) 325 (65 Fe) MG tablet Take 325 mg by mouth daily (with breakfast)      fluorometholone (FML LIQUIFILM) 0.1 % ophthalmic suspension Place 1 drop into the right eye daily      furosemide (LASIX) 20 MG tablet Take 2 tablets (40 mg) by mouth daily 180 tablet 3    gabapentin (NEURONTIN) 100 MG capsule Take 1 capsule (100 mg) by mouth 3 times daily 270 capsule 3    hydrALAZINE  "(APRESOLINE) 100 MG tablet Take 1 tablet (100 mg) by mouth 3 times daily 270 tablet 3    hypromellose-dextran (HYPROMELLOSE-DEXTRAN 0.3-0.1%) 0.1-0.3 % ophthalmic solution 1-2 drops      levothyroxine (SYNTHROID/LEVOTHROID) 50 MCG tablet Take 50 mcg by mouth daily      losartan (COZAAR) 50 MG tablet TAKE 1 TABLET(50 MG) BY MOUTH EVERY EVENING 90 tablet 1    Multiple Vitamins-Minerals (MULTIVITAMIN ADULTS 50+) TABS Take 1 tablet by mouth every morning      omeprazole (PRILOSEC) 20 MG capsule Take 20 mg by mouth daily before breakfast      vit C/E/Zn/coppr/lutein/zeaxan (PRESERVISION AREDS-2 ORAL) Take 1 tablet by mouth 2 times daily (before meals)       No current facility-administered medications for this visit.        PHYSICAL EXAMINATION  VITALS: /62   Pulse 58   Ht 1.549 m (5' 1\")   Wt 73 kg (161 lb)   BMI 30.42 kg/m    GENERAL: Healthy appearing, alert, no acute distress, normal habitus.  CARDIOVASCULAR: Extremities warm and well perfused. Pulses present.   NEUROLOGICAL:  Patient is awake and oriented to self, place and time.  Attention span is normal.  Memory is grossly intact.  Language is fluent and follows commands appropriately.  Appropriate fund of knowledge. Cranial nerves 2-12 are intact. There is no pronator drift.  Motor exam shows 5/5 strength in all extremities.  Tone is symmetric bilaterally in upper and lower extremities.  Reflexes are symmetric and 1+/diminished in upper extremities and lower extremities. Sensory exam is grossly intact to light touch, pin prick and vibration.  Finger to nose and heel to shin is without dysmetria.  Romberg is negative.  Gait is slightly wide-based and the patient is able to do tandem walk and walk on toes and heels with some difficulty.  She does have some antalgic gait due to the right knee.  Slightly more gait difficulty.  Significant change in exam.      DIAGNOSTICS  HEAD CT:  1.  No acute intracranial hemorrhage.  2.  No CT evidence acute infarct. " Aspect score 10.  3.  Age-related changes described above.     Dr. Tashi Lackey was notified by Dr Kevin Cooper at  1:40 AM 09/13/2021.      HEAD CTA:   1.  No intracranial arterial large vessel occlusion.  2.  Right carotid siphon mild stenosis.   3.  Left carotid siphon severe stenosis.  4.  Otherwise, no significant stenosis/occlusion.      NECK CTA:  1.  Right vertebral artery origin moderate stenosis.  2.  Left proximal subclavian artery mild-to-moderate stenosis.  3.  Otherwise, no significant stenosis/occlusion. No dissection.  4.  Multiple thyroid nodules. Recommend nonemergent thyroid ultrasound based upon ACR white paper criteria.   5.  Lung apices demonstrate septal thickening with patchy groundglass opacities and consolidations most likely due to pulmonary edema. Please correlate clinically to exclude acute infectious inflammatory pneumonitis.     Carotid US  IMPRESSION:  1.  Mild plaque formation, velocities consistent with less than 50% stenosis in the right internal carotid artery.  2.  Mild plaque formation, velocities consistent with less than 50% stenosis in the left internal carotid artery.  3.  Flow within the vertebral arteries is antegrade.  4.  Bilateral thyroid nodules, recommend further evaluation with dedicated thyroid ultrasound.     CT L spine  1.  No evidence of lumbar spine fracture.     2.  Multilevel degenerative change throughout the lumbar disc spaces with moderate spinal canal stenosis at L3-4. Moderate spinal canal stenosis at L4-5. Variable degrees of foraminal narrowing. Please see body of report for details at each level.    EMG  CLINICAL INTERPRETATION:  This is an abnormal nerve conduction and EMG study.  The study is suggestive of a sensorimotor polyneuropathy in both legs.  Further clinical correlation is needed.    RELEVANT LABS  Component      Latest Ref Rng & Units 9/13/2021 9/15/2021   Albumin %      51.0 - 67.0 %  61.4   Albumin Fraction      3.2 - 4.7 g/dL  3.9    Alpha 1 %      2.0 - 4.0 %  3.7   Alpha 1 Fraction      0.1 - 0.3 g/dL  0.2   Alpha 2 %      5.0 - 13.0 %  12.2   Alpha 2 Fraction      0.4 - 0.9 g/dL  0.8   Beta %      10.0 - 17.0 %  10.0   Beta Fraction      0.7 - 1.2 g/dL  0.6 (L)   Gamma Globulin %      9.0 - 20.0 %  12.7   Gamma Fraction      0.6 - 1.4 g/dL  0.8   ELP Interpretation:        Normal serum protein electrophoresis.   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 At the present time, the bands are too faint for unequivocal diagnosis.   Path ICD       I16.1   Interpreted By       Eduardo Dyer MD     Component      Latest Ref Rng & Units 5/12/2022   Vitamin B12      213 - 816 pg/mL 435     OUTSIDE RECORDS  Hospital notes reviewed.    SPEP  Component      Latest Ref Rng & Units 7/6/2022   Albumin Fraction      3.7 - 5.1 g/dL 4.2   Alpha 1 Fraction      0.2 - 0.4 g/dL 0.3   Alpha 2 Fraction      0.5 - 0.9 g/dL 0.8   Beta Fraction      0.6 - 1.0 g/dL 0.7   Gamma Fraction      0.7 - 1.6 g/dL 1.0   Monoclonal Peak      <=0.0 g/dL 0.0   ELP Interpretation:       No monoclonal protein seen by capillary electrophoresis. However, a possible very small IgM lambda monoclonal was seen in this sample by immunofixation which is a much more sensitive method for monoclonal detection. Pathologic significance requires clinical correlation. NORIS Ho M.D., Ph.D., Pathologist ().   Immunofixation ELP       Possible very small monoclonal IgM immunoglobulin of lambda light  chain type. Pathologic significance requires clinical correlation.  NORIS Ho M.D., Ph.D., Pathologist ()   Total Protein Serum for ELP      6.4 - 8.3 g/dL 6.9     Hematology      CT head  IMPRESSION:  1.  No acute intracranial process or significant change since 04/05/2022.  2.  No significant TMJ arthropathy.      LABS  Component      Latest Ref Rn 5/12/2023  4:29 PM   WBC      4.0 - 11.0 10e3/uL 5.5    RBC Count      3.80 - 5.20 10e6/uL 3.50 (L)    Hemoglobin      11.7 - 15.7 g/dL 10.9 (L)    Hematocrit      35.0 - 47.0 % 32.6 (L)    MCV      78 - 100 fL 93    MCH      26.5 - 33.0 pg 31.1    MCHC      31.5 - 36.5 g/dL 33.4    RDW      10.0 - 15.0 % 14.4    Platelet Count      150 - 450 10e3/uL 177    % Neutrophils      % 62    % Lymphocytes      % 16    % Monocytes      % 18    % Eosinophils      % 3    % Basophils      % 1    % Immature Granulocytes      % 0    NRBCs per 100 WBC      <1 /100 0    Absolute Neutrophils      1.6 - 8.3 10e3/uL 3.4    Absolute Lymphocytes      0.8 - 5.3 10e3/uL 0.9    Absolute Monocytes      0.0 - 1.3 10e3/uL 1.0    Absolute Eosinophils      0.0 - 0.7 10e3/uL 0.2    Absolute Basophils      0.0 - 0.2 10e3/uL 0.1    Absolute Immature Granulocytes      <=0.4 10e3/uL 0.0    Absolute NRBCs      10e3/uL 0.0    Sodium      136 - 145 mmol/L 135 (L)    Potassium      3.4 - 5.3 mmol/L 4.2    Chloride      98 - 107 mmol/L 95 (L)    Carbon Dioxide (CO2)      22 - 29 mmol/L 29    Anion Gap      7 - 15 mmol/L 11    Urea Nitrogen      8.0 - 23.0 mg/dL 42.8 (H)    Creatinine      0.51 - 0.95 mg/dL 1.58 (H)    Calcium      8.8 - 10.2 mg/dL 9.5    Glucose      70 - 99 mg/dL 85    Alkaline Phosphatase      35 - 104 U/L 70    AST      10 - 35 U/L 33    ALT      10 - 35 U/L 24    Protein Total      6.4 - 8.3 g/dL 6.9    Albumin      3.5 - 5.2 g/dL 4.2    Bilirubin Total      <=1.2 mg/dL 0.4    GFR Estimate      >60 mL/min/1.73m2 33 (L)    LADARIUS interpretation      Negative  Positive !     LADARIUS pattern 1 Homogeneous    LADARIUS titer 1 1:320    Neutrophil Cytoplasmic Antibody      <1:10  <1:10    Neutrophil Cytoplasmic Antibody Pattern The ANCA IFA is <1:10. No further testing will be performed.    Angiotensin Converting Enzyme      16 - 85 U/L 46    Vitamin B12      232 - 1,245 pg/mL 1,000    Vitamin B1 Whole Blood Level      70 - 180 nmol/L 116    TSH      0.30 - 4.20 uIU/mL 6.36 (H)    Lyme Disease Antibodies Serum      <0.90  0.41    Hemoglobin A1C      <5.7 % 5.3    Sed Rate      0 - 30 mm/hr 17    T4 Free      0.90 - 1.70 ng/dL 1.00       Legend:  (L) Low  (H) High  ! Abnormal  Component      Latest Ref Rn 2/14/2024  2:08 PM   Albumin Fraction      3.7 - 5.1 g/dL 4.0    Alpha 1 Fraction      0.2 - 0.4 g/dL 0.3    Alpha 2 Fraction      0.5 - 0.9 g/dL 0.8    Beta Fraction      0.6 - 1.0 g/dL 0.7    Gamma Fraction      0.7 - 1.6 g/dL 1.1    Monoclonal Peak      <=0.0 g/dL 0.0    ELP Interpretation: Essentially normal electrophoretic pattern. No obvious monoclonal proteins seen. Pathologic significance requires clinical correlation. Andrew Rocha MD    Kappa Free Lt Chain      0.33 - 1.94 mg/dL 7.41 (H)    Lambda Free Lt Chain      0.57 - 2.63 mg/dL 4.72 (H)    Kappa Lambda Ratio      0.26 - 1.65  1.57    Total Protein Serum for ELP      6.4 - 8.3 g/dL 6.8       Legend:  (H) High    IMPRESSION/REPORT/PLAN  Abnormal SPEP  Diabetic neuropathy-worsening  Primary hypertension  Neuropathic pain/muscle cramps  New onset facial numbness-stable  History of sarcoidosis  Positive LADARIUS-suspect false positive    This is a 83 year old female with numbness in the bottom of her feet.  EMG is confirmatory for peripheral neuropathy. Exam does show decreased reflexes but otherwise negative for sensory loss.   CT of the lumbar spine does show spinal stenosis though this would not fit with her symptoms.     Etiology for neuropathy would most likely be diabetes.  She did have a positive serum electrophoresis which is  being monitored through oncology.  There is no concerns for multiple myeloma though there is some family history of multiple myeloma.  Discussed prognosis of neuropathy.  Will recommend exercise and healthy lifestyle.     Exam overall is stable.  There might be some mild progression.  Gabapentin does help with neuropathic pain/cramping.  Recommend improving lifestyle/exercise/doing balance exercises.    Previously-patient was seen in the hospital for a spell of bilateral hand numbness shaking and elevated blood pressure.  This was thought to be hypertensive emergency.  Head CT was negative for stroke.  MRI could not be done because of pacemaker.  CT angiogram showed left carotid stenosis though otherwise noncontributory.  Carotid ultrasound was negative.  Patient remains on aspirin 81 mg.  She is also on a statin.  No strokelike symptoms we will continue to monitor.  No change.  Blood pressure slightly elevated today.  Could be whitecoat hypertension.  Seen by the cardiologist and they are trying to keep her weight low with use of diuretics. -- Stable    She now reports left facial numbness.  MRI brain is not possible due to pacemaker.  CT of the head with contrast was negative for any new structural lesions.  Blood work overall has been noncontributory though she did test positive for LADARIUS.  Most likely this is a false positive.  She does have a history of sarcoidosis which could be a cause for her numbness or could be related to diabetes.  Given stable symptoms with no imaging findings we will hold off on trying aggressive treatment of sarcoidosis at this point.  Continue working with primary care for vascular risk factor management.  Stable/improved.    She does complain of some shoulder issues which I suspect is more orthopedic.  She does have some numbness in her hands which is more likely related to carpal tunnel.  She is already had carpal tunnel surgery.  Recommend follow-up with orthopedic doctor/sports  medicine for her shoulder issues.    Return back on as-needed basis or in 1 year.      -     gabapentin (NEURONTIN) 100 MG capsule; Take 1 capsule (100 mg) by mouth 3 times daily      Return in about 1 year (around 6/21/2025) for In-Clinic Visit (must), After testing.    Over 30 minutes were spent coordinating the care for the patient on the day of the encounter.  This includes previsit, during visit and post visit activities as documented above.  Counseling patient.  Reviewing chart.  Prescription management.  Multiple problems addressed.  (Activities include but not inclusive of reviewing chart, reviewing outside records, reviewing labs and imaging study results as well as the images, patient visit time including getting history and exam,  use if applicable, review of test results with the patient and coming up with a plan in a shared model, counseling patient and family, education and answering patient questions, EMR , EMR diagnosis entry and problem list management, medication reconciliation and prescription management if applicable, paperwork if applicable, printing documents and documentation of the visit activities.)        Reji Davila MD  Neurologist  [facility]  Tel:- [phone]    This note was dictated using voice recognition software.  Any grammatical or context distortions are unintentional and inherent to the software.

## 2024-06-24 ENCOUNTER — ALLIED HEALTH/NURSE VISIT (OUTPATIENT)
Dept: CARDIOLOGY | Facility: CLINIC | Age: 84
End: 2024-06-24
Payer: COMMERCIAL

## 2024-06-24 DIAGNOSIS — I50.30 NYHA CLASS 3 HEART FAILURE WITH PRESERVED EJECTION FRACTION (H): Primary | ICD-10-CM

## 2024-06-24 DIAGNOSIS — I16.1 HYPERTENSIVE EMERGENCY: ICD-10-CM

## 2024-06-24 PROCEDURE — 99454 REM MNTR PHYSIOL PARAM 16-30: CPT | Performed by: INTERNAL MEDICINE

## 2024-06-25 RX ORDER — LOSARTAN POTASSIUM 50 MG/1
TABLET ORAL
Qty: 90 TABLET | Refills: 1 | Status: SHIPPED | OUTPATIENT
Start: 2024-06-25

## 2024-06-25 NOTE — PROGRESS NOTES
[facility]:   [facility] ([employer]) Routine Remote Evaluation    HRS Enrollment date: 9/1/22     Dates: 3/22/24 through 4/22/24    This is not the first billing cycle.    Alerts in the last month: none    No adjustments made this month.  Discussed with patient/caregiver and they will continue current plan of care.     Readings:          Total Minutes Spent: 0 minutes     HARRIETT Jules RN     Future Appointments   Date Time Provider Department Center   7/25/2024  4:00 AM [facility]   8/26/2024  4:00 AM [facility]   9/25/2024 12:00 AM [facility] REMOTE DEVICE CHECK FROM HOME [facility]   6/23/2025 10:30 [facility]      I have reviewed Pilar Yadav RN's note and agree.    Heidy Akins MD., MHS

## 2024-07-14 DIAGNOSIS — I10 HYPERTENSION, UNSPECIFIED TYPE: ICD-10-CM

## 2024-07-15 RX ORDER — FUROSEMIDE 20 MG
40 TABLET ORAL DAILY
Qty: 180 TABLET | Refills: 3 | Status: SHIPPED | OUTPATIENT
Start: 2024-07-15

## 2024-07-23 NOTE — PROGRESS NOTES
[facility]:   [facility] ([employer] Routine Remote Evaluation    HRS Enrollment date: 9/1/22     Dates: 4/23/24 through 5/23/24    This is not the first billing cycle.    Alerts in the last month: None    No adjustments made this month.  Discussed with patient/caregiver and they will continue current plan of care.     Readings:          Total Minutes Spent: 0 minutes     Ashlie Montelongo CMA    Future Appointments   Date Time Provider Department Center   7/25/2024  4:00 AM [facility]   8/26/2024  4:00 AM [facility]   9/25/2024 12:00 AM JN [facility] REMOTE DEVICE CHECK FROM HOME [facility]   9/26/2024  4:00 AM [facility]   6/23/2025 10:30 AM [facility]     I have reviewed Ashlie Montelongo CMA 's note and agree.    Heidy Akins MD., MHS

## 2024-07-25 ENCOUNTER — ALLIED HEALTH/NURSE VISIT (OUTPATIENT)
Dept: CARDIOLOGY | Facility: CLINIC | Age: 84
End: 2024-07-25
Payer: COMMERCIAL

## 2024-07-25 DIAGNOSIS — I50.30 NYHA CLASS 3 HEART FAILURE WITH PRESERVED EJECTION FRACTION (H): Primary | ICD-10-CM

## 2024-07-25 PROCEDURE — 99454 REM MNTR PHYSIOL PARAM 16-30: CPT | Performed by: INTERNAL MEDICINE

## 2024-08-01 NOTE — PROGRESS NOTES
[facility]:   [facility] ([employer]) Routine Remote Evaluation    HRS Enrollment date: 9/1/22     Dates: 5/24/24 through 6/24/24    This is not the first billing cycle.    Alerts in the last month: None    No adjustments made this month.  Discussed with patient/caregiver and they will continue current plan of care.     Readings:          Total Minutes Spent: 0 minutes     Ashlie Montelongo CMA    Future Appointments   Date Time Provider Department Center   8/26/2024  4:00 AM N [facility] CARDIOMEMS [facility]   9/25/2024 12:00 AM [facility] REMOTE DEVICE CHECK FROM HOME [facility]   9/26/2024  4:00 AM [facility]   6/23/2025 10:30 AM [facility]     I have reviewed Ashlie Montelongo CMA 's note and agree.    Heidy Akins MD., MHS

## 2024-08-15 ENCOUNTER — LAB REQUISITION (OUTPATIENT)
Dept: LAB | Facility: CLINIC | Age: 84
End: 2024-08-15

## 2024-08-15 ENCOUNTER — TRANSFERRED RECORDS (OUTPATIENT)
Dept: HEALTH INFORMATION MANAGEMENT | Facility: CLINIC | Age: 84
End: 2024-08-15

## 2024-08-15 DIAGNOSIS — E11.22 TYPE 2 DIABETES MELLITUS WITH DIABETIC CHRONIC KIDNEY DISEASE (H): ICD-10-CM

## 2024-08-15 DIAGNOSIS — E03.9 HYPOTHYROIDISM, UNSPECIFIED: ICD-10-CM

## 2024-08-15 PROCEDURE — 80061 LIPID PANEL: CPT | Performed by: FAMILY MEDICINE

## 2024-08-15 PROCEDURE — 84443 ASSAY THYROID STIM HORMONE: CPT | Performed by: FAMILY MEDICINE

## 2024-08-15 PROCEDURE — 80053 COMPREHEN METABOLIC PANEL: CPT | Performed by: FAMILY MEDICINE

## 2024-08-15 PROCEDURE — 82570 ASSAY OF URINE CREATININE: CPT | Performed by: FAMILY MEDICINE

## 2024-08-16 LAB
ALBUMIN SERPL BCG-MCNC: 4.1 G/DL (ref 3.5–5.2)
ALP SERPL-CCNC: 78 U/L (ref 40–150)
ALT SERPL W P-5'-P-CCNC: 22 U/L (ref 0–50)
ANION GAP SERPL CALCULATED.3IONS-SCNC: 12 MMOL/L (ref 7–15)
AST SERPL W P-5'-P-CCNC: 23 U/L (ref 0–45)
BILIRUB SERPL-MCNC: 0.5 MG/DL
BUN SERPL-MCNC: 39.7 MG/DL (ref 8–23)
CALCIUM SERPL-MCNC: 9.2 MG/DL (ref 8.8–10.4)
CHLORIDE SERPL-SCNC: 102 MMOL/L (ref 98–107)
CHOLEST SERPL-MCNC: 119 MG/DL
CREAT SERPL-MCNC: 1.78 MG/DL (ref 0.51–0.95)
CREAT UR-MCNC: 79 MG/DL
EGFRCR SERPLBLD CKD-EPI 2021: 28 ML/MIN/1.73M2
FASTING STATUS PATIENT QL REPORTED: ABNORMAL
FASTING STATUS PATIENT QL REPORTED: ABNORMAL
GLUCOSE SERPL-MCNC: 162 MG/DL (ref 70–99)
HCO3 SERPL-SCNC: 26 MMOL/L (ref 22–29)
HDLC SERPL-MCNC: 46 MG/DL
LDLC SERPL CALC-MCNC: 59 MG/DL
MICROALBUMIN UR-MCNC: 48.7 MG/L
MICROALBUMIN/CREAT UR: 61.65 MG/G CR (ref 0–25)
NONHDLC SERPL-MCNC: 73 MG/DL
POTASSIUM SERPL-SCNC: 4.3 MMOL/L (ref 3.4–5.3)
PROT SERPL-MCNC: 6.7 G/DL (ref 6.4–8.3)
SODIUM SERPL-SCNC: 140 MMOL/L (ref 135–145)
TRIGL SERPL-MCNC: 71 MG/DL
TSH SERPL DL<=0.005 MIU/L-ACNC: 2.91 UIU/ML (ref 0.3–4.2)

## 2024-08-22 NOTE — PROGRESS NOTES
[facility]:   [facility] ([employer]) Routine Remote Evaluation    HRS Enrollment date: 9/1/22     Dates: 6/25/24 through 7/25/24    This is not the first billing cycle.    Alerts in the last month: None    No adjustments made this month.  Discussed with patient/caregiver and they will continue current plan of care.      Readings:         Total Minutes Spent: 0 minutes     Ashlie Montelongo CMA    Future Appointments   Date Time Provider Department Center   8/26/2024  4:00 AM SJN HCC CARDIOMEMS HRSJN [facility]   9/25/2024 12:00 AM JN HCC REMOTE DEVICE CHECK FROM HOME HRCVN [facility]   9/26/2024  4:00 AM SJN HCC CARDIOMEMS HRSJN FV N   10/28/2024  4:00 AM [facility]N HCC CARDIOMEMS HRSJN [facility]   11/29/2024  4:00 AM [facility]N HCC CARDIOMEMS HRSJN [facility]   12/30/2024  4:00 AM S[facility]N HCC CARDIOMEMS HRSJN [facility]   6/23/2025 10:30 AM [facility]ila [facility]     I have reviewed Ashlie Montelongo CMA 's note and agree.    Heidy Akins MD., MHS

## 2024-08-24 ENCOUNTER — HOSPITAL ENCOUNTER (OUTPATIENT)
Dept: ULTRASOUND IMAGING | Facility: HOSPITAL | Age: 84
Discharge: HOME | End: 2024-08-24
Attending: FAMILY MEDICINE | Admitting: FAMILY MEDICINE
Payer: COMMERCIAL

## 2024-08-24 DIAGNOSIS — N18.32 STAGE 3B CHRONIC KIDNEY DISEASE (H): ICD-10-CM

## 2024-08-24 PROCEDURE — 76770 US EXAM ABDO BACK WALL COMP: CPT

## 2024-08-26 ENCOUNTER — ALLIED HEALTH/NURSE VISIT (OUTPATIENT)
Dept: CARDIOLOGY | Facility: CLINIC | Age: 84
End: 2024-08-26
Payer: COMMERCIAL

## 2024-08-26 DIAGNOSIS — I50.30 NYHA CLASS 3 HEART FAILURE WITH PRESERVED EJECTION FRACTION (H): Primary | ICD-10-CM

## 2024-08-26 PROCEDURE — 99454 REM MNTR PHYSIOL PARAM 16-30: CPT | Performed by: INTERNAL MEDICINE

## 2024-09-04 ENCOUNTER — LAB REQUISITION (OUTPATIENT)
Dept: LAB | Facility: CLINIC | Age: 84
End: 2024-09-04

## 2024-09-04 DIAGNOSIS — R89.9 UNSPECIFIED ABNORMAL FINDING IN SPECIMENS FROM OTHER ORGANS, SYSTEMS AND TISSUES: ICD-10-CM

## 2024-09-04 PROCEDURE — 80048 BASIC METABOLIC PNL TOTAL CA: CPT | Performed by: FAMILY MEDICINE

## 2024-09-05 LAB
ANION GAP SERPL CALCULATED.3IONS-SCNC: 12 MMOL/L (ref 7–15)
BUN SERPL-MCNC: 33.5 MG/DL (ref 8–23)
CALCIUM SERPL-MCNC: 9.6 MG/DL (ref 8.8–10.4)
CHLORIDE SERPL-SCNC: 103 MMOL/L (ref 98–107)
CREAT SERPL-MCNC: 1.54 MG/DL (ref 0.51–0.95)
EGFRCR SERPLBLD CKD-EPI 2021: 33 ML/MIN/1.73M2
GLUCOSE SERPL-MCNC: 96 MG/DL (ref 70–99)
HCO3 SERPL-SCNC: 25 MMOL/L (ref 22–29)
POTASSIUM SERPL-SCNC: 4.5 MMOL/L (ref 3.4–5.3)
SODIUM SERPL-SCNC: 140 MMOL/L (ref 135–145)

## 2024-09-12 NOTE — PROGRESS NOTES
[facility]:   [facility] ([place]) Routine Remote Evaluation    HRS Enrollment date: 9/1/22     Dates: 7/26/24 through 8/26/24    This is not the first billing cycle.    Alerts in the last month: None    No adjustments made this month.  Discussed with patient/caregiver and they will continue current plan of care.     Readings:        Total Minutes Spent: 0 minutes     Ashlie Montelongo CMA    Future Appointments   Date Time Provider Department Center   9/25/2024 12:00 AM JN HCC REMOTE DEVICE CHECK FROM HOME HRCVN [facility]   9/26/2024  4:00 AM SJN HCC CARDIOMEMS HRSJN [facility]   10/28/2024  4:00 AM SJN HCC CARDIOMEMS HRSJN FV N   11/29/2024  4:00 AM SJN HCC CARDIOMEMS HRSJN FV N   12/30/2024  4:00 AM SJN HCC CARDIOMEMS HRSJN FV N   6/23/2025 10:30 AM Reji Davila MD [facility]     I have reviewed Ashlie Montelongo CMA 's note and agree.    Heidy Akins MD., MHS

## 2024-09-25 ENCOUNTER — ANCILLARY PROCEDURE (OUTPATIENT)
Dept: CARDIOLOGY | Facility: CLINIC | Age: 84
End: 2024-09-25
Attending: INTERNAL MEDICINE
Payer: COMMERCIAL

## 2024-09-25 DIAGNOSIS — I44.2 COMPLETE ATRIOVENTRICULAR BLOCK (H): ICD-10-CM

## 2024-09-25 DIAGNOSIS — Z95.0 PACEMAKER: ICD-10-CM

## 2024-09-26 ENCOUNTER — ALLIED HEALTH/NURSE VISIT (OUTPATIENT)
Dept: CARDIOLOGY | Facility: CLINIC | Age: 84
End: 2024-09-26
Payer: COMMERCIAL

## 2024-09-26 DIAGNOSIS — I50.30 NYHA CLASS 3 HEART FAILURE WITH PRESERVED EJECTION FRACTION (H): Primary | ICD-10-CM

## 2024-09-26 PROCEDURE — 99454 REM MNTR PHYSIOL PARAM 16-30: CPT | Performed by: INTERNAL MEDICINE

## 2024-09-27 NOTE — PROGRESS NOTES
[facility]:   [facility] ([employer]) Routine Remote Evaluation    HRS Enrollment date: 9/1/22     Dates: 8/27/24 through 9/26/24    This is not the first billing cycle.    Alerts in the last month: None    No adjustments made this month.  Discussed with patient/caregiver and they will continue current plan of care.     Readings:         Total Minutes Spent: 0 minutes     Ashlie Montelongo CMA    Future Appointments   Date Time Provider Department Center   10/28/2024  4:00 AM [facility] HCC CARDIOMEMS HRSN [facility]   11/29/2024  4:00 AM [facility] HCC CARDIOMEMS HRSJN [facility]   12/30/2024  4:00 AM [facility] HCC CARDIOMEMS HRSN [facility]   1/9/2025 12:00 AM JN [facility] REMOTE DEVICE CHECK FROM HOME HRCVN [facility]   6/23/2025 10:30 AM [facility]     I have reviewed Ashlie Montelongo CMA 's note and agree.    Heidy Akins MD., MHS

## 2024-09-29 LAB
MDC_IDC_EPISODE_DTM: NORMAL
MDC_IDC_EPISODE_DURATION: 10 S
MDC_IDC_EPISODE_DURATION: 12 S
MDC_IDC_EPISODE_DURATION: 14 S
MDC_IDC_EPISODE_DURATION: 178 S
MDC_IDC_EPISODE_DURATION: 18 S
MDC_IDC_EPISODE_DURATION: 20 S
MDC_IDC_EPISODE_DURATION: 20 S
MDC_IDC_EPISODE_DURATION: 22 S
MDC_IDC_EPISODE_DURATION: 44 S
MDC_IDC_EPISODE_DURATION: 668 S
MDC_IDC_EPISODE_ID: NORMAL
MDC_IDC_EPISODE_TYPE: NORMAL
MDC_IDC_LEAD_CONNECTION_STATUS: NORMAL
MDC_IDC_LEAD_CONNECTION_STATUS: NORMAL
MDC_IDC_LEAD_IMPLANT_DT: NORMAL
MDC_IDC_LEAD_IMPLANT_DT: NORMAL
MDC_IDC_LEAD_LOCATION: NORMAL
MDC_IDC_LEAD_LOCATION: NORMAL
MDC_IDC_LEAD_LOCATION_DETAIL_1: NORMAL
MDC_IDC_LEAD_LOCATION_DETAIL_1: NORMAL
MDC_IDC_LEAD_MFG: NORMAL
MDC_IDC_LEAD_MFG: NORMAL
MDC_IDC_LEAD_MODEL: NORMAL
MDC_IDC_LEAD_MODEL: NORMAL
MDC_IDC_LEAD_POLARITY_TYPE: NORMAL
MDC_IDC_LEAD_POLARITY_TYPE: NORMAL
MDC_IDC_LEAD_SERIAL: NORMAL
MDC_IDC_LEAD_SERIAL: NORMAL
MDC_IDC_MSMT_BATTERY_DTM: NORMAL
MDC_IDC_MSMT_BATTERY_REMAINING_LONGEVITY: 92 MO
MDC_IDC_MSMT_BATTERY_REMAINING_PERCENTAGE: 71 %
MDC_IDC_MSMT_BATTERY_RRT_TRIGGER: NORMAL
MDC_IDC_MSMT_BATTERY_STATUS: NORMAL
MDC_IDC_MSMT_BATTERY_VOLTAGE: 3.02 V
MDC_IDC_MSMT_LEADCHNL_RA_IMPEDANCE_VALUE: 410 OHM
MDC_IDC_MSMT_LEADCHNL_RA_LEAD_CHANNEL_STATUS: NORMAL
MDC_IDC_MSMT_LEADCHNL_RA_PACING_THRESHOLD_AMPLITUDE: 0.5 V
MDC_IDC_MSMT_LEADCHNL_RA_PACING_THRESHOLD_PULSEWIDTH: 0.5 MS
MDC_IDC_MSMT_LEADCHNL_RA_SENSING_INTR_AMPL: 3.1 MV
MDC_IDC_MSMT_LEADCHNL_RV_IMPEDANCE_VALUE: 490 OHM
MDC_IDC_MSMT_LEADCHNL_RV_LEAD_CHANNEL_STATUS: NORMAL
MDC_IDC_MSMT_LEADCHNL_RV_PACING_THRESHOLD_AMPLITUDE: 0.88 V
MDC_IDC_MSMT_LEADCHNL_RV_PACING_THRESHOLD_PULSEWIDTH: 0.5 MS
MDC_IDC_MSMT_LEADCHNL_RV_SENSING_INTR_AMPL: 3.7 MV
MDC_IDC_PG_IMPLANT_DTM: NORMAL
MDC_IDC_PG_MFG: NORMAL
MDC_IDC_PG_MODEL: NORMAL
MDC_IDC_PG_SERIAL: NORMAL
MDC_IDC_PG_TYPE: NORMAL
MDC_IDC_SESS_CLINIC_NAME: NORMAL
MDC_IDC_SESS_DTM: NORMAL
MDC_IDC_SESS_REPROGRAMMED: NO
MDC_IDC_SESS_TYPE: NORMAL
MDC_IDC_SET_BRADY_AT_MODE_SWITCH_MODE: NORMAL
MDC_IDC_SET_BRADY_AT_MODE_SWITCH_RATE: 180 {BEATS}/MIN
MDC_IDC_SET_BRADY_LOWRATE: 50 {BEATS}/MIN
MDC_IDC_SET_BRADY_MAX_SENSOR_RATE: 100 {BEATS}/MIN
MDC_IDC_SET_BRADY_MAX_TRACKING_RATE: 100 {BEATS}/MIN
MDC_IDC_SET_BRADY_MODE: NORMAL
MDC_IDC_SET_BRADY_PAV_DELAY_HIGH: 100 MS
MDC_IDC_SET_BRADY_PAV_DELAY_LOW: 300 MS
MDC_IDC_SET_BRADY_SAV_DELAY_HIGH: 100 MS
MDC_IDC_SET_BRADY_SAV_DELAY_LOW: 130 MS
MDC_IDC_SET_LEADCHNL_RA_PACING_AMPLITUDE: 1.5 V
MDC_IDC_SET_LEADCHNL_RA_PACING_ANODE_ELECTRODE_1: NORMAL
MDC_IDC_SET_LEADCHNL_RA_PACING_ANODE_LOCATION_1: NORMAL
MDC_IDC_SET_LEADCHNL_RA_PACING_CAPTURE_MODE: NORMAL
MDC_IDC_SET_LEADCHNL_RA_PACING_CATHODE_ELECTRODE_1: NORMAL
MDC_IDC_SET_LEADCHNL_RA_PACING_CATHODE_LOCATION_1: NORMAL
MDC_IDC_SET_LEADCHNL_RA_PACING_POLARITY: NORMAL
MDC_IDC_SET_LEADCHNL_RA_PACING_PULSEWIDTH: 0.5 MS
MDC_IDC_SET_LEADCHNL_RA_SENSING_ADAPTATION_MODE: NORMAL
MDC_IDC_SET_LEADCHNL_RA_SENSING_ANODE_ELECTRODE_1: NORMAL
MDC_IDC_SET_LEADCHNL_RA_SENSING_ANODE_LOCATION_1: NORMAL
MDC_IDC_SET_LEADCHNL_RA_SENSING_CATHODE_ELECTRODE_1: NORMAL
MDC_IDC_SET_LEADCHNL_RA_SENSING_CATHODE_LOCATION_1: NORMAL
MDC_IDC_SET_LEADCHNL_RA_SENSING_POLARITY: NORMAL
MDC_IDC_SET_LEADCHNL_RA_SENSING_SENSITIVITY: 0.2 MV
MDC_IDC_SET_LEADCHNL_RV_PACING_AMPLITUDE: 1.12
MDC_IDC_SET_LEADCHNL_RV_PACING_ANODE_ELECTRODE_1: NORMAL
MDC_IDC_SET_LEADCHNL_RV_PACING_ANODE_LOCATION_1: NORMAL
MDC_IDC_SET_LEADCHNL_RV_PACING_CAPTURE_MODE: NORMAL
MDC_IDC_SET_LEADCHNL_RV_PACING_CATHODE_ELECTRODE_1: NORMAL
MDC_IDC_SET_LEADCHNL_RV_PACING_CATHODE_LOCATION_1: NORMAL
MDC_IDC_SET_LEADCHNL_RV_PACING_POLARITY: NORMAL
MDC_IDC_SET_LEADCHNL_RV_PACING_PULSEWIDTH: 0.5 MS
MDC_IDC_SET_LEADCHNL_RV_SENSING_ADAPTATION_MODE: NORMAL
MDC_IDC_SET_LEADCHNL_RV_SENSING_ANODE_ELECTRODE_1: NORMAL
MDC_IDC_SET_LEADCHNL_RV_SENSING_ANODE_LOCATION_1: NORMAL
MDC_IDC_SET_LEADCHNL_RV_SENSING_CATHODE_ELECTRODE_1: NORMAL
MDC_IDC_SET_LEADCHNL_RV_SENSING_CATHODE_LOCATION_1: NORMAL
MDC_IDC_SET_LEADCHNL_RV_SENSING_POLARITY: NORMAL
MDC_IDC_SET_LEADCHNL_RV_SENSING_SENSITIVITY: 2 MV
MDC_IDC_STAT_AT_BURDEN_PERCENT: 1 %
MDC_IDC_STAT_AT_DTM_END: NORMAL
MDC_IDC_STAT_AT_DTM_START: NORMAL
MDC_IDC_STAT_AT_MODE_SW_COUNT: 82
MDC_IDC_STAT_AT_MODE_SW_COUNT_PER_DAY: 1
MDC_IDC_STAT_AT_MODE_SW_MAX_DURATION: 668 S
MDC_IDC_STAT_AT_MODE_SW_PERCENT_TIME: 1 %
MDC_IDC_STAT_BRADY_AP_VP_PERCENT: 4.2 %
MDC_IDC_STAT_BRADY_AP_VS_PERCENT: 1 %
MDC_IDC_STAT_BRADY_AS_VP_PERCENT: 93 %
MDC_IDC_STAT_BRADY_AS_VS_PERCENT: 1 %
MDC_IDC_STAT_BRADY_DTM_END: NORMAL
MDC_IDC_STAT_BRADY_DTM_START: NORMAL
MDC_IDC_STAT_BRADY_RA_PERCENT_PACED: 1.6 %
MDC_IDC_STAT_BRADY_RV_PERCENT_PACED: 97 %
MDC_IDC_STAT_CRT_DTM_END: NORMAL
MDC_IDC_STAT_CRT_DTM_START: NORMAL
MDC_IDC_STAT_EPISODE_RECENT_COUNT: 0
MDC_IDC_STAT_EPISODE_RECENT_COUNT: 82
MDC_IDC_STAT_EPISODE_RECENT_COUNT_DTM_END: NORMAL
MDC_IDC_STAT_EPISODE_RECENT_COUNT_DTM_END: NORMAL
MDC_IDC_STAT_EPISODE_TYPE: NORMAL
MDC_IDC_STAT_EPISODE_TYPE: NORMAL
MDC_IDC_STAT_EPISODE_VENDOR_TYPE: NORMAL
MDC_IDC_STAT_EPISODE_VENDOR_TYPE: NORMAL
MDC_IDC_STAT_HEART_RATE_ATRIAL_MAX: 330 {BEATS}/MIN
MDC_IDC_STAT_HEART_RATE_ATRIAL_MEAN: 64 {BEATS}/MIN
MDC_IDC_STAT_HEART_RATE_ATRIAL_MIN: 40 {BEATS}/MIN
MDC_IDC_STAT_HEART_RATE_DTM_END: NORMAL
MDC_IDC_STAT_HEART_RATE_DTM_START: NORMAL
MDC_IDC_STAT_HEART_RATE_VENTRICULAR_MAX: 200 {BEATS}/MIN
MDC_IDC_STAT_HEART_RATE_VENTRICULAR_MEAN: 64 {BEATS}/MIN
MDC_IDC_STAT_HEART_RATE_VENTRICULAR_MIN: 30 {BEATS}/MIN

## 2024-09-29 PROCEDURE — 93296 REM INTERROG EVL PM/IDS: CPT | Performed by: INTERNAL MEDICINE

## 2024-09-29 PROCEDURE — 93294 REM INTERROG EVL PM/LDLS PM: CPT | Performed by: INTERNAL MEDICINE

## 2024-10-07 DIAGNOSIS — I48.0 PAROXYSMAL ATRIAL FIBRILLATION (H): ICD-10-CM

## 2024-11-25 ENCOUNTER — PATIENT OUTREACH (OUTPATIENT)
Dept: CARDIOLOGY | Facility: CLINIC | Age: 84
End: 2024-11-25
Payer: COMMERCIAL

## 2024-11-25 NOTE — PROGRESS NOTES
Patient is interested in transitioning from [facility] home monitoring program to [facility] remote monitoring program. Patient will continue to use the equipment from the HRS kit that they already have. Provided education to patient's daughter Leny regarding how to transmit readings and enrolled patient.     HARRIETT Jules RN

## 2024-12-21 ENCOUNTER — HEALTH MAINTENANCE LETTER (OUTPATIENT)
Age: 84
End: 2024-12-21

## 2024-12-30 ENCOUNTER — TELEPHONE (OUTPATIENT)
Dept: CARDIOLOGY | Facility: CLINIC | Age: 84
End: 2024-12-30

## 2024-12-30 DIAGNOSIS — I16.1 HYPERTENSIVE EMERGENCY: ICD-10-CM

## 2024-12-30 RX ORDER — LOSARTAN POTASSIUM 50 MG/1
50 TABLET ORAL DAILY
Qty: 90 TABLET | Refills: 3 | Status: SHIPPED | OUTPATIENT
Start: 2024-12-30

## 2024-12-30 NOTE — TELEPHONE ENCOUNTER
[facility] Call Center    Phone Message    May a detailed message be left on voicemail: yes     Reason for Call: Medication Refill Request    Has the patient contacted the pharmacy for the refill? Yes   Name of medication being requested: Losartan 50 mg  Provider who prescribed the medication: Dr Akins  Pharmacy: [facility]  Date medication is needed: 12/30/2024       Action Taken: Other: Cardio    Travel Screening: Not Applicable     Date of Service:

## 2025-01-09 ENCOUNTER — ANCILLARY PROCEDURE (OUTPATIENT)
Dept: CARDIOLOGY | Facility: CLINIC | Age: 85
End: 2025-01-09
Attending: INTERNAL MEDICINE
Payer: COMMERCIAL

## 2025-01-09 DIAGNOSIS — Z95.0 PACEMAKER: ICD-10-CM

## 2025-01-09 DIAGNOSIS — I44.2 COMPLETE ATRIOVENTRICULAR BLOCK (H): ICD-10-CM

## 2025-01-09 DIAGNOSIS — I49.5 SICK SINUS SYNDROME (H): ICD-10-CM

## 2025-01-09 LAB
MDC_IDC_EPISODE_DTM: NORMAL
MDC_IDC_EPISODE_DURATION: 10 S
MDC_IDC_EPISODE_DURATION: 10 S
MDC_IDC_EPISODE_DURATION: 100 S
MDC_IDC_EPISODE_DURATION: 12 S
MDC_IDC_EPISODE_DURATION: 14 S
MDC_IDC_EPISODE_DURATION: 16 S
MDC_IDC_EPISODE_DURATION: 24 S
MDC_IDC_EPISODE_DURATION: 48 S
MDC_IDC_EPISODE_DURATION: 542 S
MDC_IDC_EPISODE_DURATION: 8 S
MDC_IDC_EPISODE_DURATION: 8 S
MDC_IDC_EPISODE_DURATION: 92 S
MDC_IDC_EPISODE_ID: NORMAL
MDC_IDC_EPISODE_TYPE: NORMAL
MDC_IDC_LEAD_CONNECTION_STATUS: NORMAL
MDC_IDC_LEAD_CONNECTION_STATUS: NORMAL
MDC_IDC_LEAD_IMPLANT_DT: NORMAL
MDC_IDC_LEAD_IMPLANT_DT: NORMAL
MDC_IDC_LEAD_LOCATION: NORMAL
MDC_IDC_LEAD_LOCATION: NORMAL
MDC_IDC_LEAD_LOCATION_DETAIL_1: NORMAL
MDC_IDC_LEAD_LOCATION_DETAIL_1: NORMAL
MDC_IDC_LEAD_MFG: NORMAL
MDC_IDC_LEAD_MFG: NORMAL
MDC_IDC_LEAD_MODEL: NORMAL
MDC_IDC_LEAD_MODEL: NORMAL
MDC_IDC_LEAD_POLARITY_TYPE: NORMAL
MDC_IDC_LEAD_POLARITY_TYPE: NORMAL
MDC_IDC_LEAD_SERIAL: NORMAL
MDC_IDC_LEAD_SERIAL: NORMAL
MDC_IDC_MSMT_BATTERY_DTM: NORMAL
MDC_IDC_MSMT_BATTERY_REMAINING_LONGEVITY: 86 MO
MDC_IDC_MSMT_BATTERY_REMAINING_PERCENTAGE: 68 %
MDC_IDC_MSMT_BATTERY_RRT_TRIGGER: NORMAL
MDC_IDC_MSMT_BATTERY_STATUS: NORMAL
MDC_IDC_MSMT_BATTERY_VOLTAGE: 3.02 V
MDC_IDC_MSMT_LEADCHNL_RA_IMPEDANCE_VALUE: 390 OHM
MDC_IDC_MSMT_LEADCHNL_RA_LEAD_CHANNEL_STATUS: NORMAL
MDC_IDC_MSMT_LEADCHNL_RA_PACING_THRESHOLD_AMPLITUDE: 0.5 V
MDC_IDC_MSMT_LEADCHNL_RA_PACING_THRESHOLD_PULSEWIDTH: 0.5 MS
MDC_IDC_MSMT_LEADCHNL_RA_SENSING_INTR_AMPL: 2.6 MV
MDC_IDC_MSMT_LEADCHNL_RV_IMPEDANCE_VALUE: 460 OHM
MDC_IDC_MSMT_LEADCHNL_RV_LEAD_CHANNEL_STATUS: NORMAL
MDC_IDC_MSMT_LEADCHNL_RV_PACING_THRESHOLD_AMPLITUDE: 0.88 V
MDC_IDC_MSMT_LEADCHNL_RV_PACING_THRESHOLD_PULSEWIDTH: 0.5 MS
MDC_IDC_MSMT_LEADCHNL_RV_SENSING_INTR_AMPL: 4.9 MV
MDC_IDC_PG_IMPLANT_DTM: NORMAL
MDC_IDC_PG_MFG: NORMAL
MDC_IDC_PG_MODEL: NORMAL
MDC_IDC_PG_SERIAL: NORMAL
MDC_IDC_PG_TYPE: NORMAL
MDC_IDC_SESS_CLINIC_NAME: NORMAL
MDC_IDC_SESS_DTM: NORMAL
MDC_IDC_SESS_REPROGRAMMED: NO
MDC_IDC_SESS_TYPE: NORMAL
MDC_IDC_SET_BRADY_AT_MODE_SWITCH_MODE: NORMAL
MDC_IDC_SET_BRADY_AT_MODE_SWITCH_RATE: 180 {BEATS}/MIN
MDC_IDC_SET_BRADY_LOWRATE: 50 {BEATS}/MIN
MDC_IDC_SET_BRADY_MAX_SENSOR_RATE: 100 {BEATS}/MIN
MDC_IDC_SET_BRADY_MAX_TRACKING_RATE: 100 {BEATS}/MIN
MDC_IDC_SET_BRADY_MODE: NORMAL
MDC_IDC_SET_BRADY_PAV_DELAY_HIGH: 100 MS
MDC_IDC_SET_BRADY_PAV_DELAY_LOW: 300 MS
MDC_IDC_SET_BRADY_SAV_DELAY_HIGH: 100 MS
MDC_IDC_SET_BRADY_SAV_DELAY_LOW: 130 MS
MDC_IDC_SET_LEADCHNL_RA_PACING_AMPLITUDE: 1.5 V
MDC_IDC_SET_LEADCHNL_RA_PACING_ANODE_ELECTRODE_1: NORMAL
MDC_IDC_SET_LEADCHNL_RA_PACING_ANODE_LOCATION_1: NORMAL
MDC_IDC_SET_LEADCHNL_RA_PACING_CAPTURE_MODE: NORMAL
MDC_IDC_SET_LEADCHNL_RA_PACING_CATHODE_ELECTRODE_1: NORMAL
MDC_IDC_SET_LEADCHNL_RA_PACING_CATHODE_LOCATION_1: NORMAL
MDC_IDC_SET_LEADCHNL_RA_PACING_POLARITY: NORMAL
MDC_IDC_SET_LEADCHNL_RA_PACING_PULSEWIDTH: 0.5 MS
MDC_IDC_SET_LEADCHNL_RA_SENSING_ADAPTATION_MODE: NORMAL
MDC_IDC_SET_LEADCHNL_RA_SENSING_ANODE_ELECTRODE_1: NORMAL
MDC_IDC_SET_LEADCHNL_RA_SENSING_ANODE_LOCATION_1: NORMAL
MDC_IDC_SET_LEADCHNL_RA_SENSING_CATHODE_ELECTRODE_1: NORMAL
MDC_IDC_SET_LEADCHNL_RA_SENSING_CATHODE_LOCATION_1: NORMAL
MDC_IDC_SET_LEADCHNL_RA_SENSING_POLARITY: NORMAL
MDC_IDC_SET_LEADCHNL_RA_SENSING_SENSITIVITY: 0.2 MV
MDC_IDC_SET_LEADCHNL_RV_PACING_AMPLITUDE: 1.12
MDC_IDC_SET_LEADCHNL_RV_PACING_ANODE_ELECTRODE_1: NORMAL
MDC_IDC_SET_LEADCHNL_RV_PACING_ANODE_LOCATION_1: NORMAL
MDC_IDC_SET_LEADCHNL_RV_PACING_CAPTURE_MODE: NORMAL
MDC_IDC_SET_LEADCHNL_RV_PACING_CATHODE_ELECTRODE_1: NORMAL
MDC_IDC_SET_LEADCHNL_RV_PACING_CATHODE_LOCATION_1: NORMAL
MDC_IDC_SET_LEADCHNL_RV_PACING_POLARITY: NORMAL
MDC_IDC_SET_LEADCHNL_RV_PACING_PULSEWIDTH: 0.5 MS
MDC_IDC_SET_LEADCHNL_RV_SENSING_ADAPTATION_MODE: NORMAL
MDC_IDC_SET_LEADCHNL_RV_SENSING_ANODE_ELECTRODE_1: NORMAL
MDC_IDC_SET_LEADCHNL_RV_SENSING_ANODE_LOCATION_1: NORMAL
MDC_IDC_SET_LEADCHNL_RV_SENSING_CATHODE_ELECTRODE_1: NORMAL
MDC_IDC_SET_LEADCHNL_RV_SENSING_CATHODE_LOCATION_1: NORMAL
MDC_IDC_SET_LEADCHNL_RV_SENSING_POLARITY: NORMAL
MDC_IDC_SET_LEADCHNL_RV_SENSING_SENSITIVITY: 2 MV
MDC_IDC_STAT_AT_BURDEN_PERCENT: 1 %
MDC_IDC_STAT_AT_DTM_END: NORMAL
MDC_IDC_STAT_AT_DTM_START: NORMAL
MDC_IDC_STAT_AT_MODE_SW_COUNT: 151
MDC_IDC_STAT_AT_MODE_SW_COUNT_PER_DAY: 1
MDC_IDC_STAT_AT_MODE_SW_MAX_DURATION: 668 S
MDC_IDC_STAT_AT_MODE_SW_PERCENT_TIME: 1 %
MDC_IDC_STAT_BRADY_AP_VP_PERCENT: 3.3 %
MDC_IDC_STAT_BRADY_AP_VS_PERCENT: 1 %
MDC_IDC_STAT_BRADY_AS_VP_PERCENT: 95 %
MDC_IDC_STAT_BRADY_AS_VS_PERCENT: 1 %
MDC_IDC_STAT_BRADY_DTM_END: NORMAL
MDC_IDC_STAT_BRADY_DTM_START: NORMAL
MDC_IDC_STAT_BRADY_RA_PERCENT_PACED: 1.3 %
MDC_IDC_STAT_BRADY_RV_PERCENT_PACED: 98 %
MDC_IDC_STAT_CRT_DTM_END: NORMAL
MDC_IDC_STAT_CRT_DTM_START: NORMAL
MDC_IDC_STAT_EPISODE_RECENT_COUNT: 0
MDC_IDC_STAT_EPISODE_RECENT_COUNT: 69
MDC_IDC_STAT_EPISODE_RECENT_COUNT_DTM_END: NORMAL
MDC_IDC_STAT_EPISODE_RECENT_COUNT_DTM_END: NORMAL
MDC_IDC_STAT_EPISODE_TYPE: NORMAL
MDC_IDC_STAT_EPISODE_TYPE: NORMAL
MDC_IDC_STAT_EPISODE_VENDOR_TYPE: NORMAL
MDC_IDC_STAT_EPISODE_VENDOR_TYPE: NORMAL
MDC_IDC_STAT_HEART_RATE_ATRIAL_MAX: 330 {BEATS}/MIN
MDC_IDC_STAT_HEART_RATE_ATRIAL_MEAN: 64 {BEATS}/MIN
MDC_IDC_STAT_HEART_RATE_ATRIAL_MIN: 40 {BEATS}/MIN
MDC_IDC_STAT_HEART_RATE_DTM_END: NORMAL
MDC_IDC_STAT_HEART_RATE_DTM_START: NORMAL
MDC_IDC_STAT_HEART_RATE_VENTRICULAR_MAX: 200 {BEATS}/MIN
MDC_IDC_STAT_HEART_RATE_VENTRICULAR_MEAN: 64 {BEATS}/MIN
MDC_IDC_STAT_HEART_RATE_VENTRICULAR_MIN: 30 {BEATS}/MIN

## 2025-01-10 PROCEDURE — 93296 REM INTERROG EVL PM/IDS: CPT | Performed by: INTERNAL MEDICINE

## 2025-01-10 PROCEDURE — 93294 REM INTERROG EVL PM/LDLS PM: CPT | Performed by: INTERNAL MEDICINE

## 2025-01-23 NOTE — PROGRESS NOTES
[facility]:   [facility] ([employer]) Routine Remote Evaluation    HRS Enrollment date: 9/1/22     Dates: 12/19/23 through 1/18/24    This is not the first billing cycle.    Alerts in the last month:     1/3/24 - BP and Wt. Alert    These adjustments have been discussed with the patient/caregiver and they express verbal understanding.     Readings:          Total Minutes Spent: 10 minutes     Ashlie Montelongo CMA    Future Appointments   Date Time Provider Department Center   4/22/2024  4:00 AM [facility] HCC CARDIOMEMS HRSJN [facility]   5/1/2024  3:20 PM [facility]   5/23/2024  4:00 AM [facility] HCC CARDIOMEMS HRSJN [facility]   6/18/2024 10:00 AM JN HCC DEVICE NURSE 1 HRCVN [facility]V N   6/18/2024 10:50 AM Bravo Lopez MD HRSN [facility]V [facility]   6/21/2024 10:30 AM [facility]   6/24/2024  4:00 AM S[facility]N HCC CARDIOMEMS HRSJN [facility]   7/25/2024  4:00 AM S[facility]N HCC CARDIOMEMS HRSJN [facility]   8/26/2024  4:00 AM S[facility]N HCC CARDIOMEMS HRSJN FV [facility]   I have reviewed Ashlie Montelongo CMA 's note and agree.    Heidy Akins MD., S      
4 = No assist / stand by assistance

## 2025-02-01 ENCOUNTER — HOSPITAL ENCOUNTER (OUTPATIENT)
Dept: CT IMAGING | Facility: HOSPITAL | Age: 85
Discharge: HOME | End: 2025-02-01
Attending: FAMILY MEDICINE | Admitting: FAMILY MEDICINE
Payer: COMMERCIAL

## 2025-02-01 DIAGNOSIS — M25.572 LEFT LATERAL ANKLE PAIN: ICD-10-CM

## 2025-02-01 PROCEDURE — 73700 CT LOWER EXTREMITY W/O DYE: CPT | Mod: LT

## 2025-02-20 ENCOUNTER — TRANSFERRED RECORDS (OUTPATIENT)
Dept: HEALTH INFORMATION MANAGEMENT | Facility: CLINIC | Age: 85
End: 2025-02-20

## 2025-02-20 ENCOUNTER — LAB REQUISITION (OUTPATIENT)
Dept: LAB | Facility: CLINIC | Age: 85
End: 2025-02-20
Payer: COMMERCIAL

## 2025-02-20 DIAGNOSIS — E03.9 HYPOTHYROIDISM, UNSPECIFIED: ICD-10-CM

## 2025-02-20 DIAGNOSIS — E11.22 TYPE 2 DIABETES MELLITUS WITH DIABETIC CHRONIC KIDNEY DISEASE (H): ICD-10-CM

## 2025-02-20 PROCEDURE — 84443 ASSAY THYROID STIM HORMONE: CPT | Mod: ORL | Performed by: FAMILY MEDICINE

## 2025-02-20 PROCEDURE — 80048 BASIC METABOLIC PNL TOTAL CA: CPT | Mod: ORL | Performed by: FAMILY MEDICINE

## 2025-02-21 LAB
ANION GAP SERPL CALCULATED.3IONS-SCNC: 16 MMOL/L (ref 7–15)
BUN SERPL-MCNC: 36.6 MG/DL (ref 8–23)
CALCIUM SERPL-MCNC: 9.4 MG/DL (ref 8.8–10.4)
CHLORIDE SERPL-SCNC: 101 MMOL/L (ref 98–107)
CREAT SERPL-MCNC: 1.39 MG/DL (ref 0.51–0.95)
EGFRCR SERPLBLD CKD-EPI 2021: 37 ML/MIN/1.73M2
GLUCOSE SERPL-MCNC: 209 MG/DL (ref 70–99)
HCO3 SERPL-SCNC: 22 MMOL/L (ref 22–29)
POTASSIUM SERPL-SCNC: 4.4 MMOL/L (ref 3.4–5.3)
SODIUM SERPL-SCNC: 139 MMOL/L (ref 135–145)
TSH SERPL DL<=0.005 MIU/L-ACNC: 2.82 UIU/ML (ref 0.3–4.2)

## 2025-02-25 ENCOUNTER — TELEPHONE (OUTPATIENT)
Dept: CARDIOLOGY | Facility: CLINIC | Age: 85
End: 2025-02-25
Payer: COMMERCIAL

## 2025-02-25 DIAGNOSIS — I25.119 CORONARY ARTERY DISEASE INVOLVING NATIVE CORONARY ARTERY OF NATIVE HEART WITH ANGINA PECTORIS: ICD-10-CM

## 2025-02-25 DIAGNOSIS — G62.9 NEUROPATHY: ICD-10-CM

## 2025-02-25 DIAGNOSIS — I50.9 CHF (CONGESTIVE HEART FAILURE) (H): ICD-10-CM

## 2025-02-25 RX ORDER — CARVEDILOL 25 MG/1
25 TABLET ORAL 2 TIMES DAILY WITH MEALS
Qty: 180 TABLET | Refills: 1 | Status: SHIPPED | OUTPATIENT
Start: 2025-02-25

## 2025-02-25 RX ORDER — HYDRALAZINE HYDROCHLORIDE 100 MG/1
100 TABLET, FILM COATED ORAL 3 TIMES DAILY
Qty: 270 TABLET | Refills: 1 | Status: SHIPPED | OUTPATIENT
Start: 2025-02-25

## 2025-02-25 NOTE — TELEPHONE ENCOUNTER
Patient has a prescription request for Carvedilol 25 MG twice daily with meals, and Hydralazine 100 MG, three times daily.  Patient is following with Dr Lopez.  Routing to his team for assistance.      Asking pharmacy is [address]    Thank you  for your help!    Armen Painter RN

## 2025-02-25 NOTE — TELEPHONE ENCOUNTER
Pt last seen by BEW 6/18/24 and due for yearly follow-up 6/2025. Message sent to scheduling to arrange. Refills granted. LMS   The patient was signed out to me by Dr. Mcmillan at the end of her shift for follow-up on plan of care.  The sign-out included relevant details of the history, physical, and work-up to date. The chart has been reviewed, new test results have been noted, and the patient has been re-evaluated.      Labs  Results for orders placed or performed during the hospital encounter of 05/01/21   Comprehensive Metabolic Panel   Result Value    Fasting Status     Sodium 137    Potassium 3.9    Chloride 103    Carbon Dioxide 30    Anion Gap 8 (L)    Glucose 309 (H)    BUN 11    Creatinine 0.58    Glomerular Filtration Rate >90     Comment: eGFR results = or >90 mL/min/1.73m2 = Normal kidney function.    BUN/ Creatinine Ratio 19    Calcium 9.2    Bilirubin, Total 0.2    GOT/AST 14    GPT/ALT 19    Alkaline Phosphatase 94    Albumin 3.1 (L)    Protein, Total 7.2    Globulin 4.1 (H)    A/G Ratio 0.8 (L)   Magnesium   Result Value    Magnesium 1.7   Acetaminophen Level   Result Value    Acetaminophen <2 (L)   Salicylate Level   Result Value    Salicylate <2.8   Drug Abuse Screen, Urine   Result Value    Amphetamines, Urine Negative     Comment: Cutoff = 500 ng/mL    Barbiturates, Urine Negative     Comment: Cutoff = 200 ng/mL    Benzodiazepines, Urine Negative     Comment: Cutoff = 200 ng/mL    Cocaine/ Metabolite, Urine Negative     Comment: Cutoff = 150 ng/ml    Opiates, Urine Negative     Comment: Cutoff = 300 ng/mL    Phencyclidine, Urine Negative     Comment: Cutoff = 25 ng/mL    Cannabinoids, Urine Negative     Comment: Cutoff = 50 ng/mL   Alcohol   Result Value    Alcohol None Detected   Lipase   Result Value    Lipase 101   CBC with Automated Differential (performable only)   Result Value    WBC 5.4    RBC 4.43    HGB 13.5    HCT 40.0    MCV 90.3    MCH 30.5    MCHC 33.8    RDW-CV 13.5    RDW-SD 44.1        NRBC 0    Neutrophil, Percent 57    Lymphocytes, Percent 31    Mono, Percent 9    Eosinophils, Percent 1     Basophils, Percent 1    Immature Granulocytes 1    Absolute Neutrophils 3.1    Absolute Lymphocytes 1.7    Absolute Monocytes 0.5    Absolute Eosinophils  0.1    Absolute Basophils 0.1    Absolute Immmature Granulocytes 0.1   Electrocardiogram 12-Lead   Result Value    Systolic Blood Pressure 152    Diastolic Blood Pressure 75    Ventricular Rate EKG/Min (BPM) 90    Atrial Rate (BPM) 90    AL-Interval (MSEC) 176    QRS-Interval (MSEC) 76    QT-Interval (MSEC) 386    QTc 472    P Axis (Degrees) 51    R Axis (Degrees) -22    T Axis (Degrees) 40    REPORT TEXT      Normal sinus rhythm  Low voltage QRS  Inferior infarct  (cited on or before  14-APR-2021)  Cannot rule out  Anterior infarct  (cited on or before  14-APR-2021)  Abnormal ECG  When compared with ECG of  14-APR-2021 20:42,  Questionable change in  QRS axis  Confirmed by MD DAMICO SARA ([id number]),  Vito Argueta ([id number]) on 5/1/2021 12:23:44 PM     Rapid SARS-CoV-2 by PCR    Specimen: Nasopharyngeal; Swab   Result Value    Rapid SARS-COV-2 by PCR Not Detected    Isolation Guidelines      Comment: Do not use this test result as the sole decision-maker for discontinuation of isolation.   Clinical evaluation should be considered for other respiratory illness requiring transmission-based isolation.    •       No fever (<99.0 F/37.2 C) for at least 24 hours without the use of fever-reducing medications    AND  •       Respiratory symptoms have improved or resolved (e.g. cough, shortness of breath)     AND  •       COVID-19 negative test    See COVID-19 Deisolation Resource Guide    Procedural Comment      Comment: SARS-CoV-2 nucleic acid has not been detected indicating the absence of COVID-19.       Testing was performed using the [employer] Xpert Xpress SARS-CoV-2 RT-PCR assay that has been given Emergency Use Authorization (EUA) by the [place] Food and Drug Administration (FDA).  These results are considered definitive and do not need to be confirmed by  another method.       Radiology  CT HEAD WO CONTRAST   Final Result   IMPRESSION: Old right cerebellar infarct. Mild volume loss is prominent for   age. No acute intracranial injury.             Medications  Medications - No data to display    Vitals  Vitals:    05/01/21 1400 05/01/21 1430 05/01/21 1500 05/01/21 1531   BP: (!) 147/68 (!) 147/83 (!) 152/81 (!) 140/103   BP Location:       Patient Position:       Pulse: 83 82 88 99   Resp: 20 16 17 18   Temp:       TempSrc:       SpO2: 98% 98% 98% 99%   Weight:       Height:           ED Course    3:53 I spoke with Rhonda from , she states she discussed the case with Dr. Campoverde who knows the patient well regarding her presentation and the ED work up. Patient has chronic suicidality. Her behavorial health assessment results show that she is not in acute harm to herself and is safe to discharge. She has a  she will be seeing on Tuesday. The pt also has a psychiatrist and therapist here at [facility].    3:55 PM I rechecked the patient who was resting comfortably in bed. I discussed my conversation with  and  Dr. Campoverde's recommendations.  I discussed the plan of care and advised the patient to return to the ED if any new or worsening symptoms arise. Patient agrees to the plan and feels safe to go home.     [facility]  Critical Care time spent on this patient outside of billable procedures:  None    Clinical Impression  ED Diagnoses        Final diagnoses    Depression, unspecified depression type          Suicidal ideation                Follow Up:  Caden Matias MD  [id number] [facility] [id number] [phone]    In 2 days  As needed          Summary of your Discharge Medications      You have not been prescribed any medications.         Pt is discharged to home/self care in stable condition.       I have reviewed the information recorded by the scribe for accuracy and agree with its  contents.    ____________________________________________________________________    Joel Blancas acting as a scribe for Dr. Louise Cunha   Dictation # [id number]  Scribe: Joel Cunha, DO  05/01/21 1740

## 2025-04-21 ENCOUNTER — ANCILLARY PROCEDURE (OUTPATIENT)
Dept: CARDIOLOGY | Facility: CLINIC | Age: 85
End: 2025-04-21
Attending: INTERNAL MEDICINE
Payer: COMMERCIAL

## 2025-04-21 DIAGNOSIS — I44.2 COMPLETE ATRIOVENTRICULAR BLOCK (H): ICD-10-CM

## 2025-04-21 DIAGNOSIS — I49.5 SICK SINUS SYNDROME (H): ICD-10-CM

## 2025-04-21 DIAGNOSIS — Z95.0 PACEMAKER: ICD-10-CM

## 2025-04-22 LAB
MDC_IDC_EPISODE_DTM: NORMAL
MDC_IDC_EPISODE_DURATION: 10 S
MDC_IDC_EPISODE_DURATION: 14 S
MDC_IDC_EPISODE_DURATION: 18 S
MDC_IDC_EPISODE_DURATION: 214 S
MDC_IDC_EPISODE_DURATION: 26 S
MDC_IDC_EPISODE_DURATION: 26 S
MDC_IDC_EPISODE_DURATION: 42 S
MDC_IDC_EPISODE_DURATION: 44 S
MDC_IDC_EPISODE_DURATION: 52 S
MDC_IDC_EPISODE_DURATION: 8 S
MDC_IDC_EPISODE_DURATION: 898 S
MDC_IDC_EPISODE_DURATION: 98 S
MDC_IDC_EPISODE_ID: NORMAL
MDC_IDC_EPISODE_TYPE: NORMAL
MDC_IDC_LEAD_CONNECTION_STATUS: NORMAL
MDC_IDC_LEAD_CONNECTION_STATUS: NORMAL
MDC_IDC_LEAD_IMPLANT_DT: NORMAL
MDC_IDC_LEAD_IMPLANT_DT: NORMAL
MDC_IDC_LEAD_LOCATION: NORMAL
MDC_IDC_LEAD_LOCATION: NORMAL
MDC_IDC_LEAD_LOCATION_DETAIL_1: NORMAL
MDC_IDC_LEAD_LOCATION_DETAIL_1: NORMAL
MDC_IDC_LEAD_MFG: NORMAL
MDC_IDC_LEAD_MFG: NORMAL
MDC_IDC_LEAD_MODEL: NORMAL
MDC_IDC_LEAD_MODEL: NORMAL
MDC_IDC_LEAD_POLARITY_TYPE: NORMAL
MDC_IDC_LEAD_POLARITY_TYPE: NORMAL
MDC_IDC_LEAD_SERIAL: NORMAL
MDC_IDC_LEAD_SERIAL: NORMAL
MDC_IDC_MSMT_BATTERY_DTM: NORMAL
MDC_IDC_MSMT_BATTERY_REMAINING_LONGEVITY: 85 MO
MDC_IDC_MSMT_BATTERY_REMAINING_PERCENTAGE: 65 %
MDC_IDC_MSMT_BATTERY_RRT_TRIGGER: NORMAL
MDC_IDC_MSMT_BATTERY_STATUS: NORMAL
MDC_IDC_MSMT_BATTERY_VOLTAGE: 3.01 V
MDC_IDC_MSMT_LEADCHNL_RA_IMPEDANCE_VALUE: 410 OHM
MDC_IDC_MSMT_LEADCHNL_RA_LEAD_CHANNEL_STATUS: NORMAL
MDC_IDC_MSMT_LEADCHNL_RA_PACING_THRESHOLD_AMPLITUDE: 0.5 V
MDC_IDC_MSMT_LEADCHNL_RA_PACING_THRESHOLD_PULSEWIDTH: 0.5 MS
MDC_IDC_MSMT_LEADCHNL_RA_SENSING_INTR_AMPL: 3.1 MV
MDC_IDC_MSMT_LEADCHNL_RV_IMPEDANCE_VALUE: 480 OHM
MDC_IDC_MSMT_LEADCHNL_RV_LEAD_CHANNEL_STATUS: NORMAL
MDC_IDC_MSMT_LEADCHNL_RV_PACING_THRESHOLD_AMPLITUDE: 0.88 V
MDC_IDC_MSMT_LEADCHNL_RV_PACING_THRESHOLD_PULSEWIDTH: 0.5 MS
MDC_IDC_PG_IMPLANT_DTM: NORMAL
MDC_IDC_PG_MFG: NORMAL
MDC_IDC_PG_MODEL: NORMAL
MDC_IDC_PG_SERIAL: NORMAL
MDC_IDC_PG_TYPE: NORMAL
MDC_IDC_SESS_CLINIC_NAME: NORMAL
MDC_IDC_SESS_DTM: NORMAL
MDC_IDC_SESS_REPROGRAMMED: NO
MDC_IDC_SESS_TYPE: NORMAL
MDC_IDC_SET_BRADY_AT_MODE_SWITCH_MODE: NORMAL
MDC_IDC_SET_BRADY_AT_MODE_SWITCH_RATE: 180 {BEATS}/MIN
MDC_IDC_SET_BRADY_LOWRATE: 50 {BEATS}/MIN
MDC_IDC_SET_BRADY_MAX_SENSOR_RATE: 100 {BEATS}/MIN
MDC_IDC_SET_BRADY_MAX_TRACKING_RATE: 100 {BEATS}/MIN
MDC_IDC_SET_BRADY_MODE: NORMAL
MDC_IDC_SET_BRADY_PAV_DELAY_HIGH: 100 MS
MDC_IDC_SET_BRADY_PAV_DELAY_LOW: 300 MS
MDC_IDC_SET_BRADY_SAV_DELAY_HIGH: 100 MS
MDC_IDC_SET_BRADY_SAV_DELAY_LOW: 130 MS
MDC_IDC_SET_LEADCHNL_RA_PACING_AMPLITUDE: 1.5 V
MDC_IDC_SET_LEADCHNL_RA_PACING_ANODE_ELECTRODE_1: NORMAL
MDC_IDC_SET_LEADCHNL_RA_PACING_ANODE_LOCATION_1: NORMAL
MDC_IDC_SET_LEADCHNL_RA_PACING_CAPTURE_MODE: NORMAL
MDC_IDC_SET_LEADCHNL_RA_PACING_CATHODE_ELECTRODE_1: NORMAL
MDC_IDC_SET_LEADCHNL_RA_PACING_CATHODE_LOCATION_1: NORMAL
MDC_IDC_SET_LEADCHNL_RA_PACING_POLARITY: NORMAL
MDC_IDC_SET_LEADCHNL_RA_PACING_PULSEWIDTH: 0.5 MS
MDC_IDC_SET_LEADCHNL_RA_SENSING_ADAPTATION_MODE: NORMAL
MDC_IDC_SET_LEADCHNL_RA_SENSING_ANODE_ELECTRODE_1: NORMAL
MDC_IDC_SET_LEADCHNL_RA_SENSING_ANODE_LOCATION_1: NORMAL
MDC_IDC_SET_LEADCHNL_RA_SENSING_CATHODE_ELECTRODE_1: NORMAL
MDC_IDC_SET_LEADCHNL_RA_SENSING_CATHODE_LOCATION_1: NORMAL
MDC_IDC_SET_LEADCHNL_RA_SENSING_POLARITY: NORMAL
MDC_IDC_SET_LEADCHNL_RA_SENSING_SENSITIVITY: 0.2 MV
MDC_IDC_SET_LEADCHNL_RV_PACING_AMPLITUDE: 1.12
MDC_IDC_SET_LEADCHNL_RV_PACING_ANODE_ELECTRODE_1: NORMAL
MDC_IDC_SET_LEADCHNL_RV_PACING_ANODE_LOCATION_1: NORMAL
MDC_IDC_SET_LEADCHNL_RV_PACING_CAPTURE_MODE: NORMAL
MDC_IDC_SET_LEADCHNL_RV_PACING_CATHODE_ELECTRODE_1: NORMAL
MDC_IDC_SET_LEADCHNL_RV_PACING_CATHODE_LOCATION_1: NORMAL
MDC_IDC_SET_LEADCHNL_RV_PACING_POLARITY: NORMAL
MDC_IDC_SET_LEADCHNL_RV_PACING_PULSEWIDTH: 0.5 MS
MDC_IDC_SET_LEADCHNL_RV_SENSING_ADAPTATION_MODE: NORMAL
MDC_IDC_SET_LEADCHNL_RV_SENSING_ANODE_ELECTRODE_1: NORMAL
MDC_IDC_SET_LEADCHNL_RV_SENSING_ANODE_LOCATION_1: NORMAL
MDC_IDC_SET_LEADCHNL_RV_SENSING_CATHODE_ELECTRODE_1: NORMAL
MDC_IDC_SET_LEADCHNL_RV_SENSING_CATHODE_LOCATION_1: NORMAL
MDC_IDC_SET_LEADCHNL_RV_SENSING_POLARITY: NORMAL
MDC_IDC_SET_LEADCHNL_RV_SENSING_SENSITIVITY: 2 MV
MDC_IDC_STAT_AT_BURDEN_PERCENT: 1 %
MDC_IDC_STAT_AT_DTM_END: NORMAL
MDC_IDC_STAT_AT_DTM_START: NORMAL
MDC_IDC_STAT_AT_MODE_SW_COUNT: 254
MDC_IDC_STAT_AT_MODE_SW_COUNT_PER_DAY: 1
MDC_IDC_STAT_AT_MODE_SW_MAX_DURATION: 914 S
MDC_IDC_STAT_AT_MODE_SW_PERCENT_TIME: 1 %
MDC_IDC_STAT_BRADY_AP_VP_PERCENT: 3.2 %
MDC_IDC_STAT_BRADY_AP_VS_PERCENT: 1 %
MDC_IDC_STAT_BRADY_AS_VP_PERCENT: 95 %
MDC_IDC_STAT_BRADY_AS_VS_PERCENT: 1 %
MDC_IDC_STAT_BRADY_DTM_END: NORMAL
MDC_IDC_STAT_BRADY_DTM_START: NORMAL
MDC_IDC_STAT_BRADY_RA_PERCENT_PACED: 1.3 %
MDC_IDC_STAT_BRADY_RV_PERCENT_PACED: 98 %
MDC_IDC_STAT_CRT_DTM_END: NORMAL
MDC_IDC_STAT_CRT_DTM_START: NORMAL
MDC_IDC_STAT_EPISODE_RECENT_COUNT: 0
MDC_IDC_STAT_EPISODE_RECENT_COUNT: 103
MDC_IDC_STAT_EPISODE_RECENT_COUNT_DTM_END: NORMAL
MDC_IDC_STAT_EPISODE_RECENT_COUNT_DTM_END: NORMAL
MDC_IDC_STAT_EPISODE_TYPE: NORMAL
MDC_IDC_STAT_EPISODE_TYPE: NORMAL
MDC_IDC_STAT_EPISODE_VENDOR_TYPE: NORMAL
MDC_IDC_STAT_EPISODE_VENDOR_TYPE: NORMAL
MDC_IDC_STAT_HEART_RATE_ATRIAL_MAX: 330 {BEATS}/MIN
MDC_IDC_STAT_HEART_RATE_ATRIAL_MEAN: 64 {BEATS}/MIN
MDC_IDC_STAT_HEART_RATE_ATRIAL_MIN: 40 {BEATS}/MIN
MDC_IDC_STAT_HEART_RATE_DTM_END: NORMAL
MDC_IDC_STAT_HEART_RATE_DTM_START: NORMAL
MDC_IDC_STAT_HEART_RATE_VENTRICULAR_MAX: 230 {BEATS}/MIN
MDC_IDC_STAT_HEART_RATE_VENTRICULAR_MEAN: 64 {BEATS}/MIN
MDC_IDC_STAT_HEART_RATE_VENTRICULAR_MIN: 30 {BEATS}/MIN

## 2025-04-22 PROCEDURE — 93296 REM INTERROG EVL PM/IDS: CPT | Performed by: INTERNAL MEDICINE

## 2025-04-22 PROCEDURE — 93294 REM INTERROG EVL PM/LDLS PM: CPT | Performed by: INTERNAL MEDICINE

## 2025-05-13 ENCOUNTER — OFFICE VISIT (OUTPATIENT)
Dept: CARDIOLOGY | Facility: CLINIC | Age: 85
End: 2025-05-13
Payer: COMMERCIAL

## 2025-05-13 VITALS
SYSTOLIC BLOOD PRESSURE: 102 MMHG | BODY MASS INDEX: 30.93 KG/M2 | HEART RATE: 60 BPM | RESPIRATION RATE: 16 BRPM | DIASTOLIC BLOOD PRESSURE: 54 MMHG | WEIGHT: 163.8 LBS | HEIGHT: 61 IN

## 2025-05-13 DIAGNOSIS — I50.32 CHRONIC HEART FAILURE WITH PRESERVED EJECTION FRACTION (H): Primary | ICD-10-CM

## 2025-05-13 NOTE — PROGRESS NOTES
HEART CARE NOTE          Assessment/Recommendations     1. HFpEF  Assessment / Plan  Near euvolemia on physical exam; denies HF symptoms of orthopnea, PND, fluid retention or edema - no changes to regimen at this time  Repeat echo pending     2. CAD  Assessment / Plan  S/p coronary angiogram significant for moderate LAD dz. Plan for medical management at that time  Denies chest pain or anginal equivalents  Continue risk factor modifications     3. 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 Afib  Assessment / Plan  Follows with Dr. Fritz in EP - please see detailed note in chart; currently on apixaban      5. Uterine mass  Assessment / Plan  S/p hysterectomy     6. Pulmonary sarcoidosis   Assessment / Plan  Followed by pulmonary    30 minutes spent reviewing prior records (including documentation, laboratory studies, cardiac testing/imaging), history and physical exam, planning, and subsequent documentation.    The longitudinal plan of care for HFpEF was addressed during this visit. Due to the added complexity in care, I will continue to support Ms. Sharyn Trent in the subsequent management of this condition(s) and with the ongoing continuity of care of this condition(s).    History of Present Illness/Subjective    Ms. Sharyn Trent is a 80 y.o. female with a PMHx significant for HFpEF, hypertensive emergency, dyslipidemia, type 2 diabetes, non-obstructive CAD, heart block status post pacemaker, paroxysmal supraventricular tachycardia, melanoma, chronic left leg edema, chronic kidney disease stage III who presents to [facility] for follow-up care.      Today, Mrs. Tretn denies HF symptoms or acute cardiac concerns; Management plan as detailed above     ECG: Personally reviewed. Paced rhythm      Coronary angiogram:  RHC via right IJ  RA mean 4mmHg  PA mean 24mmHg  PCWP 21mmHg  LVEDP 19mmHg  Ao 153/62     PA sat 67%  Ao sat 94%     CO cindy 3.35    "  Angiography via left radial  LM short normal  LAD mid 50% narrowing with FFR 0.85  Circ normal  RCA normal     ECHO (personnaly Reviewed): repeat echo pending  Left ventricle ejection fraction is normal. The estimated left ventricular ejection fraction is 55%.  Left ventricular diastolic function is abnormal.  Normal right ventricular size and systolic function.  No hemodynamically significant valvular heart abnormalities.  When compared to the previous study dated 3/20/2018, No significant change    Lab results: personally reviewed May 13, 2025; notable for overall stable renal function    Medical history and pertinent documents reviewed in Care Everywhere please where applicable see details above        Physical Examination Review of Systems   /54 (BP Location: Right arm, Patient Position: Sitting, Cuff Size: Adult Regular)   Pulse 60   Resp 16   Ht 1.549 m (5' 1\")   Wt 74.3 kg (163 lb 12.8 oz)   BMI 30.95 kg/m    Body mass index is 30.95 kg/m .  Wt Readings from Last 3 Encounters:   05/13/25 74.3 kg (163 lb 12.8 oz)   06/21/24 73 kg (161 lb)   06/18/24 73 kg (161 lb)     General Appearance:   no distress, normal body habitus   ENT/Mouth: membranes moist, no oral lesions or bleeding gums.      EYES:  no scleral icterus, normal conjunctivae   Neck: no carotid bruits or thyromegaly   Chest/Lungs:   lungs are clear to auscultation, no rales or wheezing, equal chest wall expansion    Cardiovascular:   Regular. Normal first and second heart sounds with no murmurs, rubs, or gallops; the carotid, radial and posterior tibial pulses are intact, no JVD or LE edema bilaterally    Abdomen:  no organomegaly, masses, bruits, or tenderness; bowel sounds are present   Extremities: no cyanosis or clubbing   Skin: no xanthelasma, warm.    Neurologic: normal gait, normal  bilateral, no tremors     Psychiatric: alert and oriented x3, calm     A complete 10 systems ROS was reviewed  And is negative except what is " listed in the HPI.          Medical History  Surgical History Family History Social History   Past Medical History:   Diagnosis Date    Abnormal ECG     Anxiety     Arthritis     Chest pain     Chest pain     Chronic kidney disease     stage 3-mod.    Congestive heart failure (H)     Diabetes (H)     Dyslipidemia, goal LDL below 70     GERD (gastroesophageal reflux disease)     HTN (hypertension)     Hyperlipidemia     Macular degeneration (senile) of retina     Melanoma of ankle (H)     L ankle    Other second degree atrioventricular block     Created by Conversion     Pacemaker     PSVT (paroxysmal supraventricular tachycardia)     Right bundle branch block     Skin cancer     Past Surgical History:   Procedure Laterality Date    ABLATION OF DYSRHYTHMIC FOCUS  04/11/2013    AV analia reentry tachycardia, partially successful    BIOPSY SKIN (LOCATION)      CARDIAC CATHETERIZATION  03/10/2016    CV CORONARY ANGIOGRAM N/A 05/26/2021    Procedure: Coronary Angiogram;  Surgeon: Yony Gillis MD;  Location: [facility] Cardiac Cath Lab;  Service: Cardiology    CV LEFT HEART CATHETERIZATION WITHOUT LEFT VENTRICULOGRAM Left 05/26/2021    Procedure: Left Heart Catheterization Without Left Ventriculogram;  Surgeon: Yony Gillis MD;  Location: [facility] Cardiac Cath Lab;  Service: Cardiology    CV RIGHT HEART CATHETERIZATION N/A 05/26/2021    Procedure: Right Heart Catheterization;  Surgeon: Yony Gillis MD;  Location: [facility] Cardiac Cath Lab;  Service: Cardiology    DILATION AND CURETTAGE N/A 4/19/2022    Procedure: DILATION AND CURRETAGE;  Surgeon: Mary Reed MD;  Location: [facility] OR    EP PACEMAKER N/A 08/30/2021    Procedure: Electrophysiology Pacemaker;  Surgeon: Ab Saavedra MD;  Location: [facility] CATH LAB CV    FOOT SURGERY      L foot    HAND SURGERY      on both thumbs    HYSTERECTOMY, TOTAL, ROBOT-ASSISTED, LAP, DA HAROON XI, W/BI SAL-OOPH & SNT LYMPH NODE  BX, MINI LAP Bilateral 5/31/2022    Procedure: ROBOTIC ASSISTED TOTAL LAPAROSCOPIC HYSTERECTOMY, BILATERAL SALPINGO-OOPHORECTOMY, SENTINEL LYMPH NODE INJECTION AND BIOPSY, MINI-LAPAROTOMY,;  Surgeon: Mary Reed MD;  Location: [facility] OR    HYSTEROSCOPY DIAGNOSTIC N/A 4/19/2022    Procedure: HYSTEROSCOPY, DIAGNOSTIC;  Surgeon: Mary Reed MD;  Location: [facility] [place]    IMPLANT PACEMAKER  04/01/2011    Second-degree AV block    OTHER SURGICAL HISTORY      SVT Ablation    PELVIC EXAM UNDER ANESTHESIA, CERVICAL DILATION, N/A     PELVIC EXAMINATION UNDER ANESTHESIA N/A 4/19/2022    Procedure: PELVIC EXAM UNDER ANESTHESIA, CERVICAL DILATION,;  Surgeon: Mary Reed MD;  Location: [facility] [place]    AL SHLDR ARTHROSCOP,SURG,W/ROTAT CUFF REPR Left 03/09/2017    Procedure: LEFT SHOULDER ARTHROSCOPY DECOMPRESSION DISTAL CLAVICLE EXCISION  ROTATOR CUFF REPAIR BICEPS TENOTOMY AND EXTENSIVE DEBRIDEMENT;  Surgeon: Todd Riggs MD;  Location: [facility];  Service: Orthopedics    RELEASE CARPAL TUNNEL      both wrists    SKIN CANCER EXCISION      L ankle,Lleg and cheek    TOE SURGERY      L big toe joint replacement    TUBAL LIGATION      no family history of premature coronary artery disease Social History     Socioeconomic History    Marital status:      Spouse name: Not on file    Number of children: Not on file    Years of education: Not on file    Highest education level: Not on file   Occupational History    Not on file   Tobacco Use    Smoking status: Never     Passive exposure: Never    Smokeless tobacco: Never   Vaping Use    Vaping status: Never Used   Substance and Sexual Activity    Alcohol use: No    Drug use: No    Sexual activity: Yes     Partners: Male     Birth control/protection: Surgical   Other Topics Concern    Not on file   Social History Narrative    Not on file     Social Drivers of Health     Financial Resource Strain: Not on file    Food Insecurity: Not on file   Transportation Needs: Not on file   Physical Activity: Not on file   Stress: Not on file   Social Connections: Not on file   Interpersonal Safety: Not on file   Housing Stability: Not on file           Lab Results    Chemistry/lipid CBC Cardiac Enzymes/BNP/TSH/INR   Lab Results   Component Value Date    CHOL 119 08/15/2024    HDL 46 (L) 08/15/2024    TRIG 71 08/15/2024    BUN 36.6 (H) 02/20/2025     02/20/2025    CO2 22 02/20/2025    Lab Results   Component Value Date    WBC 6.6 02/14/2024    HGB 11.8 02/14/2024    HCT 36.0 02/14/2024    MCV 95 02/14/2024     02/14/2024    Lab Results   Component Value Date    TROPONINI 0.04 04/05/2022     (H) 04/05/2022    TSH 2.82 02/20/2025    INR 1.05 09/13/2021     Lab Results   Component Value Date    TROPONINI 0.04 04/05/2022          Weight:    Wt Readings from Last 3 Encounters:   05/13/25 74.3 kg (163 lb 12.8 oz)   06/21/24 73 kg (161 lb)   06/18/24 73 kg (161 lb)       Allergies  Allergies   Allergen Reactions    Herlinda-Kit Bee Sting Shortness Of Breath    Venom-Honey Bee [Bee Venom] Shortness Of Breath    Mercurial Analogues [Mercurial Derivatives] Dermatitis     blisters    Nitrofurantoin Other (See Comments)     Burning sensation across chest and arms    Other reaction(s): arms and chest burning    Sulfa (Sulfonamide Antibiotics) [Sulfa Antibiotics] Rash         Surgical History  Past Surgical History:   Procedure Laterality Date    ABLATION OF DYSRHYTHMIC FOCUS  04/11/2013    AV analia reentry tachycardia, partially successful    BIOPSY SKIN (LOCATION)      CARDIAC CATHETERIZATION  03/10/2016    CV CORONARY ANGIOGRAM N/A 05/26/2021    Procedure: Coronary Angiogram;  Surgeon: Yony Gillis MD;  Location: [facility] Cardiac Cath Lab;  Service: Cardiology    CV LEFT HEART CATHETERIZATION WITHOUT LEFT VENTRICULOGRAM Left 05/26/2021    Procedure: Left Heart Catheterization Without Left Ventriculogram;  Surgeon:  Yony Gillis MD;  Location: [facility] Cardiac Cath Lab;  Service: Cardiology    CV RIGHT HEART CATHETERIZATION N/A 05/26/2021    Procedure: Right Heart Catheterization;  Surgeon: Yony Gillis MD;  Location: [facility] Cardiac Cath Lab;  Service: Cardiology    DILATION AND CURETTAGE N/A 4/19/2022    Procedure: DILATION AND CURRETAGE;  Surgeon: Mary Reed MD;  [place] [facility] [place]    [place] PACEMAKER N/A 08/30/2021    Procedure: Electrophysiology Pacemaker;  Surgeon: Ab Saavedra MD;  Location: [facility] CV    FOOT SURGERY      L foot    HAND SURGERY      on both thumbs    HYSTERECTOMY, TOTAL, ROBOT-ASSISTED, LAP, DA HAROON XI, W/BI SAL-OOPH & SNT LYMPH NODE BX, MINI LAP Bilateral 5/31/2022    Procedure: ROBOTIC ASSISTED TOTAL LAPAROSCOPIC HYSTERECTOMY, BILATERAL SALPINGO-OOPHORECTOMY, SENTINEL LYMPH NODE INJECTION AND BIOPSY, MINI-LAPAROTOMY,;  Surgeon: Mary Reed MD;  [place] [facility] [place]    HYSTEROSCOPY DIAGNOSTIC N/A 4/19/2022    Procedure: HYSTEROSCOPY, DIAGNOSTIC;  Surgeon: Mary Reed MD;  [place] [facility] [place]    IMPLANT PACEMAKER  04/01/2011    Second-degree AV block    OTHER SURGICAL HISTORY      SVT Ablation    PELVIC EXAM UNDER ANESTHESIA, CERVICAL DILATION, N/A     PELVIC EXAMINATION UNDER ANESTHESIA N/A 4/19/2022    Procedure: PELVIC EXAM UNDER ANESTHESIA, CERVICAL DILATION,;  Surgeon: Mary Reed MD;  Location: [facility] [place]    NM SHLDR ARTHROSCOP,SURG,W/ROTAT CUFF REPR Left 03/09/2017    Procedure: LEFT SHOULDER ARTHROSCOPY DECOMPRESSION DISTAL CLAVICLE EXCISION  ROTATOR CUFF REPAIR BICEPS TENOTOMY AND EXTENSIVE DEBRIDEMENT;  Surgeon: Todd Riggs MD;  Location: [facility];  Service: Orthopedics    RELEASE CARPAL TUNNEL      both wrists    SKIN CANCER EXCISION      L ankle,Lleg and cheek    TOE SURGERY      L big toe joint replacement    TUBAL LIGATION         Social  History  Tobacco:   History   Smoking Status    Never   Smokeless Tobacco    Never    Alcohol:   Social History    Substance and Sexual Activity      Alcohol use: No   Illicit Drugs:   History   Drug Use No       Family History  Family History   Problem Relation Age of Onset    Hypertension Mother     Cerebrovascular Disease Mother     Prostate Cancer Father     Cancer Father     Coronary Artery Disease Father     Dyslipidemia Father     Dyslipidemia Sister     Dyslipidemia Brother     Hypertension Brother     Prostate Cancer Brother           Heidy Akins MD on 5/13/2025      cc: Jamie Mcconnell

## 2025-05-13 NOTE — LETTER
5/13/2025    Jamie Mcconnell MD  [address]  [facility] [address]    RE: Sharyn Trent       Dear Colleague,     I had the pleasure of seeing Sharyn Trent in the [facility].    HEART CARE NOTE          Assessment/Recommendations     1. HFpEF  Assessment / Plan  Near euvolemia on physical exam; denies HF symptoms of orthopnea, PND, fluid retention or edema - no changes to regimen at this time  Repeat echo pending     2. CAD  Assessment / Plan  S/p coronary angiogram significant for moderate LAD dz. Plan for medical management at that time  Denies chest pain or anginal equivalents  Continue risk factor modifications     3. 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 Afib  Assessment / Plan  Follows with Dr. Fritz in EP - please see detailed note in chart; currently on apixaban      5. Uterine mass  Assessment / Plan  S/p hysterectomy     6. Pulmonary sarcoidosis   Assessment / Plan  Followed by pulmonary    30 minutes spent reviewing prior records (including documentation, laboratory studies, cardiac testing/imaging), history and physical exam, planning, and subsequent documentation.    The longitudinal plan of care for HFpEF was addressed during this visit. Due to the added complexity in care, I will continue to support Ms. Sharyn Trent in the subsequent management of this condition(s) and with the ongoing continuity of care of this condition(s).    History of Present Illness/Subjective    Ms. Sharyn Trent is a 80 y.o. female with a PMHx significant for HFpEF, hypertensive emergency, dyslipidemia, type 2 diabetes, non-obstructive CAD, heart block status post pacemaker, paroxysmal supraventricular tachycardia, melanoma, chronic left leg edema, chronic kidney disease stage III who presents to [facility] for follow-up care.      Today, Mrs. Trent denies HF symptoms or acute cardiac concerns; Management plan as detailed above    "  ECG: Personally reviewed. Paced rhythm      Coronary angiogram:  RHC via right IJ  RA mean 4mmHg  PA mean 24mmHg  PCWP 21mmHg  LVEDP 19mmHg  Ao 153/62     PA sat 67%  Ao sat 94%     CO cindy 3.35     Angiography via left radial  LM short normal  LAD mid 50% narrowing with FFR 0.85  Circ normal  RCA normal     ECHO (personnaly Reviewed): repeat echo pending  Left ventricle ejection fraction is normal. The estimated left ventricular ejection fraction is 55%.  Left ventricular diastolic function is abnormal.  Normal right ventricular size and systolic function.  No hemodynamically significant valvular heart abnormalities.  When compared to the previous study dated 3/20/2018, No significant change    Lab results: personally reviewed May 13, 2025; notable for overall stable renal function    Medical history and pertinent documents reviewed in Care Everywhere please where applicable see details above        Physical Examination Review of Systems   /54 (BP Location: Right arm, Patient Position: Sitting, Cuff Size: Adult Regular)   Pulse 60   Resp 16   Ht 1.549 m (5' 1\")   Wt 74.3 kg (163 lb 12.8 oz)   BMI 30.95 kg/m    Body mass index is 30.95 kg/m .  Wt Readings from Last 3 Encounters:   05/13/25 74.3 kg (163 lb 12.8 oz)   06/21/24 73 kg (161 lb)   06/18/24 73 kg (161 lb)     General Appearance:   no distress, normal body habitus   ENT/Mouth: membranes moist, no oral lesions or bleeding gums.      EYES:  no scleral icterus, normal conjunctivae   Neck: no carotid bruits or thyromegaly   Chest/Lungs:   lungs are clear to auscultation, no rales or wheezing, equal chest wall expansion    Cardiovascular:   Regular. Normal first and second heart sounds with no murmurs, rubs, or gallops; the carotid, radial and posterior tibial pulses are intact, no JVD or LE edema bilaterally    Abdomen:  no organomegaly, masses, bruits, or tenderness; bowel sounds are present   Extremities: no cyanosis or clubbing   Skin: no " xanthelasma, warm.    Neurologic: normal gait, normal  bilateral, no tremors     Psychiatric: alert and oriented x3, calm     A complete 10 systems ROS was reviewed  And is negative except what is listed in the HPI.          Medical History  Surgical History Family History Social History   Past Medical History:   Diagnosis Date     Abnormal ECG      Anxiety      Arthritis      Chest pain      Chest pain      Chronic kidney disease     stage 3-mod.     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      Pacemaker      PSVT (paroxysmal supraventricular tachycardia)      Right bundle branch block      Skin cancer     Past Surgical History:   Procedure Laterality Date     ABLATION OF DYSRHYTHMIC FOCUS  04/11/2013    AV analia reentry tachycardia, partially successful     BIOPSY SKIN (LOCATION)       CARDIAC CATHETERIZATION  03/10/2016     CV CORONARY ANGIOGRAM N/A 05/26/2021    Procedure: Coronary Angiogram;  Surgeon: Yony Gillis MD;  Location: [facility] Cardiac [facility] Lab;  Service: Cardiology     CV LEFT HEART CATHETERIZATION WITHOUT LEFT VENTRICULOGRAM Left 05/26/2021    Procedure: Left Heart Catheterization Without Left Ventriculogram;  Surgeon: Yony Gillis MD;  Location: [facility] Cardiac Cath Lab;  Service: Cardiology     CV RIGHT HEART CATHETERIZATION N/A 05/26/2021    Procedure: Right Heart Catheterization;  Surgeon: Yony Gillis MD;  Location: [facility];  Service: Cardiology     DILATION AND CURETTAGE N/A 4/19/2022    Procedure: DILATION AND CURRETAGE;  Surgeon: Mary Reed MD;  Location: [facility] OR     EP PACEMAKER N/A 08/30/2021    Procedure: Electrophysiology  Pacemaker;  Surgeon: Ab Saavedra MD;  Location: [facility] LAB CV     FOOT SURGERY      L foot     HAND SURGERY      on both thumbs     HYSTERECTOMY, TOTAL, ROBOT-ASSISTED, LAP, DA HAROON XI, W/BI SAL-OOPH & SNT LYMPH NODE BX, MINI LAP Bilateral 5/31/2022    Procedure: ROBOTIC ASSISTED TOTAL LAPAROSCOPIC HYSTERECTOMY, BILATERAL SALPINGO-OOPHORECTOMY, SENTINEL LYMPH NODE INJECTION AND BIOPSY, MINI-LAPAROTOMY,;  Surgeon: Mary Reed MD;  Location: [facility] [place] [place] OR     HYSTEROSCOPY DIAGNOSTIC N/A 4/19/2022    Procedure: HYSTEROSCOPY, DIAGNOSTIC;  Surgeon: Mary Reed MD;  Location: [facility] [place] [place] OR     IMPLANT PACEMAKER  04/01/2011    Second-degree AV block     OTHER SURGICAL HISTORY      SVT Ablation     PELVIC EXAM UNDER ANESTHESIA, CERVICAL DILATION, N/A      PELVIC EXAMINATION UNDER ANESTHESIA N/A 4/19/2022    Procedure: PELVIC EXAM UNDER ANESTHESIA, CERVICAL DILATION,;  Surgeon: Mary Reed MD;  Location: [facility]     MT SHLDR ARTHROSCOP,SURG,W/ROTAT CUFF REPR Left 03/09/2017    Procedure: LEFT SHOULDER ARTHROSCOPY DECOMPRESSION DISTAL CLAVICLE EXCISION  ROTATOR CUFF REPAIR BICEPS TENOTOMY AND EXTENSIVE DEBRIDEMENT;  Surgeon: Todd Riggs MD;  Location: Health system;  Service: Orthopedics     RELEASE CARPAL TUNNEL      both wrists     SKIN CANCER EXCISION      L ankle,Lleg and cheek     TOE SURGERY      L big toe joint replacement     TUBAL LIGATION      no family history of premature coronary artery disease Social History     Socioeconomic History     Marital status:      Spouse name: Not on file     Number of children: Not on file     Years of education: Not on file     Highest education level: Not on file   Occupational History     Not on file   Tobacco Use     Smoking status: Never     Passive exposure: Never     Smokeless tobacco: Never   Vaping Use     Vaping status: Never Used   Substance and Sexual Activity     Alcohol use: No      Drug use: No     Sexual activity: Yes     Partners: Male     Birth control/protection: Surgical   Other Topics Concern     Not on file   Social History Narrative     Not on file     Social Drivers of Health     Financial Resource Strain: Not on file   Food Insecurity: Not on file   Transportation Needs: Not on file   Physical Activity: Not on file   Stress: Not on file   Social Connections: Not on file   Interpersonal Safety: Not on file   Housing Stability: Not on file           Lab Results    Chemistry/lipid CBC Cardiac Enzymes/BNP/TSH/INR   Lab Results   Component Value Date    CHOL 119 08/15/2024    HDL 46 (L) 08/15/2024    TRIG 71 08/15/2024    BUN 36.6 (H) 02/20/2025     02/20/2025    CO2 22 02/20/2025    Lab Results   Component Value Date    WBC 6.6 02/14/2024    HGB 11.8 02/14/2024    HCT 36.0 02/14/2024    MCV 95 02/14/2024     02/14/2024    Lab Results   Component Value Date    TROPONINI 0.04 04/05/2022     (H) 04/05/2022    TSH 2.82 02/20/2025    INR 1.05 09/13/2021     Lab Results   Component Value Date    TROPONINI 0.04 04/05/2022          Weight:    Wt Readings from Last 3 Encounters:   05/13/25 74.3 kg (163 lb 12.8 oz)   06/21/24 73 kg (161 lb)   06/18/24 73 kg (161 lb)       Allergies  Allergies   Allergen Reactions     Herlinda-Kit Bee Sting Shortness Of Breath     Venom-Honey Bee [Bee Venom] Shortness Of Breath     Mercurial Analogues [Mercurial Derivatives] Dermatitis     blisters     Nitrofurantoin Other (See Comments)     Burning sensation across chest and arms    Other reaction(s): arms and chest burning     Sulfa (Sulfonamide Antibiotics) [Sulfa Antibiotics] Rash         Surgical History  Past Surgical History:   Procedure Laterality Date     ABLATION OF DYSRHYTHMIC FOCUS  04/11/2013    AV analia reentry tachycardia, partially successful     BIOPSY SKIN (LOCATION)       CARDIAC CATHETERIZATION  03/10/2016     CV CORONARY ANGIOGRAM N/A 05/26/2021    Procedure: Coronary Angiogram;   Surgeon: Yony Gillis MD;  Location: [facility] Cardiac Cath Lab;  Service: Cardiology     CV LEFT HEART CATHETERIZATION WITHOUT LEFT VENTRICULOGRAM Left 05/26/2021    Procedure: Left Heart Catheterization Without Left Ventriculogram;  Surgeon: Yony Gillis MD;  Location: [facility] Cardiac [facility] Lab;  Service: Cardiology     CV RIGHT HEART CATHETERIZATION N/A 05/26/2021    Procedure: Right Heart Catheterization;  Surgeon: Yony Gillis MD;  Location: [facility] Cardiac [facility] Lab;  Service: Cardiology     DILATION AND CURETTAGE N/A 4/19/2022    Procedure: DILATION AND CURRETAGE;  Surgeon: Mary Reed MD;  Location: [facility] OR     EP PACEMAKER N/A 08/30/2021    Procedure: Electrophysiology Pacemaker;  Surgeon: Ab Saavedra MD;  Location: [facility] CV     FOOT SURGERY      L foot     HAND SURGERY      on both thumbs     HYSTERECTOMY, TOTAL, ROBOT-ASSISTED, LAP, DA HAROON XI, W/BI SAL-OOPH & SNT LYMPH NODE BX, MINI LAP Bilateral 5/31/2022    Procedure: ROBOTIC ASSISTED TOTAL LAPAROSCOPIC HYSTERECTOMY, BILATERAL SALPINGO-OOPHORECTOMY, SENTINEL LYMPH NODE INJECTION AND BIOPSY, MINI-LAPAROTOMY,;  Surgeon: Mary Reed MD;  Location: [facility] OR     HYSTEROSCOPY DIAGNOSTIC N/A 4/19/2022    Procedure: HYSTEROSCOPY, DIAGNOSTIC;  Surgeon: Mary Reed MD;  Location: [facility] OR     IMPLANT PACEMAKER  04/01/2011    Second-degree AV block     OTHER SURGICAL HISTORY      SVT Ablation     PELVIC EXAM UNDER ANESTHESIA, CERVICAL DILATION, N/A      PELVIC EXAMINATION UNDER ANESTHESIA N/A 4/19/2022    Procedure: PELVIC EXAM UNDER ANESTHESIA, CERVICAL DILATION,;  Surgeon: Mary Reed MD;  Location: [facility] OR     PA SHLDR ARTHROSCOP,SURG,W/ROTAT CUFF REPR Left 03/09/2017    Procedure: LEFT SHOULDER ARTHROSCOPY DECOMPRESSION DISTAL CLAVICLE EXCISION  ROTATOR CUFF REPAIR BICEPS TENOTOMY AND EXTENSIVE DEBRIDEMENT;   Surgeon: Todd Riggs MD;  Location: [facility] OR;  Service: Orthopedics     RELEASE CARPAL TUNNEL      both wrists     SKIN CANCER EXCISION      L ankle,Lleg and cheek     TOE SURGERY      L big toe joint replacement     TUBAL LIGATION         Social History  Tobacco:   History   Smoking Status     Never   Smokeless Tobacco     Never    Alcohol:   Social History    Substance and Sexual Activity      Alcohol use: No   Illicit Drugs:   History   Drug Use No       Family History  Family History   Problem Relation Age of Onset     Hypertension Mother      Cerebrovascular Disease Mother      Prostate Cancer Father      Cancer Father      Coronary Artery Disease Father      Dyslipidemia Father      Dyslipidemia Sister      Dyslipidemia Brother      Hypertension Brother      Prostate Cancer Brother           Heidy Akins MD on 5/13/2025      cc: Jamie Mcconnell      Thank you for allowing me to participate in the care of your patient.      Sincerely,     Heidy Akins MD     [facility]  cc:   Heidy Akins MD  [address] [address]

## 2025-06-04 ENCOUNTER — ANCILLARY PROCEDURE (OUTPATIENT)
Dept: CARDIOLOGY | Facility: CLINIC | Age: 85
End: 2025-06-04
Attending: INTERNAL MEDICINE
Payer: COMMERCIAL

## 2025-06-04 ENCOUNTER — OFFICE VISIT (OUTPATIENT)
Dept: CARDIOLOGY | Facility: CLINIC | Age: 85
End: 2025-06-04
Payer: COMMERCIAL

## 2025-06-04 VITALS
RESPIRATION RATE: 16 BRPM | DIASTOLIC BLOOD PRESSURE: 58 MMHG | WEIGHT: 161 LBS | SYSTOLIC BLOOD PRESSURE: 130 MMHG | BODY MASS INDEX: 30.42 KG/M2 | HEART RATE: 64 BPM

## 2025-06-04 DIAGNOSIS — Z95.0 PACEMAKER: ICD-10-CM

## 2025-06-04 DIAGNOSIS — I44.2 THIRD DEGREE AV BLOCK (H): ICD-10-CM

## 2025-06-04 DIAGNOSIS — I10 ESSENTIAL HYPERTENSION: ICD-10-CM

## 2025-06-04 DIAGNOSIS — Z95.0 CARDIAC PACEMAKER IN SITU: ICD-10-CM

## 2025-06-04 DIAGNOSIS — N18.30 STAGE 3 CHRONIC KIDNEY DISEASE, UNSPECIFIED WHETHER STAGE 3A OR 3B CKD (H): ICD-10-CM

## 2025-06-04 DIAGNOSIS — I44.2 COMPLETE ATRIOVENTRICULAR BLOCK (H): ICD-10-CM

## 2025-06-04 DIAGNOSIS — I48.0 PAROXYSMAL ATRIAL FIBRILLATION (H): Primary | ICD-10-CM

## 2025-06-04 DIAGNOSIS — I35.0 NONRHEUMATIC AORTIC VALVE STENOSIS: ICD-10-CM

## 2025-06-04 DIAGNOSIS — I50.32 CHRONIC HEART FAILURE WITH PRESERVED EJECTION FRACTION (H): ICD-10-CM

## 2025-06-04 LAB
MDC_IDC_LEAD_CONNECTION_STATUS: NORMAL
MDC_IDC_LEAD_CONNECTION_STATUS: NORMAL
MDC_IDC_LEAD_IMPLANT_DT: NORMAL
MDC_IDC_LEAD_IMPLANT_DT: NORMAL
MDC_IDC_LEAD_LOCATION: NORMAL
MDC_IDC_LEAD_LOCATION: NORMAL
MDC_IDC_LEAD_LOCATION_DETAIL_1: NORMAL
MDC_IDC_LEAD_LOCATION_DETAIL_1: NORMAL
MDC_IDC_LEAD_MFG: NORMAL
MDC_IDC_LEAD_MFG: NORMAL
MDC_IDC_LEAD_MODEL: NORMAL
MDC_IDC_LEAD_MODEL: NORMAL
MDC_IDC_LEAD_POLARITY_TYPE: NORMAL
MDC_IDC_LEAD_POLARITY_TYPE: NORMAL
MDC_IDC_LEAD_SERIAL: NORMAL
MDC_IDC_LEAD_SERIAL: NORMAL
MDC_IDC_MSMT_BATTERY_DTM: NORMAL
MDC_IDC_MSMT_BATTERY_REMAINING_LONGEVITY: 85 MO
MDC_IDC_MSMT_BATTERY_STATUS: NORMAL
MDC_IDC_MSMT_BATTERY_VOLTAGE: 3.01 V
MDC_IDC_MSMT_LEADCHNL_RA_IMPEDANCE_VALUE: 400 OHM
MDC_IDC_MSMT_LEADCHNL_RA_PACING_THRESHOLD_AMPLITUDE: 0.5 V
MDC_IDC_MSMT_LEADCHNL_RA_PACING_THRESHOLD_PULSEWIDTH: 0.5 MS
MDC_IDC_MSMT_LEADCHNL_RA_SENSING_INTR_AMPL: 3.1 MV
MDC_IDC_MSMT_LEADCHNL_RV_IMPEDANCE_VALUE: 480 OHM
MDC_IDC_MSMT_LEADCHNL_RV_PACING_THRESHOLD_AMPLITUDE: 1 V
MDC_IDC_MSMT_LEADCHNL_RV_PACING_THRESHOLD_PULSEWIDTH: 0.5 MS
MDC_IDC_MSMT_LEADCHNL_RV_SENSING_INTR_AMPL: 7.2 MV
MDC_IDC_PG_IMPLANT_DTM: NORMAL
MDC_IDC_PG_MFG: NORMAL
MDC_IDC_PG_MODEL: NORMAL
MDC_IDC_PG_SERIAL: NORMAL
MDC_IDC_PG_TYPE: NORMAL
MDC_IDC_SESS_CLINIC_NAME: NORMAL
MDC_IDC_SESS_DTM: NORMAL
MDC_IDC_SESS_TYPE: NORMAL
MDC_IDC_SET_BRADY_AT_MODE_SWITCH_MODE: NORMAL
MDC_IDC_SET_BRADY_AT_MODE_SWITCH_RATE: 180 {BEATS}/MIN
MDC_IDC_SET_BRADY_HYSTRATE: NORMAL
MDC_IDC_SET_BRADY_LOWRATE: 50 {BEATS}/MIN
MDC_IDC_SET_BRADY_MAX_SENSOR_RATE: 100 {BEATS}/MIN
MDC_IDC_SET_BRADY_MAX_TRACKING_RATE: 100 {BEATS}/MIN
MDC_IDC_SET_BRADY_MODE: NORMAL
MDC_IDC_SET_BRADY_NIGHT_RATE: NORMAL
MDC_IDC_SET_BRADY_PAV_DELAY_HIGH: 100 MS
MDC_IDC_SET_BRADY_PAV_DELAY_LOW: 300 MS
MDC_IDC_SET_BRADY_SAV_DELAY_HIGH: 100 MS
MDC_IDC_SET_BRADY_SAV_DELAY_LOW: 130 MS
MDC_IDC_SET_LEADCHNL_RA_PACING_AMPLITUDE: NORMAL
MDC_IDC_SET_LEADCHNL_RA_PACING_CAPTURE_MODE: NORMAL
MDC_IDC_SET_LEADCHNL_RA_PACING_POLARITY: NORMAL
MDC_IDC_SET_LEADCHNL_RA_PACING_PULSEWIDTH: 0.5 MS
MDC_IDC_SET_LEADCHNL_RA_SENSING_ADAPTATION_MODE: NORMAL
MDC_IDC_SET_LEADCHNL_RA_SENSING_POLARITY: NORMAL
MDC_IDC_SET_LEADCHNL_RV_PACING_AMPLITUDE: NORMAL
MDC_IDC_SET_LEADCHNL_RV_PACING_CAPTURE_MODE: NORMAL
MDC_IDC_SET_LEADCHNL_RV_PACING_POLARITY: NORMAL
MDC_IDC_SET_LEADCHNL_RV_PACING_PULSEWIDTH: 0.5 MS
MDC_IDC_SET_LEADCHNL_RV_SENSING_POLARITY: NORMAL
MDC_IDC_SET_LEADCHNL_RV_SENSING_SENSITIVITY: 2 MV
MDC_IDC_STAT_AT_MODE_SW_COUNT: 279
MDC_IDC_STAT_BRADY_RA_PERCENT_PACED: 1 %
MDC_IDC_STAT_BRADY_RV_PERCENT_PACED: 98 %
MDC_IDC_STAT_EPISODE_RECENT_COUNT: 0
MDC_IDC_STAT_EPISODE_TYPE: NORMAL
MDC_IDC_STAT_EPISODE_VENDOR_TYPE: NORMAL

## 2025-06-04 PROCEDURE — 99214 OFFICE O/P EST MOD 30 MIN: CPT | Performed by: GENERAL ACUTE CARE HOSPITAL

## 2025-06-04 PROCEDURE — 93280 PM DEVICE PROGR EVAL DUAL: CPT | Performed by: INTERNAL MEDICINE

## 2025-06-04 PROCEDURE — G2211 COMPLEX E/M VISIT ADD ON: HCPCS | Performed by: GENERAL ACUTE CARE HOSPITAL

## 2025-06-04 PROCEDURE — 3078F DIAST BP <80 MM HG: CPT | Performed by: GENERAL ACUTE CARE HOSPITAL

## 2025-06-04 PROCEDURE — 3075F SYST BP GE 130 - 139MM HG: CPT | Performed by: GENERAL ACUTE CARE HOSPITAL

## 2025-06-04 NOTE — LETTER
6/4/2025    Jamie Mcconnell MD  [address]  [facility] [address]    RE: Sharyn Trent       Dear Colleague,     I had the pleasure of seeing Sharyn Trent in the [facility].  HEART CARE ENCOUNTER NOTE          Assessment/Recommendations   Assessment:    Paroxysmal atrial fibrillation first noted 4/4/2022 on device interrogation. She denies symptoms. [id number] score is at least 6 (hypertension, age 75 or greater, diabetes mellitus, coronary artery disease, female gender). HAS-BLED score is at least 2 (age greater than 65, easy bruising).  Complete heart block status post [employer] dual-chamber permanent pacemaker placement 4/12/2011 with a generator replacement 8/30/2021. Normal device function noted today with 98% right ventricular pacing.  Chronic heart failure with preserved left ventricular ejection fraction. NYHA class II, euvolemic.  Mild aortic stenosis last assessed on transthoracic echocardiogram 8/7/2023.  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  Nonobstructive coronary artery disease seen on coronary angiography 5/26/2021.  Essential hypertension. Controlled.  Hyperlipidemia. Last LDL 50 mg/dL.  Non insulin-dependent diabetes mellitus type 2. Last hemoglobin A1c 5.9%.  Chronic kidney disease stage III.  Body mass index is 30.42 kg/m .    Plan:  Apixaban 2.5 mg twice daily.  We previously discussed percutaneous left atrial appendage closure. For now she is content to remain on anticoagulation.  Furosemide 40 mg daily.  Atorvastatin 80 mg daily.  If she has any decompensation of congestive heart failure, she may benefit from addition of spironolactone and/or a sodium-glucose co-transporter 2 inhibitor.  Repeat transthoracic echocardiogram this month to monitor her aortic stenosis.  She follows in Heart Failure [facility]ORE clinic with Dr. Sara Akins.  Follow-up with me in 1 year.       The longitudinal plan  of care for the diagnosis(es)/condition(s) as documented were addressed during this visit. Due to the added complexity in care, I will continue to support Sharyn in the subsequent management and with ongoing continuity of care.    History of Present Illness   Ms. Sharyn Trent is a 84 year old female with a significant past history of HFpEF, complete heart block s/p 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 She also follows in the Heart Failure CORE clinic with Dr. Sara Akins.     She is dealing with left ankle pain and has to periodically wear a walking boot which limits her mobility. She gets out of breath if she walks too fast up the stairs but otherwise feels breathing is fine. No chest pain/pressure/tightness, shortness of breath at rest light headedness/dizziness, pre-syncope, syncope, lower extremity swelling, palpitations, paroxysmal nocturnal dyspnea (PND), or orthopnea.     Cardiac Problems and Cardiac Diagnostics     Most Recent Cardiac testing:  ECG dated 4/5/2022 (personaly reviewed and interpreted): A-sensed, V-paced with PVCs     Pulmonary function tests 12/13/2022 (report reviewed):  FEV1/FVC  is normal   FEV1 is normal   FVC is normal   DLCO is normal when it is corrected for hemoglobin.     Impression:  Pulmonary Function Test is normal.      Device interrogation 6/4/2025 (report reviewed):  Encounter Type: Patient seen in clinic for annual device evaluation and iterative programming, followed by Dr. Lopez  Device: [employer] (D) pacemaker  Pacing %/Programmed: AP 1%,  98%, DDD 50-100bpm  Lead(s): Stable  Battery longevity: Estimating 7 years remaining  Presenting rhythm: ASVP 63bpm  Underlying rhythm: SR  w/CHB, rare R @ VVI 30bpm  Heart rates: Stable, HR's per histogram range 50-130bpm and primarily 50-70bpm  Atrial High rates: 279 mode switches logged, available EGM's suggest AT/AF lasting up to ~1 hour, burden <1%, v-rate >/120bpm 0%, patient denies symptoms.  Anticoagulant: Eliquis  Ventricular High rates: None  Comments: Normal device function. 1 noise reversion episode logged, no available EGM. Turned Noise Reversion EGM storage on to low.    Plan: Remote device check scheduled for 9/11/25. Eunice Cloud RN     ECHO 8/7/2023 (report reviewed):   The left ventricle is normal in size.  Left ventricular function is normal.The ejection fraction is 55-60%.  Septal wall motion abnormality may reflect pacemaker activation.  There is a pacemaker lead in the right ventricle.  The left atrium is mildly dilated.  There is mild to moderate (1-2+) mitral regurgitation.  Mild valvular aortic stenosis.     Stress test 5/12/2021 (report reviewed):      The nuclear stress test is abnormal.     Nuclear images demonstrate a small area of moderate ischemia involving the distal anteroseptal, anterior and apical wall, although specificity reduced due to breast attenuation.     The left ventricular ejection fraction at stress is 64% without wall motion abnormality.     A prior study was conducted on 3/23/2018.  Imaging appears more consistent with ischemia than infarction on the current study.     Cardiac cath 5/26/2021 (report reviewed):   RA mean 4mmHg  PA mean 24mmHg  PCWP 21mmHg  LVEDP 19mmHg  Ao 153/62     PA sat 67%  Ao sat 94%     CO cindy 3.35     Angiography via left radial  LM short normal  LAD mid 50% narrowing with FFR 0.85  Circ normal  RCA normal     Medications  Allergies   Current Outpatient Medications   Medication Sig Dispense Refill     acetaminophen (TYLENOL) 500 MG tablet Take 500-1,000 mg by mouth every 6 hours as needed for mild pain       apixaban ANTICOAGULANT (ELIQUIS) 2.5 MG tablet Take 1 tablet (2.5 mg) by  mouth 2 times daily. 180 tablet 4     atorvastatin (LIPITOR) 80 MG tablet Take 80 mg by mouth At Bedtime       calcium carbonate (TUMS) 500 MG chewable tablet Take 1-2 chew tab by mouth daily       carvedilol (COREG) 25 MG tablet Take 1 tablet (25 mg) by mouth 2 times daily (with meals). 180 tablet 1     escitalopram (LEXAPRO) 10 MG tablet Take 10 mg by mouth daily       ferrous sulfate (FEROSUL) 325 (65 Fe) MG tablet Take 325 mg by mouth daily (with breakfast)       fluorometholone (FML LIQUIFILM) 0.1 % ophthalmic suspension Place 1 drop into the right eye daily       furosemide (LASIX) 20 MG tablet TAKE 2 TABLETS(40 MG) BY MOUTH DAILY 180 tablet 3     gabapentin (NEURONTIN) 100 MG capsule Take 1 capsule (100 mg) by mouth 3 times daily (Patient taking differently: Take 200 mg by mouth 3 times daily.) 270 capsule 3     hydrALAZINE (APRESOLINE) 100 MG tablet Take 1 tablet (100 mg) by mouth 3 times daily. 270 tablet 1     levothyroxine (SYNTHROID/LEVOTHROID) 50 MCG tablet Take 50 mcg by mouth daily       losartan (COZAAR) 50 MG tablet Take 1 tablet (50 mg) by mouth daily. 90 tablet 3     Multiple Vitamins-Minerals (MULTIVITAMIN ADULTS 50+) TABS Take 1 tablet by mouth every morning       omeprazole (PRILOSEC) 20 MG capsule Take 20 mg by mouth daily before breakfast       vit C/E/Zn/coppr/lutein/zeaxan (PRESERVISION AREDS-2 ORAL) Take 1 tablet by mouth 2 times daily (before meals)        Allergies   Allergen Reactions     Herlinda-Kit Bee Sting Shortness Of Breath     Venom-Honey Bee [Bee Venom] Shortness Of Breath     Mercurial Analogues [Mercurial Derivatives] Dermatitis     blisters     Nitrofurantoin Other (See Comments)     Burning sensation across chest and arms    Other reaction(s): arms and chest burning     Sulfa (Sulfonamide Antibiotics) [Sulfa Antibiotics] Rash        Physical Examination Review of Systems   /58 (BP Location: Right arm, Patient Position: Sitting, Cuff Size: Adult Regular)   Pulse 64   Resp  16   Wt 73 kg (161 lb)   BMI 30.42 kg/m    Body mass index is 30.42 kg/m .  Wt Readings from Last 3 Encounters:   06/04/25 73 kg (161 lb)   05/13/25 74.3 kg (163 lb 12.8 oz)   06/21/24 73 kg (161 lb)       General Appearance:   Pleasant  female, appears  stated age. no acute distress, normal body habitus   ENT/Mouth: membranes moist, no apparent gingival bleeding.      EYES:  no scleral icterus, normal conjunctivae   Neck: no carotid bruits. No anterior cervical lymphadenopaty   Respiratory:   lungs are clear to auscultation, no rales or wheezing, equal chest wall expansion    Cardiovascular:   Regular rhythm, normal rate. 2/6 early-peaking systolic crescendo-decrescendo murmur heard loudest at the right upper sternal border. No rubs or gallops; the carotid, radial and posterior tibial pulses are intact, Jugular venous pressure normal, no edema bilaterally    Abdomen/GI:  no organomegaly, masses, bruits, or tenderness; bowel sounds are present   Extremities: no cyanosis or clubbing   Skin: no xanthelasma, warm.    Heme/lymph/ Immunology No apparent bleeding noted.   Neurologic: Alert and oriented. normal gait, no tremors     Psychiatric: Pleasant, calm, appropriate affect.    A complete 10 system review of systems was performed and is negative except as mentioned in the HPI/subjective.         Past History   Past Medical History:   Past Medical History:   Diagnosis Date     Abnormal ECG      Anxiety      Arthritis      Chest pain      Chest pain      Chronic kidney disease     stage 3-mod.     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      Pacemaker      PSVT (paroxysmal supraventricular tachycardia)      Right bundle branch block       Skin cancer        Past Surgical History:   Past Surgical History:   Procedure Laterality Date     ABLATION OF DYSRHYTHMIC FOCUS  04/11/2013    AV analia reentry tachycardia, partially successful     BIOPSY SKIN (LOCATION)       CARDIAC CATHETERIZATION  03/10/2016     CV CORONARY ANGIOGRAM N/A 05/26/2021    Procedure: Coronary Angiogram;  Surgeon: Yony Gillis MD;  Location: [facility] Cardiac Cath Lab;  Service: Cardiology     CV LEFT HEART CATHETERIZATION WITHOUT LEFT VENTRICULOGRAM Left 05/26/2021    Procedure: Left Heart Catheterization Without Left Ventriculogram;  Surgeon: Yony Gillis MD;  Location: [facility] Cardiac Cath Lab;  Service: Cardiology     CV RIGHT HEART CATHETERIZATION N/A 05/26/2021    Procedure: Right Heart Catheterization;  Surgeon: Yony Gillis MD;  Location: [facility] Cardiac Cath Lab;  Service: Cardiology     DILATION AND CURETTAGE N/A 4/19/2022    Procedure: DILATION AND CURRETAGE;  Surgeon: Mary Reed MD;  Location: [facility] OR     EP PACEMAKER N/A 08/30/2021    Procedure: Electrophysiology Pacemaker;  Surgeon: Ab Saavedra MD;  Location: [facility] CV     FOOT SURGERY      L foot     HAND SURGERY      on both thumbs     HYSTERECTOMY, TOTAL, ROBOT-ASSISTED, LAP, DA HAROON XI, W/BI SAL-OOPH & SNT LYMPH NODE BX, MINI LAP Bilateral 5/31/2022    Procedure: ROBOTIC ASSISTED TOTAL LAPAROSCOPIC HYSTERECTOMY, BILATERAL SALPINGO-OOPHORECTOMY, SENTINEL LYMPH NODE INJECTION AND BIOPSY, MINI-LAPAROTOMY,;  Surgeon: Mary Reed MD;  Location: [facility] OR     HYSTEROSCOPY DIAGNOSTIC N/A 4/19/2022    Procedure: HYSTEROSCOPY, DIAGNOSTIC;  Surgeon: Mary Reed MD;  Location: [facility] OR     IMPLANT PACEMAKER  04/01/2011    Second-degree AV block     OTHER SURGICAL HISTORY      SVT Ablation     PELVIC EXAM UNDER ANESTHESIA, CERVICAL DILATION, N/A      PELVIC EXAMINATION UNDER ANESTHESIA N/A 4/19/2022     Procedure: PELVIC EXAM UNDER ANESTHESIA, CERVICAL DILATION,;  Surgeon: Mary Reed MD;  Location: [facility] SHLDR ARTHROSCOP,SURG,W/ROTAT CUFF REPR Left 03/09/2017    Procedure: LEFT SHOULDER ARTHROSCOPY DECOMPRESSION DISTAL CLAVICLE EXCISION  ROTATOR CUFF REPAIR BICEPS TENOTOMY AND EXTENSIVE DEBRIDEMENT;  Surgeon: Todd Riggs MD;  Location: [facility];  Service: Orthopedics     RELEASE CARPAL TUNNEL      both wrists     SKIN CANCER EXCISION      L ankle,Lleg and cheek     TOE SURGERY      L big toe joint replacement     TUBAL LIGATION         Family History:   Family History   Problem Relation Age of Onset     Hypertension Mother      Cerebrovascular Disease Mother      Prostate Cancer Father      Cancer Father      Coronary Artery Disease Father      Dyslipidemia Father      Dyslipidemia Sister      Dyslipidemia Brother      Hypertension Brother      Prostate Cancer Brother        Social History:   Social History     Socioeconomic History     Marital status:      Spouse name: Not on file     Number of children: Not on file     Years of education: Not on file     Highest education level: Not on file   Occupational History     Not on file   Tobacco Use     Smoking status: Never     Passive exposure: Never     Smokeless tobacco: Never   Vaping Use     Vaping status: Never Used   Substance and Sexual Activity     Alcohol use: No     Drug use: No     Sexual activity: Yes     Partners: Male     Birth control/protection: Surgical   Other Topics Concern     Not on file   Social History Narrative     Not on file     Social Drivers of Health     Financial Resource Strain: Not on file   Food Insecurity: Not on file   Transportation Needs: Not on file   Physical Activity: Not on file   Stress: Not on file   Social Connections: Not on file   Interpersonal Safety: Not on file   Housing Stability: Not on file              Lab Results    Chemistry/lipid CBC Cardiac  Enzymes/BNP/TSH/INR   Lab Results   Component Value Date    CHOL 119 08/15/2024    HDL 46 (L) 08/15/2024    LDL 59 08/15/2024    TRIG 71 08/15/2024    CR 1.39 (H) 02/20/2025    BUN 36.6 (H) 02/20/2025    POTASSIUM 4.4 02/20/2025     02/20/2025    CO2 22 02/20/2025      Lab Results   Component Value Date    WBC 6.6 02/14/2024    HGB 11.8 02/14/2024    HCT 36.0 02/14/2024    MCV 95 02/14/2024     02/14/2024    A1C 5.3 05/12/2023     Lab Results   Component Value Date    A1C 5.3 05/12/2023 2/20/2025  HbA1c 5.9% Lab Results   Component Value Date    TROPONINI 0.04 04/05/2022     (H) 04/05/2022    NTBNP 1,734 05/01/2024    TSH 2.82 02/20/2025    INR 1.05 09/13/2021          Bravo Lopez MD [facility]  Non-Invasive Cardiologist  [facility]  Pager [phone]      Thank you for allowing me to participate in the care of your patient.      Sincerely,     Bravo Lopez MD     [facility]  cc:   Bravo Lopez MD  [address]  [place] [address]

## 2025-06-04 NOTE — PROGRESS NOTES
HEART CARE ENCOUNTER NOTE          Assessment/Recommendations   Assessment:    Paroxysmal atrial fibrillation first noted 4/4/2022 on device interrogation. She denies symptoms. [id number] score is at least 6 (hypertension, age 75 or greater, diabetes mellitus, coronary artery disease, female gender). HAS-BLED score is at least 2 (age greater than 65, easy bruising).  Complete heart block status post [employer] dual-chamber permanent pacemaker placement 4/12/2011 with a generator replacement 8/30/2021. Normal device function noted today with 98% right ventricular pacing.  Chronic heart failure with preserved left ventricular ejection fraction. NYHA class II, euvolemic.  Mild aortic stenosis last assessed on transthoracic echocardiogram 8/7/2023.  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  Nonobstructive coronary artery disease seen on coronary angiography 5/26/2021.  Essential hypertension. Controlled.  Hyperlipidemia. Last LDL 50 mg/dL.  Non insulin-dependent diabetes mellitus type 2. Last hemoglobin A1c 5.9%.  Chronic kidney disease stage III.  Body mass index is 30.42 kg/m .    Plan:  Apixaban 2.5 mg twice daily.  We previously discussed percutaneous left atrial appendage closure. For now she is content to remain on anticoagulation.  Furosemide 40 mg daily.  Atorvastatin 80 mg daily.  If she has any decompensation of congestive heart failure, she may benefit from addition of spironolactone and/or a sodium-glucose co-transporter 2 inhibitor.  Repeat transthoracic echocardiogram this month to monitor her aortic stenosis.  She follows in [facility] Failure [facility] clinic with Dr. Sara Akins.  Follow-up with me in 1 year.       The longitudinal plan of care for the diagnosis(es)/condition(s) as documented were addressed during this visit. Due to the added complexity in care, I will continue to support Sharyn in the subsequent management and with  ongoing continuity of care.    History of Present Illness   Ms. Sharyn Trent is a 84 year old female with a significant past history of HFpEF, complete heart block s/p 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 She also follows in the Heart Failure [facility] clinic with Dr. Sara Akins.     She is dealing with left ankle pain and has to periodically wear a walking boot which limits her mobility. She gets out of breath if she walks too fast up the stairs but otherwise feels breathing is fine. No chest pain/pressure/tightness, shortness of breath at rest light headedness/dizziness, pre-syncope, syncope, lower extremity swelling, palpitations, paroxysmal nocturnal dyspnea (PND), or orthopnea.     Cardiac Problems and Cardiac Diagnostics     Most Recent Cardiac testing:  ECG dated 4/5/2022 (personaly reviewed and interpreted): A-sensed, V-paced with PVCs     Pulmonary function tests 12/13/2022 (report reviewed):  FEV1/FVC  is normal   FEV1 is normal   FVC is normal   DLCO is normal when it is corrected for hemoglobin.     Impression:  Pulmonary Function Test is normal.      Device interrogation 6/4/2025 (report reviewed):  Encounter Type: Patient seen in clinic for annual device evaluation and iterative programming, followed by Dr. Lopez  Device: [employer] (D) pacemaker  Pacing %/Programmed: AP 1%,  98%, DDD 50-100bpm  Lead(s): Stable  Battery longevity: Estimating 7 years remaining  Presenting rhythm: ASVP 63bpm  Underlying rhythm: SR w/CHB, rare R @ VVI 30bpm  Heart rates: Stable, HR's per histogram range 50-130bpm and primarily 50-70bpm  Atrial High rates: 279 mode switches logged, available EGM's suggest AT/AF lasting up to ~1  hour, burden <1%, v-rate >/120bpm 0%, patient denies symptoms.  Anticoagulant: Eliquis  Ventricular High rates: None  Comments: Normal device function. 1 noise reversion episode logged, no available EGM. Turned Noise Reversion EGM storage on to low.    Plan: Remote device check scheduled for 9/11/25. Eunice Cloud RN     ECHO 8/7/2023 (report reviewed):   The left ventricle is normal in size.  Left ventricular function is normal.The ejection fraction is 55-60%.  Septal wall motion abnormality may reflect pacemaker activation.  There is a pacemaker lead in the right ventricle.  The left atrium is mildly dilated.  There is mild to moderate (1-2+) mitral regurgitation.  Mild valvular aortic stenosis.     Stress test 5/12/2021 (report reviewed):     The nuclear stress test is abnormal.    Nuclear images demonstrate a small area of moderate ischemia involving the distal anteroseptal, anterior and apical wall, although specificity reduced due to breast attenuation.    The left ventricular ejection fraction at stress is 64% without wall motion abnormality.    A prior study was conducted on 3/23/2018.  Imaging appears more consistent with ischemia than infarction on the current study.     Cardiac cath 5/26/2021 (report reviewed):   RA mean 4mmHg  PA mean 24mmHg  PCWP 21mmHg  LVEDP 19mmHg  Ao 153/62     PA sat 67%  Ao sat 94%     CO cindy 3.35     Angiography via left radial  LM short normal  LAD mid 50% narrowing with FFR 0.85  Circ normal  RCA normal     Medications  Allergies   Current Outpatient Medications   Medication Sig Dispense Refill    acetaminophen (TYLENOL) 500 MG tablet Take 500-1,000 mg by mouth every 6 hours as needed for mild pain      apixaban ANTICOAGULANT (ELIQUIS) 2.5 MG tablet Take 1 tablet (2.5 mg) by mouth 2 times daily. 180 tablet 4    atorvastatin (LIPITOR) 80 MG tablet Take 80 mg by mouth At Bedtime      calcium carbonate (TUMS) 500 MG chewable tablet Take 1-2 chew tab by mouth daily      carvedilol  (COREG) 25 MG tablet Take 1 tablet (25 mg) by mouth 2 times daily (with meals). 180 tablet 1    escitalopram (LEXAPRO) 10 MG tablet Take 10 mg by mouth daily      ferrous sulfate (FEROSUL) 325 (65 Fe) MG tablet Take 325 mg by mouth daily (with breakfast)      fluorometholone (FML LIQUIFILM) 0.1 % ophthalmic suspension Place 1 drop into the right eye daily      furosemide (LASIX) 20 MG tablet TAKE 2 TABLETS(40 MG) BY MOUTH DAILY 180 tablet 3    gabapentin (NEURONTIN) 100 MG capsule Take 1 capsule (100 mg) by mouth 3 times daily (Patient taking differently: Take 200 mg by mouth 3 times daily.) 270 capsule 3    hydrALAZINE (APRESOLINE) 100 MG tablet Take 1 tablet (100 mg) by mouth 3 times daily. 270 tablet 1    levothyroxine (SYNTHROID/LEVOTHROID) 50 MCG tablet Take 50 mcg by mouth daily      losartan (COZAAR) 50 MG tablet Take 1 tablet (50 mg) by mouth daily. 90 tablet 3    Multiple Vitamins-Minerals (MULTIVITAMIN ADULTS 50+) TABS Take 1 tablet by mouth every morning      omeprazole (PRILOSEC) 20 MG capsule Take 20 mg by mouth daily before breakfast      vit C/E/Zn/coppr/lutein/zeaxan (PRESERVISION AREDS-2 ORAL) Take 1 tablet by mouth 2 times daily (before meals)        Allergies   Allergen Reactions    Herlinda-Kit Bee Sting Shortness Of Breath    Venom-Honey Bee [Bee Venom] Shortness Of Breath    Mercurial Analogues [Mercurial Derivatives] Dermatitis     blisters    Nitrofurantoin Other (See Comments)     Burning sensation across chest and arms    Other reaction(s): arms and chest burning    Sulfa (Sulfonamide Antibiotics) [Sulfa Antibiotics] Rash        Physical Examination Review of Systems   /58 (BP Location: Right arm, Patient Position: Sitting, Cuff Size: Adult Regular)   Pulse 64   Resp 16   Wt 73 kg (161 lb)   BMI 30.42 kg/m    Body mass index is 30.42 kg/m .  Wt Readings from Last 3 Encounters:   06/04/25 73 kg (161 lb)   05/13/25 74.3 kg (163 lb 12.8 oz)   06/21/24 73 kg (161 lb)       General  Appearance:   Pleasant  female, appears  stated age. no acute distress, normal body habitus   ENT/Mouth: membranes moist, no apparent gingival bleeding.      EYES:  no scleral icterus, normal conjunctivae   Neck: no carotid bruits. No anterior cervical lymphadenopaty   Respiratory:   lungs are clear to auscultation, no rales or wheezing, equal chest wall expansion    Cardiovascular:   Regular rhythm, normal rate. 2/6 early-peaking systolic crescendo-decrescendo murmur heard loudest at the right upper sternal border. No rubs or gallops; the carotid, radial and posterior tibial pulses are intact, Jugular venous pressure normal, no edema bilaterally    Abdomen/GI:  no organomegaly, masses, bruits, or tenderness; bowel sounds are present   Extremities: no cyanosis or clubbing   Skin: no xanthelasma, warm.    Heme/lymph/ Immunology No apparent bleeding noted.   Neurologic: Alert and oriented. normal gait, no tremors     Psychiatric: Pleasant, calm, appropriate affect.    A complete 10 system review of systems was performed and is negative except as mentioned in the HPI/subjective.         Past History   Past Medical History:   Past Medical History:   Diagnosis Date    Abnormal ECG     Anxiety     Arthritis     Chest pain     Chest pain     Chronic kidney disease     stage 3-mod.    Congestive heart failure (H)     Diabetes (H)     Dyslipidemia, goal LDL below 70     GERD (gastroesophageal reflux disease)     HTN (hypertension)     Hyperlipidemia     Macular degeneration (senile) of retina     Melanoma of ankle (H)     L ankle    Other second degree atrioventricular block     Created by Conversion     Pacemaker     PSVT (paroxysmal supraventricular tachycardia)     Right bundle branch block     Skin cancer        Past Surgical History:   Past Surgical History:   Procedure Laterality Date    ABLATION OF DYSRHYTHMIC FOCUS  04/11/2013    AV analia reentry tachycardia, partially successful    BIOPSY SKIN (LOCATION)       CARDIAC CATHETERIZATION  03/10/2016    CV CORONARY ANGIOGRAM N/A 05/26/2021    Procedure: Coronary Angiogram;  Surgeon: Yony Gillis MD;  Location: [facility] Cardiac Cath Lab;  Service: Cardiology    CV LEFT HEART CATHETERIZATION WITHOUT LEFT VENTRICULOGRAM Left 05/26/2021    Procedure: Left Heart Catheterization Without Left Ventriculogram;  Surgeon: Yony Gillis MD;  Location: [facility] Cardiac [facility] Lab;  Service: Cardiology    CV RIGHT HEART CATHETERIZATION N/A 05/26/2021    Procedure: Right Heart Catheterization;  Surgeon: Yony Gillis MD;  Location: [facility] Cardiac Cath Lab;  Service: Cardiology    DILATION AND CURETTAGE N/A 4/19/2022    Procedure: DILATION AND CURRETAGE;  Surgeon: Mary Reed MD;  [place] [facility] [place]    [place] PACEMAKER N/A 08/30/2021    Procedure: Electrophysiology Pacemaker;  Surgeon: Ab Saavedra MD;  Location: [facility] CV    FOOT SURGERY      L foot    HAND SURGERY      on both thumbs    HYSTERECTOMY, TOTAL, ROBOT-ASSISTED, LAP, DA HAROON XI, W/BI SAL-OOPH & SNT LYMPH NODE BX, MINI LAP Bilateral 5/31/2022    Procedure: ROBOTIC ASSISTED TOTAL LAPAROSCOPIC HYSTERECTOMY, BILATERAL SALPINGO-OOPHORECTOMY, SENTINEL LYMPH NODE INJECTION AND BIOPSY, MINI-LAPAROTOMY,;  Surgeon: Mary Reed MD;  [place] [facility] [place]    HYSTEROSCOPY DIAGNOSTIC N/A 4/19/2022    Procedure: HYSTEROSCOPY, DIAGNOSTIC;  Surgeon: Mary Reed MD;  [place] [facility] [place]    IMPLANT PACEMAKER  04/01/2011    Second-degree AV block    OTHER SURGICAL HISTORY      SVT Ablation    PELVIC EXAM UNDER ANESTHESIA, CERVICAL DILATION, N/A     PELVIC EXAMINATION UNDER ANESTHESIA N/A 4/19/2022    Procedure: PELVIC EXAM UNDER ANESTHESIA, CERVICAL DILATION,;  Surgeon: Mary Reed MD;  [place] [facility] [place]    [place] SHLDR ARTHROSCOP,SURG,W/ROTAT CUFF REPR Left 03/09/2017    Procedure: LEFT SHOULDER ARTHROSCOPY  DECOMPRESSION DISTAL CLAVICLE EXCISION  ROTATOR CUFF REPAIR BICEPS TENOTOMY AND EXTENSIVE DEBRIDEMENT;  Surgeon: Todd Riggs MD;  Location: [facility] OR;  Service: Orthopedics    RELEASE CARPAL TUNNEL      both wrists    SKIN CANCER EXCISION      L ankle,Lleg and cheek    TOE SURGERY      L big toe joint replacement    TUBAL LIGATION         Family History:   Family History   Problem Relation Age of Onset    Hypertension Mother     Cerebrovascular Disease Mother     Prostate Cancer Father     Cancer Father     Coronary Artery Disease Father     Dyslipidemia Father     Dyslipidemia Sister     Dyslipidemia Brother     Hypertension Brother     Prostate Cancer Brother        Social History:   Social History     Socioeconomic History    Marital status:      Spouse name: Not on file    Number of children: Not on file    Years of education: Not on file    Highest education level: Not on file   Occupational History    Not on file   Tobacco Use    Smoking status: Never     Passive exposure: Never    Smokeless tobacco: Never   Vaping Use    Vaping status: Never Used   Substance and Sexual Activity    Alcohol use: No    Drug use: No    Sexual activity: Yes     Partners: Male     Birth control/protection: Surgical   Other Topics Concern    Not on file   Social History Narrative    Not on file     Social Drivers of Health     Financial Resource Strain: Not on file   Food Insecurity: Not on file   Transportation Needs: Not on file   Physical Activity: Not on file   Stress: Not on file   Social Connections: Not on file   Interpersonal Safety: Not on file   Housing Stability: Not on file              Lab Results    Chemistry/lipid CBC Cardiac Enzymes/BNP/TSH/INR   Lab Results   Component Value Date    CHOL 119 08/15/2024    HDL 46 (L) 08/15/2024    LDL 59 08/15/2024    TRIG 71 08/15/2024    CR 1.39 (H) 02/20/2025    BUN 36.6 (H) 02/20/2025    POTASSIUM 4.4 02/20/2025     02/20/2025    CO2 22 02/20/2025       Lab Results   Component Value Date    WBC 6.6 02/14/2024    HGB 11.8 02/14/2024    HCT 36.0 02/14/2024    MCV 95 02/14/2024     02/14/2024    A1C 5.3 05/12/2023     Lab Results   Component Value Date    A1C 5.3 05/12/2023 2/20/2025  HbA1c 5.9% Lab Results   Component Value Date    TROPONINI 0.04 04/05/2022     (H) 04/05/2022    NTBNP 1,734 05/01/2024    TSH 2.82 02/20/2025    INR 1.05 09/13/2021          Bravo Lopez MD [facility]  Non-Invasive Cardiologist  [facility]  Pager [phone]

## 2025-06-23 ENCOUNTER — OFFICE VISIT (OUTPATIENT)
Dept: NEUROLOGY | Facility: CLINIC | Age: 85
End: 2025-06-23
Payer: COMMERCIAL

## 2025-06-23 VITALS — SYSTOLIC BLOOD PRESSURE: 136 MMHG | HEART RATE: 65 BPM | DIASTOLIC BLOOD PRESSURE: 63 MMHG

## 2025-06-23 DIAGNOSIS — M79.2 NEUROPATHIC PAIN: ICD-10-CM

## 2025-06-23 DIAGNOSIS — R26.9 GAIT DIFFICULTY: ICD-10-CM

## 2025-06-23 DIAGNOSIS — G62.9 NEUROPATHY: Primary | ICD-10-CM

## 2025-06-23 PROCEDURE — 3078F DIAST BP <80 MM HG: CPT | Performed by: PSYCHIATRY & NEUROLOGY

## 2025-06-23 PROCEDURE — 99214 OFFICE O/P EST MOD 30 MIN: CPT | Performed by: PSYCHIATRY & NEUROLOGY

## 2025-06-23 PROCEDURE — 3075F SYST BP GE 130 - 139MM HG: CPT | Performed by: PSYCHIATRY & NEUROLOGY

## 2025-06-23 PROCEDURE — G2211 COMPLEX E/M VISIT ADD ON: HCPCS | Performed by: PSYCHIATRY & NEUROLOGY

## 2025-06-23 RX ORDER — GABAPENTIN 100 MG/1
CAPSULE ORAL
Qty: 630 CAPSULE | Refills: 3 | Status: SHIPPED | OUTPATIENT
Start: 2025-06-23

## 2025-06-23 NOTE — PROGRESS NOTES
NEUROLOGY OUTPATIENT PROGRESS NOTE   Jun 23, 2025     CHIEF COMPLAINT/REASON FOR VISIT/REASON FOR CONSULT  Patient presents with:  Follow Up: Annual check up    REASON FOR CONSULTATION- Numbness    HISTORY OF PRESENT ILLNESS  Sharyn Trent is a 84 year old female seen today for hospital follow-up.  Patient was seen in the hospital for an episode of shaking and headache.  He is complaining of bilateral hand numbness.  Stroke code was called.  Testing was negative.  Patient was diagnosed to have a hypertensive emergency.  She reports no recurrence of her symptoms.    In the hospital she was also complaining of some numbness in her feet which was suggestive of neuropathy.  CT of the L-spine did show mild to moderate spinal stenosis.  Did not completely fit with her symptoms.  She followed up in the neurology clinic for further evaluation.  EMG was done today.  Has also had blood work done in the hospital.  Reports ongoing numbness in the bottom of her feet.  Does have some balance issues.  Denies any other new concerns.    7/6/22  She returns today for follow-up of neuropathy.  Reports that the numbness is about the same in her feet.  Continues to have balance issues.  Encouraged her to exercise.  Discussed balance exercises.  Further reports no swelling in the feet.  Does report some leg cramps in the nighttime.  Does complain of more numbness in the morning and then taking lorazepam in the morning with symptoms improving.  Discussed that there is no rational for this and its possible the numbness might be more related to her anxiety worsening in the morning time.    11/14/22  Patient returns today.  She reports that the numbness in the feet is about the same.  Continues to have balance issues.  Has had 2 falls.  Both of them involve turning.  She is using a cane when she goes out of the house.  Denies any other new symptoms.  Seen oncology and they do not think she has myeloma.  They are monitoring the serum for  electrophoresis.  Does have a lot of right knee issues which I suspect are affecting her balance.  She has seen orthopedics and they do not recommend surgery at this point.  Gabapentin is somewhat helpful for the muscle cramps and numbness and tingling in the feet.  Her primary had stopped it for the balance issues though she did not notice any change.  She is currently on the medication and wants to continue it.  No hand numbness.    5/10/23  Patient returns today  1.  About 10 days ago she started having a toothache on the lower left side.  She then started having some numbness in the left part of her face.  The region from the left lower lip to her jaw is involved.  There is no pain.  She can eat fine.  There is no difficulty with swallowing.  There is no food coming out of her mouth.  Denies being anxious.  She does have a previous history of sarcoidosis that is under good control.  Is not on any immunomodulating medications.    In terms of her neuropathy she reports it spread to the top of her feet.  She is taking gabapentin which is helping.  Reports some locking of her fingers no new major cramps.  A1c has been around 5.  Has not been recently checked.    6/16/23  Returns today  1.  Continues to have numbness in the left lower lip.  No change.  No significant improvement either.  No other new neurological symptoms  2.  Continues to have worsening numbness slightly above the ankle.  She recently tested normal on the A1c though has not been watching what she has been eating.  No proximal leg symptoms no major back pain.  3.  Neuropathic pain is under good control.  Remains on the gabapentin.  Feels that it does not help with the numbness.    No other new issues.    6/21/24  Patient returns today  1.  Numbness is slightly worse compared to before.  Feels that the numbness is spreading to the top of the feet.  Balance is also worse.  Is trying to exercise and walk 1 foot in front of the other.  Trying to stay  active.  2.  Pain she is taking gabapentin.  This is not too bothersome.  Also using Tylenol for arm which is helping with the feet as well for neuropathic  3.  She does have decreased range of motion of her right hand.  Does have some numbness in her hands.  The numbness seems more like carpal tunnel syndrome.  For the decreased range of motion I think it is more of a shoulder issue than a neurological recommended she follow-up with her shoulder doctor/orthopedic doctor.  4.  Facial numbness has not improved.  Not too bothersome.  No other new concerns.    6/23/25  Patient returns today  1.  She has been having new onset of left foot pain.  This has been radiating up her leg.  She has been working with the spine doctor.  They are doing a CT myelogram to further evaluate the L-spine to look for pinched nerve.  Furthermore she also saw the podiatrist and was put in a boot initially and also had steroid injections which were not helpful.  2.  Because of the boot she was less active and this is potentially worsen her neuropathy.  Feels more numbness in the bottom of her feet.  Pain is worse in the evening.  Is taking gabapentin 200 mg in the morning 200 in the afternoon and is willing to increase the dose in the evening time.  No other new concerns.    Previous history is reviewed and this is unchanged.    PAST MEDICAL/SURGICAL HISTORY  Past Medical History:   Diagnosis Date    Abnormal ECG     Anxiety     Arthritis     Chest pain     Chest pain     Chronic kidney disease     stage 3-mod.    Congestive heart failure (H)     Diabetes (H)     Dyslipidemia, goal LDL below 70     GERD (gastroesophageal reflux disease)     HTN (hypertension)     Hyperlipidemia     Macular degeneration (senile) of retina     Melanoma of ankle (H)     L ankle    Other second degree atrioventricular block     Created by Conversion     Pacemaker     PSVT (paroxysmal supraventricular tachycardia)     Right bundle branch block     Skin cancer       Patient Active Problem List   Diagnosis    Atypical chest pain    Malignant essential hypertension    Dyslipidemia, goal LDL below 70    DM (diabetes mellitus), type 2 (H)    Third degree AV block (H)    Chest pain    Ectopic atrial tachycardia    Acute diastolic CHF (congestive heart failure) (H)    Acute respiratory failure with hypoxia (H)    Pneumonia of left lower lobe due to infectious organism    Stroke-like symptoms    Abnormal nuclear stress test    Chronic kidney disease, stage 3a (H)    Diabetes mellitus type 2 without retinopathy (H)    Gastroesophageal reflux disease without esophagitis    Thyroid nodule    Hypertensive emergency    Acute on chronic congestive heart failure, unspecified heart failure type (H)    Benign essential hypertension    Uterine mass       FAMILY HISTORY  Family History   Problem Relation Age of Onset    Hypertension Mother     Cerebrovascular Disease Mother     Prostate Cancer Father     Cancer Father     Coronary Artery Disease Father     Dyslipidemia Father     Dyslipidemia Sister     Dyslipidemia Brother     Hypertension Brother     Prostate Cancer Brother        SOCIAL HISTORY  Social History     Tobacco Use    Smoking status: Never     Passive exposure: Never    Smokeless tobacco: Never   Vaping Use    Vaping status: Never Used   Substance Use Topics    Alcohol use: No    Drug use: No       SYSTEMS REVIEW  Twelve-system ROS was done and other than the HPI this was negative.  Pertinent positives noted in the HPI.  No new concerns/symptoms.    MEDICATIONS  Current Outpatient Medications   Medication Sig Dispense Refill    gabapentin (NEURONTIN) 100 MG capsule 200 mg in AM, 200 mg at noon and 300mg at bedtime. 630 capsule 3    acetaminophen (TYLENOL) 500 MG tablet Take 500-1,000 mg by mouth every 6 hours as needed for mild pain      apixaban ANTICOAGULANT (ELIQUIS) 2.5 MG tablet Take 1 tablet (2.5 mg) by mouth 2 times daily. 180 tablet 4    atorvastatin (LIPITOR) 80 MG  tablet Take 80 mg by mouth At Bedtime      calcium carbonate (TUMS) 500 MG chewable tablet Take 1-2 chew tab by mouth daily      carvedilol (COREG) 25 MG tablet Take 1 tablet (25 mg) by mouth 2 times daily (with meals). 180 tablet 1    escitalopram (LEXAPRO) 10 MG tablet Take 10 mg by mouth daily      ferrous sulfate (FEROSUL) 325 (65 Fe) MG tablet Take 325 mg by mouth daily (with breakfast)      fluorometholone (FML LIQUIFILM) 0.1 % ophthalmic suspension Place 1 drop into the right eye daily      furosemide (LASIX) 20 MG tablet TAKE 2 TABLETS(40 MG) BY MOUTH DAILY 180 tablet 3    hydrALAZINE (APRESOLINE) 100 MG tablet Take 1 tablet (100 mg) by mouth 3 times daily. 270 tablet 1    levothyroxine (SYNTHROID/LEVOTHROID) 50 MCG tablet Take 50 mcg by mouth daily      losartan (COZAAR) 50 MG tablet Take 1 tablet (50 mg) by mouth daily. 90 tablet 3    Multiple Vitamins-Minerals (MULTIVITAMIN ADULTS 50+) TABS Take 1 tablet by mouth every morning      omeprazole (PRILOSEC) 20 MG capsule Take 20 mg by mouth daily before breakfast      vit C/E/Zn/coppr/lutein/zeaxan (PRESERVISION AREDS-2 ORAL) Take 1 tablet by mouth 2 times daily (before meals)       No current facility-administered medications for this visit.        PHYSICAL EXAMINATION  VITALS: /63 (BP Location: Left arm, Patient Position: Sitting)   Pulse 65   GENERAL: Healthy appearing, alert, no acute distress, normal habitus.  CARDIOVASCULAR: Extremities warm and well perfused. Pulses present.   NEUROLOGICAL:  Patient is awake and oriented to self, place and time.  Attention span is normal.  Memory is grossly intact.  Language is fluent and follows commands appropriately.  Appropriate fund of knowledge. Cranial nerves 2-12 are intact. There is no pronator drift.  Motor exam shows 5/5 strength in all extremities.  Tone is symmetric bilaterally in upper and lower extremities.  Reflexes are symmetric and 1+/diminished in upper extremities and lower extremities.  Sensory exam is grossly intact to light touch, pin prick and vibration.  Finger to nose and heel to shin is without dysmetria.  Romberg is negative.  Gait is slightly wide-based and the patient is able to do tandem walk and walk on toes and heels with some difficulty.  She does have some antalgic gait due to the right knee.  Slightly more gait difficulty.  Exam stable.  No evidence of worsening neuropathy.  Reflexes were absent      DIAGNOSTICS  HEAD CT:  1.  No acute intracranial hemorrhage.  2.  No CT evidence acute infarct. Aspect score 10.  3.  Age-related changes described above.     Dr. Tashi Lackey was notified by Dr Kevin Cooper at  1:40 AM 09/13/2021.      HEAD CTA:   1.  No intracranial arterial large vessel occlusion.  2.  Right carotid siphon mild stenosis.   3.  Left carotid siphon severe stenosis.  4.  Otherwise, no significant stenosis/occlusion.      NECK CTA:  1.  Right vertebral artery origin moderate stenosis.  2.  Left proximal subclavian artery mild-to-moderate stenosis.  3.  Otherwise, no significant stenosis/occlusion. No dissection.  4.  Multiple thyroid nodules. Recommend nonemergent thyroid ultrasound based upon ACR white paper criteria.   5.  Lung apices demonstrate septal thickening with patchy groundglass opacities and consolidations most likely due to pulmonary edema. Please correlate clinically to exclude acute infectious inflammatory pneumonitis.     Carotid US  IMPRESSION:  1.  Mild plaque formation, velocities consistent with less than 50% stenosis in the right internal carotid artery.  2.  Mild plaque formation, velocities consistent with less than 50% stenosis in the left internal carotid artery.  3.  Flow within the vertebral arteries is antegrade.  4.  Bilateral thyroid nodules, recommend further evaluation with dedicated thyroid ultrasound.     CT L spine  1.  No evidence of lumbar spine fracture.     2.  Multilevel degenerative change throughout the lumbar disc spaces with  moderate spinal canal stenosis at L3-4. Moderate spinal canal stenosis at L4-5. Variable degrees of foraminal narrowing. Please see body of report for details at each level.    EMG  CLINICAL INTERPRETATION:  This is an abnormal nerve conduction and EMG study.  The study is suggestive of a sensorimotor polyneuropathy in both legs.  Further clinical correlation is needed.    RELEVANT LABS  Component      Latest Ref Rng & Units 9/13/2021 9/15/2021   Albumin %      51.0 - 67.0 %  61.4   Albumin Fraction      3.2 - 4.7 g/dL  3.9   Alpha 1 %      2.0 - 4.0 %  3.7   Alpha 1 Fraction      0.1 - 0.3 g/dL  0.2   Alpha 2 %      5.0 - 13.0 %  12.2   Alpha 2 Fraction      0.4 - 0.9 g/dL  0.8   Beta %      10.0 - 17.0 %  10.0   Beta Fraction      0.7 - 1.2 g/dL  0.6 (L)   Gamma Globulin %      9.0 - 20.0 %  12.7   Gamma Fraction      0.6 - 1.4 g/dL  0.8   ELP Interpretation:        Normal serum protein electrophoresis.   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 At the present time, the bands are too faint for unequivocal diagnosis.   Path ICD       I16.1   Interpreted By       Eduardo Dyer MD     Component      Latest Ref Rng & Units 5/12/2022   Vitamin B12      213 - 816 pg/mL 435     OUTSIDE RECORDS  Hospital notes reviewed.    SPEP  Component      Latest Ref Rng & Units 7/6/2022   Albumin Fraction      3.7 - 5.1 g/dL 4.2   Alpha 1 Fraction      0.2 - 0.4 g/dL 0.3   Alpha 2 Fraction      0.5 - 0.9 g/dL 0.8   Beta Fraction      0.6 - 1.0 g/dL 0.7    Gamma Fraction      0.7 - 1.6 g/dL 1.0   Monoclonal Peak      <=0.0 g/dL 0.0   ELP Interpretation:       No monoclonal protein seen by capillary electrophoresis. However, a possible very small IgM lambda monoclonal was seen in this sample by immunofixation which is a much more sensitive method for monoclonal detection. Pathologic significance requires clinical correlation. NORIS Ho M.D., Ph.D., Pathologist ().   Immunofixation ELP       Possible very small monoclonal IgM immunoglobulin of lambda light chain type. Pathologic significance requires clinical correlation.  NORIS Ho M.D., Ph.D., Pathologist ()   Total Protein Serum for ELP      6.4 - 8.3 g/dL 6.9     Hematology      CT head  IMPRESSION:  1.  No acute intracranial process or significant change since 04/05/2022.  2.  No significant TMJ arthropathy.      LABS  Component      Latest Ref [facility] 5/12/2023  4:29 PM   WBC      4.0 - 11.0 10e3/uL 5.5    RBC Count      3.80 - 5.20 10e6/uL 3.50 (L)    Hemoglobin      11.7 - 15.7 g/dL 10.9 (L)    Hematocrit      35.0 - 47.0 % 32.6 (L)    MCV      78 - 100 fL 93    MCH      26.5 - 33.0 pg 31.1    MCHC      31.5 - 36.5 g/dL 33.4    RDW      10.0 - 15.0 % 14.4    Platelet Count      150 - 450 10e3/uL 177    % Neutrophils      % 62    % Lymphocytes      % 16    % Monocytes      % 18    % Eosinophils      % 3    % Basophils      % 1    % Immature Granulocytes      % 0    NRBCs per 100 WBC      <1 /100 0    Absolute Neutrophils      1.6 - 8.3 10e3/uL 3.4    Absolute Lymphocytes      0.8 - 5.3 10e3/uL 0.9    Absolute Monocytes      0.0 - 1.3 10e3/uL 1.0    Absolute Eosinophils      0.0 - 0.7 10e3/uL 0.2    Absolute Basophils      0.0 - 0.2 10e3/uL 0.1    Absolute Immature Granulocytes      <=0.4 10e3/uL 0.0    Absolute NRBCs      10e3/uL 0.0    Sodium      136 - 145 mmol/L 135 (L)    Potassium      3.4 - 5.3 mmol/L 4.2    Chloride      98 - 107 mmol/L 95 (L)    Carbon Dioxide (CO2)      22  - 29 mmol/L 29    Anion Gap      7 - 15 mmol/L 11    Urea Nitrogen      8.0 - 23.0 mg/dL 42.8 (H)    Creatinine      0.51 - 0.95 mg/dL 1.58 (H)    Calcium      8.8 - 10.2 mg/dL 9.5    Glucose      70 - 99 mg/dL 85    Alkaline Phosphatase      35 - 104 U/L 70    AST      10 - 35 U/L 33    ALT      10 - 35 U/L 24    Protein Total      6.4 - 8.3 g/dL 6.9    Albumin      3.5 - 5.2 g/dL 4.2    Bilirubin Total      <=1.2 mg/dL 0.4    GFR Estimate      >60 mL/min/1.73m2 33 (L)    LADARIUS interpretation      Negative  Positive !    LADARUIS pattern 1 Homogeneous    LADARIUS titer 1 1:320    Neutrophil Cytoplasmic Antibody      <1:10  <1:10    Neutrophil Cytoplasmic Antibody Pattern The ANCA IFA is <1:10. No further testing will be performed.    Angiotensin Converting Enzyme      16 - 85 U/L 46    Vitamin B12      232 - 1,245 pg/mL 1,000    Vitamin B1 Whole Blood Level      70 - 180 nmol/L 116    TSH      0.30 - 4.20 uIU/mL 6.36 (H)    Lyme Disease Antibodies Serum      <0.90  0.41    Hemoglobin A1C      <5.7 % 5.3    Sed Rate      0 - 30 mm/hr 17    T4 Free      0.90 - 1.70 ng/dL 1.00       Legend:  (L) Low  (H) High  ! Abnormal  Component      Latest Ref [facility] 2/14/2024  2:08 PM   Albumin Fraction      3.7 - 5.1 g/dL 4.0    Alpha 1 Fraction      0.2 - 0.4 g/dL 0.3    Alpha 2 Fraction      0.5 - 0.9 g/dL 0.8    Beta Fraction      0.6 - 1.0 g/dL 0.7    Gamma Fraction      0.7 - 1.6 g/dL 1.1    Monoclonal Peak      <=0.0 g/dL 0.0    ELP Interpretation: Essentially normal electrophoretic pattern. No obvious monoclonal proteins seen. Pathologic significance requires clinical correlation. Andrew Rocha MD    Kappa Free Lt Chain      0.33 - 1.94 mg/dL 7.41 (H)    Lambda Free Lt Chain      0.57 - 2.63 mg/dL 4.72 (H)    Kappa Lambda Ratio      0.26 - 1.65  1.57    Total Protein Serum for ELP      6.4 - 8.3 g/dL 6.8       Legend:  (H) High    IMPRESSION/REPORT/PLAN  Abnormal SPEP  Diabetic neuropathy-worsening  Primary hypertension  Neuropathic  pain/muscle cramps  New onset facial numbness-stable  History of sarcoidosis  Positive LADARIUS-suspect false positive    This is a 84 year old female with numbness in the bottom of her feet.  EMG is confirmatory for peripheral neuropathy. Exam does show decreased reflexes but otherwise negative for sensory loss.   CT of the lumbar spine does show spinal stenosis though this would not fit with her symptoms.     Etiology for neuropathy would most likely be diabetes.  She did have a positive serum electrophoresis which is being monitored through oncology.  There is no concerns for multiple myeloma though there is some family history of multiple myeloma.  Discussed prognosis of neuropathy.  Will recommend exercise and healthy lifestyle.  Neuropathy appears stable on exam.    Exam overall is stable.  There might be some mild progression.  Gabapentin does help with neuropathic pain/cramping.  Symptoms are worse in the evening and will increase the dose in the evening.  Recommend improving lifestyle/exercise/doing balance exercises.    Previously-patient was seen in the hospital for a spell of bilateral hand numbness shaking and elevated blood pressure.  This was thought to be hypertensive emergency.  Head CT was negative for stroke.  MRI could not be done because of pacemaker.  CT angiogram showed left carotid stenosis though otherwise noncontributory.  Carotid ultrasound was negative.  Patient remains on aspirin 81 mg.  She is also on a statin.  No strokelike symptoms we will continue to monitor.  No change.  Blood pressure slightly elevated today.  Could be whitecoat hypertension.  Seen by the cardiologist and they are trying to keep her weight low with use of diuretics. -- Stable    Previously-she now reports left facial numbness.  MRI brain is not possible due to pacemaker.  CT of the head with contrast was negative for any new structural lesions.  Blood work overall has been noncontributory though she did test positive  for LADARIUS.  Most likely this is a false positive.  She does have a history of sarcoidosis which could be a cause for her numbness or could be related to diabetes.  Given stable symptoms with no imaging findings we will hold off on trying aggressive treatment of sarcoidosis at this point.  Continue working with primary care for vascular risk factor management.  Stable/improved.    Previously-she does complain of some shoulder issues which I suspect is more orthopedic.  She does have some numbness in her hands which is more likely related to carpal tunnel.  She is already had carpal tunnel surgery.  Recommend follow-up with orthopedic doctor/sports medicine for her shoulder issues.    She has new onset of left foot pain.  She has been working with Ortho.  Send a CT myelogram is being done for her.  She can contact the clinic if there is something neurological that we need to address.    Return back on as-needed basis or in 1 year.    -     gabapentin (NEURONTIN) 100 MG capsule; 200 mg in AM, 200 mg at noon and 300mg at bedtime.      Return in about 1 year (around 6/23/2026) for In-Clinic Visit (must).    Over 31 minutes were spent coordinating the care for the patient on the day of the encounter.  This includes previsit, during visit and post visit activities as documented above.  Counseling patient.  Prescription management.  New problem.  Refractory problem.  (Activities include but not inclusive of reviewing chart, reviewing outside records, reviewing labs and imaging study results as well as the images, patient visit time including getting history and exam,  use if applicable, review of test results with the patient and coming up with a plan in a shared model, counseling patient and family, education and answering patient questions, EMR , EMR diagnosis entry and problem list management, medication reconciliation and prescription management if applicable, paperwork if applicable, printing  documents and documentation of the visit activities.)        Reji Davila MD  Neurologist  [facility]  Tel:- [phone]    This note was dictated using voice recognition software.  Any grammatical or context distortions are unintentional and inherent to the software.    The longitudinal plan of care for the diagnosis(es)/condition(s) as documented were addressed during this visit. Due to the added complexity in care, I will continue to support Sharyn in the subsequent management and with ongoing continuity of care.

## 2025-06-23 NOTE — NURSING NOTE
Chief Complaint   Patient presents with    Follow Up     Annual check up     Maegan HERRON MA on 6/23/2025 at 10:40 AM

## 2025-06-23 NOTE — LETTER
6/23/2025      Sharyn Trent  [address] [place] [address]      Dear Colleague,    Thank you for referring your patient, Sharyn Trent, to the [facility] [place]. Please see a copy of my visit note below.    NEUROLOGY OUTPATIENT PROGRESS NOTE   Jun 23, 2025     CHIEF COMPLAINT/REASON FOR VISIT/REASON FOR CONSULT  Patient presents with:  Follow Up: Annual check up    REASON FOR CONSULTATION- Numbness    HISTORY OF PRESENT ILLNESS  Sharyn Trent is a 84 year old female seen today for hospital follow-up.  Patient was seen in the hospital for an episode of shaking and headache.  He is complaining of bilateral hand numbness.  Stroke code was called.  Testing was negative.  Patient was diagnosed to have a hypertensive emergency.  She reports no recurrence of her symptoms.    In the hospital she was also complaining of some numbness in her feet which was suggestive of neuropathy.  CT of the L-spine did show mild to moderate spinal stenosis.  Did not completely fit with her symptoms.  She followed up in the neurology clinic for further evaluation.  EMG was done today.  Has also had blood work done in the hospital.  Reports ongoing numbness in the bottom of her feet.  Does have some balance issues.  Denies any other new concerns.    7/6/22  She returns today for follow-up of neuropathy.  Reports that the numbness is about the same in her feet.  Continues to have balance issues.  Encouraged her to exercise.  Discussed balance exercises.  Further reports no swelling in the feet.  Does report some leg cramps in the nighttime.  Does complain of more numbness in the morning and then taking lorazepam in the morning with symptoms improving.  Discussed that there is no rational for this and its possible the numbness might be more related to her anxiety worsening in the morning time.    11/14/22  Patient returns today.  She reports that the numbness in the feet is about the same.  Continues to have  balance issues.  Has had 2 falls.  Both of them involve turning.  She is using a cane when she goes out of the house.  Denies any other new symptoms.  Seen oncology and they do not think she has myeloma.  They are monitoring the serum for electrophoresis.  Does have a lot of right knee issues which I suspect are affecting her balance.  She has seen orthopedics and they do not recommend surgery at this point.  Gabapentin is somewhat helpful for the muscle cramps and numbness and tingling in the feet.  Her primary had stopped it for the balance issues though she did not notice any change.  She is currently on the medication and wants to continue it.  No hand numbness.    5/10/23  Patient returns today  1.  About 10 days ago she started having a toothache on the lower left side.  She then started having some numbness in the left part of her face.  The region from the left lower lip to her jaw is involved.  There is no pain.  She can eat fine.  There is no difficulty with swallowing.  There is no food coming out of her mouth.  Denies being anxious.  She does have a previous history of sarcoidosis that is under good control.  Is not on any immunomodulating medications.    In terms of her neuropathy she reports it spread to the top of her feet.  She is taking gabapentin which is helping.  Reports some locking of her fingers no new major cramps.  A1c has been around 5.  Has not been recently checked.    6/16/23  Returns today  1.  Continues to have numbness in the left lower lip.  No change.  No significant improvement either.  No other new neurological symptoms  2.  Continues to have worsening numbness slightly above the ankle.  She recently tested normal on the A1c though has not been watching what she has been eating.  No proximal leg symptoms no major back pain.  3.  Neuropathic pain is under good control.  Remains on the gabapentin.  Feels that it does not help with the numbness.    No other new  issues.    6/21/24  Patient returns today  1.  Numbness is slightly worse compared to before.  Feels that the numbness is spreading to the top of the feet.  Balance is also worse.  Is trying to exercise and walk 1 foot in front of the other.  Trying to stay active.  2.  Pain she is taking gabapentin.  This is not too bothersome.  Also using Tylenol for arm which is helping with the feet as well for neuropathic  3.  She does have decreased range of motion of her right hand.  Does have some numbness in her hands.  The numbness seems more like carpal tunnel syndrome.  For the decreased range of motion I think it is more of a shoulder issue than a neurological recommended she follow-up with her shoulder doctor/orthopedic doctor.  4.  Facial numbness has not improved.  Not too bothersome.  No other new concerns.    6/23/25  Patient returns today  1.  She has been having new onset of left foot pain.  This has been radiating up her leg.  She has been working with the spine doctor.  They are doing a CT myelogram to further evaluate the L-spine to look for pinched nerve.  Furthermore she also saw the podiatrist and was put in a boot initially and also had steroid injections which were not helpful.  2.  Because of the boot she was less active and this is potentially worsen her neuropathy.  Feels more numbness in the bottom of her feet.  Pain is worse in the evening.  Is taking gabapentin 200 mg in the morning 200 in the afternoon and is willing to increase the dose in the evening time.  No other new concerns.    Previous history is reviewed and this is unchanged.    PAST MEDICAL/SURGICAL HISTORY  Past Medical History:   Diagnosis Date     Abnormal ECG      Anxiety      Arthritis      Chest pain      Chest pain      Chronic kidney disease     stage 3-mod.     Congestive heart failure (H)      Diabetes (H)      Dyslipidemia, goal LDL below 70      GERD (gastroesophageal reflux disease)      HTN (hypertension)       Hyperlipidemia      Macular degeneration (senile) of retina      Melanoma of ankle (H)     L ankle     Other second degree atrioventricular block     Created by Conversion      Pacemaker      PSVT (paroxysmal supraventricular tachycardia)      Right bundle branch block      Skin cancer      Patient Active Problem List   Diagnosis     Atypical chest pain     Malignant essential hypertension     Dyslipidemia, goal LDL below 70     DM (diabetes mellitus), type 2 (H)     Third degree AV block (H)     Chest pain     Ectopic atrial tachycardia     Acute diastolic CHF (congestive heart failure) (H)     Acute respiratory failure with hypoxia (H)     Pneumonia of left lower lobe due to infectious organism     Stroke-like symptoms     Abnormal nuclear stress test     Chronic kidney disease, stage 3a (H)     Diabetes mellitus type 2 without retinopathy (H)     Gastroesophageal reflux disease without esophagitis     Thyroid nodule     Hypertensive emergency     Acute on chronic congestive heart failure, unspecified heart failure type (H)     Benign essential hypertension     Uterine mass       FAMILY HISTORY  Family History   Problem Relation Age of Onset     Hypertension Mother      Cerebrovascular Disease Mother      Prostate Cancer Father      Cancer Father      Coronary Artery Disease Father      Dyslipidemia Father      Dyslipidemia Sister      Dyslipidemia Brother      Hypertension Brother      Prostate Cancer Brother        SOCIAL HISTORY  Social History     Tobacco Use     Smoking status: Never     Passive exposure: Never     Smokeless tobacco: Never   Vaping Use     Vaping status: Never Used   Substance Use Topics     Alcohol use: No     Drug use: No       SYSTEMS REVIEW  Twelve-system ROS was done and other than the HPI this was negative.  Pertinent positives noted in the HPI.  No new concerns/symptoms.    MEDICATIONS  Current Outpatient Medications   Medication Sig Dispense Refill     gabapentin (NEURONTIN) 100 MG  capsule 200 mg in AM, 200 mg at noon and 300mg at bedtime. 630 capsule 3     acetaminophen (TYLENOL) 500 MG tablet Take 500-1,000 mg by mouth every 6 hours as needed for mild pain       apixaban ANTICOAGULANT (ELIQUIS) 2.5 MG tablet Take 1 tablet (2.5 mg) by mouth 2 times daily. 180 tablet 4     atorvastatin (LIPITOR) 80 MG tablet Take 80 mg by mouth At Bedtime       calcium carbonate (TUMS) 500 MG chewable tablet Take 1-2 chew tab by mouth daily       carvedilol (COREG) 25 MG tablet Take 1 tablet (25 mg) by mouth 2 times daily (with meals). 180 tablet 1     escitalopram (LEXAPRO) 10 MG tablet Take 10 mg by mouth daily       ferrous sulfate (FEROSUL) 325 (65 Fe) MG tablet Take 325 mg by mouth daily (with breakfast)       fluorometholone (FML LIQUIFILM) 0.1 % ophthalmic suspension Place 1 drop into the right eye daily       furosemide (LASIX) 20 MG tablet TAKE 2 TABLETS(40 MG) BY MOUTH DAILY 180 tablet 3     hydrALAZINE (APRESOLINE) 100 MG tablet Take 1 tablet (100 mg) by mouth 3 times daily. 270 tablet 1     levothyroxine (SYNTHROID/LEVOTHROID) 50 MCG tablet Take 50 mcg by mouth daily       losartan (COZAAR) 50 MG tablet Take 1 tablet (50 mg) by mouth daily. 90 tablet 3     Multiple Vitamins-Minerals (MULTIVITAMIN ADULTS 50+) TABS Take 1 tablet by mouth every morning       omeprazole (PRILOSEC) 20 MG capsule Take 20 mg by mouth daily before breakfast       vit C/E/Zn/coppr/lutein/zeaxan (PRESERVISION AREDS-2 ORAL) Take 1 tablet by mouth 2 times daily (before meals)       No current facility-administered medications for this visit.        PHYSICAL EXAMINATION  VITALS: /63 (BP Location: Left arm, Patient Position: Sitting)   Pulse 65   GENERAL: Healthy appearing, alert, no acute distress, normal habitus.  CARDIOVASCULAR: Extremities warm and well perfused. Pulses present.   NEUROLOGICAL:  Patient is awake and oriented to self, place and time.  Attention span is normal.  Memory is grossly intact.  Language is  fluent and follows commands appropriately.  Appropriate fund of knowledge. Cranial nerves 2-12 are intact. There is no pronator drift.  Motor exam shows 5/5 strength in all extremities.  Tone is symmetric bilaterally in upper and lower extremities.  Reflexes are symmetric and 1+/diminished in upper extremities and lower extremities. Sensory exam is grossly intact to light touch, pin prick and vibration.  Finger to nose and heel to shin is without dysmetria.  Romberg is negative.  Gait is slightly wide-based and the patient is able to do tandem walk and walk on toes and heels with some difficulty.  She does have some antalgic gait due to the right knee.  Slightly more gait difficulty.  Exam stable.  No evidence of worsening neuropathy.  Reflexes were absent      DIAGNOSTICS  HEAD CT:  1.  No acute intracranial hemorrhage.  2.  No CT evidence acute infarct. Aspect score 10.  3.  Age-related changes described above.     Dr. Tashi Lackey was notified by Dr Kevin Cooper at  1:40 AM 09/13/2021.      HEAD CTA:   1.  No intracranial arterial large vessel occlusion.  2.  Right carotid siphon mild stenosis.   3.  Left carotid siphon severe stenosis.  4.  Otherwise, no significant stenosis/occlusion.      NECK CTA:  1.  Right vertebral artery origin moderate stenosis.  2.  Left proximal subclavian artery mild-to-moderate stenosis.  3.  Otherwise, no significant stenosis/occlusion. No dissection.  4.  Multiple thyroid nodules. Recommend nonemergent thyroid ultrasound based upon ACR white paper criteria.   5.  Lung apices demonstrate septal thickening with patchy groundglass opacities and consolidations most likely due to pulmonary edema. Please correlate clinically to exclude acute infectious inflammatory pneumonitis.     Carotid US  IMPRESSION:  1.  Mild plaque formation, velocities consistent with less than 50% stenosis in the right internal carotid artery.  2.  Mild plaque formation, velocities consistent with less than  50% stenosis in the left internal carotid artery.  3.  Flow within the vertebral arteries is antegrade.  4.  Bilateral thyroid nodules, recommend further evaluation with dedicated thyroid ultrasound.     CT L spine  1.  No evidence of lumbar spine fracture.     2.  Multilevel degenerative change throughout the lumbar disc spaces with moderate spinal canal stenosis at L3-4. Moderate spinal canal stenosis at L4-5. Variable degrees of foraminal narrowing. Please see body of report for details at each level.    EMG  CLINICAL INTERPRETATION:  This is an abnormal nerve conduction and EMG study.  The study is suggestive of a sensorimotor polyneuropathy in both legs.  Further clinical correlation is needed.    RELEVANT LABS  Component      Latest Ref Rng & Units 9/13/2021 9/15/2021   Albumin %      51.0 - 67.0 %  61.4   Albumin Fraction      3.2 - 4.7 g/dL  3.9   Alpha 1 %      2.0 - 4.0 %  3.7   Alpha 1 Fraction      0.1 - 0.3 g/dL  0.2   Alpha 2 %      5.0 - 13.0 %  12.2   Alpha 2 Fraction      0.4 - 0.9 g/dL  0.8   Beta %      10.0 - 17.0 %  10.0   Beta Fraction      0.7 - 1.2 g/dL  0.6 (L)   Gamma Globulin %      9.0 - 20.0 %  12.7   Gamma Fraction      0.6 - 1.4 g/dL  0.8   ELP Interpretation:        Normal serum protein electrophoresis.   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 At the present time, the bands are too faint for unequivocal diagnosis.   Path ICD       I16.1   Interpreted By       Eduardo Dyer MD     Component      Latest  Ref Rng & Units 5/12/2022   Vitamin B12      213 - 816 pg/mL 435     OUTSIDE RECORDS  Hospital notes reviewed.    SPEP  Component      Latest Ref Rng & Units 7/6/2022   Albumin Fraction      3.7 - 5.1 g/dL 4.2   Alpha 1 Fraction      0.2 - 0.4 g/dL 0.3   Alpha 2 Fraction      0.5 - 0.9 g/dL 0.8   Beta Fraction      0.6 - 1.0 g/dL 0.7   Gamma Fraction      0.7 - 1.6 g/dL 1.0   Monoclonal Peak      <=0.0 g/dL 0.0   ELP Interpretation:       No monoclonal protein seen by capillary electrophoresis. However, a possible very small IgM lambda monoclonal was seen in this sample by immunofixation which is a much more sensitive method for monoclonal detection. Pathologic significance requires clinical correlation. NORIS Ho M.D., Ph.D., Pathologist ().   Immunofixation ELP       Possible very small monoclonal IgM immunoglobulin of lambda light chain type. Pathologic significance requires clinical correlation.  NORIS Ho M.D., Ph.D., Pathologist ()   Total Protein Serum for ELP      6.4 - 8.3 g/dL 6.9     Hematology      CT head  IMPRESSION:  1.  No acute intracranial process or significant change since 04/05/2022.  2.  No significant TMJ arthropathy.      LABS  Component      Latest Ref Rng 5/12/2023  4:29 PM   WBC      4.0 - 11.0 10e3/uL 5.5    RBC Count      3.80 - 5.20 10e6/uL 3.50 (L)    Hemoglobin      11.7 - 15.7 g/dL 10.9 (L)    Hematocrit      35.0 - 47.0 % 32.6 (L)    MCV      78 - 100 fL 93    MCH      26.5 - 33.0 pg 31.1    MCHC      31.5 - 36.5 g/dL 33.4    RDW      10.0 - 15.0 % 14.4    Platelet Count      150 - 450 10e3/uL 177    % Neutrophils      % 62    % Lymphocytes      % 16    % Monocytes      % 18    % Eosinophils      % 3    % Basophils      % 1    % Immature Granulocytes      % 0    NRBCs per 100 WBC      <1 /100 0    Absolute Neutrophils      1.6 - 8.3 10e3/uL 3.4    Absolute Lymphocytes      0.8 - 5.3 10e3/uL 0.9    Absolute Monocytes      0.0 - 1.3 10e3/uL 1.0     Absolute Eosinophils      0.0 - 0.7 10e3/uL 0.2    Absolute Basophils      0.0 - 0.2 10e3/uL 0.1    Absolute Immature Granulocytes      <=0.4 10e3/uL 0.0    Absolute NRBCs      10e3/uL 0.0    Sodium      136 - 145 mmol/L 135 (L)    Potassium      3.4 - 5.3 mmol/L 4.2    Chloride      98 - 107 mmol/L 95 (L)    Carbon Dioxide (CO2)      22 - 29 mmol/L 29    Anion Gap      7 - 15 mmol/L 11    Urea Nitrogen      8.0 - 23.0 mg/dL 42.8 (H)    Creatinine      0.51 - 0.95 mg/dL 1.58 (H)    Calcium      8.8 - 10.2 mg/dL 9.5    Glucose      70 - 99 mg/dL 85    Alkaline Phosphatase      35 - 104 U/L 70    AST      10 - 35 U/L 33    ALT      10 - 35 U/L 24    Protein Total      6.4 - 8.3 g/dL 6.9    Albumin      3.5 - 5.2 g/dL 4.2    Bilirubin Total      <=1.2 mg/dL 0.4    GFR Estimate      >60 mL/min/1.73m2 33 (L)    LADARIUS interpretation      Negative  Positive !    LADARIUS pattern 1 Homogeneous    LADARIUS titer 1 1:320    Neutrophil Cytoplasmic Antibody      <1:10  <1:10    Neutrophil Cytoplasmic Antibody Pattern The ANCA IFA is <1:10. No further testing will be performed.    Angiotensin Converting Enzyme      16 - 85 U/L 46    Vitamin B12      232 - 1,245 pg/mL 1,000    Vitamin B1 Whole Blood Level      70 - 180 nmol/L 116    TSH      0.30 - 4.20 uIU/mL 6.36 (H)    Lyme Disease Antibodies Serum      <0.90  0.41    Hemoglobin A1C      <5.7 % 5.3    Sed Rate      0 - 30 mm/hr 17    T4 Free      0.90 - 1.70 ng/dL 1.00       Legend:  (L) Low  (H) High  ! Abnormal  Component      Latest Ref Rn 2/14/2024  2:08 PM   Albumin Fraction      3.7 - 5.1 g/dL 4.0    Alpha 1 Fraction      0.2 - 0.4 g/dL 0.3    Alpha 2 Fraction      0.5 - 0.9 g/dL 0.8    Beta Fraction      0.6 - 1.0 g/dL 0.7    Gamma Fraction      0.7 - 1.6 g/dL 1.1    Monoclonal Peak      <=0.0 g/dL 0.0    ELP Interpretation: Essentially normal electrophoretic pattern. No obvious monoclonal proteins seen. Pathologic significance requires clinical correlation. Andrew Rocha MD     Kappa Free Lt Chain      0.33 - 1.94 mg/dL 7.41 (H)    Lambda Free Lt Chain      0.57 - 2.63 mg/dL 4.72 (H)    Kappa Lambda Ratio      0.26 - 1.65  1.57    Total Protein Serum for ELP      6.4 - 8.3 g/dL 6.8       Legend:  (H) High    IMPRESSION/REPORT/PLAN  Abnormal SPEP  Diabetic neuropathy-worsening  Primary hypertension  Neuropathic pain/muscle cramps  New onset facial numbness-stable  History of sarcoidosis  Positive LADARIUS-suspect false positive    This is a 84 year old female with numbness in the bottom of her feet.  EMG is confirmatory for peripheral neuropathy. Exam does show decreased reflexes but otherwise negative for sensory loss.   CT of the lumbar spine does show spinal stenosis though this would not fit with her symptoms.     Etiology for neuropathy would most likely be diabetes.  She did have a positive serum electrophoresis which is being monitored through oncology.  There is no concerns for multiple myeloma though there is some family history of multiple myeloma.  Discussed prognosis of neuropathy.  Will recommend exercise and healthy lifestyle.  Neuropathy appears stable on exam.    Exam overall is stable.  There might be some mild progression.  Gabapentin does help with neuropathic pain/cramping.  Symptoms are worse in the evening and will increase the dose in the evening.  Recommend improving lifestyle/exercise/doing balance exercises.    Previously-patient was seen in the hospital for a spell of bilateral hand numbness shaking and elevated blood pressure.  This was thought to be hypertensive emergency.  Head CT was negative for stroke.  MRI could not be done because of pacemaker.  CT angiogram showed left carotid stenosis though otherwise noncontributory.  Carotid ultrasound was negative.  Patient remains on aspirin 81 mg.  She is also on a statin.  No strokelike symptoms we will continue to monitor.  No change.  Blood pressure slightly elevated today.  Could be whitecoat hypertension.  Seen  by the cardiologist and they are trying to keep her weight low with use of diuretics. -- Stable    Previously-she now reports left facial numbness.  MRI brain is not possible due to pacemaker.  CT of the head with contrast was negative for any new structural lesions.  Blood work overall has been noncontributory though she did test positive for LADARIUS.  Most likely this is a false positive.  She does have a history of sarcoidosis which could be a cause for her numbness or could be related to diabetes.  Given stable symptoms with no imaging findings we will hold off on trying aggressive treatment of sarcoidosis at this point.  Continue working with primary care for vascular risk factor management.  Stable/improved.    Previously-she does complain of some shoulder issues which I suspect is more orthopedic.  She does have some numbness in her hands which is more likely related to carpal tunnel.  She is already had carpal tunnel surgery.  Recommend follow-up with orthopedic doctor/sports medicine for her shoulder issues.    She has new onset of left foot pain.  She has been working with Ortho.  Send a CT myelogram is being done for her.  She can contact the clinic if there is something neurological that we need to address.    Return back on as-needed basis or in 1 year.    -     gabapentin (NEURONTIN) 100 MG capsule; 200 mg in AM, 200 mg at noon and 300mg at bedtime.      Return in about 1 year (around 6/23/2026) for In-Clinic Visit (must).    Over 31 minutes were spent coordinating the care for the patient on the day of the encounter.  This includes previsit, during visit and post visit activities as documented above.  Counseling patient.  Prescription management.  New problem.  Refractory problem.  (Activities include but not inclusive of reviewing chart, reviewing outside records, reviewing labs and imaging study results as well as the images, patient visit time including getting history and exam,  use if  applicable, review of test results with the patient and coming up with a plan in a shared model, counseling patient and family, education and answering patient questions, EMR , EMR diagnosis entry and problem list management, medication reconciliation and prescription management if applicable, paperwork if applicable, printing documents and documentation of the visit activities.)        Reji Davila MD  Neurologist  [facility]  Tel:- [phone]    This note was dictated using voice recognition software.  Any grammatical or context distortions are unintentional and inherent to the software.    The longitudinal plan of care for the diagnosis(es)/condition(s) as documented were addressed during this visit. Due to the added complexity in care, I will continue to support Sharyn in the subsequent management and with ongoing continuity of care.      Again, thank you for allowing me to participate in the care of your patient.        Sincerely,        Reji Davila MD    Electronically signed

## 2025-06-24 ENCOUNTER — DOCUMENTATION ONLY (OUTPATIENT)
Dept: ANTICOAGULATION | Facility: CLINIC | Age: 85
End: 2025-06-24

## 2025-06-26 ENCOUNTER — HOSPITAL ENCOUNTER (OUTPATIENT)
Dept: CARDIOLOGY | Facility: HOSPITAL | Age: 85
Discharge: HOME | End: 2025-06-26
Attending: INTERNAL MEDICINE
Payer: COMMERCIAL

## 2025-06-26 DIAGNOSIS — I50.32 CHRONIC HEART FAILURE WITH PRESERVED EJECTION FRACTION (H): ICD-10-CM

## 2025-06-26 PROCEDURE — 93306 TTE W/DOPPLER COMPLETE: CPT

## 2025-07-01 ENCOUNTER — RESULTS FOLLOW-UP (OUTPATIENT)
Dept: CARDIOLOGY | Facility: CLINIC | Age: 85
End: 2025-07-01

## 2025-07-01 DIAGNOSIS — I10 HYPERTENSION, UNSPECIFIED TYPE: ICD-10-CM

## 2025-07-02 ENCOUNTER — TELEPHONE (OUTPATIENT)
Dept: CARDIOLOGY | Facility: CLINIC | Age: 85
End: 2025-07-02
Payer: COMMERCIAL

## 2025-07-02 DIAGNOSIS — I34.0 MITRAL VALVE INSUFFICIENCY: Primary | ICD-10-CM

## 2025-07-02 RX ORDER — FUROSEMIDE 20 MG/1
40 TABLET ORAL DAILY
Qty: 180 TABLET | Refills: 2 | Status: SHIPPED | OUTPATIENT
Start: 2025-07-02

## 2025-07-02 NOTE — TELEPHONE ENCOUNTER
Order placed for echo in 6 months for ongoing surveillance of mitral valve Yomaira CONN RN BSN, CHFN, PCCN-K

## 2025-07-05 ENCOUNTER — HEALTH MAINTENANCE LETTER (OUTPATIENT)
Age: 85
End: 2025-07-05

## 2025-08-10 ENCOUNTER — ANCILLARY PROCEDURE (OUTPATIENT)
Dept: CARDIOLOGY | Facility: CLINIC | Age: 85
End: 2025-08-10
Attending: INTERNAL MEDICINE
Payer: COMMERCIAL

## 2025-08-10 DIAGNOSIS — Z95.0 PACEMAKER: ICD-10-CM

## 2025-08-10 DIAGNOSIS — I44.2 COMPLETE ATRIOVENTRICULAR BLOCK (H): ICD-10-CM

## 2025-08-11 ENCOUNTER — TELEPHONE (OUTPATIENT)
Dept: CARDIOLOGY | Facility: CLINIC | Age: 85
End: 2025-08-11
Payer: COMMERCIAL

## 2025-08-11 LAB
MDC_IDC_EPISODE_DTM: NORMAL
MDC_IDC_EPISODE_DURATION: 10 S
MDC_IDC_EPISODE_DURATION: 12 S
MDC_IDC_EPISODE_DURATION: 166 S
MDC_IDC_EPISODE_DURATION: 22 S
MDC_IDC_EPISODE_DURATION: 22 S
MDC_IDC_EPISODE_DURATION: 238 S
MDC_IDC_EPISODE_DURATION: 24 S
MDC_IDC_EPISODE_DURATION: 264 S
MDC_IDC_EPISODE_DURATION: 2676 S
MDC_IDC_EPISODE_DURATION: 32 S
MDC_IDC_EPISODE_DURATION: 54 S
MDC_IDC_EPISODE_DURATION: 54 S
MDC_IDC_EPISODE_DURATION: 56 S
MDC_IDC_EPISODE_DURATION: 60 S
MDC_IDC_EPISODE_DURATION: 664 S
MDC_IDC_EPISODE_DURATION: 8 S
MDC_IDC_EPISODE_ID: NORMAL
MDC_IDC_EPISODE_TYPE: NORMAL
MDC_IDC_LEAD_CONNECTION_STATUS: NORMAL
MDC_IDC_LEAD_CONNECTION_STATUS: NORMAL
MDC_IDC_LEAD_IMPLANT_DT: NORMAL
MDC_IDC_LEAD_IMPLANT_DT: NORMAL
MDC_IDC_LEAD_LOCATION: NORMAL
MDC_IDC_LEAD_LOCATION: NORMAL
MDC_IDC_LEAD_LOCATION_DETAIL_1: NORMAL
MDC_IDC_LEAD_LOCATION_DETAIL_1: NORMAL
MDC_IDC_LEAD_MFG: NORMAL
MDC_IDC_LEAD_MFG: NORMAL
MDC_IDC_LEAD_MODEL: NORMAL
MDC_IDC_LEAD_MODEL: NORMAL
MDC_IDC_LEAD_POLARITY_TYPE: NORMAL
MDC_IDC_LEAD_POLARITY_TYPE: NORMAL
MDC_IDC_LEAD_SERIAL: NORMAL
MDC_IDC_LEAD_SERIAL: NORMAL
MDC_IDC_MSMT_BATTERY_DTM: NORMAL
MDC_IDC_MSMT_BATTERY_REMAINING_LONGEVITY: 80 MO
MDC_IDC_MSMT_BATTERY_REMAINING_PERCENTAGE: 62 %
MDC_IDC_MSMT_BATTERY_RRT_TRIGGER: NORMAL
MDC_IDC_MSMT_BATTERY_STATUS: NORMAL
MDC_IDC_MSMT_BATTERY_VOLTAGE: 3.01 V
MDC_IDC_MSMT_LEADCHNL_RA_IMPEDANCE_VALUE: 410 OHM
MDC_IDC_MSMT_LEADCHNL_RA_LEAD_CHANNEL_STATUS: NORMAL
MDC_IDC_MSMT_LEADCHNL_RA_PACING_THRESHOLD_AMPLITUDE: 0.5 V
MDC_IDC_MSMT_LEADCHNL_RA_PACING_THRESHOLD_PULSEWIDTH: 0.5 MS
MDC_IDC_MSMT_LEADCHNL_RA_SENSING_INTR_AMPL: 2.8 MV
MDC_IDC_MSMT_LEADCHNL_RV_IMPEDANCE_VALUE: 490 OHM
MDC_IDC_MSMT_LEADCHNL_RV_LEAD_CHANNEL_STATUS: NORMAL
MDC_IDC_MSMT_LEADCHNL_RV_PACING_THRESHOLD_AMPLITUDE: 0.88 V
MDC_IDC_MSMT_LEADCHNL_RV_PACING_THRESHOLD_PULSEWIDTH: 0.5 MS
MDC_IDC_MSMT_LEADCHNL_RV_SENSING_INTR_AMPL: 7 MV
MDC_IDC_PG_IMPLANT_DTM: NORMAL
MDC_IDC_PG_MFG: NORMAL
MDC_IDC_PG_MODEL: NORMAL
MDC_IDC_PG_SERIAL: NORMAL
MDC_IDC_PG_TYPE: NORMAL
MDC_IDC_SESS_CLINIC_NAME: NORMAL
MDC_IDC_SESS_DTM: NORMAL
MDC_IDC_SESS_REPROGRAMMED: NO
MDC_IDC_SESS_TYPE: NORMAL
MDC_IDC_SET_BRADY_AT_MODE_SWITCH_MODE: NORMAL
MDC_IDC_SET_BRADY_AT_MODE_SWITCH_RATE: 180 {BEATS}/MIN
MDC_IDC_SET_BRADY_LOWRATE: 50 {BEATS}/MIN
MDC_IDC_SET_BRADY_MAX_SENSOR_RATE: 100 {BEATS}/MIN
MDC_IDC_SET_BRADY_MAX_TRACKING_RATE: 100 {BEATS}/MIN
MDC_IDC_SET_BRADY_MODE: NORMAL
MDC_IDC_SET_BRADY_PAV_DELAY_HIGH: 100 MS
MDC_IDC_SET_BRADY_PAV_DELAY_LOW: 300 MS
MDC_IDC_SET_BRADY_SAV_DELAY_HIGH: 100 MS
MDC_IDC_SET_BRADY_SAV_DELAY_LOW: 130 MS
MDC_IDC_SET_LEADCHNL_RA_PACING_AMPLITUDE: 1.5 V
MDC_IDC_SET_LEADCHNL_RA_PACING_ANODE_ELECTRODE_1: NORMAL
MDC_IDC_SET_LEADCHNL_RA_PACING_ANODE_LOCATION_1: NORMAL
MDC_IDC_SET_LEADCHNL_RA_PACING_CAPTURE_MODE: NORMAL
MDC_IDC_SET_LEADCHNL_RA_PACING_CATHODE_ELECTRODE_1: NORMAL
MDC_IDC_SET_LEADCHNL_RA_PACING_CATHODE_LOCATION_1: NORMAL
MDC_IDC_SET_LEADCHNL_RA_PACING_POLARITY: NORMAL
MDC_IDC_SET_LEADCHNL_RA_PACING_PULSEWIDTH: 0.5 MS
MDC_IDC_SET_LEADCHNL_RA_SENSING_ADAPTATION_MODE: NORMAL
MDC_IDC_SET_LEADCHNL_RA_SENSING_ANODE_ELECTRODE_1: NORMAL
MDC_IDC_SET_LEADCHNL_RA_SENSING_ANODE_LOCATION_1: NORMAL
MDC_IDC_SET_LEADCHNL_RA_SENSING_CATHODE_ELECTRODE_1: NORMAL
MDC_IDC_SET_LEADCHNL_RA_SENSING_CATHODE_LOCATION_1: NORMAL
MDC_IDC_SET_LEADCHNL_RA_SENSING_POLARITY: NORMAL
MDC_IDC_SET_LEADCHNL_RA_SENSING_SENSITIVITY: 0.2 MV
MDC_IDC_SET_LEADCHNL_RV_PACING_AMPLITUDE: 1.12
MDC_IDC_SET_LEADCHNL_RV_PACING_ANODE_ELECTRODE_1: NORMAL
MDC_IDC_SET_LEADCHNL_RV_PACING_ANODE_LOCATION_1: NORMAL
MDC_IDC_SET_LEADCHNL_RV_PACING_CAPTURE_MODE: NORMAL
MDC_IDC_SET_LEADCHNL_RV_PACING_CATHODE_ELECTRODE_1: NORMAL
MDC_IDC_SET_LEADCHNL_RV_PACING_CATHODE_LOCATION_1: NORMAL
MDC_IDC_SET_LEADCHNL_RV_PACING_POLARITY: NORMAL
MDC_IDC_SET_LEADCHNL_RV_PACING_PULSEWIDTH: 0.5 MS
MDC_IDC_SET_LEADCHNL_RV_SENSING_ADAPTATION_MODE: NORMAL
MDC_IDC_SET_LEADCHNL_RV_SENSING_ANODE_ELECTRODE_1: NORMAL
MDC_IDC_SET_LEADCHNL_RV_SENSING_ANODE_LOCATION_1: NORMAL
MDC_IDC_SET_LEADCHNL_RV_SENSING_CATHODE_ELECTRODE_1: NORMAL
MDC_IDC_SET_LEADCHNL_RV_SENSING_CATHODE_LOCATION_1: NORMAL
MDC_IDC_SET_LEADCHNL_RV_SENSING_POLARITY: NORMAL
MDC_IDC_SET_LEADCHNL_RV_SENSING_SENSITIVITY: 2 MV
MDC_IDC_SET_ZONE_TYPE: NORMAL
MDC_IDC_STAT_AT_BURDEN_PERCENT: 1 %
MDC_IDC_STAT_AT_DTM_END: NORMAL
MDC_IDC_STAT_AT_DTM_START: NORMAL
MDC_IDC_STAT_AT_MODE_SW_COUNT: 166
MDC_IDC_STAT_AT_MODE_SW_COUNT_PER_DAY: 2
MDC_IDC_STAT_AT_MODE_SW_MAX_DURATION: 3936 S
MDC_IDC_STAT_AT_MODE_SW_PERCENT_TIME: 1 %
MDC_IDC_STAT_BRADY_AP_VP_PERCENT: 1.9 %
MDC_IDC_STAT_BRADY_AP_VS_PERCENT: 1 %
MDC_IDC_STAT_BRADY_AS_VP_PERCENT: 97 %
MDC_IDC_STAT_BRADY_AS_VS_PERCENT: 1 %
MDC_IDC_STAT_BRADY_DTM_END: NORMAL
MDC_IDC_STAT_BRADY_DTM_START: NORMAL
MDC_IDC_STAT_BRADY_RA_PERCENT_PACED: 1 %
MDC_IDC_STAT_BRADY_RV_PERCENT_PACED: 99 %
MDC_IDC_STAT_CRT_DTM_END: NORMAL
MDC_IDC_STAT_CRT_DTM_START: NORMAL
MDC_IDC_STAT_EPISODE_RECENT_COUNT: 1
MDC_IDC_STAT_EPISODE_RECENT_COUNT: 166
MDC_IDC_STAT_EPISODE_RECENT_COUNT_DTM_END: NORMAL
MDC_IDC_STAT_EPISODE_RECENT_COUNT_DTM_END: NORMAL
MDC_IDC_STAT_EPISODE_TYPE: NORMAL
MDC_IDC_STAT_EPISODE_TYPE: NORMAL
MDC_IDC_STAT_EPISODE_VENDOR_TYPE: NORMAL
MDC_IDC_STAT_EPISODE_VENDOR_TYPE: NORMAL
MDC_IDC_STAT_HEART_RATE_ATRIAL_MAX: 330 {BEATS}/MIN
MDC_IDC_STAT_HEART_RATE_ATRIAL_MEAN: 65 {BEATS}/MIN
MDC_IDC_STAT_HEART_RATE_ATRIAL_MIN: 40 {BEATS}/MIN
MDC_IDC_STAT_HEART_RATE_DTM_END: NORMAL
MDC_IDC_STAT_HEART_RATE_DTM_START: NORMAL
MDC_IDC_STAT_HEART_RATE_VENTRICULAR_MAX: 240 {BEATS}/MIN
MDC_IDC_STAT_HEART_RATE_VENTRICULAR_MEAN: 63 {BEATS}/MIN
MDC_IDC_STAT_HEART_RATE_VENTRICULAR_MIN: 30 {BEATS}/MIN

## 2025-08-11 PROCEDURE — 93294 REM INTERROG EVL PM/LDLS PM: CPT | Performed by: INTERNAL MEDICINE

## 2025-08-11 PROCEDURE — 93296 REM INTERROG EVL PM/IDS: CPT | Performed by: INTERNAL MEDICINE

## 2025-08-21 ENCOUNTER — LAB REQUISITION (OUTPATIENT)
Dept: LAB | Facility: CLINIC | Age: 85
End: 2025-08-21
Payer: COMMERCIAL

## 2025-08-21 DIAGNOSIS — E03.9 HYPOTHYROIDISM, UNSPECIFIED: ICD-10-CM

## 2025-08-22 LAB — TSH SERPL DL<=0.005 MIU/L-ACNC: 4.07 UIU/ML (ref 0.3–4.2)

## 2025-08-25 DIAGNOSIS — I25.119 CORONARY ARTERY DISEASE INVOLVING NATIVE CORONARY ARTERY OF NATIVE HEART WITH ANGINA PECTORIS: ICD-10-CM

## 2025-08-25 DIAGNOSIS — I50.9 CHF (CONGESTIVE HEART FAILURE) (H): ICD-10-CM

## 2025-08-26 RX ORDER — HYDRALAZINE HYDROCHLORIDE 100 MG/1
100 TABLET, FILM COATED ORAL 3 TIMES DAILY
Qty: 270 TABLET | Refills: 2 | Status: SHIPPED | OUTPATIENT
Start: 2025-08-26

## 2025-08-26 RX ORDER — CARVEDILOL 25 MG/1
25 TABLET ORAL 2 TIMES DAILY WITH MEALS
Qty: 180 TABLET | Refills: 2 | Status: SHIPPED | OUTPATIENT
Start: 2025-08-26

## (undated) DEVICE — DAVINCI XI OBTURATOR BLADELESS 8MM 470359

## (undated) DEVICE — GLOVE UNDER INDICATOR PI SZ 7.0 LF 41670

## (undated) DEVICE — CUSTOM PACK PERI GYN SMA5BPGHEA

## (undated) DEVICE — ESU BLADE PEAK PLASMA 3.0S PS210-030S

## (undated) DEVICE — DRAPE U SPLIT 74X120" 29440

## (undated) DEVICE — GLOVE BIOGEL PI ULTRATOUCH G SZ 7.0 42170

## (undated) DEVICE — NEEDLE SPINAL DISP 22X4-3/4 QUIN 333315

## (undated) DEVICE — ADH SKIN CLOSURE PREMIERPRO EXOFIN 1.0ML 3470

## (undated) DEVICE — RETR ELEV / UTERINE MANIPULATOR V-CARE LG CUP 60-6085-202A

## (undated) DEVICE — LUBRICANT INST ELECTROLUBE EL101

## (undated) DEVICE — SUCTION MANIFOLD NEPTUNE 2 SYS 1 PORT 702-025-000

## (undated) DEVICE — SYSTEM LAPAROVUE VISIBILITY LAPVUE10

## (undated) DEVICE — DRAPE LAP/CHOLE W/O POUCH 89225

## (undated) DEVICE — SYR 10ML FINGER CONTROL W/O NDL 309695

## (undated) DEVICE — SOL WATER IRRIG 1000ML BOTTLE 2F7114

## (undated) DEVICE — SURGICEL ABSORBABLE HEMOSTAT SNOW 2"X4" 2082

## (undated) DEVICE — GLOVE BIOGEL PI ULTRATOUCH G SZ 6.5 42165

## (undated) DEVICE — GOWN IMPERVIOUS BREATHABLE SMART LG 89015

## (undated) DEVICE — POUCH TISSUE RETRIEVAL ROBOTIC 8MM 5.1" INTRO TRS-ROBO-8

## (undated) DEVICE — TRENGUARD 450 PROCEDURAL PACK 111404

## (undated) DEVICE — NEEDLE HYPO 22X1-1/2 SAFETY 305900

## (undated) DEVICE — MAT FLOOR SURGICAL 40X38 0702140238

## (undated) DEVICE — ESU PENCIL W/HOLSTER E2350H

## (undated) DEVICE — ESU ELEC BLADE 6" COATED E1450-6

## (undated) DEVICE — SUTURE VICRYL+ 2-0 27IN SH UND VCP417H

## (undated) DEVICE — SUTURE VICRYL+ 0 27IN CT-1 UND VCP260H

## (undated) DEVICE — BRIEF STRETCH XL MPS40

## (undated) DEVICE — SU STRATAFIX PDS 0 CT-2 30CM SXPP1B405

## (undated) DEVICE — PROTECTOR ARM STANDARD ONE STEP

## (undated) DEVICE — PREP SCRUB SOL EXIDINE 4% CHG 4OZ 29002-404

## (undated) DEVICE — PAD POS XL 1X20X40IN PINK PIGAZZI

## (undated) DEVICE — SURGICEL ABSORBABLE HEMOSTAT SNOW 4"X4" 2083

## (undated) DEVICE — DAVINCI XI SEAL UNIVERSAL 5-8MM 470361

## (undated) DEVICE — LEGGINGS 31X48" LF KM89408

## (undated) DEVICE — TUBING LAP SUCT/IRRIG STRYKER 250070500

## (undated) DEVICE — SOL NACL 0.9% INJ 1000ML BAG 2B1324X

## (undated) DEVICE — BLADE KNIFE SURG 11 371111

## (undated) DEVICE — ESU PENCIL SMOKE EVAC W/ROCKER SWITCH 0703-047-000

## (undated) DEVICE — CUSTOM PACK PACER ICD SAN5BPCHEA

## (undated) DEVICE — DAVINCI XI DRAPE ARM 470015

## (undated) DEVICE — DAVINCI XI DRAPE COLUMN 470341

## (undated) DEVICE — DRSG TELFA 3X4" 1050

## (undated) DEVICE — DRAPE IOBAN INCISE 23X17" 6650EZ

## (undated) DEVICE — SUTURE VICRYL+ 0 36IN CT-1 UND VCP946H

## (undated) DEVICE — DAVINCI XI HANDPIECE ESU VESSEL SEALER 8MM EXT 480422

## (undated) DEVICE — DRAPE SHEET TABLE COVER KC 42301*

## (undated) DEVICE — SU MONOCRYL 3-0 PS-2 18" UND MCP497G

## (undated) DEVICE — KIT PROCEDURE FLUENT IN/OUT FLOWPAK TISS TRAP FLT-112S

## (undated) DEVICE — DAVINCI HOT SHEARS TIP COVER  400180

## (undated) DEVICE — PREP CHLORAPREP 26ML TINTED HI-LITE ORANGE 930815

## (undated) DEVICE — DRAPE POUCH INSTRUMENT 3 POCKET 1018L

## (undated) DEVICE — TUBING FILTER TRI-LUMEN AIRSEAL ASC-EVAC1

## (undated) DEVICE — SYR 50ML SLIP TIP W/O NDL 309654

## (undated) DEVICE — SOLUTION IRRIG 2B7127 .9NS 3000ML BAG

## (undated) DEVICE — CUSTOM PACK DA VINCI GYN SMA5BDVHEA

## (undated) DEVICE — CATH FOLEY 5CC 16FR SIL/LTX 0165V16S

## (undated) RX ORDER — ONDANSETRON 2 MG/ML
INJECTION INTRAMUSCULAR; INTRAVENOUS
Status: DISPENSED
Start: 2022-05-31

## (undated) RX ORDER — WATER 10 ML/10ML
INJECTION INTRAMUSCULAR; INTRAVENOUS; SUBCUTANEOUS
Status: DISPENSED
Start: 2022-05-31

## (undated) RX ORDER — FENTANYL CITRATE 50 UG/ML
INJECTION, SOLUTION INTRAMUSCULAR; INTRAVENOUS
Status: DISPENSED
Start: 2022-04-19

## (undated) RX ORDER — LIDOCAINE HYDROCHLORIDE 10 MG/ML
INJECTION, SOLUTION EPIDURAL; INFILTRATION; INTRACAUDAL; PERINEURAL
Status: DISPENSED
Start: 2022-04-19

## (undated) RX ORDER — VANCOMYCIN HYDROCHLORIDE 1 G/20ML
INJECTION, POWDER, LYOPHILIZED, FOR SOLUTION INTRAVENOUS
Status: DISPENSED
Start: 2021-08-30

## (undated) RX ORDER — DEXAMETHASONE SODIUM PHOSPHATE 10 MG/ML
INJECTION INTRAMUSCULAR; INTRAVENOUS
Status: DISPENSED
Start: 2022-05-31

## (undated) RX ORDER — INDOCYANINE GREEN AND WATER 25 MG
KIT INJECTION
Status: DISPENSED
Start: 2022-05-31

## (undated) RX ORDER — PROPOFOL 10 MG/ML
INJECTION, EMULSION INTRAVENOUS
Status: DISPENSED
Start: 2022-05-31

## (undated) RX ORDER — FENTANYL CITRATE 50 UG/ML
INJECTION, SOLUTION INTRAMUSCULAR; INTRAVENOUS
Status: DISPENSED
Start: 2022-05-31

## (undated) RX ORDER — FENTANYL CITRATE 50 UG/ML
INJECTION, SOLUTION INTRAMUSCULAR; INTRAVENOUS
Status: DISPENSED
Start: 2021-08-30